# Patient Record
Sex: MALE | Race: BLACK OR AFRICAN AMERICAN | Employment: OTHER | URBAN - METROPOLITAN AREA
[De-identification: names, ages, dates, MRNs, and addresses within clinical notes are randomized per-mention and may not be internally consistent; named-entity substitution may affect disease eponyms.]

---

## 2019-08-29 ENCOUNTER — IP HISTORICAL/CONVERTED ENCOUNTER (OUTPATIENT)
Dept: OTHER | Age: 69
End: 2019-08-29

## 2019-09-19 ENCOUNTER — IP HISTORICAL/CONVERTED ENCOUNTER (OUTPATIENT)
Dept: OTHER | Age: 69
End: 2019-09-19

## 2020-01-22 ENCOUNTER — OP HISTORICAL/CONVERTED ENCOUNTER (OUTPATIENT)
Dept: OTHER | Age: 70
End: 2020-01-22

## 2020-01-28 ENCOUNTER — OP HISTORICAL/CONVERTED ENCOUNTER (OUTPATIENT)
Dept: OTHER | Age: 70
End: 2020-01-28

## 2020-08-20 ENCOUNTER — OP HISTORICAL/CONVERTED ENCOUNTER (OUTPATIENT)
Dept: OTHER | Age: 70
End: 2020-08-20

## 2020-09-29 ENCOUNTER — HOSPITAL ENCOUNTER (OUTPATIENT)
Age: 70
Setting detail: OUTPATIENT SURGERY
Discharge: HOME | End: 2020-09-29
Attending: INTERNAL MEDICINE | Admitting: INTERNAL MEDICINE
Payer: MEDICARE

## 2020-09-29 VITALS
OXYGEN SATURATION: 99 % | HEART RATE: 71 BPM | HEIGHT: 70 IN | DIASTOLIC BLOOD PRESSURE: 73 MMHG | TEMPERATURE: 97.6 F | BODY MASS INDEX: 32.93 KG/M2 | WEIGHT: 230 LBS | SYSTOLIC BLOOD PRESSURE: 150 MMHG | RESPIRATION RATE: 18 BRPM

## 2020-09-29 LAB
GLUCOSE BLD STRIP.AUTO-MCNC: 108 MG/DL (ref 65–100)
PERFORMED BY, TECHID: ABNORMAL

## 2020-09-29 PROCEDURE — P9045 ALBUMIN (HUMAN), 5%, 250 ML: HCPCS | Performed by: INTERNAL MEDICINE

## 2020-09-29 PROCEDURE — 77030018870 HC TY PARCNT BD -B: Performed by: INTERNAL MEDICINE

## 2020-09-29 PROCEDURE — 74011250636 HC RX REV CODE- 250/636: Performed by: INTERNAL MEDICINE

## 2020-09-29 PROCEDURE — 2709999900 HC NON-CHARGEABLE SUPPLY: Performed by: INTERNAL MEDICINE

## 2020-09-29 PROCEDURE — 76040000007: Performed by: INTERNAL MEDICINE

## 2020-09-29 PROCEDURE — 82962 GLUCOSE BLOOD TEST: CPT

## 2020-09-29 RX ORDER — SODIUM CHLORIDE 9 MG/ML
20 INJECTION, SOLUTION INTRAVENOUS CONTINUOUS
Status: DISCONTINUED | OUTPATIENT
Start: 2020-09-29 | End: 2020-09-29 | Stop reason: HOSPADM

## 2020-09-29 RX ORDER — ALBUMIN HUMAN 50 G/1000ML
37.5 SOLUTION INTRAVENOUS ONCE
Status: COMPLETED | OUTPATIENT
Start: 2020-09-29 | End: 2020-09-29

## 2020-09-29 RX ADMIN — ALBUMIN (HUMAN) 37.5 G: 12.5 INJECTION, SOLUTION INTRAVENOUS at 16:00

## 2020-09-29 NOTE — PROGRESS NOTES
Third bottle of albumin started per Dr. Leola Bose order to complete dose of 37.5grams dressing clean dry and intact to right abdomen

## 2020-09-29 NOTE — ROUTINE PROCESS
Second bottle of albumin started per Dr. Goldman Flow order. Dressing intact slight serous drainage around bandaid cleaned with alcohol.

## 2020-09-29 NOTE — PROGRESS NOTES
After waiting for Dr. Melvin Costa for 30 minutes I attempted to call him 4 times on his cell phone with no success, the office is now closed, and called 2 east and ICU to verify if he was on these units the patient's family member is waiting at the door for him and the patient is ready to be discharged; Patient's abdominal site is clean dry and intact with no more active drainage; Gave the patient Dr. Brii Villatoro office number to call and ask any questions that he may have tomorrow and educated him on signs and symptoms to look out for and when to call the DRUsha Or return to the ER the patient states understanding. Discharged via wheelchair no signs or symptoms of distress noted brother-in-law in attendance iv to right hand d/asiya tape and gauze applied.

## 2020-09-29 NOTE — PROGRESS NOTES
Notified Dr. Jessica Casillas patient has some drainage around bandaid dressing changed 2x2 and bandaids applied for pressure he states he will come talk to patient prior to d/c but to educate patient to lay on opposite side for about 2 hours once at home and if more comfortable while here at the hospital continuing to monitor.

## 2020-10-01 NOTE — PROCEDURES
[facility]  PROCEDURE NOTE    Name:  Rachel Plunkett  MR#:  [id number]  :  1950  ACCOUNT #:  [de-identified]  DATE OF SERVICE:  2020    PARACENTESIS REPORT    REQUESTING PHYSICIAN:  Madelin Rodriguez MD.    PREOPERATIVE DIAGNOSIS: Ascites. POST-PROCEDURE DIAGNOSIS:  Twelve liters of clear fluid was drained from abdomen and 750 mL of 5% albumin was infused. The patient tolerated the procedure very well and there were no immediate post-procedure complications. PROCEDURE PERFORMED:  Paracentesis. SURGEON:  Garcia Yee MD.    Freddy Dueñas    ANESTHESIA:  Topical anesthesia with lidocaine    ESTIMATED BLOOD LOSS: none. SPECIMENS REMOVED:  yes. COMPLICATIONS:  none. IMPLANTS:  none. INDICATIONS:  Grade IV ascites. PROCEDURE IN DETAIL:  The patient was placed in supine position. Ultrasound was used to susy the site for paracentesis. Area was prepped and draped and anesthetized with 1% lidocaine. A small stab incision was placed with a blade and trocar with a needle was introduced into the abdominal cavity. This was then hooked up to vacuum bottles and 12.2 liters of fluid was removed. The patient tolerated the procedure very well. His blood pressure and heart rate were monitored throughout the procedure. 750 ml of 5% Albumin infusion was given. RECOMMENDATIONS:  Low-salt diet, diuretic therapy, and followup as an outpatient in two weeks' time. Thank you for allowing me to participate in this patient's care.       Frederic Weiss MD      [id number]  D:  10/01/2020 9:25  T:  10/01/2020 11:53  JOB #:  [id number]  CC:  Madeiln Rodriguez MD

## 2020-11-29 ENCOUNTER — HOSPITAL ENCOUNTER (OUTPATIENT)
Dept: PREADMISSION TESTING | Age: 70
Discharge: HOME | End: 2020-11-29
Payer: MEDICARE

## 2020-11-29 LAB — SARS-COV-2, COV2: NORMAL

## 2020-11-29 PROCEDURE — 87635 SARS-COV-2 COVID-19 AMP PRB: CPT

## 2020-11-30 LAB — SARS-COV-2, COV2NT: NOT DETECTED

## 2020-12-03 ENCOUNTER — APPOINTMENT (OUTPATIENT)
Dept: ENDOSCOPY | Age: 70
End: 2020-12-03
Attending: INTERNAL MEDICINE
Payer: MEDICARE

## 2020-12-03 ENCOUNTER — ANESTHESIA (OUTPATIENT)
Dept: ENDOSCOPY | Age: 70
End: 2020-12-03
Payer: MEDICARE

## 2020-12-03 ENCOUNTER — HOSPITAL ENCOUNTER (OUTPATIENT)
Age: 70
Setting detail: OUTPATIENT SURGERY
Discharge: HOME | End: 2020-12-03
Attending: INTERNAL MEDICINE | Admitting: INTERNAL MEDICINE
Payer: MEDICARE

## 2020-12-03 ENCOUNTER — ANESTHESIA EVENT (OUTPATIENT)
Dept: ENDOSCOPY | Age: 70
End: 2020-12-03
Payer: MEDICARE

## 2020-12-03 VITALS
BODY MASS INDEX: 30.78 KG/M2 | WEIGHT: 215 LBS | SYSTOLIC BLOOD PRESSURE: 149 MMHG | RESPIRATION RATE: 16 BRPM | HEART RATE: 65 BPM | TEMPERATURE: 98 F | DIASTOLIC BLOOD PRESSURE: 86 MMHG | OXYGEN SATURATION: 95 % | HEIGHT: 70 IN

## 2020-12-03 LAB
GLUCOSE BLD STRIP.AUTO-MCNC: 102 MG/DL (ref 65–100)
PERFORMED BY, TECHID: ABNORMAL

## 2020-12-03 PROCEDURE — 74011250636 HC RX REV CODE- 250/636: Performed by: NURSE ANESTHETIST, CERTIFIED REGISTERED

## 2020-12-03 PROCEDURE — 74011000250 HC RX REV CODE- 250: Performed by: NURSE ANESTHETIST, CERTIFIED REGISTERED

## 2020-12-03 PROCEDURE — 2709999900 HC NON-CHARGEABLE SUPPLY: Performed by: INTERNAL MEDICINE

## 2020-12-03 PROCEDURE — 88305 TISSUE EXAM BY PATHOLOGIST: CPT

## 2020-12-03 PROCEDURE — 76040000007: Performed by: INTERNAL MEDICINE

## 2020-12-03 PROCEDURE — 76060000032 HC ANESTHESIA 0.5 TO 1 HR: Performed by: INTERNAL MEDICINE

## 2020-12-03 PROCEDURE — 77030021593 HC FCPS BIOP ENDOSC BSC -A: Performed by: INTERNAL MEDICINE

## 2020-12-03 PROCEDURE — 82962 GLUCOSE BLOOD TEST: CPT

## 2020-12-03 PROCEDURE — 77030019988 HC FCPS ENDOSC DISP BSC -B: Performed by: INTERNAL MEDICINE

## 2020-12-03 PROCEDURE — 74011250636 HC RX REV CODE- 250/636: Performed by: INTERNAL MEDICINE

## 2020-12-03 RX ORDER — FUROSEMIDE 80 MG/1
80 TABLET ORAL 2 TIMES DAILY
COMMUNITY
End: 2021-01-24

## 2020-12-03 RX ORDER — OMEPRAZOLE/SODIUM BICARBONATE 20MG-1.1G
1 CAPSULE ORAL 2 TIMES DAILY
COMMUNITY
End: 2021-01-24

## 2020-12-03 RX ORDER — SODIUM CHLORIDE 9 MG/ML
25 INJECTION, SOLUTION INTRAVENOUS CONTINUOUS
Status: DISCONTINUED | OUTPATIENT
Start: 2020-12-03 | End: 2020-12-04 | Stop reason: HOSPADM

## 2020-12-03 RX ORDER — METOPROLOL TARTRATE 5 MG/5ML
2.5 INJECTION INTRAVENOUS
Status: CANCELLED | OUTPATIENT
Start: 2020-12-03

## 2020-12-03 RX ORDER — LABETALOL HCL 20 MG/4 ML
5 SYRINGE (ML) INTRAVENOUS
Status: CANCELLED | OUTPATIENT
Start: 2020-12-03

## 2020-12-03 RX ORDER — SODIUM CHLORIDE 9 MG/ML
INJECTION, SOLUTION INTRAVENOUS
Status: DISCONTINUED | OUTPATIENT
Start: 2020-12-03 | End: 2020-12-03 | Stop reason: HOSPADM

## 2020-12-03 RX ORDER — HYDRALAZINE HYDROCHLORIDE 20 MG/ML
10 INJECTION INTRAMUSCULAR; INTRAVENOUS
Status: CANCELLED | OUTPATIENT
Start: 2020-12-03

## 2020-12-03 RX ORDER — LIDOCAINE HYDROCHLORIDE 20 MG/ML
INJECTION, SOLUTION EPIDURAL; INFILTRATION; INTRACAUDAL; PERINEURAL
Status: COMPLETED
Start: 2020-12-03 | End: 2020-12-03

## 2020-12-03 RX ORDER — PROPOFOL 10 MG/ML
INJECTION, EMULSION INTRAVENOUS
Status: COMPLETED
Start: 2020-12-03 | End: 2020-12-03

## 2020-12-03 RX ORDER — CALCITRIOL 0.25 UG/1
0.25 CAPSULE ORAL DAILY
COMMUNITY
End: 2021-12-08 | Stop reason: ALTCHOICE

## 2020-12-03 RX ORDER — METOPROLOL SUCCINATE 50 MG/1
50 TABLET, EXTENDED RELEASE ORAL 2 TIMES DAILY
COMMUNITY
End: 2021-09-16

## 2020-12-03 RX ORDER — PROPOFOL 10 MG/ML
INJECTION, EMULSION INTRAVENOUS AS NEEDED
Status: DISCONTINUED | OUTPATIENT
Start: 2020-12-03 | End: 2020-12-03 | Stop reason: HOSPADM

## 2020-12-03 RX ORDER — CLONIDINE HYDROCHLORIDE 0.1 MG/1
0.1 TABLET ORAL 3 TIMES DAILY
COMMUNITY
End: 2021-01-24

## 2020-12-03 RX ORDER — HYDRALAZINE HYDROCHLORIDE 100 MG/1
100 TABLET, FILM COATED ORAL 3 TIMES DAILY
Status: ON HOLD | COMMUNITY
End: 2021-01-24 | Stop reason: SDUPTHER

## 2020-12-03 RX ORDER — LIDOCAINE HYDROCHLORIDE 20 MG/ML
INJECTION, SOLUTION EPIDURAL; INFILTRATION; INTRACAUDAL; PERINEURAL AS NEEDED
Status: DISCONTINUED | OUTPATIENT
Start: 2020-12-03 | End: 2020-12-03 | Stop reason: HOSPADM

## 2020-12-03 RX ORDER — ATORVASTATIN CALCIUM 40 MG/1
40 TABLET, FILM COATED ORAL DAILY
COMMUNITY

## 2020-12-03 RX ADMIN — PROPOFOL 20 MG: 10 INJECTION, EMULSION INTRAVENOUS at 08:32

## 2020-12-03 RX ADMIN — SODIUM CHLORIDE 25 ML/HR: 9 INJECTION, SOLUTION INTRAVENOUS at 07:51

## 2020-12-03 RX ADMIN — LIDOCAINE HYDROCHLORIDE 100 MG: 20 INJECTION, SOLUTION EPIDURAL; INFILTRATION; INTRACAUDAL; PERINEURAL at 08:06

## 2020-12-03 RX ADMIN — PROPOFOL 20 MG: 10 INJECTION, EMULSION INTRAVENOUS at 08:20

## 2020-12-03 RX ADMIN — PROPOFOL 20 MG: 10 INJECTION, EMULSION INTRAVENOUS at 08:35

## 2020-12-03 RX ADMIN — PROPOFOL 20 MG: 10 INJECTION, EMULSION INTRAVENOUS at 08:29

## 2020-12-03 RX ADMIN — PROPOFOL 20 MG: 10 INJECTION, EMULSION INTRAVENOUS at 08:23

## 2020-12-03 RX ADMIN — PROPOFOL 50 MG: 10 INJECTION, EMULSION INTRAVENOUS at 08:08

## 2020-12-03 RX ADMIN — PROPOFOL 20 MG: 10 INJECTION, EMULSION INTRAVENOUS at 08:27

## 2020-12-03 RX ADMIN — SODIUM CHLORIDE: 9 INJECTION, SOLUTION INTRAVENOUS at 08:00

## 2020-12-03 RX ADMIN — PROPOFOL 100 MG: 10 INJECTION, EMULSION INTRAVENOUS at 08:06

## 2020-12-03 RX ADMIN — PROPOFOL 20 MG: 10 INJECTION, EMULSION INTRAVENOUS at 08:17

## 2020-12-03 RX ADMIN — PROPOFOL 50 MG: 10 INJECTION, EMULSION INTRAVENOUS at 08:12

## 2020-12-03 RX ADMIN — PROPOFOL 20 MG: 10 INJECTION, EMULSION INTRAVENOUS at 08:37

## 2020-12-03 RX ADMIN — PROPOFOL 20 MG: 10 INJECTION, EMULSION INTRAVENOUS at 08:25

## 2020-12-03 NOTE — ROUTINE PROCESS
Pt talked with MD prior to d/c, taking po's well, has followup appt for two weeks already scheduled. D/c'd home with family. 13.2

## 2020-12-03 NOTE — ANESTHESIA PREPROCEDURE EVALUATION
Relevant Problems   No relevant active problems       Anesthetic History   No history of anesthetic complications            Review of Systems / Medical History  Patient summary reviewed, nursing notes reviewed and pertinent labs reviewed    Pulmonary  Within defined limits                 Neuro/Psych   Within defined limits           Cardiovascular    Hypertension      CHF    Hyperlipidemia         GI/Hepatic/Renal         Renal disease: CRI  Liver disease     Endo/Other    Diabetes         Other Findings            Past Medical History:   Diagnosis Date    [facility]     Chronic kidney disease     CKD (chronic kidney disease)     4    Diabetes ([facility]     Hypertension        Past Surgical History:   Procedure Laterality Date    HX HEART CATHETERIZATION      HX HERNIA REPAIR         Current Outpatient Medications   Medication Instructions    atorvastatin (LIPITOR) 40 mg, Oral, DAILY    calcitRIOL (ROCALTROL) 0.25 mcg, Oral, DAILY    cloNIDine HCL (CATAPRES) 0.1 mg, Oral, 3 TIMES DAILY    furosemide (LASIX) 80 mg, Oral, 2 TIMES DAILY    hydrALAZINE (APRESOLINE) 100 mg, Oral, 3 TIMES DAILY    metoprolol succinate (TOPROL-XL) 50 mg, Oral, 2 TIMES DAILY    omeprazole-sodium bicarbonate (ZEGERID) 20-1.1 mg-gram capsule 1 Cap, Oral, 2 TIMES DAILY    OTHER,NON-FORMULARY, 5 mg, Oral, DAILY, Ongluza        Current Facility-Administered Medications   Medication Dose Route Frequency    0.9% sodium chloride infusion  25 mL/hr IntraVENous CONTINUOUS       Patient Vitals for the past 24 hrs:   Temp Pulse Resp BP SpO2   12/03/20 0739 36.7 °C (98 °F) 69 18 (!) 162/85 98 %       Lab Results   Component Value Date/Time    WBC 8.8 07/08/2010 04:10 AM    HGB 13.9 07/08/2010 04:10 AM    HCT 38.6 07/08/2010 04:10 AM    PLATELET 119 15/00/9243 04:10 AM    MCV 79.6 (L) 07/08/2010 04:10 AM     Lab Results   Component Value Date/Time    Sodium 129 (L) 07/08/2010 04:10 AM    Potassium 4.5 07/08/2010 04:10 AM    Chloride 93 (L) 07/08/2010 04:10 AM    CO2 21 07/08/2010 04:10 AM    Anion gap 15 07/08/2010 04:10 AM    Glucose 497 (H) 07/08/2010 04:10 AM    BUN 18 07/08/2010 04:10 AM    Creatinine 2.0 (H) 07/08/2010 04:10 AM    BUN/Creatinine ratio 9 (L) 07/08/2010 04:10 AM    GFR est AA 44 (L) 07/08/2010 04:10 AM    GFR est non-AA 37 (L) 07/08/2010 04:10 AM    Calcium 9.0 07/08/2010 04:10 AM     No results found for: APTT, PTP, INR, INREXT  Lab Results   Component Value Date/Time    Glucose 497 (H) 07/08/2010 04:10 AM    Glucose (POC) 102 (H) 12/03/2020 07:50 AM     Physical Exam    Airway  Mallampati: I  TM Distance: 4 - 6 cm  Neck ROM: normal range of motion   Mouth opening: Normal     Cardiovascular    Rhythm: regular  Rate: normal         Dental  No notable dental hx       Pulmonary  Breath sounds clear to auscultation               Abdominal  GI exam deferred       Other Findings            Anesthetic Plan    ASA: 3  Anesthesia type: total IV anesthesia and general          Induction: Intravenous  Anesthetic plan and risks discussed with: Patient and Family      General anesthesia was prescribed for this patient because by definition it is \"a drug-induced loss of consciousness during which patients are not arousable, even by painful stimulation. \" Sometimes, the ability to independently maintain ventilatory function is often impaired and patients often require assistance in maintaining a patent airway. Occasionally, positive pressure ventilation may be required because of depressed spontaneous ventilation or drug-induced depression of neuromuscular function. This depth of anesthesia is preferred for endoscopic procedures to facilitate the procedure and for patient safety/quality of care.

## 2021-01-16 ENCOUNTER — HOSPITAL ENCOUNTER (INPATIENT)
Age: 71
LOS: 8 days | Discharge: NURSING, REHAB OR LONG-TERM CARE | DRG: 177 | End: 2021-01-24
Attending: EMERGENCY MEDICINE | Admitting: HOSPITALIST
Payer: MEDICARE

## 2021-01-16 ENCOUNTER — APPOINTMENT (OUTPATIENT)
Dept: GENERAL RADIOLOGY | Age: 71
DRG: 177 | End: 2021-01-16
Attending: EMERGENCY MEDICINE
Payer: MEDICARE

## 2021-01-16 DIAGNOSIS — J96.01 ACUTE RESPIRATORY FAILURE WITH HYPOXIA (HCC): Primary | ICD-10-CM

## 2021-01-16 DIAGNOSIS — U07.1 COVID-19: ICD-10-CM

## 2021-01-16 DIAGNOSIS — N17.9 ACUTE RENAL FAILURE SUPERIMPOSED ON STAGE 5 CHRONIC KIDNEY DISEASE, NOT ON CHRONIC DIALYSIS, UNSPECIFIED ACUTE RENAL FAILURE TYPE (HCC): ICD-10-CM

## 2021-01-16 DIAGNOSIS — N18.5 ACUTE RENAL FAILURE SUPERIMPOSED ON STAGE 5 CHRONIC KIDNEY DISEASE, NOT ON CHRONIC DIALYSIS, UNSPECIFIED ACUTE RENAL FAILURE TYPE (HCC): ICD-10-CM

## 2021-01-16 DIAGNOSIS — I50.9 CONGESTIVE HEART FAILURE, UNSPECIFIED HF CHRONICITY, UNSPECIFIED HEART FAILURE TYPE (HCC): ICD-10-CM

## 2021-01-16 PROBLEM — N17.0 ATN (ACUTE TUBULAR NECROSIS) (HCC): Status: ACTIVE | Noted: 2021-01-16

## 2021-01-16 PROBLEM — J96.90 RESPIRATORY FAILURE (HCC): Status: ACTIVE | Noted: 2021-01-16

## 2021-01-16 LAB
ALBUMIN SERPL-MCNC: 2.9 G/DL (ref 3.5–5)
ALBUMIN/GLOB SERPL: 0.4 {RATIO} (ref 1.1–2.2)
ALP SERPL-CCNC: 121 U/L (ref 45–117)
ALT SERPL-CCNC: 31 U/L (ref 12–78)
ANION GAP SERPL CALC-SCNC: 17 MMOL/L (ref 5–15)
AST SERPL W P-5'-P-CCNC: 75 U/L (ref 15–37)
BASE DEFICIT BLDV-SCNC: 10.5 MMOL/L (ref 0–2)
BASOPHILS # BLD: 0 K/UL (ref 0–0.1)
BASOPHILS NFR BLD: 0 % (ref 0–1)
BDY SITE: ABNORMAL
BILIRUB SERPL-MCNC: 0.5 MG/DL (ref 0.2–1)
BNP SERPL-MCNC: ABNORMAL PG/ML
BUN SERPL-MCNC: 115 MG/DL (ref 6–20)
BUN/CREAT SERPL: 13 (ref 12–20)
CA-I BLD-MCNC: 8.9 MG/DL (ref 8.5–10.1)
CHLORIDE SERPL-SCNC: 105 MMOL/L (ref 97–108)
CO2 SERPL-SCNC: 12 MMOL/L (ref 21–32)
COVID-19 RAPID TEST, COVR: DETECTED
CREAT SERPL-MCNC: 9.17 MG/DL (ref 0.7–1.3)
DIFFERENTIAL METHOD BLD: ABNORMAL
EOSINOPHIL # BLD: 0 K/UL (ref 0–0.4)
EOSINOPHIL NFR BLD: 0 % (ref 0–7)
ERYTHROCYTE [DISTWIDTH] IN BLOOD BY AUTOMATED COUNT: 15 % (ref 11.5–14.5)
GAS FLOW.O2 O2 DELIVERY SYS: 4 L/MIN
GLOBULIN SER CALC-MCNC: 7.5 G/DL (ref 2–4)
GLUCOSE SERPL-MCNC: 172 MG/DL (ref 65–100)
HCO3 BLDV-SCNC: 16 MMOL/L (ref 22–26)
HCT VFR BLD AUTO: 39.2 % (ref 36.6–50.3)
HGB BLD-MCNC: 13.2 G/DL (ref 12.1–17)
IMM GRANULOCYTES # BLD AUTO: 0 K/UL
IMM GRANULOCYTES NFR BLD AUTO: 0 %
LYMPHOCYTES # BLD: 0.8 K/UL (ref 0.8–3.5)
LYMPHOCYTES NFR BLD: 6 % (ref 12–49)
MAGNESIUM SERPL-MCNC: 2.2 MG/DL (ref 1.6–2.4)
MCH RBC QN AUTO: 27.2 PG (ref 26–34)
MCHC RBC AUTO-ENTMCNC: 33.7 G/DL (ref 30–36.5)
MCV RBC AUTO: 80.7 FL (ref 80–99)
MONOCYTES # BLD: 1.1 K/UL (ref 0–1)
MONOCYTES NFR BLD: 8 % (ref 5–13)
NEUTS SEG # BLD: 11.5 K/UL (ref 1.8–8)
NEUTS SEG NFR BLD: 86 % (ref 32–75)
NRBC # BLD: 0 K/UL (ref 0–0.01)
NRBC BLD-RTO: 0 PER 100 WBC
PCO2 BLDV: 27.1 MMHG (ref 40–50)
PH BLDV: 7.33 [PH] (ref 7.31–7.41)
PLATELET # BLD AUTO: 296 K/UL (ref 150–400)
PMV BLD AUTO: 11.7 FL (ref 8.9–12.9)
PO2 BLDV: 166 MMHG (ref 36–42)
POTASSIUM SERPL-SCNC: 5.4 MMOL/L (ref 3.5–5.1)
PROT SERPL-MCNC: 10.4 G/DL (ref 6.4–8.2)
RBC # BLD AUTO: 4.86 M/UL (ref 4.1–5.7)
RBC MORPH BLD: ABNORMAL
RBC MORPH BLD: ABNORMAL
SAO2 % BLDV: 99 % (ref 60–80)
SAO2% DEVICE SAO2% SENSOR NAME: ABNORMAL
SARS-COV-2, COV2: NORMAL
SODIUM SERPL-SCNC: 134 MMOL/L (ref 136–145)
SPECIMEN SOURCE: ABNORMAL
TROPONIN I SERPL-MCNC: 0.76 NG/ML
WBC # BLD AUTO: 13.4 K/UL (ref 4.1–11.1)

## 2021-01-16 PROCEDURE — 83735 ASSAY OF MAGNESIUM: CPT

## 2021-01-16 PROCEDURE — 74011250637 HC RX REV CODE- 250/637: Performed by: HOSPITALIST

## 2021-01-16 PROCEDURE — 85025 COMPLETE CBC W/AUTO DIFF WBC: CPT

## 2021-01-16 PROCEDURE — 83880 ASSAY OF NATRIURETIC PEPTIDE: CPT

## 2021-01-16 PROCEDURE — 87635 SARS-COV-2 COVID-19 AMP PRB: CPT

## 2021-01-16 PROCEDURE — 74011000250 HC RX REV CODE- 250: Performed by: EMERGENCY MEDICINE

## 2021-01-16 PROCEDURE — 36415 COLL VENOUS BLD VENIPUNCTURE: CPT

## 2021-01-16 PROCEDURE — 82803 BLOOD GASES ANY COMBINATION: CPT

## 2021-01-16 PROCEDURE — 74011250636 HC RX REV CODE- 250/636: Performed by: HOSPITALIST

## 2021-01-16 PROCEDURE — 99285 EMERGENCY DEPT VISIT HI MDM: CPT

## 2021-01-16 PROCEDURE — 74011250637 HC RX REV CODE- 250/637: Performed by: EMERGENCY MEDICINE

## 2021-01-16 PROCEDURE — 71045 X-RAY EXAM CHEST 1 VIEW: CPT

## 2021-01-16 PROCEDURE — 65270000029 HC RM PRIVATE

## 2021-01-16 PROCEDURE — 93005 ELECTROCARDIOGRAM TRACING: CPT

## 2021-01-16 PROCEDURE — 74011250636 HC RX REV CODE- 250/636: Performed by: EMERGENCY MEDICINE

## 2021-01-16 PROCEDURE — 84484 ASSAY OF TROPONIN QUANT: CPT

## 2021-01-16 PROCEDURE — 80053 COMPREHEN METABOLIC PANEL: CPT

## 2021-01-16 RX ORDER — FUROSEMIDE 10 MG/ML
40 INJECTION INTRAMUSCULAR; INTRAVENOUS 2 TIMES DAILY
Status: DISCONTINUED | OUTPATIENT
Start: 2021-01-17 | End: 2021-01-17

## 2021-01-16 RX ORDER — DEXAMETHASONE SODIUM PHOSPHATE 10 MG/ML
10 INJECTION INTRAMUSCULAR; INTRAVENOUS ONCE
Status: DISCONTINUED | OUTPATIENT
Start: 2021-01-16 | End: 2021-01-16

## 2021-01-16 RX ORDER — FUROSEMIDE 10 MG/ML
80 INJECTION INTRAMUSCULAR; INTRAVENOUS
Status: COMPLETED | OUTPATIENT
Start: 2021-01-16 | End: 2021-01-16

## 2021-01-16 RX ORDER — HEPARIN SODIUM 5000 [USP'U]/ML
5000 INJECTION, SOLUTION INTRAVENOUS; SUBCUTANEOUS EVERY 8 HOURS
Status: DISCONTINUED | OUTPATIENT
Start: 2021-01-16 | End: 2021-01-24 | Stop reason: HOSPADM

## 2021-01-16 RX ORDER — DEXAMETHASONE SODIUM PHOSPHATE 4 MG/ML
6 INJECTION, SOLUTION INTRA-ARTICULAR; INTRALESIONAL; INTRAMUSCULAR; INTRAVENOUS; SOFT TISSUE EVERY 8 HOURS
Status: DISCONTINUED | OUTPATIENT
Start: 2021-01-16 | End: 2021-01-21

## 2021-01-16 RX ORDER — SODIUM BICARBONATE 1 MEQ/ML
50 SYRINGE (ML) INTRAVENOUS
Status: COMPLETED | OUTPATIENT
Start: 2021-01-16 | End: 2021-01-16

## 2021-01-16 RX ORDER — ATORVASTATIN CALCIUM 40 MG/1
40 TABLET, FILM COATED ORAL DAILY
Status: DISCONTINUED | OUTPATIENT
Start: 2021-01-17 | End: 2021-01-24 | Stop reason: HOSPADM

## 2021-01-16 RX ORDER — DEXAMETHASONE SODIUM PHOSPHATE 10 MG/ML
10 INJECTION INTRAMUSCULAR; INTRAVENOUS ONCE
Status: COMPLETED | OUTPATIENT
Start: 2021-01-16 | End: 2021-01-16

## 2021-01-16 RX ORDER — CALCITRIOL 0.25 UG/1
0.25 CAPSULE ORAL DAILY
Status: DISCONTINUED | OUTPATIENT
Start: 2021-01-17 | End: 2021-01-19

## 2021-01-16 RX ORDER — HYDRALAZINE HYDROCHLORIDE 50 MG/1
100 TABLET, FILM COATED ORAL 3 TIMES DAILY
Status: DISCONTINUED | OUTPATIENT
Start: 2021-01-16 | End: 2021-01-19

## 2021-01-16 RX ORDER — NITROGLYCERIN 0.4 MG/1
0.4 TABLET SUBLINGUAL
Status: COMPLETED | OUTPATIENT
Start: 2021-01-16 | End: 2021-01-16

## 2021-01-16 RX ORDER — METOPROLOL SUCCINATE 50 MG/1
50 TABLET, EXTENDED RELEASE ORAL 2 TIMES DAILY
Status: DISCONTINUED | OUTPATIENT
Start: 2021-01-16 | End: 2021-01-24 | Stop reason: HOSPADM

## 2021-01-16 RX ORDER — PANTOPRAZOLE SODIUM 40 MG/1
40 TABLET, DELAYED RELEASE ORAL DAILY
Status: DISCONTINUED | OUTPATIENT
Start: 2021-01-17 | End: 2021-01-24 | Stop reason: HOSPADM

## 2021-01-16 RX ORDER — CLONIDINE HYDROCHLORIDE 0.1 MG/1
0.1 TABLET ORAL 3 TIMES DAILY
Status: DISCONTINUED | OUTPATIENT
Start: 2021-01-16 | End: 2021-01-18

## 2021-01-16 RX ORDER — SODIUM BICARBONATE 650 MG/1
975 TABLET ORAL 2 TIMES DAILY
Status: DISCONTINUED | OUTPATIENT
Start: 2021-01-16 | End: 2021-01-17

## 2021-01-16 RX ADMIN — HYDRALAZINE HYDROCHLORIDE 100 MG: 50 TABLET, FILM COATED ORAL at 16:48

## 2021-01-16 RX ADMIN — SODIUM BICARBONATE 50 MEQ: 84 INJECTION INTRAVENOUS at 15:14

## 2021-01-16 RX ADMIN — METOPROLOL SUCCINATE 50 MG: 50 TABLET, EXTENDED RELEASE ORAL at 21:07

## 2021-01-16 RX ADMIN — AZITHROMYCIN DIHYDRATE 500 MG: 500 INJECTION, POWDER, LYOPHILIZED, FOR SOLUTION INTRAVENOUS at 16:48

## 2021-01-16 RX ADMIN — FUROSEMIDE 80 MG: 10 INJECTION, SOLUTION INTRAMUSCULAR; INTRAVENOUS at 15:09

## 2021-01-16 RX ADMIN — CLONIDINE HYDROCHLORIDE 0.1 MG: 0.1 TABLET ORAL at 16:48

## 2021-01-16 RX ADMIN — SODIUM BICARBONATE 50 MEQ: 84 INJECTION INTRAVENOUS at 15:10

## 2021-01-16 RX ADMIN — NITROGLYCERIN 1 INCH: 20 OINTMENT TOPICAL at 15:09

## 2021-01-16 RX ADMIN — DEXAMETHASONE SODIUM PHOSPHATE 10 MG: 10 INJECTION, SOLUTION INTRAMUSCULAR; INTRAVENOUS at 15:09

## 2021-01-16 RX ADMIN — HYDRALAZINE HYDROCHLORIDE 100 MG: 50 TABLET, FILM COATED ORAL at 21:07

## 2021-01-16 RX ADMIN — HEPARIN SODIUM 5000 UNITS: 5000 INJECTION INTRAVENOUS; SUBCUTANEOUS at 16:48

## 2021-01-16 RX ADMIN — NITROGLYCERIN 0.4 MG: 0.4 TABLET, ORALLY DISINTEGRATING SUBLINGUAL at 15:09

## 2021-01-16 RX ADMIN — CLONIDINE HYDROCHLORIDE 0.1 MG: 0.1 TABLET ORAL at 21:07

## 2021-01-16 NOTE — Clinical Note
Status[de-identified] INPATIENT [101]   Type of Bed: Intensive Care [6]   Inpatient Hospitalization Certified Necessary for the Following Reasons: 9.  Other (further clarification in H&P documentation)   Admitting Diagnosis: Respiratory failure ([facility] [[id number]]   Admitting Diagnosis: ATN (acute tubular necrosis) [facility]) [[id number]]   Admitting Physician: Conrad Guzman [[id number]]   Attending Physician: Conrad Guzman [[id number]]   Estimated Length of Stay: 3-4 Midnights   Discharge Plan[de-identified] Extended Care Facility (e.g. Adult Home, Nursing Home, etc.)

## 2021-01-16 NOTE — ED TRIAGE NOTES
Sob, hx of chf, pts family member covid positive. Pt states he is not any more sob than normal. pts family member at home relayed she is not able to care for pt anymore.  gcs 15

## 2021-01-16 NOTE — H&P
History and Physical    Subjective:   Chief Complaint : worsening shortness of breath since few days  Source of information : patient    History of present illness:     70M, H/o CHF unknown EF, CKD IV with worsening shortness of breath wince few days    He states, he was been on lasix 80 mg BID for almost a month and his lower extremity swelling has resolved, but lately he started to notice worsening shortness of breath , gradual, constant more on movement. People at home developed COVID and he was in contact with them. Chronically has h/o CHF unknown EF with CKDIV. ER: lasix 80 and 2 amps of bicarb. Past Medical History:   Diagnosis Date    [facility]     Chronic kidney disease     CKD (chronic kidney disease)     4    Diabetes ([facility]     Hypertension      Past Surgical History:   Procedure Laterality Date    COLONOSCOPY N/A 12/3/2020    COLONOSCOPY performed by Liz Ames MD at [facility] ENDOSCOPY    HX HEART CATHETERIZATION      HX HERNIA REPAIR       Family History   Problem Relation Age of Onset    Diabetes Mother     Heart Failure Father       Social History     Tobacco Use    Smoking status: Never Smoker    Smokeless tobacco: Never Used   Substance Use Topics    Alcohol use: Not Currently       Prior to Admission medications    Medication Sig Start Date End Date Taking? Authorizing Provider   hydrALAZINE (APRESOLINE) 100 mg tablet Take 100 mg by mouth three (3) times daily. Provider, Historical   metoprolol succinate (TOPROL-XL) 50 mg XL tablet Take 50 mg by mouth two (2) times a day. Provider, Historical   furosemide (Lasix) 80 mg tablet Take 80 mg by mouth two (2) times a day. Provider, Historical   calcitRIOL (ROCALTROL) 0.25 mcg capsule Take 0.25 mcg by mouth daily. Provider, Historical   omeprazole-sodium bicarbonate (ZEGERID) 20-1.1 mg-gram capsule Take 1 Cap by mouth two (2) times a day.     Provider, Historical OTHER,NON-FORMULARY, Take 5 mg by mouth daily. Ongluza    Provider, Historical   cloNIDine HCL (CATAPRES) 0.1 mg tablet Take 0.1 mg by mouth three (3) times daily. Provider, Historical   atorvastatin (LIPITOR) 40 mg tablet Take 40 mg by mouth daily. Provider, Historical     Allergies   Allergen Reactions    Lisinopril Angioedema    Pcn [Penicillins] Rash             Review of Systems:  Constitutional: Appetite is NOT good, denies weight loss, no fever, no chills, no night sweats  Eye: No recent visual disturbances, no discharge, no double vision  Ear/nose/mouth/throat : No hearing disturbance, no ear pain, no nasal congestion, no sore throat, no trouble swallowing. Respiratory : +++ trouble breathing, ++ cough, ++++ shortness of breath, no hemoptysis, no wheezing  Cardiovascular : No chest pain, no palpitation, no racing of heart, no orthopnea, no paroxysmal nocturnal dyspnea, no peripheral edema  Gastrointestinal : ++ nausea, no vomiting, no diarrhea, constipation, heartburn, abdominal pain  Genitourinary : No dysuria, no hematuria, no increased frequency, incontinence,  Lymphatics : No swollen glands -Neck, axillary, inguinal  Endocrine : No excessive thirst, no polyuria no cold intolerance, no heat intolerance.   Immunologic : No hives, urticaria, no seasonal allergies,   Musculoskeletal : No joint swelling, pain, effusion,  no back pain, no neck pain,   Integumentary : No rash, no pruritus, no ecchymosis  Hematology : No petechiae, No easy bruising,  No tendency to bleed easy  Neurology : Denies change in mental status, no abnormal balance, no headache, no confusion, numbness, tingling,  Psychiatric : No mood swings, no anxiety, depression    Vitals:     Visit Vitals  BP (!) 161/98   Pulse 82   Resp 24   Ht 5' 10\" (1.778 m)   Wt 95.3 kg (210 lb)   SpO2 95%   BMI 30.13 kg/m²       Physical Exam:   General : appears comfortable, on 2L NC, no distress  HEENT : PERRLA, normal oral mucosa, atraumatic normocephalic, Normal ear and nose. oropharynx  Neck : Supple, no JVD, no masses noted, no carotid bruit  Lungs : Breath sounds with good air entry bilaterally, no wheezes or rales, no accessory muscle use,  CVS : Rhythm rate regular, S1+, S2+, no murmur or gallop  Abdomen : Soft, nontender, no organomegaly, bowel sounds active  Extremities : 1 + edema  Musculoskeletal : Fair range of motion, no joint swelling or effusion, muscle tone and power appears normal,   Skin : Moist, warm, skin turgor, no pathological rash  Lymphatic : No cervical lymphadenopathy. Neurological : Awake, alert, oriented to time place person. Cranial nerves intact, deep tendon reflexes active, no sensory disturbance, no cerebellar deficits. Psychiatric : Mood and affect appears appropriate to the situation. Data Review:   Recent Results (from the past 24 hour(s))   CBC WITH AUTOMATED DIFF    Collection Time: 01/16/21 12:30 PM   Result Value Ref Range    WBC 13.4 (H) 4.1 - 11.1 K/uL    RBC 4.86 4.10 - 5.70 M/uL    HGB 13.2 12.1 - 17.0 g/dL    HCT 39.2 36.6 - 50.3 %    MCV 80.7 80.0 - 99.0 FL    MCH 27.2 26.0 - 34.0 PG    MCHC 33.7 30.0 - 36.5 g/dL    RDW 15.0 (H) 11.5 - 14.5 %    PLATELET 540 616 - 723 K/uL    MPV 11.7 8.9 - 12.9 FL    NRBC 0.0 0  WBC    ABSOLUTE NRBC 0.00 0.00 - 0.01 K/uL    NEUTROPHILS 86 (H) 32 - 75 %    LYMPHOCYTES 6 (L) 12 - 49 %    MONOCYTES 8 5 - 13 %    EOSINOPHILS 0 0 - 7 %    BASOPHILS 0 0 - 1 %    IMMATURE GRANULOCYTES 0 %    ABS. NEUTROPHILS 11.5 (H) 1.8 - 8.0 K/UL    ABS. LYMPHOCYTES 0.8 0.8 - 3.5 K/UL    ABS. MONOCYTES 1.1 (H) 0.0 - 1.0 K/UL    ABS. EOSINOPHILS 0.0 0.0 - 0.4 K/UL    ABS. BASOPHILS 0.0 0.0 - 0.1 K/UL    ABS. IMM.  GRANS. 0.0 K/UL    DF Manual      RBC COMMENTS Anisocytosis  1+        RBC COMMENTS Microcytosis  1+       METABOLIC PANEL, COMPREHENSIVE    Collection Time: 01/16/21 12:30 PM   Result Value Ref Range    Sodium 134 (L) 136 - 145 mmol/L    Potassium 5.4 (H) 3.5 - 5.1 mmol/L    Chloride 105 97 - 108 mmol/L    CO2 12 (LL) 21 - 32 mmol/L    Anion gap 17 (H) 5 - 15 mmol/L    Glucose 172 (H) 65 - 100 mg/dL     (H) 6 - 20 mg/dL    Creatinine 9.17 (H) 0.70 - 1.30 mg/dL    BUN/Creatinine ratio 13 12 - 20      GFR est AA 7 (L) >60 ml/min/1.73m2    GFR est non-AA 6 (L) >60 ml/min/1.73m2    Calcium 8.9 8.5 - 10.1 mg/dL    Bilirubin, total 0.5 0.2 - 1.0 mg/dL    AST (SGOT) 75 (H) 15 - 37 U/L    ALT (SGPT) 31 12 - 78 U/L    Alk. phosphatase 121 (H) 45 - 117 U/L    Protein, total 10.4 (H) 6.4 - 8.2 g/dL    Albumin 2.9 (L) 3.5 - 5.0 g/dL    Globulin 7.5 (H) 2.0 - 4.0 g/dL    A-G Ratio 0.4 (L) 1.1 - 2.2     TROPONIN I    Collection Time: 01/16/21 12:30 PM   Result Value Ref Range    Troponin-I, Qt. 0.76 (H) <0.05 ng/mL   BNP    Collection Time: 01/16/21 12:30 PM   Result Value Ref Range    NT pro-BNP >35,000 (H) <125 pg/mL   MAGNESIUM    Collection Time: 01/16/21 12:30 PM   Result Value Ref Range    Magnesium 2.2 1.6 - 2.4 mg/dL   SARS-COV-2    Collection Time: 01/16/21  1:30 PM   Result Value Ref Range    SARS-CoV-2 Nasopharyngeal     COVID-19 RAPID TEST    Collection Time: 01/16/21  1:30 PM   Result Value Ref Range    Specimen source Nasopharyngeal      COVID-19 rapid test DETECTED (A) Not Detected               Assessment and Plan :     (1) Acute hypoxic respiratory failure: s/t COVID and pulmonary edema. o2 to keep saturation    (2) COVID-19 pneumonia: azithromycin and decadron, consult pulmonology. (3) CHF: ECHO, lasix 40 BID. (4) CKD IV: possible CRS but also possible over diuresed with lasix 80 bid. If cr increasing tomorrow with stop lasix and see. Nephrology consult, he might end up needing HD. (5) HTN: resume clonidine and hydralazine    (6) GERD: protonix    DVT ppx:heparin SubQ    DISPOSITION: 4-5 days, pt to determine.      Signed By: Isaiah Garcia MD     January 16, 2021

## 2021-01-16 NOTE — ED NOTES
Labs and ekg completed, labs sent     Pt rounding complete, pt is awake alert and oriented x4, pt is resting on the stretcher, vitals are stable. Pt call bell within reach, and belongings are at bedside, pt updated on the plan of care at this time.

## 2021-01-16 NOTE — Clinical Note
#15 Schon inserted via access into right IJ. Velocity assessed both ports. Flushed with NS. Lidocaine 1% to access site of catheter. Catheter sutured in place.

## 2021-01-16 NOTE — ED PROVIDER NOTES
EMERGENCY DEPARTMENT HISTORY AND PHYSICAL EXAM      Date: 1/16/2021  Patient Name: Yordy Camargo    History of Presenting Illness     Chief Complaint   Patient presents with    Shortness of Breath       History Provided By: Patient    HPI: Roberth De Jesus, 72 y.o. male with PMHx as noted below presents the emergency department with complaints of shortness of breath. History is limited as patient is very poor historian. Patient notes progressively worsening dyspnea over the last several weeks but states acutely worsened yesterday. He reports symptoms are worse with exertion and laying flat. Otherwise no other relieving or exacerbating factors. Patient is denying any pain at this time.   Also denying any fevers, nausea, vomiting, chest pain      PCP: Annel Chopra MD    Current Facility-Administered Medications   Medication Dose Route Frequency Provider Last Rate Last Admin    calcium gluconate injection 1 g  1 g IntraVENous NOW Nirmal Kearns MD        albuterol (PROVENTIL HFA, VENTOLIN HFA, PROAIR HFA) inhaler 2 Puff  2 Puff Inhalation Q4H PRN Nirmal Kearns MD        sodium bicarbonate tablet 1,300 mg  1,300 mg Oral BID Dilan Gustafson MD   1,300 mg at 01/17/21 0924    furosemide (LASIX) injection 80 mg  80 mg IntraVENous BID Dilan Gustafson MD   80 mg at 01/17/21 0904    atorvastatin (LIPITOR) tablet 40 mg  40 mg Oral DAILY Elidia Good MD   40 mg at 01/17/21 2768    calcitRIOL (ROCALTROL) capsule 0.25 mcg  0.25 mcg Oral DAILY Elidia Good MD   0.25 mcg at 01/17/21 0922    metoprolol succinate (TOPROL-XL) XL tablet 50 mg  50 mg Oral BID Elidia Good MD   50 mg at 01/17/21 2154    hydrALAZINE (APRESOLINE) tablet 100 mg  100 mg Oral TID Elidia Good MD   100 mg at 01/17/21 0904    pantoprazole (PROTONIX) tablet 40 mg  40 mg Oral DAILY Elidia Good MD   40 mg at 01/17/21 8136    cloNIDine HCL (CATAPRES) tablet 0.1 mg  0.1 mg Oral TID Elidia Good MD   0.1 mg at 01/17/21 0904    azithromycin (ZITHROMAX) 500 mg in 0.9% sodium chloride 250 mL (VIAL-MATE)  500 mg IntraVENous Q24H Emery Madrid MD   500 mg at 01/16/21 1648    dexamethasone (DECADRON) 4 mg/mL injection 6 mg  6 mg IntraVENous Q8H Emery Madrid MD   6 mg at 01/17/21 0904    heparin (porcine) injection 5,000 Units  5,000 Units SubCUTAneous Q8H Emery Madrid MD   5,000 Units at 01/17/21 0904     Current Outpatient Medications   Medication Sig Dispense Refill    hydrALAZINE (APRESOLINE) 100 mg tablet Take 100 mg by mouth three (3) times daily.  metoprolol succinate (TOPROL-XL) 50 mg XL tablet Take 50 mg by mouth two (2) times a day.  furosemide (Lasix) 80 mg tablet Take 80 mg by mouth two (2) times a day.  calcitRIOL (ROCALTROL) 0.25 mcg capsule Take 0.25 mcg by mouth daily.  omeprazole-sodium bicarbonate (ZEGERID) 20-1.1 mg-gram capsule Take 1 Cap by mouth two (2) times a day.  OTHER,NON-FORMULARY, Take 5 mg by mouth daily. Ongluza      cloNIDine HCL (CATAPRES) 0.1 mg tablet Take 0.1 mg by mouth three (3) times daily.  atorvastatin (LIPITOR) 40 mg tablet Take 40 mg by mouth daily. Past History     Past Medical History:  Past Medical History:   Diagnosis Date    [facility] ([facility] [address]     Chronic kidney disease     CKD (chronic kidney disease)     4    Diabetes (HCC)     Hypertension        Past Surgical History:  Past Surgical History:   Procedure Laterality Date    COLONOSCOPY N/A 12/3/2020    COLONOSCOPY performed by Kojo Molina MD at [address] [place] [address] ENDOSCOPY    HX HEART CATHETERIZATION      HX HERNIA REPAIR         Family History:  Family History   Problem Relation Age of Onset    Diabetes Mother     Heart Failure Father        Social History:  Social History     Tobacco Use    Smoking status: Never Smoker    Smokeless tobacco: Never Used   Substance Use Topics    Alcohol use: Not Currently    Drug use: Never       Allergies:   Allergies Allergen Reactions    Lisinopril Angioedema    Pcn [Penicillins] Rash         Review of Systems   Review of Systems  Constitutional: Negative for fever, chills, and fatigue. HENT: Negative for congestion, sore throat, rhinorrhea, sneezing and neck stiffness   Eyes: Negative for discharge and redness. Respiratory: Positive for  shortness of breath. Negative wheezing   Cardiovascular: Negative for chest pain, palpitations   Gastrointestinal: Negative for nausea, vomiting, abdominal pain, constipation, diarrhea and blood in stool. Genitourinary: Negative for dysuria, urgency, frequency, hematuria, flank pain, decreased urine volume, discharge,   Musculoskeletal: Negative for myalgias or joint pain . Skin: Negative for rash or lesions . Neurological: Negative weakness, light-headedness, numbness and headaches. Physical Exam   Physical Exam    GENERAL: alert and oriented, no acute distress  EYES: PEERL, No injection, discharge or icterus. ENT: Mucous membranes pink and moist.  NECK: Supple  LUNGS: Airway patent. Labored respirations, crackles in the bases bilaterally  HEART: Regular rate and rhythm. No peripheral edema  ABDOMEN: Non-distended and non-tender, without guarding or rebound.   SKIN:  warm, dry  EXTREMITIES: Without swelling, tenderness or deformity, symmetric with normal ROM  NEUROLOGICAL: Alert, oriented      Diagnostic Study Results     Labs -     Recent Results (from the past 12 hour(s))   METABOLIC PANEL, BASIC    Collection Time: 01/17/21  3:19 AM   Result Value Ref Range    Sodium 136 136 - 145 mmol/L    Potassium 5.6 (H) 3.5 - 5.1 mmol/L    Chloride 110 (H) 97 - 108 mmol/L    CO2 13 (LL) 21 - 32 mmol/L    Anion gap 13 5 - 15 mmol/L    Glucose 210 (H) 65 - 100 mg/dL     (H) 6 - 20 mg/dL    Creatinine 8.96 (H) 0.70 - 1.30 mg/dL    BUN/Creatinine ratio 14 12 - 20      GFR est AA 7 (L) >60 ml/min/1.73m2    GFR est non-AA 6 (L) >60 ml/min/1.73m2    Calcium 7.6 (L) 8.5 - 10.1 mg/dL   PROCALCITONIN    Collection Time: 01/17/21  3:19 AM   Result Value Ref Range    Procalcitonin 7.59 (H) 0 ng/mL       Radiologic Studies -   XR CHEST SNGL V   Final Result   IMPRESSION: Findings consistent with developing congestive heart failure. CT Results  (Last 48 hours)    None        CXR Results  (Last 48 hours)               01/16/21 1336  XR CHEST SNGL V Final result    Impression:  IMPRESSION: Findings consistent with developing congestive heart failure. Narrative:  Frontal radiograph of the chest at 1:29 PM. No prior study. INDICATION: Shortness of breath. Heart size is enlarged. Pulmonary vasculature is prominent with increased   interstitial markings consistent with developing edema. No focal consolidation   or effusion. No pneumothorax. Bones appear intact. Medical Decision Making     ITita MD am the first provider for this patient and am the attending of record for this patient encounter. I reviewed the vital signs, available nursing notes, past medical history, past surgical history, family history and social history. Vital Signs-Reviewed the patient's vital signs. Records Reviewed: Nursing Notes and Old Medical Records    Provider Notes (Medical Decision Making):   71-year-old male presenting with labored respirations. Noted to be hypoxic on arrival.  X-ray showed findings concerning for volume overload although may be indicative of viral pneumonia as he is Covid positive as well. Labs notable for acute renal failure with a creatinine of 9 as well as a metabolic acidosis and hyperkalemia. At this time uncertain as to whether or not patient is volume overloaded for intravascularly volume depleted. In consultation with nephrology will trial Lasix and give patient 2 A of bicarb given his acidosis and hyperkalemia.   Will monitor urine output closely and recheck BMP with plan for dialysis in the morning if no significant improvement. ED Course:   Initial assessment performed. The patients presenting problems have been discussed, and they are in agreement with the care plan formulated and outlined with them. I have encouraged them to ask questions as they arise throughout their visit.         Medications   atorvastatin (LIPITOR) tablet 40 mg (40 mg Oral Given 1/17/21 0904)   calcitRIOL (ROCALTROL) capsule 0.25 mcg (0.25 mcg Oral Given 1/17/21 0923)   metoprolol succinate (TOPROL-XL) XL tablet 50 mg (50 mg Oral Given 1/17/21 0904)   hydrALAZINE (APRESOLINE) tablet 100 mg (100 mg Oral Given 1/17/21 0904)   pantoprazole (PROTONIX) tablet 40 mg (40 mg Oral Given 1/17/21 0904)   cloNIDine HCL (CATAPRES) tablet 0.1 mg (0.1 mg Oral Given 1/17/21 0904)   azithromycin (ZITHROMAX) 500 mg in 0.9% sodium chloride 250 mL (VIAL-MATE) (500 mg IntraVENous Given 1/16/21 1648)   dexamethasone (DECADRON) 4 mg/mL injection 6 mg (6 mg IntraVENous Given 1/17/21 0904)   heparin (porcine) injection 5,000 Units (5,000 Units SubCUTAneous Given 1/17/21 0904)   calcium gluconate injection 1 g (has no administration in time range)   albuterol (PROVENTIL HFA, VENTOLIN HFA, PROAIR HFA) inhaler 2 Puff (has no administration in time range)   sodium bicarbonate tablet 1,300 mg (1,300 mg Oral Given 1/17/21 0924)   furosemide (LASIX) injection 80 mg (80 mg IntraVENous Given 1/17/21 0904)   furosemide (LASIX) injection 80 mg (80 mg IntraVENous Given 1/16/21 1509)   sodium bicarbonate 8.4 % (1 mEq/mL) injection 50 mEq (50 mEq IntraVENous Given 1/16/21 1514)   sodium bicarbonate 8.4 % (1 mEq/mL) injection 50 mEq (50 mEq IntraVENous Given 1/16/21 1510)   nitroglycerin (NITROBID) 2 % ointment 1 Inch (1 Inch Topical Given 1/16/21 1509)   nitroglycerin (NITROSTAT) tablet 0.4 mg (0.4 mg SubLINGual Given 1/16/21 1509)   dexamethasone (PF) (DECADRON) 10 mg/mL injection 10 mg (10 mg IntraVENous Given 1/16/21 1509)   sodium polystyrene (KAYEXALATE) 15 gram/60 mL oral suspension 30 g (30 g Oral Given 1/17/21 0924)       PROGRESS:  The patient has been re-evaluated and sx have improved. Reviewed available results with patient and have counseled them on diagnosis and care plan. They have expressed understanding, and all their questions have been answered. They agree with plan for admission. CONSULT:  Lara Bellamy MD spoke with the hospitalist.  Discussed HPI and PE, available diagnostic tests and clinical findings. He is in agreement with care plans as outlined and will evaluate for admission     Admit Note  Patient is being admitted to the hospitalist.  The results of their tests and reasons for their admission have been discussed with them and/or available family. They convey agreement and understanding for the need to be admitted and for their admission diagnosis. Consultation has been made with the inpatient physician specialist for hospitalization. Critical Care Note     IMPENDING DETERIORATION -Respiratory, Cardiovascular and Renal  ASSOCIATED RISK FACTORS - Hypoxia  MANAGEMENT- Bedside Assessment and Supervision of Care  INTERPRETATION -  Xrays, ECG and Blood Pressure  INTERVENTIONS -supplemental oxygen titration, hyperkalemia treatment  CASE REVIEW - Hospitalist and Medical Sub-Specialist  TREATMENT RESPONSE -Improved  PERFORMED BY - Self    NOTES   :  I have provided a total of 47 minutes of critical time not including time spent on separately documented procedures. The reason for providing this level of medical care for this critically ill patient was due to a critical illness that impaired one or more vital organ systems such that there was a high probability of imminent or life threatening deterioration in the patients condition. This care involved high complexity decision making to assess, manipulate, and support vital system functions,  lab review, consultations with specialist, family decision- making, bedside attention and documentation.  During this entire length of time I was immediately available to the patient    Disposition:  admission    PLAN:  1. Admit     Diagnosis     Clinical Impression:   1. Acute respiratory failure with hypoxia ([facility] [address]    2. Acute renal failure superimposed on stage 5 chronic kidney disease, not on chronic dialysis, unspecified acute renal failure type ([facility] [address]    3. COVID-19    4. Congestive heart failure, unspecified HF chronicity, unspecified heart failure type [facility])        Please note that this dictation was completed with Dragon, computer voice recognition software. Quite often unanticipated grammatical, syntax, homophones, and other interpretive errors are inadvertently transcribed by the computer software. Please disregard these errors. Additionally, please excuse any errors that have escaped final proofreading.

## 2021-01-17 LAB
ANION GAP SERPL CALC-SCNC: 13 MMOL/L (ref 5–15)
BUN SERPL-MCNC: 123 MG/DL (ref 6–20)
BUN/CREAT SERPL: 14 (ref 12–20)
CA-I BLD-MCNC: 7.6 MG/DL (ref 8.5–10.1)
CHLORIDE SERPL-SCNC: 110 MMOL/L (ref 97–108)
CO2 SERPL-SCNC: 13 MMOL/L (ref 21–32)
CREAT SERPL-MCNC: 8.96 MG/DL (ref 0.7–1.3)
CRP SERPL HS-MCNC: >9.5 MG/L
GLUCOSE SERPL-MCNC: 210 MG/DL (ref 65–100)
POTASSIUM SERPL-SCNC: 5.6 MMOL/L (ref 3.5–5.1)
PROCALCITONIN SERPL-MCNC: 7.59 NG/ML
SODIUM SERPL-SCNC: 136 MMOL/L (ref 136–145)

## 2021-01-17 PROCEDURE — 36415 COLL VENOUS BLD VENIPUNCTURE: CPT

## 2021-01-17 PROCEDURE — 74011250636 HC RX REV CODE- 250/636: Performed by: INTERNAL MEDICINE

## 2021-01-17 PROCEDURE — 65270000029 HC RM PRIVATE

## 2021-01-17 PROCEDURE — 74011250637 HC RX REV CODE- 250/637: Performed by: HOSPITALIST

## 2021-01-17 PROCEDURE — 86141 C-REACTIVE PROTEIN HS: CPT

## 2021-01-17 PROCEDURE — 84145 PROCALCITONIN (PCT): CPT

## 2021-01-17 PROCEDURE — 74011250637 HC RX REV CODE- 250/637: Performed by: INTERNAL MEDICINE

## 2021-01-17 PROCEDURE — 80048 BASIC METABOLIC PNL TOTAL CA: CPT

## 2021-01-17 PROCEDURE — 74011250636 HC RX REV CODE- 250/636: Performed by: HOSPITALIST

## 2021-01-17 RX ORDER — CALCIUM GLUCONATE 94 MG/ML
1 INJECTION, SOLUTION INTRAVENOUS
Status: ACTIVE | OUTPATIENT
Start: 2021-01-17 | End: 2021-01-17

## 2021-01-17 RX ORDER — SODIUM BICARBONATE 650 MG/1
1300 TABLET ORAL 2 TIMES DAILY
Status: DISCONTINUED | OUTPATIENT
Start: 2021-01-17 | End: 2021-01-18

## 2021-01-17 RX ORDER — FUROSEMIDE 10 MG/ML
80 INJECTION INTRAMUSCULAR; INTRAVENOUS 2 TIMES DAILY
Status: DISCONTINUED | OUTPATIENT
Start: 2021-01-17 | End: 2021-01-19

## 2021-01-17 RX ORDER — SODIUM POLYSTYRENE SULFONATE 15 G/60ML
30 SUSPENSION ORAL; RECTAL
Status: COMPLETED | OUTPATIENT
Start: 2021-01-17 | End: 2021-01-17

## 2021-01-17 RX ORDER — ALBUTEROL SULFATE 90 UG/1
2 AEROSOL, METERED RESPIRATORY (INHALATION)
Status: DISCONTINUED | OUTPATIENT
Start: 2021-01-17 | End: 2021-01-24 | Stop reason: HOSPADM

## 2021-01-17 RX ADMIN — SODIUM BICARBONATE 650 MG TABLET 1300 MG: at 09:24

## 2021-01-17 RX ADMIN — CALCITRIOL CAPSULES 0.25 MCG 0.25 MCG: 0.25 CAPSULE ORAL at 09:23

## 2021-01-17 RX ADMIN — CLONIDINE HYDROCHLORIDE 0.1 MG: 0.1 TABLET ORAL at 09:04

## 2021-01-17 RX ADMIN — AZITHROMYCIN DIHYDRATE 500 MG: 500 INJECTION, POWDER, LYOPHILIZED, FOR SOLUTION INTRAVENOUS at 16:14

## 2021-01-17 RX ADMIN — SODIUM POLYSTYRENE SULFONATE 30 G: 15 SUSPENSION ORAL; RECTAL at 09:24

## 2021-01-17 RX ADMIN — HYDRALAZINE HYDROCHLORIDE 100 MG: 50 TABLET, FILM COATED ORAL at 16:13

## 2021-01-17 RX ADMIN — HEPARIN SODIUM 5000 UNITS: 5000 INJECTION INTRAVENOUS; SUBCUTANEOUS at 16:13

## 2021-01-17 RX ADMIN — ATORVASTATIN CALCIUM 40 MG: 40 TABLET, FILM COATED ORAL at 09:04

## 2021-01-17 RX ADMIN — FUROSEMIDE 80 MG: 10 INJECTION, SOLUTION INTRAMUSCULAR; INTRAVENOUS at 09:04

## 2021-01-17 RX ADMIN — DEXAMETHASONE SODIUM PHOSPHATE 6 MG: 4 INJECTION, SOLUTION INTRA-ARTICULAR; INTRALESIONAL; INTRAMUSCULAR; INTRAVENOUS; SOFT TISSUE at 09:04

## 2021-01-17 RX ADMIN — DEXAMETHASONE SODIUM PHOSPHATE 6 MG: 4 INJECTION, SOLUTION INTRA-ARTICULAR; INTRALESIONAL; INTRAMUSCULAR; INTRAVENOUS; SOFT TISSUE at 16:13

## 2021-01-17 RX ADMIN — CLONIDINE HYDROCHLORIDE 0.1 MG: 0.1 TABLET ORAL at 16:15

## 2021-01-17 RX ADMIN — METOPROLOL SUCCINATE 50 MG: 50 TABLET, EXTENDED RELEASE ORAL at 09:04

## 2021-01-17 RX ADMIN — SODIUM BICARBONATE 650 MG TABLET 975 MG: at 00:17

## 2021-01-17 RX ADMIN — HYDRALAZINE HYDROCHLORIDE 100 MG: 50 TABLET, FILM COATED ORAL at 09:04

## 2021-01-17 RX ADMIN — HEPARIN SODIUM 5000 UNITS: 5000 INJECTION INTRAVENOUS; SUBCUTANEOUS at 00:17

## 2021-01-17 RX ADMIN — DEXAMETHASONE SODIUM PHOSPHATE 6 MG: 4 INJECTION, SOLUTION INTRA-ARTICULAR; INTRALESIONAL; INTRAMUSCULAR; INTRAVENOUS; SOFT TISSUE at 00:17

## 2021-01-17 RX ADMIN — HEPARIN SODIUM 5000 UNITS: 5000 INJECTION INTRAVENOUS; SUBCUTANEOUS at 09:04

## 2021-01-17 RX ADMIN — PANTOPRAZOLE SODIUM 40 MG: 40 TABLET, DELAYED RELEASE ORAL at 09:04

## 2021-01-17 NOTE — PROGRESS NOTES
PROGRESS NOTE    Subjective:   Chief Complaint : worsening shortness of breath since few days  Source of information : patient    History of present illness:     70M, H/o CHF unknown EF, CKD IV with worsening shortness of breath wince few days s/t respiratory failure s.t COVID and fluid overload s.t ESRD. Plan for HD, while treating COVID    Past Medical History:   Diagnosis Date    [facility]est[facility] failure) ([facility] [address]     Chronic kidney disease     CKD (chronic kidney disease)     4    Diabetes ([facility] [address]     Hypertension      Past Surgical History:   Procedure Laterality Date    COLONOSCOPY N/A 12/3/2020    COLONOSCOPY performed by Urmila Dent MD at [address] [facility] ENDOSCOPY    HX HEART CATHETERIZATION      HX HERNIA REPAIR       Family History   Problem Relation Age of Onset    Diabetes Mother     Heart Failure Father       Social History     Tobacco Use    Smoking status: Never Smoker    Smokeless tobacco: Never Used   Substance Use Topics    Alcohol use: Not Currently       Prior to Admission medications    Medication Sig Start Date End Date Taking? Authorizing Provider   hydrALAZINE (APRESOLINE) 100 mg tablet Take 100 mg by mouth three (3) times daily. Provider, Historical   metoprolol succinate (TOPROL-XL) 50 mg XL tablet Take 50 mg by mouth two (2) times a day. Provider, Historical   furosemide (Lasix) 80 mg tablet Take 80 mg by mouth two (2) times a day. Provider, Historical   calcitRIOL (ROCALTROL) 0.25 mcg capsule Take 0.25 mcg by mouth daily. Provider, Historical   omeprazole-sodium bicarbonate (ZEGERID) 20-1.1 mg-gram capsule Take 1 Cap by mouth two (2) times a day. Provider, Historical   OTHER,NON-FORMULARY, Take 5 mg by mouth daily. Ongluza    Provider, Historical   cloNIDine HCL (CATAPRES) 0.1 mg tablet Take 0.1 mg by mouth three (3) times daily. Provider, Historical   atorvastatin (LIPITOR) 40 mg tablet Take 40 mg by mouth daily.     Provider, Historical     Allergies Allergen Reactions    Lisinopril Angioedema    Pcn [Penicillins] Rash             Review of Systems:  Constitutional: Appetite is NOT good, denies weight loss, no fever, no chills, no night sweats  Eye: No recent visual disturbances, no discharge, no double vision  Ear/nose/mouth/throat : No hearing disturbance, no ear pain, no nasal congestion, no sore throat, no trouble swallowing. Respiratory : +++ trouble breathing, ++ cough, ++++ shortness of breath, no hemoptysis, no wheezing  Cardiovascular : No chest pain, no palpitation, no racing of heart, no orthopnea, no paroxysmal nocturnal dyspnea, no peripheral edema  Gastrointestinal : ++ nausea, no vomiting, no diarrhea, constipation, heartburn, abdominal pain  Genitourinary : No dysuria, no hematuria, no increased frequency, incontinence,  Lymphatics : No swollen glands -Neck, axillary, inguinal  Endocrine : No excessive thirst, no polyuria no cold intolerance, no heat intolerance. Immunologic : No hives, urticaria, no seasonal allergies,   Musculoskeletal : No joint swelling, pain, effusion,  no back pain, no neck pain,   Integumentary : No rash, no pruritus, no ecchymosis  Hematology : No petechiae, No easy bruising,  No tendency to bleed easy  Neurology : Denies change in mental status, no abnormal balance, no headache, no confusion, numbness, tingling,  Psychiatric : No mood swings, no anxiety, depression    Vitals:     Visit Vitals  /80   Pulse 65   Temp 98.9 °F (37.2 °C)   Resp 20   Ht 5' 10\" (1.778 m)   Wt 95.3 kg (210 lb)   SpO2 95%   BMI 30.13 kg/m²       Physical Exam:   General : appears comfortable, on 2L NC, no distress  HEENT : PERRLA, normal oral mucosa, atraumatic normocephalic, Normal ear and nose.     oropharynx  Neck : Supple, no JVD, no masses noted, no carotid bruit  Lungs : Breath sounds with good air entry bilaterally, no wheezes or rales, no accessory muscle use,  CVS : Rhythm rate regular, S1+, S2+, no murmur or gallop  Abdomen : Soft, nontender, no organomegaly, bowel sounds active  Extremities : 1 + edema  Musculoskeletal : Fair range of motion, no joint swelling or effusion, muscle tone and power appears normal,   Skin : Moist, warm, skin turgor, no pathological rash  Lymphatic : No cervical lymphadenopathy.  Neurological : Awake, alert, oriented to time place person.  Cranial nerves intact, deep tendon reflexes active, no sensory disturbance, no cerebellar deficits.  Psychiatric : Mood and affect appears appropriate to the situation.         Data Review:   Recent Results (from the past 24 hour(s))   CBC WITH AUTOMATED DIFF    Collection Time: 01/16/21 12:30 PM   Result Value Ref Range    WBC 13.4 (H) 4.1 - 11.1 K/uL    RBC 4.86 4.10 - 5.70 M/uL    HGB 13.2 12.1 - 17.0 g/dL    HCT 39.2 36.6 - 50.3 %    MCV 80.7 80.0 - 99.0 FL    MCH 27.2 26.0 - 34.0 PG    MCHC 33.7 30.0 - 36.5 g/dL    RDW 15.0 (H) 11.5 - 14.5 %    PLATELET 296 150 - 400 K/uL    MPV 11.7 8.9 - 12.9 FL    NRBC 0.0 0  WBC    ABSOLUTE NRBC 0.00 0.00 - 0.01 K/uL    NEUTROPHILS 86 (H) 32 - 75 %    LYMPHOCYTES 6 (L) 12 - 49 %    MONOCYTES 8 5 - 13 %    EOSINOPHILS 0 0 - 7 %    BASOPHILS 0 0 - 1 %    IMMATURE GRANULOCYTES 0 %    ABS. NEUTROPHILS 11.5 (H) 1.8 - 8.0 K/UL    ABS. LYMPHOCYTES 0.8 0.8 - 3.5 K/UL    ABS. MONOCYTES 1.1 (H) 0.0 - 1.0 K/UL    ABS. EOSINOPHILS 0.0 0.0 - 0.4 K/UL    ABS. BASOPHILS 0.0 0.0 - 0.1 K/UL    ABS. IMM. GRANS. 0.0 K/UL    DF Manual      RBC COMMENTS Anisocytosis  1+        RBC COMMENTS Microcytosis  1+       METABOLIC PANEL, COMPREHENSIVE    Collection Time: 01/16/21 12:30 PM   Result Value Ref Range    Sodium 134 (L) 136 - 145 mmol/L    Potassium 5.4 (H) 3.5 - 5.1 mmol/L    Chloride 105 97 - 108 mmol/L    CO2 12 (LL) 21 - 32 mmol/L    Anion gap 17 (H) 5 - 15 mmol/L    Glucose 172 (H) 65 - 100 mg/dL     (H) 6 - 20 mg/dL    Creatinine 9.17 (H) 0.70 - 1.30 mg/dL    BUN/Creatinine ratio 13 12 - 20      GFR est AA 7 (L) >60 ml/min/1.73m2  GFR est non-AA 6 (L) >60 ml/min/1.73m2    Calcium 8.9 8.5 - 10.1 mg/dL    Bilirubin, total 0.5 0.2 - 1.0 mg/dL    AST (SGOT) 75 (H) 15 - 37 U/L    ALT (SGPT) 31 12 - 78 U/L    Alk.  phosphatase 121 (H) 45 - 117 U/L    Protein, total 10.4 (H) 6.4 - 8.2 g/dL    Albumin 2.9 (L) 3.5 - 5.0 g/dL    Globulin 7.5 (H) 2.0 - 4.0 g/dL    A-G Ratio 0.4 (L) 1.1 - 2.2     TROPONIN I    Collection Time: 01/16/21 12:30 PM   Result Value Ref Range    Troponin-I, Qt. 0.76 (H) <0.05 ng/mL   BNP    Collection Time: 01/16/21 12:30 PM   Result Value Ref Range    NT pro-BNP >35,000 (H) <125 pg/mL   MAGNESIUM    Collection Time: 01/16/21 12:30 PM   Result Value Ref Range    Magnesium 2.2 1.6 - 2.4 mg/dL   SARS-COV-2    Collection Time: 01/16/21  1:30 PM   Result Value Ref Range    SARS-CoV-2 Nasopharyngeal     COVID-19 RAPID TEST    Collection Time: 01/16/21  1:30 PM   Result Value Ref Range    Specimen source Nasopharyngeal      COVID-19 rapid test DETECTED (A) Not Detected     VENOUS BLOOD GAS    Collection Time: 01/16/21  3:30 PM   Result Value Ref Range    VENOUS PH 7.33 7.31 - 7.41      VENOUS PCO2 27.1 (L) 40 - 50 mmHg    VENOUS PO2 166 (H) 36 - 42 mmHg    VENOUS O2 SATURATION 99 (H) 60 - 80 %    VENOUS BICARBONATE 16 (L) 22 - 26 mmol/L    VENOUS BASE DEFICIT 10.5 (H) 0 - 2 mmol/L    O2 METHOD Nasal Cannula      O2 FLOW RATE 4 L/min    SITE Right Radial     METABOLIC PANEL, BASIC    Collection Time: 01/17/21  3:19 AM   Result Value Ref Range    Sodium 136 136 - 145 mmol/L    Potassium 5.6 (H) 3.5 - 5.1 mmol/L    Chloride 110 (H) 97 - 108 mmol/L    CO2 13 (LL) 21 - 32 mmol/L    Anion gap 13 5 - 15 mmol/L    Glucose 210 (H) 65 - 100 mg/dL     (H) 6 - 20 mg/dL    Creatinine 8.96 (H) 0.70 - 1.30 mg/dL    BUN/Creatinine ratio 14 12 - 20      GFR est AA 7 (L) >60 ml/min/1.73m2    GFR est non-AA 6 (L) >60 ml/min/1.73m2    Calcium 7.6 (L) 8.5 - 10.1 mg/dL   PROCALCITONIN    Collection Time: 01/17/21  3:19 AM   Result Value Ref Range Procalcitonin 7.59 (H) 0 ng/mL             Assessment and Plan :     (1) Acute hypoxic respiratory failure: s/t COVID and pulmonary edema. o2 to keep saturation    (2) COVID-19 pneumonia: azithromycin and decadron, consult pulmonology. (3) CHF: ECHO, lasix 40 BID. (4) CKD IV: possible CRS but also possible over diuresed with lasix 80 bid. If cr increasing tomorrow with stop lasix and see. Nephrology consult, he might end up needing HD. (5) HTN: resume clonidine and hydralazine    (6) GERD: protonix    DVT ppx:heparin SubQ    DISPOSITION: 4-5 days, may need permanent HD.      Signed By: Luann Moreira MD     January 17, 2021

## 2021-01-17 NOTE — CONSULTS
Renal Consultation Note    NAME:  Rosey Ames   :   1950   MRN:   [id number]     ATTENDING: Vilma Slater MD  PCP:  Shane Schulte MD    Date/Time:  2021 8:02 AM      Subjective:   REQUESTING PHYSICIAN:  REASON FOR CONSULT:      Patient is a 71-year-old gentleman with history of CKD 4/5 who follows with me outpatient but lost follow-up over the past year, hypertension, congestive heart failure with unknown EF who presented to the ER with shortness of breath which has been worsening over the past few days. He was on Lasix 80 mg twice daily for shortness of breath and leg swelling. According to the patient his leg swelling improved but shortness of breath did not so he came to the ER. He was found to be Covid positive in the ER. Patient has history of exposure to Covid positive patients at home (brother-in-law). On admission he was noted to have very abnormal labs including a creatinine of 9.1 with a GFR of only 7. CO2 was only 12 and potassium 5.4.  proBNP was elevated > 35,000 and chest x-ray suggestive of pulmonary edema. Exact baseline creatinine is unknown but creatinine was 2.0 in 2010. According to patient when he last saw me in the office about a year ago, I had told him his GFR is about 17-18 ml/min/1.73m2. However patient lost follow-up after that  Patient seen in the ER. Currently on Covid isolation. He is on room air but looks a little short of breath during conversation. His creatinine is unchanged at 8.9 today with a GFR of only 7. Potassium has increased to 5.6. He received Lasix 80 mg IV in the ER and 2 amps of bicarb. Pleitez catheter in place with about 200 mils of urine output so far  Patient has had issues with abdominal distention and saw GI about 2 years ago    I discussed with the patient in detail about possibly starting hemodialysis which she is agreeable for.     Past Medical History:   Diagnosis Date    CHF (congestive heart failure) (HCC)     Chronic kidney disease     CKD (chronic kidney disease)     4    Diabetes ([facility] [address]     Hypertension       Past Surgical History:   Procedure Laterality Date    COLONOSCOPY N/A 12/3/2020    COLONOSCOPY performed by Dianna Burks MD at [facility] HX HEART CATHETERIZATION      HX HERNIA REPAIR       Social History     Tobacco Use    Smoking status: Never Smoker    Smokeless tobacco: Never Used   Substance Use Topics    Alcohol use: Not Currently      Family History   Problem Relation Age of Onset    Diabetes Mother     Heart Failure Father        Allergies   Allergen Reactions    Lisinopril Angioedema    Pcn [Penicillins] Rash      Prior to Admission medications    Medication Sig Start Date End Date Taking? Authorizing Provider   hydrALAZINE (APRESOLINE) 100 mg tablet Take 100 mg by mouth three (3) times daily. Provider, Historical   metoprolol succinate (TOPROL-XL) 50 mg XL tablet Take 50 mg by mouth two (2) times a day. Provider, Historical   furosemide (Lasix) 80 mg tablet Take 80 mg by mouth two (2) times a day. Provider, Historical   calcitRIOL (ROCALTROL) 0.25 mcg capsule Take 0.25 mcg by mouth daily. Provider, Historical   omeprazole-sodium bicarbonate (ZEGERID) 20-1.1 mg-gram capsule Take 1 Cap by mouth two (2) times a day. Provider, Historical   OTHER,NON-FORMULARY, Take 5 mg by mouth daily. Ongluza    Provider, Historical   cloNIDine HCL (CATAPRES) 0.1 mg tablet Take 0.1 mg by mouth three (3) times daily. Provider, Historical   atorvastatin (LIPITOR) 40 mg tablet Take 40 mg by mouth daily. Provider, Historical       REVIEW OF SYSTEMS:       Constitutional: Negative for chills and fever. HENT: Negative. Eyes: Negative. Respiratory: Shortness of breath is present   Cardiovascular: Negative. Gastrointestinal: Negative for abdominal pain and nausea. Skin: Negative. Neurological: Negative.       Objective:   VITALS:    Visit Vitals  /69   Pulse 62   Temp 98.9 °F (37.2 °C)   Resp 21   Ht 5' 10\" (1.778 m)   Wt 95.3 kg (210 lb)   SpO2 98%   BMI 30.13 kg/m²     Temp (24hrs), Av.9 °F (37.2 °C), Min:98.9 °F (37.2 °C), Max:98.9 °F (37.2 °C)      PHYSICAL EXAM:   General:    Alert, cooperative, no distress, appears stated age. HEENT: Atraumatic, anicteric sclerae, pink conjunctivae     No oral ulcers, mucosa moist, throat clear  Neck:  JVD is not elevated  Lungs:   Crackles are heard both bases no rhonchi /wheezing  Chest wall:  No tenderness  No Accessory muscle use. Heart:   Regular  rhythm,  No  murmur   trace edema both ankles  Abdomen:   Soft, non-tender. Not distended. Bowel sounds normal  Extremities: No cyanosis. No clubbing  Genitourinary Pleitez catheter in place  Skin:     Eczema +  psych:  Good insight. Not depressed. Not anxious or agitated. Neurologic: Alert and oriented X 4. No asterixis    LAB DATA REVIEWED:    Recent Results (from the past 24 hour(s))   CBC WITH AUTOMATED DIFF    Collection Time: 21 12:30 PM   Result Value Ref Range    WBC 13.4 (H) 4.1 - 11.1 K/uL    RBC 4.86 4.10 - 5.70 M/uL    HGB 13.2 12.1 - 17.0 g/dL    HCT 39.2 36.6 - 50.3 %    MCV 80.7 80.0 - 99.0 FL    MCH 27.2 26.0 - 34.0 PG    MCHC 33.7 30.0 - 36.5 g/dL    RDW 15.0 (H) 11.5 - 14.5 %    PLATELET 129 434 - 534 K/uL    MPV 11.7 8.9 - 12.9 FL    NRBC 0.0 0  WBC    ABSOLUTE NRBC 0.00 0.00 - 0.01 K/uL    NEUTROPHILS 86 (H) 32 - 75 %    LYMPHOCYTES 6 (L) 12 - 49 %    MONOCYTES 8 5 - 13 %    EOSINOPHILS 0 0 - 7 %    BASOPHILS 0 0 - 1 %    IMMATURE GRANULOCYTES 0 %    ABS. NEUTROPHILS 11.5 (H) 1.8 - 8.0 K/UL    ABS. LYMPHOCYTES 0.8 0.8 - 3.5 K/UL    ABS. MONOCYTES 1.1 (H) 0.0 - 1.0 K/UL    ABS. EOSINOPHILS 0.0 0.0 - 0.4 K/UL    ABS. BASOPHILS 0.0 0.0 - 0.1 K/UL    ABS. IMM.  GRANS. 0.0 K/UL    DF Manual      RBC COMMENTS Anisocytosis  1+        RBC COMMENTS Microcytosis  1+       METABOLIC PANEL, COMPREHENSIVE    Collection Time: 21 12:30 PM   Result Value Ref Range Sodium 134 (L) 136 - 145 mmol/L    Potassium 5.4 (H) 3.5 - 5.1 mmol/L    Chloride 105 97 - 108 mmol/L    CO2 12 (LL) 21 - 32 mmol/L    Anion gap 17 (H) 5 - 15 mmol/L    Glucose 172 (H) 65 - 100 mg/dL     (H) 6 - 20 mg/dL    Creatinine 9.17 (H) 0.70 - 1.30 mg/dL    BUN/Creatinine ratio 13 12 - 20      GFR est AA 7 (L) >60 ml/min/1.73m2    GFR est non-AA 6 (L) >60 ml/min/1.73m2    Calcium 8.9 8.5 - 10.1 mg/dL    Bilirubin, total 0.5 0.2 - 1.0 mg/dL    AST (SGOT) 75 (H) 15 - 37 U/L    ALT (SGPT) 31 12 - 78 U/L    Alk.  phosphatase 121 (H) 45 - 117 U/L    Protein, total 10.4 (H) 6.4 - 8.2 g/dL    Albumin 2.9 (L) 3.5 - 5.0 g/dL    Globulin 7.5 (H) 2.0 - 4.0 g/dL    A-G Ratio 0.4 (L) 1.1 - 2.2     TROPONIN I    Collection Time: 01/16/21 12:30 PM   Result Value Ref Range    Troponin-I, Qt. 0.76 (H) <0.05 ng/mL   BNP    Collection Time: 01/16/21 12:30 PM   Result Value Ref Range    NT pro-BNP >35,000 (H) <125 pg/mL   MAGNESIUM    Collection Time: 01/16/21 12:30 PM   Result Value Ref Range    Magnesium 2.2 1.6 - 2.4 mg/dL   SARS-COV-2    Collection Time: 01/16/21  1:30 PM   Result Value Ref Range    SARS-CoV-2 Nasopharyngeal     COVID-19 RAPID TEST    Collection Time: 01/16/21  1:30 PM   Result Value Ref Range    Specimen source Nasopharyngeal      COVID-19 rapid test DETECTED (A) Not Detected     VENOUS BLOOD GAS    Collection Time: 01/16/21  3:30 PM   Result Value Ref Range    VENOUS PH 7.33 7.31 - 7.41      VENOUS PCO2 27.1 (L) 40 - 50 mmHg    VENOUS PO2 166 (H) 36 - 42 mmHg    VENOUS O2 SATURATION 99 (H) 60 - 80 %    VENOUS BICARBONATE 16 (L) 22 - 26 mmol/L    VENOUS BASE DEFICIT 10.5 (H) 0 - 2 mmol/L    O2 METHOD Nasal Cannula      O2 FLOW RATE 4 L/min    SITE Right Radial     METABOLIC PANEL, BASIC    Collection Time: 01/17/21  3:19 AM   Result Value Ref Range    Sodium 136 136 - 145 mmol/L    Potassium 5.6 (H) 3.5 - 5.1 mmol/L    Chloride 110 (H) 97 - 108 mmol/L    CO2 13 (LL) 21 - 32 mmol/L    Anion gap 13 5 - 15 mmol/L    Glucose 210 (H) 65 - 100 mg/dL     (H) 6 - 20 mg/dL    Creatinine 8.96 (H) 0.70 - 1.30 mg/dL    BUN/Creatinine ratio 14 12 - 20      GFR est AA 7 (L) >60 ml/min/1.73m2    GFR est non-AA 6 (L) >60 ml/min/1.73m2    Calcium 7.6 (L) 8.5 - 10.1 mg/dL   PROCALCITONIN    Collection Time: 01/17/21  3:19 AM   Result Value Ref Range    Procalcitonin 7.59 (H) 0 ng/mL       Recommendations/Plan:     1. ILIR on CKD 4/5  -presented with a creatinine of 9.1 . GFR only 7  -Creatinine has only slightly improved to 8.9 today. GFR about 7  -Exact baseline is unknown as office records are not available today but he has advanced CKD as baseline. Follows with me outpatient, though lost follow-up over last year  -Creatinine was 2.0 in 2010.   -ILIR could be prerenal secondary to diuretics versus progression of his renal disease   -presented with volume overload , chest x-ray showing pulmonary congestion  -Continue Lasix 80 iv TID  -Also has associated hyperkalemia and acidosis  -I have spoken to the patient in detail. He is agreeable to starting hemodialysis  -I will have dialysis catheter placed in a.m. Will have temporary catheter placed for now because of leukocytosis  -We will start with hemodialysis in a.m. with 2K bath. Goal to remove 1 to 2 L of fluid  -We will consult case management for placement outpatient in [facility]  -It is bland other than showing glucosuria and hematuria. Will check CT abdomen without contrast because of hematuria  -Quantify proteinuria  -We will check CPK. Not noted to be on any potential nephrotoxins other than diuretics    2. Renal osteodystrophy  -Calcium is okay. We will check phosphorus. Check PTH. Continue Calcitriol at the current dose for now  -Check vitamin D levels    3. Hyperkalemia  -Potassium is 5.6. Likely because of advanced renal disease and high potassium diet. Not on ACE inhibitor/ arbs  -We will treat with Kayexalate today.   Low potassium diet.  -Start hemodialysis in a.m. with 2K bath    4. Severe metabolic acidosis  -presented with bicarb of 12. Improved to 13 today. pH 7.2  -2/2 RTA IV 2/2 DN  -We will start oral bicarbonate 1300 twice daily for now. Will discontinue when patient is started on hemodialysis    5. Covid pneumonia  -Patient presented with shortness of breath. Found to be positive for Covid  -Has been started on Decadron and azithromycin  -Leukocytosis of 13.5. Afebrile for now  -Currently in no respiratory distress    6.   Hypertension  -Blood pressure is labile but mostly well controlled  -I will continue metoprolol and hydralazine at the current dose for now    Thank you for the consultation        ________________________________________________________________________  Signed: Gayatri Armendariz MD

## 2021-01-18 ENCOUNTER — APPOINTMENT (OUTPATIENT)
Dept: INTERVENTIONAL RADIOLOGY/VASCULAR | Age: 71
DRG: 177 | End: 2021-01-18
Attending: INTERNAL MEDICINE
Payer: MEDICARE

## 2021-01-18 LAB
25(OH)D3 SERPL-MCNC: 9.6 NG/ML (ref 30–100)
ALBUMIN SERPL-MCNC: 2.4 G/DL (ref 3.5–5)
ANION GAP SERPL CALC-SCNC: 15 MMOL/L (ref 5–15)
BASOPHILS # BLD: 0 K/UL (ref 0–0.1)
BASOPHILS NFR BLD: 0 % (ref 0–1)
BUN SERPL-MCNC: 131 MG/DL (ref 6–20)
BUN/CREAT SERPL: 13 (ref 12–20)
CA-I BLD-MCNC: 7.9 MG/DL (ref 8.5–10.1)
CA-I BLD-MCNC: 8 MG/DL (ref 8.5–10.1)
CHLORIDE SERPL-SCNC: 105 MMOL/L (ref 97–108)
CO2 SERPL-SCNC: 15 MMOL/L (ref 21–32)
CREAT SERPL-MCNC: 9.76 MG/DL (ref 0.7–1.3)
DIFFERENTIAL METHOD BLD: ABNORMAL
EOSINOPHIL # BLD: 0 K/UL (ref 0–0.4)
EOSINOPHIL NFR BLD: 0 % (ref 0–7)
ERYTHROCYTE [DISTWIDTH] IN BLOOD BY AUTOMATED COUNT: 15 % (ref 11.5–14.5)
GLUCOSE BLD STRIP.AUTO-MCNC: 301 MG/DL (ref 65–100)
GLUCOSE BLD STRIP.AUTO-MCNC: 389 MG/DL (ref 65–100)
GLUCOSE SERPL-MCNC: 320 MG/DL (ref 65–100)
HCT VFR BLD AUTO: 33.9 % (ref 36.6–50.3)
HGB BLD-MCNC: 11.3 G/DL (ref 12.1–17)
IMM GRANULOCYTES # BLD AUTO: 0 K/UL
IMM GRANULOCYTES NFR BLD AUTO: 0 % (ref 0–0.5)
LYMPHOCYTES # BLD: 0.9 K/UL (ref 0.8–3.5)
LYMPHOCYTES NFR BLD: 6 % (ref 12–49)
MCH RBC QN AUTO: 27.1 PG (ref 26–34)
MCHC RBC AUTO-ENTMCNC: 33.3 G/DL (ref 30–36.5)
MCV RBC AUTO: 81.3 FL (ref 80–99)
MONOCYTES # BLD: 0.3 K/UL (ref 0–1)
MONOCYTES NFR BLD: 2 % (ref 5–13)
NEUTS SEG # BLD: 14.2 K/UL (ref 1.8–8)
NEUTS SEG NFR BLD: 92 % (ref 32–75)
PERFORMED BY, TECHID: ABNORMAL
PERFORMED BY, TECHID: ABNORMAL
PHOSPHATE SERPL-MCNC: 6.6 MG/DL (ref 2.6–4.7)
PLATELET # BLD AUTO: 303 K/UL (ref 150–400)
PMV BLD AUTO: 11.5 FL (ref 8.9–12.9)
POTASSIUM SERPL-SCNC: 4.9 MMOL/L (ref 3.5–5.1)
PTH-INTACT SERPL-MCNC: 250.7 PG/ML (ref 18.4–88)
RBC # BLD AUTO: 4.17 M/UL (ref 4.1–5.7)
RBC MORPH BLD: ABNORMAL
SODIUM SERPL-SCNC: 135 MMOL/L (ref 136–145)
WBC # BLD AUTO: 15.4 K/UL (ref 4.1–11.1)

## 2021-01-18 PROCEDURE — 77001 FLUOROGUIDE FOR VEIN DEVICE: CPT

## 2021-01-18 PROCEDURE — 36415 COLL VENOUS BLD VENIPUNCTURE: CPT

## 2021-01-18 PROCEDURE — 74011250636 HC RX REV CODE- 250/636: Performed by: INTERNAL MEDICINE

## 2021-01-18 PROCEDURE — 74011250637 HC RX REV CODE- 250/637: Performed by: INTERNAL MEDICINE

## 2021-01-18 PROCEDURE — 5A1D70Z PERFORMANCE OF URINARY FILTRATION, INTERMITTENT, LESS THAN 6 HOURS PER DAY: ICD-10-PCS | Performed by: INTERNAL MEDICINE

## 2021-01-18 PROCEDURE — 65270000029 HC RM PRIVATE

## 2021-01-18 PROCEDURE — 80069 RENAL FUNCTION PANEL: CPT

## 2021-01-18 PROCEDURE — C1769 GUIDE WIRE: HCPCS

## 2021-01-18 PROCEDURE — 74011250636 HC RX REV CODE- 250/636: Performed by: RADIOLOGY

## 2021-01-18 PROCEDURE — 02HV33Z INSERTION OF INFUSION DEVICE INTO SUPERIOR VENA CAVA, PERCUTANEOUS APPROACH: ICD-10-PCS | Performed by: RADIOLOGY

## 2021-01-18 PROCEDURE — 94760 N-INVAS EAR/PLS OXIMETRY 1: CPT

## 2021-01-18 PROCEDURE — 77010033678 HC OXYGEN DAILY

## 2021-01-18 PROCEDURE — 86708 HEPATITIS A ANTIBODY: CPT

## 2021-01-18 PROCEDURE — 74011250636 HC RX REV CODE- 250/636: Performed by: HOSPITALIST

## 2021-01-18 PROCEDURE — 76937 US GUIDE VASCULAR ACCESS: CPT

## 2021-01-18 PROCEDURE — 74011250637 HC RX REV CODE- 250/637: Performed by: HOSPITALIST

## 2021-01-18 PROCEDURE — 82306 VITAMIN D 25 HYDROXY: CPT

## 2021-01-18 PROCEDURE — 0JH63XZ INSERTION OF TUNNELED VASCULAR ACCESS DEVICE INTO CHEST SUBCUTANEOUS TISSUE AND FASCIA, PERCUTANEOUS APPROACH: ICD-10-PCS | Performed by: RADIOLOGY

## 2021-01-18 PROCEDURE — 82962 GLUCOSE BLOOD TEST: CPT

## 2021-01-18 PROCEDURE — 83970 ASSAY OF PARATHORMONE: CPT

## 2021-01-18 PROCEDURE — 02PYX3Z REMOVAL OF INFUSION DEVICE FROM GREAT VESSEL, EXTERNAL APPROACH: ICD-10-PCS | Performed by: RADIOLOGY

## 2021-01-18 PROCEDURE — 85025 COMPLETE CBC W/AUTO DIFF WBC: CPT

## 2021-01-18 RX ORDER — HEPARIN SODIUM 5000 [USP'U]/ML
5000 INJECTION, SOLUTION INTRAVENOUS; SUBCUTANEOUS ONCE
Status: COMPLETED | OUTPATIENT
Start: 2021-01-18 | End: 2021-01-18

## 2021-01-18 RX ORDER — SODIUM BICARBONATE 650 MG/1
1300 TABLET ORAL 3 TIMES DAILY
Status: DISCONTINUED | OUTPATIENT
Start: 2021-01-18 | End: 2021-01-21

## 2021-01-18 RX ADMIN — HEPARIN SODIUM 5000 UNITS: 5000 INJECTION INTRAVENOUS; SUBCUTANEOUS at 17:10

## 2021-01-18 RX ADMIN — HEPARIN SODIUM 5000 UNITS: 5000 INJECTION INTRAVENOUS; SUBCUTANEOUS at 16:45

## 2021-01-18 RX ADMIN — SODIUM BICARBONATE 650 MG TABLET 1300 MG: at 08:55

## 2021-01-18 RX ADMIN — CLONIDINE HYDROCHLORIDE 0.1 MG: 0.1 TABLET ORAL at 08:55

## 2021-01-18 RX ADMIN — HEPARIN SODIUM 5000 UNITS: 5000 INJECTION INTRAVENOUS; SUBCUTANEOUS at 08:55

## 2021-01-18 RX ADMIN — CALCITRIOL CAPSULES 0.25 MCG 0.25 MCG: 0.25 CAPSULE ORAL at 09:00

## 2021-01-18 RX ADMIN — ATORVASTATIN CALCIUM 40 MG: 40 TABLET, FILM COATED ORAL at 08:55

## 2021-01-18 RX ADMIN — SODIUM BICARBONATE 650 MG TABLET 1300 MG: at 16:00

## 2021-01-18 RX ADMIN — METOPROLOL SUCCINATE 50 MG: 50 TABLET, EXTENDED RELEASE ORAL at 08:55

## 2021-01-18 RX ADMIN — HYDRALAZINE HYDROCHLORIDE 100 MG: 50 TABLET, FILM COATED ORAL at 08:55

## 2021-01-18 RX ADMIN — METOPROLOL SUCCINATE 50 MG: 50 TABLET, EXTENDED RELEASE ORAL at 21:43

## 2021-01-18 RX ADMIN — FUROSEMIDE 80 MG: 10 INJECTION, SOLUTION INTRAMUSCULAR; INTRAVENOUS at 08:54

## 2021-01-18 RX ADMIN — AZITHROMYCIN DIHYDRATE 500 MG: 500 INJECTION, POWDER, LYOPHILIZED, FOR SOLUTION INTRAVENOUS at 17:10

## 2021-01-18 RX ADMIN — SODIUM BICARBONATE 650 MG TABLET 1300 MG: at 21:42

## 2021-01-18 RX ADMIN — DEXAMETHASONE SODIUM PHOSPHATE 6 MG: 4 INJECTION, SOLUTION INTRA-ARTICULAR; INTRALESIONAL; INTRAMUSCULAR; INTRAVENOUS; SOFT TISSUE at 08:54

## 2021-01-18 RX ADMIN — HYDRALAZINE HYDROCHLORIDE 100 MG: 50 TABLET, FILM COATED ORAL at 17:10

## 2021-01-18 RX ADMIN — FUROSEMIDE 80 MG: 10 INJECTION, SOLUTION INTRAMUSCULAR; INTRAVENOUS at 21:43

## 2021-01-18 RX ADMIN — HYDRALAZINE HYDROCHLORIDE 100 MG: 50 TABLET, FILM COATED ORAL at 21:42

## 2021-01-18 RX ADMIN — DEXAMETHASONE SODIUM PHOSPHATE 6 MG: 4 INJECTION, SOLUTION INTRA-ARTICULAR; INTRALESIONAL; INTRAMUSCULAR; INTRAVENOUS; SOFT TISSUE at 17:10

## 2021-01-18 RX ADMIN — PANTOPRAZOLE SODIUM 40 MG: 40 TABLET, DELAYED RELEASE ORAL at 08:55

## 2021-01-18 NOTE — PROGRESS NOTES
Renal Consultation Note    NAME:  Luis Jaeger   :   1950   MRN:   [id number]     ATTENDING: Luann Moreira MD  PCP:  Isaiah Salas MD    Date/Time:  2021 8:02 AM      Subjective:   REQUESTING PHYSICIAN:  REASON FOR CONSULT:      Patient is seen in the room  He is awake and alert  No symptoms  No LE swelling     Past Medical History:   Diagnosis Date    [facility] [address]     Chronic kidney disease     CKD (chronic kidney disease)     4    Diabetes [facility] [address]     Hypertension       Past Surgical History:   Procedure Laterality Date    COLONOSCOPY N/A 12/3/2020    COLONOSCOPY performed by Cale Vincent MD at [address] [place] [address] HX HEART CATHETERIZATION      HX HERNIA REPAIR       Social History     Tobacco Use    Smoking status: Never Smoker    Smokeless tobacco: Never Used   Substance Use Topics    Alcohol use: Not Currently      Family History   Problem Relation Age of Onset    Diabetes Mother     Heart Failure Father        Allergies   Allergen Reactions    Lisinopril Angioedema    Pcn [Penicillins] Rash      Prior to Admission medications    Medication Sig Start Date End Date Taking? Authorizing Provider   hydrALAZINE (APRESOLINE) 100 mg tablet Take 100 mg by mouth three (3) times daily. Provider, Historical   metoprolol succinate (TOPROL-XL) 50 mg XL tablet Take 50 mg by mouth two (2) times a day. Provider, Historical   furosemide (Lasix) 80 mg tablet Take 80 mg by mouth two (2) times a day. Provider, Historical   calcitRIOL (ROCALTROL) 0.25 mcg capsule Take 0.25 mcg by mouth daily. Provider, Historical   omeprazole-sodium bicarbonate (ZEGERID) 20-1.1 mg-gram capsule Take 1 Cap by mouth two (2) times a day. Provider, Historical   OTHER,NON-FORMULARY, Take 5 mg by mouth daily. Ongluza    Provider, Historical   cloNIDine HCL (CATAPRES) 0.1 mg tablet Take 0.1 mg by mouth three (3) times daily.     Provider, Historical   atorvastatin (LIPITOR) 40 mg tablet Take 40 mg by mouth daily. Provider, Historical       REVIEW OF SYSTEMS:       Constitutional: Negative for chills and fever. HENT: Negative. Eyes: Negative. Respiratory: Shortness of breath is present   Cardiovascular: Negative. Gastrointestinal: Negative for abdominal pain and nausea. Skin: Negative. Neurological: Negative. Objective:   VITALS:    Visit Vitals  /77   Pulse 98   Temp (!) 95.9 °F (35.5 °C)   Resp 20   Ht 5' 10\" (1.778 m)   Wt 94.9 kg (209 lb 3.5 oz)   SpO2 98%   BMI 30.02 kg/m²     Temp (24hrs), Av °F (35.6 °C), Min:95.9 °F (35.5 °C), Max:96 °F (35.6 °C)      PHYSICAL EXAM:   General:    Alert, cooperative, no distress, appears stated age. HEENT: Atraumatic, anicteric sclerae, pink conjunctivae     No oral ulcers, mucosa moist, throat clear  Neck:  JVD is not elevated  Lungs:   Crackles are heard both bases no rhonchi /wheezing  Chest wall:  No tenderness  No Accessory muscle use. Heart:   Regular  rhythm,  No  murmur   trace edema both ankles  Abdomen:   Soft, non-tender. Not distended. Bowel sounds normal  Extremities: No cyanosis. No clubbing  Genitourinary Pleitez catheter in place  Skin:     Eczema +  psych:  Good insight. Not depressed. Not anxious or agitated. Neurologic: Alert and oriented X 4.   No asterixis    LAB DATA REVIEWED:    Recent Results (from the past 24 hour(s))   GLUCOSE, POC    Collection Time: 21  2:46 AM   Result Value Ref Range    Glucose (POC) 301 (H) 65 - 100 mg/dL    Performed by Liz SUTTON    PTH INTACT    Collection Time: 21  4:05 AM   Result Value Ref Range    Calcium 8.0 (L) 8.5 - 10.1 mg/dL    PTH, Intact 250.7 (H) 18.4 - 88.0 pg/mL   VITAMIN D, 25 HYDROXY    Collection Time: 21  4:05 AM   Result Value Ref Range    Vitamin D 25-Hydroxy 9.6 (L) 30 - 100 ng/mL   CBC WITH AUTOMATED DIFF    Collection Time: 21  4:05 AM   Result Value Ref Range    WBC 15.4 (H) 4.1 - 11.1 K/uL    RBC 4.17 4.10 - 5.70 M/uL HGB 11.3 (L) 12.1 - 17.0 g/dL    HCT 33.9 (L) 36.6 - 50.3 %    MCV 81.3 80.0 - 99.0 FL    MCH 27.1 26.0 - 34.0 PG    MCHC 33.3 30.0 - 36.5 g/dL    RDW 15.0 (H) 11.5 - 14.5 %    PLATELET 972 059 - 963 K/uL    MPV 11.5 8.9 - 12.9 FL    NEUTROPHILS 92 (H) 32 - 75 %    LYMPHOCYTES 6 (L) 12 - 49 %    MONOCYTES 2 (L) 5 - 13 %    EOSINOPHILS 0 0 - 7 %    BASOPHILS 0 0 - 1 %    IMMATURE GRANULOCYTES 0 0.0 - 0.5 %    ABS. NEUTROPHILS 14.2 (H) 1.8 - 8.0 K/UL    ABS. LYMPHOCYTES 0.9 0.8 - 3.5 K/UL    ABS. MONOCYTES 0.3 0.0 - 1.0 K/UL    ABS. EOSINOPHILS 0.0 0.0 - 0.4 K/UL    ABS. BASOPHILS 0.0 0.0 - 0.1 K/UL    ABS. IMM. GRANS. 0.0 K/UL    DF AUTOMATED      RBC COMMENTS Normocytic, Normochromic     RENAL FUNCTION PANEL    Collection Time: 01/18/21  4:05 AM   Result Value Ref Range    Sodium 135 (L) 136 - 145 mmol/L    Potassium 4.9 3.5 - 5.1 mmol/L    Chloride 105 97 - 108 mmol/L    CO2 15 (LL) 21 - 32 mmol/L    Anion gap 15 5 - 15 mmol/L    Glucose 320 (H) 65 - 100 mg/dL     (H) 6 - 20 mg/dL    Creatinine 9.76 (H) 0.70 - 1.30 mg/dL    BUN/Creatinine ratio 13 12 - 20      GFR est AA 6 (L) >60 ml/min/1.73m2    GFR est non-AA 5 (L) >60 ml/min/1.73m2    Calcium 7.9 (L) 8.5 - 10.1 mg/dL    Phosphorus 6.6 (H) 2.6 - 4.7 mg/dL    Albumin 2.4 (L) 3.5 - 5.0 g/dL       Recommendations/Plan:     1. 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 GFR only 7  -presented with volume overload, hyperkalemia and metabolic acidosis  -Continue Lasix 80 iv TI  -plan for perm cath placement and to start on dialysis  -consulted case management for placement outpatient in [facility]    2. Renal osteodystrophy:  -Calcium is okay.   -phosphorus 6.6. Will add phos binders if phos is still high despite on dialysis  -Check PTH. Continue Calcitriol at the current dose for now  -Check vitamin D levels    3. Hyperkalemia:  -Likely because of advanced renal disease and high potassium diet.   Not on ACE inhibitor/ arbs  -K 5.6, received kayexalate  -K is 4.9 today  -Low potassium diet.  -Start hemodialysis in a.m. with 2K bath    4. Severe metabolic acidosis  -presented with bicarb of 12. Improved to 15 today. pH 7.2  -2/2 RTA IV 2/2 DM  -will increase oral bicarbonate 1300 tid     5. Covid pneumonia  -Patient presented with shortness of breath.    -Found to be positive for Covid  -Has been started on Decadron and azithromycin  -Leukocytosis of 13.5. Afebrile for now  -Currently in no respiratory distress    6. Hypertension  -Blood pressure is labile but mostly well controlled  -I will continue metoprolol and hydralazine at the current dose for now    7.  Hydronephrosis, UL:  -had gross hematuria  -CT abd showed BL renal calculi  -R hydronephrosis from obstruction stone  -will consult Urology        ________________________________________________________________________  Signed: Vee Pineda MD

## 2021-01-18 NOTE — PROGRESS NOTES
PROGRESS NOTE    Subjective:   Chief Complaint : worsening shortness of breath since few days  Source of information : patient    History of present illness:     70M, H/o CHF unknown EF, CKD IV with worsening shortness of breath wince few days s/t respiratory failure s.t COVID and fluid overload s.t ESRD. Plan for HD today, will eventually need permanent HD. Treatment for Araceli continues    Past Medical History:   Diagnosis Date    CHF (congestive heart failure) (HCC)     Chronic kidney disease     CKD (chronic kidney disease)     4    Diabetes (HCC)     Hypertension      Past Surgical History:   Procedure Laterality Date    COLONOSCOPY N/A 12/3/2020    COL[facility] Liz [facility]es MD at [facility] ENDOSCOPY    HX HEART CATHETERIZATION      HX HERNIA REPAIR       Family History   Problem Relation Age of Onset    Diabetes Mother     Heart Failure Father       Social History     Tobacco Use    Smoking status: Never Smoker    Smokeless tobacco: Never Used   Substance Use Topics    Alcohol use: Not Currently       Prior to Admission medications    Medication Sig Start Date End Date Taking? Authorizing Provider   hydrALAZINE (APRESOLINE) 100 mg tablet Take 100 mg by mouth three (3) times daily. Provider, Historical   metoprolol succinate (TOPROL-XL) 50 mg XL tablet Take 50 mg by mouth two (2) times a day. Provider, Historical   furosemide (Lasix) 80 mg tablet Take 80 mg by mouth two (2) times a day. Provider, Historical   calcitRIOL (ROCALTROL) 0.25 mcg capsule Take 0.25 mcg by mouth daily. Provider, Historical   omeprazole-sodium bicarbonate (ZEGERID) 20-1.1 mg-gram capsule Take 1 Cap by mouth two (2) times a day. Provider, Historical   OTHER,NON-FORMULARY, Take 5 mg by mouth daily. Ongluza    Provider, Historical   cloNIDine HCL (CATAPRES) 0.1 mg tablet Take 0.1 mg by mouth three (3) times daily.     Provider, Historical   atorvastatin (LIPITOR) 40 mg tablet Take 40 mg by mouth daily. Provider, Historical     Allergies   Allergen Reactions    Lisinopril Angioedema    Pcn [Penicillins] Rash             Review of Systems:  Constitutional: Appetite is NOT good, denies weight loss, no fever, no chills, no night sweats  Eye: No recent visual disturbances, no discharge, no double vision  Ear/nose/mouth/throat : No hearing disturbance, no ear pain, no nasal congestion, no sore throat, no trouble swallowing. Respiratory : +++ trouble breathing, ++ cough, ++++ shortness of breath, no hemoptysis, no wheezing  Cardiovascular : No chest pain, no palpitation, no racing of heart, no orthopnea, no paroxysmal nocturnal dyspnea, no peripheral edema  Gastrointestinal : ++ nausea, no vomiting, no diarrhea, constipation, heartburn, abdominal pain  Genitourinary : No dysuria, no hematuria, no increased frequency, incontinence,  Lymphatics : No swollen glands -Neck, axillary, inguinal  Endocrine : No excessive thirst, no polyuria no cold intolerance, no heat intolerance. Immunologic : No hives, urticaria, no seasonal allergies,   Musculoskeletal : No joint swelling, pain, effusion,  no back pain, no neck pain,   Integumentary : No rash, no pruritus, no ecchymosis  Hematology : No petechiae, No easy bruising,  No tendency to bleed easy  Neurology : Denies change in mental status, no abnormal balance, no headache, no confusion, numbness, tingling,  Psychiatric : No mood swings, no anxiety, depression    Vitals:     Visit Vitals  /77   Pulse 98   Temp (!) 95.9 °F (35.5 °C)   Resp 20   Ht 5' 10\" (1.778 m)   Wt 94.9 kg (209 lb 3.5 oz)   SpO2 98%   BMI 30.02 kg/m²       Physical Exam:   General : appears comfortable, on 2L NC, no distress  HEENT : PERRLA, normal oral mucosa, atraumatic normocephalic, Normal ear and nose.     oropharynx  Neck : Supple, no JVD, no masses noted, no carotid bruit  Lungs : Breath sounds with good air entry bilaterally, no wheezes or rales, no accessory muscle use,  CVS : Rhythm rate regular, S1+, S2+, no murmur or gallop  Abdomen : Soft, nontender, no organomegaly, bowel sounds active  Extremities : 1 + edema  Musculoskeletal : Fair range of motion, no joint swelling or effusion, muscle tone and power appears normal,   Skin : Moist, warm, skin turgor, no pathological rash  Lymphatic : No cervical lymphadenopathy. Neurological : Awake, alert, oriented to time place person. Cranial nerves intact, deep tendon reflexes active, no sensory disturbance, no cerebellar deficits. Psychiatric : Mood and affect appears appropriate to the situation. Data Review:   Recent Results (from the past 24 hour(s))   GLUCOSE, POC    Collection Time: 01/18/21  2:46 AM   Result Value Ref Range    Glucose (POC) 301 (H) 65 - 100 mg/dL    Performed by SANDY SUTTON    CBC WITH AUTOMATED DIFF    Collection Time: 01/18/21  4:05 AM   Result Value Ref Range    WBC 15.4 (H) 4.1 - 11.1 K/uL    RBC 4.17 4.10 - 5.70 M/uL    HGB 11.3 (L) 12.1 - 17.0 g/dL    HCT 33.9 (L) 36.6 - 50.3 %    MCV 81.3 80.0 - 99.0 FL    MCH 27.1 26.0 - 34.0 PG    MCHC 33.3 30.0 - 36.5 g/dL    RDW 15.0 (H) 11.5 - 14.5 %    PLATELET 943 846 - 469 K/uL    MPV 11.5 8.9 - 12.9 FL    NEUTROPHILS 92 (H) 32 - 75 %    LYMPHOCYTES 6 (L) 12 - 49 %    MONOCYTES 2 (L) 5 - 13 %    EOSINOPHILS 0 0 - 7 %    BASOPHILS 0 0 - 1 %    IMMATURE GRANULOCYTES 0 0.0 - 0.5 %    ABS. NEUTROPHILS 14.2 (H) 1.8 - 8.0 K/UL    ABS. LYMPHOCYTES 0.9 0.8 - 3.5 K/UL    ABS. MONOCYTES 0.3 0.0 - 1.0 K/UL    ABS. EOSINOPHILS 0.0 0.0 - 0.4 K/UL    ABS. BASOPHILS 0.0 0.0 - 0.1 K/UL    ABS. IMM.  GRANS. 0.0 K/UL    DF AUTOMATED      RBC COMMENTS Normocytic, Normochromic     RENAL FUNCTION PANEL    Collection Time: 01/18/21  4:05 AM   Result Value Ref Range    Sodium 135 (L) 136 - 145 mmol/L    Potassium 4.9 3.5 - 5.1 mmol/L    Chloride 105 97 - 108 mmol/L    CO2 15 (LL) 21 - 32 mmol/L    Anion gap 15 5 - 15 mmol/L    Glucose 320 (H) 65 - 100 mg/dL     (H) 6 - 20 mg/dL    Creatinine 9.76 (H) 0.70 - 1.30 mg/dL    BUN/Creatinine ratio 13 12 - 20      GFR est AA 6 (L) >60 ml/min/1.73m2    GFR est non-AA 5 (L) >60 ml/min/1.73m2    Calcium 7.9 (L) 8.5 - 10.1 mg/dL    Phosphorus 6.6 (H) 2.6 - 4.7 mg/dL    Albumin 2.4 (L) 3.5 - 5.0 g/dL             Assessment and Plan :     (1) Acute hypoxic respiratory failure: s/t COVID and pulmonary edema. o2 to keep saturation    (2) COVID-19 pneumonia: azithromycin and decadron, consult pulmonology. (3) CHF: ECHO, lasix 40 BID. (4) CKD IV: possible CRS, but definitely need HD     (5) HTN: resume clonidine and hydralazine    (6) GERD: protonix    DVT ppx:heparin SubQ    DISPOSITION: 4-5 days, may need permanent HD.      Signed By: Sara Platt MD     January 18, 2021

## 2021-01-18 NOTE — PROCEDURES
Interventional Radiology Brief Procedure Note    Patient: Zhao Gregory MRN: [id number]  SSN: [id number]    YOB: 1950  Age: 72 y.o. Sex: male      Date of Procedure: 1/18/2021    Pre-Procedure Diagnosis: renal failure    Post-Procedure Diagnosis: SAME    Procedure(s): Temporary Hemodialysis Venous Catheter    Brief Description of Procedure: Temp dialysis cath placement, US and fluoro guided    Performed By: Laron Jon DO     Assistants: None    Anesthesia: Lidocaine    Estimated Blood Loss: Less than 10ml    Specimens: None    Implants: Temporary Hemodialysis Catheter    Findings: Widely patent RIJV, tip of catheter placed at cavoatrial junction    Complications: None    Recommendations: Catheter ready for use.

## 2021-01-19 LAB
ALBUMIN SERPL-MCNC: 2.5 G/DL (ref 3.5–5)
ANION GAP SERPL CALC-SCNC: 15 MMOL/L (ref 5–15)
ATRIAL RATE: 86 BPM
BUN SERPL-MCNC: 137 MG/DL (ref 6–20)
BUN/CREAT SERPL: 14 (ref 12–20)
CA-I BLD-MCNC: 7.3 MG/DL (ref 8.5–10.1)
CALCULATED P AXIS, ECG09: 33 DEGREES
CALCULATED R AXIS, ECG10: -39 DEGREES
CALCULATED T AXIS, ECG11: 94 DEGREES
CHLORIDE SERPL-SCNC: 100 MMOL/L (ref 97–108)
CO2 SERPL-SCNC: 17 MMOL/L (ref 21–32)
CREAT SERPL-MCNC: 9.81 MG/DL (ref 0.7–1.3)
DIAGNOSIS, 93000: NORMAL
GLUCOSE BLD STRIP.AUTO-MCNC: 426 MG/DL (ref 65–100)
GLUCOSE SERPL-MCNC: 425 MG/DL (ref 65–100)
P-R INTERVAL, ECG05: 150 MS
PERFORMED BY, TECHID: ABNORMAL
PHOSPHATE SERPL-MCNC: 6.9 MG/DL (ref 2.6–4.7)
POTASSIUM SERPL-SCNC: 4.6 MMOL/L (ref 3.5–5.1)
Q-T INTERVAL, ECG07: 390 MS
QRS DURATION, ECG06: 84 MS
QTC CALCULATION (BEZET), ECG08: 466 MS
SODIUM SERPL-SCNC: 132 MMOL/L (ref 136–145)
VENTRICULAR RATE, ECG03: 86 BPM

## 2021-01-19 PROCEDURE — 90935 HEMODIALYSIS ONE EVALUATION: CPT

## 2021-01-19 PROCEDURE — 74011250636 HC RX REV CODE- 250/636

## 2021-01-19 PROCEDURE — 77010033678 HC OXYGEN DAILY

## 2021-01-19 PROCEDURE — 74011250637 HC RX REV CODE- 250/637: Performed by: HOSPITALIST

## 2021-01-19 PROCEDURE — 94760 N-INVAS EAR/PLS OXIMETRY 1: CPT

## 2021-01-19 PROCEDURE — 65270000029 HC RM PRIVATE

## 2021-01-19 PROCEDURE — 74011250636 HC RX REV CODE- 250/636: Performed by: HOSPITALIST

## 2021-01-19 PROCEDURE — 80069 RENAL FUNCTION PANEL: CPT

## 2021-01-19 PROCEDURE — 74011250637 HC RX REV CODE- 250/637: Performed by: INTERNAL MEDICINE

## 2021-01-19 PROCEDURE — 82962 GLUCOSE BLOOD TEST: CPT

## 2021-01-19 PROCEDURE — 36415 COLL VENOUS BLD VENIPUNCTURE: CPT

## 2021-01-19 RX ORDER — HYDRALAZINE HYDROCHLORIDE 50 MG/1
50 TABLET, FILM COATED ORAL 3 TIMES DAILY
Status: DISCONTINUED | OUTPATIENT
Start: 2021-01-19 | End: 2021-01-23

## 2021-01-19 RX ORDER — HEPARIN SODIUM 1000 [USP'U]/ML
INJECTION, SOLUTION INTRAVENOUS; SUBCUTANEOUS
Status: COMPLETED
Start: 2021-01-19 | End: 2021-01-19

## 2021-01-19 RX ADMIN — HEPARIN SODIUM 2300 UNITS: 1000 INJECTION, SOLUTION INTRAVENOUS; SUBCUTANEOUS at 18:01

## 2021-01-19 RX ADMIN — SODIUM BICARBONATE 650 MG TABLET 1300 MG: at 09:36

## 2021-01-19 RX ADMIN — SODIUM BICARBONATE 650 MG TABLET 1300 MG: at 20:44

## 2021-01-19 RX ADMIN — DEXAMETHASONE SODIUM PHOSPHATE 6 MG: 4 INJECTION, SOLUTION INTRA-ARTICULAR; INTRALESIONAL; INTRAMUSCULAR; INTRAVENOUS; SOFT TISSUE at 06:29

## 2021-01-19 RX ADMIN — METOPROLOL SUCCINATE 50 MG: 50 TABLET, EXTENDED RELEASE ORAL at 09:36

## 2021-01-19 RX ADMIN — PANTOPRAZOLE SODIUM 40 MG: 40 TABLET, DELAYED RELEASE ORAL at 09:36

## 2021-01-19 RX ADMIN — DEXAMETHASONE SODIUM PHOSPHATE 6 MG: 4 INJECTION, SOLUTION INTRA-ARTICULAR; INTRALESIONAL; INTRAMUSCULAR; INTRAVENOUS; SOFT TISSUE at 00:16

## 2021-01-19 RX ADMIN — HEPARIN SODIUM 5000 UNITS: 5000 INJECTION INTRAVENOUS; SUBCUTANEOUS at 20:45

## 2021-01-19 RX ADMIN — HYDRALAZINE HYDROCHLORIDE 50 MG: 50 TABLET, FILM COATED ORAL at 09:00

## 2021-01-19 RX ADMIN — HYDRALAZINE HYDROCHLORIDE 50 MG: 50 TABLET, FILM COATED ORAL at 20:45

## 2021-01-19 RX ADMIN — HEPARIN SODIUM 5000 UNITS: 5000 INJECTION INTRAVENOUS; SUBCUTANEOUS at 09:37

## 2021-01-19 RX ADMIN — DEXAMETHASONE SODIUM PHOSPHATE 6 MG: 4 INJECTION, SOLUTION INTRA-ARTICULAR; INTRALESIONAL; INTRAMUSCULAR; INTRAVENOUS; SOFT TISSUE at 20:45

## 2021-01-19 RX ADMIN — METOPROLOL SUCCINATE 50 MG: 50 TABLET, EXTENDED RELEASE ORAL at 20:44

## 2021-01-19 RX ADMIN — HEPARIN SODIUM 5000 UNITS: 5000 INJECTION INTRAVENOUS; SUBCUTANEOUS at 00:17

## 2021-01-19 RX ADMIN — ATORVASTATIN CALCIUM 40 MG: 40 TABLET, FILM COATED ORAL at 09:36

## 2021-01-19 NOTE — PROGRESS NOTES
Renal Consultation Note    NAME:  Jodie Quiroz   :   1950   MRN:   [id number]     ATTENDING: Contreras Boateng MD  PCP:  Seferino Bhat MD    Date/Time:  2021 8:02 AM      Subjective:       Patient is seen in the room  He is awake and alert  No symptoms  No LE swelling     Past Medical History:   Diagnosis Date    [facility] [address]     Chronic kidney disease     CKD (chronic kidney disease)     4    Diabetes ([facility]     Hypertension       Past Surgical History:   Procedure Laterality Date    COLONOSCOPY N/A 12/3/2020    COLONOSCOPY performed by Tyler Porter MD at [address] [place] [address] HX HEART CATHETERIZATION      HX HERNIA REPAIR       Social History     Tobacco Use    Smoking status: Never Smoker    Smokeless tobacco: Never Used   Substance Use Topics    Alcohol use: Not Currently      Family History   Problem Relation Age of Onset    Diabetes Mother     Heart Failure Father        Allergies   Allergen Reactions    Lisinopril Angioedema    Pcn [Penicillins] Rash      Prior to Admission medications    Medication Sig Start Date End Date Taking? Authorizing Provider   hydrALAZINE (APRESOLINE) 100 mg tablet Take 100 mg by mouth three (3) times daily. Provider, Historical   metoprolol succinate (TOPROL-XL) 50 mg XL tablet Take 50 mg by mouth two (2) times a day. Provider, Historical   furosemide (Lasix) 80 mg tablet Take 80 mg by mouth two (2) times a day. Provider, Historical   calcitRIOL (ROCALTROL) 0.25 mcg capsule Take 0.25 mcg by mouth daily. Provider, Historical   omeprazole-sodium bicarbonate (ZEGERID) 20-1.1 mg-gram capsule Take 1 Cap by mouth two (2) times a day. Provider, Historical   OTHER,NON-FORMULARY, Take 5 mg by mouth daily. Ongluza    Provider, Historical   cloNIDine HCL (CATAPRES) 0.1 mg tablet Take 0.1 mg by mouth three (3) times daily. Provider, Historical   atorvastatin (LIPITOR) 40 mg tablet Take 40 mg by mouth daily.     Provider, Historical       REVIEW OF SYSTEMS:       Constitutional: Negative for chills and fever. HENT: Negative. Eyes: Negative. Respiratory: Shortness of breath is present   Cardiovascular: Negative. Gastrointestinal: Negative for abdominal pain and nausea. Skin: Negative. Neurological: Negative. Objective:   VITALS:    Visit Vitals  /75 (BP Patient Position: At rest)   Pulse 69   Temp 97.4 °F (36.3 °C)   Resp 20   Ht 5' 10\" (1.778 m)   Wt 94.9 kg (209 lb 3.5 oz)   SpO2 98%   BMI 30.02 kg/m²     Temp (24hrs), Av.9 °F (36.1 °C), Min:95.9 °F (35.5 °C), Max:97.5 °F (36.4 °C)      PHYSICAL EXAM:   General:    Alert, cooperative, no distress, appears stated age. HEENT: Atraumatic, anicteric sclerae, pink conjunctivae     No oral ulcers, mucosa moist, throat clear  Neck:  JVD is not elevated  Lungs:   Crackles are heard both bases no rhonchi /wheezing  Chest wall:  No tenderness  No Accessory muscle use. Heart:   Regular  rhythm,  No  murmur   trace edema both ankles  Abdomen:   Soft, non-tender. Not distended. Bowel sounds normal  Extremities: No cyanosis. No clubbing  Genitourinary Pleitez catheter in place  Skin:     Eczema +  psych:  Good insight. Not depressed. Not anxious or agitated. Neurologic: Alert and oriented X 4. No asterixis    LAB DATA REVIEWED:    Recent Results (from the past 24 hour(s))   GLUCOSE, POC    Collection Time: 21  9:29 PM   Result Value Ref Range    Glucose (POC) 389 (H) 65 - 100 mg/dL    Performed by Earlene Desir    GLUCOSE, POC    Collection Time: 21  7:46 AM   Result Value Ref Range    Glucose (POC) 426 (H) 65 - 100 mg/dL    Performed by Paulo Jerry        Recommendations/Plan:     1. 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   GFR only 7  -presented with volume overload, hyperkalemia and metabolic acidosis  -s/p perm cath placement on    -will start on dialysis  -consulted case management for placement outpatient in [facility]  -ok to dc once has chair setup     2. Renal osteodystrophy:  -Calcium is okay.   -phosphorus 6.6. Will add phos binders if phos is still high despite on dialysis  -Check PTH. Continue Calcitriol at the current dose for now  -Check vitamin D levels    3. Hyperkalemia:  -Likely because of advanced renal disease and high potassium diet. Not on ACE inhibitor/ arbs  -K 5.6, received kayexalate  -K is 4.9   -Low potassium diet.  -Start hemodialysis in a.m. with 2K bath    4. Severe metabolic acidosis  -presented with bicarb of 12. Improved to 15 today. pH 7.2  -2/2 RTA IV 2/2 DM  -will increase oral bicarbonate 1300 tid     5. Covid pneumonia  -Patient presented with shortness of breath.    -Found to be positive for Covid  -Has been started on Decadron and azithromycin  -Leukocytosis of 13.5. Afebrile for now  -Currently in no respiratory distress    6. Hypertension  -Blood pressure is labile but mostly well controlled  -I will continue metoprolol and hydralazine at the current dose for now    7.  Hydronephrosis, UL:  -had gross hematuria  -CT abd showed BL renal calculi  -R hydronephrosis from obstruction stone  -will consult Urology        ________________________________________________________________________  Signed: Deja Mario MD

## 2021-01-19 NOTE — PROGRESS NOTES
PROGRESS NOTE    Subjective:   Chief Complaint : worsening shortness of breath since few days  Source of information : patient    History of present illness:     70M, H/o CHF unknown EF, CKD IV with worsening shortness of breath wince few days s/t respiratory failure s.t COVID and fluid overload s.t ESRD. Temp hd cath placed 1/18 by IR  Plan for HD per nephrology, will eventually need permanent HD. Treatment for COVID continues    States that he is feeling some improvement in his breathing  Denies pain. O2 at 2 L saturating at 96%    Past Medical History:   Diagnosis Date    CHF (congestive heart failure) (HCC)     Chronic kidney disease     CKD (chronic kidney disease)     4    Diabetes ([address]     Hypertension      Past Surgical History:   Procedure Laterality Date    COLONOSCOPY N/A 12/3/2020    COL[facility] by [facility] Lan [facility] at [facility]COPY    HX HEART CATHETERIZATION      HX HERNIA REPAIR      IR INSERT NON TUNL CVC OVER 5 YRS  1/18/2021    IR PLACE CVAD FLUORO GUIDE  1/18/2021     Family History   Problem Relation Age of Onset    Diabetes Mother     Heart Failure Father       Social History     Tobacco Use    Smoking status: Never Smoker    Smokeless tobacco: Never Used   Substance Use Topics    Alcohol use: Not Currently       Prior to Admission medications    Medication Sig Start Date End Date Taking? Authorizing Provider   hydrALAZINE (APRESOLINE) 100 mg tablet Take 100 mg by mouth three (3) times daily. Provider, Historical   metoprolol succinate (TOPROL-XL) 50 mg XL tablet Take 50 mg by mouth two (2) times a day. Provider, Historical   furosemide (Lasix) 80 mg tablet Take 80 mg by mouth two (2) times a day. Provider, Historical   calcitRIOL (ROCALTROL) 0.25 mcg capsule Take 0.25 mcg by mouth daily. Provider, Historical   omeprazole-sodium bicarbonate (ZEGERID) 20-1.1 mg-gram capsule Take 1 Cap by mouth two (2) times a day.     Provider, Historical OTHER,NON-FORMULARY, Take 5 mg by mouth daily. Ongluza    Provider, Historical   cloNIDine HCL (CATAPRES) 0.1 mg tablet Take 0.1 mg by mouth three (3) times daily. Provider, Historical   atorvastatin (LIPITOR) 40 mg tablet Take 40 mg by mouth daily. Provider, Historical     Allergies   Allergen Reactions    Lisinopril Angioedema    Pcn [Penicillins] Rash             Review of Systems:  Review of Systems   Constitutional: Positive for malaise/fatigue. Negative for chills and fever. HENT: Negative. Eyes: Negative. Respiratory: Positive for cough and shortness of breath. Negative for sputum production. Cardiovascular: Negative. Gastrointestinal: Negative. Genitourinary: Negative. Musculoskeletal: Negative. Skin: Negative. Neurological: Positive for weakness. Endo/Heme/Allergies: Negative. Psychiatric/Behavioral: Negative. Vitals:     Visit Vitals  /75 (BP Patient Position: At rest)   Pulse 69   Temp 97.4 °F (36.3 °C)   Resp 20   Ht 5' 10\" (1.778 m)   Wt 94.9 kg (209 lb 3.5 oz)   SpO2 96%   BMI 30.02 kg/m²       Physical Exam:   General : appears comfortable, on 2L NC, no distress  HEENT : eomi, normal oral mucosa, atraumatic normocephalic, Normal ear and nose. oropharynx  Neck : Supple, no JVD, no masses noted, no carotid bruit  Lungs : Breath sounds with good air entry bilaterally, no wheezes or rales, no accessory muscle use,  CVS : Rhythm rate regular, S1+, S2+, no murmur or gallop  Abdomen : Soft, nontender, no organomegaly, bowel sounds active, neg cvat  Extremities : 1 + edema  Musculoskeletal : Fair range of motion, no joint swelling or effusion, muscle tone and power appears normal,   Skin : Moist, warm, skin turgor, no pathological rash  Lymphatic : No cervical lymphadenopathy. Neurological : Awake, alert, oriented to time place person. Cranial nerves intact, deep tendon reflexes active, no sensory disturbance, no cerebellar deficits.   Psychiatric : Mood and affect appears appropriate to the situation. Data Review:   Recent Results (from the past 24 hour(s))   GLUCOSE, POC    Collection Time: 01/18/21  9:29 PM   Result Value Ref Range    Glucose (POC) 389 (H) 65 - 100 mg/dL    Performed by Adama Mcknight    GLUCOSE, POC    Collection Time: 01/19/21  7:46 AM   Result Value Ref Range    Glucose (POC) 426 (H) 65 - 100 mg/dL    Performed by IZABELLA TADEO              Assessment and Plan :     --Acute hypoxic respiratory failure:  s/t COVID and pulmonary edema. o2 to keep saturation > 92%    --COVID-19 pneumonia:   azithromycin and decadron    --CHF:   ECHO, lasix 40 BID. --ILIR onCKD IV:   presented with a creatinine of 9.1 . GFR only 7  presented with volume overload, cxr showed pulmonary congestion. ILIR related to diuretics vs ckd progression. hyperkalemia and metabolic acidosis  Continue Lasix 80 iv TI  Temp hd cath placed 1/18  consulted case management for placement outpatient in [facility]  Met acidosis- cont bicarb  Nephrology following    --HTN:   Continue metoprolol and hydralazine    --GERD:  protonix    --nephroloithiasis/hydronephrosis; Had gross hematuria. Ct a/p showed Obstruction of the proximal right ureter x 6.6 mm calculus/rt hydronephrosis + bilateral renal calculi non obstructing. Consult Urology(no urology coverage today)  DVT ppx:heparin SubQ    DISPOSITION: 4-5 days, may need permanent HD.      Signed By: Darrell Mazariegos MD     January 19, 2021

## 2021-01-19 NOTE — PROGRESS NOTES
Problem: Falls - Risk of  Goal: *Absence of Falls  Description: Document Brooks Ponce Fall Risk and appropriate interventions in the flowsheet.   Outcome: Progressing Towards Goal  Note: Fall Risk Interventions:       Mentation Interventions: Bed/chair exit alarm         Elimination Interventions: Bed/chair exit alarm

## 2021-01-19 NOTE — PROGRESS NOTES
CM attempted to meet with the patient at the bedside to complete assessment. Patient is currently off the floor in dialysis. Per chart review there was a consult placed to  to arrange a hemodialysis chair at [place] [address] in [place]. CM has initiated the referral process, however, the patient will likely have to go to a [facility] dialysis center temporarily. He has had a Hep panel ordered and it is still pending at this time. Clinicals have been sent to [facility]. CM still will need to complete full assessment with the patient once he returns to the floor.

## 2021-01-20 ENCOUNTER — APPOINTMENT (OUTPATIENT)
Dept: NON INVASIVE DIAGNOSTICS | Age: 71
DRG: 177 | End: 2021-01-20
Attending: HOSPITALIST
Payer: MEDICARE

## 2021-01-20 LAB
HAV AB SER QL IA: NEGATIVE
HAV IGM SERPL QL IA: NEGATIVE
HBV CORE AB SERPL QL IA: NEGATIVE
HBV CORE IGM SERPL QL IA: NEGATIVE
HBV SURFACE AB SER QL: NON REACTIVE INDEX VALUE
HBV SURFACE AG SERPL QL IA: NEGATIVE
HCV AB S/CO SERPL IA: <0.1 S/CO RATIO
HCV AB SERPL QL IA: NORMAL

## 2021-01-20 PROCEDURE — 74011250636 HC RX REV CODE- 250/636: Performed by: HOSPITALIST

## 2021-01-20 PROCEDURE — 65270000029 HC RM PRIVATE

## 2021-01-20 PROCEDURE — 93306 TTE W/DOPPLER COMPLETE: CPT

## 2021-01-20 PROCEDURE — 74011250637 HC RX REV CODE- 250/637: Performed by: INTERNAL MEDICINE

## 2021-01-20 PROCEDURE — 94760 N-INVAS EAR/PLS OXIMETRY 1: CPT

## 2021-01-20 PROCEDURE — 74011250637 HC RX REV CODE- 250/637: Performed by: HOSPITALIST

## 2021-01-20 PROCEDURE — 77010033678 HC OXYGEN DAILY

## 2021-01-20 RX ORDER — HEPARIN SODIUM 1000 [USP'U]/ML
2200 INJECTION, SOLUTION INTRAVENOUS; SUBCUTANEOUS
Status: CANCELLED | OUTPATIENT
Start: 2021-01-20

## 2021-01-20 RX ADMIN — SODIUM BICARBONATE 650 MG TABLET 1300 MG: at 13:55

## 2021-01-20 RX ADMIN — METOPROLOL SUCCINATE 50 MG: 50 TABLET, EXTENDED RELEASE ORAL at 21:43

## 2021-01-20 RX ADMIN — HYDRALAZINE HYDROCHLORIDE 50 MG: 50 TABLET, FILM COATED ORAL at 21:43

## 2021-01-20 RX ADMIN — HEPARIN SODIUM 5000 UNITS: 5000 INJECTION INTRAVENOUS; SUBCUTANEOUS at 05:05

## 2021-01-20 RX ADMIN — PANTOPRAZOLE SODIUM 40 MG: 40 TABLET, DELAYED RELEASE ORAL at 13:55

## 2021-01-20 RX ADMIN — DEXAMETHASONE SODIUM PHOSPHATE 6 MG: 4 INJECTION, SOLUTION INTRA-ARTICULAR; INTRALESIONAL; INTRAMUSCULAR; INTRAVENOUS; SOFT TISSUE at 21:45

## 2021-01-20 RX ADMIN — DEXAMETHASONE SODIUM PHOSPHATE 6 MG: 4 INJECTION, SOLUTION INTRA-ARTICULAR; INTRALESIONAL; INTRAMUSCULAR; INTRAVENOUS; SOFT TISSUE at 16:17

## 2021-01-20 RX ADMIN — AZITHROMYCIN DIHYDRATE 500 MG: 500 INJECTION, POWDER, LYOPHILIZED, FOR SOLUTION INTRAVENOUS at 13:54

## 2021-01-20 RX ADMIN — SODIUM BICARBONATE 650 MG TABLET 1300 MG: at 21:42

## 2021-01-20 RX ADMIN — METOPROLOL SUCCINATE 50 MG: 50 TABLET, EXTENDED RELEASE ORAL at 13:55

## 2021-01-20 RX ADMIN — ATORVASTATIN CALCIUM 40 MG: 40 TABLET, FILM COATED ORAL at 13:55

## 2021-01-20 RX ADMIN — DEXAMETHASONE SODIUM PHOSPHATE 6 MG: 4 INJECTION, SOLUTION INTRA-ARTICULAR; INTRALESIONAL; INTRAMUSCULAR; INTRAVENOUS; SOFT TISSUE at 05:05

## 2021-01-20 RX ADMIN — HYDRALAZINE HYDROCHLORIDE 50 MG: 50 TABLET, FILM COATED ORAL at 16:17

## 2021-01-20 RX ADMIN — HEPARIN SODIUM 5000 UNITS: 5000 INJECTION INTRAVENOUS; SUBCUTANEOUS at 16:17

## 2021-01-20 RX ADMIN — HEPARIN SODIUM 5000 UNITS: 5000 INJECTION INTRAVENOUS; SUBCUTANEOUS at 22:00

## 2021-01-20 NOTE — PROGRESS NOTES
PROGRESS NOTE    Subjective:   Chief Complaint : worsening shortness of breath since few days  Source of information : patient    History of present illness:     70M, H/o CHF unknown EF, CKD IV with worsening shortness of breath wince few days s/t respiratory failure s.t COVID and fluid overload s.t ESRD. Temp hd cath placed 1/18 by IR  Hd 1/19 & 1/20nephrology following  Treatment for COVID continues    States that he is feeling some improvement in his breathing  Denies pain. O2 at 2 L saturating at 95%    Past Medical History:   Diagnosis Date    CHF (congestive heart failure) (HCC)     Chronic kidney disease     CKD (chronic kidney disease)     4    Diabetes ([address]     Hypertension      Past Surgical History:   Procedure Laterality Date    COLONOSCOPY N/A 12/3/2020    COLONOSCOPY performed by Tash Roque MD at [facility] ENDOSCOPY    HX HEART CATHETERIZATION      HX HERNIA REPAIR      IR INSERT NON TUNL CVC OVER 5 YRS  1/18/2021    IR PLACE CVAD FLUORO GUIDE  1/18/2021     Family History   Problem Relation Age of Onset    Diabetes Mother     Heart Failure Father       Social History     Tobacco Use    Smoking status: Never Smoker    Smokeless tobacco: Never Used   Substance Use Topics    Alcohol use: Not Currently       Prior to Admission medications    Medication Sig Start Date End Date Taking? Authorizing Provider   hydrALAZINE (APRESOLINE) 100 mg tablet Take 100 mg by mouth three (3) times daily. Provider, Historical   metoprolol succinate (TOPROL-XL) 50 mg XL tablet Take 50 mg by mouth two (2) times a day. Provider, Historical   furosemide (Lasix) 80 mg tablet Take 80 mg by mouth two (2) times a day. Provider, Historical   calcitRIOL (ROCALTROL) 0.25 mcg capsule Take 0.25 mcg by mouth daily. Provider, Historical   omeprazole-sodium bicarbonate (ZEGERID) 20-1.1 mg-gram capsule Take 1 Cap by mouth two (2) times a day.     Provider, Historical   OTHER,NON-FORMULARY, Take 5 mg by mouth daily. Ongluza    Provider, Historical   cloNIDine HCL (CATAPRES) 0.1 mg tablet Take 0.1 mg by mouth three (3) times daily. Provider, Historical   atorvastatin (LIPITOR) 40 mg tablet Take 40 mg by mouth daily. Provider, Historical     Allergies   Allergen Reactions    Lisinopril Angioedema    Pcn [Penicillins] Rash             Review of Systems:  Review of Systems   Constitutional: Positive for malaise/fatigue. Negative for chills and fever. HENT: Negative. Eyes: Negative. Respiratory: Positive for cough and shortness of breath. Negative for sputum production. Cardiovascular: Negative. Gastrointestinal: Negative. Genitourinary: Negative. Musculoskeletal: Negative. Skin: Negative. Neurological: Positive for weakness. Seizures: & 1/20. Endo/Heme/Allergies: Negative. Psychiatric/Behavioral: Negative. Vitals:     Visit Vitals  BP (!) 160/95 (BP 1 Location: Left arm, BP Patient Position: At rest)   Pulse 74   Temp 98.2 °F (36.8 °C)   Resp 16   Ht 5' 10\" (1.778 m)   Wt 94.9 kg (209 lb 3.5 oz)   SpO2 95%   BMI 30.02 kg/m²       Physical Exam:   General : appears comfortable, on 2L NC, no distress  HEENT : eomi, normal oral mucosa, atraumatic normocephalic, Normal ear and nose. oropharynx  Neck : Supple, no JVD, no masses noted, no carotid bruit  Lungs : Breath sounds with good air entry bilaterally, no wheezes or rales, no accessory muscle use,  CVS : Rhythm rate regular, S1+, S2+, no murmur or gallop  Abdomen : Soft, nontender, no organomegaly, bowel sounds active, neg cvat  Extremities : 1 + edema  Musculoskeletal : Fair range of motion, no joint swelling or effusion, muscle tone and power appears normal,   Skin : Moist, warm, skin turgor, no pathological rash  Lymphatic : No cervical lymphadenopathy. Neurological : Awake, alert, oriented to time place person.   Cranial nerves intact, deep tendon reflexes active, no sensory disturbance, no cerebellar deficits. Psychiatric : Mood and affect appears appropriate to the situation. Data Review:   Recent Results (from the past 24 hour(s))   ECHO ADULT COMPLETE    Collection Time: 01/20/21  3:31 PM   Result Value Ref Range    Pulmonic Regurgitant End Max Velocity 100.00 cm/s    AoV PG 4.00 mmHg    Aortic Valve Area by Continuity of Peak Velocity 2.28 cm2    IVSd 1.02 (A) 0.6 - 1.0 cm    LVIDd 6.86 (A) 4.2 - 5.9 cm    LVIDs 6.37 cm    LVOT d 1.90 cm    Pulmonic Regurgitant End Max Velocity 80.20 cm/s    LVOT Peak Gradient 3.00 mmHg    LVPWd 0.83 0.6 - 1.0 cm    LV E' Septal Velocity 3.61 cm/s    LV ED Vol A2C 323.00 cm3    BP EF 13.0 (A) 55 - 100 %    LV ES Vol A2C 258.00 cm3    E/E' septal 26.68     LV Ejection Fraction MOD 2C 13.0 %    Pulmonic Regurgitant End Max Velocity 459.00 cm/s    Mitral Valve Deceleration Towns 7,550.00 mm/s2    Mitral Valve Deceleration Towns 7,550.00 mm/s2    Mitral Valve E Wave Deceleration Time 178.00 ms    Mitral Valve Pressure Half-time 49.00 ms    MV A Jack 48.40 cm/s    MV E Jack 96.30 cm/s    MVA (PHT) 4.49 cm2    MV E/A 1.99     Pulmonic Regurgitant End Max Velocity 119.00 cm/s    Pulmonic Valve Systolic Peak Instantaneous Gradient 6.00 mmHg    Pulmonic Regurgitant End Max Velocity 67.90 cm/s    Pulmonic Valve Systolic Peak Instantaneous Gradient 2.00 mmHg    Est. RA Pressure 15.00 mmHg    RVIDd 5.34 cm    RVSP 89.00 mmHg    Tricuspid Valve Max Velocity 431.00 cm/s    Triscuspid Valve Regurgitation Peak Gradient 74.00 mmHg    Tapse 0.40 (A) 1.5 - 2.0 cm    LV Mass .8 88 - 224 g    LV Mass AL Index 132.4 49 - 115 g/m2    STACY/BSA Pk Jack 1.1 cm2/m2             Assessment and Plan :     --Acute hypoxic respiratory failure:  s/t COVID and pulmonary edema. o2 to keep saturation > 92%    --COVID-19 pneumonia:   azithromycin and decadron    --CHF:   ECHO, lasix 40 BID. --LIIR onCKD IV:   presented with a creatinine of 9.1 .   GFR only 7  presented with volume overload, cxr showed pulmonary congestion. ILIR related to diuretics vs ckd progression. hyperkalemia and metabolic acidosis  Continue Lasix 80 iv TI  Temp hd cath placed 1/18  Hd 1/19 + 1/20  Cm to arrange chair  consulted case management for placement outpatient in [facility]  Met acidosis- cont bicarb  Nephrology following    --HTN:   Continue metoprolol and hydralazine    --GERD:  protonix    --nephroloithiasis/hydronephrosis; Had gross hematuria. Ct a/p showed Obstruction of the proximal right ureter x 6.6 mm calculus/rt hydronephrosis + bilateral renal calculi non obstructing. Discussed with urology-chronic  DVT ppx:heparin SubQ    DISPOSITION: 3-4days, will need permanent HD cath.      Signed By: Leti Carrera MD     January 20, 2021

## 2021-01-20 NOTE — PROGRESS NOTES
14: 55PM MELISSA spoke w/Olvin (admissions coordinator/[facility]) @ [phone]. MELISSA informed of the following:    -just recv'd approval from insurance [employer] ([address] [place] [address] location)    -chair days Tue, Thurs, and Fri    -patient will need to be dialyzed on Friday prior to discharge. Can't admit a brand new patient over the weekend.      -patient to start new dialysis on Tuesday (1/26/2021)    -while the patient is COVID (+) patient dialysis is 15:00PM Tue, Thurs, and Sat    -patient can't come into the building while COVID (+) must call the staff to inform he's in the parking lot, and he'll be escorted in a different door.     -once patient is COVID(-) his chair time is going to be 11:15AM Tue, Thurs, and [address] [facility]

## 2021-01-20 NOTE — PROGRESS NOTES
Reason for Admission:   Respiratory Failure                   RUR Score:  18% Low                    Plan for utilizing home health:   Prior HH in the past.  Offered patient [facility] and patient declined at time of discharge. PCP: First and Last name:  Rand Casillas MD   Name of Practice:    Are you a current patient: Yes/No: Yes    Approximate date of last visit: 3-6 mos   Can you participate in a virtual visit with your PCP: Yes                    Current Advanced Directive/Advance Care Plan: FULL Code. Emergency contact listed is Katerine Mccarthy (brother-in-law) @ [phone]. Sister Saurabh Mobley verbalized she's his POA. Writer explained there's no POA documentation in his chart. \"It was brought years ago. \"  Writer explained hospital switched to a different computer system, and everything didn't transfer over. Requested she fax a copy of the POA paperwork to the hospital.                           Transition of Care Plan:                      Patient states he lives alone in a two story home w/stairs to enter the home. Last seen PCP approx 3-6 mos ago. Patient unable to recall which pharmacy he uses to p/u medications. Home O2; however unsure how many liters. DME includes a walker. Patient voiced he drives himself. Prior HH in the past.  No prior SNF or IRF in the past.  \"I need to go to rehab/[facility]. \"  Patient gave writer verbal consent for a referral to be sent to [facility]. Referral sent via [facility]. SW informed patient of dialysis information. Patient acknowledged understanding. Writer to contact the emergency contact (GERRY) to inform of dialysis information. Care Management Interventions  PCP Verified by CM: Yes(Approx 3-6 mos)  Mode of Transport at Discharge: Other (see comment)(Family or Transport)  Transition of Care Consult (CM Consult): Discharge Planning  Discharge Durable Medical Equipment: (Home O2.   DME (walker))  Current Support Network: Own Home(Lives alone in  two story home w/stairs to enter the front door. )  Confirm Follow Up Transport: Other (see comment)(Transport or Family)  Discharge Location  Discharge Placement: [address], [place]

## 2021-01-20 NOTE — PROGRESS NOTES
Renal Consultation Note    NAME:  Emmy Homans   :   1950   MRN:   [id number]     ATTENDING: Darling Rodas MD  PCP:  Tanja Ren MD    Date/Time:  2021 8:02 AM      Subjective:       Patient is seen on dialysis  He is awake and alert  No symptoms  No LE swelling     Past Medical History:   Diagnosis Date    [facility]congestive heart failure) ([facility] [address]     Chronic kidney disease     CKD (chronic kidney disease)     4    Diabetes ([facility] [address] [address]     Hypertension       Past Surgical History:   Procedure Laterality Date    COLONOSCOPY N/A 12/3/2020    COLONOSCOPY performed by Justyna Colunga MD at [facility] HERNIA REPAIR      IR INSERT NON TUNL CVC OVER 5 YRS  2021    IR PLACE CVAD FLUORO GUIDE  2021     Social History     Tobacco Use    Smoking status: Never Smoker    Smokeless tobacco: Never Used   Substance Use Topics    Alcohol use: Not Currently      Family History   Problem Relation Age of Onset    Diabetes Mother     Heart Failure Father        Allergies   Allergen Reactions    Lisinopril Angioedema    Pcn [Penicillins] Rash      Prior to Admission medications    Medication Sig Start Date End Date Taking? Authorizing Provider   hydrALAZINE (APRESOLINE) 100 mg tablet Take 100 mg by mouth three (3) times daily. Provider, Historical   metoprolol succinate (TOPROL-XL) 50 mg XL tablet Take 50 mg by mouth two (2) times a day. Provider, Historical   furosemide (Lasix) 80 mg tablet Take 80 mg by mouth two (2) times a day. Provider, Historical   calcitRIOL (ROCALTROL) 0.25 mcg capsule Take 0.25 mcg by mouth daily. Provider, Historical   omeprazole-sodium bicarbonate (ZEGERID) 20-1.1 mg-gram capsule Take 1 Cap by mouth two (2) times a day. Provider, Historical   OTHER,NON-FORMULARY, Take 5 mg by mouth daily. Ongluza    Provider, Historical   cloNIDine HCL (CATAPRES) 0.1 mg tablet Take 0.1 mg by mouth three (3) times daily.     Provider, Historical   atorvastatin (LIPITOR) 40 mg tablet Take 40 mg by mouth daily. Provider, Historical       REVIEW OF SYSTEMS:       Constitutional: Negative for chills and fever. HENT: Negative. Eyes: Negative. Respiratory: Shortness of breath is present   Cardiovascular: Negative. Gastrointestinal: Negative for abdominal pain and nausea. Skin: Negative. Neurological: Negative. Objective:   VITALS:    Visit Vitals  BP (!) 160/95 (BP 1 Location: Left arm, BP Patient Position: At rest)   Pulse 74   Temp 98.2 °F (36.8 °C)   Resp 16   Ht 5' 10\" (1.778 m)   Wt 94.9 kg (209 lb 3.5 oz)   SpO2 95%   BMI 30.02 kg/m²     Temp (24hrs), Av.8 °F (36.6 °C), Min:97.4 °F (36.3 °C), Max:98.2 °F (36.8 °C)      PHYSICAL EXAM:   General:    Alert, cooperative, no distress, appears stated age. HEENT: Atraumatic, anicteric sclerae, pink conjunctivae     No oral ulcers, mucosa moist, throat clear  Neck:  JVD is not elevated  Lungs:   Crackles are heard both bases no rhonchi /wheezing  Chest wall:  No tenderness  No Accessory muscle use. Heart:   Regular  rhythm,  No  murmur   trace edema both ankles  Abdomen:   Soft, non-tender. Not distended. Bowel sounds normal  Extremities: No cyanosis. No clubbing  Genitourinary Pleitez catheter in place  Skin:     Eczema +  psych:  Good insight. Not depressed. Not anxious or agitated. Neurologic: Alert and oriented X 4.   No asterixis    LAB DATA REVIEWED:    Recent Results (from the past 24 hour(s))   ECHO ADULT COMPLETE    Collection Time: 21  3:31 PM   Result Value Ref Range    Pulmonic Regurgitant End Max Velocity 100.00 cm/s    AoV PG 4.00 mmHg    Aortic Valve Area by Continuity of Peak Velocity 2.28 cm2    IVSd 1.02 (A) 0.6 - 1.0 cm    LVIDd 6.86 (A) 4.2 - 5.9 cm    LVIDs 6.37 cm    LVOT d 1.90 cm    Pulmonic Regurgitant End Max Velocity 80.20 cm/s    LVOT Peak Gradient 3.00 mmHg    LVPWd 0.83 0.6 - 1.0 cm    LV E' Septal Velocity 3.61 cm/s    LV ED Vol A2C 323.00 cm3    BP EF 13.0 (A) 55 - 100 %    LV ES Vol A2C 258.00 cm3    E/E' septal 26.68     LV Ejection Fraction MOD 2C 13.0 %    Pulmonic Regurgitant End Max Velocity 459.00 cm/s    Mitral Valve Deceleration [place] 7,550.00 mm/s2    Mitral Valve Deceleration [place] 7,550.00 mm/s2    Mitral Valve E Wave Deceleration Time 178.00 ms    Mitral Valve Pressure Half-time 49.00 ms    MV A Jack 48.40 cm/s    MV E Jack 96.30 cm/s    MVA (PHT) 4.49 cm2    MV E/A 1.99     Pulmonic Regurgitant End Max Velocity 119.00 cm/s    Pulmonic Valve Systolic Peak Instantaneous Gradient 6.00 mmHg    Pulmonic Regurgitant End Max Velocity 67.90 cm/s    Pulmonic Valve Systolic Peak Instantaneous Gradient 2.00 mmHg    Est. RA Pressure 15.00 mmHg    RVIDd 5.34 cm    RVSP 89.00 mmHg    Tricuspid Valve Max Velocity 431.00 cm/s    Triscuspid Valve Regurgitation Peak Gradient 74.00 mmHg    Tapse 0.40 (A) 1.5 - 2.0 cm    LV Mass .8 88 - 224 g    LV Mass AL Index 132.4 49 - 115 g/m2    STACY/BSA Pk Jack 1.1 cm2/m2       Recommendations/Plan:     1. 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 GFR only 7  -presented with volume overload, hyperkalemia and metabolic acidosis  -s/p perm cath placement on 1/18   -s/p dialyzed 1/19 and 1/20  -consulted case management for placement outpatient in [facility]  -ok to MI once has chair setup     2. Renal osteodystrophy:  -Calcium is okay.   -phosphorus 6.6. Will add phos binders if phos is still high despite on dialysis  -Check PTH. Continue Calcitriol at the current dose for now  -Check vitamin D levels    3. Hyperkalemia:  -Likely because of advanced renal disease and high potassium diet. Not on ACE inhibitor/ arbs  -K 5.6, received kayexalate  -K is 4.9   -Low potassium diet.  -Start hemodialysis in a.m. with 2K bath    4. Severe metabolic acidosis  -presented with bicarb of 12. Improved to 15 today. pH 7.2  -2/2 RTA IV 2/2 DM  -will increase oral bicarbonate 1300 tid     5. Covid pneumonia  -Patient presented with shortness of breath.    -Found to be positive for Covid  -Has been started on Decadron and azithromycin  -Leukocytosis of 13.5. Afebrile for now  -Currently in no respiratory distress    6. Hypertension  -Blood pressure is labile but mostly well controlled  -I will continue metoprolol and hydralazine at the current dose for now    7.  Hydronephrosis, UL:  -had gross hematuria  -CT abd showed BL renal calculi  -R hydronephrosis from obstruction stone  -will consult Urology        ________________________________________________________________________  Signed: Amrik Arenas MD

## 2021-01-21 ENCOUNTER — APPOINTMENT (OUTPATIENT)
Dept: GENERAL RADIOLOGY | Age: 71
DRG: 177 | End: 2021-01-21
Attending: INTERNAL MEDICINE
Payer: MEDICARE

## 2021-01-21 LAB
ALBUMIN SERPL-MCNC: 3.7 G/DL (ref 3.5–5)
AMMONIA PLAS-SCNC: 21 UMOL/L
ANION GAP SERPL CALC-SCNC: 13 MMOL/L (ref 5–15)
BNP SERPL-MCNC: ABNORMAL PG/ML
BUN SERPL-MCNC: 64 MG/DL (ref 6–20)
BUN/CREAT SERPL: 12 (ref 12–20)
CA-I BLD-MCNC: 7.8 MG/DL (ref 8.5–10.1)
CHLORIDE SERPL-SCNC: 98 MMOL/L (ref 97–108)
CO2 SERPL-SCNC: 23 MMOL/L (ref 21–32)
CREAT SERPL-MCNC: 5.54 MG/DL (ref 0.7–1.3)
CRP SERPL-MCNC: 1.73 MG/DL (ref 0–0.6)
ECHO AV AREA PEAK VELOCITY: 2.28 CM2
ECHO AV AREA/BSA PEAK VELOCITY: 1.1 CM2/M2
ECHO AV PEAK GRADIENT: 4 MMHG
ECHO EST RA PRESSURE: 15 MMHG
ECHO LV E' SEPTAL VELOCITY: 3.61 CM/S
ECHO LV EDV A2C: 323 CM3
ECHO LV EJECTION FRACTION A2C: 13 %
ECHO LV EJECTION FRACTION BIPLANE: 13 % (ref 55–100)
ECHO LV ESV A2C: 258 CM3
ECHO LV INTERNAL DIMENSION DIASTOLIC: 6.86 CM (ref 4.2–5.9)
ECHO LV INTERNAL DIMENSION SYSTOLIC: 6.37 CM
ECHO LV IVSD: 1.02 CM (ref 0.6–1)
ECHO LV MASS 2D: 281.8 G (ref 88–224)
ECHO LV MASS INDEX 2D: 132.4 G/M2 (ref 49–115)
ECHO LV POSTERIOR WALL DIASTOLIC: 0.83 CM (ref 0.6–1)
ECHO LVOT DIAM: 1.9 CM
ECHO LVOT PEAK GRADIENT: 3 MMHG
ECHO MV A VELOCITY: 48.4 CM/S
ECHO MV AREA PHT: 4.49 CM2
ECHO MV E DECELERATION TIME (DT): 178 MS
ECHO MV E VELOCITY: 96.3 CM/S
ECHO MV E/A RATIO: 1.99
ECHO MV E/E' SEPTAL: 26.68
ECHO MV PRESSURE HALF TIME (PHT): 49 MS
ECHO PV PEAK INSTANTANEOUS GRADIENT SYSTOLIC: 2 MMHG
ECHO PV PEAK INSTANTANEOUS GRADIENT SYSTOLIC: 6 MMHG
ECHO PV REGURGITANT MAX VELOCITY: 100 CM/S
ECHO PV REGURGITANT MAX VELOCITY: 119 CM/S
ECHO PV REGURGITANT MAX VELOCITY: 459 CM/S
ECHO PV REGURGITANT MAX VELOCITY: 67.9 CM/S
ECHO PV REGURGITANT MAX VELOCITY: 80.2 CM/S
ECHO RIGHT VENTRICULAR SYSTOLIC PRESSURE (RVSP): 89 MMHG
ECHO RV INTERNAL DIMENSION: 5.34 CM
ECHO RV TAPSE: 0.4 CM (ref 1.5–2)
ECHO TV MAX VELOCITY: 431 CM/S
ECHO TV REGURGITANT PEAK GRADIENT: 74 MMHG
GLUCOSE BLD STRIP.AUTO-MCNC: 189 MG/DL (ref 65–100)
GLUCOSE BLD STRIP.AUTO-MCNC: 351 MG/DL (ref 65–100)
GLUCOSE BLD STRIP.AUTO-MCNC: 386 MG/DL (ref 65–100)
GLUCOSE SERPL-MCNC: 360 MG/DL (ref 65–100)
MV DEC SLOPE: 7550 MM/S2
MV DEC SLOPE: 7550 MM/S2
PERFORMED BY, TECHID: ABNORMAL
PHOSPHATE SERPL-MCNC: 3.8 MG/DL (ref 2.6–4.7)
POTASSIUM SERPL-SCNC: 3.6 MMOL/L (ref 3.5–5.1)
PROCALCITONIN SERPL-MCNC: 2.85 NG/ML
SODIUM SERPL-SCNC: 134 MMOL/L (ref 136–145)
TROPONIN I SERPL-MCNC: 0.21 NG/ML

## 2021-01-21 PROCEDURE — 94760 N-INVAS EAR/PLS OXIMETRY 1: CPT

## 2021-01-21 PROCEDURE — 84145 PROCALCITONIN (PCT): CPT

## 2021-01-21 PROCEDURE — 77010033678 HC OXYGEN DAILY

## 2021-01-21 PROCEDURE — 74011250636 HC RX REV CODE- 250/636: Performed by: HOSPITALIST

## 2021-01-21 PROCEDURE — 84484 ASSAY OF TROPONIN QUANT: CPT

## 2021-01-21 PROCEDURE — 83036 HEMOGLOBIN GLYCOSYLATED A1C: CPT

## 2021-01-21 PROCEDURE — 86140 C-REACTIVE PROTEIN: CPT

## 2021-01-21 PROCEDURE — 65270000032 HC RM SEMIPRIVATE

## 2021-01-21 PROCEDURE — 74011636637 HC RX REV CODE- 636/637: Performed by: INTERNAL MEDICINE

## 2021-01-21 PROCEDURE — 80069 RENAL FUNCTION PANEL: CPT

## 2021-01-21 PROCEDURE — 82140 ASSAY OF AMMONIA: CPT

## 2021-01-21 PROCEDURE — 36415 COLL VENOUS BLD VENIPUNCTURE: CPT

## 2021-01-21 PROCEDURE — 74011250636 HC RX REV CODE- 250/636: Performed by: INTERNAL MEDICINE

## 2021-01-21 PROCEDURE — 83880 ASSAY OF NATRIURETIC PEPTIDE: CPT

## 2021-01-21 PROCEDURE — 82962 GLUCOSE BLOOD TEST: CPT

## 2021-01-21 PROCEDURE — 74011250637 HC RX REV CODE- 250/637: Performed by: HOSPITALIST

## 2021-01-21 PROCEDURE — 71045 X-RAY EXAM CHEST 1 VIEW: CPT

## 2021-01-21 PROCEDURE — 90935 HEMODIALYSIS ONE EVALUATION: CPT

## 2021-01-21 PROCEDURE — 74011250637 HC RX REV CODE- 250/637: Performed by: INTERNAL MEDICINE

## 2021-01-21 RX ORDER — INSULIN GLARGINE 100 [IU]/ML
10 INJECTION, SOLUTION SUBCUTANEOUS ONCE
Status: COMPLETED | OUTPATIENT
Start: 2021-01-21 | End: 2021-01-21

## 2021-01-21 RX ORDER — HEPARIN SODIUM 1000 [USP'U]/ML
INJECTION, SOLUTION INTRAVENOUS; SUBCUTANEOUS
Status: DISPENSED
Start: 2021-01-21 | End: 2021-01-22

## 2021-01-21 RX ORDER — MAGNESIUM SULFATE 100 %
4 CRYSTALS MISCELLANEOUS AS NEEDED
Status: DISCONTINUED | OUTPATIENT
Start: 2021-01-21 | End: 2021-01-24 | Stop reason: HOSPADM

## 2021-01-21 RX ORDER — DEXTROSE 50 % IN WATER (D50W) INTRAVENOUS SYRINGE
25-50 AS NEEDED
Status: DISCONTINUED | OUTPATIENT
Start: 2021-01-21 | End: 2021-01-24 | Stop reason: HOSPADM

## 2021-01-21 RX ORDER — INSULIN LISPRO 100 [IU]/ML
INJECTION, SOLUTION INTRAVENOUS; SUBCUTANEOUS
Status: DISCONTINUED | OUTPATIENT
Start: 2021-01-21 | End: 2021-01-24 | Stop reason: HOSPADM

## 2021-01-21 RX ORDER — DEXAMETHASONE 4 MG/1
4 TABLET ORAL EVERY 12 HOURS
Status: DISCONTINUED | OUTPATIENT
Start: 2021-01-21 | End: 2021-01-23

## 2021-01-21 RX ADMIN — DEXAMETHASONE 4 MG: 4 TABLET ORAL at 11:40

## 2021-01-21 RX ADMIN — INSULIN GLARGINE 10 UNITS: 100 INJECTION, SOLUTION SUBCUTANEOUS at 11:40

## 2021-01-21 RX ADMIN — METOPROLOL SUCCINATE 50 MG: 50 TABLET, EXTENDED RELEASE ORAL at 22:50

## 2021-01-21 RX ADMIN — DEXAMETHASONE SODIUM PHOSPHATE 6 MG: 4 INJECTION, SOLUTION INTRA-ARTICULAR; INTRALESIONAL; INTRAMUSCULAR; INTRAVENOUS; SOFT TISSUE at 05:35

## 2021-01-21 RX ADMIN — HYDRALAZINE HYDROCHLORIDE 50 MG: 50 TABLET, FILM COATED ORAL at 22:50

## 2021-01-21 RX ADMIN — SODIUM BICARBONATE 650 MG TABLET 1300 MG: at 08:08

## 2021-01-21 RX ADMIN — AZITHROMYCIN DIHYDRATE 500 MG: 500 INJECTION, POWDER, LYOPHILIZED, FOR SOLUTION INTRAVENOUS at 18:29

## 2021-01-21 RX ADMIN — INSULIN LISPRO 2 UNITS: 100 INJECTION, SOLUTION INTRAVENOUS; SUBCUTANEOUS at 22:50

## 2021-01-21 RX ADMIN — INSULIN LISPRO 10 UNITS: 100 INJECTION, SOLUTION INTRAVENOUS; SUBCUTANEOUS at 11:41

## 2021-01-21 RX ADMIN — METOPROLOL SUCCINATE 50 MG: 50 TABLET, EXTENDED RELEASE ORAL at 08:08

## 2021-01-21 RX ADMIN — HYDRALAZINE HYDROCHLORIDE 50 MG: 50 TABLET, FILM COATED ORAL at 08:08

## 2021-01-21 RX ADMIN — HEPARIN SODIUM 5000 UNITS: 5000 INJECTION INTRAVENOUS; SUBCUTANEOUS at 18:29

## 2021-01-21 RX ADMIN — HEPARIN SODIUM 5000 UNITS: 5000 INJECTION INTRAVENOUS; SUBCUTANEOUS at 22:50

## 2021-01-21 RX ADMIN — HEPARIN SODIUM 5000 UNITS: 5000 INJECTION INTRAVENOUS; SUBCUTANEOUS at 08:08

## 2021-01-21 RX ADMIN — ATORVASTATIN CALCIUM 40 MG: 40 TABLET, FILM COATED ORAL at 08:08

## 2021-01-21 RX ADMIN — DEXAMETHASONE 4 MG: 4 TABLET ORAL at 22:50

## 2021-01-21 RX ADMIN — PANTOPRAZOLE SODIUM 40 MG: 40 TABLET, DELAYED RELEASE ORAL at 08:08

## 2021-01-21 NOTE — WOUND CARE
IP WOUND CONSULT 
 
J Luis Camargo 
MEDICAL RECORD NUMBER:  [id number] 
AGE: 70 y.o.   GENDER: male  : 1950 
TODAY'S DATE:  2021 
 
GENERAL  
 
[] Follow-up  
[x] New Consult 
 
J Luis Camargo is a 70 y.o. male referred by:  
[x] Physician 
[x] Nursing 
[] Other:  
     
PAST MEDICAL HISTORY 
 
Past Medical History:  
Diagnosis Date  
• CHF (congestive heart failure) (HCC)   
• Chronic kidney disease   
• CKD (chronic kidney disease)   
 4  
• Diabetes (HCC)   
• Hypertension   
  
 
PAST SURGICAL HISTORY 
 
Past Surgical History:  
Procedure Laterality Date  
• COLONOSCOPY N/A 12/3/2020  
 COLONOSCOPY performed by Carly Kate MD at [facility] ENDOSCOPY  
• HX HEART CATHETERIZATION    
• HX HERNIA REPAIR    
• IR INSERT NON TUNL CVC OVER 5 YRS  2021  
• IR PLACE CVAD FLUORO GUIDE  2021  
 
 
FAMILY HISTORY 
 
Family History  
Problem Relation Age of Onset  
• Diabetes Mother   
• Heart Failure Father   
 
 
SOCIAL HISTORY 
 
Social History  
 
Tobacco Use  
• Smoking status: Never Smoker  
• Smokeless tobacco: Never Used  
Substance Use Topics  
• Alcohol use: Not Currently  
• Drug use: Never  
 
 
ALLERGIES 
 
Allergies  
Allergen Reactions  
• Lisinopril Angioedema  
• Pcn [Penicillins] Rash  
 
 
MEDICATIONS 
 
No current facility-administered medications on file prior to encounter.   
 
Current Outpatient Medications on File Prior to Encounter  
Medication Sig Dispense Refill  
• hydrALAZINE (APRESOLINE) 100 mg tablet Take 100 mg by mouth three (3) times daily.    
• metoprolol succinate (TOPROL-XL) 50 mg XL tablet Take 50 mg by mouth two (2) times a day.    
• furosemide (Lasix) 80 mg tablet Take 80 mg by mouth two (2) times a day.    
• calcitRIOL (ROCALTROL) 0.25 mcg capsule Take 0.25 mcg by mouth daily.    
• omeprazole-sodium bicarbonate (ZEGERID) 20-1.1 mg-gram capsule Take 1 Cap by mouth two (2) times a day.    
• OTHER,NON-FORMULARY, Take 5 mg by mouth daily. Ongluza    
  cloNIDine HCL (CATAPRES) 0.1 mg tablet Take 0.1 mg by mouth three (3) times daily.  atorvastatin (LIPITOR) 40 mg tablet Take 40 mg by mouth daily. Wt Readings from Last 3 Encounters:  
01/20/21 94.9 kg (209 lb 3.5 oz) 12/03/20 97.5 kg (215 lb)  
09/29/20 104.3 kg (230 lb) [unfilled] Visit Vitals BP (!) 170/101 (BP 1 Location: Right arm) Pulse 78 Temp 97.5 °F (36.4 °C) Resp 20 Ht 5' 10\" (1.778 m) Wt 94.9 kg (209 lb 3.5 oz) SpO2 96% BMI 30.02 kg/m² ASSESSMENT Patient awake and agreeable to care. Assisted nurse with turning. Pleitez to gravity drainage. Stat justen in place. Mepilex dressing with visible drainage red on the outside. Skin around wound and in center of wound macerated and moist.  Wound bed looks moist, though picture does not seem to  moisture. Vik Score: 18 
 
Current Preventative Measures: Mepilex border in place Activity Level/PT: 
 
Ulcer Type: Stage three sacral wound Contributing Factors: diabetes, decreased mobility, shear force, incontinence of urine, obesity and decreased tissue oxygenation Wound Buttocks (Active) Wound Image   01/21/21 1039 Wound Etiology Pressure Stage 3 01/21/21 1039 Dressing Status Breakthrough drainage noted 01/21/21 1039 Cleansed Cleansed with saline 01/21/21 1039 Dressing/Treatment Zinc paste 01/21/21 1039 Offloading for Diabetic Foot Ulcers Other (comment) 01/21/21 1039 Wound Length (cm) 7 cm 01/21/21 1039 Wound Width (cm) 7 cm 01/21/21 1039 Wound Surface Area (cm^2) 49 cm^2 01/21/21 1039 Wound Assessment Pink/red;Subcutaneous; Purple/maroon 01/21/21 1039 Drainage Amount Small 01/21/21 1039 Drainage Description Serosanguinous 01/21/21 1039 Wound Odor None 01/21/21 1039 Carole-Wound/Incision Assessment Excoriated;Fragile; Maceration 01/21/21 1039 Edges Attached edges 01/21/21 1039 Wound Thickness Description Full thickness 01/21/21 1039 Number of days: 1 Wound Heel Left;Posterior small, discolored, blanchable (Active) Wound Image   01/21/21 1045 Offloading for Diabetic Foot Ulcers Yes (type) 01/21/21 1045 Wound Assessment Other (Comment) 01/21/21 1045 Number of days: 0 Permission to photo obtained: yes Care/Treatment given:  Skin evaluation including heels. Sacral/coccyx wound and left heel photographed. Sacral wound rinsed with NS and Z paste applied for now. Will place order for Zpaste now. Anticipate loose center skin and surrounding will come off. Will place on list to reevaluate tomorrow to see if orders need to be changed. Patient tolerated care well. PLAN Skin Care & Pressure Relief Recommendations Speciality bed Will place on waffle bed for now. Anticipate discharge tomorrow. If he does not discharg, will reevaluate bed. Minimize layers of linen Turn/reposition approximately every 2 hours Pillow wedges Manage incontinence Promote continence; Skin Protective lotion/cream to buttocks and sacrum daily and as needed with incontinence care Offloading boots Nutrition Consult:  Consult placed Physician/Provider notified:  
 
 
Teaching completed with:  
[] Patient          
[] Family member      
[] Caregiver [x] Nursing 
[] Other Reviewed wound care, moisture under mepilex with Regi Suggs RN. Patient/Caregiver Teaching: 
Level of patient/caregiver understanding able to:  
[] Indicates understanding       [] Needs reinforcement 
[] Unsuccessful      [] Verbal Understanding 
[] Demonstrated understanding       [] No evidence of learning 
[] Refused teaching         [x] N/A Electronically signed by Evelene Schaumann, RN on 1/21/2021 at 11:01 AM

## 2021-01-21 NOTE — PROGRESS NOTES
Renal Consultation Note    NAME:  Ashton Callow   :   1950   MRN:   [id number]     ATTENDING: Norah Luque MD  PCP:  Tre Barajas MD    Date/Time:  2021 8:02 AM      Subjective:       Patient is seen in the room  He is awake and alert  No symptoms  No LE swelling   Was dialyzed     Past Medical History:   Diagnosis Date    [facility]     Chronic kidney disease     CKD (chronic kidney disease)     4    Diabetes ([facility]     Hypertension       Past Surgical History:   Procedure Laterality Date    COLONOSCOPY N/A 12/3/2020    [facility]ares [facility] at [facility]      HX HERNIA REPAIR      IR INSERT NON TUNL CVC OVER 5 YRS  2021    IR PLACE CVAD FLUORO GUIDE  2021     Social History     Tobacco Use    Smoking status: Never Smoker    Smokeless tobacco: Never Used   Substance Use Topics    Alcohol use: Not Currently      Family History   Problem Relation Age of Onset    Diabetes Mother     Heart Failure Father        Allergies   Allergen Reactions    Lisinopril Angioedema    Pcn [Penicillins] Rash      Prior to Admission medications    Medication Sig Start Date End Date Taking? Authorizing Provider   hydrALAZINE (APRESOLINE) 100 mg tablet Take 100 mg by mouth three (3) times daily. Provider, Historical   metoprolol succinate (TOPROL-XL) 50 mg XL tablet Take 50 mg by mouth two (2) times a day. Provider, Historical   furosemide (Lasix) 80 mg tablet Take 80 mg by mouth two (2) times a day. Provider, Historical   calcitRIOL (ROCALTROL) 0.25 mcg capsule Take 0.25 mcg by mouth daily. Provider, Historical   omeprazole-sodium bicarbonate (ZEGERID) 20-1.1 mg-gram capsule Take 1 Cap by mouth two (2) times a day. Provider, Historical   OTHER,NON-FORMULARY, Take 5 mg by mouth daily. Ongluza    Provider, Historical   cloNIDine HCL (CATAPRES) 0.1 mg tablet Take 0.1 mg by mouth three (3) times daily. Provider, Historical   atorvastatin (LIPITOR) 40 mg tablet Take 40 mg by mouth daily. Provider, Historical       REVIEW OF SYSTEMS:       Constitutional: Negative for chills and fever. HENT: Negative. Eyes: Negative. Respiratory: Shortness of breath is present   Cardiovascular: Negative. Gastrointestinal: Negative for abdominal pain and nausea. Skin: Negative. Neurological: Negative. Objective:   VITALS:    Visit Vitals  BP (!) 170/101 (BP 1 Location: Right arm)   Pulse 78   Temp 97.5 °F (36.4 °C)   Resp 20   Ht 5' 10\" (1.778 m)   Wt 94.9 kg (209 lb 3.5 oz)   SpO2 98%   BMI 30.02 kg/m²     Temp (24hrs), Av °F (36.7 °C), Min:97.5 °F (36.4 °C), Max:98.2 °F (36.8 °C)      PHYSICAL EXAM:   General:    Alert, cooperative, no distress, appears stated age. HEENT: Atraumatic, anicteric sclerae, pink conjunctivae     No oral ulcers, mucosa moist, throat clear  Neck:  JVD is not elevated  Lungs:   Crackles are heard both bases no rhonchi /wheezing  Chest wall:  No tenderness  No Accessory muscle use. Heart:   Regular  rhythm,  No  murmur   trace edema both ankles  Abdomen:   Soft, non-tender. Not distended. Bowel sounds normal  Extremities: No cyanosis. No clubbing  Genitourinary Pleitez catheter in place  Skin:     Eczema +  psych:  Good insight. Not depressed. Not anxious or agitated. Neurologic: Alert and oriented X 4.   No asterixis    LAB DATA REVIEWED:    Recent Results (from the past 24 hour(s))   ECHO ADULT COMPLETE    Collection Time: 21  3:31 PM   Result Value Ref Range    Pulmonic Regurgitant End Max Velocity 100.00 cm/s    AoV PG 4.00 mmHg    Aortic Valve Area by Continuity of Peak Velocity 2.28 cm2    IVSd 1.02 (A) 0.6 - 1.0 cm    LVIDd 6.86 (A) 4.2 - 5.9 cm    LVIDs 6.37 cm    LVOT d 1.90 cm    Pulmonic Regurgitant End Max Velocity 80.20 cm/s    LVOT Peak Gradient 3.00 mmHg    LVPWd 0.83 0.6 - 1.0 cm    LV E' Septal Velocity 3.61 cm/s    LV ED Vol A2C 323.00 cm3    BP EF 13.0 (A) 55 - 100 %    LV ES Vol A2C 258.00 cm3    E/E' septal 26.68     LV Ejection Fraction MOD 2C 13.0 %    Pulmonic Regurgitant End Max Velocity 459.00 cm/s    Mitral Valve Deceleration [place] 7,550.00 mm/s2    Mitral Valve Deceleration [place] 7,550.00 mm/s2    Mitral Valve E Wave Deceleration Time 178.00 ms    Mitral Valve Pressure Half-time 49.00 ms    MV A Jack 48.40 cm/s    MV E Jack 96.30 cm/s    MVA (PHT) 4.49 cm2    MV E/A 1.99     Pulmonic Regurgitant End Max Velocity 119.00 cm/s    Pulmonic Valve Systolic Peak Instantaneous Gradient 6.00 mmHg    Pulmonic Regurgitant End Max Velocity 67.90 cm/s    Pulmonic Valve Systolic Peak Instantaneous Gradient 2.00 mmHg    Est. RA Pressure 15.00 mmHg    RVIDd 5.34 cm    RVSP 89.00 mmHg    Tricuspid Valve Max Velocity 431.00 cm/s    Triscuspid Valve Regurgitation Peak Gradient 74.00 mmHg    Tapse 0.40 (A) 1.5 - 2.0 cm    LV Mass .8 88 - 224 g    LV Mass AL Index 132.4 49 - 115 g/m2    STACY/BSA Pk Jack 1.1 cm2/m2   GLUCOSE, POC    Collection Time: 01/21/21  7:41 AM   Result Value Ref Range    Glucose (POC) 351 (H) 65 - 100 mg/dL    Performed by Satnam Melchor        Recommendations/Plan:     1. 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 GFR only 7  -presented with volume overload, hyperkalemia and metabolic acidosis  -s/p perm cath placement on 1/18   -s/p dialyzed 1/19 and 1/20  -plan for HD AM   -pt has chair setup at [facility], [place]  -will start next week 1/26  -ok to dc tomorrow after the HD     2. Renal osteodystrophy:  -Calcium is okay.   -phosphorus 6.6. Will add phos binders if phos is still high despite on dialysis  -Check PTH. Continue Calcitriol at the current dose for now  -Check vitamin D levels    3. Hyperkalemia:  -Likely because of advanced renal disease and high potassium diet. Not on ACE inhibitor/ arbs  -K 5.6, received kayexalate  -K is 4.9   -Low potassium diet.  -Start hemodialysis in a.m. with 2K bath    4.   Severe metabolic acidosis  -presented with bicarb of 12. Improved to 15 today. pH 7.2  -2/2 RTA IV 2/2 DM  -will increase oral bicarbonate 1300 tid     5. Covid pneumonia  -Patient presented with shortness of breath.    -Found to be positive for Covid  -Has been started on Decadron and azithromycin  -Leukocytosis of 13.5. Afebrile for now  -Currently in no respiratory distress    6. Hypertension  -Blood pressure is labile but mostly well controlled  -I will continue metoprolol and hydralazine at the current dose for now    7.  Hydronephrosis, UL:  -had gross hematuria  -CT abd showed BL renal calculi  -R hydronephrosis from obstruction stone  -will consult Urology        ________________________________________________________________________  Signed: Trang Newman MD

## 2021-01-21 NOTE — PROGRESS NOTES
PROGRESS NOTE    Subjective:   Chief Complaint : worsening shortness of breath since few days  Source of information : patient    History of present illness:     70M, H/o CHF unknown EF, CKD IV with worsening shortness of breath wince few days s/t respiratory failure s.t COVID and fluid overload s.t ESRD. Temp hd cath placed 1/18 by IR  Hd 1/19 & 1/20nephrology following  Treatment for COVID continues  Blood sugars markedly elevated- hx dm  Echo 1/20 revealing lvef 15-20%, grade 33 diastolic dysfunction    States that he is feeling some improvement in his breathing  Denies pain. O2 at 2 L saturating at 94%    Past Medical History:   Diagnosis Date    CHF (congestive heart failure) (HCC)     Chronic kidney disease     CKD (chronic kidney disease)     4    Diabetes ([facility] [address]     Hypertension      Past Surgical History:   Procedure Laterality Date    COLONOSCOPY N/A 12/3/2020    [facility] Navya Craig [facility] ENDOSCOPY    HX HEART CATHETERIZATION      HX HERNIA REPAIR      IR INSERT NON TUNL CVC OVER 5 YRS  1/18/2021    IR PLACE CVAD FLUORO GUIDE  1/18/2021     Family History   Problem Relation Age of Onset    Diabetes Mother     Heart Failure Father       Social History     Tobacco Use    Smoking status: Never Smoker    Smokeless tobacco: Never Used   Substance Use Topics    Alcohol use: Not Currently       Prior to Admission medications    Medication Sig Start Date End Date Taking? Authorizing Provider   hydrALAZINE (APRESOLINE) 100 mg tablet Take 100 mg by mouth three (3) times daily. Provider, Historical   metoprolol succinate (TOPROL-XL) 50 mg XL tablet Take 50 mg by mouth two (2) times a day. Provider, Historical   furosemide (Lasix) 80 mg tablet Take 80 mg by mouth two (2) times a day. Provider, Historical   calcitRIOL (ROCALTROL) 0.25 mcg capsule Take 0.25 mcg by mouth daily.     Provider, Historical   omeprazole-sodium bicarbonate (ZEGERID) 20-1.1 mg-gram capsule Take 1 Cap by mouth two (2) times a day. Provider, Historical   OTHER,NON-FORMULARY, Take 5 mg by mouth daily. Ongluza    Provider, Historical   cloNIDine HCL (CATAPRES) 0.1 mg tablet Take 0.1 mg by mouth three (3) times daily. Provider, Historical   atorvastatin (LIPITOR) 40 mg tablet Take 40 mg by mouth daily. Provider, Historical     Allergies   Allergen Reactions    Lisinopril Angioedema    Pcn [Penicillins] Rash             Review of Systems:  Review of Systems   Constitutional: Positive for malaise/fatigue. Negative for chills and fever. HENT: Negative. Eyes: Negative. Respiratory: Positive for shortness of breath. Negative for sputum production. Cardiovascular: Negative. Gastrointestinal: Negative. Genitourinary: Negative. Musculoskeletal: Negative. Skin: Negative. Neurological: Positive for weakness. Seizures: & 1/20. Endo/Heme/Allergies: Negative. Psychiatric/Behavioral: Negative. Vitals:     Visit Vitals  BP (!) 160/93 (BP 1 Location: Left arm, BP Patient Position: At rest)   Pulse 73   Temp 98.1 °F (36.7 °C)   Resp 20   Ht 5' 10\" (1.778 m)   Wt 94.9 kg (209 lb 3.5 oz)   SpO2 97%   BMI 30.02 kg/m²       Physical Exam:   General : appears comfortable, on 2L NC, no distress  HEENT : eomi, normal oral mucosa, atraumatic normocephalic, Normal ear and nose. oropharynx  Neck : Supple, no JVD, no masses noted, no carotid bruit  Lungs : Breath sounds with good air entry bilaterally, no wheezes or rales, no accessory muscle use,  CVS : Rhythm rate regular, S1+, S2+, no murmur or gallop  Abdomen : Soft, nontender, no organomegaly, bowel sounds active, neg cvat. + obese/aascites/distended  Extremities : 1 + edema  Musculoskeletal : Fair range of motion, no joint swelling or effusion, muscle tone and power appears normal,   Skin : Moist, warm, skin turgor, no pathological rash  Lymphatic : No cervical lymphadenopathy.   Neurological : Awake, alert, oriented to time place person. Cranial nerves intact, nfd  Psychiatric : Mood and affect appears appropriate to the situation. Data Review:   Recent Results (from the past 24 hour(s))   ECHO ADULT COMPLETE    Collection Time: 01/20/21  3:31 PM   Result Value Ref Range    Pulmonic Regurgitant End Max Velocity 100.00 cm/s    AoV PG 4.00 mmHg    Aortic Valve Area by Continuity of Peak Velocity 2.28 cm2    IVSd 1.02 (A) 0.6 - 1.0 cm    LVIDd 6.86 (A) 4.2 - 5.9 cm    LVIDs 6.37 cm    LVOT d 1.90 cm    Pulmonic Regurgitant End Max Velocity 80.20 cm/s    LVOT Peak Gradient 3.00 mmHg    LVPWd 0.83 0.6 - 1.0 cm    LV E' Septal Velocity 3.61 cm/s    LV ED Vol A2C 323.00 cm3    BP EF 13.0 (A) 55 - 100 %    LV ES Vol A2C 258.00 cm3    E/E' septal 26.68     LV Ejection Fraction MOD 2C 13.0 %    Pulmonic Regurgitant End Max Velocity 459.00 cm/s    Mitral Valve Deceleration [place] 7,550.00 mm/s2    Mitral Valve Deceleration [place] 7,550.00 mm/s2    Mitral Valve E Wave Deceleration Time 178.00 ms    Mitral Valve Pressure Half-time 49.00 ms    MV A Jack 48.40 cm/s    MV E Jack 96.30 cm/s    MVA (PHT) 4.49 cm2    MV E/A 1.99     Pulmonic Regurgitant End Max Velocity 119.00 cm/s    Pulmonic Valve Systolic Peak Instantaneous Gradient 6.00 mmHg    Pulmonic Regurgitant End Max Velocity 67.90 cm/s    Pulmonic Valve Systolic Peak Instantaneous Gradient 2.00 mmHg    Est. RA Pressure 15.00 mmHg    RVIDd 5.34 cm    RVSP 89.00 mmHg    Tricuspid Valve Max Velocity 431.00 cm/s    Triscuspid Valve Regurgitation Peak Gradient 74.00 mmHg    Tapse 0.40 (A) 1.5 - 2.0 cm    LV Mass .8 88 - 224 g    LV Mass AL Index 132.4 49 - 115 g/m2    STACY/BSA Pk Jack 1.1 cm2/m2   GLUCOSE, POC    Collection Time: 01/21/21  7:41 AM   Result Value Ref Range    Glucose (POC) 351 (H) 65 - 100 mg/dL    Performed by Leonard Eldridge        2d echo:  · LV: Estimated LVEF is 15 - 20%. Normal wall thickness. Moderately dilated left ventricle.  Severely and globally reduced systolic function. Severe (grade 3) left ventricular diastolic dysfunction. · LA: Moderately dilated left atrium. · RV: Moderately dilated right ventricle. Moderately reduced systolic function. · RA: Severely dilated right atrium. · AV: Aortic valve leaflet calcification present. · MV: Mitral valve non-specific thickening. Mild mitral annular calcification. Moderate mitral valve regurgitation is present. · TV: Moderate to severe tricuspid valve regurgitation is present. · PV: Mild pulmonic valve regurgitation is present. · PA: Pulmonary arterial systolic pressure is 89 mmHg. · IVC: Severely elevated central venous pressure (15 mmHg); IVC diameter is larger than 21 mm and collapses less than 50% with respiration. · Pericardium: Trivial pericardial effusion. There is left pleural effusion. There is right pleural effusion. Assessment and Plan :     --Acute hypoxic respiratory failure:  s/t COVID and pulmonary edema. o2 to keep saturation > 92%  resp status better, now 2L @ 98%  Follow repeat cxr    --COVID-19 pneumonia:   azithromycin and decadron  Taper dexamethasone to oral, bg's out of contrrol. resp status better, now 2L @ 98%    --CHF: ac/chr combined  --Severe cardiomyopathy  ECHO, lasix 40 BID. Echo reports lvef 37-72%, grade 3 diastolic dysfunction  Patient denies cardio op w/u or known cardio  Asa  Trop elevated initially in setting of severe ilir  Cx cardio    --ILIR onCKD IV:   presented with a creatinine of 9.1 . GFR only 7  presented with volume overload, cxr showed pulmonary congestion. ILIR related to diuretics vs ckd progression.   hyperkalemia and metabolic acidosis  Continue Lasix 80 iv TI  Temp hd cath placed 1/18  Hd 1/19 + 1/20, HD per nephrology  Cm to arrange chair  consulted case management for placement outpatient in [facility]  Met acidosis- cont bicarb  Nephrology following    --HTN:   Continue metoprolol and hydralazine    --GERD:  protonix    --Hyperglycemia/ dm2 uncontrrolled/dexamethasone contributing:  Patient states that he is a diabetic, it appears that he takes Onglyza, not resumed on presentation. Steroids are contributing to markedly elevated blood sugars. Continue carb consistent/renal diet, SSI with hypoglycemia protocol and will give a dose of Lantus 10 units today and monitor to see how he does to see if need to continue it. A1c is pending. --Hx Cirrhosis  Follows with Dr Lilyl Shah  Follow ammonia    --nephroloithiasis/hydronephrosis; Had gross hematuria. Ct a/p showed Obstruction of the proximal right ureter x 6.6 mm calculus/rt hydronephrosis + bilateral renal calculi non obstructing. Discussed with urology-chronic  DVT ppx:heparin SubQ    DISPOSITION: 3-4days, will need permanent HD cath.      Signed By: Leti Carrera MD     January 21, 2021

## 2021-01-22 ENCOUNTER — APPOINTMENT (OUTPATIENT)
Dept: INTERVENTIONAL RADIOLOGY/VASCULAR | Age: 71
DRG: 177 | End: 2021-01-22
Attending: INTERNAL MEDICINE
Payer: MEDICARE

## 2021-01-22 LAB
ALBUMIN SERPL-MCNC: 2.4 G/DL (ref 3.5–5)
ANION GAP SERPL CALC-SCNC: 8 MMOL/L (ref 5–15)
BUN SERPL-MCNC: 42 MG/DL (ref 6–20)
BUN/CREAT SERPL: 10 (ref 12–20)
CA-I BLD-MCNC: 7.8 MG/DL (ref 8.5–10.1)
CHLORIDE SERPL-SCNC: 102 MMOL/L (ref 97–108)
CO2 SERPL-SCNC: 27 MMOL/L (ref 21–32)
CREAT SERPL-MCNC: 4.01 MG/DL (ref 0.7–1.3)
EST. AVERAGE GLUCOSE BLD GHB EST-MCNC: 171 MG/DL
GLUCOSE BLD STRIP.AUTO-MCNC: 133 MG/DL (ref 65–100)
GLUCOSE BLD STRIP.AUTO-MCNC: 177 MG/DL (ref 65–100)
GLUCOSE BLD STRIP.AUTO-MCNC: 85 MG/DL (ref 65–100)
GLUCOSE SERPL-MCNC: 71 MG/DL (ref 65–100)
HBA1C MFR BLD: 7.6 % (ref 4–5.6)
PERFORMED BY, TECHID: ABNORMAL
PERFORMED BY, TECHID: ABNORMAL
PERFORMED BY, TECHID: NORMAL
PHOSPHATE SERPL-MCNC: 2.6 MG/DL (ref 2.6–4.7)
POTASSIUM SERPL-SCNC: 3.1 MMOL/L (ref 3.5–5.1)
SODIUM SERPL-SCNC: 137 MMOL/L (ref 136–145)

## 2021-01-22 PROCEDURE — 80069 RENAL FUNCTION PANEL: CPT

## 2021-01-22 PROCEDURE — 74011250636 HC RX REV CODE- 250/636: Performed by: INTERNAL MEDICINE

## 2021-01-22 PROCEDURE — 97530 THERAPEUTIC ACTIVITIES: CPT

## 2021-01-22 PROCEDURE — 82962 GLUCOSE BLOOD TEST: CPT

## 2021-01-22 PROCEDURE — 74011636637 HC RX REV CODE- 636/637: Performed by: INTERNAL MEDICINE

## 2021-01-22 PROCEDURE — 97165 OT EVAL LOW COMPLEX 30 MIN: CPT

## 2021-01-22 PROCEDURE — 76937 US GUIDE VASCULAR ACCESS: CPT

## 2021-01-22 PROCEDURE — 74011250636 HC RX REV CODE- 250/636: Performed by: HOSPITALIST

## 2021-01-22 PROCEDURE — 36415 COLL VENOUS BLD VENIPUNCTURE: CPT

## 2021-01-22 PROCEDURE — 74011250637 HC RX REV CODE- 250/637: Performed by: HOSPITALIST

## 2021-01-22 PROCEDURE — 94760 N-INVAS EAR/PLS OXIMETRY 1: CPT

## 2021-01-22 PROCEDURE — 65270000032 HC RM SEMIPRIVATE

## 2021-01-22 PROCEDURE — 97161 PT EVAL LOW COMPLEX 20 MIN: CPT

## 2021-01-22 PROCEDURE — 74011250637 HC RX REV CODE- 250/637: Performed by: INTERNAL MEDICINE

## 2021-01-22 PROCEDURE — 74011250636 HC RX REV CODE- 250/636: Performed by: RADIOLOGY

## 2021-01-22 PROCEDURE — 77001 FLUOROGUIDE FOR VEIN DEVICE: CPT

## 2021-01-22 PROCEDURE — C1750 CATH, HEMODIALYSIS,LONG-TERM: HCPCS

## 2021-01-22 RX ORDER — POTASSIUM CHLORIDE 1.5 G/1.77G
40 POWDER, FOR SOLUTION ORAL
Status: COMPLETED | OUTPATIENT
Start: 2021-01-22 | End: 2021-01-22

## 2021-01-22 RX ORDER — HEPARIN SODIUM 5000 [USP'U]/ML
5000 INJECTION, SOLUTION INTRAVENOUS; SUBCUTANEOUS ONCE
Status: COMPLETED | OUTPATIENT
Start: 2021-01-22 | End: 2021-01-22

## 2021-01-22 RX ORDER — HEPARIN SODIUM 5000 [USP'U]/.5ML
5000 INJECTION, SOLUTION INTRAVENOUS; SUBCUTANEOUS ONCE
Status: DISCONTINUED | OUTPATIENT
Start: 2021-01-22 | End: 2021-01-22

## 2021-01-22 RX ADMIN — DEXAMETHASONE 4 MG: 4 TABLET ORAL at 08:34

## 2021-01-22 RX ADMIN — DEXAMETHASONE 4 MG: 4 TABLET ORAL at 21:04

## 2021-01-22 RX ADMIN — HEPARIN SODIUM 5000 UNITS: 5000 INJECTION INTRAVENOUS; SUBCUTANEOUS at 12:44

## 2021-01-22 RX ADMIN — METOPROLOL SUCCINATE 50 MG: 50 TABLET, EXTENDED RELEASE ORAL at 21:04

## 2021-01-22 RX ADMIN — HYDRALAZINE HYDROCHLORIDE 50 MG: 50 TABLET, FILM COATED ORAL at 21:05

## 2021-01-22 RX ADMIN — PANTOPRAZOLE SODIUM 40 MG: 40 TABLET, DELAYED RELEASE ORAL at 08:34

## 2021-01-22 RX ADMIN — METOPROLOL SUCCINATE 50 MG: 50 TABLET, EXTENDED RELEASE ORAL at 08:34

## 2021-01-22 RX ADMIN — HEPARIN SODIUM 5000 UNITS: 5000 INJECTION INTRAVENOUS; SUBCUTANEOUS at 08:34

## 2021-01-22 RX ADMIN — AZITHROMYCIN DIHYDRATE 500 MG: 500 INJECTION, POWDER, LYOPHILIZED, FOR SOLUTION INTRAVENOUS at 16:32

## 2021-01-22 RX ADMIN — INSULIN LISPRO 2 UNITS: 100 INJECTION, SOLUTION INTRAVENOUS; SUBCUTANEOUS at 22:00

## 2021-01-22 RX ADMIN — HEPARIN SODIUM 5000 UNITS: 5000 INJECTION, SOLUTION INTRAVENOUS; SUBCUTANEOUS at 11:31

## 2021-01-22 RX ADMIN — POTASSIUM CHLORIDE 40 MEQ: 1.5 POWDER, FOR SOLUTION ORAL at 12:43

## 2021-01-22 RX ADMIN — HEPARIN SODIUM 5000 UNITS: 5000 INJECTION INTRAVENOUS; SUBCUTANEOUS at 16:33

## 2021-01-22 RX ADMIN — HYDRALAZINE HYDROCHLORIDE 50 MG: 50 TABLET, FILM COATED ORAL at 08:34

## 2021-01-22 RX ADMIN — HEPARIN SODIUM 5000 UNITS: 5000 INJECTION INTRAVENOUS; SUBCUTANEOUS at 11:32

## 2021-01-22 RX ADMIN — ATORVASTATIN CALCIUM 40 MG: 40 TABLET, FILM COATED ORAL at 08:34

## 2021-01-22 RX ADMIN — HYDRALAZINE HYDROCHLORIDE 50 MG: 50 TABLET, FILM COATED ORAL at 16:32

## 2021-01-22 NOTE — PROGRESS NOTES
PROGRESS NOTE    Subjective:   Chief Complaint : worsening shortness of breath since few days  Source of information : patient    History of present illness:     70M, H/o CHF unknown EF, CKD IV with worsening shortness of breath wince few days s/t respiratory failure s.t COVID and fluid overload s.t ESRD. Temp hd cath placed 1/18 by IR  Hd 1/19 & 1/20nephrology following  Treatment for Araceli reyes        Past Medical History:   Diagnosis Date    CHF (congestive heart failure) (HCC)     Chronic kidney disease     CKD (chronic kidney disease)     4    Diabetes ([facility] [address]     Hypertension      Past Surgical History:   Procedure Laterality Date    COLONOSCOPY N/A 12/3/2020    COLONOSCOPY performed by Sara Caballero MD at [facility] HERNIA REPAIR      IR FLUORO GUIDE [facility] CVAD  1/22/2021    IR INSERT NON TUNL CVC OVER 5 YRS  1/18/2021    IR INSERT TUNL CVC W/O PORT OVER 5 YR  1/22/2021    IR PLACE CVAD FLUORO GUIDE  1/18/2021     Family History   Problem Relation Age of Onset    Diabetes Mother     Heart Failure Father       Social History     Tobacco Use    Smoking status: Never Smoker    Smokeless tobacco: Never Used   Substance Use Topics    Alcohol use: Not Currently       Prior to Admission medications    Medication Sig Start Date End Date Taking? Authorizing Provider   hydrALAZINE (APRESOLINE) 100 mg tablet Take 100 mg by mouth three (3) times daily. Provider, Historical   metoprolol succinate (TOPROL-XL) 50 mg XL tablet Take 50 mg by mouth two (2) times a day. Provider, Historical   furosemide (Lasix) 80 mg tablet Take 80 mg by mouth two (2) times a day. Provider, Historical   calcitRIOL (ROCALTROL) 0.25 mcg capsule Take 0.25 mcg by mouth daily. Provider, Historical   omeprazole-sodium bicarbonate (ZEGERID) 20-1.1 mg-gram capsule Take 1 Cap by mouth two (2) times a day.     Provider, Historical   OTHER,NON-FORMULARY, Take 5 mg by mouth daily. Ongluza    Provider, Historical   cloNIDine HCL (CATAPRES) 0.1 mg tablet Take 0.1 mg by mouth three (3) times daily. Provider, Historical   atorvastatin (LIPITOR) 40 mg tablet Take 40 mg by mouth daily. Provider, Historical     Allergies   Allergen Reactions    Lisinopril Angioedema    Pcn [Penicillins] Rash             Review of Systems:  Review of Systems   Constitutional: Positive for malaise/fatigue. Negative for chills and fever. HENT: Negative. Eyes: Negative. Respiratory: Positive for cough and shortness of breath. Negative for sputum production. Cardiovascular: Negative. Gastrointestinal: Negative. Genitourinary: Negative. Musculoskeletal: Negative. Skin: Negative. Neurological: Positive for weakness. Seizures: & 1/20. Endo/Heme/Allergies: Negative. Psychiatric/Behavioral: Negative. Vitals:     Visit Vitals  BP (!) 152/97 (BP 1 Location: Right arm, BP Patient Position: At rest)   Pulse 68   Temp 98 °F (36.7 °C)   Resp 22   Ht 5' 10\" (1.778 m)   Wt 96.2 kg (212 lb)   SpO2 97%   BMI 30.42 kg/m²       Physical Exam:   General : appears comfortable, on 2L NC, no distress  HEENT : eomi, normal oral mucosa, atraumatic normocephalic, Normal ear and nose. oropharynx  Neck : Supple, no JVD, no masses noted, no carotid bruit  Lungs : Breath sounds with good air entry bilaterally, no wheezes or rales, no accessory muscle use,  CVS : Rhythm rate regular, S1+, S2+, no murmur or gallop  Abdomen : Soft, nontender, no organomegaly, bowel sounds active, neg cvat  Extremities : 1 + edema  Musculoskeletal : Fair range of motion, no joint swelling or effusion, muscle tone and power appears normal,   Skin : Moist, warm, skin turgor, no pathological rash  Lymphatic : No cervical lymphadenopathy. Neurological : Awake, alert, oriented to time place person. Cranial nerves intact, deep tendon reflexes active, no sensory disturbance, no cerebellar deficits.   Psychiatric : Mood and affect appears appropriate to the situation. Data Review:   Recent Results (from the past 24 hour(s))   GLUCOSE, POC    Collection Time: 01/21/21 10:42 PM   Result Value Ref Range    Glucose (POC) 189 (H) 65 - 100 mg/dL    Performed by Kalyani Epperson    GLUCOSE, POC    Collection Time: 01/22/21  7:50 AM   Result Value Ref Range    Glucose (POC) 85 65 - 100 mg/dL    Performed by Avelina Cerda    RENAL FUNCTION PANEL    Collection Time: 01/22/21  8:13 AM   Result Value Ref Range    Sodium 137 136 - 145 mmol/L    Potassium 3.1 (L) 3.5 - 5.1 mmol/L    Chloride 102 97 - 108 mmol/L    CO2 27 21 - 32 mmol/L    Anion gap 8 5 - 15 mmol/L    Glucose 71 65 - 100 mg/dL    BUN 42 (H) 6 - 20 mg/dL    Creatinine 4.01 (H) 0.70 - 1.30 mg/dL    BUN/Creatinine ratio 10 (L) 12 - 20      GFR est AA 18 (L) >60 ml/min/1.73m2    GFR est non-AA 15 (L) >60 ml/min/1.73m2    Calcium 7.8 (L) 8.5 - 10.1 mg/dL    Phosphorus 2.6 2.6 - 4.7 mg/dL    Albumin 2.4 (L) 3.5 - 5.0 g/dL   GLUCOSE, POC    Collection Time: 01/22/21 12:57 PM   Result Value Ref Range    Glucose (POC) 133 (H) 65 - 100 mg/dL    Performed by Cristiane Baker    GLUCOSE, POC    Collection Time: 01/22/21  4:12 PM   Result Value Ref Range    Glucose (POC) 177 (H) 65 - 100 mg/dL    Performed by Radha Macias and Plan :     --Acute hypoxic respiratory failure:  s/t COVID and pulmonary edema. o2 to keep saturation > 92%    --COVID-19 pneumonia:   azithromycin and decadron    --CHF:   ECHO, lasix 40 BID. --ILIR onCKD IV:   presented with a creatinine of 9.1 . GFR only 7  presented with volume overload, cxr showed pulmonary congestion. ILIR related to diuretics vs ckd progression.   hyperkalemia and metabolic acidosis  Continue Lasix 80 iv TI  Temp hd cath placed 1/18  Hd 1/19 + 1/20  Cm to arrange chair  consulted case management for [facility] outpatient in [facility]  Met acidosis- cont bicarb  Nephrology following    --HTN:   Continue metoprolol and hydralazine    --GERD:  protonix    --nephroloithiasis/hydronephrosis; Had gross hematuria. Ct a/p showed Obstruction of the proximal right ureter x 6.6 mm calculus/rt hydronephrosis + bilateral renal calculi non obstructing. Discussed with urology-chronic  DVT ppx:heparin SubQ    DISPOSITION: 3-4days, will need permanent HD cath.      Signed By: Hi Barrera MD     January 22, 2021

## 2021-01-22 NOTE — PROGRESS NOTES
Comprehensive Nutrition Assessment    Type and Reason for Visit: Wound, Consult, Initial    Nutrition Recommendations/Plan:   Continue current Renal Consistent Carbohydrate (Diabetic)diet   Adding Beneprotein with applesauce TID (255kcals, 18g pro)  Allow snacks as desired  Food preferences added    Adjust insulin to promote euglycemia  Please document % of all meal/snack consumed    Nutrition Assessment:  Admitted for sob, +COVID-19. No appetite and intakes of 75% documented in EMR. RD interviewed pt over phone, endorsed appetite has decreased since admit, stated he consumes ~50% of trays. Per RN however, pt eating well. Pt agreeable to beneprotiein with applesauce to help enhance wound healing, RD to order. Will f/u for tolerance and need for adjustments to nutrition interventions. Labs: K+ 3.1, BUN42, Cr 4.01, BG , Corrected Ca 9.08. Meds: Statin, azithromycin, dexamethasone, heparin, insulin, PPI. Malnutrition Assessment:  Malnutrition Status:  Mild malnutrition    Context:  Chronic illness     Findings of the 6 clinical characteristics of malnutrition:   Energy Intake:  No significant decrease in energy intake  Weight Loss:  1.0 - 7.5% over 3 months(Wt loss of 8.1kg x4 months (8.4%, moderate), ? If fluid related with hx ESRD on HD and CHF.)     Body Fat Loss:  Unable to assess,     Muscle Mass Loss:  Unable to assess,    Fluid Accumulation:  No significant fluid accumulation,      Nutrition Related Findings:  Unable to complete NFPE d/t COVID-19 precautions. +1 BLLE edema. Per pt +diarrhea for >1 week, says this prevents intakes, however he did not yet tell RN about this issues, per EMR last BM 1/19. Denies N/V and issues with c/s.       Wounds:    Stage III, Deep tissue injury(st 3-sacrum; DTI-L heel)       Current Nutrition Therapies:  DIET RENAL Regular; Consistent Carb 1800kcal    Anthropometric Measures:  · Height:  5' 10\" (177.8 cm)  · Current Body Wt:  96.2 kg (212 lb 1.3 oz)(1/22)   · Usual Body Wt:  97.5 kg (215 lb)(per pt)     · Ideal Body Wt:  166 lbs:  127.8 %   · BMI Category:  Obese class 1 (BMI 30.0-34. 9)     Wt loss of 8.1kg x4 months (8.4%, moderate), ? If fluid related with hx ESRD on HD and CHF. Wt Readings from Last 5 Encounters:   01/22/21 96.2 kg (212 lb)   12/03/20 97.5 kg (215 lb)   09/29/20 104.3 kg (230 lb)       Nutrition Diagnosis:   · Increased nutrient needs related to catabolic illness, increased demand for energy/nutrients(ESRD on HD, large wounds) as evidenced by wounds, intake 51-75%      Nutrition Interventions:   Food and/or Nutrient Delivery: Continue current diet, Start oral nutrition supplement(Beneprotein)  Nutrition Education and Counseling: No recommendations at this time  Coordination of Nutrition Care: Continue to monitor while inpatient    Goals:  Intakes >/=75% of EENs in >5 days  Wt maintenance within +/-0.5kg in >5 days       Nutrition Monitoring and Evaluation:   Behavioral-Environmental Outcomes: None identified  Food/Nutrient Intake Outcomes: Food and nutrient intake, Supplement intake  Physical Signs/Symptoms Outcomes: Weight, Skin, Fluid status or edema    Discharge Planning:     Too soon to determine     Electronically signed by Ragini Delgado RD on 1/22/2021 at 4:43 PM    Contact: Ext [phone]

## 2021-01-22 NOTE — PROGRESS NOTES
PHYSICAL THERAPY EVALUATION  Patient: Zhao Gregory (18 y.o. male)  Date: 1/22/2021  Primary Diagnosis: Respiratory failure ([facility] [J96.90]  ATN (acute tubular necrosis) ([facility] [N17.0]        Precautions: droplet plus (COVID +), skin integrity, falls       ASSESSMENT  Based on the objective data described below, the patient presents with new supplemental O2 use, decreased standing balance using RW for ambulation, decreased activity tolerance, generalized deconditioning, AMS, increased need for A with amb and functional mobility/transfers. Patient seated on commode with OT, upon PT arrival and agreeable evaluation. Patient A&O to person and place, disoriented to time stating \"February 2001\" and disoriented to situation and per pt report, pt lives with sister in a two story house with basement and primary bed/bath in basement with 10 CHANTALE and 5 steps down to basement, unclear if handrails. Patient reports ambulating with no AD, DME includes: RW, shower chair, BSC, grab bars. Patient reports being independent for all ADLs/IADLs PTA including driving. Patient does not wear oxygen at home and currently wearing 3L via nasal cannula. Oxygen 99% at rest, decreased to 96% with bed to chair transfer and 99% following ambulation to and from bathroom. Observed SOB with activity but oxygen saturation remaining above 96% throughout. Patient required min A sit <> stand, min A bed to chair transfer. Pt amb 15 feet from commode to bedside chair with gt belt, RW, and CGA, demonstrating slow, shuffling, step to gt pattern; no LOB or knee buckling noted. Patient would benefit from continued skilled PT services to address above deficits and improve safety and independence with amb and functional mobility/transfers. Recommend discharge to IRF for pulmonary vs HHPT with 24/7 supervision when medically appropriate.  Patient independent PTA and driving, currently experiencing new AMS and supplemental O2 use and would benefit from pulmonary rehab.     Current Level of Function Impacting Discharge: new use of AD, requiring CGA to min A for safety      Other factors to consider for discharge: time since onset, new O2 use, family support, AMS          PLAN :  Recommendations and Planned Interventions: bed mobility training, transfer training, gait training, therapeutic exercises, patient and family training/education and therapeutic activities      Frequency/Duration: Patient will be followed by physical therapy:  5 times a week to address goals. Recommendation for discharge: (in order for the patient to meet his/her long term goals)  IRF vs HHPT    This discharge recommendation:  Has been made in collaboration with the attending provider and/or case management    IF patient discharges home will need the following DME: to be determined (TBD)         SUBJECTIVE:   Patient stated I'm doing ok.     OBJECTIVE DATA SUMMARY:   HISTORY:    Past Medical History:   Diagnosis Date    [facility]congest[facility] failure) ([facility]     Chronic kidney disease     CKD (chronic kidney disease)     4    Diabetes ([facility]     Hypertension      Past Surgical History:   Procedure Laterality Date    COLONOSCOPY N/A 12/3/2020    COLONOSCOPY performed by Funmi Dennis MD at [facility] HX HEART CATHETERIZATION      HX HERNIA REPAIR      IR FLUORO GUIDE PLC CVAD  1/22/2021    IR INSERT NON TUNL CVC OVER 5 YRS  1/18/2021    IR INSERT TUNL CVC W/O PORT OVER 5 YR  1/22/2021    IR PLACE CVAD FLUORO GUIDE  1/18/2021         Home Situation  Home Environment: Private residence  # Steps to Enter: 10  One/Two Story Residence: Other (Comment)(two story with basement, pt lives in basement)  # of Interior Steps: 5  Living Alone: No  Support Systems: Family member(s)(lives with sister)  Patient Expects to be Discharged to[de-identified] Private residence  Current DME Used/Available at Home: Walker, rolling, Shower chair, Grab bars, Commode, bedside    EXAMINATION/PRESENTATION/DECISION MAKING: Critical Behavior:  Neurologic State: Alert  Orientation Level: Oriented to person, Oriented to place, Disoriented to time, Disoriented to situation  Cognition: Decreased attention/concentration, Decreased command following(requires cueing to follow commands)  Safety/Judgement: Decreased awareness of need for assistance, Decreased insight into deficits  Hearing: Auditory  Auditory Impairment: None  Skin:  Large wound noted on sacrum   Edema: none noted  Range Of Motion:  AROM: Generally decreased, functional           PROM: Within functional limits           Strength:    Strength: Generally decreased, functional                    Tone & Sensation:   Tone: Normal                              Coordination:     Vision:      Functional Mobility:  Bed Mobility:  Rolling: Stand-by assistance; Additional time  Supine to Sit: Contact guard assistance; Additional time     Scooting: Stand-by assistance; Additional time  Transfers:  Sit to Stand: Minimum assistance  Stand to Sit: Minimum assistance        Bed to Chair: Minimum assistance              Balance:   Sitting: Intact; With support  Standing: Impaired; With support  Standing - Static: Fair;Occasional  Standing - Dynamic : Poor;Constant support  Ambulation/Gait Training:  Distance (ft): 15 Feet (ft)  Assistive Device: Gait belt;Walker, rolling  Ambulation - Level of Assistance: Contact guard assistance     Gait Description (WDL): Exceptions to [facility]           Base of Support: Narrowed     Speed/Roseanne: Shuffled; Slow     Therapeutic Exercises:   Not completed this session    Functional Measure:  [address] [facility] [address] [address] AM-PAC 6 Clicks         Basic Mobility Inpatient Short Form  How much difficulty does the patient currently have. .. Unable A Lot A Little None   1. Turning over in bed (including adjusting bedclothes, sheets and blankets)? [] 1   [] 2   [x] 3   [] 4   2.   Sitting down on and standing up from a chair with arms ( e.g., wheelchair, bedside commode, etc.)   [] 1   [] 2   [x] 3   [] 4   3. Moving from lying on back to sitting on the side of the bed? [] 1   [] 2   [x] 3   [] 4          How much help from another person does the patient currently need. .. Total A Lot A Little None   4. Moving to and from a bed to a chair (including a wheelchair)? [] 1   [x] 2   [] 3   [] 4   5. Need to walk in hospital room? [] 1   [x] 2   [] 3   [] 4   6. Climbing 3-5 steps with a railing? [] 1   [x] 2   [] 3   [] 4   © , Trustees of [facility] [employer], under license to [employer]. All rights reserved     Score:  Initial: 15/24 Most Recent: X (Date: 2021 )   Interpretation of Tool:  Represents activities that are increasingly more difficult (i.e. Bed mobility, Transfers, Gait). Score 24 23 22-20 19-15 14-10 9-7 6   Modifier CH CI CJ CK CL CM CN         Physical Therapy Evaluation Charge Determination   History Examination Presentation Decision-Making   HIGH Complexity :3+ comorbidities / personal factors will impact the outcome/ POC  MEDIUM Complexity : 3 Standardized tests and measures addressing body structure, function, activity limitation and / or participation in recreation  LOW Complexity : Stable, uncomplicated  Other outcome measures ampac 6  mod      Based on the above components, the patient evaluation is determined to be of the following complexity level: LOW     Pain Ratin/10    Activity Tolerance:   Fair, desaturates with exertion and requires oxygen, requires rest breaks and observed SOB with activity    After treatment patient left in no apparent distress:   Sitting in chair, Call bell within reach and Bed / chair alarm activated and nsg updated. GOALS:    Problem: Mobility Impaired (Adult and Pediatric)  Goal: *Acute Goals and Plan of Care (Insert Text)  Description: Pt will be I with LE HEP in 7 days. Pt will perform bed mobility with mod I in 7 days. Pt will perform transfers with mod I in 7 days.    Pt will amb  feet with LRAD safely with mod I in 7 days. Outcome: Not Met       COMMUNICATION/EDUCATION:   The patients plan of care was discussed with: Occupational therapist and Registered nurse. Fall prevention education was provided and the patient/caregiver indicated understanding., Patient/family have participated as able in goal setting and plan of care. , and Patient/family agree to work toward stated goals and plan of care. PT/OT sessions occurred partially together for increased safety of pt and clinician.        Thank you for this referral.  Jason Gill, PT, DPT   Time Calculation: 23 mins

## 2021-01-22 NOTE — PROGRESS NOTES
Renal Consultation Note    NAME:  Álvaro Rahman   :   1950   MRN:   [id number]     ATTENDING: Patrick Xiong MD  PCP:  Luna Ayon MD    Date/Time:  2021 8:02 AM      Subjective:       Patient is seen in the room  He is awake and alert  No symptoms  No LE swelling   Was dialyzed     Past Medical History:   Diagnosis Date    [facility]     Chronic kidney disease     CKD (chronic kidney disease)     4    Diabetes ([facility]     Hypertension       Past Surgical History:   Procedure Laterality Date    COLONOSCOPY N/A 12/3/2020    COLON[facility] Conchita Gustafson MD at [facility] HERNIA REPAIR      IR INSERT NON TUNL CVC OVER 5 YRS  2021    IR PLACE CVAD FLUORO GUIDE  2021     Social History     Tobacco Use    Smoking status: Never Smoker    Smokeless tobacco: Never Used   Substance Use Topics    Alcohol use: Not Currently      Family History   Problem Relation Age of Onset    Diabetes Mother     Heart Failure Father        Allergies   Allergen Reactions    Lisinopril Angioedema    Pcn [Penicillins] Rash      Prior to Admission medications    Medication Sig Start Date End Date Taking? Authorizing Provider   hydrALAZINE (APRESOLINE) 100 mg tablet Take 100 mg by mouth three (3) times daily. Provider, Historical   metoprolol succinate (TOPROL-XL) 50 mg XL tablet Take 50 mg by mouth two (2) times a day. Provider, Historical   furosemide (Lasix) 80 mg tablet Take 80 mg by mouth two (2) times a day. Provider, Historical   calcitRIOL (ROCALTROL) 0.25 mcg capsule Take 0.25 mcg by mouth daily. Provider, Historical   omeprazole-sodium bicarbonate (ZEGERID) 20-1.1 mg-gram capsule Take 1 Cap by mouth two (2) times a day. Provider, Historical   OTHER,NON-FORMULARY, Take 5 mg by mouth daily. Ongluza    Provider, Historical   cloNIDine HCL (CATAPRES) 0.1 mg tablet Take 0.1 mg by mouth three (3) times daily. Provider, Historical   atorvastatin (LIPITOR) 40 mg tablet Take 40 mg by mouth daily. Provider, Historical       REVIEW OF SYSTEMS:       Constitutional: Negative for chills and fever. HENT: Negative. Eyes: Negative. Respiratory: Shortness of breath is present   Cardiovascular: Negative. Gastrointestinal: Negative for abdominal pain and nausea. Skin: Negative. Neurological: Negative. Objective:   VITALS:    Visit Vitals  BP (!) 155/100   Pulse 68   Temp 98.2 °F (36.8 °C)   Resp 22   Ht 5' 10\" (1.778 m)   Wt 94.9 kg (209 lb 3.5 oz)   SpO2 99%   BMI 30.02 kg/m²     Temp (24hrs), Av.3 °F (36.8 °C), Min:97.9 °F (36.6 °C), Max:98.7 °F (37.1 °C)      PHYSICAL EXAM:   General:    Alert, cooperative, no distress, appears stated age. HEENT: Atraumatic, anicteric sclerae, pink conjunctivae     No oral ulcers, mucosa moist, throat clear  Neck:  JVD is not elevated  Lungs:   Crackles are heard both bases no rhonchi /wheezing  Chest wall:  No tenderness  No Accessory muscle use. Heart:   Regular  rhythm,  No  murmur   trace edema both ankles  Abdomen:   Soft, non-tender. Not distended. Bowel sounds normal  Extremities: No cyanosis. No clubbing  Genitourinary Pleitez catheter in place  Skin:     Eczema +  psych:  Good insight. Not depressed. Not anxious or agitated. Neurologic: Alert and oriented X 4.   No asterixis    LAB DATA REVIEWED:    Recent Results (from the past 24 hour(s))   RENAL FUNCTION PANEL    Collection Time: 21 12:57 PM   Result Value Ref Range    Sodium 134 (L) 136 - 145 mmol/L    Potassium 3.6 3.5 - 5.1 mmol/L    Chloride 98 97 - 108 mmol/L    CO2 23 21 - 32 mmol/L    Anion gap 13 5 - 15 mmol/L    Glucose 360 (H) 65 - 100 mg/dL    BUN 64 (H) 6 - 20 mg/dL    Creatinine 5.54 (H) 0.70 - 1.30 mg/dL    BUN/Creatinine ratio 12 12 - 20      GFR est AA 12 (L) >60 ml/min/1.73m2    GFR est non-AA 10 (L) >60 ml/min/1.73m2    Calcium 7.8 (L) 8.5 - 10.1 mg/dL    Phosphorus 3.8 2.6 - 4.7 mg/dL    Albumin 3.7 3.5 - 5.0 g/dL   PROCALCITONIN    Collection Time: 01/21/21 12:59 PM   Result Value Ref Range    Procalcitonin 2.85 (H) 0 ng/mL   C REACTIVE PROTEIN, QT    Collection Time: 01/21/21 12:59 PM   Result Value Ref Range    C-Reactive protein 1.73 (H) 0.00 - 0.60 mg/dL   AMMONIA    Collection Time: 01/21/21 12:59 PM   Result Value Ref Range    Ammonia 21 <32 umol/L   TROPONIN I    Collection Time: 01/21/21 12:59 PM   Result Value Ref Range    Troponin-I, Qt. 0.21 (H) <0.05 ng/mL   BNP    Collection Time: 01/21/21 12:59 PM   Result Value Ref Range    NT pro-BNP >35,000 (H) <125 pg/mL   GLUCOSE, POC    Collection Time: 01/21/21 10:42 PM   Result Value Ref Range    Glucose (POC) 189 (H) 65 - 100 mg/dL    Performed by Simeon Pugh    GLUCOSE, POC    Collection Time: 01/22/21  7:50 AM   Result Value Ref Range    Glucose (POC) 85 65 - 100 mg/dL    Performed by Gokul Christensen    RENAL FUNCTION PANEL    Collection Time: 01/22/21  8:13 AM   Result Value Ref Range    Sodium 137 136 - 145 mmol/L    Potassium 3.1 (L) 3.5 - 5.1 mmol/L    Chloride 102 97 - 108 mmol/L    CO2 27 21 - 32 mmol/L    Anion gap 8 5 - 15 mmol/L    Glucose 71 65 - 100 mg/dL    BUN 42 (H) 6 - 20 mg/dL    Creatinine 4.01 (H) 0.70 - 1.30 mg/dL    BUN/Creatinine ratio 10 (L) 12 - 20      GFR est AA 18 (L) >60 ml/min/1.73m2    GFR est non-AA 15 (L) >60 ml/min/1.73m2    Calcium 7.8 (L) 8.5 - 10.1 mg/dL    Phosphorus 2.6 2.6 - 4.7 mg/dL    Albumin 2.4 (L) 3.5 - 5.0 g/dL       Recommendations/Plan:     1. 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 GFR only 7  -presented with volume overload, hyperkalemia and metabolic acidosis  -s/p perm cath placement on 1/18   -s/p dialyzed 1/19 and 1/20  -plan for HD AM   -pt has chair setup at [facility], [place]  -will start next week 1/26  -ok to dc tomorrow after the HD     2. Renal osteodystrophy:  -Calcium is okay.   -phosphorus 6.6.   Will add phos binders if phos is still high despite on dialysis  -Check PTH. Continue Calcitriol at the current dose for now  -Check vitamin D levels    3. Hypokalemia:  -K 3.1  -po KCL 40 meq  -will use 3K bath      4. Severe metabolic acidosis  -presented with bicarb of 12. Improved to 15 today. pH 7.2  -2/2 RTA IV 2/2 DM  -will increase oral bicarbonate 1300 tid     5. Covid pneumonia  -Patient presented with shortness of breath.    -Found to be positive for Covid  -Has been started on Decadron and azithromycin  -Leukocytosis of 13.5. Afebrile for now  -Currently in no respiratory distress    6. Hypertension  -Blood pressure is labile but mostly well controlled  -I will continue metoprolol and hydralazine at the current dose for now    7.  Hydronephrosis, UL:  -had gross hematuria  -CT abd showed BL renal calculi  -R hydronephrosis from obstruction stone  -will consult Urology        ________________________________________________________________________  Signed: Almas Pelaez MD

## 2021-01-22 NOTE — PROGRESS NOTES
Spoke with pt's x wife who will be involved in his care. His Daughter Ernestina Ren will be point of contact for pt's family.  Ernestina Ren- [phone] Betsy Boateng [phone]

## 2021-01-22 NOTE — BRIEF OP NOTE
Brief Procedure Note    Patient: Luis Jaeger  YOB: 1950  MRN: [id number]    Date of Procedure: 1/22/2021     Pre-Op Diagnosis: ILIR on CKD    Post-Op Diagnosis: Same    Procedure:  Removal of non tunneled dialysis catheter  US and fluoroscopic guided placement of a tunneled dialysis catheter    :  Meghan Gao MD    Assistant:  IR staff    Findings:  Removal of non tunneled dialysis catheter  Successful US and fluoroscopic guided placement of a tunneled dialysis catheter via the right IJ vein. Catheter appropriate for immediate use.     Estimated blood loss: < 5 mL    Complication: none    Specimen: none

## 2021-01-22 NOTE — PROGRESS NOTES
OCCUPATIONAL THERAPY EVALUATION  Patient: Tripp Bryan (22 y.o. male)  Date: 1/22/2021  Primary Diagnosis: Respiratory failure ([facility] [J96.90]  ATN (acute tubular necrosis) ([facility] [N17.0]        Precautions: COVID +, falls    ASSESSMENT  Based on the objective data described below, the patient presents with new supplemental O2 use, decreased standing balance using RW for ambulation, decreased activity tolerance, generalized deconditioning, AMS, increased need for A with self care and functional mobility/transfers. Patient semi supine in bed upon OT arrival and agreeable to working with therapy. Patient A&O to person and place, disoriented to time stating \"February 2001\" and disoriented to situation and per pt report, pt lives with sister in a two story house with basement and primary bed/bath in basement with 10 CHANTALE and 5 steps down to basement, unclear if handrails. Patient reports ambulating with no AD, DME includes: RW, shower chair, BSC, grab bars. Patient reports being independent for all ADLs/IADLs PTA including driving. Patient does not wear oxygen at home and currently wearing 3L via nasal cannula. Oxygen 99% at rest, decreased to 96% with bed to chair transfer and 99% following ambulation to and from bathroom. Observed SOB with activity but oxygen saturation remaining above 96% throughout. Patient SBA with increased time bed mobility, CGA sup -> sit, SBA scooting EOB, min A sit <> stand, min A bed to chair transfer. Once sitting in chair patient requesting to use the restroom, min A toilet transfer, CGA bathroom mobility. Patient setup A grooming to wash hands sitting on toilet, washing face and brushing teeth sitting in chair. Patient max A LE dressing to don socks seated EOB, min A toileting with A for cloth mgmt. Patient would benefit from continued skilled OT services to address above deficits and improve safety and independence with self care and functional mobility/transfers.  Recommend discharge to IRF for pulmonary vs HHOT with 24/7 supervision when medically appropriate. Patient independent PTA and driving, currently experiencing new AMS and supplemental O2 use and would benefit from pulmonary rehab. Current Level of Function Impacting Discharge (ADLs/self-care): setup A grooming sitting in chair, min A toileting, max A LE dressing. Other factors to consider for discharge: time since onset, new O2 use, family support, AMS        PLAN :  Recommendations and Planned Interventions: self care training, functional mobility training, therapeutic exercise, balance training, therapeutic activities, endurance activities, patient education, home safety training and family training/education    Frequency/Duration: Patient will be followed by occupational therapy 5 times a week to address goals. Recommendation for discharge: (in order for the patient to meet his/her long term goals)  IRF vs HHOT and 24/7 supervision    This discharge recommendation:  Has been made in collaboration with the attending provider and/or case management    IF patient discharges home will need the following DME: patient owns DME required for discharge       SUBJECTIVE:   Patient stated My sister is home with me most of the time.     OBJECTIVE DATA SUMMARY:   HISTORY:   Past Medical History:   Diagnosis Date    [facility]congestive [facility] failure) [facility]     Chronic kidney disease     CKD (chronic kidney disease)     4    Diabetes ([facility]     Hypertension      Past Surgical History:   Procedure Laterality Date    COLONOSCOPY N/A 12/3/2020    COLONOSCOPY performed by Meeta Sharma MD at [address] HX HEART CATHETERIZATION      HX HERNIA REPAIR      IR INSERT NON TUNL CVC OVER 5 YRS  1/18/2021    IR PLACE CVAD FLUORO GUIDE  1/18/2021       Expanded or extensive additional review of patient history:     Home Situation  Home Environment: Private residence  # Steps to Enter: 10  One/Two Story Residence: Other (Comment)(two story with basement, pt lives in basement)  # of Interior Steps: 5  Living Alone: No  Support Systems: Family member(s)(lives with sister)  Patient Expects to be Discharged to[de-identified] Private residence  Current DME Used/Available at Home: Walker, rolling, Shower chair, Grab bars, Commode, bedside    PLOF: Pt I for ADLS/IADLS, I with mobility prior to admission. Hand dominance: Right    EXAMINATION OF PERFORMANCE DEFICITS:  Cognitive/Behavioral Status:  Neurologic State: Alert  Orientation Level: Oriented to person;Oriented to place; Disoriented to time;Disoriented to situation  Cognition: Decreased attention/concentration;Decreased command following(requires cueing to follow commands)  Safety/Judgement: Decreased awareness of need for assistance;Decreased insight into deficits    Skin: patient with stagable sore on buttock, z paste applied following toileting, see nursing notes for details    Edema: none noted    Hearing: Auditory  Auditory Impairment: None    Vision/Perceptual:    No deficits noted at this time    Range of Motion:  AROM: Generally decreased, functional  PROM: Within functional limits    Strength:  Strength: Generally decreased, functional     RUE Strength  Observation: grossly observed to be 3+/5     LUE Strength  Observation: grossly observed to be 3+/5    Coordination:  Generally intact    Tone & Sensation:  Tone: Normal    Balance:  Sitting: Intact; With support  Standing: Impaired; With support  Standing - Static: Fair;Occasional  Standing - Dynamic : Poor;Constant support    Functional Mobility and Transfers for ADLs:  Bed Mobility:  Rolling: Stand-by assistance; Additional time  Supine to Sit: Contact guard assistance; Additional time  Scooting: Stand-by assistance; Additional time    Transfers:  Sit to Stand: Minimum assistance  Stand to Sit: Minimum assistance  Bed to Chair: Minimum assistance  Bathroom Mobility: Contact guard assistance  Toilet Transfer : Minimum assistance  Assistive Device : Gait Belt;Walker, rolling    ADL Assessment:    Oral Facial Hygiene/Grooming: Setup    Lower Body Dressing: Maximum assistance    Toileting: Minimum assistance    ADL Intervention and task modifications:    Grooming  Grooming Assistance: Set-up  Position Performed: Seated in chair  Washing Hands: Set-up  Brushing Teeth: Set-up    Lower Body Dressing Assistance  Dressing Assistance: Maximum assistance  Socks: Maximum assistance  Leg Crossed Method Used: No  Position Performed: Seated edge of bed    Toileting  Toileting Assistance: Minimum assistance  Bowel Hygiene: Stand-by assistance  Clothing Management: Minimum assistance    Cognitive Retraining  Safety/Judgement: Decreased awareness of need for assistance;Decreased insight into deficits    Therapeutic Exercise:  Patient may benefit from UE HEP to be initiated at next session as able     Functional Measure:    [facility] AM-PACTM \"6 Clicks\"                                                       Daily Activity Inpatient Short Form  How much help from another person does the patient currently need. .. Total; A Lot A Little None   1. Putting on and taking off regular lower body clothing? []  1 [x]  2 []  3 []  4   2. Bathing (including washing, rinsing, drying)? []  1 []  2 [x]  3 []  4   3. Toileting, which includes using toilet, bedpan or urinal? [] 1 []  2 [x]  3 []  4   4. Putting on and taking off regular upper body clothing? []  1 []  2 [x]  3 []  4   5. Taking care of personal grooming such as brushing teeth? []  1 []  2 [x]  3 []  4   6. Eating meals? []  1 []  2 [x]  3 []  4   © 2007, Trustees of [facility], under license to [employer]. All rights reserved     Score: 17/24     Interpretation of Tool:  Represents clinically-significant functional categories (i.e. Activities of daily living).   Percentage of Impairment CH    0%   CI    1-19% CJ    20-39% CK    40-59% CL    60-79% CM    80-99% CN     100%   AMPA  Score 6-24 24 23 20-22 15-19 10-14 7-9 6        Occupational Therapy Evaluation Charge Determination   History Examination Decision-Making   LOW Complexity : Brief history review  LOW Complexity : 1-3 performance deficits relating to physical, cognitive , or psychosocial skils that result in activity limitations and / or participation restrictions  LOW Complexity : No comorbidities that affect functional and no verbal or physical assistance needed to complete eval tasks       Based on the above components, the patient evaluation is determined to be of the following complexity level: LOW     Pain Ratin/10    Activity Tolerance:   Fair, desaturates with exertion and requires oxygen, requires rest breaks and observed SOB with activity    After treatment patient left in no apparent distress:    ambulating in room with PT upon OT exit    COMMUNICATION/EDUCATION:   The patients plan of care was discussed with: Physical therapist and Registered nurse. Home safety education was provided and the patient/caregiver indicated understanding., Patient/family have participated as able in goal setting and plan of care. and Patient/family agree to work toward stated goals and plan of care. This patients plan of care is appropriate for delegation to ALEX. Partial overlap with PT during patient hand off of care.     Problem: Self Care Deficits Care Plan (Adult)  Goal: *Acute Goals and Plan of Care (Insert Text)  Description: Pt will be mod I sup <> sit in prep for EOB ADLs  Pt will be mod I grooming standing at sink LRAD  Pt will be mod I LE dressing sitting EOB/long sit  Pt will be mod I sit <>  prep for toileting LRAD  Pt will be mod I toileting/toilet transfer/cloth mgmt LRAD  Pt will be I following UE HEP in prep for self care tasks     Outcome: Not Met     Thank you for this referral.  Vic Kessler, OTR/L  Time Calculation: 48 mins

## 2021-01-22 NOTE — PROGRESS NOTES
SW spoke w/Olvin/admissions coordinator ([address] [facility] [facility]) @ [phone]. Admissions coordinator verbalized the following:    -patient is approved for dialysis, start date is Jan 26th, 2021.    -chair days is [facility], and Sat @ 15:00PM.    -patient must be dialyzed before he discharges, d/t start date of Jan 26th, 2021.    -patient can't enter the building upon arrival, must call the staff to inform he's there and will be taken into a different entrance. -once he's COVID (-) his chair time will change to 11:15AM Tue, Thurs, and Sat. Dialysis staff will inform him of new chair time.         Shivam Esparza, ARNIE

## 2021-01-22 NOTE — WOUND CARE
Spoke with [address] [place] [address] regarding patient discharge plan. Planning IRF but could be a wait for bed and no definitive  Discharge plans. Will order air gel bed with pump to floor. Bed delivered with pump connected to matress and plug. Kavita Chan RN notified.

## 2021-01-23 LAB
ANION GAP SERPL CALC-SCNC: 9 MMOL/L (ref 5–15)
BASOPHILS # BLD: 0 K/UL (ref 0–0.1)
BASOPHILS NFR BLD: 0 % (ref 0–1)
BUN SERPL-MCNC: 54 MG/DL (ref 6–20)
BUN/CREAT SERPL: 12 (ref 12–20)
CA-I BLD-MCNC: 7.7 MG/DL (ref 8.5–10.1)
CHLORIDE SERPL-SCNC: 100 MMOL/L (ref 97–108)
CO2 SERPL-SCNC: 26 MMOL/L (ref 21–32)
CREAT SERPL-MCNC: 4.46 MG/DL (ref 0.7–1.3)
DIFFERENTIAL METHOD BLD: ABNORMAL
EOSINOPHIL # BLD: 0 K/UL (ref 0–0.4)
EOSINOPHIL NFR BLD: 0 % (ref 0–7)
ERYTHROCYTE [DISTWIDTH] IN BLOOD BY AUTOMATED COUNT: 15.5 % (ref 11.5–14.5)
GLUCOSE BLD STRIP.AUTO-MCNC: 188 MG/DL (ref 65–100)
GLUCOSE BLD STRIP.AUTO-MCNC: 191 MG/DL (ref 65–100)
GLUCOSE BLD STRIP.AUTO-MCNC: 286 MG/DL (ref 65–100)
GLUCOSE BLD STRIP.AUTO-MCNC: 334 MG/DL (ref 65–100)
GLUCOSE SERPL-MCNC: 183 MG/DL (ref 65–100)
HCT VFR BLD AUTO: 30.9 % (ref 36.6–50.3)
HGB BLD-MCNC: 10.2 G/DL (ref 12.1–17)
IMM GRANULOCYTES # BLD AUTO: 0.1 K/UL (ref 0–0.04)
IMM GRANULOCYTES NFR BLD AUTO: 0 % (ref 0–0.5)
LYMPHOCYTES # BLD: 1.4 K/UL (ref 0.8–3.5)
LYMPHOCYTES NFR BLD: 9 % (ref 12–49)
MCH RBC QN AUTO: 27.6 PG (ref 26–34)
MCHC RBC AUTO-ENTMCNC: 33 G/DL (ref 30–36.5)
MCV RBC AUTO: 83.5 FL (ref 80–99)
MONOCYTES # BLD: 0.3 K/UL (ref 0–1)
MONOCYTES NFR BLD: 2 % (ref 5–13)
NEUTS SEG # BLD: 14.1 K/UL (ref 1.8–8)
NEUTS SEG NFR BLD: 89 % (ref 32–75)
PERFORMED BY, TECHID: ABNORMAL
PLATELET # BLD AUTO: 226 K/UL (ref 150–400)
PMV BLD AUTO: 11.5 FL (ref 8.9–12.9)
POTASSIUM SERPL-SCNC: 3.5 MMOL/L (ref 3.5–5.1)
RBC # BLD AUTO: 3.7 M/UL (ref 4.1–5.7)
SODIUM SERPL-SCNC: 135 MMOL/L (ref 136–145)
WBC # BLD AUTO: 15.8 K/UL (ref 4.1–11.1)

## 2021-01-23 PROCEDURE — 80048 BASIC METABOLIC PNL TOTAL CA: CPT

## 2021-01-23 PROCEDURE — 65270000032 HC RM SEMIPRIVATE

## 2021-01-23 PROCEDURE — 74011250637 HC RX REV CODE- 250/637: Performed by: INTERNAL MEDICINE

## 2021-01-23 PROCEDURE — 82962 GLUCOSE BLOOD TEST: CPT

## 2021-01-23 PROCEDURE — 90935 HEMODIALYSIS ONE EVALUATION: CPT

## 2021-01-23 PROCEDURE — 36415 COLL VENOUS BLD VENIPUNCTURE: CPT

## 2021-01-23 PROCEDURE — 74011250637 HC RX REV CODE- 250/637: Performed by: HOSPITALIST

## 2021-01-23 PROCEDURE — 74011636637 HC RX REV CODE- 636/637: Performed by: INTERNAL MEDICINE

## 2021-01-23 PROCEDURE — 74011250636 HC RX REV CODE- 250/636: Performed by: INTERNAL MEDICINE

## 2021-01-23 PROCEDURE — 74011250636 HC RX REV CODE- 250/636: Performed by: HOSPITALIST

## 2021-01-23 PROCEDURE — 85025 COMPLETE CBC W/AUTO DIFF WBC: CPT

## 2021-01-23 RX ORDER — HEPARIN SODIUM 1000 [USP'U]/ML
INJECTION, SOLUTION INTRAVENOUS; SUBCUTANEOUS
Status: DISPENSED
Start: 2021-01-23 | End: 2021-01-24

## 2021-01-23 RX ORDER — HYDRALAZINE HYDROCHLORIDE 50 MG/1
100 TABLET, FILM COATED ORAL 3 TIMES DAILY
Status: DISCONTINUED | OUTPATIENT
Start: 2021-01-23 | End: 2021-01-24 | Stop reason: HOSPADM

## 2021-01-23 RX ORDER — DEXAMETHASONE 4 MG/1
4 TABLET ORAL DAILY
Status: DISCONTINUED | OUTPATIENT
Start: 2021-01-24 | End: 2021-01-24 | Stop reason: HOSPADM

## 2021-01-23 RX ADMIN — AZITHROMYCIN DIHYDRATE 500 MG: 500 INJECTION, POWDER, LYOPHILIZED, FOR SOLUTION INTRAVENOUS at 18:29

## 2021-01-23 RX ADMIN — INSULIN LISPRO 6 UNITS: 100 INJECTION, SOLUTION INTRAVENOUS; SUBCUTANEOUS at 22:30

## 2021-01-23 RX ADMIN — ATORVASTATIN CALCIUM 40 MG: 40 TABLET, FILM COATED ORAL at 09:52

## 2021-01-23 RX ADMIN — HEPARIN SODIUM 5000 UNITS: 5000 INJECTION INTRAVENOUS; SUBCUTANEOUS at 09:53

## 2021-01-23 RX ADMIN — METOPROLOL SUCCINATE 50 MG: 50 TABLET, EXTENDED RELEASE ORAL at 22:30

## 2021-01-23 RX ADMIN — HEPARIN SODIUM 5000 UNITS: 5000 INJECTION INTRAVENOUS; SUBCUTANEOUS at 00:30

## 2021-01-23 RX ADMIN — HYDRALAZINE HYDROCHLORIDE 100 MG: 50 TABLET, FILM COATED ORAL at 22:29

## 2021-01-23 RX ADMIN — METOPROLOL SUCCINATE 50 MG: 50 TABLET, EXTENDED RELEASE ORAL at 09:52

## 2021-01-23 RX ADMIN — HYDRALAZINE HYDROCHLORIDE 100 MG: 50 TABLET, FILM COATED ORAL at 18:28

## 2021-01-23 RX ADMIN — DEXAMETHASONE 4 MG: 4 TABLET ORAL at 09:52

## 2021-01-23 RX ADMIN — INSULIN LISPRO 2 UNITS: 100 INJECTION, SOLUTION INTRAVENOUS; SUBCUTANEOUS at 09:53

## 2021-01-23 RX ADMIN — HEPARIN SODIUM 5000 UNITS: 5000 INJECTION INTRAVENOUS; SUBCUTANEOUS at 18:28

## 2021-01-23 RX ADMIN — HYDRALAZINE HYDROCHLORIDE 50 MG: 50 TABLET, FILM COATED ORAL at 09:52

## 2021-01-23 RX ADMIN — PANTOPRAZOLE SODIUM 40 MG: 40 TABLET, DELAYED RELEASE ORAL at 09:52

## 2021-01-23 NOTE — PROGRESS NOTES
Visit attempted for patient in  East unit during rounds. Per current COVID-19 isolation protocol in effect, I provided silent support and prayer outside patient room. There were no family members present. Chaplains will follow up if further referrals are requested. Chaplain Tristin Torres M.Div.    can be reached by calling the  at [facility]  [phone]

## 2021-01-23 NOTE — PROGRESS NOTES
Renal Consultation Note    NAME:  Isis Sun   :   1950   MRN:   [id number]     ATTENDING: Sylvia Garrett MD  PCP:  Barbara Lyman MD    Date/Time:  2021 8:02 AM      Subjective:       Patient is seen in the room  He is awake and alert  No symptoms  No LE swelling   Was dialyzed     Past Medical History:   Diagnosis Date    [facility]     Chronic kidney disease     CKD (chronic kidney disease)     4    Diabetes [facility]     Hypertension       Past Surgical History:   Procedure Laterality Date    COLONOSCOPY N/A 12/3/2020    COLONOSCOPY performed by Naomi Lan MD at [facility] HERNIA REPAIR      IR FLUORO GUIDE [facility] CVAD  2021    IR INSERT NON TUNL CVC OVER 5 YRS  2021    IR INSERT TUNL CVC W/O PORT OVER 5 YR  2021    IR PLACE CVAD FLUORO GUIDE  2021     Social History     Tobacco Use    Smoking status: Never Smoker    Smokeless tobacco: Never Used   Substance Use Topics    Alcohol use: Not Currently      Family History   Problem Relation Age of Onset    Diabetes Mother     Heart Failure Father        Allergies   Allergen Reactions    Lisinopril Angioedema    Pcn [Penicillins] Rash      Prior to Admission medications    Medication Sig Start Date End Date Taking? Authorizing Provider   hydrALAZINE (APRESOLINE) 100 mg tablet Take 100 mg by mouth three (3) times daily. Provider, Historical   metoprolol succinate (TOPROL-XL) 50 mg XL tablet Take 50 mg by mouth two (2) times a day. Provider, Historical   furosemide (Lasix) 80 mg tablet Take 80 mg by mouth two (2) times a day. Provider, Historical   calcitRIOL (ROCALTROL) 0.25 mcg capsule Take 0.25 mcg by mouth daily. Provider, Historical   omeprazole-sodium bicarbonate (ZEGERID) 20-1.1 mg-gram capsule Take 1 Cap by mouth two (2) times a day. Provider, Historical   OTHER,NON-FORMULARY, Take 5 mg by mouth daily.  Ongluza    Provider, Historical   cloNIDine HCL (CATAPRES) 0.1 mg tablet Take 0.1 mg by mouth three (3) times daily. Provider, Historical   atorvastatin (LIPITOR) 40 mg tablet Take 40 mg by mouth daily. Provider, Historical       REVIEW OF SYSTEMS:       Constitutional: Negative for chills and fever. HENT: Negative. Eyes: Negative. Respiratory: Shortness of breath is present   Cardiovascular: Negative. Gastrointestinal: Negative for abdominal pain and nausea. Skin: Negative. Neurological: Negative. Objective:   VITALS:    Visit Vitals  BP (!) 168/99 (BP 1 Location: Right arm)   Pulse 84   Temp 97.6 °F (36.4 °C)   Resp 20   Ht 5' 10\" (1.778 m)   Wt 96.2 kg (212 lb)   SpO2 97%   BMI 30.42 kg/m²     Temp (24hrs), Av °F (36.7 °C), Min:97.6 °F (36.4 °C), Max:98.1 °F (36.7 °C)      PHYSICAL EXAM:   General:    Alert, cooperative, no distress, appears stated age. HEENT: Atraumatic, anicteric sclerae, pink conjunctivae     No oral ulcers, mucosa moist, throat clear  Neck:  JVD is not elevated  Lungs:   Crackles are heard both bases no rhonchi /wheezing  Chest wall:  No tenderness  No Accessory muscle use. Heart:   Regular  rhythm,  No  murmur   trace edema both ankles  Abdomen:   Soft, non-tender. Not distended. Bowel sounds normal  Extremities: No cyanosis. No clubbing  Genitourinary Pleitez catheter in place  Skin:     Eczema +  psych:  Good insight. Not depressed. Not anxious or agitated. Neurologic: Alert and oriented X 4.   No asterixis    LAB DATA REVIEWED:    Recent Results (from the past 24 hour(s))   GLUCOSE, POC    Collection Time: 21  4:12 PM   Result Value Ref Range    Glucose (POC) 177 (H) 65 - 100 mg/dL    Performed by [place] Officer    GLUCOSE, POC    Collection Time: 21 12:23 AM   Result Value Ref Range    Glucose (POC) 191 (H) 65 - 100 mg/dL    Performed by Lisa Chavarria[address], BASIC    Collection Time: 21  5:55 AM   Result Value Ref Range    Sodium 135 (L) 136 - 145 mmol/L    Potassium 3.5 3.5 - 5.1 mmol/L    Chloride 100 97 - 108 mmol/L    CO2 26 21 - 32 mmol/L    Anion gap 9 5 - 15 mmol/L    Glucose 183 (H) 65 - 100 mg/dL    BUN 54 (H) 6 - 20 mg/dL    Creatinine 4.46 (H) 0.70 - 1.30 mg/dL    BUN/Creatinine ratio 12 12 - 20      GFR est AA 16 (L) >60 ml/min/1.73m2    GFR est non-AA 13 (L) >60 ml/min/1.73m2    Calcium 7.7 (L) 8.5 - 10.1 mg/dL   CBC WITH AUTOMATED DIFF    Collection Time: 01/23/21  5:55 AM   Result Value Ref Range    WBC 15.8 (H) 4.1 - 11.1 K/uL    RBC 3.70 (L) 4.10 - 5.70 M/uL    HGB 10.2 (L) 12.1 - 17.0 g/dL    HCT 30.9 (L) 36.6 - 50.3 %    MCV 83.5 80.0 - 99.0 FL    MCH 27.6 26.0 - 34.0 PG    MCHC 33.0 30.0 - 36.5 g/dL    RDW 15.5 (H) 11.5 - 14.5 %    PLATELET 687 578 - 576 K/uL    MPV 11.5 8.9 - 12.9 FL    NEUTROPHILS 89 (H) 32 - 75 %    LYMPHOCYTES 9 (L) 12 - 49 %    MONOCYTES 2 (L) 5 - 13 %    EOSINOPHILS 0 0 - 7 %    BASOPHILS 0 0 - 1 %    IMMATURE GRANULOCYTES 0 0.0 - 0.5 %    ABS. NEUTROPHILS 14.1 (H) 1.8 - 8.0 K/UL    ABS. LYMPHOCYTES 1.4 0.8 - 3.5 K/UL    ABS. MONOCYTES 0.3 0.0 - 1.0 K/UL    ABS. EOSINOPHILS 0.0 0.0 - 0.4 K/UL    ABS. BASOPHILS 0.0 0.0 - 0.1 K/UL    ABS. IMM. GRANS. 0.1 (H) 0.00 - 0.04 K/UL    DF AUTOMATED     GLUCOSE, POC    Collection Time: 01/23/21  7:52 AM   Result Value Ref Range    Glucose (POC) 188 (H) 65 - 100 mg/dL    Performed by [address] [place] [address], POC    Collection Time: 01/23/21  1:10 PM   Result Value Ref Range    Glucose (POC) 334 (H) 65 - 100 mg/dL    Performed by Rosalino Da Silva        Recommendations/Plan:     1. 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 GFR only 7  -presented with volume overload, hyperkalemia and metabolic acidosis  -s/p perm cath placement on 1/18   -s/p dialyzed 1/19 and 1/20  -plan for HD AM   -pt has chair setup at [facility], [place]  -will start next week 1/26  -ok to dc     2. Renal osteodystrophy:  -Calcium is okay.   -phosphorus 6.6.   Will add phos binders if phos is still high despite on dialysis  -Check PTH. Continue Calcitriol at the current dose for now  -Check vitamin D levels    3. Hypokalemia:  -K 3.1-->3.5  -po KCL 40 meq  Received   -will use 3K bath      4. Severe metabolic acidosis  -presented with bicarb of 12. Improved to 15 today. pH 7.2  -2/2 RTA IV 2/2 DM  -will increase oral bicarbonate 1300 tid     5. Covid pneumonia  -Patient presented with shortness of breath.    -Found to be positive for Covid  -Has been started on Decadron and azithromycin  -Leukocytosis of 13.5. Afebrile for now  -Currently in no respiratory distress    6. Hypertension  -Blood pressure is labile but mostly well controlled  -I will continue metoprolol and hydralazine at the current dose for now    7.  Hydronephrosis, UL:  -had gross hematuria  -CT abd showed BL renal calculi  -R hydronephrosis from obstruction stone  -will consult Urology        ________________________________________________________________________  Signed: Angelina Bernabe MD

## 2021-01-23 NOTE — PROGRESS NOTES
Problem: Falls - Risk of  Goal: *Absence of Falls  Description: Document Sarah Sims Fall Risk and appropriate interventions in the flowsheet.   Outcome: Progressing Towards Goal  Note: Fall Risk Interventions:  Mobility Interventions: Bed/chair exit alarm    Mentation Interventions: Bed/chair exit alarm    Medication Interventions: Bed/chair exit alarm    Elimination Interventions: Bed/chair exit alarm    History of Falls Interventions: Bed/chair exit alarm

## 2021-01-23 NOTE — PROGRESS NOTES
0630- Blood found on patients sheets and gown from site of perm-a-cath placed yesterday at dialysis. Dr Suraj Zurita notified of bleeding and gave orders to place pressure at the site in order to stop the bleeding. Pressure held on the site long enough for it to finally stop. Perm-a-cath redressed and bleeding seemed to stop slowly. Call placed to the nephro physician on call, no return call at this time.

## 2021-01-23 NOTE — PROGRESS NOTES
PROGRESS NOTE    Subjective:   Chief Complaint : worsening shortness of breath since few days  Source of information : patient    History of present illness:     70M, H/o CHF unknown EF, CKD IV with worsening shortness of breath wince few days s/t respiratory failure s.t COVID and fluid overload s.t ESRD. Tunneled cath 1/22  Feels improved  Weaned him down from 3 l/m to ra  HD this afternoon, hoping to dc but needs pt for ambulatory check. States he feels better. States doesn't need hhc pt/ot  Respiratory tried walking for ambulatory O2 check but concerned 2/2 weakness and rec PT to walk. Past Medical History:   Diagnosis Date    CHF (congestive heart failure) (HCC)     Chronic kidney disease     CKD (chronic kidney disease)     4    Diabetes ([facility] [address]     Hypertension      Past Surgical History:   Procedure Laterality Date    COLONOSCOPY N/A 12/3/2020    COLONOSCOPY performed by Ed Prasad MD at [facility]COPY    HX HEART CATHETERIZATION      HX HERNIA REPAIR      IR FLUORO GUIDE [facility] CVAD  1/22/2021    IR INSERT NON TUNL CVC OVER 5 YRS  1/18/2021    IR INSERT TUNL CVC W/O PORT OVER 5 YR  1/22/2021    IR PLACE CVAD FLUORO GUIDE  1/18/2021     Family History   Problem Relation Age of Onset    Diabetes Mother     Heart Failure Father       Social History     Tobacco Use    Smoking status: Never Smoker    Smokeless tobacco: Never Used   Substance Use Topics    Alcohol use: Not Currently       Prior to Admission medications    Medication Sig Start Date End Date Taking? Authorizing Provider   hydrALAZINE (APRESOLINE) 100 mg tablet Take 100 mg by mouth three (3) times daily. Provider, Historical   metoprolol succinate (TOPROL-XL) 50 mg XL tablet Take 50 mg by mouth two (2) times a day. Provider, Historical   furosemide (Lasix) 80 mg tablet Take 80 mg by mouth two (2) times a day. Provider, Historical   calcitRIOL (ROCALTROL) 0.25 mcg capsule Take 0.25 mcg by mouth daily. Provider, Historical   omeprazole-sodium bicarbonate (ZEGERID) 20-1.1 mg-gram capsule Take 1 Cap by mouth two (2) times a day. Provider, Historical   OTHER,NON-FORMULARY, Take 5 mg by mouth daily. Ongluza    Provider, Historical   cloNIDine HCL (CATAPRES) 0.1 mg tablet Take 0.1 mg by mouth three (3) times daily. Provider, Historical   atorvastatin (LIPITOR) 40 mg tablet Take 40 mg by mouth daily. Provider, Historical     Allergies   Allergen Reactions    Lisinopril Angioedema    Pcn [Penicillins] Rash             Review of Systems:  Review of Systems   Constitutional: Positive for malaise/fatigue. Negative for chills and fever. HENT: Negative. Eyes: Negative. Respiratory: Negative for cough, sputum production and shortness of breath. Cardiovascular: Negative. Gastrointestinal: Negative. Genitourinary: Negative. Musculoskeletal: Negative. Skin: Negative. Neurological: Positive for weakness. Seizures: & 1/20. Endo/Heme/Allergies: Negative. Psychiatric/Behavioral: Negative. Vitals:     Visit Vitals  BP (!) 168/99 (BP 1 Location: Right arm)   Pulse 84   Temp 97.6 °F (36.4 °C)   Resp 20   Ht 5' 10\" (1.778 m)   Wt 96.2 kg (212 lb)   SpO2 97%   BMI 30.42 kg/m²       Physical Exam:   General : appears comfortable, on ra @ 98%, no distress  HEENT : eomi, normal oral mucosa, atraumatic normocephalic, Normal ear and nose. oropharynx  Neck : Supple, no JVD, no masses noted, no carotid bruit  Lungs : Breath sounds with good air entry bilaterally, no wheezes or rales, no accessory muscle use,  CVS : Rhythm rate regular, S1+, S2+, no murmur or gallop  Abdomen : Soft, nontender, no organomegaly, bowel sounds active, neg cvat  Extremities : no edema  Musculoskeletal : Fair range of motion, no joint swelling or effusion, muscle tone and power appears normal,   Skin : Moist, warm, skin turgor, no pathological rash  Lymphatic : No cervical lymphadenopathy.   Neurological : Awake, alert, oriented to time place person. Cranial nerves intact, deep tendon reflexes active, no sensory disturbance, no cerebellar deficits. Psychiatric : Mood and affect appears appropriate to the situation. Data Review:   Recent Results (from the past 24 hour(s))   GLUCOSE, POC    Collection Time: 01/22/21 12:57 PM   Result Value Ref Range    Glucose (POC) 133 (H) 65 - 100 mg/dL    Performed by [employer]    GLUCOSE, POC    Collection Time: 01/22/21  4:12 PM   Result Value Ref Range    Glucose (POC) 177 (H) 65 - 100 mg/dL    Performed by [employer]    GLUCOSE, POC    Collection Time: 01/23/21 12:23 AM   Result Value Ref Range    Glucose (POC) 191 (H) 65 - 100 mg/dL    Performed by MAGGY MOSS    METABOLIC PANEL, BASIC    Collection Time: 01/23/21  5:55 AM   Result Value Ref Range    Sodium 135 (L) 136 - 145 mmol/L    Potassium 3.5 3.5 - 5.1 mmol/L    Chloride 100 97 - 108 mmol/L    CO2 26 21 - 32 mmol/L    Anion gap 9 5 - 15 mmol/L    Glucose 183 (H) 65 - 100 mg/dL    BUN 54 (H) 6 - 20 mg/dL    Creatinine 4.46 (H) 0.70 - 1.30 mg/dL    BUN/Creatinine ratio 12 12 - 20      GFR est AA 16 (L) >60 ml/min/1.73m2    GFR est non-AA 13 (L) >60 ml/min/1.73m2    Calcium 7.7 (L) 8.5 - 10.1 mg/dL   CBC WITH AUTOMATED DIFF    Collection Time: 01/23/21  5:55 AM   Result Value Ref Range    WBC 15.8 (H) 4.1 - 11.1 K/uL    RBC 3.70 (L) 4.10 - 5.70 M/uL    HGB 10.2 (L) 12.1 - 17.0 g/dL    HCT 30.9 (L) 36.6 - 50.3 %    MCV 83.5 80.0 - 99.0 FL    MCH 27.6 26.0 - 34.0 PG    MCHC 33.0 30.0 - 36.5 g/dL    RDW 15.5 (H) 11.5 - 14.5 %    PLATELET 205 531 - 315 K/uL    MPV 11.5 8.9 - 12.9 FL    NEUTROPHILS 89 (H) 32 - 75 %    LYMPHOCYTES 9 (L) 12 - 49 %    MONOCYTES 2 (L) 5 - 13 %    EOSINOPHILS 0 0 - 7 %    BASOPHILS 0 0 - 1 %    IMMATURE GRANULOCYTES 0 0.0 - 0.5 %    ABS. NEUTROPHILS 14.1 (H) 1.8 - 8.0 K/UL    ABS. LYMPHOCYTES 1.4 0.8 - 3.5 K/UL    ABS. MONOCYTES 0.3 0.0 - 1.0 K/UL    ABS.  EOSINOPHILS 0.0 0.0 - 0.4 K/UL ABS. BASOPHILS 0.0 0.0 - 0.1 K/UL    ABS. IMM. GRANS. 0.1 (H) 0.00 - 0.04 K/UL    DF AUTOMATED     GLUCOSE, POC    Collection Time: 01/23/21  7:52 AM   Result Value Ref Range    Glucose (POC) 188 (H) 65 - 100 mg/dL    Performed by Sherin Tanner      2d echo:  · LV: Estimated LVEF is 15 - 20%. Normal wall thickness. Moderately dilated left ventricle. Severely and globally reduced systolic function. Severe (grade 3) left ventricular diastolic dysfunction. · LA: Moderately dilated left atrium. · RV: Moderately dilated right ventricle. Moderately reduced systolic function. · RA: Severely dilated right atrium. · AV: Aortic valve leaflet calcification present. · MV: Mitral valve non-specific thickening. Mild mitral annular calcification. Moderate mitral valve regurgitation is present. · TV: Moderate to severe tricuspid valve regurgitation is present. · PV: Mild pulmonic valve regurgitation is present. · PA: Pulmonary arterial systolic pressure is 89 mmHg. · IVC: Severely elevated central venous pressure (15 mmHg); IVC diameter is larger than 21 mm and collapses less than 50% with respiration. · Pericardium: Trivial pericardial effusion. There is left pleural effusion. There is right pleural effusion. LV: Estimated LVEF is 15 - 20%. Normal wall thickness. Moderately dilated left ventricle. Severely and globally reduced systolic function. Severe (grade 3) left ventricular diastolic dysfunction. · LA: Moderately dilated left atrium. · RV: Moderately dilated right ventricle. Moderately reduced systolic function. · RA: Severely dilated right atrium. · AV: Aortic valve leaflet calcification present. · MV: Mitral valve non-specific thickening. Mild mitral annular calcification. Moderate mitral valve regurgitation is present. · TV: Moderate to severe tricuspid valve regurgitation is present. · PV: Mild pulmonic valve regurgitation is present. · PA: Pulmonary arterial systolic pressure is 89 mmHg.   · IVC: Severely elevated central venous pressure (15 mmHg); IVC diameter is larger than 21 mm and collapses less than 50% with respiration. · Pericardium: Trivial pericardial effusion. There is left pleural effusion. There is right pleural effusion. Assessment and Plan :     --Acute hypoxic respiratory failure:  s/t COVID and pulmonary edema. waened off o2  Ambulatory O2 check- home O2? Needs pt to ambulate per resp therapy    --COVID-19 pneumonia:   azithromycin and decadron-taper- dc on discharge  --CHF: Cardiomyopathy, ef 15-20%, pulm hypertension  Cont metoprolol. Not on ace/arb- defer to     --ILIR onCKD IV:   presented with a creatinine of 9.1 . GFR only 7  presented with volume overload, cxr showed pulmonary congestion. ILIR related to diuretics vs ckd progression. hyperkalemia and metabolic acidosis resolved  Tunneled hd cath placed 1/22  Hd 1/19 + 1/20 + 1/23  Cm arranged chair [id number] [place] [address], [address] and will start next week. Nephrology following    --HTN:   Continue metoprolol and hydralazine    --GERD:  protonix    --nephroloithiasis/hydronephrosis; Had gross hematuria. Ct a/p showed Obstruction of the proximal right ureter x 6.6 mm calculus/rt hydronephrosis + bilateral renal calculi non obstructing.   Discussed with urology-chronic  DVT ppx:heparin SubQ    DISPOSITION: Anticipate home Am     Signed By: Ann Marie Duncan MD     January 23, 2021

## 2021-01-24 VITALS
TEMPERATURE: 98.3 F | BODY MASS INDEX: 29.86 KG/M2 | OXYGEN SATURATION: 95 % | WEIGHT: 208.56 LBS | SYSTOLIC BLOOD PRESSURE: 151 MMHG | DIASTOLIC BLOOD PRESSURE: 91 MMHG | RESPIRATION RATE: 18 BRPM | HEART RATE: 74 BPM | HEIGHT: 70 IN

## 2021-01-24 PROBLEM — J12.82 PNEUMONIA DUE TO COVID-19 VIRUS: Status: ACTIVE | Noted: 2021-01-24

## 2021-01-24 PROBLEM — I50.23 SYSTOLIC CHF, ACUTE ON CHRONIC (HCC): Status: ACTIVE | Noted: 2021-01-24

## 2021-01-24 PROBLEM — E87.70 FLUID OVERLOAD: Status: ACTIVE | Noted: 2021-01-24

## 2021-01-24 PROBLEM — J96.01 ACUTE HYPOXEMIC RESPIRATORY FAILURE (HCC): Status: RESOLVED | Noted: 2021-01-24 | Resolved: 2021-01-24

## 2021-01-24 PROBLEM — I10 ESSENTIAL HYPERTENSION: Status: ACTIVE | Noted: 2021-01-24

## 2021-01-24 PROBLEM — I42.9 CARDIOMYOPATHY (HCC): Chronic | Status: ACTIVE | Noted: 2021-01-24

## 2021-01-24 PROBLEM — K21.9 GASTROESOPHAGEAL REFLUX DISEASE: Status: ACTIVE | Noted: 2021-01-24

## 2021-01-24 PROBLEM — Z99.2 END STAGE RENAL DISEASE ON DIALYSIS (HCC): Status: ACTIVE | Noted: 2021-01-24

## 2021-01-24 PROBLEM — J96.01 ACUTE HYPOXEMIC RESPIRATORY FAILURE (HCC): Status: ACTIVE | Noted: 2021-01-24

## 2021-01-24 PROBLEM — R53.1 ASTHENIA: Status: ACTIVE | Noted: 2021-01-24

## 2021-01-24 PROBLEM — U07.1 PNEUMONIA DUE TO COVID-19 VIRUS: Status: ACTIVE | Noted: 2021-01-24

## 2021-01-24 PROBLEM — N18.6 END STAGE RENAL DISEASE ON DIALYSIS (HCC): Status: ACTIVE | Noted: 2021-01-24

## 2021-01-24 LAB
GLUCOSE BLD STRIP.AUTO-MCNC: 191 MG/DL (ref 65–100)
GLUCOSE BLD STRIP.AUTO-MCNC: 232 MG/DL (ref 65–100)
PERFORMED BY, TECHID: ABNORMAL
PERFORMED BY, TECHID: ABNORMAL

## 2021-01-24 PROCEDURE — 74011250637 HC RX REV CODE- 250/637: Performed by: HOSPITALIST

## 2021-01-24 PROCEDURE — 74011636637 HC RX REV CODE- 636/637: Performed by: INTERNAL MEDICINE

## 2021-01-24 PROCEDURE — 74011250637 HC RX REV CODE- 250/637: Performed by: INTERNAL MEDICINE

## 2021-01-24 PROCEDURE — 74011250636 HC RX REV CODE- 250/636: Performed by: HOSPITALIST

## 2021-01-24 PROCEDURE — 94762 N-INVAS EAR/PLS OXIMTRY CONT: CPT

## 2021-01-24 PROCEDURE — 82962 GLUCOSE BLOOD TEST: CPT

## 2021-01-24 PROCEDURE — 74011250636 HC RX REV CODE- 250/636: Performed by: INTERNAL MEDICINE

## 2021-01-24 RX ORDER — OMEPRAZOLE 20 MG/1
20 CAPSULE, DELAYED RELEASE ORAL DAILY
Qty: 30 CAP | Refills: 0 | Status: SHIPPED
Start: 2021-01-24 | End: 2021-02-23

## 2021-01-24 RX ORDER — INSULIN LISPRO 100 [IU]/ML
INJECTION, SOLUTION INTRAVENOUS; SUBCUTANEOUS
Qty: 1 VIAL | Refills: 0 | Status: SHIPPED
Start: 2021-01-24 | End: 2021-07-07

## 2021-01-24 RX ORDER — HYDRALAZINE HYDROCHLORIDE 100 MG/1
50 TABLET, FILM COATED ORAL 3 TIMES DAILY
Qty: 90 TAB | Refills: 0 | Status: SHIPPED
Start: 2021-01-24 | End: 2021-10-21

## 2021-01-24 RX ORDER — ALBUTEROL SULFATE 90 UG/1
2 AEROSOL, METERED RESPIRATORY (INHALATION)
Qty: 1 INHALER | Refills: 0 | Status: SHIPPED
Start: 2021-01-24

## 2021-01-24 RX ADMIN — METOPROLOL SUCCINATE 50 MG: 50 TABLET, EXTENDED RELEASE ORAL at 09:30

## 2021-01-24 RX ADMIN — HEPARIN SODIUM 5000 UNITS: 5000 INJECTION INTRAVENOUS; SUBCUTANEOUS at 01:31

## 2021-01-24 RX ADMIN — ATORVASTATIN CALCIUM 40 MG: 40 TABLET, FILM COATED ORAL at 09:30

## 2021-01-24 RX ADMIN — PANTOPRAZOLE SODIUM 40 MG: 40 TABLET, DELAYED RELEASE ORAL at 09:30

## 2021-01-24 RX ADMIN — INSULIN LISPRO 6 UNITS: 100 INJECTION, SOLUTION INTRAVENOUS; SUBCUTANEOUS at 13:15

## 2021-01-24 RX ADMIN — INSULIN LISPRO 2 UNITS: 100 INJECTION, SOLUTION INTRAVENOUS; SUBCUTANEOUS at 09:36

## 2021-01-24 RX ADMIN — DEXAMETHASONE 4 MG: 4 TABLET ORAL at 09:30

## 2021-01-24 RX ADMIN — HYDRALAZINE HYDROCHLORIDE 100 MG: 50 TABLET, FILM COATED ORAL at 09:30

## 2021-01-24 NOTE — PROGRESS NOTES
Report called to [facility]. Transport through [facility]. Pt has spoke to family and they are aware of his plan for [facility].

## 2021-01-24 NOTE — PROGRESS NOTES
PT treatment was iniitated and patient was agreeable to therapy but was haulted when nurse stated his cathether is currently bleeding and would need to be addressed ASAP ; this writer will re attempt visit as  Time schedules permitted this PM

## 2021-01-24 NOTE — PROGRESS NOTES
Final approval received from [facility] for pt admit this date. Call report to [phone] after transport is set up and arrival time is known. Please call Admit coordinator FIRST as she will arrange bed for him  #[phone]  CM to fax DC order and DC summary as soon as they are available.     1600: faxed DC order and summary to [email or web address]

## 2021-01-24 NOTE — ROUTINE PROCESS
Bedside shift report given to Maria E Maldonado RN  (oncoming nurse) by Ramon Hunter RN (offgoing nurse). Report to include SBAR, plan of care, discharge planning, and patient's questions and concerns.

## 2021-01-24 NOTE — PROGRESS NOTES
PROGRESS NOTE    Subjective:   Chief Complaint : worsening shortness of breath since few days  Source of information : patient    History of present illness:     70M, H/o CHF unknown EF, CKD IV with worsening shortness of breath wince few days s/t respiratory failure s.t COVID and fluid overload s.t ESRD. Tunneled cath 1/22  Feels improved  Weaned him down from to room air yesterday and is maintaining sats at rest > 94%. Overnight oximetry on room air had him at <88% 33.6% of the time, (time 2:31:26)  Will need at least nocturnal O2. PT will check if can get ambulatory sao22. Bleeding from tunneled cath site this morning as well from site with removed temp cath site. Pt/ot recs noted. Pt declines placement or hhc at this point. I was able to d/w sister/careegiver, she is adamant about placement, wheelchair, walker . .. D/c cm and [facility] is arranged for today. I asked sister to talk with him to encourage IRF short term. Will d/c to [facility] later today if he agrees and no further bleed from cath sitee.        Past Medical History:   Diagnosis Date    [facility]est[facility]) ([facility])     Chronic kidney disease     CKD (chronic kidney disease)     4    Diabetes ([facility] [address]     Hypertension      Past Surgical History:   Procedure Laterality Date    COLONOSCOPY N/A 12/3/2020    COLONOSCOPY performed by Fuentes Catalan MD at [address] [facility] HX HEART CATHETERIZATION      HX HERNIA REPAIR      IR FLUORO GUIDE [facility] CVAD  1/22/2021    IR INSERT NON TUNL CVC OVER 5 YRS  1/18/2021    IR INSERT TUNL CVC W/O PORT OVER 5 YR  1/22/2021    IR PLACE CVAD FLUORO GUIDE  1/18/2021     Family History   Problem Relation Age of Onset    Diabetes Mother     Heart Failure Father       Social History     Tobacco Use    Smoking status: Never Smoker    Smokeless tobacco: Never Used   Substance Use Topics    Alcohol use: Not Currently       Prior to Admission medications    Medication Sig Start Date End Date Taking? Authorizing Provider   hydrALAZINE (APRESOLINE) 100 mg tablet Take 100 mg by mouth three (3) times daily. Provider, Historical   metoprolol succinate (TOPROL-XL) 50 mg XL tablet Take 50 mg by mouth two (2) times a day. Provider, Historical   furosemide (Lasix) 80 mg tablet Take 80 mg by mouth two (2) times a day. Provider, Historical   calcitRIOL (ROCALTROL) 0.25 mcg capsule Take 0.25 mcg by mouth daily. Provider, Historical   omeprazole-sodium bicarbonate (ZEGERID) 20-1.1 mg-gram capsule Take 1 Cap by mouth two (2) times a day. Provider, Historical   OTHER,NON-FORMULARY, Take 5 mg by mouth daily. Ongluza    Provider, Historical   cloNIDine HCL (CATAPRES) 0.1 mg tablet Take 0.1 mg by mouth three (3) times daily. Provider, Historical   atorvastatin (LIPITOR) 40 mg tablet Take 40 mg by mouth daily. Provider, Historical     Allergies   Allergen Reactions    Lisinopril Angioedema    Pcn [Penicillins] Rash             Review of Systems:  Review of Systems   Constitutional: Positive for malaise/fatigue. Negative for chills and fever. HENT: Negative. Eyes: Negative. Respiratory: Negative for cough, sputum production and shortness of breath. Cardiovascular: Negative. Gastrointestinal: Negative. Genitourinary: Negative. Musculoskeletal: Negative. Skin: Negative. Neurological: Positive for weakness. Seizures: & 1/20. Endo/Heme/Allergies: Negative. Psychiatric/Behavioral: Negative. Vitals:     Visit Vitals  BP (!) 151/91   Pulse 74   Temp 98.3 °F (36.8 °C)   Resp 18   Ht 5' 10\" (1.778 m)   Wt 94.6 kg (208 lb 8.9 oz)   SpO2 95%   BMI 29.92 kg/m²       Physical Exam:   General : appears comfortable, on ra @ 98%, no distress  HEENT : eomi, normal oral mucosa, atraumatic normocephalic, Normal ear and nose.     oropharynx  Neck : Supple, no JVD, no masses noted, no carotid bruit  Lungs : Breath sounds with good air entry bilaterally, no wheezes or rales, no accessory muscle use,  CVS : Rhythm rate regular, S1+, S2+, no murmur or gallop  Abdomen : Soft, nontender, no organomegaly, bowel sounds active, neg cvat  Extremities : no edema  Musculoskeletal : Fair range of motion, no joint swelling or effusion, muscle tone and power appears normal,   Skin : Moist, warm, skin turgor, no pathological rash  Lymphatic : No cervical lymphadenopathy. Neurological : Awake, alert, oriented to time place person. Cranial nerves intact, deep tendon reflexes active, no sensory disturbance, no cerebellar deficits. Psychiatric : Mood and affect appears appropriate to the situation. Data Review:   Recent Results (from the past 24 hour(s))   GLUCOSE, POC    Collection Time: 01/23/21  1:10 PM   Result Value Ref Range    Glucose (POC) 334 (H) 65 - 100 mg/dL    Performed by [address] [place] [address], POC    Collection Time: 01/23/21  7:57 PM   Result Value Ref Range    Glucose (POC) 286 (H) 65 - 100 mg/dL    Performed by Darnell Merritt 10, POC    Collection Time: 01/24/21  7:43 AM   Result Value Ref Range    Glucose (POC) 191 (H) 65 - 100 mg/dL    Performed by Renny Cavanaugh      2d echo:  · LV: Estimated LVEF is 15 - 20%. Normal wall thickness. Moderately dilated left ventricle. Severely and globally reduced systolic function. Severe (grade 3) left ventricular diastolic dysfunction. · LA: Moderately dilated left atrium. · RV: Moderately dilated right ventricle. Moderately reduced systolic function. · RA: Severely dilated right atrium. · AV: Aortic valve leaflet calcification present. · MV: Mitral valve non-specific thickening. Mild mitral annular calcification. Moderate mitral valve regurgitation is present. · TV: Moderate to severe tricuspid valve regurgitation is present. · PV: Mild pulmonic valve regurgitation is present. · PA: Pulmonary arterial systolic pressure is 89 mmHg.   · IVC: Severely elevated central venous pressure (15 mmHg); IVC diameter is larger than 21 mm and collapses less than 50% with respiration. · Pericardium: Trivial pericardial effusion. There is left pleural effusion. There is right pleural effusion. LV: Estimated LVEF is 15 - 20%. Normal wall thickness. Moderately dilated left ventricle. Severely and globally reduced systolic function. Severe (grade 3) left ventricular diastolic dysfunction. · LA: Moderately dilated left atrium. · RV: Moderately dilated right ventricle. Moderately reduced systolic function. · RA: Severely dilated right atrium. · AV: Aortic valve leaflet calcification present. · MV: Mitral valve non-specific thickening. Mild mitral annular calcification. Moderate mitral valve regurgitation is present. · TV: Moderate to severe tricuspid valve regurgitation is present. · PV: Mild pulmonic valve regurgitation is present. · PA: Pulmonary arterial systolic pressure is 89 mmHg. · IVC: Severely elevated central venous pressure (15 mmHg); IVC diameter is larger than 21 mm and collapses less than 50% with respiration. · Pericardium: Trivial pericardial effusion. There is left pleural effusion. There is right pleural effusion. Assessment and Plan :     --Acute hypoxic respiratory failure:  s/t COVID and pulmonary edema. waened off o2 yesterday and sats >94 % RA. Overniight oximetry had him <88% for 2:31;26/333% of the timee. Rec nocturnal O2 22 l/m.    --COVID-19 pneumonia:   azithromycin and decadron-taper- dc on discharge    --CHF: Cardiomyopathy, ef 15-20%, pulm hypertension  Cont metoprolol. Not on ace/arb-     --ILIR onCKD IV:   presented with a creatinine of 9.1 . GFR only 7  presented with volume overload, cxr showed pulmonary congestion. ILIR related to diuretics vs ckd progression. hyperkalemia and metabolic acidosis resolved  Tunneled hd cath placed 1/22  Hd 1/19 + 1/20 + 1/23  Cm arranged chair [id number] [place] [address] [place], [address] and will start next week.   Nephrology following    --HTN:   Continue metoprolol and hydralazine    --GERD:  protonix    -Asthenia: pt/ot rec IRF vs [facility] pt/ot and 24 hr supervision. Sister with whom she live states he needs to go to rehab and she is discussing this with him now.      --nephroloithiasis/hydronephrosis; Had gross hematuria. Ct a/p showed Obstruction of the proximal right ureter x 6.6 mm calculus/rt hydronephrosis + bilateral renal calculi non obstructing    Discussed with urology-chronic  DVT ppx:heparin SubQ    Dispo: [facility] has a bed for him today. If he agrees will dc today to IRF.         Signed By: Mikki Mccrary MD     January 24, 2021

## 2021-01-24 NOTE — PROGRESS NOTES
Updated documentation faxed to  as provided by [email or web address]. Will fax DC order and summary as available.

## 2021-01-24 NOTE — PROGRESS NOTES
Renal Consultation Note    NAME:  Zhao Gregory   :   1950   MRN:   [id number]     ATTENDING: Akira Hernandez MD  PCP:  Marco Gusman MD    Date/Time:  2021 8:02 AM      Subjective:       Patient is seen in the room  He is awake and alert  No symptoms  No LE swelling   Was dialyzed     Past Medical History:   Diagnosis Date    CHF (congestive heart failure) ([facility]     Chronic kidney disease     CKD (chronic kidney disease)     4    Diabetes ([facility]     Hypertension       Past Surgical History:   Procedure Laterality Date    COLONOSCOPY N/A 12/3/2020    COLONOSCOPY performed by Kennedy Arndt MD at [facility] HERNIA REPAIR      IR FLUORO GUIDE [facility] CVAD  2021    IR INSERT NON TUNL CVC OVER 5 YRS  2021    IR INSERT TUNL CVC W/O PORT OVER 5 YR  2021    IR PLACE CVAD FLUORO GUIDE  2021     Social History     Tobacco Use    Smoking status: Never Smoker    Smokeless tobacco: Never Used   Substance Use Topics    Alcohol use: Not Currently      Family History   Problem Relation Age of Onset    Diabetes Mother     Heart Failure Father        Allergies   Allergen Reactions    Lisinopril Angioedema    Pcn [Penicillins] Rash      Prior to Admission medications    Medication Sig Start Date End Date Taking? Authorizing Provider   hydrALAZINE (APRESOLINE) 100 mg tablet Take 100 mg by mouth three (3) times daily. Provider, Historical   metoprolol succinate (TOPROL-XL) 50 mg XL tablet Take 50 mg by mouth two (2) times a day. Provider, Historical   furosemide (Lasix) 80 mg tablet Take 80 mg by mouth two (2) times a day. Provider, Historical   calcitRIOL (ROCALTROL) 0.25 mcg capsule Take 0.25 mcg by mouth daily. Provider, Historical   omeprazole-sodium bicarbonate (ZEGERID) 20-1.1 mg-gram capsule Take 1 Cap by mouth two (2) times a day. Provider, Historical   OTHER,NON-FORMULARY, Take 5 mg by mouth daily.  Ongluza    Provider, Historical   cloNIDine HCL (CATAPRES) 0.1 mg tablet Take 0.1 mg by mouth three (3) times daily.    Provider, Historical   atorvastatin (LIPITOR) 40 mg tablet Take 40 mg by mouth daily.    Provider, Historical       REVIEW OF SYSTEMS:       Constitutional: Negative for chills and fever.   HENT: Negative.    Eyes: Negative.    Respiratory: Shortness of breath is present   Cardiovascular: Negative.    Gastrointestinal: Negative for abdominal pain and nausea.   Skin: Negative.    Neurological: Negative.      Objective:   VITALS:    Visit Vitals  BP (!) 151/91   Pulse 74   Temp 98.3 °F (36.8 °C)   Resp 18   Ht 5' 10\" (1.778 m)   Wt 94.6 kg (208 lb 8.9 oz)   SpO2 95%   BMI 29.92 kg/m²     Temp (24hrs), Av.7 °F (37.1 °C), Min:97.8 °F (36.6 °C), Max:100.1 °F (37.8 °C)      PHYSICAL EXAM:   General:    Alert, cooperative, no distress, appears stated age.     HEENT: Atraumatic, anicteric sclerae, pink conjunctivae     No oral ulcers, mucosa moist, throat clear  Neck:  JVD is not elevated  Lungs:   Crackles are heard both bases no rhonchi /wheezing  Chest wall:  No tenderness  No Accessory muscle use.  Heart:   Regular  rhythm,  No  murmur   trace edema both ankles  Abdomen:   Soft, non-tender. Not distended.  Bowel sounds normal  Extremities: No cyanosis.  No clubbing  Genitourinary Pleitez catheter in place  Skin:     Eczema +  psych:  Good insight.  Not depressed.  Not anxious or agitated.  Neurologic: Alert and oriented X 4.  No asterixis    LAB DATA REVIEWED:    Recent Results (from the past 24 hour(s))   GLUCOSE, POC    Collection Time: 21  1:10 PM   Result Value Ref Range    Glucose (POC) 334 (H) 65 - 100 mg/dL    Performed by VERNA SIMMONS    GLUCOSE, POC    Collection Time: 21  7:57 PM   Result Value Ref Range    Glucose (POC) 286 (H) 65 - 100 mg/dL    Performed by ANT MEDLEY    GLUCOSE, POC    Collection Time: 21  7:43 AM   Result Value Ref Range    Glucose (POC) 191 (H) 65 - 100  mg/dL    Performed by Aidee Rae        Recommendations/Plan:     1. 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 GFR only 7  -presented with volume overload, hyperkalemia and metabolic acidosis  -s/p perm cath placement on 1/18   -last dialyzed 1/23  -pt has chair setup at [facility], [place]  -will start next week 1/26  -ok to dc     2. Renal osteodystrophy:  -Calcium is okay.   -phosphorus 6.6. Will add phos binders if phos is still high despite on dialysis  -Check PTH. Continue Calcitriol at the current dose for now  -Check vitamin D levels    3. Hypokalemia:  -K 3.1-->3.5  -po KCL 40 meq  Received   -will use 3K bath      4. Covid pneumonia  -Patient presented with shortness of breath.    -Found to be positive for Covid  -Has been started on Decadron and azithromycin  -Leukocytosis of 13.5. Afebrile for now  -Currently in no respiratory distress    5. Hypertension  -Blood pressure is labile but mostly well controlled  -I will continue metoprolol and hydralazine at the current dose for now    6.  Hydronephrosis, UL:  -had gross hematuria  -CT abd showed BL renal calculi  -R hydronephrosis from obstruction stone  - Urology on board        ________________________________________________________________________  Signed: Suresh Brooks MD

## 2021-01-24 NOTE — PROGRESS NOTES
Chart reviewed for DC readiness. Dialysis chair arranged at [facility] in [place]. See previous CM note. . Nephrology documented OK to DC on 01/23/2021   Pt bed is available at [facility] for admission today. Attending paged for status update. Spoke with Attending regarding DC. Spoke with pts floor RN regarding DC  CM will follow for DC destination and possible need for O2 in the home.

## 2021-01-24 NOTE — PROGRESS NOTES
Discharge Pt to [facility] pt family notified. [facility] transport with [facility]. Report given to [facility].

## 2021-01-24 NOTE — DISCHARGE SUMMARY
Physician Discharge Summary     Patient ID:    Pearl Kaufman  [id number]  72 y.o.  1950    Admit date: 1/16/2021    Discharge date : 1/24/2021    Chronic Diagnoses:    Problem List as of 1/24/2021 Never Reviewed          Codes Class Noted - Resolved    Respiratory failure ([facility] ICD-10-CM: J96.90  ICD-9-CM: 518.81  1/16/2021 - Present        ATN (acute tubular necrosis) ([facility]) ICD-10-CM: N17.0  ICD-9-CM: 584.5  1/16/2021 - Present          22    Final Diagnoses:   Respiratory failure ([facility] [J96.90]  ATN (acute tubular necrosis) ([facility] [N17.0]    Reason for Hospitalization:  Acute respiratory failure with hypoxia secondary to Covid and pulmonary edema/fluid overload. COVID-19 pneumonia. Hospital Course:   70M, H/o CHF unknown EF, CKD IV with worsening shortness of breath for few days s/t respiratory failure s.t COVID and fluid overload s.t ESRD. He had a known history of CKD 4/5 following with Dr. Duarte Cooper in the outpatient setting but lost to follow-up over the past year with a history of hypertension, CHF with unknown EF presenting to the ER with shortness of breath which had been worsening over the past few days. Patient had been on Lasix 80 mg twice a day for shortness of breath and leg swelling. The leg swelling had improved but shortness of breath did not and he presented to the ER for evaluation. He was found to be Covid positive in the ER. Patient has a history of exposure to Covid positive family members. On presentation and his creatinine was 9.1 with a GFR of only 7. CO2 was only 12 and his potassium was 5.4. His proBNP was elevated to greater than 35,000 and his chest x-ray was suggestive of pulmonary edema. Surmised ILIR could be secondary to diuretics versus progression of his renal disease. Presenting with volume overload and his chest x-ray showing pulmonary congestion. He is continue on Lasix 80 mg IV 3 times daily.   Nephrology followed and recommending started hemodialysis which patient agreed. Temporary hemodialysis catheter was placed and hemodialysis was initiated. PermCath was placed on 1/18. He was dialyzed on 119, 1/20 and finally 1/23. Case management arranged for a chair at [facility] and he will start dialysis next week 1/26.  hyperkalemia resolved after hemodialysis as did metabolic acidosis. Regards to acute hypoxic respiratory failure, to supplement mentation ultimately weaned to room air and he maintain adequate saturations at rest on room air, on discharge date greater than 94%. He received tapering Decadron and azithromycin. As mentioned his respiratory status improved. We did do an overnight oximetry as patient was too weak to ambulate, reports of O2 saturations below 88% 33% of the time or 2 hours and 31 minutes. He does qualify for nocturnal oxygen and will continue this at 2 L per nasal cannula. He will need to be reevaluated for this or set up on discharge from IRF. In regards to CHF, echocardiogram did show a severe cardiomyopathy with an EF of 15 to 20% with pulmonary hypertension. Metoprolol was continued. ACE/ARB deferred to nephrology. Euvolemic on discharge. Patient also was found to have nephrolithiasis/hydronephrosis. He had an episode of gross hematuria and a CAT scan of the abdomen and pelvis showed obstruction of the proximal right ureter with a 6 mm calculus/right hydronephrosis and bilateral renal calculi which were nonobstructing. I did consult urology, we discussed it and he said this was chronic and there is nothing to do currently. We will need to monitor this in the future. Urology as needed. Patient was quite weak, followed by physical and Occupational Therapy recommending IRF versus home health care with PT OT and 24-hour supervision.   Discussed with sister who cares for him at home stating that he cannot return home as she just recently had surgery as did her  with a stroke and they cannot care for him. Patient initially was resistant to placement but he is now agreeable for short term rehab at [facility], bed available and will be transferring him over today. --Acute hypoxic respiratory failure:  s/t COVID and pulmonary edema. waened off o2 yesterday and sats >94 % RA. Overniight oximetry had him <88% for 2:31;26/ 33% of the timee. Rec nocturnal O2 22 l/m.     --COVID-19 pneumonia:   azithromycin and decadron-taper- dc on discharge     --CHF: Cardiomyopathy, ef 15-20%, pulm hypertension  Cont metoprolol. Not on ace with history of angioedema to lisinopril.     --ILIR onCKD IV:   presented with a creatinine of 9.1 .  GFR only 7  presented with volume overload, cxr showed pulmonary congestion. ILIR related to diuretics vs ckd progression. hyperkalemia and metabolic acidosis resolved  Tunneled hd cath placed 1/22  Hd 1/19 + 1/20 + 1/23  Cm arranged chair [id number] [place] [address] and will start next week 1/26  Nephrology following     --HTN:   Continue metoprolol and hydralazine     --GERD:  protonix     -Asthenia: pt/ot rec IRF vs [facility] pt/ot and 24 hr supervision. Kezia Arriola stated she could not take care of him. Patient is finally agreeable for placement in inpatient rehab facility and accepted at [facility] will be going today.      --nephroloithiasis/hydronephrosis; Had gross hematuria  Ct a/p showed Obstruction   Consulted urology, no current intervention, outpatient follow-up. DC recommendations:   -Discharged to [facility] rehab  -Medications per med rec  -Recommend nocturnal O2 at 2 L per nasal cannula. Reevaluate for need during the daytime.  -Azithromycin and dexamethasone discontinued on discharge  -Hemodialysis at [facility], next session 1/26/2021  -Monitor tunneled dialysis catheter for bleeding. Patient had some bleeding to his cath site within the last 24 hours but this ceased after heparin subcutaneously for DVT prophylaxis was stopped.   -Recommend repeating CBC and BMP in 3 days. Discharge Medications:   Current Discharge Medication List      START taking these medications    Details   albuterol (PROVENTIL HFA, VENTOLIN HFA, PROAIR HFA) 90 mcg/actuation inhaler Take 2 Puffs by inhalation every four (4) hours as needed for Wheezing or Shortness of Breath. Qty: 1 Inhaler, Refills: 0      insulin lispro (HUMALOG) 100 unit/mL injection Sliding scale insulin coverage as per scale above, before meals and at bedtime. Follow to titrate as dexamethasone has been tapered off. Qty: 1 Vial, Refills: 0      omeprazole (PRILOSEC) 20 mg capsule Take 1 Cap by mouth daily for 30 days. Qty: 30 Cap, Refills: 0         CONTINUE these medications which have CHANGED    Details   hydrALAZINE (APRESOLINE) 100 mg tablet Take 0.5 Tabs by mouth three (3) times daily. For systolic under 808  Qty: 90 Tab, Refills: 0         CONTINUE these medications which have NOT CHANGED    Details   metoprolol succinate (TOPROL-XL) 50 mg XL tablet Take 50 mg by mouth two (2) times a day. calcitRIOL (ROCALTROL) 0.25 mcg capsule Take 0.25 mcg by mouth daily. atorvastatin (LIPITOR) 40 mg tablet Take 40 mg by mouth daily. STOP taking these medications       furosemide (Lasix) 80 mg tablet Comments:   Reason for Stopping:         omeprazole-sodium bicarbonate (ZEGERID) 20-1.1 mg-gram capsule Comments:   Reason for Stopping:         OTHER,NON-FORMULARY, Comments:   Reason for Stopping:         cloNIDine HCL (CATAPRES) 0.1 mg tablet Comments:   Reason for Stopping: Follow up Care:    1. Shane Schulte MD in 1-2 weeks. Please call to set up an appointment shortly after discharge. 2-Dr. Edgar Sandoval 2 weeks nephrology  3-Dr. Zoya Arrington 2 to 4 weeks urology    Diet:  Diabetic Diet and Renal Diet    Disposition:  [facility] rehab/IRF. Advanced Directive:   FULL x   DNR      Discharge Exam:  See today's note.     CONSULTATIONS: Nephrology, Urology and Hospitalist    Significant Diagnostic Studies:   1/16/2021:  mg/dL* (Ref range: 6 - 20 mg/dL); Calcium 8.9 mg/dL (Ref range: 8.5 - 10.1 mg/dL); CO2 12 mmol/L* (Ref range: 21 - 32 mmol/L); Creatinine 9.17 mg/dL* (Ref range: 0.70 - 1.30 mg/dL); Glucose 172 mg/dL* (Ref range: 65 - 100 mg/dL); HCT 39.2 % (Ref range: 36.6 - 50.3 %); HGB 13.2 g/dL (Ref range: 12.1 - 17.0 g/dL); Potassium 5.4 mmol/L* (Ref range: 3.5 - 5.1 mmol/L); Sodium 134 mmol/L* (Ref range: 136 - 145 mmol/L)  1/17/2021:  mg/dL* (Ref range: 6 - 20 mg/dL); Calcium 7.6 mg/dL* (Ref range: 8.5 - 10.1 mg/dL); CO2 13 mmol/L* (Ref range: 21 - 32 mmol/L); Creatinine 8.96 mg/dL* (Ref range: 0.70 - 1.30 mg/dL); Glucose 210 mg/dL* (Ref range: 65 - 100 mg/dL); Potassium 5.6 mmol/L* (Ref range: 3.5 - 5.1 mmol/L); Sodium 136 mmol/L (Ref range: 136 - 145 mmol/L)  Recent Labs     01/23/21  0555   WBC 15.8*   HGB 10.2*   HCT 30.9*        Recent Labs     01/23/21  0555 01/22/21  0813   * 137   K 3.5 3.1*    102   CO2 26 27   BUN 54* 42*   CREA 4.46* 4.01*   * 71   CA 7.7* 7.8*   PHOS  --  2.6     Recent Labs     01/22/21  0813   ALB 2.4*     No results for input(s): INR, PTP, APTT, INREXT in the last 72 hours. No results for input(s): FE, TIBC, PSAT, FERR in the last 72 hours. No results for input(s): PH, PCO2, PO2 in the last 72 hours. No results for input(s): CPK, CKMB in the last 72 hours.     No lab exists for component: TROPONINI  Lab Results   Component Value Date/Time    Glucose (POC) 232 (H) 01/24/2021 12:15 PM    Glucose (POC) 191 (H) 01/24/2021 07:43 AM    Glucose (POC) 286 (H) 01/23/2021 07:57 PM    Glucose (POC) 334 (H) 01/23/2021 01:10 PM    Glucose (POC) 188 (H) 01/23/2021 07:52 AM           Signed:  Sakshi Styles MD  1/24/2021  2:24 PM    Time 45 minutes

## 2021-01-25 ENCOUNTER — APPOINTMENT (OUTPATIENT)
Dept: GENERAL RADIOLOGY | Age: 71
End: 2021-01-25
Attending: EMERGENCY MEDICINE
Payer: COMMERCIAL

## 2021-01-25 ENCOUNTER — HOSPITAL ENCOUNTER (EMERGENCY)
Age: 71
Discharge: HOME | End: 2021-01-25
Attending: EMERGENCY MEDICINE
Payer: COMMERCIAL

## 2021-01-25 ENCOUNTER — HOSPITAL ENCOUNTER (OUTPATIENT)
Dept: LAB | Age: 71
Discharge: HOME | End: 2021-01-25

## 2021-01-25 VITALS
TEMPERATURE: 98.1 F | SYSTOLIC BLOOD PRESSURE: 146 MMHG | HEART RATE: 80 BPM | WEIGHT: 215 LBS | HEIGHT: 70 IN | RESPIRATION RATE: 18 BRPM | OXYGEN SATURATION: 97 % | DIASTOLIC BLOOD PRESSURE: 95 MMHG | BODY MASS INDEX: 30.78 KG/M2

## 2021-01-25 DIAGNOSIS — T82.838A BLEEDING DUE TO DIALYSIS CATHETER PLACEMENT, INITIAL ENCOUNTER (HCC): Primary | ICD-10-CM

## 2021-01-25 LAB
ALBUMIN SERPL-MCNC: 2.3 G/DL (ref 3.5–5)
ALBUMIN/GLOB SERPL: 0.4 {RATIO} (ref 1.1–2.2)
ALP SERPL-CCNC: 445 U/L (ref 45–117)
ALT SERPL-CCNC: 70 U/L (ref 12–78)
ANION GAP SERPL CALC-SCNC: 9 MMOL/L (ref 5–15)
AST SERPL W P-5'-P-CCNC: 102 U/L (ref 15–37)
BASOPHILS # BLD: 0 K/UL (ref 0–0.1)
BASOPHILS NFR BLD: 0 % (ref 0–1)
BILIRUB SERPL-MCNC: 0.6 MG/DL (ref 0.2–1)
BUN SERPL-MCNC: 53 MG/DL (ref 6–20)
BUN/CREAT SERPL: 12 (ref 12–20)
CA-I BLD-MCNC: 7.9 MG/DL (ref 8.5–10.1)
CHLORIDE SERPL-SCNC: 101 MMOL/L (ref 97–108)
CO2 SERPL-SCNC: 27 MMOL/L (ref 21–32)
CREAT SERPL-MCNC: 4.25 MG/DL (ref 0.7–1.3)
DIFFERENTIAL METHOD BLD: ABNORMAL
EOSINOPHIL # BLD: 0 K/UL (ref 0–0.4)
EOSINOPHIL NFR BLD: 0 % (ref 0–7)
ERYTHROCYTE [DISTWIDTH] IN BLOOD BY AUTOMATED COUNT: 15.7 % (ref 11.5–14.5)
GLOBULIN SER CALC-MCNC: 5.4 G/DL (ref 2–4)
GLUCOSE SERPL-MCNC: 184 MG/DL (ref 65–100)
HCT VFR BLD AUTO: 29.4 % (ref 36.6–50.3)
HGB BLD-MCNC: 9.5 G/DL (ref 12.1–17)
IMM GRANULOCYTES # BLD AUTO: 0.1 K/UL (ref 0–0.04)
IMM GRANULOCYTES NFR BLD AUTO: 0 % (ref 0–0.5)
LYMPHOCYTES # BLD: 1.7 K/UL (ref 0.8–3.5)
LYMPHOCYTES NFR BLD: 11 % (ref 12–49)
MCH RBC QN AUTO: 27.1 PG (ref 26–34)
MCHC RBC AUTO-ENTMCNC: 32.3 G/DL (ref 30–36.5)
MCV RBC AUTO: 83.8 FL (ref 80–99)
MONOCYTES # BLD: 0.6 K/UL (ref 0–1)
MONOCYTES NFR BLD: 4 % (ref 5–13)
NEUTS SEG # BLD: 13.5 K/UL (ref 1.8–8)
NEUTS SEG NFR BLD: 85 % (ref 32–75)
PLATELET # BLD AUTO: 202 K/UL (ref 150–400)
PMV BLD AUTO: 12.1 FL (ref 8.9–12.9)
POTASSIUM SERPL-SCNC: 3.3 MMOL/L (ref 3.5–5.1)
PREALB SERPL-MCNC: 10.9 MG/DL (ref 20–40)
PROT SERPL-MCNC: 7.7 G/DL (ref 6.4–8.2)
RBC # BLD AUTO: 3.51 M/UL (ref 4.1–5.7)
SODIUM SERPL-SCNC: 137 MMOL/L (ref 136–145)
WBC # BLD AUTO: 15.8 K/UL (ref 4.1–11.1)

## 2021-01-25 PROCEDURE — 85025 COMPLETE CBC W/AUTO DIFF WBC: CPT

## 2021-01-25 PROCEDURE — 80053 COMPREHEN METABOLIC PANEL: CPT

## 2021-01-25 PROCEDURE — 71045 X-RAY EXAM CHEST 1 VIEW: CPT

## 2021-01-25 PROCEDURE — 99283 EMERGENCY DEPT VISIT LOW MDM: CPT

## 2021-01-25 PROCEDURE — 84134 ASSAY OF PREALBUMIN: CPT

## 2021-01-25 NOTE — PROGRESS NOTES
08: 59AM 1/25/2021 Outbound call to Barbi/bubba ([facility]) @ [phone]. SW informed Barbi, of the contact number for [facility], and to notify [facility] prior to patient discharging re: chair days/times. Barbi acknowledged understanding, and agreed to do so. ARNIE Slaughter            08:52AM 1/25/2021 Outbound call to [address] [facility] @ [phone]. Identified self, role, and nature of the call. Spoke to [place]. MELISSA informed patient had been accepted at [facility] for inpatient acute rehab. Nazia requested that [facility] contact them before patient discharges to discuss chair days/times. Message to be relayed to [place] (admissions coordinator). Writer acknowledged understanding.           ARNIE Slaughter

## 2021-01-25 NOTE — ED PROVIDER NOTES
EMERGENCY DEPARTMENT HISTORY AND PHYSICAL EXAM      Date: 1/25/2021  Patient Name: Jason Camargo    History of Presenting Illness     Chief Complaint   Patient presents with    Physical       History Provided By: Patient    HPI: Son Bowen, 72 y.o. male with a past medical history significant CHF presents to the ED with chief complaint of Physical  .   73-year-old male recent Covid infection is on dialysis. Recently had a permacatheter placed to the anterior chest for dialysis. Is post to have dialysis today. They were concerned that it had become dislodged versus just bleeding. Oozing through the bandage site. Patient has [place] no complaints including dizziness. No nausea vomiting diarrhea. No worsening cough congestion or shortness of breath. There are no other complaints, changes, or physical findings at this time. PCP: Maximiliano Schwarz MD    Current Outpatient Medications   Medication Sig Dispense Refill    hydrALAZINE (APRESOLINE) 100 mg tablet Take 0.5 Tabs by mouth three (3) times daily. For systolic under 964 90 Tab 0    albuterol (PROVENTIL HFA, VENTOLIN HFA, PROAIR HFA) 90 mcg/actuation inhaler Take 2 Puffs by inhalation every four (4) hours as needed for Wheezing or Shortness of Breath. 1 Inhaler 0    insulin lispro (HUMALOG) 100 unit/mL injection Sliding scale insulin coverage as per scale above, before meals and at bedtime. Follow to titrate as dexamethasone has been tapered off. 1 Vial 0    omeprazole (PRILOSEC) 20 mg capsule Take 1 Cap by mouth daily for 30 days. 30 Cap 0    metoprolol succinate (TOPROL-XL) 50 mg XL tablet Take 50 mg by mouth two (2) times a day.  calcitRIOL (ROCALTROL) 0.25 mcg capsule Take 0.25 mcg by mouth daily.  atorvastatin (LIPITOR) 40 mg tablet Take 40 mg by mouth daily.          Past History     Past Medical History:  Past Medical History:   Diagnosis Date    Asthenia 1/24/2021    Cardiomyopathy [facility]) 1/24/2021    [facility]est[facility] [address]     Chronic kidney disease     CKD (chronic kidney disease)     4    Diabetes ([facility] [address]     End stage renal disease on dialysis ([facility] [address] 1/24/2021    Essential hypertension 1/24/2021    Gastroesophageal reflux disease 1/24/2021    Gastroesophageal reflux disease 1/24/2021    Hypertension     Pneumonia due to COVID-19 virus 0/37/3491    Systolic CHF, acute on chronic ([facility] [address] 1/24/2021       Past Surgical History:  Past Surgical History:   Procedure Laterality Date    COLONOSCOPY N/A 12/3/2020    COLONOSCOPY performed by Zhanna Renteria MD at [facility] HX HEART CATHETERIZATION      HX HERNIA REPAIR      IR FLUORO GUIDE [facility] CV[facility]  1/22/2021    IR INSERT NON TUNL CVC OVER 5 YRS  1/18/2021    IR INSERT TUNL CVC W/O PORT OVER 5 YR  1/22/2021    IR PLACE CVAD FLUORO GUIDE  1/18/2021       Family History:  Family History   Problem Relation Age of Onset    Diabetes Mother     Heart Failure Father        Social History:  Social History     Tobacco Use    Smoking status: Never Smoker    Smokeless tobacco: Never Used   Substance Use Topics    Alcohol use: Not Currently    Drug use: Never       Allergies: Allergies   Allergen Reactions    Lisinopril Angioedema    Pcn [Penicillins] Rash         Review of Systems   Review of Systems   Constitutional: Negative. Negative for chills, fatigue and fever. HENT: Negative. Negative for congestion, ear pain, nosebleeds and sore throat. Eyes: Negative. Negative for pain, discharge and visual disturbance. Respiratory: Negative. Negative for cough, chest tightness and shortness of breath. Cardiovascular: Negative. Negative for chest pain and leg swelling. Gastrointestinal: Negative. Negative for abdominal pain, blood in stool, constipation, diarrhea, nausea and vomiting. Endocrine: Negative. Genitourinary: Negative. Negative for difficulty urinating, dysuria and flank pain. Musculoskeletal: Negative. Negative for back pain and myalgias. Skin: Positive for wound. Negative for rash. Allergic/Immunologic: Negative. Neurological: Negative. Negative for dizziness, syncope, weakness, numbness and headaches. Hematological: Negative. Does not bruise/bleed easily. Psychiatric/Behavioral: Negative. Negative for agitation, confusion, hallucinations and suicidal ideas. All other systems reviewed and are negative. Physical Exam   Physical Exam  Vitals signs and nursing note reviewed. Constitutional:       General: He is not in acute distress. Appearance: He is normal weight. He is not ill-appearing. HENT:      Head: Normocephalic and atraumatic. Right Ear: External ear normal.      Left Ear: External ear normal.      Nose: Nose normal. No rhinorrhea. Mouth/Throat:      Mouth: Mucous membranes are moist.      Pharynx: Oropharynx is clear. Eyes:      Extraocular Movements: Extraocular movements intact. Conjunctiva/sclera: Conjunctivae normal.      Pupils: Pupils are equal, round, and reactive to light. Neck:      Musculoskeletal: Normal range of motion and neck supple. Cardiovascular:      Rate and Rhythm: Normal rate and regular rhythm. Pulses: Normal pulses. Heart sounds: Normal heart sounds. Comments: Chest wall port right anterior chest with blood on the bandage no active bleeding now. Pulmonary:      Effort: Pulmonary effort is normal. No respiratory distress. Breath sounds: Normal breath sounds. Abdominal:      General: Abdomen is flat. Bowel sounds are normal.      Palpations: Abdomen is soft. Musculoskeletal: Normal range of motion. General: No tenderness or deformity. Skin:     General: Skin is warm and dry. Capillary Refill: Capillary refill takes less than 2 seconds. Findings: No bruising, lesion or rash. Neurological:      General: No focal deficit present. Mental Status: He is alert and oriented to person, place, and time.  Mental status is at baseline. Psychiatric:         Mood and Affect: Mood normal.         Behavior: Behavior normal.         Thought Content: Thought content normal.         Judgment: Judgment normal.         Diagnostic Study Results     Labs -     Recent Results (from the past 12 hour(s))   CBC WITH AUTOMATED DIFF    Collection Time: 01/25/21  6:14 AM   Result Value Ref Range    WBC 15.8 (H) 4.1 - 11.1 K/uL    RBC 3.51 (L) 4.10 - 5.70 M/uL    HGB 9.5 (L) 12.1 - 17.0 g/dL    HCT 29.4 (L) 36.6 - 50.3 %    MCV 83.8 80.0 - 99.0 FL    MCH 27.1 26.0 - 34.0 PG    MCHC 32.3 30.0 - 36.5 g/dL    RDW 15.7 (H) 11.5 - 14.5 %    PLATELET 666 922 - 770 K/uL    MPV 12.1 8.9 - 12.9 FL    NEUTROPHILS 85 (H) 32 - 75 %    LYMPHOCYTES 11 (L) 12 - 49 %    MONOCYTES 4 (L) 5 - 13 %    EOSINOPHILS 0 0 - 7 %    BASOPHILS 0 0 - 1 %    IMMATURE GRANULOCYTES 0 0.0 - 0.5 %    ABS. NEUTROPHILS 13.5 (H) 1.8 - 8.0 K/UL    ABS. LYMPHOCYTES 1.7 0.8 - 3.5 K/UL    ABS. MONOCYTES 0.6 0.0 - 1.0 K/UL    ABS. EOSINOPHILS 0.0 0.0 - 0.4 K/UL    ABS. BASOPHILS 0.0 0.0 - 0.1 K/UL    ABS. IMM. GRANS. 0.1 (H) 0.00 - 0.04 K/UL    DF AUTOMATED     METABOLIC PANEL, COMPREHENSIVE    Collection Time: 01/25/21  6:14 AM   Result Value Ref Range    Sodium 137 136 - 145 mmol/L    Potassium 3.3 (L) 3.5 - 5.1 mmol/L    Chloride 101 97 - 108 mmol/L    CO2 27 21 - 32 mmol/L    Anion gap 9 5 - 15 mmol/L    Glucose 184 (H) 65 - 100 mg/dL    BUN 53 (H) 6 - 20 mg/dL    Creatinine 4.25 (H) 0.70 - 1.30 mg/dL    BUN/Creatinine ratio 12 12 - 20      GFR est AA 17 (L) >60 ml/min/1.73m2    GFR est non-AA 14 (L) >60 ml/min/1.73m2    Calcium 7.9 (L) 8.5 - 10.1 mg/dL    Bilirubin, total 0.6 0.2 - 1.0 mg/dL    AST (SGOT) 102 (H) 15 - 37 U/L    ALT (SGPT) 70 12 - 78 U/L    Alk.  phosphatase 445 (H) 45 - 117 U/L    Protein, total 7.7 6.4 - 8.2 g/dL    Albumin 2.3 (L) 3.5 - 5.0 g/dL    Globulin 5.4 (H) 2.0 - 4.0 g/dL    A-G Ratio 0.4 (L) 1.1 - 2.2         Radiologic Studies -   XR CHEST SNGL V   Final Result Impression: Underexpanded lungs with subsegmental atelectasis. Hydrostatic   edema. CT Results  (Last 48 hours)    None        CXR Results  (Last 48 hours)               01/25/21 1024  XR CHEST SNGL V Final result    Impression:  Impression: Underexpanded lungs with subsegmental atelectasis. Hydrostatic   edema. Narrative:  Chest, frontal view, 1/25/2021       History: Port bleeding. Comparison: Including chest 1/21/2021. Findings: Right internal jugular catheter tip is near the SVC/right atrial   junction. The cardiac silhouette remains enlarged. The lungs are   underexpanded. Linear opacities in both lungs are most likely subsegmental   atelectasis. Pulmonary vascular congestion and hydrostatic edema are not   significantly changed. No pleural effusion or pneumothorax is identified. Degenerative changes are present in the thoracic spine. Medical Decision Making and ED Course   I am the first provider for this patient. I reviewed the vital signs, available nursing notes, past medical history, past surgical history, family history and social history. Vital Signs-Reviewed the patient's vital signs. Patient Vitals for the past 12 hrs:   Temp Pulse Resp BP SpO2   01/25/21 1012 97.5 °F (36.4 °C) 80 18 (!) 144/98 96 %       EKG interpretation:         Records Reviewed: Previous Hospital chart. EMS run report      ED Course:   Initial assessment performed. The patients presenting problems have been discussed, and they are in agreement with the care plan formulated and outlined with them. I have encouraged them to ask questions as they arise throughout their visit.     Orders Placed This Encounter    XR CHEST SNGL V     Standing Status:   Standing     Number of Occurrences:   1     Order Specific Question:   Transport     Answer:   Ambulatory [1]     Order Specific Question:   Reason for Exam     Answer:   port bleeding    Droplet Plus Isolation     Standing Status:   Standing     Number of Occurrences:   1              CONSULTANTS:  Consults  teresa campbell in ED    Provider Notes (Medical Decision Making):   50-year-old male with recent cath placement of a permanent catheter site for dialysis treatment. Scheduled to get dialysis today. Patient is in no respiratory distress. X-ray performed to confirm placement to make sure it has not become dislodged. Quick clot applied to the site which is controlled the bleeding. Patient is stable for discharge back for continuation of dialysis today. Patient was evaluated by nephrology in the ER and he agrees. Procedures                       Disposition       Emergency Department Disposition:  Discharged      DISCHARGE PLAN:    Patient is discharged home. Discharge instructions provided. Patient is stable and improved at time of disposition. Vitals are stable. 1.   Current Discharge Medication List      CONTINUE these medications which have NOT CHANGED    Details   hydrALAZINE (APRESOLINE) 100 mg tablet Take 0.5 Tabs by mouth three (3) times daily. For systolic under 534  Qty: 90 Tab, Refills: 0      albuterol (PROVENTIL HFA, VENTOLIN HFA, PROAIR HFA) 90 mcg/actuation inhaler Take 2 Puffs by inhalation every four (4) hours as needed for Wheezing or Shortness of Breath. Qty: 1 Inhaler, Refills: 0      insulin lispro (HUMALOG) 100 unit/mL injection Sliding scale insulin coverage as per scale above, before meals and at bedtime. Follow to titrate as dexamethasone has been tapered off. Qty: 1 Vial, Refills: 0      omeprazole (PRILOSEC) 20 mg capsule Take 1 Cap by mouth daily for 30 days. Qty: 30 Cap, Refills: 0      metoprolol succinate (TOPROL-XL) 50 mg XL tablet Take 50 mg by mouth two (2) times a day. calcitRIOL (ROCALTROL) 0.25 mcg capsule Take 0.25 mcg by mouth daily. atorvastatin (LIPITOR) 40 mg tablet Take 40 mg by mouth daily.            2.   Follow-up Information     Follow up With Specialties Details Why Contact Info    Luis Flores MD Internal Medicine   Annette Mcmahon 1997 [id number] [phone]          3. Return to ED if worse     Pt voiced they understand they plan and do not have questions at this time      Diagnosis     Clinical Impression:   1. Bleeding due to dialysis catheter placement, initial encounter [facility])        Attestations:    Annette Simons MD    Please note that this dictation was completed with Videoflot, the computer voice recognition software. Quite often unanticipated grammatical, syntax, homophones, and other interpretive errors are inadvertently transcribed by the computer software. Please disregard these errors. Please excuse any errors that have escaped final proofreading. Thank you.

## 2021-01-25 NOTE — ED TRIAGE NOTES
Discharged from [facility] yesterday, went to [facility] for rehab, pt has new permacath in chest wall which is oozing blood. Sent for bleeding permacath. Pt reports permacath is new. Labs entered per dictation on 8/23/2018

## 2021-01-25 NOTE — DISCHARGE INSTRUCTIONS
Thank you!  Thank you for allowing me to care for you in the emergency department.  I sincerely hope that you are satisfied with your visit today.  It is my goal to provide you with excellent care.    Below you will find a list of your labs and imaging from your visit today.  Should you have any questions regarding these results please do not hesitate to call the emergency department.    Labs -     Recent Results (from the past 12 hour(s))   CBC WITH AUTOMATED DIFF    Collection Time: 01/25/21  6:14 AM   Result Value Ref Range    WBC 15.8 (H) 4.1 - 11.1 K/uL    RBC 3.51 (L) 4.10 - 5.70 M/uL    HGB 9.5 (L) 12.1 - 17.0 g/dL    HCT 29.4 (L) 36.6 - 50.3 %    MCV 83.8 80.0 - 99.0 FL    MCH 27.1 26.0 - 34.0 PG    MCHC 32.3 30.0 - 36.5 g/dL    RDW 15.7 (H) 11.5 - 14.5 %    PLATELET 202 150 - 400 K/uL    MPV 12.1 8.9 - 12.9 FL    NEUTROPHILS 85 (H) 32 - 75 %    LYMPHOCYTES 11 (L) 12 - 49 %    MONOCYTES 4 (L) 5 - 13 %    EOSINOPHILS 0 0 - 7 %    BASOPHILS 0 0 - 1 %    IMMATURE GRANULOCYTES 0 0.0 - 0.5 %    ABS. NEUTROPHILS 13.5 (H) 1.8 - 8.0 K/UL    ABS. LYMPHOCYTES 1.7 0.8 - 3.5 K/UL    ABS. MONOCYTES 0.6 0.0 - 1.0 K/UL    ABS. EOSINOPHILS 0.0 0.0 - 0.4 K/UL    ABS. BASOPHILS 0.0 0.0 - 0.1 K/UL    ABS. IMM. GRANS. 0.1 (H) 0.00 - 0.04 K/UL    DF AUTOMATED     METABOLIC PANEL, COMPREHENSIVE    Collection Time: 01/25/21  6:14 AM   Result Value Ref Range    Sodium 137 136 - 145 mmol/L    Potassium 3.3 (L) 3.5 - 5.1 mmol/L    Chloride 101 97 - 108 mmol/L    CO2 27 21 - 32 mmol/L    Anion gap 9 5 - 15 mmol/L    Glucose 184 (H) 65 - 100 mg/dL    BUN 53 (H) 6 - 20 mg/dL    Creatinine 4.25 (H) 0.70 - 1.30 mg/dL    BUN/Creatinine ratio 12 12 - 20      GFR est AA 17 (L) >60 ml/min/1.73m2    GFR est non-AA 14 (L) >60 ml/min/1.73m2    Calcium 7.9 (L) 8.5 - 10.1 mg/dL    Bilirubin, total 0.6 0.2 - 1.0 mg/dL    AST (SGOT) 102 (H) 15 - 37 U/L    ALT (SGPT) 70 12 - 78 U/L    Alk. phosphatase 445 (H) 45 - 117 U/L    Protein, total 7.7 6.4 -  8.2 g/dL    Albumin 2.3 (L) 3.5 - 5.0 g/dL    Globulin 5.4 (H) 2.0 - 4.0 g/dL    A-G Ratio 0.4 (L) 1.1 - 2.2         Radiologic Studies -   XR CHEST SNGL V   Final Result   Impression: Underexpanded lungs with subsegmental atelectasis. Hydrostatic   edema. CT Results  (Last 48 hours)      None          CXR Results  (Last 48 hours)                 01/25/21 1024  XR CHEST SNGL V Final result    Impression:  Impression: Underexpanded lungs with subsegmental atelectasis. Hydrostatic   edema. Narrative:  Chest, frontal view, 1/25/2021       History: Port bleeding. Comparison: Including chest 1/21/2021. Findings: Right internal jugular catheter tip is near the SVC/right atrial   junction. The cardiac silhouette remains enlarged. The lungs are   underexpanded. Linear opacities in both lungs are most likely subsegmental   atelectasis. Pulmonary vascular congestion and hydrostatic edema are not   significantly changed. No pleural effusion or pneumothorax is identified. Degenerative changes are present in the thoracic spine. If you feel that you have not received excellent quality care or timely care, please ask to speak to the nurse manager. Please choose us in the future for your continued health care needs. ------------------------------------------------------------------------------------------------------------  The exam and treatment you received in the Emergency Department were for an urgent problem and are not intended as complete care. It is important that you follow-up with a doctor, nurse practitioner, or physician assistant to:  (1) confirm your diagnosis,  (2) re-evaluation of changes in your illness and treatment, and  (3) for ongoing care. If your symptoms become worse or you do not improve as expected and you are unable to reach your usual health care provider, you should return to the Emergency Department. We are available 24 hours a day.      Please take your discharge instructions with you when you go to your follow-up appointment. If you have any problem arranging a follow-up appointment, contact the Emergency Department immediately. If a prescription has been provided, please have it filled as soon as possible to prevent a delay in treatment. Read the entire medication instruction sheet provided to you by the pharmacy. If you have any questions or reservations about taking the medication due to side effects or interactions with other medications, please call your primary care physician or contact the ER to speak with the charge nurse. Make an appointment with your family doctor or the physician you were referred to for follow-up of this visit as instructed on your discharge paperwork, as this is a mandatory follow-up. Return to the ER if you are unable to be seen or if you are unable to be seen in a timely manner. If you have any problem arranging the follow-up visit, contact the Emergency Department immediately.

## 2021-01-26 ENCOUNTER — HOSPITAL ENCOUNTER (OUTPATIENT)
Dept: LAB | Age: 71
Discharge: HOME | End: 2021-01-26

## 2021-01-26 LAB
ALBUMIN SERPL-MCNC: 2.4 G/DL (ref 3.5–5)
ANION GAP SERPL CALC-SCNC: 9 MMOL/L (ref 5–15)
BUN SERPL-MCNC: 43 MG/DL (ref 6–20)
BUN/CREAT SERPL: 11 (ref 12–20)
CA-I BLD-MCNC: 7.9 MG/DL (ref 8.5–10.1)
CHLORIDE SERPL-SCNC: 103 MMOL/L (ref 97–108)
CO2 SERPL-SCNC: 27 MMOL/L (ref 21–32)
CREAT SERPL-MCNC: 3.91 MG/DL (ref 0.7–1.3)
GLUCOSE SERPL-MCNC: 158 MG/DL (ref 65–100)
PHOSPHATE SERPL-MCNC: 1.4 MG/DL (ref 2.6–4.7)
POTASSIUM SERPL-SCNC: 3.3 MMOL/L (ref 3.5–5.1)
SODIUM SERPL-SCNC: 139 MMOL/L (ref 136–145)

## 2021-01-26 PROCEDURE — 80069 RENAL FUNCTION PANEL: CPT

## 2021-01-29 ENCOUNTER — HOSPITAL ENCOUNTER (OUTPATIENT)
Dept: LAB | Age: 71
Discharge: HOME | End: 2021-01-29

## 2021-01-29 LAB
ALBUMIN SERPL-MCNC: 2.3 G/DL (ref 3.5–5)
ANION GAP SERPL CALC-SCNC: 8 MMOL/L (ref 5–15)
BUN SERPL-MCNC: 47 MG/DL (ref 6–20)
BUN/CREAT SERPL: 11 (ref 12–20)
CA-I BLD-MCNC: 7.5 MG/DL (ref 8.5–10.1)
CHLORIDE SERPL-SCNC: 104 MMOL/L (ref 97–108)
CO2 SERPL-SCNC: 26 MMOL/L (ref 21–32)
CREAT SERPL-MCNC: 4.15 MG/DL (ref 0.7–1.3)
ERYTHROCYTE [DISTWIDTH] IN BLOOD BY AUTOMATED COUNT: 16.8 % (ref 11.5–14.5)
GLUCOSE SERPL-MCNC: 139 MG/DL (ref 65–100)
HCT VFR BLD AUTO: 26.3 % (ref 36.6–50.3)
HGB BLD-MCNC: 8.3 G/DL (ref 12.1–17)
MCH RBC QN AUTO: 27.5 PG (ref 26–34)
MCHC RBC AUTO-ENTMCNC: 31.6 G/DL (ref 30–36.5)
MCV RBC AUTO: 87.1 FL (ref 80–99)
PHOSPHATE SERPL-MCNC: 1.3 MG/DL (ref 2.6–4.7)
PLATELET # BLD AUTO: 153 K/UL (ref 150–400)
POTASSIUM SERPL-SCNC: 3.6 MMOL/L (ref 3.5–5.1)
RBC # BLD AUTO: 3.02 M/UL (ref 4.1–5.7)
SODIUM SERPL-SCNC: 138 MMOL/L (ref 136–145)
WBC # BLD AUTO: 13.8 K/UL (ref 4.1–11.1)

## 2021-01-29 PROCEDURE — 80069 RENAL FUNCTION PANEL: CPT

## 2021-01-29 PROCEDURE — 36415 COLL VENOUS BLD VENIPUNCTURE: CPT

## 2021-01-29 PROCEDURE — 85027 COMPLETE CBC AUTOMATED: CPT

## 2021-02-01 ENCOUNTER — HOSPITAL ENCOUNTER (OUTPATIENT)
Dept: LAB | Age: 71
Discharge: HOME | End: 2021-02-01

## 2021-02-01 LAB
ALBUMIN SERPL-MCNC: 2.2 G/DL (ref 3.5–5)
ANION GAP SERPL CALC-SCNC: 7 MMOL/L (ref 5–15)
BASOPHILS # BLD: 0 K/UL (ref 0–0.1)
BASOPHILS NFR BLD: 0 % (ref 0–1)
BUN SERPL-MCNC: 52 MG/DL (ref 6–20)
BUN/CREAT SERPL: 11 (ref 12–20)
CA-I BLD-MCNC: 7.9 MG/DL (ref 8.5–10.1)
CHLORIDE SERPL-SCNC: 102 MMOL/L (ref 97–108)
CO2 SERPL-SCNC: 26 MMOL/L (ref 21–32)
CREAT SERPL-MCNC: 4.69 MG/DL (ref 0.7–1.3)
DIFFERENTIAL METHOD BLD: ABNORMAL
EOSINOPHIL # BLD: 0.1 K/UL (ref 0–0.4)
EOSINOPHIL NFR BLD: 1 % (ref 0–7)
ERYTHROCYTE [DISTWIDTH] IN BLOOD BY AUTOMATED COUNT: 17 % (ref 11.5–14.5)
GLUCOSE SERPL-MCNC: 91 MG/DL (ref 65–100)
HCT VFR BLD AUTO: 22.6 % (ref 36.6–50.3)
HGB BLD-MCNC: 6.9 G/DL (ref 12.1–17)
IMM GRANULOCYTES # BLD AUTO: 0 K/UL (ref 0–0.04)
IMM GRANULOCYTES NFR BLD AUTO: 0 % (ref 0–0.5)
LYMPHOCYTES # BLD: 0.9 K/UL (ref 0.8–3.5)
LYMPHOCYTES NFR BLD: 8 % (ref 12–49)
MCH RBC QN AUTO: 26.1 PG (ref 26–34)
MCHC RBC AUTO-ENTMCNC: 30.5 G/DL (ref 30–36.5)
MCV RBC AUTO: 85.6 FL (ref 80–99)
MONOCYTES # BLD: 1.7 K/UL (ref 0–1)
MONOCYTES NFR BLD: 15 % (ref 5–13)
NEUTS SEG # BLD: 8.4 K/UL (ref 1.8–8)
NEUTS SEG NFR BLD: 76 % (ref 32–75)
PHOSPHATE SERPL-MCNC: 2 MG/DL (ref 2.6–4.7)
PLATELET # BLD AUTO: 144 K/UL (ref 150–400)
PMV BLD AUTO: 12.7 FL (ref 8.9–12.9)
POTASSIUM SERPL-SCNC: 4.4 MMOL/L (ref 3.5–5.1)
RBC # BLD AUTO: 2.64 M/UL (ref 4.1–5.7)
SODIUM SERPL-SCNC: 135 MMOL/L (ref 136–145)
WBC # BLD AUTO: 11 K/UL (ref 4.1–11.1)

## 2021-02-01 PROCEDURE — 85025 COMPLETE CBC W/AUTO DIFF WBC: CPT

## 2021-02-01 PROCEDURE — 87070 CULTURE OTHR SPECIMN AEROBIC: CPT

## 2021-02-01 PROCEDURE — 87186 SC STD MICRODIL/AGAR DIL: CPT

## 2021-02-01 PROCEDURE — 80069 RENAL FUNCTION PANEL: CPT

## 2021-02-01 PROCEDURE — 87077 CULTURE AEROBIC IDENTIFY: CPT

## 2021-02-02 ENCOUNTER — HOSPITAL ENCOUNTER (OUTPATIENT)
Dept: LAB | Age: 71
Discharge: HOME | End: 2021-02-02

## 2021-02-02 PROCEDURE — 87493 C DIFF AMPLIFIED PROBE: CPT

## 2021-02-03 LAB — C DIFF TOX GENS STL QL NAA+PROBE: NEGATIVE

## 2021-02-04 NOTE — PROGRESS NOTES
Physician Progress Note      Arian Kirkpatrick  CSN #:                  [id number]  :                       1950  ADMIT DATE:       2021 12:19 PM  [place] DATE:        2021 6:00 PM  RESPONDING  PROVIDER #:        Tere Penny MD          QUERY TEXT:    Pt admitted with shortness of breath and has CHF documented. If possible, please document in progress notes and discharge summary further specificity regarding the type and acuity of CHF:        The medical record reflects the following:  Risk Factors: 72year old male, history of CHF  Clinical Indicators:  Attending PN - CHF: ac/chr combined. BNP >35,000   ECHO - EF 15-20%.  CXR - Findings consistent with developing congestive heart failure. Treatment: IV Lasix 40mg BID      Please call [phone] with any questions  Options provided:  -- Acute on Chronic Systolic CHF/HFrEF  -- Acute on Chronic Diastolic CHF/HFpEF  -- Acute on Chronic Systolic and Diastolic CHF  -- Other - I will add my own diagnosis  -- Disagree - Not applicable / Not valid  -- Disagree - Clinically unable to determine / Unknown  -- Refer to Clinical Documentation Reviewer    PROVIDER RESPONSE TEXT:    This patient is in acute on chronic systolic CHF/HFrEF. Query created by:  Aurelia Cristina on 2021 8:29 AM      Electronically signed by:  Tere Penny MD 2021 4:25 PM

## 2021-02-06 LAB
ALBUMIN SERPL-MCNC: 2.5 G/DL (ref 3.5–5)
ANION GAP SERPL CALC-SCNC: 10 MMOL/L (ref 5–15)
BACTERIA SPEC CULT: NORMAL
BASOPHILS # BLD: 0 K/UL (ref 0–0.1)
BASOPHILS NFR BLD: 0 % (ref 0–1)
BUN SERPL-MCNC: 54 MG/DL (ref 6–20)
BUN/CREAT SERPL: 11 (ref 12–20)
CA-I BLD-MCNC: 7.6 MG/DL (ref 8.5–10.1)
CHLORIDE SERPL-SCNC: 99 MMOL/L (ref 97–108)
CO2 SERPL-SCNC: 24 MMOL/L (ref 21–32)
CREAT SERPL-MCNC: 5.02 MG/DL (ref 0.7–1.3)
DIFFERENTIAL METHOD BLD: ABNORMAL
EOSINOPHIL # BLD: 0.1 K/UL (ref 0–0.4)
EOSINOPHIL NFR BLD: 1 % (ref 0–7)
ERYTHROCYTE [DISTWIDTH] IN BLOOD BY AUTOMATED COUNT: 17.2 % (ref 11.5–14.5)
GLUCOSE SERPL-MCNC: 140 MG/DL (ref 65–100)
GRAM STN SPEC: NORMAL
HCT VFR BLD AUTO: 26.3 % (ref 36.6–50.3)
HGB BLD-MCNC: 8.2 G/DL (ref 12.1–17)
IMM GRANULOCYTES # BLD AUTO: 0 K/UL (ref 0–0.04)
IMM GRANULOCYTES NFR BLD AUTO: 0 % (ref 0–0.5)
LYMPHOCYTES # BLD: 1.8 K/UL (ref 0.8–3.5)
LYMPHOCYTES NFR BLD: 14 % (ref 12–49)
MCH RBC QN AUTO: 27.1 PG (ref 26–34)
MCHC RBC AUTO-ENTMCNC: 31.2 G/DL (ref 30–36.5)
MCV RBC AUTO: 86.8 FL (ref 80–99)
MONOCYTES # BLD: 0.6 K/UL (ref 0–1)
MONOCYTES NFR BLD: 5 % (ref 5–13)
NEUTS SEG # BLD: 10.3 K/UL (ref 1.8–8)
NEUTS SEG NFR BLD: 80 % (ref 32–75)
NRBC # BLD: 0 K/UL (ref 0–0.01)
NRBC BLD-RTO: 0 PER 100 WBC
PHOSPHATE SERPL-MCNC: 2.1 MG/DL (ref 2.6–4.7)
PLATELET # BLD AUTO: 157 K/UL (ref 150–400)
PMV BLD AUTO: 13.2 FL (ref 8.9–12.9)
POTASSIUM SERPL-SCNC: 4.8 MMOL/L (ref 3.5–5.1)
RBC # BLD AUTO: 3.03 M/UL (ref 4.1–5.7)
SODIUM SERPL-SCNC: 133 MMOL/L (ref 136–145)
SPECIAL REQUESTS,SREQ: NORMAL
WBC # BLD AUTO: 12.8 K/UL (ref 4.1–11.1)

## 2021-07-06 ENCOUNTER — APPOINTMENT (OUTPATIENT)
Dept: CT IMAGING | Age: 71
DRG: 314 | End: 2021-07-06
Attending: EMERGENCY MEDICINE
Payer: MEDICARE

## 2021-07-06 ENCOUNTER — HOSPITAL ENCOUNTER (EMERGENCY)
Age: 71
Discharge: HOME | DRG: 314 | End: 2021-07-07
Attending: EMERGENCY MEDICINE
Payer: MEDICARE

## 2021-07-06 VITALS
RESPIRATION RATE: 16 BRPM | OXYGEN SATURATION: 98 % | SYSTOLIC BLOOD PRESSURE: 114 MMHG | HEART RATE: 97 BPM | DIASTOLIC BLOOD PRESSURE: 61 MMHG | HEIGHT: 70 IN | TEMPERATURE: 99.6 F | WEIGHT: 170 LBS | BODY MASS INDEX: 24.34 KG/M2

## 2021-07-06 DIAGNOSIS — N18.6 ESRD (END STAGE RENAL DISEASE) (HCC): ICD-10-CM
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
## 2021-07-06 LAB
ALBUMIN SERPL-MCNC: 2.6 G/DL (ref 3.5–5)
ALBUMIN/GLOB SERPL: 0.4 {RATIO} (ref 1.1–2.2)
ALP SERPL-CCNC: 79 U/L (ref 45–117)
ALT SERPL-CCNC: 12 U/L (ref 12–78)
ANION GAP SERPL CALC-SCNC: 9 MMOL/L (ref 5–15)
AST SERPL W P-5'-P-CCNC: 20 U/L (ref 15–37)
BASOPHILS # BLD: 0 K/UL (ref 0–0.1)
BASOPHILS NFR BLD: 0 % (ref 0–1)
BILIRUB SERPL-MCNC: 0.7 MG/DL (ref 0.2–1)
BUN SERPL-MCNC: 39 MG/DL (ref 6–20)
BUN/CREAT SERPL: 5 (ref 12–20)
CA-I BLD-MCNC: 8.2 MG/DL (ref 8.5–10.1)
CHLORIDE SERPL-SCNC: 103 MMOL/L (ref 97–108)
CO2 SERPL-SCNC: 22 MMOL/L (ref 21–32)
CREAT SERPL-MCNC: 7.34 MG/DL (ref 0.7–1.3)
DIFFERENTIAL METHOD BLD: ABNORMAL
EOSINOPHIL # BLD: 0.1 K/UL (ref 0–0.4)
EOSINOPHIL NFR BLD: 0 % (ref 0–7)
ERYTHROCYTE [DISTWIDTH] IN BLOOD BY AUTOMATED COUNT: 20.2 % (ref 11.5–14.5)
GLOBULIN SER CALC-MCNC: 6.8 G/DL (ref 2–4)
GLUCOSE SERPL-MCNC: 81 MG/DL (ref 65–100)
HCT VFR BLD AUTO: 38 % (ref 36.6–50.3)
HGB BLD-MCNC: 11.7 G/DL (ref 12.1–17)
IMM GRANULOCYTES # BLD AUTO: 0.1 K/UL (ref 0–0.04)
IMM GRANULOCYTES NFR BLD AUTO: 1 % (ref 0–0.5)
LYMPHOCYTES # BLD: 1.4 K/UL (ref 0.8–3.5)
LYMPHOCYTES NFR BLD: 10 % (ref 12–49)
MAGNESIUM SERPL-MCNC: 2 MG/DL (ref 1.6–2.4)
MCH RBC QN AUTO: 25.7 PG (ref 26–34)
MCHC RBC AUTO-ENTMCNC: 30.8 G/DL (ref 30–36.5)
MCV RBC AUTO: 83.3 FL (ref 80–99)
MONOCYTES # BLD: 1.8 K/UL (ref 0–1)
MONOCYTES NFR BLD: 12 % (ref 5–13)
NEUTS SEG # BLD: 11.4 K/UL (ref 1.8–8)
NEUTS SEG NFR BLD: 77 % (ref 32–75)
NRBC # BLD: 0 K/UL (ref 0–0.01)
NRBC BLD-RTO: 0 PER 100 WBC
PLATELET # BLD AUTO: 242 K/UL (ref 150–400)
PMV BLD AUTO: 10.7 FL (ref 8.9–12.9)
POTASSIUM SERPL-SCNC: 4.3 MMOL/L (ref 3.5–5.1)
PROT SERPL-MCNC: 9.4 G/DL (ref 6.4–8.2)
RBC # BLD AUTO: 4.56 M/UL (ref 4.1–5.7)
SODIUM SERPL-SCNC: 134 MMOL/L (ref 136–145)
TROPONIN I SERPL-MCNC: 0.15 NG/ML
WBC # BLD AUTO: 14.8 K/UL (ref 4.1–11.1)

## 2021-07-06 PROCEDURE — 70450 CT HEAD/BRAIN W/O DYE: CPT

## 2021-07-06 PROCEDURE — 83735 ASSAY OF MAGNESIUM: CPT

## 2021-07-06 PROCEDURE — 84484 ASSAY OF TROPONIN QUANT: CPT

## 2021-07-06 PROCEDURE — 93005 ELECTROCARDIOGRAM TRACING: CPT

## 2021-07-06 PROCEDURE — 80053 COMPREHEN METABOLIC PANEL: CPT

## 2021-07-06 PROCEDURE — 85025 COMPLETE CBC W/AUTO DIFF WBC: CPT

## 2021-07-06 PROCEDURE — 99285 EMERGENCY DEPT VISIT HI MDM: CPT

## 2021-07-06 PROCEDURE — 36415 COLL VENOUS BLD VENIPUNCTURE: CPT

## 2021-07-06 NOTE — ED PROVIDER NOTES
EMERGENCY DEPARTMENT HISTORY AND PHYSICAL EXAM      Date: 7/6/2021  Patient Name: Sandee Camargo    History of Presenting Illness     Chief Complaint   Patient presents with    Fatigue       History Provided By: Patient    HPI: Solo Pablo, 72 y.o. male with PMHx as noted below presents the emergency department for evaluation after a fall. Patient states that earlier today he tripped falling 2 times. Patient denies any injury and states that he is feeling at his baseline but states that his daughter wanted him to come to be evaluated since he did fall twice. He is denying any specific headache, syncope, fatigue, unilateral weakness, numbness, speech difficulty, disequilibrium or imbalance. Patient is currently being treated for shingles. He is also being followed by ophthalmology for ocular involvement. PCP: Mark Bliss MD    Current Outpatient Medications   Medication Sig Dispense Refill    hydrALAZINE (APRESOLINE) 100 mg tablet Take 0.5 Tabs by mouth three (3) times daily. For systolic under 254 90 Tab 0    albuterol (PROVENTIL HFA, VENTOLIN HFA, PROAIR HFA) 90 mcg/actuation inhaler Take 2 Puffs by inhalation every four (4) hours as needed for Wheezing or Shortness of Breath. 1 Inhaler 0    insulin lispro (HUMALOG) 100 unit/mL injection Sliding scale insulin coverage as per scale above, before meals and at bedtime. Follow to titrate as dexamethasone has been tapered off. 1 Vial 0    metoprolol succinate (TOPROL-XL) 50 mg XL tablet Take 50 mg by mouth two (2) times a day.  calcitRIOL (ROCALTROL) 0.25 mcg capsule Take 0.25 mcg by mouth daily.  atorvastatin (LIPITOR) 40 mg tablet Take 40 mg by mouth daily.          Past History     Past Medical History:  Past Medical History:   Diagnosis Date    Asthenia 1/24/2021    Cardiomyopathy [facility]) 1/24/2021    CHF (congestive heart failure) (HCC)     Chronic kidney disease     CKD (chronic kidney disease)     4    Diabetes (HCC)     End stage renal disease on dialysis [facility]) 1/24/2021    Essential hypertension 1/24/2021    Gastroesophageal reflux disease 1/24/2021    Gastroesophageal reflux disease 1/24/2021    Hypertension     Pneumonia due to COVID-19 virus 7/23/1209    Systolic CHF, acute on chronic ([facility] [address] 1/24/2021       Past Surgical History:  Past Surgical History:   Procedure Laterality Date    COLONOSCOPY N/A 12/3/2020    COLONOSCOPY performed by Filemon Hodge MD at [address] HX HERNIA REPAIR      IR FLUORO GUIDE PLC CVAD  1/22/2021    IR INSERT NON TUNL CVC OVER 5 YRS  1/18/2021    IR INSERT TUNL CVC W/O PORT OVER 5 YR  1/22/2021    IR PLACE CVAD FLUORO GUIDE  1/18/2021       Family History:  Family History   Problem Relation Age of Onset    Diabetes Mother     Heart Failure Father        Social History:  Social History     Tobacco Use    Smoking status: Never Smoker    Smokeless tobacco: Never Used   Substance Use Topics    Alcohol use: Not Currently    Drug use: Never       Allergies: Allergies   Allergen Reactions    Lisinopril Angioedema    Pcn [Penicillins] Rash         Review of Systems   Review of Systems  Constitutional: Negative for fever, chills, and fatigue. HENT: Negative for congestion, sore throat, rhinorrhea, sneezing and neck stiffness   Eyes: Negative for discharge and redness. Respiratory: Negative for  shortness of breath, wheezing   Cardiovascular: Negative for chest pain, palpitations   Gastrointestinal: Negative for nausea, vomiting, abdominal pain, constipation, diarrhea and blood in stool. Genitourinary: Negative for dysuria, hematuria, flank pain, decreased urine volume, discharge,   Musculoskeletal: Negative for myalgias or joint pain . Skin: Negative for rash or lesions . Neurological: Negative weakness, light-headedness, numbness and headaches.        Physical Exam   Physical Exam    GENERAL: alert and oriented, no acute distress  EYES: PEERL, No injection, discharge or icterus. ENT: Mucous membranes pink and moist.  NECK: Supple  LUNGS: Airway patent. Non-labored respirations. Breath sounds clear with good air entry bilaterally. HEART: Regular rate and rhythm. No peripheral edema  ABDOMEN: Non-distended and non-tender, without guarding or rebound. SKIN:  warm, dry  MSK/EXTREMITIES: Without swelling, tenderness or deformity, symmetric with normal ROM  NEUROLOGICAL: alert and oriented x 3, CN II-XII grossly intact although difficult to assess secondary to left-sided facial swelling from the zoster, strength 5/5 bilateral upper and lower extremities, sensation intact throughout to light touch, no dysmetria or ataxia noted      1a. Level of Consciousness: 0  0 = Alert; keenly responsive. 1 = Not alert; but arousable by minor stimulation to obey, answer, or respond. 2 = Not alert; requires repeated stimulation to attend, or is obtunded and requires strong or painful stimulation to make movements (not stereotyped.)  3 = Responds only with reflex motor or autonomic effects or totally unresponsive, flaccid and areflexic. 1b. LOC Questions: 0   0 = Answers both questions correctly. 1 = Answers one question correctly. 2 = Answers neither question correctly. 1c. LOC Commands: 0       0 = Performs both tasks correctly. 1 = Performs one task correctly. 2 = Performs neither task correctly. 2. Best Gaze: 0  0 = Normal. Able to move both eyes left to right across midline. 1 = Partial gaze palsy. Gaze is abnormal in one or both eyes, but forced deviation, or total gaze paresis is not present. Able to move one or both eyes, but may not be able to cross midline. 2 = Forced deviation. Total gaze paresis is not overcome by the oculocephalic maneuver. 3.  Best Visual. : 0   0 = No visual loss. 1 = Partial hemianopia. Includes loss in only one quadrant. 2 = Complete hemianopia.  Loss of vision in both top and bottom quadrants on the right or left side of a patients visual field. 3 = Bilateral hemianopia. Blindness of any cause, including cortical blindness, or if visual loss is noted on both right and left sides of the visual fields. The next several items of the NIHSS are intended to assess voluntary movement of the stroke patient. 4.  Facial Palsy.: 0  0 = Normal. Symmetrical movements. 1 = Minor paralysis. Flattened nasolabial fold, asymmetry on smiling. 2 = Partial paralysis. Total or near-total paralysis of lower face. 3 = Complete paralysis of one or both sides of face. Absence of movement in the upper and lower face. 5.  Motor Arm. (a. Left Arm, b. Right Arm): 0  0 = No drift. Limb holds 90 (or 45) degrees for a full 10 seconds. 1 = Drift. Limb holds 90 (or 45) degrees, but then drifts down before full 10 seconds; does not hit bed or other support. 2 = Some effort against gravity. Limb cannot get to or maintain 90 (or 45) degrees, drifts down to bed, but has some effort against gravity. 3 = No effort against gravity. Limb falls. 4 = No movements. Flaccid extremities with no effort noted. 6. Motor Leg. ( a. Left Leg, b. Right Leg) : 0  0 = No drift. Leg holds 30 degree position for full five seconds. 1 = Drift. Leg falls by the end of the five second period, but does not hit bed. 2 = Some effort against gravity. Leg falls to bed by five seconds, but has some effort against gravity. 3 = No effort against gravity. Leg falls to bed immediately. 4 = No movement. Flaccid extremities with no effort noted. 7. Limb Ataxia. : 0  Scale Definition  0 = Absent. 1 = Present in one limb. 2 = Present in two limbs. 8. Sensory: 0  0 = Normal. No sensory loss. 1 = Mild to moderate sensory loss. Patient feels pin prick is less sharp or is dull on the affected side or there is a loss of superficial pain with pin prick, but patient is aware of being touched. 2 = Severe to total sensory loss.  Patient is not aware of being touched on the face, arm and leg. 9. Best Language: 0   0 = No aphasia. Normal fluent speech. 1 = Mild to moderate aphasia. Some obvious loss of fluency or facility of comprehension without significant limitation on ideas expressed or form of expression. Reduction of speech and/or comprehension however, makes conversation about provided materials difficult or impossible. For example, in conversation about provided materials, examiner can identify picture or naming card content from patients response. 2 = Severe aphasia. All communication is through fragmentary expression; great need for inference, questioning and guessing by the listener. Often limited to one-word answers. Range of information that can be exchanged is limited; listener carries burden of communication. Examiner cannot identify materials provided from patient response. 3 = Mute. Global aphasia. No usable speech or auditory comprehension. 10. Dysarthria: 0  0 = Normal  1= Mild to moderate dysarthria. Patient slurs at least some words and at worst, can be understood with some difficulty. 2 = Severe dysarthria. Patients speech is so slurred as to be unintelligible in the absence of or out of proportion to any dysphasia or is mute. 11. Extinction and Inattention (formerly neglect): 0  0 = Normal  1 = Visual, tactile, auditory, special or personal inattention or extinction to bilateral simultaneous stimulation in one of the sensory modalities. 2 = Profound pallavi-inattention or extinction to more than one modality. Patient does not recognize own hand or orients to only one side of space.      Total Score: 0             Diagnostic Study Results     Labs -     Recent Results (from the past 12 hour(s))   CBC WITH AUTOMATED DIFF    Collection Time: 07/06/21  3:05 PM   Result Value Ref Range    WBC 14.8 (H) 4.1 - 11.1 K/uL    RBC 4.56 4.10 - 5.70 M/uL    HGB 11.7 (L) 12.1 - 17.0 g/dL    HCT 38.0 36.6 - 50.3 %    MCV 83.3 80.0 - 99.0 FL MCH 25.7 (L) 26.0 - 34.0 PG    MCHC 30.8 30.0 - 36.5 g/dL    RDW 20.2 (H) 11.5 - 14.5 %    PLATELET 415 069 - 303 K/uL    MPV 10.7 8.9 - 12.9 FL    NRBC 0.0 0.0  WBC    ABSOLUTE NRBC 0.00 0.00 - 0.01 K/uL    NEUTROPHILS 77 (H) 32 - 75 %    LYMPHOCYTES 10 (L) 12 - 49 %    MONOCYTES 12 5 - 13 %    EOSINOPHILS 0 0 - 7 %    BASOPHILS 0 0 - 1 %    IMMATURE GRANULOCYTES 1 (H) 0 - 0.5 %    ABS. NEUTROPHILS 11.4 (H) 1.8 - 8.0 K/UL    ABS. LYMPHOCYTES 1.4 0.8 - 3.5 K/UL    ABS. MONOCYTES 1.8 (H) 0.0 - 1.0 K/UL    ABS. EOSINOPHILS 0.1 0.0 - 0.4 K/UL    ABS. BASOPHILS 0.0 0.0 - 0.1 K/UL    ABS. IMM. GRANS. 0.1 (H) 0.00 - 0.04 K/UL    DF AUTOMATED     METABOLIC PANEL, COMPREHENSIVE    Collection Time: 07/06/21  3:05 PM   Result Value Ref Range    Sodium 134 (L) 136 - 145 mmol/L    Potassium 4.3 3.5 - 5.1 mmol/L    Chloride 103 97 - 108 mmol/L    CO2 22 21 - 32 mmol/L    Anion gap 9 5 - 15 mmol/L    Glucose 81 65 - 100 mg/dL    BUN 39 (H) 6 - 20 mg/dL    Creatinine 7.34 (H) 0.70 - 1.30 mg/dL    BUN/Creatinine ratio 5 (L) 12 - 20      GFR est AA 9 (L) >60 ml/min/1.73m2    GFR est non-AA 7 (L) >60 ml/min/1.73m2    Calcium 8.2 (L) 8.5 - 10.1 mg/dL    Bilirubin, total 0.7 0.2 - 1.0 mg/dL    AST (SGOT) 20 15 - 37 U/L    ALT (SGPT) 12 12 - 78 U/L    Alk.  phosphatase 79 45 - 117 U/L    Protein, total 9.4 (H) 6.4 - 8.2 g/dL    Albumin 2.6 (L) 3.5 - 5.0 g/dL    Globulin 6.8 (H) 2.0 - 4.0 g/dL    A-G Ratio 0.4 (L) 1.1 - 2.2     TROPONIN I    Collection Time: 07/06/21  3:05 PM   Result Value Ref Range    Troponin-I, Qt. 0.15 (H) <0.05 ng/mL   MAGNESIUM    Collection Time: 07/06/21  3:05 PM   Result Value Ref Range    Magnesium 2.0 1.6 - 2.4 mg/dL       Radiologic Studies -   CT HEAD WO CONT   Final Result   Negative exam.               Dose reduction: All CT scans at this facility are performed using radiation dose   reduction optimization techniques as appropriate to a performed exam, including   the following: Automated exposure control (8 cc), adjustment of the MAA and/or   KUB according to the patient's size, or the patient's use of iterative   reconstruction techniques (ASIR). CT Results  (Last 48 hours)               07/06/21 1832  CT HEAD WO CONT Final result    Impression:  Negative exam.                   Dose reduction: All CT scans at this facility are performed using radiation dose   reduction optimization techniques as appropriate to a performed exam, including   the following: Automated exposure control (8 cc), adjustment of the MAA and/or   KUB according to the patient's size, or the patient's use of iterative   reconstruction techniques (ASIR). Narrative:  CT SCAN OF THE HEAD WITHOUT IV CONTRAST:       INDICATION: Fall       COMPARISON:None       The exam is negative for hemorrhage, infarct, mass lesion, midline shift or   abnormal extra-axial fluid collection. The ventricles and cisterns are normal.    The orbits, paranasal sinuses and temporal bones are normal. No fractures are   identified. CXR Results  (Last 48 hours)    None            Medical Decision Making     IShmuel MD am the first provider for this patient and am the attending of record for this patient encounter. I reviewed the vital signs, available nursing notes, past medical history, past surgical history, family history and social history. Vital Signs-Reviewed the patient's vital signs. Patient Vitals for the past 12 hrs:   Temp Pulse Resp BP SpO2   07/06/21 1445 99.6 °F (37.6 °C) 97 16 114/61 98 %         Records Reviewed: Nursing Notes and Old Medical Records    Provider Notes (Medical Decision Making): On presentation, the patient is well appearing, in no acute distress with normal vital signs. Patient assures that these were mechanical falls and he tripped.   Based on my history and exam the differential diagnosis for this patient includes generalized weakness, sepsis, metabolic abnormality, dehydration, electrolyte abnormality, stroke, head injury. Labs reviewed, at patient's baseline. Patient is entirely asymptomatic at this time and states he only came because his daughter wanted to be checked out after the falls. He again denies any injuries or acute complaints at this time and states he feels well and at his baseline do not feel that any further work-up would be indicated at this time. He had no focal findings on neurologic assessment. Patient is already being treated for his zoster and is being followed by ophthalmology will discharge per his preference. ED Course:   Initial assessment performed. The patients presenting problems have been discussed, and they are in agreement with the care plan formulated and outlined with them. I have encouraged them to ask questions as they arise throughout their visit. PROGRESS  Alesha Camargo's  results have been reviewed with him. He has been counseled regarding his diagnosis. He verbally conveys understanding and agreement of the signs, symptoms, diagnosis, treatment and prognosis and additionally agrees to follow up as recommended with Dr. Mine Lerner MD in 24 - 48 hours. He also agrees with the care-plan and conveys that all of his questions have been answered. I have also put together some discharge instructions for him that include: 1) educational information regarding their diagnosis, 2) how to care for their diagnosis at home, as well a 3) list of reasons why they would want to return to the ED prior to their follow-up appointment, should their condition change. Disposition:  home    PLAN:  1. Current Discharge Medication List        2. Follow-up Information    None       Return to ED if worse     Diagnosis     Clinical Impression:   1. Fall, initial encounter    2. ESRD (end stage renal disease) ([facility] [address]        Please note that this dictation was completed with Dragon, Adan voice recognition software.   Quite often unanticipated grammatical, syntax, homophones, and other interpretive errors are inadvertently transcribed by the computer software. Please disregard these errors. Additionally, please excuse any errors that have escaped final proofreading.

## 2021-07-06 NOTE — ED TRIAGE NOTES
Pt arrives by EMS from home. Pt has shingles and wife reported Pt has generalized weakness when taking his \"shingles medication. \" Pt decreased walking. Pt A&Ox4. Pt his a HD patient.

## 2021-07-07 ENCOUNTER — APPOINTMENT (OUTPATIENT)
Dept: GENERAL RADIOLOGY | Age: 71
DRG: 314 | End: 2021-07-07
Attending: EMERGENCY MEDICINE
Payer: MEDICARE

## 2021-07-07 ENCOUNTER — APPOINTMENT (OUTPATIENT)
Dept: GENERAL RADIOLOGY | Age: 71
DRG: 314 | End: 2021-07-07
Attending: HOSPITALIST
Payer: MEDICARE

## 2021-07-07 ENCOUNTER — HOSPITAL ENCOUNTER (INPATIENT)
Age: 71
LOS: 7 days | Discharge: NURSING, REHAB OR LONG-TERM CARE | DRG: 314 | End: 2021-07-14
Attending: EMERGENCY MEDICINE | Admitting: INTERNAL MEDICINE
Payer: MEDICARE

## 2021-07-07 DIAGNOSIS — A41.9 SEPSIS WITHOUT ACUTE ORGAN DYSFUNCTION, DUE TO UNSPECIFIED ORGANISM (HCC): ICD-10-CM

## 2021-07-07 DIAGNOSIS — B95.62 MRSA BACTEREMIA: Primary | ICD-10-CM

## 2021-07-07 DIAGNOSIS — R78.81 MRSA BACTEREMIA: Primary | ICD-10-CM

## 2021-07-07 PROBLEM — Z99.2 ESRD (END STAGE RENAL DISEASE) ON DIALYSIS (HCC): Status: ACTIVE | Noted: 2021-07-07

## 2021-07-07 PROBLEM — N18.6 ESRD (END STAGE RENAL DISEASE) ON DIALYSIS (HCC): Status: ACTIVE | Noted: 2021-07-07

## 2021-07-07 PROBLEM — T85.79XA LINE SEPSIS (HCC): Status: ACTIVE | Noted: 2021-07-07

## 2021-07-07 LAB
ALBUMIN SERPL-MCNC: 2.8 G/DL (ref 3.5–5)
ALBUMIN/GLOB SERPL: 0.4 {RATIO} (ref 1.1–2.2)
ALP SERPL-CCNC: 112 U/L (ref 45–117)
ALT SERPL-CCNC: 12 U/L (ref 12–78)
ANION GAP SERPL CALC-SCNC: 10 MMOL/L (ref 5–15)
AST SERPL W P-5'-P-CCNC: 20 U/L (ref 15–37)
ATRIAL RATE: 95 BPM
BASOPHILS # BLD: 0.1 K/UL (ref 0–0.1)
BASOPHILS NFR BLD: 1 % (ref 0–1)
BILIRUB SERPL-MCNC: 0.7 MG/DL (ref 0.2–1)
BUN SERPL-MCNC: 50 MG/DL (ref 6–20)
BUN/CREAT SERPL: 6 (ref 12–20)
CA-I BLD-MCNC: 8.6 MG/DL (ref 8.5–10.1)
CALCULATED P AXIS, ECG09: 92 DEGREES
CALCULATED R AXIS, ECG10: -143 DEGREES
CALCULATED T AXIS, ECG11: 84 DEGREES
CHLORIDE SERPL-SCNC: 105 MMOL/L (ref 97–108)
CO2 SERPL-SCNC: 20 MMOL/L (ref 21–32)
COVID-19 RAPID TEST, COVR: NOT DETECTED
CREAT SERPL-MCNC: 8.89 MG/DL (ref 0.7–1.3)
DIAGNOSIS, 93000: NORMAL
DIFFERENTIAL METHOD BLD: ABNORMAL
EOSINOPHIL # BLD: 0.1 K/UL (ref 0–0.4)
EOSINOPHIL NFR BLD: 1 % (ref 0–7)
ERYTHROCYTE [DISTWIDTH] IN BLOOD BY AUTOMATED COUNT: 19.9 % (ref 11.5–14.5)
GLOBULIN SER CALC-MCNC: 7.6 G/DL (ref 2–4)
GLUCOSE SERPL-MCNC: 78 MG/DL (ref 65–100)
HCT VFR BLD AUTO: 40.4 % (ref 36.6–50.3)
HGB BLD-MCNC: 12.3 G/DL (ref 12.1–17)
IMM GRANULOCYTES # BLD AUTO: 0.1 K/UL (ref 0–0.04)
IMM GRANULOCYTES NFR BLD AUTO: 1 % (ref 0–0.5)
LACTATE SERPL-SCNC: 1.4 MMOL/L (ref 0.4–2)
LYMPHOCYTES # BLD: 1.3 K/UL (ref 0.8–3.5)
LYMPHOCYTES NFR BLD: 12 % (ref 12–49)
MCH RBC QN AUTO: 25.5 PG (ref 26–34)
MCHC RBC AUTO-ENTMCNC: 30.4 G/DL (ref 30–36.5)
MCV RBC AUTO: 83.6 FL (ref 80–99)
MONOCYTES # BLD: 0.9 K/UL (ref 0–1)
MONOCYTES NFR BLD: 9 % (ref 5–13)
NEUTS SEG # BLD: 7.9 K/UL (ref 1.8–8)
NEUTS SEG NFR BLD: 76 % (ref 32–75)
NRBC # BLD: 0 K/UL (ref 0–0.01)
NRBC BLD-RTO: 0 PER 100 WBC
P-R INTERVAL, ECG05: 194 MS
PLATELET # BLD AUTO: 248 K/UL (ref 150–400)
PMV BLD AUTO: 10.2 FL (ref 8.9–12.9)
POTASSIUM SERPL-SCNC: 4.7 MMOL/L (ref 3.5–5.1)
PROT SERPL-MCNC: 10.4 G/DL (ref 6.4–8.2)
Q-T INTERVAL, ECG07: 358 MS
QRS DURATION, ECG06: 88 MS
QTC CALCULATION (BEZET), ECG08: 449 MS
RBC # BLD AUTO: 4.83 M/UL (ref 4.1–5.7)
SARS-COV-2, COV2: NORMAL
SODIUM SERPL-SCNC: 135 MMOL/L (ref 136–145)
SPECIMEN SOURCE: NORMAL
VENTRICULAR RATE, ECG03: 95 BPM
WBC # BLD AUTO: 10.2 K/UL (ref 4.1–11.1)

## 2021-07-07 PROCEDURE — 74011250636 HC RX REV CODE- 250/636: Performed by: INTERNAL MEDICINE

## 2021-07-07 PROCEDURE — 81001 URINALYSIS AUTO W/SCOPE: CPT

## 2021-07-07 PROCEDURE — 87086 URINE CULTURE/COLONY COUNT: CPT

## 2021-07-07 PROCEDURE — 85025 COMPLETE CBC W/AUTO DIFF WBC: CPT

## 2021-07-07 PROCEDURE — 74011250636 HC RX REV CODE- 250/636: Performed by: EMERGENCY MEDICINE

## 2021-07-07 PROCEDURE — 74011250637 HC RX REV CODE- 250/637: Performed by: EMERGENCY MEDICINE

## 2021-07-07 PROCEDURE — 87186 SC STD MICRODIL/AGAR DIL: CPT

## 2021-07-07 PROCEDURE — 71046 X-RAY EXAM CHEST 2 VIEWS: CPT

## 2021-07-07 PROCEDURE — 87040 BLOOD CULTURE FOR BACTERIA: CPT

## 2021-07-07 PROCEDURE — 65270000029 HC RM PRIVATE

## 2021-07-07 PROCEDURE — 99284 EMERGENCY DEPT VISIT MOD MDM: CPT

## 2021-07-07 PROCEDURE — 87077 CULTURE AEROBIC IDENTIFY: CPT

## 2021-07-07 PROCEDURE — 36415 COLL VENOUS BLD VENIPUNCTURE: CPT

## 2021-07-07 PROCEDURE — 93005 ELECTROCARDIOGRAM TRACING: CPT

## 2021-07-07 PROCEDURE — 99223 1ST HOSP IP/OBS HIGH 75: CPT | Performed by: INTERNAL MEDICINE

## 2021-07-07 PROCEDURE — 74011000250 HC RX REV CODE- 250: Performed by: INTERNAL MEDICINE

## 2021-07-07 PROCEDURE — 71045 X-RAY EXAM CHEST 1 VIEW: CPT

## 2021-07-07 PROCEDURE — 80053 COMPREHEN METABOLIC PANEL: CPT

## 2021-07-07 PROCEDURE — 83605 ASSAY OF LACTIC ACID: CPT

## 2021-07-07 PROCEDURE — 74011250637 HC RX REV CODE- 250/637: Performed by: HOSPITALIST

## 2021-07-07 PROCEDURE — 87635 SARS-COV-2 COVID-19 AMP PRB: CPT

## 2021-07-07 RX ORDER — ACYCLOVIR 200 MG/1
200 CAPSULE ORAL 2 TIMES DAILY
Status: COMPLETED | OUTPATIENT
Start: 2021-07-07 | End: 2021-07-11

## 2021-07-07 RX ORDER — SODIUM CHLORIDE 0.9 % (FLUSH) 0.9 %
5-40 SYRINGE (ML) INJECTION EVERY 8 HOURS
Status: DISCONTINUED | OUTPATIENT
Start: 2021-07-07 | End: 2021-07-14 | Stop reason: HOSPADM

## 2021-07-07 RX ORDER — ACETAMINOPHEN 325 MG/1
650 TABLET ORAL
Status: DISCONTINUED | OUTPATIENT
Start: 2021-07-07 | End: 2021-07-14 | Stop reason: HOSPADM

## 2021-07-07 RX ORDER — ACETAMINOPHEN 325 MG/1
650 TABLET ORAL
Status: COMPLETED | OUTPATIENT
Start: 2021-07-07 | End: 2021-07-07

## 2021-07-07 RX ORDER — ACETAMINOPHEN 650 MG/1
650 SUPPOSITORY RECTAL
Status: DISCONTINUED | OUTPATIENT
Start: 2021-07-07 | End: 2021-07-14 | Stop reason: HOSPADM

## 2021-07-07 RX ORDER — FAMOTIDINE 20 MG/1
20 TABLET, FILM COATED ORAL 2 TIMES DAILY
Status: DISCONTINUED | OUTPATIENT
Start: 2021-07-07 | End: 2021-07-14 | Stop reason: HOSPADM

## 2021-07-07 RX ORDER — ONDANSETRON 2 MG/ML
4 INJECTION INTRAMUSCULAR; INTRAVENOUS
Status: DISCONTINUED | OUTPATIENT
Start: 2021-07-07 | End: 2021-07-14 | Stop reason: HOSPADM

## 2021-07-07 RX ORDER — SODIUM CHLORIDE 0.9 % (FLUSH) 0.9 %
5-10 SYRINGE (ML) INJECTION AS NEEDED
Status: DISCONTINUED | OUTPATIENT
Start: 2021-07-07 | End: 2021-07-14 | Stop reason: HOSPADM

## 2021-07-07 RX ORDER — POLYETHYLENE GLYCOL 3350 17 G/17G
17 POWDER, FOR SOLUTION ORAL DAILY PRN
Status: DISCONTINUED | OUTPATIENT
Start: 2021-07-07 | End: 2021-07-14 | Stop reason: HOSPADM

## 2021-07-07 RX ORDER — SODIUM CHLORIDE 0.9 % (FLUSH) 0.9 %
5-40 SYRINGE (ML) INJECTION AS NEEDED
Status: DISCONTINUED | OUTPATIENT
Start: 2021-07-07 | End: 2021-07-14 | Stop reason: HOSPADM

## 2021-07-07 RX ORDER — ACYCLOVIR 800 MG/1
800 TABLET ORAL 2 TIMES DAILY
COMMUNITY

## 2021-07-07 RX ORDER — PROMETHAZINE HYDROCHLORIDE 25 MG/1
12.5 TABLET ORAL
Status: DISCONTINUED | OUTPATIENT
Start: 2021-07-07 | End: 2021-07-14 | Stop reason: HOSPADM

## 2021-07-07 RX ADMIN — SODIUM CHLORIDE 500 ML: 9 INJECTION, SOLUTION INTRAVENOUS at 17:04

## 2021-07-07 RX ADMIN — CEFEPIME HYDROCHLORIDE 1 G: 1 INJECTION, POWDER, FOR SOLUTION INTRAMUSCULAR; INTRAVENOUS at 21:16

## 2021-07-07 RX ADMIN — Medication 10 ML: at 21:17

## 2021-07-07 RX ADMIN — ACETAMINOPHEN 650 MG: 325 TABLET ORAL at 17:02

## 2021-07-07 RX ADMIN — Medication 10 ML: at 22:45

## 2021-07-07 RX ADMIN — VANCOMYCIN HYDROCHLORIDE 1000 MG: 1 INJECTION, POWDER, LYOPHILIZED, FOR SOLUTION INTRAVENOUS at 17:33

## 2021-07-07 RX ADMIN — FAMOTIDINE 20 MG: 20 TABLET ORAL at 21:17

## 2021-07-07 NOTE — ED PROVIDER NOTES
HPI   Chief Complaint   Patient presents with    Fever     70yoM hx CHF, DM, ESRD on HD presents with fever. Pt reports 2-3 days of severe constant diffuse weakness, causing him to fall twice yesterday, was worked up in ED and then discharged. Today having fever and persistent 10/10 diffuse weakness. Pt reports several weeks of left facial shingles, no visual deficit or left eye pain, he says he is on antiviral by his PCP. He reports chronic watery diarrhea but no abdominal pain. Poor appetite. Yesterday felt so bad he did not get dialysis like he was supposed to.      Past Medical History:   Diagnosis Date    Asthenia 1/24/2021    Cardiomyopathy ([address] 1/24/2021    CHF (congestive heart failure) (HCC)     Chronic kidney disease     CKD (chronic kidney disease)     4    Diabetes ([address]     End stage renal disease on dialysis ([facility] [address] 1/24/2021    Essential hypertension 1/24/2021    Gastroesophageal reflux disease 1/24/2021    Gastroesophageal reflux disease 1/24/2021    Hypertension     Pneumonia due to COVID-19 virus 2/77/5655    Systolic CHF, acute on chronic ([address] 1/24/2021       Past Surgical History:   Procedure Laterality Date    COLONOSCOPY N/A 12/3/2020    COLONOSCOPY performed by Kylie Hennessy MD at [address] HX HEART CATHETERIZATION      HX HERNIA REPAIR      IR FLUORO GUIDE PLC CVAD  1/22/2021    IR INSERT NON TUNL CVC OVER 5 YRS  1/18/2021    IR INSERT TUNL CVC W/O PORT OVER 5 YR  1/22/2021    IR PLACE CVAD FLUORO GUIDE  1/18/2021         Family History:   Problem Relation Age of Onset    Diabetes Mother     Heart Failure Father        Social History     Socioeconomic History    Marital status:      Spouse name: Not on file    Number of children: Not on file    Years of education: Not on file    Highest education level: Not on file   Occupational History    Not on file   Tobacco Use    Smoking status: Never Smoker    Smokeless tobacco: Never Used   Substance and Sexual Activity    Alcohol use: Not Currently    Drug use: Never    Sexual activity: Not on file   Other Topics Concern    Not on file   Social History Narrative    Not on file     Social Determinants of Health     Financial Resource Strain:     Difficulty of Paying Living Expenses:    Food Insecurity:     Worried About Running Out of Food in the Last Year:     [address] in the Last Year:    Transportation Needs:     Lack of Transportation (Medical):  Lack of Transportation (Non-Medical):    Physical Activity:     Days of Exercise per Week:     Minutes of Exercise per Session:    Stress:     Feeling of Stress :    Social Connections:     Frequency of Communication with Friends and Family:     Frequency of Social Gatherings with Friends and Family:     Attends Judaism Services:     Active Member of Clubs or Organizations:     Attends Club or Organization Meetings:     Marital Status:    Intimate Partner Violence:     Fear of Current or Ex-Partner:     Emotionally Abused:     Physically Abused:     Sexually Abused: ALLERGIES: Lisinopril and Pcn [penicillins]    Review of Systems   Constitutional: Positive for appetite change, fatigue and fever. Gastrointestinal: Positive for diarrhea. Skin: Positive for rash. Neurological: Positive for weakness. All other systems reviewed and are negative. Vitals:    07/07/21 1510 07/07/21 1652   BP: 120/80 134/81   Pulse: (!) 101 97   Resp: 17 16   Temp: (!) 103 °F (39.4 °C) (!) 102.4 °F (39.1 °C)   SpO2: 99% 98%   Height: 5' 10\" (1.778 m)             Physical Exam   Patient Vitals for the past 8 hrs:   Temp Pulse Resp BP SpO2   07/07/21 1652 (!) 102.4 °F (39.1 °C) 97 16 134/81 98 %   07/07/21 1510 (!) 103 °F (39.4 °C) (!) 101 17 120/80 99 %        Nursing note and vitals reviewed. Constitutional: Ill appearing. Head: Normocephalic and atraumatic. Mouth/Throat: Airway patent. Dry mucous membranes. Eyes: EOMI.  Left conjunctival injection, yellow crusting and discharge. Visual acuity grossly intact b/l. No scleral icterus. Neck: Neck supple. Cardiovascular: Tachy rate, regular rhythm. Normal heart sounds. No murmur, rub, or gallop. Good pulses throughout. Pulmonary/Chest: No respiratory distress. Clear to auscultation bilaterally. No wheezes, rales, or rhonchi. Right chest wall dialysis line has very dirty dressing, foul smelling. Abdominal/GI: BS normal, Soft, non-tender, non-distended. No rebound or guarding. Musculoskeletal: No gross injuries or deformities. No leg swelling. Neurological: Alert and oriented to person, place, and time. Cranial Nerves 2-12 intact. Moving all extremities. No gross deficits. Psych: Pleasant, cooperative. Skin: Skin is warm and dry. Left upper face and scalp shingles rash, all lesions are crusted over, no erythema/drainage/induration. MDM   Ddx = Olmstead's line infection, bacteremia, pneumonia, COVID-19, lower suspicion for C. difficile as patient has no abdominal tenderness. I reviewed labs from yesterday, he had leukocytosis yesterday. Today's does not have leukocytosis, does have metabolic acidosis with bicarb of 20. Empirically I gave the patient vancomycin and cefepime for sepsis. I only gave him 0.5 L of IV fluid bolus due to his ESRD status. On reassessment patient is not tachycardic or hypotensive. His lactate is normal.  I consulted Dr. Deniz Alexandra hospitalist who is admitting the patient for sepsis work-up. Labs Reviewed   CBC WITH AUTOMATED DIFF - Abnormal; Notable for the following components:       Result Value    MCH 25.5 (*)     RDW 19.9 (*)     NEUTROPHILS 76 (*)     IMMATURE GRANULOCYTES 1 (*)     ABS. IMM.  GRANS. 0.1 (*)     All other components within normal limits   METABOLIC PANEL, COMPREHENSIVE - Abnormal; Notable for the following components:    Sodium 135 (*)     CO2 20 (*)     BUN 50 (*)     Creatinine 8.89 (*)     BUN/Creatinine ratio 6 (*)     GFR est AA 7 (*)     GFR est non-AA 6 (*)     Protein, total 10.4 (*)     Albumin 2.8 (*)     Globulin 7.6 (*)     A-G Ratio 0.4 (*)     All other components within normal limits   CULTURE, BLOOD, PAIRED   COVID-19 RAPID TEST   LACTIC ACID   URINALYSIS W/ REFLEX CULTURE     XR CHEST PA LAT   Final Result   Lung base opacity likely atelectasis. No focal airspace   consolidation. Evidence of pulmonary vascular congestion without overt edema. Medications   sodium chloride 0.9 % bolus infusion 500 mL (500 mL IntraVENous New Bag 7/7/21 1704)   vancomycin (VANCOCIN) 1,000 mg in 0.9% sodium chloride 250 mL (VIAL-MATE) (has no administration in time range)   cefepime (MAXIPIME) 1 g in sterile water (preservative free) 10 mL IV syringe (has no administration in time range)   acetaminophen (TYLENOL) tablet 650 mg (650 mg Oral Given 7/7/21 1702)     INirali MD, am  the first and primary ED provider for this patient.           Procedures

## 2021-07-07 NOTE — ED TRIAGE NOTES
EMS reports summoned for weakness and fever today. Seen here yesterday for Shingles in face which he had been already treated for.  Pt has multiple scabbed areas to face no drainage

## 2021-07-07 NOTE — H&P
History and Physical    Patient: Fidel Stewart MRN: [id number]  SSN: [id number]    YOB: 1950  Age: 72 y.o. Sex: male      Subjective:      Chief Complaint: generalized  weakness    HPI: Fidel Stewart is a 72 y.o. male who has a history of end-stage renal disease on hemodialysis, congestive heart failure, cardiomyopathy, diabetes came to ER with a complaint of generalized weakness and recurrent falls at home. Patient states that he has been very weak he came to ER yesterday also with a similar kind of complaints. Today on evaluation in ER patient was found to have fever of 103 °F.  Patient white cell count was elevated yesterday to 14 however today they are 10. Patient is noticed to have poor care of his dialysis catheter. Hospitalist service has been requested to admit the patient for further management.     Past Medical History:   Diagnosis Date    Asthenia 1/24/2021    Cardiomyopathy ([address] 1/24/2021    CHF (congestive heart failure) (HCC)     Chronic kidney disease     CKD (chronic kidney disease)     4    Diabetes ([address]     End stage renal disease on dialysis ([address] 1/24/2021    Essential hypertension 1/24/2021    Gastroesophageal reflux disease 1/24/2021    Gastroesophageal reflux disease 1/24/2021    Hypertension     Pneumonia due to COVID-19 virus 3/57/5755    Systolic CHF, acute on chronic ([address] 1/24/2021     Past Surgical History:   Procedure Laterality Date    COLONOSCOPY N/A 12/3/2020    COLONOSCOPY performed by Héctor Casanova MD at [address] [place] [address] HX HEART CATHETERIZATION      HX HERNIA REPAIR      IR FLUORO GUIDE PLC CVAD  1/22/2021    IR INSERT NON TUNL CVC OVER 5 YRS  1/18/2021    IR INSERT TUNL CVC W/O PORT OVER 5 YR  1/22/2021    IR PLACE CVAD FLUORO GUIDE  1/18/2021      Family History   Problem Relation Age of Onset    Diabetes Mother     Heart Failure Father      Social History     Tobacco Use    Smoking status: Never Smoker    Smokeless tobacco: Never Used   Substance Use Topics    Alcohol use: Not Currently      Prior to Admission medications    Medication Sig Start Date End Date Taking? Authorizing Provider   acyclovir (ZOVIRAX) 800 mg tablet Take 800 mg by mouth two (2) times a day. Yes Other, MD Farhan   hydrALAZINE (APRESOLINE) 100 mg tablet Take 0.5 Tabs by mouth three (3) times daily. For systolic under 282 4/99/83  Yes Zay Hui MD   albuterol (PROVENTIL HFA, VENTOLIN HFA, PROAIR HFA) 90 mcg/actuation inhaler Take 2 Puffs by inhalation every four (4) hours as needed for Wheezing or Shortness of Breath. 1/24/21  Yes Zay Hui MD   metoprolol succinate (TOPROL-XL) 50 mg XL tablet Take 50 mg by mouth two (2) times a day. Yes Provider, Historical   calcitRIOL (ROCALTROL) 0.25 mcg capsule Take 0.25 mcg by mouth daily. Yes Provider, Historical   atorvastatin (LIPITOR) 40 mg tablet Take 40 mg by mouth daily.    Yes Provider, Historical        Allergies   Allergen Reactions    Lisinopril Angioedema    Pcn [Penicillins] Rash       Review of Systems:  Constitutional: +++fevers, No chills, ++ fatigue, ++ weakness  Eyes: No visual disturbance  Ears, Nose, Mouth, Throat, and Face: No nasal congestion, No sore throat  Respiratory: No cough, No sputum, No wheezing, No SOB  Cardiovascular: No chest pain, No lower extremity edema, No Palpitations   Gastrointestinal: No nausea, No vomiting, No diarrhea, No constipation, No abdominal pain  Genitourinary: No frequency, No dysuria, No hematuria  Integument/Breast: No rash, No skin lesion(s), No dryness  Musculoskeletal: No arthralgias, No neck pain, No back pain  Neurological: No headaches, No dizziness, No confusion,  No seizures  Behavioral/Psychiatric: No anxiety, No depression      Objective:     Vitals:    07/07/21 1510 07/07/21 1652 07/07/21 1747   BP: 120/80 134/81    Pulse: (!) 101 97    Resp: 17 16    Temp: (!) 103 °F (39.4 °C) (!) 102.4 °F (39.1 °C)    SpO2: 99% 98% 98%   Height: 5' 10\" (1.778 m)          Physical Exam:  General: Alert and Oriented x 3. Cooperative and friendly. No acute distress. HEAD: Normocephalic, atraumatic. Eye: PERRLA, EOMI. Throat and Neck: normal and no erythema or exudates noted. No mass   Lung: Clear to auscultation bilaterally without wheezes, rhonchi. Good air movement bilaterally. Heart: Regular rate and rhythm. Normal S1/S2. NO appreciated murmurs, rubs or gallops. Abdomen: soft, non-tender. Bowel sounds present in all four quadrants. No masses appreciated. Extremities:  able to move all extremities normal, atraumatic  Skin: Clean, dry and intact without appreciated lesions. Neurologic: AOx3. Cranial nerves 2-12 and sensation grossly intact. Psychiatric: non focal, normal affect, normal thought process    Recent Labs     07/07/21  1635 07/06/21  1505   WBC 10.2 14.8*   HGB 12.3 11.7*   HCT 40.4 38.0    242     Recent Labs     07/07/21  1635 07/06/21  1505   * 134*   K 4.7 4.3    103   CO2 20* 22   GLU 78 81   BUN 50* 39*   CREA 8.89* 7.34*   CA 8.6 8.2*   MG  --  2.0   ALB 2.8* 2.6*   TBILI 0.7 0.7   ALT 12 12     No results for input(s): PH, PCO2, PO2, HCO3, FIO2 in the last 72 hours. XR CHEST PA LAT   Final Result   Lung base opacity likely atelectasis. No focal airspace   consolidation. Evidence of pulmonary vascular congestion without overt edema.           XR CHEST PORT    (Results Pending)         Assessment:     Active Problems:    Essential hypertension (1/24/2021)      Sepsis ([address] (7/7/2021)      ESRD (end stage renal disease) on dialysis [facility]) (7/7/2021)      Line sepsis [facility]) (7/7/2021)         Plan:     60-year-old male came to the hospital with a complaint of generalized weakness and is found to have fever  Start the patient on cefepime and vancomycin  Follow-up on blood cultures  Admit the patient to remote telemetry  Infectious disease and nephrology evaluation of the patient will be requested  Continue patient home antihypertensive medications  Further plan of management will depend upon patient's clinical course in the hospital  Home medication reviewed and reconciled  Patient's sister is her surrogate decision-maker  He is a full code    Signed By: Mita Ricci MD     July 7, 2021

## 2021-07-07 NOTE — ED NOTES
Called lab to confirm receipt of specimens. Lab confirmed receipt of all blood specimens except for 1 set of ordered blood cultures.

## 2021-07-07 NOTE — ED NOTES
Called Sister Nina Ramos [phone] left a message to report [phone] [facility] arrived @[phone] for transportation to home.

## 2021-07-07 NOTE — ED NOTES
[facility] here for transport , the issue with pt being transported via stretcher versus wheelchair is the steps going into and from the house

## 2021-07-07 NOTE — ED NOTES
Pt ambulated with a walker, steady gait with assistive device, no c/o dizziness, family member - sister whom cares for pt asked for assistance with problem solving with transportaion from home to appointments, amber pt has diff with ambulation  with stairs, pt has to go up and down steps to get out and into his home

## 2021-07-08 ENCOUNTER — APPOINTMENT (OUTPATIENT)
Dept: CT IMAGING | Age: 71
DRG: 314 | End: 2021-07-08
Attending: INTERNAL MEDICINE
Payer: MEDICARE

## 2021-07-08 PROBLEM — B02.29 HERPES ZOSTER WITH NERVOUS SYSTEM COMPLICATION: Status: ACTIVE | Noted: 2021-07-08

## 2021-07-08 LAB
ANION GAP SERPL CALC-SCNC: 16 MMOL/L (ref 5–15)
APPEARANCE UR: CLEAR
ATRIAL RATE: 84 BPM
BACTERIA URNS QL MICRO: NEGATIVE /HPF
BASOPHILS # BLD: 0.1 K/UL (ref 0–0.1)
BASOPHILS NFR BLD: 1 % (ref 0–1)
BILIRUB UR QL: NEGATIVE
BUN SERPL-MCNC: 52 MG/DL (ref 6–20)
BUN/CREAT SERPL: 6 (ref 12–20)
CA-I BLD-MCNC: 8.5 MG/DL (ref 8.5–10.1)
CALCULATED P AXIS, ECG09: 36 DEGREES
CALCULATED R AXIS, ECG10: -39 DEGREES
CALCULATED T AXIS, ECG11: 133 DEGREES
CHLORIDE SERPL-SCNC: 103 MMOL/L (ref 97–108)
CO2 SERPL-SCNC: 13 MMOL/L (ref 21–32)
COLOR UR: ABNORMAL
CREAT SERPL-MCNC: 9.04 MG/DL (ref 0.7–1.3)
CRP SERPL-MCNC: 15.2 MG/DL (ref 0–0.6)
DATE LAST DOSE: NORMAL
DIAGNOSIS, 93000: NORMAL
DIFFERENTIAL METHOD BLD: ABNORMAL
EOSINOPHIL # BLD: 0 K/UL (ref 0–0.4)
EOSINOPHIL NFR BLD: 0 % (ref 0–7)
ERYTHROCYTE [DISTWIDTH] IN BLOOD BY AUTOMATED COUNT: 19.5 % (ref 11.5–14.5)
GLUCOSE SERPL-MCNC: 84 MG/DL (ref 65–100)
GLUCOSE UR STRIP.AUTO-MCNC: NEGATIVE MG/DL
HCT VFR BLD AUTO: 38.5 % (ref 36.6–50.3)
HGB BLD-MCNC: 12 G/DL (ref 12.1–17)
HGB UR QL STRIP: NEGATIVE
IMM GRANULOCYTES # BLD AUTO: 0 K/UL (ref 0–0.04)
IMM GRANULOCYTES NFR BLD AUTO: 0 % (ref 0–0.5)
KETONES UR QL STRIP.AUTO: NEGATIVE MG/DL
LACTATE SERPL-SCNC: 2.5 MMOL/L (ref 0.4–2)
LEUKOCYTE ESTERASE UR QL STRIP.AUTO: ABNORMAL
LYMPHOCYTES # BLD: 1.6 K/UL (ref 0.8–3.5)
LYMPHOCYTES NFR BLD: 16 % (ref 12–49)
MCH RBC QN AUTO: 25.8 PG (ref 26–34)
MCHC RBC AUTO-ENTMCNC: 31.2 G/DL (ref 30–36.5)
MCV RBC AUTO: 82.6 FL (ref 80–99)
MONOCYTES # BLD: 1.3 K/UL (ref 0–1)
MONOCYTES NFR BLD: 12 % (ref 5–13)
MUCOUS THREADS URNS QL MICRO: ABNORMAL /LPF
NEUTS SEG # BLD: 7.1 K/UL (ref 1.8–8)
NEUTS SEG NFR BLD: 71 % (ref 32–75)
NITRITE UR QL STRIP.AUTO: NEGATIVE
NRBC # BLD: 0 K/UL (ref 0–0.01)
NRBC BLD-RTO: 0 PER 100 WBC
P-R INTERVAL, ECG05: 160 MS
PH UR STRIP: 7 [PH] (ref 5–8)
PLATELET # BLD AUTO: 240 K/UL (ref 150–400)
PMV BLD AUTO: 10.5 FL (ref 8.9–12.9)
POTASSIUM SERPL-SCNC: 4.8 MMOL/L (ref 3.5–5.1)
PROCALCITONIN SERPL-MCNC: 23.29 NG/ML
PROT UR STRIP-MCNC: >300 MG/DL
Q-T INTERVAL, ECG07: 410 MS
QRS DURATION, ECG06: 102 MS
QTC CALCULATION (BEZET), ECG08: 484 MS
RBC # BLD AUTO: 4.66 M/UL (ref 4.1–5.7)
RBC #/AREA URNS HPF: ABNORMAL /HPF (ref 0–5)
REPORTED DOSE,DOSE: NORMAL UNITS
SODIUM SERPL-SCNC: 132 MMOL/L (ref 136–145)
SP GR UR REFRACTOMETRY: 1.02 (ref 1–1.03)
UA: UC IF INDICATED,UAUC: ABNORMAL
UROBILINOGEN UR QL STRIP.AUTO: 0.1 EU/DL (ref 0.1–1)
VANCOMYCIN SERPL-MCNC: 8.2 UG/ML
VENTRICULAR RATE, ECG03: 84 BPM
WBC # BLD AUTO: 10.1 K/UL (ref 4.1–11.1)
WBC URNS QL MICRO: ABNORMAL /HPF (ref 0–4)

## 2021-07-08 PROCEDURE — 36415 COLL VENOUS BLD VENIPUNCTURE: CPT

## 2021-07-08 PROCEDURE — 74011250636 HC RX REV CODE- 250/636: Performed by: INTERNAL MEDICINE

## 2021-07-08 PROCEDURE — 74176 CT ABD & PELVIS W/O CONTRAST: CPT

## 2021-07-08 PROCEDURE — 87147 CULTURE TYPE IMMUNOLOGIC: CPT

## 2021-07-08 PROCEDURE — 86140 C-REACTIVE PROTEIN: CPT

## 2021-07-08 PROCEDURE — 87040 BLOOD CULTURE FOR BACTERIA: CPT

## 2021-07-08 PROCEDURE — 74011000250 HC RX REV CODE- 250: Performed by: HOSPITALIST

## 2021-07-08 PROCEDURE — 65270000029 HC RM PRIVATE

## 2021-07-08 PROCEDURE — 74011250636 HC RX REV CODE- 250/636: Performed by: HOSPITALIST

## 2021-07-08 PROCEDURE — 74011250637 HC RX REV CODE- 250/637: Performed by: HOSPITALIST

## 2021-07-08 PROCEDURE — 85025 COMPLETE CBC W/AUTO DIFF WBC: CPT

## 2021-07-08 PROCEDURE — 90935 HEMODIALYSIS ONE EVALUATION: CPT

## 2021-07-08 PROCEDURE — 74011250637 HC RX REV CODE- 250/637: Performed by: INTERNAL MEDICINE

## 2021-07-08 PROCEDURE — 84145 PROCALCITONIN (PCT): CPT

## 2021-07-08 PROCEDURE — 5A1D70Z PERFORMANCE OF URINARY FILTRATION, INTERMITTENT, LESS THAN 6 HOURS PER DAY: ICD-10-PCS | Performed by: INTERNAL MEDICINE

## 2021-07-08 PROCEDURE — 74011250637 HC RX REV CODE- 250/637

## 2021-07-08 PROCEDURE — 80202 ASSAY OF VANCOMYCIN: CPT

## 2021-07-08 PROCEDURE — 99232 SBSQ HOSP IP/OBS MODERATE 35: CPT | Performed by: INTERNAL MEDICINE

## 2021-07-08 PROCEDURE — 83605 ASSAY OF LACTIC ACID: CPT

## 2021-07-08 PROCEDURE — 80048 BASIC METABOLIC PNL TOTAL CA: CPT

## 2021-07-08 RX ORDER — TRAMADOL HYDROCHLORIDE 50 MG/1
50 TABLET ORAL
Status: DISCONTINUED | OUTPATIENT
Start: 2021-07-08 | End: 2021-07-14 | Stop reason: HOSPADM

## 2021-07-08 RX ORDER — DIPHENHYDRAMINE HCL 25 MG
1 CAPSULE ORAL
COMMUNITY

## 2021-07-08 RX ORDER — HYDRALAZINE HYDROCHLORIDE 50 MG/1
50 TABLET, FILM COATED ORAL 3 TIMES DAILY
Status: DISCONTINUED | OUTPATIENT
Start: 2021-07-08 | End: 2021-07-09

## 2021-07-08 RX ORDER — TRAMADOL HYDROCHLORIDE 50 MG/1
TABLET ORAL
Status: COMPLETED
Start: 2021-07-08 | End: 2021-07-08

## 2021-07-08 RX ORDER — HEPARIN SODIUM 1000 [USP'U]/ML
3600 INJECTION, SOLUTION INTRAVENOUS; SUBCUTANEOUS
Status: DISCONTINUED | OUTPATIENT
Start: 2021-07-08 | End: 2021-07-14 | Stop reason: HOSPADM

## 2021-07-08 RX ORDER — METOPROLOL SUCCINATE 50 MG/1
50 TABLET, EXTENDED RELEASE ORAL 2 TIMES DAILY
Status: DISCONTINUED | OUTPATIENT
Start: 2021-07-08 | End: 2021-07-14 | Stop reason: HOSPADM

## 2021-07-08 RX ORDER — ATORVASTATIN CALCIUM 40 MG/1
40 TABLET, FILM COATED ORAL DAILY
Status: DISCONTINUED | OUTPATIENT
Start: 2021-07-09 | End: 2021-07-14 | Stop reason: HOSPADM

## 2021-07-08 RX ORDER — CALCITRIOL 0.25 UG/1
0.25 CAPSULE ORAL DAILY
Status: DISCONTINUED | OUTPATIENT
Start: 2021-07-08 | End: 2021-07-09

## 2021-07-08 RX ORDER — HEPARIN SODIUM 1000 [USP'U]/ML
INJECTION, SOLUTION INTRAVENOUS; SUBCUTANEOUS
Status: DISPENSED
Start: 2021-07-08 | End: 2021-07-09

## 2021-07-08 RX ORDER — ACYCLOVIR 800 MG/1
800 TABLET ORAL 2 TIMES DAILY
Status: DISCONTINUED | OUTPATIENT
Start: 2021-07-08 | End: 2021-07-08 | Stop reason: SDUPTHER

## 2021-07-08 RX ADMIN — VANCOMYCIN HYDROCHLORIDE 1250 MG: 1.25 INJECTION, POWDER, LYOPHILIZED, FOR SOLUTION INTRAVENOUS at 18:01

## 2021-07-08 RX ADMIN — ACYCLOVIR 200 MG: 200 CAPSULE ORAL at 20:47

## 2021-07-08 RX ADMIN — ACETAMINOPHEN 650 MG: 325 TABLET ORAL at 00:33

## 2021-07-08 RX ADMIN — Medication 10 ML: at 14:34

## 2021-07-08 RX ADMIN — ACETAMINOPHEN 650 MG: 325 TABLET ORAL at 23:45

## 2021-07-08 RX ADMIN — CEFEPIME HYDROCHLORIDE 1 G: 1 INJECTION, POWDER, FOR SOLUTION INTRAMUSCULAR; INTRAVENOUS at 00:47

## 2021-07-08 RX ADMIN — CEFEPIME HYDROCHLORIDE 1 G: 1 INJECTION, POWDER, FOR SOLUTION INTRAMUSCULAR; INTRAVENOUS at 10:27

## 2021-07-08 RX ADMIN — ACYCLOVIR 200 MG: 200 CAPSULE ORAL at 00:33

## 2021-07-08 RX ADMIN — FAMOTIDINE 20 MG: 20 TABLET ORAL at 20:47

## 2021-07-08 RX ADMIN — FAMOTIDINE 20 MG: 20 TABLET ORAL at 08:53

## 2021-07-08 RX ADMIN — ACETAMINOPHEN 650 MG: 325 TABLET ORAL at 08:58

## 2021-07-08 RX ADMIN — TRAMADOL HYDROCHLORIDE 50 MG: 50 TABLET, FILM COATED ORAL at 17:15

## 2021-07-08 RX ADMIN — METOPROLOL SUCCINATE 50 MG: 50 TABLET, EXTENDED RELEASE ORAL at 20:47

## 2021-07-08 RX ADMIN — ACYCLOVIR 200 MG: 200 CAPSULE ORAL at 10:27

## 2021-07-08 RX ADMIN — HYDRALAZINE HYDROCHLORIDE 50 MG: 50 TABLET, FILM COATED ORAL at 18:01

## 2021-07-08 RX ADMIN — Medication 10 ML: at 05:44

## 2021-07-08 RX ADMIN — Medication 10 ML: at 20:48

## 2021-07-08 RX ADMIN — HEPARIN SODIUM 3600 UNITS: 1000 INJECTION, SOLUTION INTRAVENOUS; SUBCUTANEOUS at 15:43

## 2021-07-08 RX ADMIN — CEFEPIME HYDROCHLORIDE 1 G: 1 INJECTION, POWDER, FOR SOLUTION INTRAMUSCULAR; INTRAVENOUS at 18:01

## 2021-07-08 NOTE — DIALYSIS
gcs 15 pt  tolerated 3.5h of dialysis with 2 L fluid removal without problem. Pt returns to room with transport. Received heparin during dialysis  to prevent dialysis circuit clotting.

## 2021-07-08 NOTE — CONSULTS
Nephrology Consult    Patient: Violet Millan MRN: [id number]  SSN: [id number]    YOB: 1950  Age: 72 y.o. Sex: male      Subjective:   Reason for the consultation. Medical management for end-stage renal disease  History of present illness. The patient is 36-year-old man with underlying history of end-stage renal disease, on hemodialysis, right IJ permacath as dialysis access, maturing left wrist AV fistula ,history of kidney stones and hydronephrosis in the past, cardiomyopathy with low LVEF 15 to 20% was admitted with temp of 103 recurrent falls, chest x-ray no infiltrates, UA 20-50 WBCs and trace LE, put on IV antibiotics vancomycin and cefepime, seen by infectious disease specialist.  Patient is also complaining of loose stools. Urine culture is pending. Hemodynamically stable. He is afebrile this morning.     Past Medical History:   Diagnosis Date    Asthenia 1/24/2021    Cardiomyopathy ([address] 1/24/2021    CHF (congestive heart failure) (HCC)     Chronic kidney disease     CKD (chronic kidney disease)     4    Diabetes ([address]     End stage renal disease on dialysis ([address] 1/24/2021    Essential hypertension 1/24/2021    Gastroesophageal reflux disease 1/24/2021    Gastroesophageal reflux disease 1/24/2021    Hypertension     Pneumonia due to COVID-19 virus 0/94/8012    Systolic CHF, acute on chronic ([address] 1/24/2021     Past Surgical History:   Procedure Laterality Date    COLONOSCOPY N/A 12/3/2020    COLONOSCOPY performed by Jennifer Nicole MD at [facility] HX HEART CATHETERIZATION      HX HERNIA REPAIR      IR FLUORO GUIDE PLC CVAD  1/22/2021    IR INSERT NON TUNL CVC OVER 5 YRS  1/18/2021    IR INSERT TUNL CVC W/O PORT OVER 5 YR  1/22/2021    IR PLACE CVAD FLUORO GUIDE  1/18/2021      Family History   Problem Relation Age of Onset    Diabetes Mother     Heart Failure Father      Social History     Tobacco Use    Smoking status: Never Smoker    Smokeless tobacco: Never Used   Substance Use Topics    Alcohol use: Not Currently      Current Facility-Administered Medications   Medication Dose Route Frequency Provider Last Rate Last Admin    vancomycin (VANCOCIN) 1,250 mg in 0.9% sodium chloride 250 mL (VIAL-MATE)  1,250 mg IntraVENous Delia Rodriguez MD        [START ON 7/10/2021] Vancomycin random level to be drawn on 7/10/21 at 0600 pre HD. Other Vaishali Bean MD        sodium chloride (NS) flush 5-10 mL  5-10 mL IntraVENous PRN Chin Kimbrough MD        cefepime (MAXIPIME) 1 g in sterile water (preservative free) 10 mL IV syringe  1 g IntraVENous Q8H Chin Kimbrough MD   1 g at 07/08/21 1027    sodium chloride (NS) flush 5-40 mL  5-40 mL IntraVENous Q8H Chin Kimbrough MD   10 mL at 07/08/21 0544    sodium chloride (NS) flush 5-40 mL  5-40 mL IntraVENous PRN Chin Kimbrough MD        acetaminophen (TYLENOL) tablet 650 mg  650 mg Oral Q6H PRN Chin Kimbrough MD   650 mg at 07/08/21 6551    Or    acetaminophen (TYLENOL) suppository 650 mg  650 mg Rectal Q6H PRN Chin Kimbrough MD        polyethylene glycol (MIRALAX) packet 17 g  17 g Oral DAILY PRN Chin Kimbrough MD        promethazine (PHENERGAN) tablet 12.5 mg  12.5 mg Oral Q6H PRN Chin Kimbrough MD        Or    ondansetron [facility]) injection 4 mg  4 mg IntraVENous Q6H PRN Chin Kimbrough MD        famotidine (PEPCID) tablet 20 mg  20 mg Oral BID Chin Kimbrough MD   20 mg at 07/08/21 5164    Vancomycin dose per pharmacy protocol   Other Rx Dosing/Monitoring Gabe Ortega MD        acyclovir (ZOVIRAX) capsule 200 mg  200 mg Oral BID Gabe Ortega MD   200 mg at 07/08/21 1027        Allergies   Allergen Reactions    Lisinopril Angioedema    Pcn [Penicillins] Rash       Review of Systems:  A comprehensive review of systems was negative except for that written in the History of Present Illness.     Objective:     Vitals:    07/08/21 0055 07/08/21 0336 07/08/21 0400 07/08/21 7572 BP:  113/64  (!) 148/86   Pulse: 100 85 89 95   Resp:  18  18   Temp:  99 °F (37.2 °C)  98.5 °F (36.9 °C)   SpO2:  99%  99%   Weight:       Height:            Physical Exam:  General: NAD  Eyes: sclera anicteric  Oral Cavity: No thrush or ulcers  Neck: no JVD  Chest: Fair bilateral air entry  Heart: normal sounds  Abdomen: soft and non tender   : no simms  Lower Extremities: no edema  Skin: no rash  Neuro: intact  Psychiatric: non-depressed    RI Perm cath, clean exit site and no drainage  L wrist AVF with good thrill          Assessment:     Hospital Problems  Date Reviewed: 1/24/2021        Codes Class Noted POA    Sepsis ([facility] ICD-10-CM: A41.9  ICD-9-CM: 038.9, 995.91  7/7/2021 Unknown        ESRD (end stage renal disease) on dialysis [facility]) ICD-10-CM: N18.6, Z99.2  ICD-9-CM: 585.6, V45.11  7/7/2021 Unknown        Line sepsis ([facility] ICD-10-CM: T85.79XA, A41.9  ICD-9-CM: 996.62, 038.9, 995.91  7/7/2021 Unknown        Essential hypertension ICD-10-CM: I10  ICD-9-CM: 401.9  1/24/2021 Yes              Plan:     1 ESRD. -On hemodialysis Tuesday Thursday Saturday.    -He was last dialyzed on 7/6 as an outpatient.    -No volume overload.    -He is not looking uremic.    -Plan to dialyze him today as per his schedule.    -He has permacath is a dialysis access.    -He has a maturing left wrist AV fistula. 2.  Fever.    -Etiology not clear so far. -Came with temp 103, WBC 14.8.    -Chest x-ray no infiltrates. -Urine 20-30 WBCs and trace LE. -Urine culture is pending.    -His catheter site looks clean with no drainage.    -He was put on vancomycin and cefepime.    -He is afebrile and leukocytosis has been improved. -Hemodynamically stable. -I will do blood cultures from the permacath.    -Will order CT of the abdomen as patient had history of renal calculi and hydronephrosis in the past.   -ID specialist on board. 3.  Metabolic acidosis, high anion gap.     -CO2 is 13.   - It was 20 yesterday.    -I will check lactic acid.    -Lactic acid was 1.4 on 7/7.    -He also complaining of loose stools. - I will dialyze her with high bicarb bath. 4.  Anemia.    -Hemoglobin at goal.  No indication for erythropoietin. 5.  Renal osteodystrophy.    -His calcium is okay. -I will check phosphorus and intact PTH level. 6 ophthalmic herpes zoster.    -Left ophthalmic artery zoster.    -Seen by ophthalmology as an outpatient.    -On acyclovir 800 mg twice a day. Thank you for the consultation.     Signed By: All Mena MD     July 8, 2021

## 2021-07-08 NOTE — PROGRESS NOTES
This nurse completed admission double skin assessment with Laila Del Valle RN. Patient presents with healing incision with stitches for new fistula in left wrist; crusted over scabs from shingles on forehead; slightly tunneling pressure ulcer on coccyx/sacrum with surrounding tissue pink/red; healing scabs bilateral shins; blanchable pink bilateral heels; blanchable pink skin to 4th and 5th digit on right foot; permacath right chest; and yellow/green drainage from left eye.

## 2021-07-08 NOTE — PROGRESS NOTES
Problem: Falls - Risk of  Goal: *Absence of Falls  Description: Document Haritha Danieletarik Fall Risk and appropriate interventions in the flowsheet. Outcome: Progressing Towards Goal  Note: Fall Risk Interventions:  Mobility Interventions: Bed/chair exit alarm, Patient to call before getting OOB, PT Consult for mobility concerns, PT Consult for assist device competence, Strengthening exercises (ROM-active/passive), Utilize walker, cane, or other assistive device         Medication Interventions: Bed/chair exit alarm, Patient to call before getting OOB    Elimination Interventions: Bed/chair exit alarm, Call light in reach, Toileting schedule/hourly rounds    History of Falls Interventions: Bed/chair exit alarm, Door open when patient unattended         Problem: Patient Education: Go to Patient Education Activity  Goal: Patient/Family Education  Outcome: Progressing Towards Goal     Problem: Risk for Spread of Infection  Goal: Prevent transmission of infectious organism to others  Description: Prevent the transmission of infectious organisms to other patients, staff members, and visitors. Outcome: Progressing Towards Goal     Problem: Patient Education:  Go to Education Activity  Goal: Patient/Family Education  Outcome: Progressing Towards Goal     Problem: Pressure Injury - Risk of  Goal: *Prevention of pressure injury  Description: Document Vik Scale and appropriate interventions in the flowsheet.   Outcome: Progressing Towards Goal  Note: Pressure Injury Interventions:  Sensory Interventions: Keep linens dry and wrinkle-free, Maintain/enhance activity level, Minimize linen layers    Moisture Interventions: Absorbent underpads, Apply protective barrier, creams and emollients, Minimize layers    Activity Interventions: Increase time out of bed, PT/OT evaluation    Mobility Interventions: PT/OT evaluation, HOB 30 degrees or less    Nutrition Interventions: Document food/fluid/supplement intake    Friction and Shear Interventions: Apply protective barrier, creams and emollients, Foam dressings/transparent film/skin sealants, HOB 30 degrees or less, Minimize layers                Problem: Patient Education: Go to Patient Education Activity  Goal: Patient/Family Education  Outcome: Progressing Towards Goal

## 2021-07-08 NOTE — PROGRESS NOTES
Patient arrived to unit via cart and transferred with two to bed. Patient oriented to room and call light.

## 2021-07-08 NOTE — PROGRESS NOTES
Hospitalist Progress Note    Subjective:   Daily Progress Note: 7/8/2021 12:42 PM    Hospital Course:     Ayana Delgado is a 72 y.o. male who has a history of end-stage renal disease on hemodialysis, congestive heart failure, cardiomyopathy, diabetes came to ER with a complaint of generalized weakness and recurrent falls at home. Patient states that he has been very weak he came to ER yesterday also with a similar kind of complaints. Today on evaluation in ER patient was found to have fever of 103 °F.    If he cannot elevated WBC, procalcitonin and CRP on admission. Also noted to have facial shingle crustations around left forehead with associated neuropathy pain. Patient is noticed to have poor care of his dialysis catheter. Hospitalist service has been requested to admit the patient for further management. Started on IV vancomycin and cefepime. Consults placed with nephrology and infectious disease. Blood cultures returned positive for gram-positive cocci in clusters. Subjective:  Examined patient at the bedside. He is complaining of significant pain from shingles. Also complaining of significant weakness. Current Facility-Administered Medications   Medication Dose Route Frequency    vancomycin (VANCOCIN) 1,250 mg in 0.9% sodium chloride 250 mL (VIAL-MATE)  1,250 mg IntraVENous ONCE    [START ON 7/10/2021] Vancomycin random level to be drawn on 7/10/21 at 0600 pre HD.    Other ONCE    traMADoL (ULTRAM) tablet 50 mg  50 mg Oral Q6H PRN    polyvinyl alcohol-povidon(PF) (REFRESH CLASSIC) 1.4-0.6 % ophthalmic solution 1 Drop  1 Drop Both Eyes PRN    sodium chloride (NS) flush 5-10 mL  5-10 mL IntraVENous PRN    cefepime (MAXIPIME) 1 g in sterile water (preservative free) 10 mL IV syringe  1 g IntraVENous Q8H    sodium chloride (NS) flush 5-40 mL  5-40 mL IntraVENous Q8H    sodium chloride (NS) flush 5-40 mL  5-40 mL IntraVENous PRN    acetaminophen (TYLENOL) tablet 650 mg  650 mg Oral Q6H PRN Or    acetaminophen (TYLENOL) suppository 650 mg  650 mg Rectal Q6H PRN    polyethylene glycol (MIRALAX) packet 17 g  17 g Oral DAILY PRN    promethazine (PHENERGAN) tablet 12.5 mg  12.5 mg Oral Q6H PRN    Or    ondansetron (ZOFRAN) injection 4 mg  4 mg IntraVENous Q6H PRN    famotidine (PEPCID) tablet 20 mg  20 mg Oral BID    Vancomycin dose per pharmacy protocol   Other Rx Dosing/Monitoring    acyclovir (ZOVIRAX) capsule 200 mg  200 mg Oral BID        REVIEW OF SYSTEMS    Review of Systems   Constitutional: Positive for malaise/fatigue. HENT: Negative. Respiratory: Negative. Cardiovascular: Negative. Gastrointestinal: Negative. Musculoskeletal: Positive for myalgias. Neurological: Positive for weakness. Nerve pain from facial shingles        Objective:     Visit Vitals  /72 (BP 1 Location: Left upper arm, BP Patient Position: At rest)   Pulse 83   Temp 99.4 °F (37.4 °C)   Resp 18   Ht 5' 10\" (1.778 m)   Wt 81.6 kg (179 lb 14.3 oz)   SpO2 96%   BMI 25.81 kg/m²      O2 Device: None (Room air)    Temp (24hrs), Av.9 °F (37.7 °C), Min:98.3 °F (36.8 °C), Max:103 °F (39.4 °C)      No intake/output data recorded.  1901 -  0700  In: 9018 [P.O.:300; I.V.:790]  Out: 150 [Urine:150]    PHYSICAL EXAM:    Physical Exam  Constitutional:       Appearance: He is ill-appearing. Cardiovascular:      Rate and Rhythm: Normal rate. Pulmonary:      Effort: No respiratory distress. Neurological:      Motor: Weakness present.       Comments: Nerve pain from facial shingles           Data Review    Recent Results (from the past 24 hour(s))   LACTIC ACID    Collection Time: 21  4:15 PM   Result Value Ref Range    Lactic acid 1.4 0.4 - 2.0 mmol/L   CBC WITH AUTOMATED DIFF    Collection Time: 21  4:35 PM   Result Value Ref Range    WBC 10.2 4.1 - 11.1 K/uL    RBC 4.83 4.10 - 5.70 M/uL    HGB 12.3 12.1 - 17.0 g/dL    HCT 40.4 36.6 - 50.3 %    MCV 83.6 80.0 - 99.0 FL    MCH 25.5 (L) 26.0 - 34.0 PG    MCHC 30.4 30.0 - 36.5 g/dL    RDW 19.9 (H) 11.5 - 14.5 %    PLATELET 655 300 - 388 K/uL    MPV 10.2 8.9 - 12.9 FL    NRBC 0.0 0.0  WBC    ABSOLUTE NRBC 0.00 0.00 - 0.01 K/uL    NEUTROPHILS 76 (H) 32 - 75 %    LYMPHOCYTES 12 12 - 49 %    MONOCYTES 9 5 - 13 %    EOSINOPHILS 1 0 - 7 %    BASOPHILS 1 0 - 1 %    IMMATURE GRANULOCYTES 1 (H) 0 - 0.5 %    ABS. NEUTROPHILS 7.9 1.8 - 8.0 K/UL    ABS. LYMPHOCYTES 1.3 0.8 - 3.5 K/UL    ABS. MONOCYTES 0.9 0.0 - 1.0 K/UL    ABS. EOSINOPHILS 0.1 0.0 - 0.4 K/UL    ABS. BASOPHILS 0.1 0.0 - 0.1 K/UL    ABS. IMM. GRANS. 0.1 (H) 0.00 - 0.04 K/UL    DF AUTOMATED     METABOLIC PANEL, COMPREHENSIVE    Collection Time: 07/07/21  4:35 PM   Result Value Ref Range    Sodium 135 (L) 136 - 145 mmol/L    Potassium 4.7 3.5 - 5.1 mmol/L    Chloride 105 97 - 108 mmol/L    CO2 20 (L) 21 - 32 mmol/L    Anion gap 10 5 - 15 mmol/L    Glucose 78 65 - 100 mg/dL    BUN 50 (H) 6 - 20 mg/dL    Creatinine 8.89 (H) 0.70 - 1.30 mg/dL    BUN/Creatinine ratio 6 (L) 12 - 20      GFR est AA 7 (L) >60 ml/min/1.73m2    GFR est non-AA 6 (L) >60 ml/min/1.73m2    Calcium 8.6 8.5 - 10.1 mg/dL    Bilirubin, total 0.7 0.2 - 1.0 mg/dL    AST (SGOT) 20 15 - 37 U/L    ALT (SGPT) 12 12 - 78 U/L    Alk. phosphatase 112 45 - 117 U/L    Protein, total 10.4 (H) 6.4 - 8.2 g/dL    Albumin 2.8 (L) 3.5 - 5.0 g/dL    Globulin 7.6 (H) 2.0 - 4.0 g/dL    A-G Ratio 0.4 (L) 1.1 - 2.2     CULTURE, BLOOD, PAIRED    Collection Time: 07/07/21  5:25 PM    Specimen: Blood   Result Value Ref Range    Special Requests: No Special Requests      Culture result:        Two of four bottles have been flagged positive by instrument. Bottles have been sent to [facility] laboratory to assess for possible growth.  Gram Positive Cocci in clusters CALLED TO AND READ BACK BY DAIJA ANDREWS R.N. [id number] 07/08/2021 BY JAVED   COVID-19 RAPID TEST    Collection Time: 07/07/21  5:45 PM   Result Value Ref Range    Specimen source Nasopharyngeal COVID-19 rapid test Not Detected Not Detected     SARS-COV-2    Collection Time: 07/07/21  5:45 PM   Result Value Ref Range    SARS-CoV-2 Please find results under separate order     EKG, 12 LEAD, INITIAL    Collection Time: 07/07/21  6:08 PM   Result Value Ref Range    Ventricular Rate 84 BPM    Atrial Rate 84 BPM    P-R Interval 160 ms    QRS Duration 102 ms    Q-T Interval 410 ms    QTC Calculation (Bezet) 484 ms    Calculated P Axis 36 degrees    Calculated R Axis -39 degrees    Calculated T Axis 133 degrees    Diagnosis       Normal sinus rhythm  Left axis deviation  Anteroseptal infarct (cited on or before 16-JAN-2021)  T wave abnormality, consider lateral ischemia  Abnormal ECG  When compared with ECG of 06-JUL-2021 15:00,  Questionable change in initial forces of Septal leads  Questionable change in initial forces of Lateral leads  T wave inversion now evident in Lateral leads  Confirmed by [facility] RODDYSilverdaleStephanie ([id number]) on 7/8/2021 8:42:05 AM     METABOLIC PANEL, BASIC    Collection Time: 07/08/21  4:43 AM   Result Value Ref Range    Sodium 132 (L) 136 - 145 mmol/L    Potassium 4.8 3.5 - 5.1 mmol/L    Chloride 103 97 - 108 mmol/L    CO2 13 (LL) 21 - 32 mmol/L    Anion gap 16 (H) 5 - 15 mmol/L    Glucose 84 65 - 100 mg/dL    BUN 52 (H) 6 - 20 mg/dL    Creatinine 9.04 (H) 0.70 - 1.30 mg/dL    BUN/Creatinine ratio 6 (L) 12 - 20      GFR est AA 7 (L) >60 ml/min/1.73m2    GFR est non-AA 6 (L) >60 ml/min/1.73m2    Calcium 8.5 8.5 - 10.1 mg/dL   CBC WITH AUTOMATED DIFF    Collection Time: 07/08/21  4:43 AM   Result Value Ref Range    WBC 10.1 4.1 - 11.1 K/uL    RBC 4.66 4.10 - 5.70 M/uL    HGB 12.0 (L) 12.1 - 17.0 g/dL    HCT 38.5 36.6 - 50.3 %    MCV 82.6 80.0 - 99.0 FL    MCH 25.8 (L) 26.0 - 34.0 PG    MCHC 31.2 30.0 - 36.5 g/dL    RDW 19.5 (H) 11.5 - 14.5 %    PLATELET 304 115 - 445 K/uL    MPV 10.5 8.9 - 12.9 FL    NRBC 0.0 0.0  WBC    ABSOLUTE NRBC 0.00 0.00 - 0.01 K/uL    NEUTROPHILS 71 32 - 75 % LYMPHOCYTES 16 12 - 49 %    MONOCYTES 12 5 - 13 %    EOSINOPHILS 0 0 - 7 %    BASOPHILS 1 0 - 1 %    IMMATURE GRANULOCYTES 0 0 - 0.5 %    ABS. NEUTROPHILS 7.1 1.8 - 8.0 K/UL    ABS. LYMPHOCYTES 1.6 0.8 - 3.5 K/UL    ABS. MONOCYTES 1.3 (H) 0.0 - 1.0 K/UL    ABS. EOSINOPHILS 0.0 0.0 - 0.4 K/UL    ABS. BASOPHILS 0.1 0.0 - 0.1 K/UL    ABS. IMM. GRANS. 0.0 0.00 - 0.04 K/UL    DF AUTOMATED     VANCOMYCIN, RANDOM    Collection Time: 07/08/21  4:43 AM   Result Value Ref Range    Vancomycin, random 8.2 ug/mL    Reported dose date Not provided      Reported dose: Not provided Units   C REACTIVE PROTEIN, QT    Collection Time: 07/08/21  4:43 AM   Result Value Ref Range    C-Reactive protein 15.20 (H) 0.00 - 0.60 mg/dL   PROCALCITONIN    Collection Time: 07/08/21  4:43 AM   Result Value Ref Range    Procalcitonin 23.29 (H) 0 ng/mL       CT ABD PELV WO CONT   Final Result   Pronounced ascites. No evidence of a fever source. No evidence of   hydronephrosis      XR CHEST PORT   Final Result   Mild central pulmonary vascular congestion. XR CHEST PA LAT   Final Result   Lung base opacity likely atelectasis. No focal airspace   consolidation. Evidence of pulmonary vascular congestion without overt edema. Active Problems:    Essential hypertension (1/24/2021)      Cardiomyopathy ([facility] [address] (1/24/2021)      Sepsis ([facility] [address] (7/7/2021)      ESRD (end stage renal disease) on dialysis ([facility] [address] (7/7/2021)      Line sepsis ([facility] [address] (7/7/2021)      Herpes zoster with nervous system complication (4/8/8413)        Assessment/Plan:     1. Sepsis:  Noted poor HD line hygiene  Elevated WBC, procalcitonin and CRP  Cultures positive for gram-positive cocci in clusters  ID consulted  On IV vancomycin    2. ESRD on HD:  Nephrology consulted  Currently has permacath in place    3.   Cardiomyopathy:  Systolic and diastolic stage III heart failure with severe TR  Hydralazine 100 mg 3 times daily, hold for SBP less than 110, metoprolol 50 mg twice daily    4. Shingles with neuropathy symptoms:  Continue acyclovir    DVT Prophylaxis:  Code Status: Full Code  POA/NOK:  Disposition/barriers: Response to treatment  Care Plan discussed with: Patient, RN, IDR team  _____________________________________________________________________________  Time spent in direct care including coordination of service, review of data and examination: > 35 minutes    ______________________________________________________________________________    Maximo Mendoza NP    This is dictation was done by dragon, computer voice recognition software. Quite often unanticipated grammatical, syntax, homophones and other interpretive errors or inadvertently transcribed by the computer software. Please excuse errors that have escaped final proofreading. Thank you.

## 2021-07-08 NOTE — PROGRESS NOTES
Reason for Admission: Sepsis, ESRD                      RUR Score:   19%               PCP: First and Last name:   Brenda Quezada MD     Name of Practice:    Are you a current patient: Yes/No: yes   Approximate date of last visit: 7/1/21   Can you participate in a virtual visit if needed: no    Do you (patient/family) have any concerns for transition/discharge? Concerns about not having a ramp to the house or grab bars in the bathroom                 Plan for utilizing home health: requesting IRF/SNF      Current Advanced Directive/Advance Care Plan:  Full Code      Healthcare Decision Maker:   Mary Manzo (sister) [phone]  Click here to complete [address] including selection of the Healthcare Decision Maker Relationship (ie \"Primary\")              Transition of Care Plan: CM met with the patient and his sister, Xander Louis, at the bedside to complete assessment. Patient reported he lives in a house with his sister. He reported increased weakness the past week with falls the past two days. He has a walker, wheelchair and hospital bed, however his sister reports she has no ramp and due to increased weakness she is having difficulty getting him in and out the house. Patient and sister reported they feel he needs rehab of some kind and he has been to both [facility] and [facility] for SNF in the past. They are agreeable to either facility depending upon therapy recs. Choice obtained and referrals will be sent. DC plan: SNF vs. IRF pending recs.

## 2021-07-08 NOTE — PROGRESS NOTES
07/07/2021 - Consult for Vancomycin Dosing by Pharmacy by Dr. Lucius Dowd provided for this 72y.o. year old male , for indication of sepsis. Day of Therapy: 2  Goal of Level(s): 15-20mcg/dL        Serum Creatinine Creatinine   Date Value Ref Range Status   07/08/2021 9.04 (H) 0.70 - 1.30 mg/dL Final   07/07/2021 8.89 (H) 0.70 - 1.30 mg/dL Final   07/06/2021 7.34 (H) 0.70 - 1.30 mg/dL Final      Creatinine Clearance Estimated Creatinine Clearance: 8 mL/min (A) (based on SCr of 9.04 mg/dL (H)). BUN Lab Results   Component Value Date/Time    BUN 52 (H) 07/08/2021 04:43 AM      WBC Lab Results   Component Value Date/Time    WBC 10.1 07/08/2021 04:43 AM      Temp Temp Readings from Last 1 Encounters:   07/08/21 98.5 °F (36.9 °C)          Vancomycin, random   Date/Time Value Ref Range Status   07/08/2021 04:43 AM 8.2 ug/mL Final     Comment:     No reference range has been established. Give Vancomycin 1250 mg  today post HD. A random vancomycin level has been scheduled for 7/10/21 at 0600 am pre HD. Random vancomycin level ordered for AM 07/08    Pharmacy to follow daily and will make changes to dose and/or frequency based on clinical status.   _________________________________     Pharmacist Rene Steven, WILLISD

## 2021-07-08 NOTE — PROGRESS NOTES
Progress Note    Patient: Eduardo Goldstein MRN: [id number]  SSN: [id number]    YOB: 1950  Age: 72 y.o. Sex: male      Admit Date: 7/7/2021    LOS: 1 day     Subjective:   Patient followed for sepsis of unclear etiology but with concern for dialysis catheter related bacteremia. Blood cultures now growing GPC in clusters and urine culture pending. He has low grade temperatures with normal WBC but elevated procal and CRP. He is currently on Vancomycin and Cefepime. Patient is off the floor at this time, perhaps for hemodialysis. Objective:     Vitals:    07/08/21 0336 07/08/21 0400 07/08/21 0736 07/08/21 1125   BP: 113/64  (!) 148/86 115/72   Pulse: 85 89 95 83   Resp: 18  18 18   Temp: 99 °F (37.2 °C)  98.5 °F (36.9 °C) 99.4 °F (37.4 °C)   SpO2: 99%  99% 96%   Weight:       Height:            Intake and Output:  Current Shift: No intake/output data recorded. Last three shifts: 07/06 1901 - 07/08 0700  In: 1090 [P.O.:300; I.V.:790]  Out: 150 [Urine:150]    Physical Exam:    Vitals and nursing note reviewed. Constitutional:       Appearance: He is obese. He is ill-appearing. HENT:      Head: Normocephalic and atraumatic. Right Ear: External ear normal.      Left Ear: External ear normal.      Nose: Nose normal.      Mouth/Throat:      Pharynx: Oropharynx is clear. Eyes:      General:         Left eye: Discharge present. Pupils: Pupils are equal, round, and reactive to light. Cardiovascular:      Rate and Rhythm: Normal rate and regular rhythm. Comments: Right sided Permacath with site hyperpigmentation but no purulent drainage, erythema or tenderness  Musculoskeletal:      Cervical back: Neck supple. Right lower leg: No edema. Left lower leg: No edema. Skin:     Findings: Rash present. Comments: Dry crusted lesions left forehead   Neurological:      General: No focal deficit present. Mental Status: He is alert and oriented to person, place, and time. Psychiatric:         Mood and Affect: Mood normal.         Behavior: Behavior normal.         Thought Content: Thought content normal.         Judgment: Judgment normal.     Lab/Data Review:     WBC 10,100    Procal 23.29  CRP 15.20    Blood cultures (7/7) Pending  Urine culture (7/7) Pending  Assessment:     Active Problems:    Essential hypertension (1/24/2021)      Sepsis ([facility] [address] (7/7/2021)      ESRD (end stage renal disease) on dialysis ([facility] [address] (7/7/2021)      Line sepsis ([facility] [address] (7/7/2021)    1. Sepsis with fever, leukocytosis, elevated procal and CRP, etiology unclear  2. Possible Permacath related bacteremia, blood cultures growing GPC in clusters  3. Dermatomal zoster, CN V1, left side, resolving, Day #11/14 Acyclovir  4. CKD Stage 4 on dialysis  5. Nonobstructing renal calculi  6. Generalized weakness    Plan:   1. Continue IV Vancomcyin  2. Continue Acyclovir for possible eye involvement  3. Discontinue Cefepime for now  4. Follow-up blood cultures and urine culture  5. In am, repeat procal and CRP  6.  Contact Dr. Oscar Adams ([phone]), his Ophthalmologist regarding his diagnosis; unable to reach today       Signed By: Kaitlin Cordova MD     July 8, 2021

## 2021-07-08 NOTE — PROGRESS NOTES
07/07/2021 - Consult for Vancomycin Dosing by Pharmacy by Dr. Robin Hebert provided for this 72y.o. year old male , for indication of sepsis. Day of Therapy: 1  Goal of Level(s): 15-20mcg/dL      All Micro Results       Procedure Component Value Units Date/Time    COVID-19 RAPID TEST [[id number]] Collected: 07/07/21 1745    Order Status: Completed Specimen: Nasopharyngeal Updated: 07/07/21 1810     Specimen source Nasopharyngeal        COVID-19 rapid test Not Detected        Comment: Rapid Abbott ID Now   Rapid NAAT:  The specimen is NEGATIVE for SARS-CoV-2, the novel coronavirus associated with COVID-19. Negative results should be treated as presumptive and, if inconsistent with clinical signs and symptoms or necessary for patient management, should be tested with an alternative molecular assay. Negative results do not preclude SARS-CoV-2 infection and should not be used as the sole basis for patient management decisions. This test has been authorized by the FDA under   an Emergency Use Authorization (EUA) for use by authorized laboratories. Fact sheet for Healthcare Providers: ConventionUpdate.co.nz Fact sheet for Patients: ConventionUpdate.co.nz   Methodology: Isothermal Nucleic Acid Amplification         CULTURE, BLOOD [[id number]]     Order Status: Sent Specimen: Blood     CULTURE, BLOOD [[id number]]     Order Status: Sent Specimen: Blood     CULTURE, BLOOD, PAIRED [[id number]] Collected: 07/07/21 1725    Order Status: Completed Specimen: Blood Updated: 07/07/21 1748              Serum Creatinine Creatinine   Date Value Ref Range Status   07/07/2021 8.89 (H) 0.70 - 1.30 mg/dL Final   07/06/2021 7.34 (H) 0.70 - 1.30 mg/dL Final   02/01/2021 5.02 (H) 0.70 - 1.30 mg/dL Final      Creatinine Clearance CrCl cannot be calculated (Unknown ideal weight.).    BUN Lab Results   Component Value Date/Time    BUN 50 (H) 07/07/2021 04:35 PM      WBC Lab Results   Component Value Date/Time    WBC 10.2 07/07/2021 04:35 PM      Temp Temp Readings from Last 1 Encounters:   07/07/21 98.3 °F (36.8 °C)        Last Level: No results found for: VANCT No results found for: VANCR    Ht Readings from Last 1 Encounters:   07/07/21 177.8 cm (70\")        Wt Readings from Last 1 Encounters:   07/06/21 77.1 kg (170 lb)     Patient weight not recorded     Patient received 1000mg in ER. No further dose needed at this time. Scr 8.89mg/dL. Random vancomycin level ordered for AM 07/08    Pharmacy to follow daily and will make changes to dose and/or frequency based on clinical status.   _________________________________     Pharmacist Cely Nguyen, WILLISD

## 2021-07-08 NOTE — ED NOTES
TRANSFER - OUT REPORT:    Verbal report given to accepting admit nurse, Tal Jauregui (name) on Solo Pablo  being transferred to [facility] (unit) for routine progression of care       Report consisted of patients Situation, Background, Assessment and   Recommendations(SBAR). Information from the following report(s) SBAR was reviewed with the receiving nurse & care transferred. Lines:   Venous Access Device Dialysis port 07/07/21 Upper chest (subclavicular area, right (Active)       Peripheral IV 07/07/21 Right Antecubital (Active)   Site Assessment Clean, dry, & intact 07/07/21 1655   Phlebitis Assessment 0 07/07/21 1655   Infiltration Assessment 0 07/07/21 1655   Dressing Status Clean, dry, & intact 07/07/21 1655   Dressing Type Tape;Transparent 07/07/21 1655   Hub Color/Line Status Pink 07/07/21 1655        Opportunity for questions and clarification was provided. Patient transported to floor on stretcher by ED Tech with:   Monitor    All belongings accompanied patient to floor in labeled belongings bag.

## 2021-07-08 NOTE — CONSULTS
Consult Date: 7/7/2021    Consults  Sepsis, ?catheter associated    Subjective   This is a 72year old male with CKD on hemodialysis who presented because of weakness and frequent falls. He missed dialysis yesterday. He was febrile to 103 with WBC 14,800. Urinalysis negative for WBC and bacteria. CXR showed lung base opacity likely atelectasis with no focal airspace consolidation and evidence of pulmonary vascular congestion without overt edema. CT Abdomen showed obstruction of the proximal right ureter x 6.6 mm calculus and nonobstructing bilateral renal calculi. CT Head was negative. Images reviewed by me. Blood culture was sent and patient was started on Cefepime and Vancomycin. Patient has right sided dialysis catheter. ID has been consulted for possible related infection. Patient seen in the ED. He affirms history above. Also being treated for dermatomal zoster involving left forehead and ?uveitis. He was seen by Ophthalmology and started on Acyclovir 800 mg BID on 6/28/21.        Past Medical History:   Diagnosis Date    Asthenia 1/24/2021    Cardiomyopathy ([address] 1/24/2021    CHF (congestive heart failure) (HCC)     Chronic kidney disease     CKD (chronic kidney disease)     4    Diabetes ([address]     End stage renal disease on dialysis ([address] 1/24/2021    Essential hypertension 1/24/2021    Gastroesophageal reflux disease 1/24/2021    Gastroesophageal reflux disease 1/24/2021    Hypertension     Pneumonia due to COVID-19 virus 6/97/2368    Systolic CHF, acute on chronic ([address] 1/24/2021      Past Surgical History:   Procedure Laterality Date    COLONOSCOPY N/A 12/3/2020    COLONOSCOPY performed by Chas Krueger MD at [address] [place] [address] HX HEART CATHETERIZATION      HX HERNIA REPAIR      IR FLUORO GUIDE PLC CVAD  1/22/2021    IR INSERT NON TUNL CVC OVER 5 YRS  1/18/2021    IR INSERT TUNL CVC W/O PORT OVER 5 YR  1/22/2021    IR PLACE CVAD FLUORO GUIDE  1/18/2021     Family History   Problem Relation Age of Onset    Diabetes Mother     Heart Failure Father       Social History     Tobacco Use    Smoking status: Never Smoker    Smokeless tobacco: Never Used   Substance Use Topics    Alcohol use: Not Currently       Current Facility-Administered Medications   Medication Dose Route Frequency Provider Last Rate Last Admin    cefepime (MAXIPIME) 1 g in sterile water (preservative free) 10 mL IV syringe  1 g IntraVENous Neptali Rousseau MD        sodium chloride (NS) flush 5-10 mL  5-10 mL IntraVENous PRN Pamela Last MD        cefepime (MAXIPIME) 1 g in sterile water (preservative free) 10 mL IV syringe  1 g IntraVENous Q8H Pamela Last MD        sodium chloride (NS) flush 5-40 mL  5-40 mL IntraVENous Q8H Pamela Last MD        sodium chloride (NS) flush 5-40 mL  5-40 mL IntraVENous PRN Pamela Last MD        acetaminophen (TYLENOL) tablet 650 mg  650 mg Oral Q6H PRN Pamela Last MD        Or    acetaminophen (TYLENOL) suppository 650 mg  650 mg Rectal Q6H PRN Pamela Last MD        polyethylene glycol (MIRALAX) packet 17 g  17 g Oral DAILY PRN Pamela Last MD        promethazine (PHENERGAN) tablet 12.5 mg  12.5 mg Oral Q6H PRN Pamela Last MD        Or    ondansetron [facility]) injection 4 mg  4 mg IntraVENous Q6H PRN Pamela Last MD        famotidine (PEPCID) tablet 20 mg  20 mg Oral BID Pamela Last MD        Vancomycin dose per pharmacy protocol   Other Rx Dosing/Monitoring Rosibel Bauman MD         Current Outpatient Medications   Medication Sig Dispense Refill    acyclovir (ZOVIRAX) 800 mg tablet Take 800 mg by mouth two (2) times a day.  hydrALAZINE (APRESOLINE) 100 mg tablet Take 0.5 Tabs by mouth three (3) times daily. For systolic under 860 90 Tab 0    albuterol (PROVENTIL HFA, VENTOLIN HFA, PROAIR HFA) 90 mcg/actuation inhaler Take 2 Puffs by inhalation every four (4) hours as needed for Wheezing or Shortness of Breath.  1 Inhaler 0    metoprolol succinate (TOPROL-XL) 50 mg XL tablet Take 50 mg by mouth two (2) times a day.  calcitRIOL (ROCALTROL) 0.25 mcg capsule Take 0.25 mcg by mouth daily.  atorvastatin (LIPITOR) 40 mg tablet Take 40 mg by mouth daily. Review of Systems   Constitutional: Positive for fatigue and fever. Negative for appetite change and chills. HENT: Negative. Eyes: Positive for redness. Respiratory: Negative. Cardiovascular: Negative. Gastrointestinal: Negative. Endocrine: Negative. Genitourinary: Negative. Musculoskeletal: Positive for arthralgias (knees and ankles). Skin: Positive for rash (forehead). Allergic/Immunologic: Negative. Neurological: Positive for weakness. Negative for headaches. Hematological: Negative. Psychiatric/Behavioral: Negative. Objective     Vital signs for last 24 hours:  Visit Vitals  /71   Pulse 84   Temp 98.3 °F (36.8 °C)   Resp 18   Ht 5' 10\" (1.778 m)   SpO2 99%   BMI 24.39 kg/m²       Intake/Output this shift:  Current Shift: 07/07 1901 - 07/08 0700  In: 750 [I.V.:750]  Out: -   Last 3 Shifts: No intake/output data recorded. Data Review:   Recent Results (from the past 24 hour(s))   LACTIC ACID    Collection Time: 07/07/21  4:15 PM   Result Value Ref Range    Lactic acid 1.4 0.4 - 2.0 mmol/L   CBC WITH AUTOMATED DIFF    Collection Time: 07/07/21  4:35 PM   Result Value Ref Range    WBC 10.2 4.1 - 11.1 K/uL    RBC 4.83 4.10 - 5.70 M/uL    HGB 12.3 12.1 - 17.0 g/dL    HCT 40.4 36.6 - 50.3 %    MCV 83.6 80.0 - 99.0 FL    MCH 25.5 (L) 26.0 - 34.0 PG    MCHC 30.4 30.0 - 36.5 g/dL    RDW 19.9 (H) 11.5 - 14.5 %    PLATELET 816 324 - 326 K/uL    MPV 10.2 8.9 - 12.9 FL    NRBC 0.0 0.0  WBC    ABSOLUTE NRBC 0.00 0.00 - 0.01 K/uL    NEUTROPHILS 76 (H) 32 - 75 %    LYMPHOCYTES 12 12 - 49 %    MONOCYTES 9 5 - 13 %    EOSINOPHILS 1 0 - 7 %    BASOPHILS 1 0 - 1 %    IMMATURE GRANULOCYTES 1 (H) 0 - 0.5 %    ABS.  NEUTROPHILS 7.9 1.8 - 8.0 K/UL    ABS. LYMPHOCYTES 1.3 0.8 - 3.5 K/UL    ABS. MONOCYTES 0.9 0.0 - 1.0 K/UL    ABS. EOSINOPHILS 0.1 0.0 - 0.4 K/UL    ABS. BASOPHILS 0.1 0.0 - 0.1 K/UL    ABS. IMM. GRANS. 0.1 (H) 0.00 - 0.04 K/UL    DF AUTOMATED     METABOLIC PANEL, COMPREHENSIVE    Collection Time: 07/07/21  4:35 PM   Result Value Ref Range    Sodium 135 (L) 136 - 145 mmol/L    Potassium 4.7 3.5 - 5.1 mmol/L    Chloride 105 97 - 108 mmol/L    CO2 20 (L) 21 - 32 mmol/L    Anion gap 10 5 - 15 mmol/L    Glucose 78 65 - 100 mg/dL    BUN 50 (H) 6 - 20 mg/dL    Creatinine 8.89 (H) 0.70 - 1.30 mg/dL    BUN/Creatinine ratio 6 (L) 12 - 20      GFR est AA 7 (L) >60 ml/min/1.73m2    GFR est non-AA 6 (L) >60 ml/min/1.73m2    Calcium 8.6 8.5 - 10.1 mg/dL    Bilirubin, total 0.7 0.2 - 1.0 mg/dL    AST (SGOT) 20 15 - 37 U/L    ALT (SGPT) 12 12 - 78 U/L    Alk. phosphatase 112 45 - 117 U/L    Protein, total 10.4 (H) 6.4 - 8.2 g/dL    Albumin 2.8 (L) 3.5 - 5.0 g/dL    Globulin 7.6 (H) 2.0 - 4.0 g/dL    A-G Ratio 0.4 (L) 1.1 - 2.2     COVID-19 RAPID TEST    Collection Time: 07/07/21  5:45 PM   Result Value Ref Range    Specimen source Nasopharyngeal      COVID-19 rapid test Not Detected Not Detected     SARS-COV-2    Collection Time: 07/07/21  5:45 PM   Result Value Ref Range    SARS-CoV-2 Please find results under separate order         Physical Exam  Vitals and nursing note reviewed. Constitutional:       Appearance: He is obese. He is ill-appearing. HENT:      Head: Normocephalic and atraumatic. Right Ear: External ear normal.      Left Ear: External ear normal.      Nose: Nose normal.      Mouth/Throat:      Pharynx: Oropharynx is clear. Eyes:      General:         Left eye: Discharge present. Pupils: Pupils are equal, round, and reactive to light. Cardiovascular:      Rate and Rhythm: Normal rate and regular rhythm.       Comments: Right sided Permacath with site hyperpigmentation but no purulent drainage, erythema or tenderness  Musculoskeletal:      Cervical back: Neck supple. Right lower leg: No edema. Left lower leg: No edema. Skin:     Findings: Rash present. Comments: Dry crusted lesions left forehead   Neurological:      General: No focal deficit present. Mental Status: He is alert and oriented to person, place, and time. Psychiatric:         Mood and Affect: Mood normal.         Behavior: Behavior normal.         Thought Content: Thought content normal.         Judgment: Judgment normal.       ASSESSMENT/PLAN    1. Sepsis with fever and leukocytosis, etiology unclear  2. Permacath in situ, rule out catheter associated bacteremia  3. Dermatomal zoster, CN V1, left side, resolving, Day #10/14 Acyclovir  4. CKD Stage 4 on dialysis  5. Nonobstructing renal calculi  6. Generalized weakness    1. Continue IV Cefepime and Vancomcyin  2. Continue Acyclovir for possible eye involvement  3. Follow-up blood cultures   4. Send urine culture  5. In am, repeat CBC, check procal and CRP  6. Contact Dr. Percy Rosales, his Ophthalmologist regarding his diagnosis    Keon Rodríguez MD

## 2021-07-09 LAB
ALBUMIN SERPL-MCNC: 2.4 G/DL (ref 3.5–5)
ANION GAP SERPL CALC-SCNC: 10 MMOL/L (ref 5–15)
ANION GAP SERPL CALC-SCNC: 9 MMOL/L (ref 5–15)
BACTERIA SPEC CULT: NORMAL
BACTERIA SPEC CULT: NORMAL
BASOPHILS # BLD: 0 K/UL (ref 0–0.1)
BASOPHILS NFR BLD: 1 % (ref 0–1)
BUN SERPL-MCNC: 39 MG/DL (ref 6–20)
BUN SERPL-MCNC: 40 MG/DL (ref 6–20)
BUN/CREAT SERPL: 6 (ref 12–20)
BUN/CREAT SERPL: 6 (ref 12–20)
CA-I BLD-MCNC: 8.2 MG/DL (ref 8.5–10.1)
CA-I BLD-MCNC: 8.2 MG/DL (ref 8.5–10.1)
CHLORIDE SERPL-SCNC: 101 MMOL/L (ref 97–108)
CHLORIDE SERPL-SCNC: 102 MMOL/L (ref 97–108)
CO2 SERPL-SCNC: 22 MMOL/L (ref 21–32)
CO2 SERPL-SCNC: 22 MMOL/L (ref 21–32)
CREAT SERPL-MCNC: 6.91 MG/DL (ref 0.7–1.3)
CREAT SERPL-MCNC: 7.04 MG/DL (ref 0.7–1.3)
CRP SERPL-MCNC: 11.2 MG/DL (ref 0–0.6)
DIFFERENTIAL METHOD BLD: ABNORMAL
EOSINOPHIL # BLD: 0.1 K/UL (ref 0–0.4)
EOSINOPHIL NFR BLD: 2 % (ref 0–7)
ERYTHROCYTE [DISTWIDTH] IN BLOOD BY AUTOMATED COUNT: 19.7 % (ref 11.5–14.5)
GLUCOSE SERPL-MCNC: 109 MG/DL (ref 65–100)
GLUCOSE SERPL-MCNC: 111 MG/DL (ref 65–100)
HCT VFR BLD AUTO: 39.3 % (ref 36.6–50.3)
HGB BLD-MCNC: 12.1 G/DL (ref 12.1–17)
IMM GRANULOCYTES # BLD AUTO: 0 K/UL (ref 0–0.04)
IMM GRANULOCYTES NFR BLD AUTO: 1 % (ref 0–0.5)
LYMPHOCYTES # BLD: 1.5 K/UL (ref 0.8–3.5)
LYMPHOCYTES NFR BLD: 20 % (ref 12–49)
MCH RBC QN AUTO: 25.2 PG (ref 26–34)
MCHC RBC AUTO-ENTMCNC: 30.8 G/DL (ref 30–36.5)
MCV RBC AUTO: 81.7 FL (ref 80–99)
MONOCYTES # BLD: 1.6 K/UL (ref 0–1)
MONOCYTES NFR BLD: 22 % (ref 5–13)
NEUTS SEG # BLD: 4.1 K/UL (ref 1.8–8)
NEUTS SEG NFR BLD: 54 % (ref 32–75)
NRBC # BLD: 0 K/UL (ref 0–0.01)
NRBC BLD-RTO: 0 PER 100 WBC
PHOSPHATE SERPL-MCNC: 3.8 MG/DL (ref 2.6–4.7)
PLATELET # BLD AUTO: 250 K/UL (ref 150–400)
PMV BLD AUTO: 10.8 FL (ref 8.9–12.9)
POTASSIUM SERPL-SCNC: 3.8 MMOL/L (ref 3.5–5.1)
POTASSIUM SERPL-SCNC: 3.9 MMOL/L (ref 3.5–5.1)
PROCALCITONIN SERPL-MCNC: 21.98 NG/ML
RBC # BLD AUTO: 4.81 M/UL (ref 4.1–5.7)
SODIUM SERPL-SCNC: 133 MMOL/L (ref 136–145)
SODIUM SERPL-SCNC: 133 MMOL/L (ref 136–145)
SPECIAL REQUESTS,SREQ: NORMAL
SPECIAL REQUESTS,SREQ: NORMAL
WBC # BLD AUTO: 7.4 K/UL (ref 4.1–11.1)

## 2021-07-09 PROCEDURE — 74011250637 HC RX REV CODE- 250/637: Performed by: NURSE PRACTITIONER

## 2021-07-09 PROCEDURE — 80048 BASIC METABOLIC PNL TOTAL CA: CPT

## 2021-07-09 PROCEDURE — 65270000029 HC RM PRIVATE

## 2021-07-09 PROCEDURE — 97165 OT EVAL LOW COMPLEX 30 MIN: CPT

## 2021-07-09 PROCEDURE — 97530 THERAPEUTIC ACTIVITIES: CPT

## 2021-07-09 PROCEDURE — 80069 RENAL FUNCTION PANEL: CPT

## 2021-07-09 PROCEDURE — 86140 C-REACTIVE PROTEIN: CPT

## 2021-07-09 PROCEDURE — 84145 PROCALCITONIN (PCT): CPT

## 2021-07-09 PROCEDURE — 85025 COMPLETE CBC W/AUTO DIFF WBC: CPT

## 2021-07-09 PROCEDURE — 74011250637 HC RX REV CODE- 250/637: Performed by: INTERNAL MEDICINE

## 2021-07-09 PROCEDURE — 36415 COLL VENOUS BLD VENIPUNCTURE: CPT

## 2021-07-09 PROCEDURE — 99232 SBSQ HOSP IP/OBS MODERATE 35: CPT | Performed by: INTERNAL MEDICINE

## 2021-07-09 PROCEDURE — 97161 PT EVAL LOW COMPLEX 20 MIN: CPT

## 2021-07-09 PROCEDURE — 74011250637 HC RX REV CODE- 250/637: Performed by: HOSPITALIST

## 2021-07-09 RX ADMIN — ACYCLOVIR 200 MG: 200 CAPSULE ORAL at 10:20

## 2021-07-09 RX ADMIN — Medication 10 ML: at 16:07

## 2021-07-09 RX ADMIN — ATORVASTATIN CALCIUM 40 MG: 40 TABLET, FILM COATED ORAL at 10:20

## 2021-07-09 RX ADMIN — TRAMADOL HYDROCHLORIDE 50 MG: 50 TABLET, FILM COATED ORAL at 23:13

## 2021-07-09 RX ADMIN — Medication 10 ML: at 23:15

## 2021-07-09 RX ADMIN — FAMOTIDINE 20 MG: 20 TABLET ORAL at 10:20

## 2021-07-09 RX ADMIN — CALCITRIOL CAPSULES 0.25 MCG 0.25 MCG: 0.25 CAPSULE ORAL at 10:20

## 2021-07-09 RX ADMIN — ACETAMINOPHEN 650 MG: 325 TABLET ORAL at 16:07

## 2021-07-09 RX ADMIN — METOPROLOL SUCCINATE 50 MG: 50 TABLET, EXTENDED RELEASE ORAL at 10:20

## 2021-07-09 RX ADMIN — Medication 10 ML: at 05:21

## 2021-07-09 RX ADMIN — METOPROLOL SUCCINATE 50 MG: 50 TABLET, EXTENDED RELEASE ORAL at 20:21

## 2021-07-09 RX ADMIN — ACYCLOVIR 200 MG: 200 CAPSULE ORAL at 20:21

## 2021-07-09 RX ADMIN — FAMOTIDINE 20 MG: 20 TABLET ORAL at 20:20

## 2021-07-09 RX ADMIN — HYDRALAZINE HYDROCHLORIDE 50 MG: 50 TABLET, FILM COATED ORAL at 10:20

## 2021-07-09 RX ADMIN — MINERAL OIL, PETROLATUM: 425; 568 OINTMENT OPHTHALMIC at 21:00

## 2021-07-09 NOTE — PROGRESS NOTES
PHYSICAL THERAPY EVALUATION  Patient: Egbert Landau (76 y.o. male)  Date: 7/9/2021  Primary Diagnosis: Sepsis ([facility] [A41.9]  ESRD (end stage renal disease) on dialysis ([facility] [N18.6, Z99.2]  Line sepsis ([facility] Doretha Daiglecal, A41.9]        Precautions: falls, contact      ASSESSMENT  Pt is a 71 yo male admitted on 7/7/2021 for reports of generalized weakness and fever; pt currently being treated for sepsis with unknown origin. Medical chart also reports shingles on left forehead and scalp extending down near left eye. PMH: asthenia, cardiomyopathy, CHF, CKD on HD, DM, ESRD, HTN, GERD, COVID19 virus. Pt A&O x 4. Per pt report pt resides with sister in a 1 story home with 3 CHANTALE, bilateral handrails, pt reports being I with ADLS/IADLS, RW AD with mobility prior to admission. DME: WC, RW, and hospital bed. Pt reports several rehabilitation admissions this year due to falls (reports 2 in 3 months) as well as following COVID 19. Based on the objective data described below, the patient presents with generalized weakness, impaired functional mobility, impaired amb, impaired balance, and decreased activity tolerance. Pt semi-supine in bed upon PT/OT arrival, agreeable to evaluation. Pt required SBA with additional time for bed mobility and  supine <> sit, CGA to min A with additional time sit <> stand transfers. Pt amb 30 feet (bed > commode> bedside recliner) with gt belt, RW, and CGA; demonstrating slow, steady, step through gt pattern with no LOB or knee buckling noted. Pt did fair with session today with reports of overall weakness and fatigue following amb this sesssion. Pt will benefit from continued skilled PT to address above deficits and return to PLOF. Current PT DC recommendation SNF.      Current Level of Function Impacting Discharge (mobility/balance): SBA to min A with additional time    Other factors to consider for discharge: severity of deficit acute medical state      PLAN :  Recommendations and Planned Interventions: bed mobility training, transfer training, gait training, therapeutic exercises, neuromuscular re-education, patient and family training/education and therapeutic activities      Recommend with staff: amb with RW and gt belt    Frequency/Duration: Patient will be followed by physical therapy:  5 times a week to address goals. Recommendation for discharge: (in order for the patient to meet his/her long term goals)  SNF    This discharge recommendation:  Has been made in collaboration with the attending provider and/or case management    IF patient discharges home will need the following DME: pt owns DME needed for DC home         SUBJECTIVE:   Patient stated Anselmo steele help me get to the bathroom?     OBJECTIVE DATA SUMMARY:   HISTORY:    Past Medical History:   Diagnosis Date    Asthenia 1/24/2021    Cardiomyopathy ([facility] 1/24/2021    CHF (congestive heart failure) (HCC)     Chronic kidney disease     CKD (chronic kidney disease)     4    Diabetes ([facility]     End stage renal disease on dialysis ([facility] 1/24/2021    Essential hypertension 1/24/2021    Gastroesophageal reflux disease 1/24/2021    Gastroesophageal reflux disease 1/24/2021    Hypertension     Pneumonia due to COVID-19 virus 1950    Systolic CHF, acute on chronic ([facility] 1/24/2021     Past Surgical History:   Procedure Laterality Date    COLONOSCOPY N/A 12/3/2020    COLONOSCOPY performed by Melody Boone MD at [address] [place] [address] HX HEART CATHETERIZATION      HX HERNIA REPAIR      IR FLUORO GUIDE PLC CVAD  1/22/2021    IR INSERT NON TUNL CVC OVER 5 YRS  1/18/2021    IR INSERT TUNL CVC W/O PORT OVER 5 YR  1/22/2021    IR PLACE CVAD FLUORO GUIDE  1/18/2021       Home Situation  Home Environment: Private residence  [place] to Enter: Yes  Hand Rails : Bilateral  One/Two Story Residence: One story  Living Alone: Yes  Support Systems: Family member(s) (Sister)  Patient Expects to be Discharged to[de-identified] Skilled nursing facility  Current DME Used/Available at Home: Delsie Frock, rolling, Wheelchair, Hospital bed  Tub or Shower Type: Tub/Shower combination    EXAMINATION/PRESENTATION/DECISION MAKING:   Critical Behavior:  Neurologic State: Alert  Orientation Level: Oriented X4  Cognition: Follows commands     Hearing: Auditory  Auditory Impairment: None  Skin:  Bandage noted on buttock; stage 2 sacral wound listed in chart not visualized by PT  Edema: none noted   Range Of Motion:  AROM: Generally decreased, functional                       Strength:    Strength: Generally decreased, functional                    Tone & Sensation:   Tone: Normal              Functional Mobility:  Bed Mobility:  Rolling: Stand-by assistance  Supine to Sit: Stand-by assistance     Scooting: Stand-by assistance  Transfers:  Sit to Stand: Contact guard assistance;Minimum assistance  Stand to Sit: Contact guard assistance        Bed to Chair: Contact guard assistance              Balance:   Sitting: Intact  Standing: Impaired; Without support  Standing - Static: Fair;Occasional  Standing - Dynamic : Fair;Constant support  Ambulation/Gait Training:  Distance (ft): 30 Feet (ft)  Assistive Device: Gait belt;Walker, rolling  Ambulation - Level of Assistance: Contact guard assistance     Gait Description (WDL): Exceptions to WDL           Base of Support: Widened     Speed/Roseanne: Slow           Therapeutic Exercises:   Not completed this session    Functional Measure:  [employer] AM-PAC 6 Clicks         Basic Mobility Inpatient Short Form  How much difficulty does the patient currently have. .. Unable A Lot A Little None   1. Turning over in bed (including adjusting bedclothes, sheets and blankets)? [] 1   [] 2   [] 3   [x] 4   2. Sitting down on and standing up from a chair with arms ( e.g., wheelchair, bedside commode, etc.)   [] 1   [] 2   [x] 3   [] 4   3. Moving from lying on back to sitting on the side of the bed?    [] 1   [] 2   [x] 3   [] 4 How much help from another person does the patient currently need. .. Total A Lot A Little None   4. Moving to and from a bed to a chair (including a wheelchair)? [] 1   [] 2   [x] 3   [] 4   5. Need to walk in hospital room? [] 1   [] 2   [x] 3   [] 4   6. Climbing 3-5 steps with a railing? [] 1   [x] 2   [] 3   [] 4   © , Trustees of [facility] [employer], under license to [employer]. All rights reserved     Score:  Initial:  Most Recent: X (Date: 21 )   Interpretation of Tool:  Represents activities that are increasingly more difficult (i.e. Bed mobility, Transfers, Gait). Score 24 23 22-20 19-15 14-10 9-7 6   Modifier CH CI CJ CK CL CM CN         Physical Therapy Evaluation Charge Determination   History Examination Presentation Decision-Making   HIGH Complexity :3+ comorbidities / personal factors will impact the outcome/ POC  HIGH Complexity : 4+ Standardized tests and measures addressing body structure, function, activity limitation and / or participation in recreation  LOW Complexity : Stable, uncomplicated  Other outcome measures ampac 6  mod      Based on the above components, the patient evaluation is determined to be of the following complexity level: LOW     Pain Ratin-6/10 forehead, face and head    Activity Tolerance:   Good    After treatment patient left in no apparent distress:   Sitting in chair and Call bell within reach and nsg in room and updated. GOALS:    Problem: Mobility Impaired (Adult and Pediatric)  Goal: *Acute Goals and Plan of Care (Insert Text)  Description: Pt will be I with LE HEP in 7 days. Pt will perform bed mobility with mod I in 7 days. Pt will perform transfers with mod I in 7 days. Pt will amb  feet with LRAD safely with mod I in 7 days. Outcome: Not Met       COMMUNICATION/EDUCATION:   The patients plan of care was discussed with: Occupational therapist and Registered nurse.      Fall prevention education was provided and the patient/caregiver indicated understanding., Patient/family have participated as able in goal setting and plan of care. , and Patient/family agree to work toward stated goals and plan of care. PT/OT sessions occurred together for increased safety of pt and clinician.        Thank you for this referral.  Chava Britton, PT, DPT   Time Calculation: 23 mins

## 2021-07-09 NOTE — PROGRESS NOTES
Patient voiced desires to go to [facility]; and has declined SNF option at time of discharge. Message sent to SNF informing of patient's change of plan re: discharge. Currently open /[facility] agency. Referral to [facility] sent and is being reviewed.          ARNIE Hernández

## 2021-07-09 NOTE — PROGRESS NOTES
OCCUPATIONAL THERAPY EVALUATION  Patient: Ryley Polo (45 y.o. male)  Date: 7/9/2021  Primary Diagnosis: Sepsis ([facility] [A41.9]  ESRD (end stage renal disease) on dialysis ([facility] [N18.6, Z99.2]  Line sepsis ([facility] Alisa Almeida, A41.9]        Precautions: Fall    ASSESSMENT  Patient is a 72year old male, who came to the ED for evaluation after a fall and admitted for sepsis, ESRD, line sepsis on 7/7/2021. Patient currently being treated for shingles. PMH includes: asthenia, CHF, CKD, DM, ESRD, essential HTN. Based on the objective data described below, the patient presents with impaired balance, generalized weakness, decreased activity tolerance and increased need for A with self care and functional mobility/transfers. Patient semi-supine upon OT/OTS/PT arrival and pleasantly agreeable to working with therapy. Patient A&O x4 and per pt report, pt lives with his sister in a 1  with 3 CHANTALE and bilateral handrails. Patient reports being IND for ADLs/IADLs and used a RW for mobility at home PTA and reports owning a w/c and hospital bed in addition to the RW. Patient states he's had two recent falls, but doesn't remember how they happened. Patient states he was at [facility] until May 2021 after his sister requested he go there 'to get stronger' because she couldn't pick him up if he fell. Patient SBA rolling, supine > sit and scooting for bed mobility. Patient CGA/Min A sit > stand and CGA ambulating to bathroom with gait belt and RW. Patient CGA toilet transfer and mod A clothing management for brief, CGA bowel hygiene, CGA grooming (washing hands) standing at sink. Patient CGA ambulating back to chair and CGA stand > sit. Patient would benefit from continued skilled OT services to address above deficits and improve safety and independence with self care and functional mobility/transfers. Recommend discharge to SNF when medically appropriate.     Current Level of Function Impacting Discharge (ADLs/self-care): CGA toilet transfer/toileting/bowel hygiene, Mod A clothing management (brief) prior to / after toileting, CGA grooming (washing hands) standing at sink with RW. Other factors to consider for discharge: family support, DME, hx of falls, severity of deficits        PLAN :  Recommendations and Planned Interventions: self care training, functional mobility training, therapeutic exercise, balance training, therapeutic activities, endurance activities, patient education and home safety training    Frequency/Duration: Patient will be followed by occupational therapy 5 times a week to address goals. Recommendation for discharge: (in order for the patient to meet his/her long term goals)  SNF    This discharge recommendation:  Has been made in collaboration with the attending provider and/or case management    IF patient discharges home will need the following DME: Patient may benefit from shower chair once d/c home. SUBJECTIVE:   Patient stated My head and face are sore.     OBJECTIVE DATA SUMMARY:   HISTORY:   Past Medical History:   Diagnosis Date    Asthenia 1/24/2021    [facility] 1/24/2021    [facility]est[facility]     Chronic kidney disease     CKD (chronic kidney disease)     4    Diabetes (HCC)     End stage renal disease on dialysis ([facility] 1/24/2021    Essential hypertension 1/24/2021    Gastroesophageal reflux disease 1/24/2021    Gastroesophageal reflux disease 1/24/2021    Hypertension     Pneumonia due to COVID-19 virus 2/41/5022    Systolic CHF, acute on chronic ([facility] 1/24/2021     Past Surgical History:   Procedure Laterality Date    COLONOSCOPY N/A 12/3/2020    COLONOSCOPY performed by João Mays MD at [facility] HX HEART CATHETERIZATION      HX HERNIA REPAIR      IR FLUORO GUIDE PLC CVAD  1/22/2021    IR INSERT NON TUNL CVC OVER 5 YRS  1/18/2021    IR INSERT TUNL CVC W/O PORT OVER 5 YR  1/22/2021    IR PLACE CVAD FLUORO GUIDE  1/18/2021 Expanded or extensive additional review of patient history:     Home Situation  Home Environment: Private residence  [place] to Enter: Yes  Hand Rails : Bilateral  One/Two Story Residence: One story  Living Alone: Yes  Support Systems: Family member(s) (Sister)  Patient Expects to be Discharged to[de-identified] Skilled nursing facility  Current DME Used/Available at Home: Markus [facility], [facility], [address] bed  Tub or Shower Type: Tub/Shower combination    PLOF: Pt IND for ADLS/IADLS, mod I with mobility prior to admission. Hand dominance: Right    EXAMINATION OF PERFORMANCE DEFICITS:  Cognitive/Behavioral Status:  Neurologic State: Alert  Orientation Level: Oriented X4  Cognition: Follows commands    Skin: shingles noted on forehead and scalp, bandage over sacral wound, otherwise intact where visible    Hearing: Auditory  Auditory Impairment: None    Vision/Perceptual:    Not formally assessed, however patient stated he has not had any changes in vision since admission. Per medical chart, pt is being followed by opthalmology due to shingles.     Range of Motion:  AROM: Generally decreased, functional    Strength:  Strength: Generally decreased, functional     RUE Strength  Observation: grossly observed 4-/5     LUE Strength  Observation: grossly observed 4-/5    Tone & Sensation:  Tone: Normal    Balance:  Sitting: Intact  Standing: Impaired  Standing - Static: Fair  Standing - Dynamic : Fair    Functional Mobility and Transfers for ADLs:  Bed Mobility:  Rolling: Stand-by assistance  Supine to Sit: Stand-by assistance  Scooting: Stand-by assistance    Transfers:  Sit to Stand: Contact guard assistance;Minimum assistance  Stand to Sit: Contact guard assistance  Bed to Chair: Contact guard assistance  Bathroom Mobility: Contact guard assistance  Toilet Transfer : Contact guard assistance  Assistive Device : Gait Belt;Walker, rolling    ADL Intervention and task modifications:  Grooming  Position Performed: Standing  Washing Hands: Contact guard assistance    Lower Body Dressing Assistance  Protective Undergarmet: Moderate assistance    Toileting  Toileting Assistance: Contact guard assistance  Bowel Hygiene: Contact guard assistance  Clothing Management: Moderate assistance (Brief)    Therapeutic Exercise:  Patient may benefit from UE HEP, initiate at next session as able. Functional Measure:    [address] [place] [address] AM-PACTM \"6 Clicks\"                                                       Daily Activity Inpatient Short Form  How much help from another person does the patient currently need. .. Total; A Lot A Little None   1. Putting on and taking off regular lower body clothing? []  1 [x]  2 []  3 []  4   2. Bathing (including washing, rinsing, drying)? []  1 [x]  2 []  3 []  4   3. Toileting, which includes using toilet, bedpan or urinal? [] 1 []  2 [x]  3 []  4   4. Putting on and taking off regular upper body clothing? []  1 []  2 [x]  3 []  4   5. Taking care of personal grooming such as brushing teeth? []  1 []  2 [x]  3 []  4   6. Eating meals? []  1 []  2 [x]  3 []  4   © 2007, Trustees of [address] [place] [address], under license to [employer]. All rights reserved     Score: 16/24     Interpretation of Tool:  Represents clinically-significant functional categories (i.e. Activities of daily living). Percentage of Impairment CH    0%   CI    1-19% CJ    20-39% CK    40-59% CL    60-79% CM    80-99% CN     100%   [facility]  Score 6-24 24 23 20-22 15-19 10-14 7-9 6        Occupational Therapy Evaluation Charge Determination   History Examination Decision-Making   LOW Complexity : Brief history review  LOW Complexity : 1-3 performance deficits relating to physical, cognitive , or psychosocial skils that result in activity limitations and / or participation restrictions  MEDIUM Complexity : Patient may present with comorbidities that affect occupational performnce.  Miniml to moderate modification of tasks or assistance (eg, physical or verbal ) with assesment(s) is necessary to enable patient to complete evaluation       Based on the above components, the patient evaluation is determined to be of the following complexity level: LOW     Pain Ratin/10 head soreness from shingles    Activity Tolerance:   Fair    After treatment patient left in no apparent distress:    Sitting in chair and Call bell within reach    COMMUNICATION/EDUCATION:   The patients plan of care was discussed with: Physical therapist and Registered nurse. Patient/family have participated as able in goal setting and plan of care. This patients plan of care is appropriate for delegation to [facility]. Problem: Self Care Deficits Care Plan (Adult)  Goal: *Acute Goals and Plan of Care (Insert Text)  Description: Pt will be mod I sup <> sit in prep for EOB ADLs  Pt will be mod I grooming standing at sink  Pt will be min A LE dressing sitting EOB/long sit  Pt will be mod I sit <>  prep for toileting  Pt will be mod I toileting/toilet transfer/cloth mgmt  Pt will be IND following UE HEP in prep for self care tasks   Outcome: Not Met    OT/PT sessions occurred together for increased patient and clinician safety. Patient was seen by OT student, Debra Bernabe, under direct supervision of supervising OT, Jacob Gallegos. Supervising OT has reviewed documentation for accuracy and co-signed report.     Thank you for this referral.  JS Wadsworth  Time Calculation: 26 mins

## 2021-07-09 NOTE — PROGRESS NOTES
Hospitalist Progress Note    Subjective:   Daily Progress Note: 7/9/2021 12:42 PM    Hospital Course:     Eduardo Goldstein is a 72 y.o. male who has a history of end-stage renal disease on hemodialysis, congestive heart failure, cardiomyopathy, diabetes came to ER with a complaint of generalized weakness and recurrent falls at home. Patient states that he has been very weak he came to ER yesterday also with a similar kind of complaints. Today on evaluation in ER patient was found to have fever of 103 °F.    If he cannot elevated WBC, procalcitonin and CRP on admission. Also noted to have facial shingle crustations around left forehead with associated neuropathy pain. Patient is noticed to have poor care of his dialysis catheter. Hospitalist service has been requested to admit the patient for further management. Started on IV vancomycin and cefepime. Consults placed with nephrology and infectious disease. Blood cultures returned positive for gram-positive cocci in clusters. Subjective: Follow-up examination of patient at the bedside. He looks much better today. He states he feels better, no particular complaints. Confirms generalized weakness. Current Facility-Administered Medications   Medication Dose Route Frequency    atorvastatin (LIPITOR) tablet 40 mg  40 mg Oral DAILY    metoprolol succinate (TOPROL-XL) XL tablet 50 mg  50 mg Oral BID    [START ON 7/10/2021] Vancomycin random level to be drawn on 7/10/21 at 0600 pre HD.    Other ONCE    traMADoL (ULTRAM) tablet 50 mg  50 mg Oral Q6H PRN    polyvinyl alcohol-povidon(PF) (REFRESH CLASSIC) 1.4-0.6 % ophthalmic solution 1 Drop  1 Drop Both Eyes PRN    heparin (porcine) 1,000 unit/mL injection 3,600 Units  3,600 Units Hemodialysis DIALYSIS PRN    sodium chloride (NS) flush 5-10 mL  5-10 mL IntraVENous PRN    sodium chloride (NS) flush 5-40 mL  5-40 mL IntraVENous Q8H    sodium chloride (NS) flush 5-40 mL  5-40 mL IntraVENous PRN    acetaminophen (TYLENOL) tablet 650 mg  650 mg Oral Q6H PRN    Or    acetaminophen (TYLENOL) suppository 650 mg  650 mg Rectal Q6H PRN    polyethylene glycol (MIRALAX) packet 17 g  17 g Oral DAILY PRN    promethazine (PHENERGAN) tablet 12.5 mg  12.5 mg Oral Q6H PRN    Or    ondansetron (ZOFRAN) injection 4 mg  4 mg IntraVENous Q6H PRN    famotidine (PEPCID) tablet 20 mg  20 mg Oral BID    Vancomycin dose per pharmacy protocol   Other Rx Dosing/Monitoring    acyclovir (ZOVIRAX) capsule 200 mg  200 mg Oral BID        REVIEW OF SYSTEMS    Review of Systems   Constitutional: Positive for malaise/fatigue. HENT: Negative. Respiratory: Negative. Cardiovascular: Negative. Gastrointestinal: Negative. Musculoskeletal: Positive for myalgias. Neurological: Positive for weakness. Nerve pain from facial shingles        Objective:     Visit Vitals  /69 (BP 1 Location: Right upper arm, BP Patient Position: At rest;Supine)   Pulse 78   Temp 98.4 °F (36.9 °C)   Resp 16   Ht 5' 10\" (1.778 m)   Wt 79.1 kg (174 lb 6.1 oz)   SpO2 95%   BMI 25.02 kg/m²      O2 Device: None (Room air)    Temp (24hrs), Av.7 °F (37.6 °C), Min:98.2 °F (36.8 °C), Max:101.7 °F (38.7 °C)      701 - 1900  In: 350 [P.O.:350]  Out: -   1901 -  0700  In: 7869 [P.O.:300; I.V.:790]  Out: 2150 [Urine:150]    PHYSICAL EXAM:    Physical Exam  Constitutional:       Appearance: He is ill-appearing. Cardiovascular:      Rate and Rhythm: Normal rate. Pulmonary:      Effort: No respiratory distress. Neurological:      Motor: Weakness present.       Comments: Nerve pain from facial shingles           Data Review    Recent Results (from the past 24 hour(s))   CULTURE, BLOOD, PAIRED    Collection Time: 21  1:35 PM    Specimen: Blood   Result Value Ref Range    Special Requests: No Special Requests      Culture result:        Gram pos cocci in clusters Two of four bottles have been flagged positive by instrument. Bottles have been sent to [facility] laboratory to assess for possible growth. PREVIOUS POSITIVE. LACTIC ACID    Collection Time: 07/08/21  1:52 PM   Result Value Ref Range    Lactic acid 2.5 (HH) 0.4 - 2.0 mmol/L   CBC WITH AUTOMATED DIFF    Collection Time: 07/09/21  7:22 AM   Result Value Ref Range    WBC 7.4 4.1 - 11.1 K/uL    RBC 4.81 4.10 - 5.70 M/uL    HGB 12.1 12.1 - 17.0 g/dL    HCT 39.3 36.6 - 50.3 %    MCV 81.7 80.0 - 99.0 FL    MCH 25.2 (L) 26.0 - 34.0 PG    MCHC 30.8 30.0 - 36.5 g/dL    RDW 19.7 (H) 11.5 - 14.5 %    PLATELET 066 860 - 546 K/uL    MPV 10.8 8.9 - 12.9 FL    NRBC 0.0 0.0  WBC    ABSOLUTE NRBC 0.00 0.00 - 0.01 K/uL    NEUTROPHILS 54 32 - 75 %    LYMPHOCYTES 20 12 - 49 %    MONOCYTES 22 (H) 5 - 13 %    EOSINOPHILS 2 0 - 7 %    BASOPHILS 1 0 - 1 %    IMMATURE GRANULOCYTES 1 (H) 0 - 0.5 %    ABS. NEUTROPHILS 4.1 1.8 - 8.0 K/UL    ABS. LYMPHOCYTES 1.5 0.8 - 3.5 K/UL    ABS. MONOCYTES 1.6 (H) 0.0 - 1.0 K/UL    ABS. EOSINOPHILS 0.1 0.0 - 0.4 K/UL    ABS. BASOPHILS 0.0 0.0 - 0.1 K/UL    ABS. IMM.  GRANS. 0.0 0.00 - 0.04 K/UL    DF AUTOMATED     RENAL FUNCTION PANEL    Collection Time: 07/09/21  7:22 AM   Result Value Ref Range    Sodium 133 (L) 136 - 145 mmol/L    Potassium 3.8 3.5 - 5.1 mmol/L    Chloride 101 97 - 108 mmol/L    CO2 22 21 - 32 mmol/L    Anion gap 10 5 - 15 mmol/L    Glucose 111 (H) 65 - 100 mg/dL    BUN 40 (H) 6 - 20 mg/dL    Creatinine 7.04 (H) 0.70 - 1.30 mg/dL    BUN/Creatinine ratio 6 (L) 12 - 20      GFR est AA 9 (L) >60 ml/min/1.73m2    GFR est non-AA 8 (L) >60 ml/min/1.73m2    Calcium 8.2 (L) 8.5 - 10.1 mg/dL    Phosphorus 3.8 2.6 - 4.7 mg/dL    Albumin 2.4 (L) 3.5 - 5.0 g/dL   C REACTIVE PROTEIN, QT    Collection Time: 07/09/21  7:22 AM   Result Value Ref Range    C-Reactive protein 11.20 (H) 0.00 - 0.60 mg/dL   PROCALCITONIN    Collection Time: 07/09/21  7:22 AM   Result Value Ref Range    Procalcitonin 21.98 (H) 0 ng/mL   METABOLIC PANEL, BASIC Collection Time: 07/09/21  7:22 AM   Result Value Ref Range    Sodium 133 (L) 136 - 145 mmol/L    Potassium 3.9 3.5 - 5.1 mmol/L    Chloride 102 97 - 108 mmol/L    CO2 22 21 - 32 mmol/L    Anion gap 9 5 - 15 mmol/L    Glucose 109 (H) 65 - 100 mg/dL    BUN 39 (H) 6 - 20 mg/dL    Creatinine 6.91 (H) 0.70 - 1.30 mg/dL    BUN/Creatinine ratio 6 (L) 12 - 20      GFR est AA 10 (L) >60 ml/min/1.73m2    GFR est non-AA 8 (L) >60 ml/min/1.73m2    Calcium 8.2 (L) 8.5 - 10.1 mg/dL       CT ABD PELV WO CONT   Final Result   Pronounced ascites. No evidence of a fever source. No evidence of   hydronephrosis      XR CHEST PORT   Final Result   Mild central pulmonary vascular congestion. XR CHEST PA LAT   Final Result   Lung base opacity likely atelectasis. No focal airspace   consolidation. Evidence of pulmonary vascular congestion without overt edema. Active Problems:    Essential hypertension (1/24/2021)      Cardiomyopathy ([facility] (1/24/2021)      Sepsis ([facility] (7/7/2021)      ESRD (end stage renal disease) on dialysis ([facility] (7/7/2021)      Line sepsis ([facility] (7/7/2021)      Herpes zoster with nervous system complication (0/8/1662)        Assessment/Plan:     1. Sepsis:  Noted poor HD line hygiene  Elevated WBC, procalcitonin and CRP  Cultures positive for gram-positive cocci in clusters  ID consulted   On IV vancomycin    2. ESRD on HD:  Nephrology consulted  Currently has permacath in place    3. Cardiomyopathy:  Systolic and diastolic stage III heart failure with severe TR  Hydralazine 100 mg 3 times daily, hold for SBP less than 110, metoprolol 50 mg twice daily    4.   Shingles with neuropathy symptoms:  Continue acyclovir day #12/14    DVT Prophylaxis:  Code Status: Full Code  POA/NOK:  Disposition/barriers: Response to treatment  Care Plan discussed with: Patient, RN, IDR team  _____________________________________________________________________________  Time spent in direct care including coordination of service, review of data and examination: > 35 minutes    ______________________________________________________________________________    Mary Fox NP    This is dictation was done by [employer] computer voice recognition software. Quite often unanticipated grammatical, syntax, homophones and other interpretive errors or inadvertently transcribed by the computer software. Please excuse errors that have escaped final proofreading. Thank you.

## 2021-07-09 NOTE — ROUTINE PROCESS
[facility] shift change report given to Ok Leigh, RN, RN (oncoming nurse) by PALMER Hein RN (offgoing nurse).  Report included the following information from the SB[facility]

## 2021-07-09 NOTE — PROGRESS NOTES
Nephrology Consult    Patient: Rachel Livingston MRN: [id number]  SSN: [id number]    YOB: 1950  Age: 72 y.o.   Sex: male      Subjective:   Pt is seen in the room  He is looking more alert  No new complaints  Afebrile, normal wbc, BP ok  BC GPC in clusters  CT abdomen no hydronephrosis but ascites       Past Medical History:   Diagnosis Date    Asthenia 1/24/2021    Cardiomyopathy ([address] 1/24/2021    CHF (congestive heart failure) ([address]     Chronic kidney disease     CKD (chronic kidney disease)     4    Diabetes ([address]     End stage renal disease on dialysis ([facility] 1/24/2021    Essential hypertension 1/24/2021    Gastroesophageal reflux disease 1/24/2021    Gastroesophageal reflux disease 1/24/2021    Hypertension     Pneumonia due to COVID-19 virus 0/92/4209    Systolic CHF, acute on chronic ([facility] 1/24/2021     Past Surgical History:   Procedure Laterality Date    COLONOSCOPY N/A 12/3/2020    COLONOSCOPY performed by Leisa Montana MD at [address] HX HERNIA REPAIR       [address] CVAD  1/22/2021    IR INSERT NON TUNL CVC OVER 5 YRS  1/18/2021    IR INSERT TUNL CVC W/O PORT OVER 5 YR  1/22/2021    IR PLACE CVAD FLUORO GUIDE  1/18/2021      Family History   Problem Relation Age of Onset    Diabetes Mother     Heart Failure Father      Social History     Tobacco Use    Smoking status: Never Smoker    Smokeless tobacco: Never Used   Substance Use Topics    Alcohol use: Not Currently      Current Facility-Administered Medications   Medication Dose Route Frequency Provider Last Rate Last Admin    atorvastatin (LIPITOR) tablet 40 mg  40 mg Oral DAILY Obdulia Cabrales MD   40 mg at 07/09/21 1020    calcitRIOL (ROCALTROL) capsule 0.25 mcg  0.25 mcg Oral DAILY Obdulia Cabrales MD   0.25 mcg at 07/09/21 1020    hydrALAZINE (APRESOLINE) tablet 50 mg  50 mg Oral TID Obdulia Cabrales MD   50 mg at 07/09/21 1020    metoprolol succinate (TOPROL-XL) XL tablet 50 mg  50 mg Oral BID Norma Bajwa MD   50 mg at 07/09/21 1020    [START ON 7/10/2021] Vancomycin random level to be drawn on 7/10/21 at 0600 pre HD. Other Maria Teresa Cantu MD        traMADoL Rollyariane Caceresi) tablet 50 mg  50 mg Oral Q6H PRN Sarahi Zee NP   50 mg at 07/08/21 1715    polyvinyl alcohol-povidon(PF) (REFRESH CLASSIC) 1.4-0.6 % ophthalmic solution 1 Drop  1 Drop Both Eyes PRN Sarahi Zee NP        heparin (porcine) 1,000 unit/mL injection 3,600 Units  3,600 Units Hemodialysis DIALYSIS PRN Tamara Villalobos MD   3,600 Units at 07/08/21 1543    sodium chloride (NS) flush 5-10 mL  5-10 mL IntraVENous PRN Norma Bajwa MD        sodium chloride (NS) flush 5-40 mL  5-40 mL IntraVENous Q8H Norma Bajwa MD   10 mL at 07/09/21 0521    sodium chloride (NS) flush 5-40 mL  5-40 mL IntraVENous PRN Norma Bajwa MD        acetaminophen (TYLENOL) tablet 650 mg  650 mg Oral Q6H PRN Norma Bajwa MD   650 mg at 07/08/21 2345    Or    acetaminophen (TYLENOL) suppository 650 mg  650 mg Rectal Q6H PRN Norma Bajwa MD        polyethylene glycol (MIRALAX) packet 17 g  17 g Oral DAILY PRN Norma Bajwa MD        promethazine (PHENERGAN) tablet 12.5 mg  12.5 mg Oral Q6H PRN Norma Bajwa MD        Or    [facility]ron [facility]) injection 4 mg  4 mg IntraVENous Q6H PRN Norma Bajwa MD        famotidine (PEPCID) tablet 20 mg  20 mg Oral BID Norma Bajwa MD   20 mg at 07/09/21 1020    Vancomycin dose per pharmacy protocol   Other Rx Dosing/Monitoring Ez Gates MD        acyclovir (ZOVIRAX) capsule 200 mg  200 mg Oral BID Ez Gates MD   200 mg at 07/09/21 1020        Allergies   Allergen Reactions    Lisinopril Angioedema    Pcn [Penicillins] Rash       Review of Systems:  A comprehensive review of systems was negative except for that written in the History of Present Illness.     Objective:     Vitals:    07/09/21 0129 07/09/21 4935 07/09/21 6436 07/09/21 0749   BP:    115/69   Pulse:  69  75   Resp:    16   Temp: 100.2 °F (37.9 °C)   98.4 °F (36.9 °C)   TempSrc:       SpO2:    95%   Weight:   79.1 kg (174 lb 6.1 oz)    Height:            Physical Exam:  General: NAD  Eyes: sclera anicteric  Oral Cavity: No thrush or ulcers  Neck: no JVD  Chest: Fair bilateral air entry  Heart: normal sounds  Abdomen: soft and non tender   : no simms  Lower Extremities: no edema  Skin: no rash  Neuro: intact  Psychiatric: non-depressed    RI Perm cath, clean exit site and no drainage  L wrist AVF with good thrill          Assessment:     Hospital Problems  Date Reviewed: 1/24/2021        Codes Class Noted POA    Herpes zoster with nervous system complication CNR-40-VR: F85.84  ICD-9-CM: 053.10  7/8/2021 Unknown        Sepsis ([facility] ICD-10-CM: A41.9  ICD-9-CM: 038.9, 995.91  7/7/2021 Unknown        ESRD (end stage renal disease) on dialysis [facility]) ICD-10-CM: N18.6, Z99.2  ICD-9-CM: 585.6, V45.11  7/7/2021 Unknown        Line sepsis ([facility] ICD-10-CM: T85.79XA, A41.9  ICD-9-CM: 996.62, 038.9, 995.91  7/7/2021 Unknown        Essential hypertension ICD-10-CM: I10  ICD-9-CM: 401.9  1/24/2021 Yes        Cardiomyopathy ([facility] (Chronic) ICD-10-CM: I42.9  ICD-9-CM: 425.4  1/24/2021 Yes              Plan:     1 ESRD. -On hemodialysis Tuesday Thursday Saturday.    -He was last dialyzed on 7/6 as an outpatient.    -No volume overload,  not looking uremic.    -s/p dialyzed on 7/08 and removed 2L  -He has permacath is a dialysis access.    -He has a maturing left wrist AV fistula. 2.  Sepsis:  -2/2 bacteremia  -BC grew GPC in clusters   -Came with temp 103, WBC 14.8.    -Chest x-ray no infiltrates. -Urine 20-30 WBCs and trace LE. -Urine culture is pending.    -His catheter site looks clean with no drainage.    -plan to keep on iv vancomycin and cefepime (dced). -He is afebrile and leukocytosis has been improved.     -Hemodynamically stable.    -No indication to remove the perm cath if ok with ID   - CT of the abdomen no hydronephrosis. -ID specialist on board. 3.  Metabolic acidosis, high anion gap. -CO2 up from 13-->22. -LA 2.5   -Lactic acid was 1.4 on 7/7.    -He also complaining of loose stools. -dialyze him with high bicarb bath. 4.  Anemia.    -Hemoglobin at goal.    -No indication for erythropoietin. 5.  Renal osteodystrophy.    -His calcium is okay. -I will check phosphorus and intact PTH level. 6 ophthalmic herpes zoster.    -Left ophthalmic artery zoster.    -Seen by ophthalmology as an outpatient.    -On acyclovir 800 mg twice a day.       Signed By: Krystal Hernandes MD     July 9, 2021

## 2021-07-09 NOTE — ROUTINE PROCESS
[facility] BEDSIDE_VERBAL shift change report given to Marielle Rodriguez, RN, RN (oncoming nurse) by PALMER Mendez RN (offgoing nurse).  Report included the following information from the SBAR

## 2021-07-09 NOTE — PROGRESS NOTES
Bedside shift change report given to Pao Gomez (oncoming nurse) by Robinson Fink (offgoing nurse). Report included the following information SBAR.

## 2021-07-09 NOTE — PROGRESS NOTES
Progress Note    Patient: Rosanne Hankins MRN: [id number]  SSN: [id number]    YOB: 1950  Age: 72 y.o. Sex: male      Admit Date: 7/7/2021    LOS: 2 days     Subjective:   Patient followed for sepsis of unclear etiology but with concern for dialysis catheter related bacteremia. Blood cultures now growing GPC in clusters and urine culture negative. He has low grade temperatures with normal WBC but elevated procal and CRP. He is currently on Vancomycin alone. Patient resting comfortably with no complaints except that he is not getting his eye lubricant. No visual disturbance. Objective:     Vitals:    07/09/21 0129 07/09/21 0346 07/09/21 0520 07/09/21 0749   BP:    115/69   Pulse:  69  75   Resp:    16   Temp: 100.2 °F (37.9 °C)   98.4 °F (36.9 °C)   TempSrc:       SpO2:    95%   Weight:   174 lb 6.1 oz (79.1 kg)    Height:            Intake and Output:  Current Shift: No intake/output data recorded. Last three shifts: 07/07 1901 - 07/09 0700  In: 1090 [P.O.:300; I.V.:790]  Out: 2150 [Urine:150]    Physical Exam:    Vitals and nursing note reviewed. Constitutional:       Appearance: He is obese. He is ill-appearing. HENT:      Head: Normocephalic and atraumatic. Right Ear: External ear normal.      Left Ear: External ear normal.      Nose: Nose normal.      Mouth/Throat:      Pharynx: Oropharynx is clear. Eyes:      General:         Left eye: redness resolving     Pupils: Pupils are equal, round, and reactive to light. Cardiovascular:      Rate and Rhythm: Normal rate and regular rhythm. Comments: Right sided Permacath with site hyperpigmentation but no purulent drainage, erythema or tenderness  Musculoskeletal:      Cervical back: Neck supple. Right lower leg: No edema. Left lower leg: No edema. Skin:     Findings: Rash present. Comments: Dry crusted lesions left forehead   Neurological:      General: No focal deficit present.       Mental Status: He is alert and oriented to person, place, and time. Psychiatric:         Mood and Affect: Mood normal.         Behavior: Behavior normal.         Thought Content: Thought content normal.         Judgment: Judgment normal.     Lab/Data Review:     WBC 7,400  Lactic acid 2.5    Procal 21.98 <23.29  CRP 11.20 <15.20    Blood cultures (7/7) Staphylococcus aureus  Blood cultures (7/8) No growth so far  Urine culture (7/7) No growth FINAL  Assessment:     Active Problems:    Essential hypertension (1/24/2021)      Cardiomyopathy ([address] (1/24/2021)      Sepsis ([address] (7/7/2021)      ESRD (end stage renal disease) on dialysis ([address] (7/7/2021)      Line sepsis ([address] (7/7/2021)      Herpes zoster with nervous system complication (4/9/3468)    1. Sepsis with fever, leukocytosis, elevated procal and CRP, etiology unclear  2. Permacath related bacteremia, secondary to Staphylococcus aureus, on IV Vancomycin  3. Dermatomal zoster, CN V1, left side, resolving, Day #12/14 Acyclovir  4. CKD Stage 4 on dialysis  5. Nonobstructing renal calculi  6. Generalized weakness    Comment:  If repeat blood cultures are negative, I think that we can treat without removing Permacath, but defer Nephrology. Plan:   1. Continue IV Vancomcyin  2. Continue Acyclovir for possible eye involvement; order artificial tears  3. Follow-up blood cultures  4. In am, repeat procal and CRP  5.  Contact Dr. David Claire ([phone]), his Ophthalmologist regarding his diagnosis; unable to reach today       Signed By: Daniel Rao MD     July 9, 2021

## 2021-07-10 LAB
ALBUMIN SERPL-MCNC: 2.3 G/DL (ref 3.5–5)
ANION GAP SERPL CALC-SCNC: 11 MMOL/L (ref 5–15)
BACTERIA SPEC CULT: NORMAL
BACTERIA SPEC CULT: NORMAL
BASOPHILS # BLD: 0.1 K/UL (ref 0–0.1)
BASOPHILS NFR BLD: 1 % (ref 0–1)
BUN SERPL-MCNC: 49 MG/DL (ref 6–20)
BUN/CREAT SERPL: 6 (ref 12–20)
CA-I BLD-MCNC: 8 MG/DL (ref 8.5–10.1)
CHLORIDE SERPL-SCNC: 100 MMOL/L (ref 97–108)
CO2 SERPL-SCNC: 21 MMOL/L (ref 21–32)
CREAT SERPL-MCNC: 7.91 MG/DL (ref 0.7–1.3)
CRP SERPL-MCNC: 11.8 MG/DL (ref 0–0.6)
DATE LAST DOSE: NORMAL
DIFFERENTIAL METHOD BLD: ABNORMAL
EOSINOPHIL # BLD: 0.1 K/UL (ref 0–0.4)
EOSINOPHIL NFR BLD: 1 % (ref 0–7)
ERYTHROCYTE [DISTWIDTH] IN BLOOD BY AUTOMATED COUNT: 19 % (ref 11.5–14.5)
GLUCOSE BLD STRIP.AUTO-MCNC: 166 MG/DL (ref 65–117)
GLUCOSE BLD STRIP.AUTO-MCNC: 89 MG/DL (ref 65–117)
GLUCOSE SERPL-MCNC: 112 MG/DL (ref 65–100)
HCT VFR BLD AUTO: 37.9 % (ref 36.6–50.3)
HGB BLD-MCNC: 11.8 G/DL (ref 12.1–17)
IMM GRANULOCYTES # BLD AUTO: 0 K/UL (ref 0–0.04)
IMM GRANULOCYTES NFR BLD AUTO: 0 % (ref 0–0.5)
LYMPHOCYTES # BLD: 1.8 K/UL (ref 0.8–3.5)
LYMPHOCYTES NFR BLD: 20 % (ref 12–49)
MCH RBC QN AUTO: 25.4 PG (ref 26–34)
MCHC RBC AUTO-ENTMCNC: 31.1 G/DL (ref 30–36.5)
MCV RBC AUTO: 81.7 FL (ref 80–99)
MONOCYTES # BLD: 1.7 K/UL (ref 0–1)
MONOCYTES NFR BLD: 19 % (ref 5–13)
NEUTS SEG # BLD: 5.4 K/UL (ref 1.8–8)
NEUTS SEG NFR BLD: 59 % (ref 32–75)
NRBC # BLD: 0 K/UL (ref 0–0.01)
NRBC BLD-RTO: 0 PER 100 WBC
PERFORMED BY, TECHID: ABNORMAL
PERFORMED BY, TECHID: NORMAL
PHOSPHATE SERPL-MCNC: 4 MG/DL (ref 2.6–4.7)
PLATELET # BLD AUTO: 234 K/UL (ref 150–400)
PMV BLD AUTO: 10.7 FL (ref 8.9–12.9)
POTASSIUM SERPL-SCNC: 4 MMOL/L (ref 3.5–5.1)
PROCALCITONIN SERPL-MCNC: 19.35 NG/ML
RBC # BLD AUTO: 4.64 M/UL (ref 4.1–5.7)
REPORTED DOSE,DOSE: NORMAL UNITS
SODIUM SERPL-SCNC: 132 MMOL/L (ref 136–145)
SPECIAL REQUESTS,SREQ: NORMAL
VANCOMYCIN SERPL-MCNC: 15.2 UG/ML
WBC # BLD AUTO: 9.2 K/UL (ref 4.1–11.1)

## 2021-07-10 PROCEDURE — 99232 SBSQ HOSP IP/OBS MODERATE 35: CPT | Performed by: INTERNAL MEDICINE

## 2021-07-10 PROCEDURE — 86140 C-REACTIVE PROTEIN: CPT

## 2021-07-10 PROCEDURE — 74011250637 HC RX REV CODE- 250/637: Performed by: HOSPITALIST

## 2021-07-10 PROCEDURE — 65270000029 HC RM PRIVATE

## 2021-07-10 PROCEDURE — 82962 GLUCOSE BLOOD TEST: CPT

## 2021-07-10 PROCEDURE — 74011250636 HC RX REV CODE- 250/636

## 2021-07-10 PROCEDURE — 74011250637 HC RX REV CODE- 250/637: Performed by: INTERNAL MEDICINE

## 2021-07-10 PROCEDURE — 90935 HEMODIALYSIS ONE EVALUATION: CPT

## 2021-07-10 PROCEDURE — 85025 COMPLETE CBC W/AUTO DIFF WBC: CPT

## 2021-07-10 PROCEDURE — 80069 RENAL FUNCTION PANEL: CPT

## 2021-07-10 PROCEDURE — 74011250636 HC RX REV CODE- 250/636: Performed by: INTERNAL MEDICINE

## 2021-07-10 PROCEDURE — 80202 ASSAY OF VANCOMYCIN: CPT

## 2021-07-10 PROCEDURE — 74011000258 HC RX REV CODE- 258: Performed by: INTERNAL MEDICINE

## 2021-07-10 PROCEDURE — 36415 COLL VENOUS BLD VENIPUNCTURE: CPT

## 2021-07-10 PROCEDURE — 84145 PROCALCITONIN (PCT): CPT

## 2021-07-10 RX ORDER — HEPARIN SODIUM 1000 [USP'U]/ML
INJECTION, SOLUTION INTRAVENOUS; SUBCUTANEOUS
Status: COMPLETED
Start: 2021-07-10 | End: 2021-07-10

## 2021-07-10 RX ORDER — TRAMADOL HYDROCHLORIDE 50 MG/1
TABLET ORAL
Status: DISPENSED
Start: 2021-07-10 | End: 2021-07-10

## 2021-07-10 RX ADMIN — METOPROLOL SUCCINATE 50 MG: 50 TABLET, EXTENDED RELEASE ORAL at 21:03

## 2021-07-10 RX ADMIN — Medication 10 ML: at 05:51

## 2021-07-10 RX ADMIN — VANCOMYCIN HYDROCHLORIDE 1250 MG: 1.25 INJECTION, POWDER, LYOPHILIZED, FOR SOLUTION INTRAVENOUS at 16:15

## 2021-07-10 RX ADMIN — HEPARIN SODIUM 3600 UNITS: 1000 INJECTION, SOLUTION INTRAVENOUS; SUBCUTANEOUS at 11:01

## 2021-07-10 RX ADMIN — MINERAL OIL, PETROLATUM: 425; 568 OINTMENT OPHTHALMIC at 12:07

## 2021-07-10 RX ADMIN — ACYCLOVIR 200 MG: 200 CAPSULE ORAL at 21:03

## 2021-07-10 RX ADMIN — FAMOTIDINE 20 MG: 20 TABLET ORAL at 11:56

## 2021-07-10 RX ADMIN — MINERAL OIL, PETROLATUM: 425; 568 OINTMENT OPHTHALMIC at 21:04

## 2021-07-10 RX ADMIN — DAPTOMYCIN 500 MG: 500 INJECTION, POWDER, LYOPHILIZED, FOR SOLUTION INTRAVENOUS at 13:00

## 2021-07-10 RX ADMIN — Medication 10 ML: at 16:17

## 2021-07-10 RX ADMIN — ACYCLOVIR 200 MG: 200 CAPSULE ORAL at 09:00

## 2021-07-10 RX ADMIN — FAMOTIDINE 20 MG: 20 TABLET ORAL at 21:03

## 2021-07-10 RX ADMIN — ATORVASTATIN CALCIUM 40 MG: 40 TABLET, FILM COATED ORAL at 11:56

## 2021-07-10 RX ADMIN — METOPROLOL SUCCINATE 50 MG: 50 TABLET, EXTENDED RELEASE ORAL at 12:00

## 2021-07-10 RX ADMIN — Medication 10 ML: at 21:09

## 2021-07-10 NOTE — WOUND CARE
IP WOUND CONSULT    Cristopher Gandhi  MEDICAL RECORD NUMBER:  [id number]  AGE: 72 y.o. GENDER: male  : 1950  TODAY'S DATE:  7/10/2021    GENERAL     [] Follow-up   [x] New Consult    Tila Camargo is a 72 y.o. male referred by:   [x] Physician  [] Nursing  [] Other:         PAST MEDICAL HISTORY    Past Medical History:   Diagnosis Date    Asthenia 2021    Cardiomyopathy ([facility] 2021    CHF (congestive heart failure) (HCC)     Chronic kidney disease     CKD (chronic kidney disease)     4    Diabetes ([facility]     End stage renal disease on dialysis ([facility] 2021    Essential hypertension 2021    Gastroesophageal reflux disease 2021    Gastroesophageal reflux disease 2021    Hypertension     Pneumonia due to COVID-19 virus 2338    Systolic CHF, acute on chronic ([facility] 2021        PAST SURGICAL HISTORY    Past Surgical History:   Procedure Laterality Date    COLONOSCOPY N/A 12/3/2020    COLONOSCOPY performed by Alexa Serrato MD [facility] HX HEART CATHETERIZATION      HX HERNIA REPAIR      IR FLUORO GUIDE [facility] CVAD  2021    IR INSERT NON TUNL CVC OVER 5 YRS  2021    IR INSERT TUNL CVC W/O PORT OVER 5 YR  2021    IR PLACE CVAD FLUORO GUIDE  2021       FAMILY HISTORY    Family History   Problem Relation Age of Onset    Diabetes Mother     Heart Failure Father          ALLERGIES    Allergies   Allergen Reactions    Lisinopril Angioedema    Pcn [Penicillins] Rash       MEDICATIONS    No current facility-administered medications on file prior to encounter. Current Outpatient Medications on File Prior to Encounter   Medication Sig Dispense Refill    dextran 70-hypromellose (Artificial Tears,[id number],) ophthalmic solution Administer 1 Drop to both eyes three (3) times daily as needed.  acyclovir (ZOVIRAX) 800 mg tablet Take 800 mg by mouth two (2) times a day.       hydrALAZINE (APRESOLINE) 100 mg tablet Take 0.5 Tabs by mouth three (3) times daily. For systolic under 209 90 Tab 0    albuterol (PROVENTIL HFA, VENTOLIN HFA, PROAIR HFA) 90 mcg/actuation inhaler Take 2 Puffs by inhalation every four (4) hours as needed for Wheezing or Shortness of Breath. 1 Inhaler 0    metoprolol succinate (TOPROL-XL) 50 mg XL tablet Take 50 mg by mouth two (2) times a day.  calcitRIOL (ROCALTROL) 0.25 mcg capsule Take 0.25 mcg by mouth daily.  atorvastatin (LIPITOR) 40 mg tablet Take 40 mg by mouth daily. [unfilled]  Visit Vitals  /70   Pulse 100   Temp 98.4 °F (36.9 °C) (Oral)   Resp 18   Ht 5' 10\" (1.778 m)   Wt 79.1 kg (174 lb 6.1 oz)   SpO2 98%   BMI 25.02 kg/m²       ASSESSMENT     Wound Identification & Type: Stage 4 PI to coccyx (wound to inner buttocks is well-healed compared to previous admission in January 2021, open area is now at coccyx). Dressing change: Yes, packed with Wanderu Ag rope and covered with Optifoam Border Sacrum  Verbal consent for picture: Yes    Contributing Factors: anticoagulation therapy, diabetes, poor glucose control, decreased mobility, shear force and incontinence of urine    Wound Buttocks (Active)   Wound Image   07/10/21 1318   Wound Etiology Pressure Stage 4 07/10/21 1318   Dressing Status New dressing applied 07/10/21 1318   Cleansed Cleansed with saline 07/10/21 1318   Dressing/Treatment Alginate with Ag; Foam 07/10/21 1318   Dressing Change Due 07/11/21 07/10/21 1318   Wound Length (cm) 2 cm 07/10/21 1318   Wound Width (cm) 1.5 cm 07/10/21 1318   Wound Depth (cm) 1.8 cm 07/10/21 1318   Wound Surface Area (cm^2) 3 cm^2 07/10/21 1318   Change in Wound Size % 93.88 07/10/21 1318   Wound Volume (cm^3) 5.4 cm^3 07/10/21 1318   Wound Assessment Pink/red 07/10/21 1318   Drainage Amount Scant 07/10/21 1318   Drainage Description Serous 07/10/21 1318   Wound Odor None 07/10/21 1318   Carole-Wound/Incision Assessment Maceration 07/10/21 1318   Edges Defined edges 07/10/21 1318 Wound Thickness Description Full thickness 07/10/21 1318   Number of days: 171       Wound Heel Left;Posterior small, discolored, blanchable (Active)   Number of days: 170       Wound Wrist Left Healing surgical site. No redness, swelling, or drainage. Present upon arrival to ED (Active)   Number of days: 3       Incision 01/23/21 Cervical anterior Right (Active)   Number of days: 168          PLAN     Skin Care & Pressure Relief Recommendations  Minimize layers of linen  Turn/reposition approximately every 2 hours  Pillow wedges  Manage incontinence   Promote continence; Skin Protective lotion/cream to buttocks and sacrum daily and as needed with incontinence care  Offload heels pillows    Vik Cornejo  Blood Glucose: 112 on 7/10/21                              Albumin: 2.3 on 7/10/21  WBCs: 9.2 on 7/10/21    Support Surface: Gel mattress    Physician/Provider notified:   Recommendations: Wound is much improved compared to previous admission in January 2021. Pack with Genterpret rope and cover with Thinkature Sacrum daily and prn for soiling. Patient laying fully on right side. Per patient, he rarely lays flat on back. Turns self in bed independently as witnessed. Per patient, he makes urine sometimes. Currently wearing brief from home but agreeable to removing and using  incontinence system for men. Apply PrimoFit Male  incontinence system or Quick Change wrap to manage  incontinence. Discussed plan with Cassy Victoria RN. Ensure heels are floated with pillows and offload when in bed. Ensure turning q2h at 30 degree angle. Encourage OOB to chair 3 x daily if approved by PT. Will continue to follow.         Teaching completed with:   [] Patient           [] Family member       [] Caregiver          [] Nursing  [] Other    Patient/Caregiver Teaching:  Level of patient/caregiver understanding able to:   [] Indicates understanding       [] Needs reinforcement  [] Unsuccessful      [] Verbal Understanding  [] Demonstrated understanding       [] No evidence of learning  [] Refused teaching         [] N/A       Electronically signed by Milton Carcamo RN on 7/10/2021 at 1:23 PM

## 2021-07-10 NOTE — PROGRESS NOTES
Hospitalist Progress Note    Subjective:   Daily Progress Note: 7/10/2021 9:47 AM    Hospital Course:  Rayshawn Murphy a 72 y. o. male who has a history of end-stage renal disease on hemodialysis, congestive heart failure, cardiomyopathy, and diabetes who came to ER with a complaint of generalized weakness and recurrent falls at home.  Patient states that he has been very weak recently. In the ER, patient was found to have fever of 103 °F. Elevated WBC, procalcitonin, and CRP on admission.    Also noted to have facial shingle crustations around left forehead with associated neuropathy pain. Patient is noticed to have poor care of his dialysis catheter.  Hospitalist service has been requested to admit the patient for further management. Started on IV vancomycin and cefepime. Consults placed with nephrology and infectious disease. Blood cultures returned positive for gram-positive cocci in clusters. Subjective:    Patient seen in dialysis. WBC within normal range, afebrile. No overnight events noted. Current Facility-Administered Medications   Medication Dose Route Frequency    traMADoL (ULTRAM) 50 mg tablet        heparin (porcine) 1,000 unit/mL injection        white petrolatum-mineral oiL (LACRILUBE S.O.P.) ointment   Both Eyes Q12H    atorvastatin (LIPITOR) tablet 40 mg  40 mg Oral DAILY    metoprolol succinate (TOPROL-XL) XL tablet 50 mg  50 mg Oral BID    Vancomycin random level to be drawn on 7/10/21 at 0600 pre HD.    Other ONCE    traMADoL (ULTRAM) tablet 50 mg  50 mg Oral Q6H PRN    polyvinyl alcohol-povidon(PF) (REFRESH CLASSIC) 1.4-0.6 % ophthalmic solution 1 Drop  1 Drop Both Eyes PRN    heparin (porcine) 1,000 unit/mL injection 3,600 Units  3,600 Units Hemodialysis DIALYSIS PRN    sodium chloride (NS) flush 5-10 mL  5-10 mL IntraVENous PRN    sodium chloride (NS) flush 5-40 mL  5-40 mL IntraVENous Q8H    sodium chloride (NS) flush 5-40 mL  5-40 mL IntraVENous PRN    acetaminophen (TYLENOL) tablet 650 mg  650 mg Oral Q6H PRN    Or    acetaminophen (TYLENOL) suppository 650 mg  650 mg Rectal Q6H PRN    polyethylene glycol (MIRALAX) packet 17 g  17 g Oral DAILY PRN    promethazine (PHENERGAN) tablet 12.5 mg  12.5 mg Oral Q6H PRN    Or    ondansetron (ZOFRAN) injection 4 mg  4 mg IntraVENous Q6H PRN    famotidine (PEPCID) tablet 20 mg  20 mg Oral BID    Vancomycin dose per pharmacy protocol   Other Rx Dosing/Monitoring    acyclovir (ZOVIRAX) capsule 200 mg  200 mg Oral BID        Review of Systems  Constitutional: Positive for malaise/fatigue. HENT: Negative. Respiratory: Negative. Cardiovascular: Negative. Gastrointestinal: Negative. Musculoskeletal: Positive for myalgias. Neurological: Positive for weakness. Nerve pain from facial shingles. Objective:     Visit Vitals  /65   Pulse 68   Temp 98.1 °F (36.7 °C) (Oral)   Resp 18   Ht 5' 10\" (1.778 m)   Wt 79.1 kg (174 lb 6.1 oz)   SpO2 98%   BMI 25.02 kg/m²      O2 Device: None (Room air)    Temp (24hrs), Av.7 °F (37.1 °C), Min:98.1 °F (36.7 °C), Max:100.1 °F (37.8 °C)      No intake/output data recorded.  1901 - 07/10 0700  In: 0 [P.O.:1050]  Out: -     PHYSICAL EXAM:  Constitutional: Ill-appearing, obese male. No acute distress  Skin: Right-sided permacath. Extremities and face reveal no rashes. HEENT: Mild left eye redness. Crusted lesions on forehead. PERRL. No oral ulcers. The neck is supple and no masses. Cardiovascular: Regular rate and rhythm. Normal S1/S2. No murmur appreciated. Respiratory:  Clear breath sounds bilaterally with no wheezes, rales, or rhonchi. GI: Abdomen nondistended, soft, and nontender. Normal active bowel sounds. Rectal: Deferred   Musculoskeletal: No pitting edema of the lower legs. Able to move all ext  Neurological:  Patient is alert and oriented.  Cranial nerves II-XII grossly intact  Psychiatric: Mood appears appropriate       Data Review    Recent Results (from the past 24 hour(s))   VANCOMYCIN, RANDOM    Collection Time: 07/10/21  6:10 AM   Result Value Ref Range    Vancomycin, random 15.2 ug/mL    Reported dose date Not provided      Reported dose: Not provided Units   RENAL FUNCTION PANEL    Collection Time: 07/10/21  6:10 AM   Result Value Ref Range    Sodium 132 (L) 136 - 145 mmol/L    Potassium 4.0 3.5 - 5.1 mmol/L    Chloride 100 97 - 108 mmol/L    CO2 21 21 - 32 mmol/L    Anion gap 11 5 - 15 mmol/L    Glucose 112 (H) 65 - 100 mg/dL    BUN 49 (H) 6 - 20 mg/dL    Creatinine 7.91 (H) 0.70 - 1.30 mg/dL    BUN/Creatinine ratio 6 (L) 12 - 20      GFR est AA 8 (L) >60 ml/min/1.73m2    GFR est non-AA 7 (L) >60 ml/min/1.73m2    Calcium 8.0 (L) 8.5 - 10.1 mg/dL    Phosphorus 4.0 2.6 - 4.7 mg/dL    Albumin 2.3 (L) 3.5 - 5.0 g/dL   CBC WITH AUTOMATED DIFF    Collection Time: 07/10/21  6:10 AM   Result Value Ref Range    WBC 9.2 4.1 - 11.1 K/uL    RBC 4.64 4.10 - 5.70 M/uL    HGB 11.8 (L) 12.1 - 17.0 g/dL    HCT 37.9 36.6 - 50.3 %    MCV 81.7 80.0 - 99.0 FL    MCH 25.4 (L) 26.0 - 34.0 PG    MCHC 31.1 30.0 - 36.5 g/dL    RDW 19.0 (H) 11.5 - 14.5 %    PLATELET 739 613 - 631 K/uL    MPV 10.7 8.9 - 12.9 FL    NRBC 0.0 0.0  WBC    ABSOLUTE NRBC 0.00 0.00 - 0.01 K/uL    NEUTROPHILS 59 32 - 75 %    LYMPHOCYTES 20 12 - 49 %    MONOCYTES 19 (H) 5 - 13 %    EOSINOPHILS 1 0 - 7 %    BASOPHILS 1 0 - 1 %    IMMATURE GRANULOCYTES 0 0 - 0.5 %    ABS. NEUTROPHILS 5.4 1.8 - 8.0 K/UL    ABS. LYMPHOCYTES 1.8 0.8 - 3.5 K/UL    ABS. MONOCYTES 1.7 (H) 0.0 - 1.0 K/UL    ABS. EOSINOPHILS 0.1 0.0 - 0.4 K/UL    ABS. BASOPHILS 0.1 0.0 - 0.1 K/UL    ABS. IMM.  GRANS. 0.0 0.00 - 0.04 K/UL    DF AUTOMATED     C REACTIVE PROTEIN, QT    Collection Time: 07/10/21  6:10 AM   Result Value Ref Range    C-Reactive protein 11.80 (H) 0.00 - 0.60 mg/dL   PROCALCITONIN    Collection Time: 07/10/21  6:10 AM   Result Value Ref Range    Procalcitonin 19.35 (H) 0 ng/mL       CT ABD PELV WO CONT   Final Result   Pronounced ascites. No evidence of a fever source. No evidence of   hydronephrosis      XR CHEST PORT   Final Result   Mild central pulmonary vascular congestion. XR CHEST PA LAT   Final Result   Lung base opacity likely atelectasis. No focal airspace   consolidation. Evidence of pulmonary vascular congestion without overt edema. Active Problems:    Essential hypertension (1/24/2021)      Cardiomyopathy ([address] (1/24/2021)      Sepsis ([address] (7/7/2021)      ESRD (end stage renal disease) on dialysis ([address] (7/7/2021)      Line sepsis ([address] (7/7/2021)      Herpes zoster with nervous system complication (3/4/9325)        Assessment/Plan:     1. Sepsis:  Secondary to bacteremia  Noted poor HD line hygiene  Elevated WBC, procalcitonin and CRP. WBC now normal, procal and CRP trending down. Cultures positive for gram-positive cocci in clusters  ID consulted   On IV vancomycin     2. ESRD on HD:  Nephrology consulted  Currently has permacath in place     3.  Cardiomyopathy:  Systolic and diastolic stage III heart failure with severe TR  Hydralazine 100 mg 3 times daily, hold for SBP less than 110, metoprolol 50 mg twice daily     4. Shingles with neuropathy symptoms  5. Ophthalmic herpes zoster  Continue acyclovir day #13/14  F/u with Dr. Quoc Goetz ([phone]), his Ophthalmologist, in outpatient setting      DVT Prophylaxis:  Code Status: Full Code  POA/NOK:    Disposition/barriers: Response to treatment  Care Plan discussed with: Patient, RN    Total time spent with patient: >35 minutes.

## 2021-07-10 NOTE — PROGRESS NOTES
Progress Note    Patient: Catherine Warner MRN: [id number]  SSN: [id number]    YOB: 1950  Age: 72 y.o. Sex: male      Admit Date: 7/7/2021    LOS: 3 days     Subjective:   Patient followed for sepsis of unclear etiology but with concern for dialysis catheter related bacteremia. Blood cultures now growing GPC in clusters and urine culture negative. He has low grade temperatures with normal WBC but elevated procal and CRP. He is currently on Vancomycin alone. Patient resting comfortably with no complaints. Objective:     Vitals:    07/10/21 0730 07/10/21 0800 07/10/21 0830 07/10/21 0900   BP: 117/71 118/76 128/60 131/65   Pulse: 77 70 72 68   Resp: 18 18 18 18   Temp: 98.1 °F (36.7 °C)      TempSrc: Oral      SpO2:       Weight:       Height:            Intake and Output:  Current Shift: No intake/output data recorded. Last three shifts: 07/08 1901 - 07/10 0700  In: 1050 [P.O.:1050]  Out: -     Physical Exam:    Vitals and nursing note reviewed. Constitutional:       Appearance: He is obese. He is ill-appearing. HENT:      Head: Normocephalic and atraumatic. Right Ear: External ear normal.      Left Ear: External ear normal.      Nose: Nose normal.      Mouth/Throat:      Pharynx: Oropharynx is clear. Eyes:      General:         Left eye: redness resolving     Pupils: Pupils are equal, round, and reactive to light. Cardiovascular:      Rate and Rhythm: Normal rate and regular rhythm. Comments: Right sided Permacath with site hyperpigmentation but no purulent drainage, erythema or tenderness  Musculoskeletal:      Cervical back: Neck supple. Right lower leg: No edema. Left lower leg: No edema. Skin:     Findings: Rash present. Comments: Dry crusted lesions left forehead   Neurological:      General: No focal deficit present. Mental Status: He is alert and oriented to person, place, and time.    Psychiatric:         Mood and Affect: Mood normal. Behavior: Behavior normal.         Thought Content: Thought content normal.         Judgment: Judgment normal.     Lab/Data Review:     WBC 9,200  Lactic acid 2.5    Procal 19.35 <21.98 <23.29  CRP 11.80 <11.20 <15.20    Blood cultures (7/7) Staphylococcus aureus (MRSA)  Blood cultures (7/8) GPC in clusters  Urine culture (7/7) No growth FINAL  Assessment:     Active Problems:    Essential hypertension (1/24/2021)      Cardiomyopathy ([address] (1/24/2021)      Sepsis ([address] (7/7/2021)      ESRD (end stage renal disease) on dialysis ([facility] (7/7/2021)      Line sepsis ([facility] [address] (7/7/2021)      Herpes zoster with nervous system complication (0/5/8491)    1. Sepsis with fever, leukocytosis, elevated procal and CRP  2. Permacath related bacteremia, secondary to Staphylococcus aureus (MRSA), on IV Vancomycin  3. Dermatomal zoster, CN V1, left side, resolving, Day #13/14 Acyclovir  4. CKD Stage 4 on dialysis  5. Nonobstructing renal calculi  6. Generalized weakness  7. Penicillin allergy    Comment:  It appears that repeat blood cultures are positive. We can still try to treat through without removing Permacath. Will add Daptomycin for additive coverage since Ceftaroline might cause hypersensitivity reaction because of Penicillin allergy. Plan:   1. Continue IV Vancomcyin  2. Start Daptomycin 6 mg/kg IV q48 hours for additive therapy  3. Continue Acyclovir for presumptive eye involvement; artificial tears  4. Follow-up blood cultures  5. In am, repeat procal and CRP, repeat blood cultures, check CK  6.  Contact Dr. Judd Lee ([phone]), his Ophthalmologist regarding his diagnosis; unable to reach today       Signed By: Mary Dalton MD     July 10, 2021

## 2021-07-10 NOTE — PROGRESS NOTES
Nephrology Consult    Patient: Ran Jameson MRN: [id number]  SSN: [id number]    YOB: 1950  Age: 72 y.o.   Sex: male      Subjective:   Pt is seen on dialysis  He is looking more alert  No new complaints  Afebrile, normal wbc, BP ok  BC grew MRSA  CT abdomen no hydronephrosis but ascites       Past Medical History:   Diagnosis Date    Asthenia 1/24/2021    Cardiomyopathy ([facility] 1/24/2021    CHF (congestive heart failure) (HCC)     Chronic kidney disease     CKD (chronic kidney disease)     4    Diabetes ([address]     End stage renal disease on dialysis ([facility] [address] 1/24/2021    Essential hypertension 1/24/2021    Gastroesophageal reflux disease 1/24/2021    Gastroesophageal reflux disease 1/24/2021    Hypertension     Pneumonia due to COVID-19 virus 7/23/3473    Systolic CHF, acute on chronic ([facility] [address] 1/24/2021     Past Surgical History:   Procedure Laterality Date    COLONOSCOPY N/A 12/3/2020    COLONOSCOPY performed by Juan Manuel Pablo MD at [facility] HX HEART CATHETERIZATION      HX HERNIA REPAIR       [address] CVAD  1/22/2021    IR INSERT NON TUNL CVC OVER 5 YRS  1/18/2021    IR INSERT TUNL CVC W/O PORT OVER 5 YR  1/22/2021    IR PLACE CVAD FLUORO GUIDE  1/18/2021      Family History   Problem Relation Age of Onset    Diabetes Mother     Heart Failure Father      Social History     Tobacco Use    Smoking status: Never Smoker    Smokeless tobacco: Never Used   Substance Use Topics    Alcohol use: Not Currently      Current Facility-Administered Medications   Medication Dose Route Frequency Provider Last Rate Last Admin    traMADoL (ULTRAM) 50 mg tablet             white petrolatum-mineral oiL (LACRILUBE S.O.P.) ointment   Both Eyes Q12H Charis Choudhury MD   Given at 07/09/21 2100    atorvastatin (LIPITOR) tablet 40 mg  40 mg Oral DAILY Trinidad Gonzalez MD   40 mg at 07/09/21 1020    metoprolol succinate (TOPROL-XL) XL tablet 50 mg  50 mg Oral BID Trinidad Gonzalez MD   50 mg at 07/09/21 2021    Vancomycin random level to be drawn on 7/10/21 at 0600 pre HD. Other Shirley Veras MD        traMADoL [employer] Bracket) tablet 50 mg  50 mg Oral Q6H PRN Debbie Kingsley, NP   50 mg at 07/09/21 2313    polyvinyl alcohol-povidon(PF) (REFRESH CLASSIC) 1.4-0.6 % ophthalmic solution 1 Drop  1 Drop Both Eyes PRN Debbie Kingsley, NP        heparin (porcine) 1,000 unit/mL injection 3,600 Units  3,600 Units Hemodialysis DIALYSIS PRN Uriel Worley MD   3,600 Units at 07/08/21 1543    sodium chloride (NS) flush 5-10 mL  5-10 mL IntraVENous PRN Dash Nobles MD        sodium chloride (NS) flush 5-40 mL  5-40 mL IntraVENous Q8H Dash Nobles MD   10 mL at 07/10/21 0551    sodium chloride (NS) flush 5-40 mL  5-40 mL IntraVENous PRN Dash Nobles MD        acetaminophen (TYLENOL) tablet 650 mg  650 mg Oral Q6H PRN Dash Nobles MD   650 mg at 07/09/21 1607    Or    acetaminophen (TYLENOL) suppository 650 mg  650 mg Rectal Q6H PRN Dash Nobles MD        polyethylene glycol (MIRALAX) packet 17 g  17 g Oral DAILY PRN Dash Nobles MD        promethazine (PHENERGAN) tablet 12.5 mg  12.5 mg Oral Q6H PRN Dash Nobles MD        Or    ondansetron [facility]) injection 4 mg  4 mg IntraVENous Q6H PRN Dash Nobles MD        famotidine (PEPCID) tablet 20 mg  20 mg Oral BID Dash Nobles MD   20 mg at 07/09/21 2020    Vancomycin dose per pharmacy protocol   Other Rx Dosing/Monitoring Lemont Ormond, MD        acyclovir (ZOVIRAX) capsule 200 mg  200 mg Oral BID Lemont Ormond, MD   200 mg at 07/09/21 2021        Allergies   Allergen Reactions    Lisinopril Angioedema    Pcn [Penicillins] Rash       Review of Systems:  A comprehensive review of systems was negative except for that written in the History of Present Illness.     Objective:     Vitals:    07/09/21 2000 07/09/21 2016 07/09/21 2316 07/10/21 0730   BP:  99/60 110/68 117/71   Pulse: 80 70 80 77   Resp:  16 16 18 Temp:  98.8 °F (37.1 °C) 98.2 °F (36.8 °C) 98.1 °F (36.7 °C)   TempSrc:    Oral   SpO2:  95% 98%    Weight:       Height:            Physical Exam:  General: NAD  Eyes: sclera anicteric  Oral Cavity: No thrush or ulcers  Neck: no JVD  Chest: Fair bilateral air entry  Heart: normal sounds  Abdomen: soft and non tender   : no simms  Lower Extremities: no edema  Skin: no rash  Neuro: intact  Psychiatric: non-depressed    RI Perm cath, clean exit site and no drainage  L wrist AVF with good thrill          Assessment:     Hospital Problems  Date Reviewed: 1/24/2021        Codes Class Noted POA    Herpes zoster with nervous system complication WOM-08-DM: X36.99  ICD-9-CM: 053.10  7/8/2021 Unknown        Sepsis ([facility] ICD-10-CM: A41.9  ICD-9-CM: 038.9, 995.91  7/7/2021 Unknown        ESRD (end stage renal disease) on dialysis [facility]) ICD-10-CM: N18.6, Z99.2  ICD-9-CM: 585.6, V45.11  7/7/2021 Unknown        Line sepsis ([facility] ICD-10-CM: T85.79XA, A41.9  ICD-9-CM: 996.62, 038.9, 995.91  7/7/2021 Unknown        Essential hypertension ICD-10-CM: I10  ICD-9-CM: 401.9  1/24/2021 Yes        Cardiomyopathy ([facility] (Chronic) ICD-10-CM: I42.9  ICD-9-CM: 425.4  1/24/2021 Yes              Plan:     1 ESRD. -On hemodialysis Tuesday Thursday Saturday.    -He was last dialyzed on 7/6 as an outpatient.    -No volume overload,  not looking uremic.    -s/p dialyzed on 7/08 and removed 2L  -He has permacath is a dialysis access.    -He has a maturing left wrist AV fistula. 2.  Sepsis:  -2/2 bacteremia  -BC grew GPC in clusters   -Came with temp 103, WBC 14.8.    -Chest x-ray no infiltrates. -Urine 20-30 WBCs and trace LE. -Urine culture is neg.    -His catheter site looks clean with no drainage.    -plan to keep on iv vancomycin and cefepime (dced). -He is afebrile and leukocytosis has been improved. -Hemodynamically stable.    -No indication to remove the perm cath if ok with ID   - CT of the abdomen no hydronephrosis.    -ID specialist on board. 3.  Metabolic acidosis, high anion gap. -CO2 up from 13-->22. -LA 2.5   -Lactic acid was 1.4 on 7/7.    -He also complaining of loose stools. -dialyze him with high bicarb bath. 4.  Anemia.    -Hemoglobin at goal.    -No indication for erythropoietin. 5.  Renal osteodystrophy.    -His calcium and phos are ok.    -pending intact PTH level. 6 ophthalmic herpes zoster.    -Left ophthalmic artery zoster.    -Seen by ophthalmology as an outpatient.    -On acyclovir 800 mg twice a day.       Signed By: Reba Holstein, MD     July 10, 2021

## 2021-07-10 NOTE — PROGRESS NOTES
Consult for Vancomycin Dosing by Pharmacy by Dr. Amilcar Chavez provided for this 72y.o. year old male on HD (TTS), for indication of sepsis. Day of Therapy: 4  Goal of Level(s): 15-20mcg/dL    Significant Cultures:       Date                             Culture                                       Organism      7/7                                Blood x4                                     MRSA    Serum Creatinine Creatinine   Date Value Ref Range Status   07/10/2021 7.91 (H) 0.70 - 1.30 mg/dL Final   07/09/2021 7.04 (H) 0.70 - 1.30 mg/dL Final   07/09/2021 6.91 (H) 0.70 - 1.30 mg/dL Final     Comment:     Investigated per delta check protocol      Creatinine Clearance Estimated Creatinine Clearance: 9.1 mL/min (A) (based on SCr of 7.91 mg/dL (H)). BUN Lab Results   Component Value Date/Time    BUN 49 (H) 07/10/2021 06:10 AM      WBC Lab Results   Component Value Date/Time    WBC 9.2 07/10/2021 06:10 AM      Temp Temp Readings from Last 1 Encounters:   07/10/21 98.1 °F (36.7 °C) (Oral)        Last Level:    Vancomycin, random   Date/Time Value Ref Range Status   07/10/2021 06:10 AM 15.2 ug/mL Final     Comment:     No reference range has been established. Ht Readings from Last 1 Encounters:   07/07/21 177.8 cm (70\")        Wt Readings from Last 1 Encounters:   07/09/21 79.1 kg (174 lb 6.1 oz)     Ideal body weight: 73 kg (160 lb 15 oz)  Adjusted ideal body weight: 75.4 kg (166 lb 5 oz)     Previous Regimen: 1250mg IV x1 (7/8)    New Regimen:   Start Vancomycin 1250mg IV x1 post HD. Pharmacy to follow daily and will make changes to dose and/or frequency based on clinical status.   _________________________________     Pharmacist Estephania Garibay

## 2021-07-11 LAB
ANION GAP SERPL CALC-SCNC: 11 MMOL/L (ref 5–15)
BACTERIA SPEC CULT: ABNORMAL
BACTERIA SPEC CULT: ABNORMAL
BASOPHILS # BLD: 0.1 K/UL (ref 0–0.1)
BASOPHILS NFR BLD: 1 % (ref 0–1)
BUN SERPL-MCNC: 33 MG/DL (ref 6–20)
BUN/CREAT SERPL: 5 (ref 12–20)
CA-I BLD-MCNC: 8.1 MG/DL (ref 8.5–10.1)
CHLORIDE SERPL-SCNC: 100 MMOL/L (ref 97–108)
CK SERPL-CCNC: 80 U/L (ref 39–308)
CO2 SERPL-SCNC: 22 MMOL/L (ref 21–32)
CREAT SERPL-MCNC: 6.04 MG/DL (ref 0.7–1.3)
CRP SERPL-MCNC: 15.1 MG/DL (ref 0–0.6)
DIFFERENTIAL METHOD BLD: ABNORMAL
EOSINOPHIL # BLD: 0.1 K/UL (ref 0–0.4)
EOSINOPHIL NFR BLD: 1 % (ref 0–7)
ERYTHROCYTE [DISTWIDTH] IN BLOOD BY AUTOMATED COUNT: 18.6 % (ref 11.5–14.5)
GLUCOSE SERPL-MCNC: 89 MG/DL (ref 65–100)
HCT VFR BLD AUTO: 38.9 % (ref 36.6–50.3)
HGB BLD-MCNC: 12 G/DL (ref 12.1–17)
IMM GRANULOCYTES # BLD AUTO: 0 K/UL (ref 0–0.04)
IMM GRANULOCYTES NFR BLD AUTO: 0 % (ref 0–0.5)
LYMPHOCYTES # BLD: 1.8 K/UL (ref 0.8–3.5)
LYMPHOCYTES NFR BLD: 19 % (ref 12–49)
MCH RBC QN AUTO: 25.1 PG (ref 26–34)
MCHC RBC AUTO-ENTMCNC: 30.8 G/DL (ref 30–36.5)
MCV RBC AUTO: 81.4 FL (ref 80–99)
MONOCYTES # BLD: 1.8 K/UL (ref 0–1)
MONOCYTES NFR BLD: 19 % (ref 5–13)
NEUTS SEG # BLD: 5.6 K/UL (ref 1.8–8)
NEUTS SEG NFR BLD: 60 % (ref 32–75)
NRBC # BLD: 0 K/UL (ref 0–0.01)
NRBC BLD-RTO: 0 PER 100 WBC
PLATELET # BLD AUTO: 260 K/UL (ref 150–400)
PMV BLD AUTO: 11.2 FL (ref 8.9–12.9)
POTASSIUM SERPL-SCNC: 3.6 MMOL/L (ref 3.5–5.1)
PROCALCITONIN SERPL-MCNC: 13.79 NG/ML
RBC # BLD AUTO: 4.78 M/UL (ref 4.1–5.7)
SODIUM SERPL-SCNC: 133 MMOL/L (ref 136–145)
SPECIAL REQUESTS,SREQ: ABNORMAL
SPECIAL REQUESTS,SREQ: ABNORMAL
WBC # BLD AUTO: 9.4 K/UL (ref 4.1–11.1)

## 2021-07-11 PROCEDURE — 65270000029 HC RM PRIVATE

## 2021-07-11 PROCEDURE — 87040 BLOOD CULTURE FOR BACTERIA: CPT

## 2021-07-11 PROCEDURE — 74011250637 HC RX REV CODE- 250/637: Performed by: INTERNAL MEDICINE

## 2021-07-11 PROCEDURE — 99232 SBSQ HOSP IP/OBS MODERATE 35: CPT | Performed by: INTERNAL MEDICINE

## 2021-07-11 PROCEDURE — 82550 ASSAY OF CK (CPK): CPT

## 2021-07-11 PROCEDURE — 74011250637 HC RX REV CODE- 250/637: Performed by: HOSPITALIST

## 2021-07-11 PROCEDURE — 84145 PROCALCITONIN (PCT): CPT

## 2021-07-11 PROCEDURE — 86140 C-REACTIVE PROTEIN: CPT

## 2021-07-11 PROCEDURE — 74011250637 HC RX REV CODE- 250/637: Performed by: NURSE PRACTITIONER

## 2021-07-11 PROCEDURE — 85025 COMPLETE CBC W/AUTO DIFF WBC: CPT

## 2021-07-11 PROCEDURE — 80048 BASIC METABOLIC PNL TOTAL CA: CPT

## 2021-07-11 PROCEDURE — 36415 COLL VENOUS BLD VENIPUNCTURE: CPT

## 2021-07-11 RX ADMIN — Medication 10 ML: at 05:51

## 2021-07-11 RX ADMIN — METOPROLOL SUCCINATE 50 MG: 50 TABLET, EXTENDED RELEASE ORAL at 22:01

## 2021-07-11 RX ADMIN — FAMOTIDINE 20 MG: 20 TABLET ORAL at 22:01

## 2021-07-11 RX ADMIN — Medication 10 ML: at 13:57

## 2021-07-11 RX ADMIN — METOPROLOL SUCCINATE 50 MG: 50 TABLET, EXTENDED RELEASE ORAL at 08:33

## 2021-07-11 RX ADMIN — ACYCLOVIR 200 MG: 200 CAPSULE ORAL at 08:33

## 2021-07-11 RX ADMIN — TRAMADOL HYDROCHLORIDE 50 MG: 50 TABLET, FILM COATED ORAL at 08:39

## 2021-07-11 RX ADMIN — Medication 10 ML: at 22:00

## 2021-07-11 RX ADMIN — ATORVASTATIN CALCIUM 40 MG: 40 TABLET, FILM COATED ORAL at 08:33

## 2021-07-11 RX ADMIN — MINERAL OIL, PETROLATUM: 425; 568 OINTMENT OPHTHALMIC at 08:34

## 2021-07-11 RX ADMIN — MINERAL OIL, PETROLATUM: 425; 568 OINTMENT OPHTHALMIC at 22:02

## 2021-07-11 RX ADMIN — FAMOTIDINE 20 MG: 20 TABLET ORAL at 08:33

## 2021-07-11 NOTE — PROGRESS NOTES
Problem: Falls - Risk of  Goal: *Absence of Falls  Description: Document Patrick Gomez Fall Risk and appropriate interventions in the flowsheet.   Outcome: Progressing Towards Goal  Note: Fall Risk Interventions:  Mobility Interventions: Patient to call before getting OOB    Mentation Interventions: Adequate sleep, hydration, pain control    Medication Interventions: Patient to call before getting OOB    Elimination Interventions: Call light in reach    History of Falls Interventions: Bed/chair exit alarm

## 2021-07-11 NOTE — PROGRESS NOTES
Nephrology Consult    Patient: Renee Treviño MRN: [id number]  SSN: [id number]    YOB: 1950  Age: 72 y.o.   Sex: male      Subjective:   Pt is seen in the room  He is looking more alert  No new complaints  Afebrile, normal wbc, BP ok  BC grew MRSA  CT abdomen no hydronephrosis but ascites       Past Medical History:   Diagnosis Date    Asthenia 1/24/2021    Cardiomyopathy ([address] 1/24/2021    CHF (congestive heart failure) ([address]     Chronic kidney disease     CKD (chronic kidney disease)     4    Diabetes ([address]     End stage renal disease on dialysis ([facility] 1/24/2021    Essential hypertension 1/24/2021    Gastroesophageal reflux disease 1/24/2021    Gastroesophageal reflux disease 1/24/2021    Hypertension     Pneumonia due to COVID-19 virus 0/62/0955    Systolic CHF, acute on chronic ([facility] 1/24/2021     Past Surgical History:   Procedure Laterality Date    COLONOSCOPY N/A 12/3/2020    COLONOSCOPY performed by Azam Holden MD at [facility] HX HEART CATHETERIZATION      HX HERNIA REPAIR       [address] CVAD  1/22/2021    IR INSERT NON TUNL CVC OVER 5 YRS  1/18/2021    IR INSERT TUNL CVC W/O PORT OVER 5 YR  1/22/2021    IR PLACE CVAD FLUORO GUIDE  1/18/2021      Family History   Problem Relation Age of Onset    Diabetes Mother     Heart Failure Father      Social History     Tobacco Use    Smoking status: Never Smoker    Smokeless tobacco: Never Used   Substance Use Topics    Alcohol use: Not Currently      Current Facility-Administered Medications   Medication Dose Route Frequency Provider Last Rate Last Admin    DAPTOmycin (CUBICIN) 500 mg in 0.9% sodium chloride 50 mL IVPB RF formulation  500 mg IntraVENous Q48H Shazia Duran  mL/hr at 07/10/21 1300 500 mg at 07/10/21 1300    white petrolatum-mineral oiL (LACRILUBE S.O.P.) ointment   Both Eyes Q12H Shazia Duran MD   Given at 07/11/21 0834    atorvastatin (LIPITOR) tablet 40 mg  40 mg Oral DAILY Sage Cosby MD   40 mg at 07/11/21 1903    metoprolol succinate (TOPROL-XL) XL tablet 50 mg  50 mg Oral BID Sage Cosby MD   50 mg at 07/11/21 3421    traMADoL (ULTRAM) tablet 50 mg  50 mg Oral Q6H PRN Caity Fitzgerald NP   50 mg at 07/11/21 0839    polyvinyl alcohol-povidon(PF) (REFRESH CLASSIC) 1.4-0.6 % ophthalmic solution 1 Drop  1 Drop Both Eyes PRN Caity Fitzgerald NP        heparin (porcine) 1,000 unit/mL injection 3,600 Units  3,600 Units Hemodialysis DIALYSIS PRN Fatmata Jenkins MD   3,600 Units at 07/10/21 1101    sodium chloride (NS) flush 5-10 mL  5-10 mL IntraVENous PRN Sage Cosby MD        sodium chloride (NS) flush 5-40 mL  5-40 mL IntraVENous Q8H Sage Cosby MD   10 mL at 07/11/21 0551    sodium chloride (NS) flush 5-40 mL  5-40 mL IntraVENous PRN Sage Cosby MD        acetaminophen (TYLENOL) tablet 650 mg  650 mg Oral Q6H PRN Sage Cosby MD   650 mg at 07/09/21 1607    Or    acetaminophen (TYLENOL) suppository 650 mg  650 mg Rectal Q6H PRN Sage Cosby MD        polyethylene glycol (MIRALAX) packet 17 g  17 g Oral DAILY PRN Sage Cosby MD        promethazine (PHENERGAN) tablet 12.5 mg  12.5 mg Oral Q6H PRN Sage Cosby MD        Or    [facility] 4 mg  4 mg IntraVENous Q6H PRN Sage Cosby MD        famotidine (PEPCID) tablet 20 mg  20 mg Oral BID Sage Cosby MD   20 mg at 07/11/21 4127    Vancomycin dose per pharmacy protocol   Other Rx Dosing/Monitoring Cornel Salgado MD            Allergies   Allergen Reactions    Lisinopril Angioedema    Pcn [Penicillins] Rash       Review of Systems:  A comprehensive review of systems was negative except for that written in the History of Present Illness.     Objective:     Vitals:    07/10/21 2104 07/11/21 0010 07/11/21 0831 07/11/21 1004   BP: 120/64 113/75 133/72    Pulse: 81 78 81 79   Resp: 16 16 18    Temp: 99.4 °F (37.4 °C) 98.7 °F (37.1 °C) 98.1 °F (36.7 °C)    TempSrc: SpO2: 100% 97% 94%    Weight:       Height:            Physical Exam:  General: NAD  Eyes: sclera anicteric  Oral Cavity: No thrush or ulcers  Neck: no JVD  Chest: Fair bilateral air entry  Heart: normal sounds  Abdomen: soft and non tender   : no simms  Lower Extremities: no edema  Skin: no rash  Neuro: intact  Psychiatric: non-depressed    RI Perm cath, clean exit site and no drainage  L wrist AVF with good thrill          Assessment:     Hospital Problems  Date Reviewed: 1/24/2021        Codes Class Noted POA    Herpes zoster with nervous system complication [id number]-DK: M51.47  ICD-9-CM: 053.10  7/8/2021 Unknown        Sepsis ([facility] ICD-10-CM: A41.9  ICD-9-CM: 038.9, 995.91  7/7/2021 Unknown        ESRD (end stage renal disease) on dialysis [facility]) ICD-10-CM: N18.6, Z99.2  ICD-9-CM: 585.6, V45.11  7/7/2021 Unknown        Line sepsis ([facility] ICD-10-CM: T85.79XA, A41.9  ICD-9-CM: 996.62, 038.9, 995.91  7/7/2021 Unknown        Essential hypertension ICD-10-CM: I10  ICD-9-CM: 401.9  1/24/2021 Yes        Cardiomyopathy ([facility] (Chronic) ICD-10-CM: I42.9  ICD-9-CM: 425.4  1/24/2021 Yes              Plan:     1 ESRD. -On hemodialysis Tuesday Thursday Saturday.    -He was last dialyzed on 7/6 as an outpatient.    -No volume overload,  not looking uremic.    -s/p dialyzed on 7/08 and removed 2L  -He has permacath is a dialysis access.    -He has a maturing left wrist AV fistula. 2.  Sepsis:  -2/2 bacteremia  -BC grew GPC in clusters   -Came with temp 103, WBC 14.8.    -Chest x-ray no infiltrates. -Urine 20-30 WBCs and trace LE. -Urine culture is neg.    -His catheter site looks clean with no drainage.    -plan to keep on iv vancomycin and cefepime (dced). -He is afebrile and leukocytosis has been improved. -Hemodynamically stable.    -No indication to remove the perm cath if ok with ID   - CT of the abdomen no hydronephrosis. -ID specialist on board. 3.  Metabolic acidosis, high anion gap.     -CO2 up from 13-->22. -LA 2.5   -Lactic acid was 1.4 on 7/7.    -He also complaining of loose stools. -dialyze him with high bicarb bath. 4.  Anemia.    -Hemoglobin at goal.    -No indication for erythropoietin. 5.  Renal osteodystrophy.    -His calcium and phos are ok.    -pending intact PTH level. 6 ophthalmic herpes zoster.    -Left ophthalmic artery zoster.    -Seen by ophthalmology as an outpatient.    -On acyclovir 800 mg twice a day.       Signed By: Bill Finch MD     July 11, 2021

## 2021-07-11 NOTE — PROGRESS NOTES
Problem: Falls - Risk of  Goal: *Absence of Falls  Description: Document Gm Krause Fall Risk and appropriate interventions in the flowsheet.   Outcome: Progressing Towards Goal  Note: Fall Risk Interventions:  Mobility Interventions: Bed/chair exit alarm, Patient to call before getting OOB, Utilize walker, cane, or other assistive device    Mentation Interventions: Bed/chair exit alarm, Increase mobility, More frequent rounding    Medication Interventions: Bed/chair exit alarm, Patient to call before getting OOB, Teach patient to arise slowly    Elimination Interventions: Bed/chair exit alarm, Call light in reach, Patient to call for help with toileting needs    History of Falls Interventions: Bed/chair exit alarm

## 2021-07-11 NOTE — PROGRESS NOTES
Hospitalist Progress Note    Subjective:   Daily Progress Note: 7/11/2021 10:41 AM    Hospital Course:  Jack Busby a 72 y. o. male who has a history of end-stage renal disease on hemodialysis, congestive heart failure, cardiomyopathy, and diabetes who came to ER with a complaint of generalized weakness and recurrent falls at home.  Patient states that he has been very weak recently. In the ER, patient was found to have fever of 103 °F. Elevated WBC, procalcitonin, and CRP on admission.    Also noted to have facial shingle crustations around left forehead with associated neuropathy pain.  Patient is noticed to have poor care of his dialysis catheter.  Hospitalist service has been requested to admit the patient for further management.  Started on IV vancomycin and cefepime. Consults placed with nephrology and infectious disease.  Started on IV Acyclovir for ophthalmic zoster. Blood cultures returned positive for gram-positive cocci in clusters. Repeat blood cultures also positive. IV Daptomycin added for additional coverage. Subjective:    Patient seen and examined at bedside. He reports some dull pain around dermatomal zoster breakout. WBC count within normal range. Afebrile.     Current Facility-Administered Medications   Medication Dose Route Frequency    DAPTOmycin (CUBICIN) 500 mg in 0.9% sodium chloride 50 mL IVPB RF formulation  500 mg IntraVENous Q48H    white petrolatum-mineral oiL (LACRILUBE S.O.P.) ointment   Both Eyes Q12H    atorvastatin (LIPITOR) tablet 40 mg  40 mg Oral DAILY    metoprolol succinate (TOPROL-XL) XL tablet 50 mg  50 mg Oral BID    traMADoL (ULTRAM) tablet 50 mg  50 mg Oral Q6H PRN    polyvinyl alcohol-povidon(PF) (REFRESH CLASSIC) 1.4-0.6 % ophthalmic solution 1 Drop  1 Drop Both Eyes PRN    heparin (porcine) 1,000 unit/mL injection 3,600 Units  3,600 Units Hemodialysis DIALYSIS PRN    sodium chloride (NS) flush 5-10 mL  5-10 mL IntraVENous PRN    sodium chloride (NS) flush 5-40 mL  5-40 mL IntraVENous Q8H    sodium chloride (NS) flush 5-40 mL  5-40 mL IntraVENous PRN    acetaminophen (TYLENOL) tablet 650 mg  650 mg Oral Q6H PRN    Or    acetaminophen (TYLENOL) suppository 650 mg  650 mg Rectal Q6H PRN    polyethylene glycol (MIRALAX) packet 17 g  17 g Oral DAILY PRN    promethazine (PHENERGAN) tablet 12.5 mg  12.5 mg Oral Q6H PRN    Or    ondansetron (ZOFRAN) injection 4 mg  4 mg IntraVENous Q6H PRN    famotidine (PEPCID) tablet 20 mg  20 mg Oral BID    Vancomycin dose per pharmacy protocol   Other Rx Dosing/Monitoring        Review of Systems  Constitutional: Positive for malaise/fatigue. HENT: Negative.    Respiratory: Negative.    Cardiovascular: Negative.    Gastrointestinal: Negative.    Musculoskeletal: Positive for myalgias. Neurological: Positive for weakness. Nerve pain from facial shingles.       Objective:     Visit Vitals  /72 (BP 1 Location: Right lower arm, BP Patient Position: At rest)   Pulse 79   Temp 98.1 °F (36.7 °C)   Resp 18   Ht 5' 10\" (1.778 m)   Wt 79.1 kg (174 lb 6.1 oz)   SpO2 94%   BMI 25.02 kg/m²      O2 Device: None (Room air)    Temp (24hrs), Av.8 °F (37.1 °C), Min:98.1 °F (36.7 °C), Max:99.9 °F (37.7 °C)      701 - 1900  In: 240 [P.O.:240]  Out: -   1901 - 700  In: 680 [P.O.:680]  Out:      PHYSICAL EXAM:  Constitutional: Ill-appearing, obese male. No acute distress  Skin: Right-sided permacath. Extremities and face reveal no rashes. HEENT: Mild left eye redness. Crusted lesions on left side of forehead. PERRL. No oral ulcers. The neck is supple and no masses. Cardiovascular: Regular rate and rhythm. Normal S1/S2. No murmur appreciated. Respiratory:  Clear breath sounds bilaterally with no wheezes, rales, or rhonchi. GI: Abdomen nondistended, soft, and nontender. Normal active bowel sounds. Rectal: Deferred   Musculoskeletal: No pitting edema of the lower legs.  Able to move all ext  Neurological:  Patient is alert and oriented. Cranial nerves II-XII grossly intact  Psychiatric: Mood appears appropriate        Data Review    Recent Results (from the past 24 hour(s))   GLUCOSE, POC    Collection Time: 07/10/21 12:07 PM   Result Value Ref Range    Glucose (POC) 166 (H) 65 - 117 mg/dL    Performed by Radha Du, POC    Collection Time: 07/10/21  4:29 PM   Result Value Ref Range    Glucose (POC) 89 65 - 117 mg/dL    Performed by Lisa Diaz    C REACTIVE PROTEIN, QT    Collection Time: 07/11/21  6:36 AM   Result Value Ref Range    C-Reactive protein 15.10 (H) 0.00 - 0.60 mg/dL   PROCALCITONIN    Collection Time: 07/11/21  6:36 AM   Result Value Ref Range    Procalcitonin 13.79 (H) 0 ng/mL   CK    Collection Time: 07/11/21  6:36 AM   Result Value Ref Range    CK 80 39 - 308 U/L   CBC WITH AUTOMATED DIFF    Collection Time: 07/11/21  6:36 AM   Result Value Ref Range    WBC 9.4 4.1 - 11.1 K/uL    RBC 4.78 4.10 - 5.70 M/uL    HGB 12.0 (L) 12.1 - 17.0 g/dL    HCT 38.9 36.6 - 50.3 %    MCV 81.4 80.0 - 99.0 FL    MCH 25.1 (L) 26.0 - 34.0 PG    MCHC 30.8 30.0 - 36.5 g/dL    RDW 18.6 (H) 11.5 - 14.5 %    PLATELET 358 008 - 428 K/uL    MPV 11.2 8.9 - 12.9 FL    NRBC 0.0 0.0  WBC    ABSOLUTE NRBC 0.00 0.00 - 0.01 K/uL    NEUTROPHILS 60 32 - 75 %    LYMPHOCYTES 19 12 - 49 %    MONOCYTES 19 (H) 5 - 13 %    EOSINOPHILS 1 0 - 7 %    BASOPHILS 1 0 - 1 %    IMMATURE GRANULOCYTES 0 0 - 0.5 %    ABS. NEUTROPHILS 5.6 1.8 - 8.0 K/UL    ABS. LYMPHOCYTES 1.8 0.8 - 3.5 K/UL    ABS. MONOCYTES 1.8 (H) 0.0 - 1.0 K/UL    ABS. EOSINOPHILS 0.1 0.0 - 0.4 K/UL    ABS. BASOPHILS 0.1 0.0 - 0.1 K/UL    ABS. IMM.  GRANS. 0.0 0.00 - 0.04 K/UL    DF AUTOMATED     METABOLIC PANEL, BASIC    Collection Time: 07/11/21  6:36 AM   Result Value Ref Range    Sodium 133 (L) 136 - 145 mmol/L    Potassium 3.6 3.5 - 5.1 mmol/L    Chloride 100 97 - 108 mmol/L    CO2 22 21 - 32 mmol/L    Anion gap 11 5 - 15 mmol/L    Glucose 89 65 - 100 mg/dL    BUN 33 (H) 6 - 20 mg/dL    Creatinine 6.04 (H) 0.70 - 1.30 mg/dL    BUN/Creatinine ratio 5 (L) 12 - 20      GFR est AA 11 (L) >60 ml/min/1.73m2    GFR est non-AA 9 (L) >60 ml/min/1.73m2    Calcium 8.1 (L) 8.5 - 10.1 mg/dL       CT ABD PELV WO CONT   Final Result   Pronounced ascites. No evidence of a fever source. No evidence of   hydronephrosis      XR CHEST PORT   Final Result   Mild central pulmonary vascular congestion. XR CHEST PA LAT   Final Result   Lung base opacity likely atelectasis. No focal airspace   consolidation. Evidence of pulmonary vascular congestion without overt edema. Active Problems:    Essential hypertension (1/24/2021)      Cardiomyopathy ([facility] [address] (1/24/2021)      Sepsis ([facility] [address] (7/7/2021)      ESRD (end stage renal disease) on dialysis ([facility] [address] (7/7/2021)      Line sepsis ([facility] [address] (7/7/2021)      Herpes zoster with nervous system complication (4/1/1439)        Assessment/Plan:     1.  Sepsis:  Secondary to bacteremia  Noted poor HD line hygiene  Elevated WBC, procalcitonin and CRP. WBC now normal, procal and CRP trending down. Cultures positive for gram-positive cocci in clusters. Repeat cultures positive. ID consulted   On IV vancomycin and IV daptomycin     2.  ESRD on HD:  Nephrology consulted  Currently has permacath in place     3.  Cardiomyopathy:  Systolic and diastolic stage III heart failure with severe TR  Hydralazine 100 mg 3 times daily, hold for SBP less than 110, metoprolol 50 mg twice daily     4.  Shingles with neuropathy symptoms  5. Ophthalmic herpes zoster  Continue acyclovir day #14/14  F/u with Dr. Krunal Egan ([phone]), his Ophthalmologist, in outpatient setting      DVT Prophylaxis:  Code Status: Full Code  POA/NOK:     Disposition/barriers: Response to treatment  Care Plan discussed with: Patient, RN     Total time spent with patient: >35 minutes.

## 2021-07-11 NOTE — PROGRESS NOTES
Progress Note    Patient: Mukund Saucedo MRN: [id number]  SSN: [id number]    YOB: 1950  Age: 72 y.o. Sex: male      Admit Date: 7/7/2021    LOS: 4 days     Subjective:   Patient followed for sepsis of unclear etiology but with concern for dialysis catheter related bacteremia with MRSA and repeat blood cultures also positive. He has low grade temperatures with normal WBC but elevated procal and CRP. He is currently on Vancomycin and Daptomycin. Patient resting comfortably with no complaints. Objective:     Vitals:    07/10/21 1537 07/10/21 2104 07/11/21 0010 07/11/21 0831   BP: 116/71 120/64 113/75 133/72   Pulse: 78 81 78 81   Resp: 16 16 16 18   Temp: 99.9 °F (37.7 °C) 99.4 °F (37.4 °C) 98.7 °F (37.1 °C) 98.1 °F (36.7 °C)   TempSrc:       SpO2: 96% 100% 97% 94%   Weight:       Height:            Intake and Output:  Current Shift: No intake/output data recorded. Last three shifts: 07/09 1901 - 07/11 0700  In: 680 [P.O.:680]  Out: 2000     Physical Exam:    Vitals and nursing note reviewed. Constitutional:       Appearance: He is obese. He is ill-appearing. HENT:      Head: Normocephalic and atraumatic. Right Ear: External ear normal.      Left Ear: External ear normal.      Nose: Nose normal.      Mouth/Throat:      Pharynx: Oropharynx is clear. Eyes:      General:         Left eye: redness resolved     Pupils: Pupils are equal, round, and reactive to light. Cardiovascular:      Rate and Rhythm: Normal rate and regular rhythm. Comments: Right sided Permacath with site hyperpigmentation but no purulent drainage, erythema or tenderness  Musculoskeletal:      Cervical back: Neck supple. Right lower leg: No edema. Left lower leg: No edema. Skin:     Findings: Rash present. Comments: Dry crusted lesions left forehead   Neurological:      General: No focal deficit present. Mental Status: He is alert and oriented to person, place, and time. Psychiatric:         Mood and Affect: Mood normal.         Behavior: Behavior normal.         Thought Content: Thought content normal.         Judgment: Judgment normal.     Lab/Data Review:     WBC 9,400  Lactic acid 2.5  CK 80    Procal 19.35 <21.98 <23.29  CRP 15.10 <11.80 <11.20 <15.20    Blood cultures (7/7) Staphylococcus aureus (MRSA)  Blood cultures (7/8) Staphylococcus aureus  Blood cultures (7/11) Pending  Urine culture (7/7) No growth FINAL  Assessment:     Active Problems:    Essential hypertension (1/24/2021)      Cardiomyopathy ([address] (1/24/2021)      Sepsis ([facility] [address] (7/7/2021)      ESRD (end stage renal disease) on dialysis ([facility] [address] (7/7/2021)      Line sepsis ([facility] [address] (7/7/2021)      Herpes zoster with nervous system complication (7/9/9084)    1. Sepsis with fever, leukocytosis, elevated procal and CRP  2. Permacath related bacteremia, secondary to Staphylococcus aureus (MRSA), on Day #5 IV Vancomycin, Day #2 IV Daptomycin  3. Dermatomal zoster, CN V1, left side, resolving, Day #14/14 Acyclovir  4. CKD Stage 4 on dialysis  5. Nonobstructing renal calculi  6. Generalized weakness  7. Penicillin allergy    Comment:  If blood cultures remain positive on combination therapy, may require removal of Permacath. Plan:   1. Continue IV Vancomcyin and Daptomycin for additive therapy  3. Discontinue Acyclovir after today  4. Follow-up repeat blood cultures  5. In am, repeat procal and CRP, blood cultures  6.  Contact Dr. David Claire ([phone]), his Ophthalmologist regarding his diagnosis; unable to reach today       Signed By: Daniel Rao MD     July 11, 2021

## 2021-07-12 LAB
ANION GAP SERPL CALC-SCNC: 9 MMOL/L (ref 5–15)
BASOPHILS # BLD: 0.1 K/UL (ref 0–0.1)
BASOPHILS NFR BLD: 1 % (ref 0–1)
BUN SERPL-MCNC: 41 MG/DL (ref 6–20)
BUN/CREAT SERPL: 6 (ref 12–20)
CA-I BLD-MCNC: 8.2 MG/DL (ref 8.5–10.1)
CHLORIDE SERPL-SCNC: 101 MMOL/L (ref 97–108)
CO2 SERPL-SCNC: 23 MMOL/L (ref 21–32)
CREAT SERPL-MCNC: 7.04 MG/DL (ref 0.7–1.3)
CRP SERPL-MCNC: 16.6 MG/DL (ref 0–0.6)
DIFFERENTIAL METHOD BLD: ABNORMAL
EOSINOPHIL # BLD: 0.1 K/UL (ref 0–0.4)
EOSINOPHIL NFR BLD: 1 % (ref 0–7)
ERYTHROCYTE [DISTWIDTH] IN BLOOD BY AUTOMATED COUNT: 18.8 % (ref 11.5–14.5)
GLUCOSE BLD STRIP.AUTO-MCNC: 101 MG/DL (ref 65–117)
GLUCOSE BLD STRIP.AUTO-MCNC: 121 MG/DL (ref 65–117)
GLUCOSE BLD STRIP.AUTO-MCNC: 136 MG/DL (ref 65–117)
GLUCOSE BLD STRIP.AUTO-MCNC: 79 MG/DL (ref 65–117)
GLUCOSE SERPL-MCNC: 84 MG/DL (ref 65–100)
HCT VFR BLD AUTO: 39.5 % (ref 36.6–50.3)
HGB BLD-MCNC: 12.3 G/DL (ref 12.1–17)
IMM GRANULOCYTES # BLD AUTO: 0.1 K/UL (ref 0–0.04)
IMM GRANULOCYTES NFR BLD AUTO: 1 % (ref 0–0.5)
LYMPHOCYTES # BLD: 2 K/UL (ref 0.8–3.5)
LYMPHOCYTES NFR BLD: 21 % (ref 12–49)
MCH RBC QN AUTO: 25.2 PG (ref 26–34)
MCHC RBC AUTO-ENTMCNC: 31.1 G/DL (ref 30–36.5)
MCV RBC AUTO: 80.9 FL (ref 80–99)
MONOCYTES # BLD: 1.6 K/UL (ref 0–1)
MONOCYTES NFR BLD: 17 % (ref 5–13)
NEUTS SEG # BLD: 5.4 K/UL (ref 1.8–8)
NEUTS SEG NFR BLD: 59 % (ref 32–75)
NRBC # BLD: 0 K/UL (ref 0–0.01)
NRBC BLD-RTO: 0 PER 100 WBC
PERFORMED BY, TECHID: ABNORMAL
PERFORMED BY, TECHID: ABNORMAL
PERFORMED BY, TECHID: NORMAL
PERFORMED BY, TECHID: NORMAL
PLATELET # BLD AUTO: 274 K/UL (ref 150–400)
PMV BLD AUTO: 10.9 FL (ref 8.9–12.9)
POTASSIUM SERPL-SCNC: 3.9 MMOL/L (ref 3.5–5.1)
PROCALCITONIN SERPL-MCNC: NORMAL NG/ML
RBC # BLD AUTO: 4.88 M/UL (ref 4.1–5.7)
SODIUM SERPL-SCNC: 133 MMOL/L (ref 136–145)
WBC # BLD AUTO: 9.1 K/UL (ref 4.1–11.1)

## 2021-07-12 PROCEDURE — 87040 BLOOD CULTURE FOR BACTERIA: CPT

## 2021-07-12 PROCEDURE — 82962 GLUCOSE BLOOD TEST: CPT

## 2021-07-12 PROCEDURE — 65270000029 HC RM PRIVATE

## 2021-07-12 PROCEDURE — 74011250637 HC RX REV CODE- 250/637: Performed by: NURSE PRACTITIONER

## 2021-07-12 PROCEDURE — 86140 C-REACTIVE PROTEIN: CPT

## 2021-07-12 PROCEDURE — 80048 BASIC METABOLIC PNL TOTAL CA: CPT

## 2021-07-12 PROCEDURE — 84145 PROCALCITONIN (PCT): CPT

## 2021-07-12 PROCEDURE — 74011000258 HC RX REV CODE- 258: Performed by: INTERNAL MEDICINE

## 2021-07-12 PROCEDURE — 74011250637 HC RX REV CODE- 250/637: Performed by: HOSPITALIST

## 2021-07-12 PROCEDURE — 74011250636 HC RX REV CODE- 250/636: Performed by: INTERNAL MEDICINE

## 2021-07-12 PROCEDURE — 36415 COLL VENOUS BLD VENIPUNCTURE: CPT

## 2021-07-12 PROCEDURE — 99232 SBSQ HOSP IP/OBS MODERATE 35: CPT | Performed by: INTERNAL MEDICINE

## 2021-07-12 PROCEDURE — 85025 COMPLETE CBC W/AUTO DIFF WBC: CPT

## 2021-07-12 RX ADMIN — METOPROLOL SUCCINATE 50 MG: 50 TABLET, EXTENDED RELEASE ORAL at 20:31

## 2021-07-12 RX ADMIN — ATORVASTATIN CALCIUM 40 MG: 40 TABLET, FILM COATED ORAL at 08:52

## 2021-07-12 RX ADMIN — Medication 10 ML: at 06:49

## 2021-07-12 RX ADMIN — MINERAL OIL, PETROLATUM: 425; 568 OINTMENT OPHTHALMIC at 08:53

## 2021-07-12 RX ADMIN — METOPROLOL SUCCINATE 50 MG: 50 TABLET, EXTENDED RELEASE ORAL at 08:52

## 2021-07-12 RX ADMIN — MINERAL OIL, PETROLATUM: 425; 568 OINTMENT OPHTHALMIC at 20:32

## 2021-07-12 RX ADMIN — DAPTOMYCIN 500 MG: 500 INJECTION, POWDER, LYOPHILIZED, FOR SOLUTION INTRAVENOUS at 13:35

## 2021-07-12 RX ADMIN — Medication 10 ML: at 22:00

## 2021-07-12 RX ADMIN — FAMOTIDINE 20 MG: 20 TABLET ORAL at 20:31

## 2021-07-12 RX ADMIN — TRAMADOL HYDROCHLORIDE 50 MG: 50 TABLET, FILM COATED ORAL at 08:52

## 2021-07-12 RX ADMIN — FAMOTIDINE 20 MG: 20 TABLET ORAL at 08:52

## 2021-07-12 RX ADMIN — Medication 10 ML: at 13:38

## 2021-07-12 NOTE — PROGRESS NOTES
Hospitalist Progress Note    Subjective:   Daily Progress Note: 7/12/2021 10:26 AM    Hospital Course:  Mohsen Madrigal a 72 y. o. male who has a history of end-stage renal disease on hemodialysis, congestive heart failure, cardiomyopathy, and diabetes who came to ER with a complaint of generalized weakness and recurrent falls at home.  Patient states that he has been very weak recently. In the ER, patient was found to have fever of 103 °F. Elevated WBC, procalcitonin, and CRP on admission.    Also noted to have facial shingle crustations around left forehead with associated neuropathy pain.  Patient is noticed to have poor care of his dialysis catheter.  Hospitalist service has been requested to admit the patient for further management.  Started on IV vancomycin and cefepime. Consults placed with nephrology and infectious disease.  Started on IV Acyclovir for ophthalmic zoster. Blood cultures returned positive for gram-positive cocci in clusters. Repeat blood cultures also positive. IV Daptomycin added for additional coverage. Subjective:    Patient seen and examined at bedside. He is asking about discharge. Afebrile, WBC count within normal range.      Current Facility-Administered Medications   Medication Dose Route Frequency    DAPTOmycin (CUBICIN) 500 mg in 0.9% sodium chloride 50 mL IVPB RF formulation  500 mg IntraVENous Q48H    white petrolatum-mineral oiL (LACRILUBE S.O.P.) ointment   Both Eyes Q12H    atorvastatin (LIPITOR) tablet 40 mg  40 mg Oral DAILY    metoprolol succinate (TOPROL-XL) XL tablet 50 mg  50 mg Oral BID    traMADoL (ULTRAM) tablet 50 mg  50 mg Oral Q6H PRN    polyvinyl alcohol-povidon(PF) (REFRESH CLASSIC) 1.4-0.6 % ophthalmic solution 1 Drop  1 Drop Both Eyes PRN    heparin (porcine) 1,000 unit/mL injection 3,600 Units  3,600 Units Hemodialysis DIALYSIS PRN    sodium chloride (NS) flush 5-10 mL  5-10 mL IntraVENous PRN    sodium chloride (NS) flush 5-40 mL  5-40 mL IntraVENous Q8H    sodium chloride (NS) flush 5-40 mL  5-40 mL IntraVENous PRN    acetaminophen (TYLENOL) tablet 650 mg  650 mg Oral Q6H PRN    Or    acetaminophen (TYLENOL) suppository 650 mg  650 mg Rectal Q6H PRN    polyethylene glycol (MIRALAX) packet 17 g  17 g Oral DAILY PRN    promethazine (PHENERGAN) tablet 12.5 mg  12.5 mg Oral Q6H PRN    Or    ondansetron (ZOFRAN) injection 4 mg  4 mg IntraVENous Q6H PRN    famotidine (PEPCID) tablet 20 mg  20 mg Oral BID    Vancomycin dose per pharmacy protocol   Other Rx Dosing/Monitoring        Review of Systems  Constitutional: Positive for malaise/fatigue. HENT: Negative.    Respiratory: Negative.    Cardiovascular: Negative.    Gastrointestinal: Negative.    Musculoskeletal: Positive for myalgias. Neurological: Positive for weakness. Nerve pain from facial shingles.       Objective:     Visit Vitals  /67 (BP 1 Location: Right lower arm, BP Patient Position: At rest)   Pulse 72   Temp 98.3 °F (36.8 °C)   Resp 18   Ht 5' 10\" (1.778 m)   Wt 79.1 kg (174 lb 6.1 oz)   SpO2 99%   BMI 25.02 kg/m²      O2 Device: None (Room air)    Temp (24hrs), Av.2 °F (37.3 °C), Min:98.3 °F (36.8 °C), Max:99.6 °F (37.6 °C)      No intake/output data recorded. 07/10 1901 -  0700  In: 200 [P.O.:980]  Out: -     PHYSICAL EXAM:  Constitutional: Ill-appearing, obese male. No acute distress  Skin: Right-sided permacath. Extremities and face reveal no rashes. HEENT: Mild left eye redness. Crusted lesions on left side of forehead.  PERRL. No oral ulcers. The neck is supple and no masses. Cardiovascular: Regular rate and rhythm. Normal S1/S2. No murmur appreciated. Respiratory:  Clear breath sounds bilaterally with no wheezes, rales, or rhonchi. GI: Abdomen nondistended, soft, and nontender. Normal active bowel sounds. Rectal: Deferred   Musculoskeletal: No pitting edema of the lower legs. Able to move all ext  Neurological:  Patient is alert and oriented.  Cranial nerves II-XII grossly intact  Psychiatric: Mood appears appropriate      Data Review    Recent Results (from the past 24 hour(s))   CBC WITH AUTOMATED DIFF    Collection Time: 07/12/21  7:08 AM   Result Value Ref Range    WBC 9.1 4.1 - 11.1 K/uL    RBC 4.88 4.10 - 5.70 M/uL    HGB 12.3 12.1 - 17.0 g/dL    HCT 39.5 36.6 - 50.3 %    MCV 80.9 80.0 - 99.0 FL    MCH 25.2 (L) 26.0 - 34.0 PG    MCHC 31.1 30.0 - 36.5 g/dL    RDW 18.8 (H) 11.5 - 14.5 %    PLATELET 514 623 - 437 K/uL    MPV 10.9 8.9 - 12.9 FL    NRBC 0.0 0.0  WBC    ABSOLUTE NRBC 0.00 0.00 - 0.01 K/uL    NEUTROPHILS 59 32 - 75 %    LYMPHOCYTES 21 12 - 49 %    MONOCYTES 17 (H) 5 - 13 %    EOSINOPHILS 1 0 - 7 %    BASOPHILS 1 0 - 1 %    IMMATURE GRANULOCYTES 1 (H) 0 - 0.5 %    ABS. NEUTROPHILS 5.4 1.8 - 8.0 K/UL    ABS. LYMPHOCYTES 2.0 0.8 - 3.5 K/UL    ABS. MONOCYTES 1.6 (H) 0.0 - 1.0 K/UL    ABS. EOSINOPHILS 0.1 0.0 - 0.4 K/UL    ABS. BASOPHILS 0.1 0.0 - 0.1 K/UL    ABS. IMM.  GRANS. 0.1 (H) 0.00 - 0.04 K/UL    DF AUTOMATED     METABOLIC PANEL, BASIC    Collection Time: 07/12/21  7:08 AM   Result Value Ref Range    Sodium 133 (L) 136 - 145 mmol/L    Potassium 3.9 3.5 - 5.1 mmol/L    Chloride 101 97 - 108 mmol/L    CO2 23 21 - 32 mmol/L    Anion gap 9 5 - 15 mmol/L    Glucose 84 65 - 100 mg/dL    BUN 41 (H) 6 - 20 mg/dL    Creatinine 7.04 (H) 0.70 - 1.30 mg/dL    BUN/Creatinine ratio 6 (L) 12 - 20      GFR est AA 9 (L) >60 ml/min/1.73m2    GFR est non-AA 8 (L) >60 ml/min/1.73m2    Calcium 8.2 (L) 8.5 - 10.1 mg/dL   C REACTIVE PROTEIN, QT    Collection Time: 07/12/21  7:08 AM   Result Value Ref Range    C-Reactive protein 16.60 (H) 0.00 - 0.60 mg/dL   PROCALCITONIN    Collection Time: 07/12/21  7:08 AM   Result Value Ref Range    Procalcitonin Results verified, phoned to and read back by 0 ng/mL   GLUCOSE, POC    Collection Time: 07/12/21  8:35 AM   Result Value Ref Range    Glucose (POC) 79 65 - 117 mg/dL    Performed by Raven CLIFTON ABD PELV WO CONT   Final Result   Pronounced ascites. No evidence of a fever source. No evidence of   hydronephrosis      XR CHEST PORT   Final Result   Mild central pulmonary vascular congestion. XR CHEST PA LAT   Final Result   Lung base opacity likely atelectasis. No focal airspace   consolidation. Evidence of pulmonary vascular congestion without overt edema. Active Problems:    Essential hypertension (1/24/2021)      Cardiomyopathy ([address] (1/24/2021)      Sepsis ([address] (7/7/2021)      ESRD (end stage renal disease) on dialysis ([address] (7/7/2021)      Line sepsis ([address] (7/7/2021)      Herpes zoster with nervous system complication (0/6/9813)        Assessment/Plan:     1.  Sepsis:  Secondary to bacteremia  Noted poor HD line hygiene  Elevated WBC, procalcitonin and CRP. WBC now normal, procal and CRP trending down. Cultures positive for gram-positive cocci in clusters. Repeat cultures positive. On IV vancomycin and IV daptomycin  ID consulted     2.  ESRD on HD:  Nephrology consulted  Currently has permacath in place, no indication for removal  Urine culture negative  CT abd showed no hydronephrosis     3.  Cardiomyopathy:  Systolic and diastolic stage III heart failure with severe TR  Hydralazine 100 mg 3 times daily, hold for SBP less than 110, metoprolol 50 mg twice daily     4.  Shingles with neuropathy symptoms  5. Ophthalmic herpes zoster  Finished 14 days of IV acyclovir  F/u with Dr. Magali Fish ([phone]), his Ophthalmologist, in outpatient setting      DVT Prophylaxis:  Code Status: Full Code  POA/NOK:     Disposition/barriers: Response to treatment, awaiting culture sensitivities and further antibiotic management per ID  Care Plan discussed with: Patient, RN    Total time spent with patient: >35 minutes.

## 2021-07-12 NOTE — PROGRESS NOTES
Nephrology Consult    Patient: Renee Treviño MRN: [id number]  SSN: [id number]    YOB: 1950  Age: 72 y.o.   Sex: male      Subjective:   Pt is seen in the room  He is looking more alert  No new complaints  Afebrile, normal wbc, BP ok  BC grew MRSA  CT abdomen no hydronephrosis but ascites       Past Medical History:   Diagnosis Date    Asthenia 1/24/2021    Cardiomyopathy ([address] 1/24/2021    CHF (congestive heart failure) ([address]     Chronic kidney disease     CKD (chronic kidney disease)     4    Diabetes ([address]     End stage renal disease on dialysis ([facility] 1/24/2021    Essential hypertension 1/24/2021    Gastroesophageal reflux disease 1/24/2021    Gastroesophageal reflux disease 1/24/2021    Hypertension     Pneumonia due to COVID-19 virus 0/02/3016    Systolic CHF, acute on chronic ([facility] 1/24/2021     Past Surgical History:   Procedure Laterality Date    COLONOSCOPY N/A 12/3/2020    COLONOSCOPY performed by Azam Holden MD at [facility] HX HEART CATHETERIZATION      HX HERNIA REPAIR       [address] CVAD  1/22/2021    IR INSERT NON TUNL CVC OVER 5 YRS  1/18/2021    IR INSERT TUNL CVC W/O PORT OVER 5 YR  1/22/2021    IR PLACE CVAD FLUORO GUIDE  1/18/2021      Family History   Problem Relation Age of Onset    Diabetes Mother     Heart Failure Father      Social History     Tobacco Use    Smoking status: Never Smoker    Smokeless tobacco: Never Used   Substance Use Topics    Alcohol use: Not Currently      Current Facility-Administered Medications   Medication Dose Route Frequency Provider Last Rate Last Admin    DAPTOmycin (CUBICIN) 500 mg in 0.9% sodium chloride 50 mL IVPB RF formulation  500 mg IntraVENous Q48H Shazia Duran  mL/hr at 07/10/21 1300 500 mg at 07/10/21 1300    white petrolatum-mineral oiL (LACRILUBE S.O.P.) ointment   Both Eyes Q12H Shazia Duarn MD   Given at 07/12/21 0853    atorvastatin (LIPITOR) tablet 40 mg  40 mg Oral DAILY Mary Wallis MD   40 mg at 07/12/21 5872    metoprolol succinate (TOPROL-XL) XL tablet 50 mg  50 mg Oral BID Mary Wallis MD   50 mg at 07/12/21 0852    traMADoL (ULTRAM) tablet 50 mg  50 mg Oral Q6H PRN Tianna Cough, NP   50 mg at 07/12/21 0214    polyvinyl alcohol-povidon(PF) (REFRESH CLASSIC) 1.4-0.6 % ophthalmic solution 1 Drop  1 Drop Both Eyes PRN [place] Cough, NP        heparin (porcine) 1,000 unit/mL injection 3,600 Units  3,600 Units Hemodialysis DIALYSIS PRN Loulou Atkins MD   3,600 Units at 07/10/21 1101    sodium chloride (NS) flush 5-10 mL  5-10 mL IntraVENous PRN Mary Wallis MD        sodium chloride (NS) flush 5-40 mL  5-40 mL IntraVENous Q8H Mary Wallis MD   10 mL at 07/12/21 0649    sodium chloride (NS) flush 5-40 mL  5-40 mL IntraVENous PRN Mary Wallis MD        acetaminophen (TYLENOL) tablet 650 mg  650 mg Oral Q6H PRN Mary Wallis MD   650 mg at 07/09/21 1607    Or    acetaminophen (TYLENOL) suppository 650 mg  650 mg Rectal Q6H PRN Mary Wallis MD        polyethylene glycol (MIRALAX) packet 17 g  17 g Oral DAILY PRN Mary Wallis MD        promethazine (PHENERGAN) tablet 12.5 mg  12.5 mg Oral Q6H PRN Mary Wallis MD        Or    [facility]) [facility] 4 mg  4 mg IntraVENous Q6H PRN Mary Wallis MD        famotidine (PEPCID) tablet 20 mg  20 mg Oral BID Mary Wallis MD   20 mg at 07/12/21 5830    Vancomycin dose per pharmacy protocol   Other Rx Dosing/Monitoring Lazara Smith MD            Allergies   Allergen Reactions    Lisinopril Angioedema    Pcn [Penicillins] Rash       Review of Systems:  A comprehensive review of systems was negative except for that written in the History of Present Illness.     Objective:     Vitals:    07/11/21 2209 07/12/21 0703 07/12/21 0728 07/12/21 1208   BP: 121/75 124/71 121/67 135/81   Pulse: 78 72 72 80   Resp: 18 18 18 18   Temp: 99.2 °F (37.3 °C) 99.4 °F (37.4 °C) 98.3 °F (36.8 °C) 100.2 °F (37.9 °C)   TempSrc:       SpO2: 96% 96% 99% 95%   Weight:       Height:            Physical Exam:  General: NAD  Eyes: sclera anicteric  Oral Cavity: No thrush or ulcers  Neck: no JVD  Chest: Fair bilateral air entry  Heart: normal sounds  Abdomen: soft and non tender   : no simms  Lower Extremities: no edema  Skin: no rash  Neuro: intact  Psychiatric: non-depressed    RI Perm cath, clean exit site and no drainage  L wrist AVF with good thrill          Assessment:     Hospital Problems  Date Reviewed: 1/24/2021        Codes Class Noted POA    Herpes zoster with nervous system complication [id number]: X62.24  ICD-9-CM: 053.10  7/8/2021 Unknown        Sepsis ([facility] ICD-10-CM: A41.9  ICD-9-CM: 038.9, 995.91  7/7/2021 Unknown        ESRD (end stage renal disease) on dialysis [facility]) ICD-10-CM: N18.6, Z99.2  ICD-9-CM: 585.6, V45.11  7/7/2021 Unknown        Line sepsis ([facility] ICD-10-CM: T85.79XA, A41.9  ICD-9-CM: 996.62, 038.9, 995.91  7/7/2021 Unknown        Essential hypertension ICD-10-CM: I10  ICD-9-CM: 401.9  1/24/2021 Yes        Cardiomyopathy ([facility] (Chronic) ICD-10-CM: I42.9  ICD-9-CM: 425.4  1/24/2021 Yes              Plan:     1 ESRD. -On hemodialysis Tuesday Thursday Saturday.    -He was last dialyzed on 7/6 as an outpatient.    -No volume overload,  not looking uremic.    -s/p dialyzed on 7/10 and removed 2L  -He has permacath is a dialysis access.    -He has a maturing left wrist AV fistula. 2.  Sepsis:  -2/2 bacteremia  -BC grew GPC in clusters   -Came with temp 103, WBC 14.8.    -Chest x-ray no infiltrates. -Urine 20-30 WBCs and trace LE. -Urine culture is neg.    -His catheter site looks clean with no drainage.    -plan to keep on iv vancomycin and cefepime (dced). -He is afebrile and leukocytosis has been improved. -Hemodynamically stable.    -No indication to remove the perm cath if ok with ID   - CT of the abdomen no hydronephrosis. -ID specialist on board.     3.  Metabolic acidosis, high anion gap. -CO2 up from 13-->22. -LA 2.5   -Lactic acid was 1.4 on 7/7.    -He also complaining of loose stools. -dialyze him with high bicarb bath. 4.  Anemia.    -Hemoglobin at goal.    -No indication for erythropoietin. 5.  Renal osteodystrophy.    -His calcium and phos are ok.    -pending intact PTH level. 6 ophthalmic herpes zoster.    -Left ophthalmic artery zoster.    -Seen by ophthalmology as an outpatient.    -On acyclovir 800 mg twice a day.       Signed By: Clabe Leyden, MD     July 12, 2021

## 2021-07-12 NOTE — PROGRESS NOTES
MARY Plan:    -d/c to [facility] when medically stable. Patient has been approved for [facility], pending bed availability. Patient's insurance doesn't require an [facility]. [facility] will continue to follow. Medicare 2nd IM letter prior to discharge.          Yordan Segura, MSW

## 2021-07-12 NOTE — PROGRESS NOTES
Progress Note    Patient: Blanca Farrell MRN: [id number]  SSN: [id number]    YOB: 1950  Age: 72 y.o. Sex: male      Admit Date: 7/7/2021    LOS: 5 days     Subjective:   Patient followed for sepsis of unclear etiology but with concern for dialysis catheter related bacteremia with MRSA and repeat blood cultures also positive. Most recent blood cultures negative at 1 day. They were repeated again today. Still having low grade temperatures with normal WBC and decreasing procal.  He is currently on Vancomycin and Daptomycin. Patient resting comfortably with no complaints. Objective:     Vitals:    07/11/21 2209 07/12/21 0703 07/12/21 0728 07/12/21 1208   BP: 121/75 124/71 121/67 135/81   Pulse: 78 72 72 80   Resp: 18 18 18 18   Temp: 99.2 °F (37.3 °C) 99.4 °F (37.4 °C) 98.3 °F (36.8 °C) 100.2 °F (37.9 °C)   TempSrc:       SpO2: 96% 96% 99% 95%   Weight:       Height:            Intake and Output:  Current Shift: 07/12 0701 - 07/12 1900  In: 100 [P.O.:100]  Out: -   Last three shifts: 07/10 1901 - 07/12 0700  In: 980 [P.O.:980]  Out: -     Physical Exam:    Vitals and nursing note reviewed. Constitutional:       Appearance: He is obese. He is ill-appearing. HENT:      Head: Normocephalic and atraumatic. Right Ear: External ear normal.      Left Ear: External ear normal.      Nose: Nose normal.      Mouth/Throat:      Pharynx: Oropharynx is clear. Eyes:      General:         Left eye: redness resolved     Pupils: Pupils are equal, round, and reactive to light. Cardiovascular:      Rate and Rhythm: Normal rate and regular rhythm. Comments: Right sided Permacath with site hyperpigmentation but no purulent drainage, erythema or tenderness  Musculoskeletal:      Cervical back: Neck supple. Right lower leg: No edema. Left lower leg: No edema. Skin:     Findings: Rash present.       Comments: Dry crusted lesions left forehead   Neurological:      General: No focal deficit present. Mental Status: He is alert and oriented to person, place, and time. Psychiatric:         Mood and Affect: Mood normal.         Behavior: Behavior normal.         Thought Content: Thought content normal.         Judgment: Judgment normal.     Lab/Data Review:     WBC 9,100  Lactic acid 2.5  CK 80    Procal <19.35 <21.98 <23.29  CRP 16.60 <15.10 <11.80 <11.20 <15.20    Blood cultures (7/7) Staphylococcus aureus (MRSA)  Blood cultures (7/8) Staphylococcus aureus (MRSA)  Blood cultures (7/11) No growth 1 day  Blood cultures (7/12) Pending  Urine culture (7/7) No growth FINAL  Assessment:     Active Problems:    Essential hypertension (1/24/2021)      Cardiomyopathy ([facility] (1/24/2021)      Sepsis ([address] (7/7/2021)      ESRD (end stage renal disease) on dialysis ([facility] (7/7/2021)      Line sepsis ([facility] (7/7/2021)      Herpes zoster with nervous system complication (1/7/4329)    1. Sepsis with fever, leukocytosis, elevated procal and CRP  2. Permacath related bacteremia, secondary to Staphylococcus aureus (MRSA), on Day #5 IV Vancomycin, Day #2 IV Daptomycin  3. Dermatomal zoster, CN V1, left side, resolving, Day #14/14 Acyclovir  4. CKD Stage 4 on dialysis  5. Nonobstructing renal calculi  6. Generalized weakness  7. Penicillin allergy    Comment:  Afebrile with normal WBC and decreasing procal.  If blood cultures remain positive on combination therapy, may require removal of Permacath. Plan:   1. Continue IV Vancomcyin and Daptomycin for additive therapy  2. Discontinue Acyclovir   3. Follow-up repeat blood cultures  4.  In am, repeat procal and CRP        Signed By: Pilo Baez MD     July 12, 2021

## 2021-07-13 LAB
ANION GAP SERPL CALC-SCNC: 10 MMOL/L (ref 5–15)
BASOPHILS # BLD: 0.1 K/UL (ref 0–0.1)
BASOPHILS NFR BLD: 1 % (ref 0–1)
BUN SERPL-MCNC: 45 MG/DL (ref 6–20)
BUN/CREAT SERPL: 6 (ref 12–20)
CA-I BLD-MCNC: 8.2 MG/DL (ref 8.5–10.1)
CHLORIDE SERPL-SCNC: 102 MMOL/L (ref 97–108)
CO2 SERPL-SCNC: 22 MMOL/L (ref 21–32)
CREAT SERPL-MCNC: 7.91 MG/DL (ref 0.7–1.3)
CREAT SERPL-MCNC: 7.91 MG/DL (ref 0.7–1.3)
CRP SERPL-MCNC: 16 MG/DL (ref 0–0.6)
DATE LAST DOSE: NORMAL
DIFFERENTIAL METHOD BLD: ABNORMAL
EOSINOPHIL # BLD: 0.1 K/UL (ref 0–0.4)
EOSINOPHIL NFR BLD: 2 % (ref 0–7)
ERYTHROCYTE [DISTWIDTH] IN BLOOD BY AUTOMATED COUNT: 18.7 % (ref 11.5–14.5)
GLUCOSE SERPL-MCNC: 97 MG/DL (ref 65–100)
HCT VFR BLD AUTO: 39.2 % (ref 36.6–50.3)
HGB BLD-MCNC: 12.3 G/DL (ref 12.1–17)
IMM GRANULOCYTES # BLD AUTO: 0 K/UL (ref 0–0.04)
IMM GRANULOCYTES NFR BLD AUTO: 1 % (ref 0–0.5)
LYMPHOCYTES # BLD: 1.8 K/UL (ref 0.8–3.5)
LYMPHOCYTES NFR BLD: 22 % (ref 12–49)
MCH RBC QN AUTO: 25.4 PG (ref 26–34)
MCHC RBC AUTO-ENTMCNC: 31.4 G/DL (ref 30–36.5)
MCV RBC AUTO: 80.8 FL (ref 80–99)
MONOCYTES # BLD: 1.3 K/UL (ref 0–1)
MONOCYTES NFR BLD: 16 % (ref 5–13)
NEUTS SEG # BLD: 4.8 K/UL (ref 1.8–8)
NEUTS SEG NFR BLD: 58 % (ref 32–75)
NRBC # BLD: 0 K/UL (ref 0–0.01)
NRBC BLD-RTO: 0 PER 100 WBC
PLATELET # BLD AUTO: 321 K/UL (ref 150–400)
PMV BLD AUTO: 10.3 FL (ref 8.9–12.9)
POTASSIUM SERPL-SCNC: 4 MMOL/L (ref 3.5–5.1)
PROCALCITONIN SERPL-MCNC: 6.79 NG/ML
RBC # BLD AUTO: 4.85 M/UL (ref 4.1–5.7)
REPORTED DOSE,DOSE: NORMAL UNITS
SODIUM SERPL-SCNC: 134 MMOL/L (ref 136–145)
VANCOMYCIN SERPL-MCNC: 19.3 UG/ML
WBC # BLD AUTO: 8.1 K/UL (ref 4.1–11.1)

## 2021-07-13 PROCEDURE — 86140 C-REACTIVE PROTEIN: CPT

## 2021-07-13 PROCEDURE — 80202 ASSAY OF VANCOMYCIN: CPT

## 2021-07-13 PROCEDURE — 74011250637 HC RX REV CODE- 250/637: Performed by: HOSPITALIST

## 2021-07-13 PROCEDURE — 85025 COMPLETE CBC W/AUTO DIFF WBC: CPT

## 2021-07-13 PROCEDURE — 74011250636 HC RX REV CODE- 250/636: Performed by: INTERNAL MEDICINE

## 2021-07-13 PROCEDURE — 84145 PROCALCITONIN (PCT): CPT

## 2021-07-13 PROCEDURE — 74011250637 HC RX REV CODE- 250/637: Performed by: NURSE PRACTITIONER

## 2021-07-13 PROCEDURE — 80048 BASIC METABOLIC PNL TOTAL CA: CPT

## 2021-07-13 PROCEDURE — 65270000029 HC RM PRIVATE

## 2021-07-13 PROCEDURE — 99232 SBSQ HOSP IP/OBS MODERATE 35: CPT | Performed by: INTERNAL MEDICINE

## 2021-07-13 PROCEDURE — 90935 HEMODIALYSIS ONE EVALUATION: CPT

## 2021-07-13 PROCEDURE — 36415 COLL VENOUS BLD VENIPUNCTURE: CPT

## 2021-07-13 RX ORDER — HEPARIN SODIUM 1000 [USP'U]/ML
INJECTION, SOLUTION INTRAVENOUS; SUBCUTANEOUS
Status: DISPENSED
Start: 2021-07-13 | End: 2021-07-13

## 2021-07-13 RX ORDER — VANCOMYCIN HYDROCHLORIDE 10 G/100ML
1000 INJECTION, POWDER, LYOPHILIZED, FOR SOLUTION INTRAVENOUS
Qty: 6000 MG | Refills: 0 | Status: SHIPPED | OUTPATIENT
Start: 2021-07-13 | End: 2021-07-27

## 2021-07-13 RX ADMIN — MINERAL OIL, PETROLATUM: 425; 568 OINTMENT OPHTHALMIC at 20:34

## 2021-07-13 RX ADMIN — Medication 10 ML: at 06:30

## 2021-07-13 RX ADMIN — MINERAL OIL, PETROLATUM: 425; 568 OINTMENT OPHTHALMIC at 09:00

## 2021-07-13 RX ADMIN — VANCOMYCIN HYDROCHLORIDE 1000 MG: 1 INJECTION, POWDER, FOR SOLUTION INTRAVENOUS at 15:47

## 2021-07-13 RX ADMIN — ATORVASTATIN CALCIUM 40 MG: 40 TABLET, FILM COATED ORAL at 12:05

## 2021-07-13 RX ADMIN — HEPARIN SODIUM 3600 UNITS: 1000 INJECTION, SOLUTION INTRAVENOUS; SUBCUTANEOUS at 07:43

## 2021-07-13 RX ADMIN — Medication 10 ML: at 14:00

## 2021-07-13 RX ADMIN — TRAMADOL HYDROCHLORIDE 50 MG: 50 TABLET, FILM COATED ORAL at 03:59

## 2021-07-13 RX ADMIN — FAMOTIDINE 20 MG: 20 TABLET ORAL at 20:31

## 2021-07-13 RX ADMIN — FAMOTIDINE 20 MG: 20 TABLET ORAL at 12:05

## 2021-07-13 RX ADMIN — METOPROLOL SUCCINATE 50 MG: 50 TABLET, EXTENDED RELEASE ORAL at 20:31

## 2021-07-13 RX ADMIN — TRAMADOL HYDROCHLORIDE 50 MG: 50 TABLET, FILM COATED ORAL at 20:32

## 2021-07-13 RX ADMIN — METOPROLOL SUCCINATE 50 MG: 50 TABLET, EXTENDED RELEASE ORAL at 12:05

## 2021-07-13 NOTE — PROGRESS NOTES
Picc insertion ordered contacted MANASA Tidwell related to patient being a dialysis patient and a power-line would be necessary.

## 2021-07-13 NOTE — PROGRESS NOTES
Bedside shift change report given to Tori leger (oncoming nurse) by Andrew Gleason (offgoing nurse). Report included the following information SBAR, Procedure Summary, Intake/Output, MAR and Recent Results.

## 2021-07-13 NOTE — PROGRESS NOTES
PT treatment attempted at 1304 however, per nursing patient at dialysis at this time. Will continue to follow pt and will attempt treatment at a later time.  Thank you

## 2021-07-13 NOTE — PROGRESS NOTES
MARY Plan:    -d/c to [facility]         Scheduled to discharge to [facility] today. Room will be ready @19:00PM.  Please schedule transport at 19:00PM.  Patient to go to Room 208. Nurse to call report to [phone]. Medicare pt has received, reviewed, and signed 2nd IM letter informing them of their right to appeal the discharge. Signed copy has been placed on pt bedside chart.     ARNIE Adam Cha

## 2021-07-13 NOTE — PROGRESS NOTES
Progress Note    Patient: Maria E Wood MRN: [id number]  SSN: [id number]    YOB: 1950  Age: 72 y.o. Sex: male      Admit Date: 7/7/2021    LOS: 6 days     Subjective:   Patient followed for sepsis of unclear etiology but with concern for dialysis catheter related bacteremia with MRSA and repeat blood cultures also positive. Most recent blood cultures, however, remain negative at 2 days on Vancomycin and Daptomycin. He is afebrile but still having low grade temperatures with normal WBC and decreasing procal.  He is currently on Vancomycin and Daptomycin. Patient resting comfortably with no complaints. Objective:     Vitals:    07/13/21 1000 07/13/21 1030 07/13/21 1100 07/13/21 1104   BP: 131/87 127/84 125/70 133/83   Pulse: 68 71 69 69   Resp:       Temp:  98.3 °F (36.8 °C)     TempSrc:  Oral     SpO2:       Weight:       Height:            Intake and Output:  Current Shift: 07/13 0701 - 07/13 1900  In: -   Out: 2000   Last three shifts: 07/11 1901 - 07/13 0700  In: 430 [P.O.:430]  Out: -     Physical Exam:    Vitals and nursing note reviewed. Constitutional:       Appearance: He is obese. He is ill-appearing. HENT:      Head: Normocephalic and atraumatic. Right Ear: External ear normal.      Left Ear: External ear normal.      Nose: Nose normal.      Mouth/Throat:      Pharynx: Oropharynx is clear. Eyes:      General:         Left eye: redness resolved     Pupils: Pupils are equal, round, and reactive to light. Cardiovascular:      Rate and Rhythm: Normal rate and regular rhythm. Comments: Right sided Permacath with site hyperpigmentation but no purulent drainage, erythema or tenderness  Musculoskeletal:      Cervical back: Neck supple. Right lower leg: No edema. Left lower leg: No edema. Skin:     Findings: Rash present. Comments: Dry crusted lesions left forehead   Neurological:      General: No focal deficit present.       Mental Status: He is alert and oriented to person, place, and time. Psychiatric:         Mood and Affect: Mood normal.         Behavior: Behavior normal.         Thought Content: Thought content normal.         Judgment: Judgment normal.     Lab/Data Review:     WBC 8,100  Lactic acid 2.5  CK 80    Procal 6.79 <13.79 <19.35 <21.98 <23.29  CRP 16.00 <16.60 <15.10 <11.80 <11.20 <15.20    Blood cultures (7/7) Staphylococcus aureus (MRSA)  Blood cultures (7/8) Staphylococcus aureus (MRSA)  Blood cultures (7/11) No growth 2 days  Blood cultures (7/12) No growth 1 day  Urine culture (7/7) No growth FINAL  Assessment:     Active Problems:    Essential hypertension (1/24/2021)      Cardiomyopathy ([facility] (1/24/2021)      Sepsis ([facility] (7/7/2021)      ESRD (end stage renal disease) on dialysis ([facility] (7/7/2021)      Line sepsis ([facility] (7/7/2021)      Herpes zoster with nervous system complication (8/1/8060)    1. Sepsis with fever, leukocytosis, elevated procal and CRP  2. Permacath related bacteremia, secondary to Staphylococcus aureus (MRSA), on Day #6 IV Vancomycin, Day #3 IV Daptomycin  3. Dermatomal zoster, CN V1, left side, resolving, status post Acyclovir, resolved  4. CKD Stage 4 on dialysis  5. Nonobstructing renal calculi  6. Generalized weakness  7. Penicillin allergy    Comment:  Afebrile with normal WBC and decreasing procal and CRP. Plan:   1. Reasonable to discharge on IV Vancomycin at hemodialysis for another 2 weeks (Rx for Vancomycin IV after dialysis TThSat on chart, needs to be faxed to [address] [facility] on [place] [address])  2. Will follow-up repeat blood cultures  3.  Cleared for discharge from ID standpoint        Signed By: Sourav De La Paz MD     July 13, 2021

## 2021-07-13 NOTE — PROGRESS NOTES
Hospitalist Progress Note    Subjective:   Daily Progress Note: 7/13/2021 10:48 AM    Hospital Course:  Maddie Brought a 72 y. o. male who has a history of end-stage renal disease on hemodialysis, congestive heart failure, cardiomyopathy, and diabetes who came to ER with a complaint of generalized weakness and recurrent falls at home.  Patient states that he has been very weak recently. In the ER, patient was found to have fever of 103 °F. Elevated WBC, procalcitonin, and CRP on admission.    Also noted to have facial shingle crustations around left forehead with associated neuropathy pain.  Patient is noticed to have poor care of his dialysis catheter.  Hospitalist service has been requested to admit the patient for further management.  Started on IV vancomycin and cefepime. Consults placed with nephrology and infectious disease.  Started on IV Acyclovir for ophthalmic zoster. Blood cultures returned positive for gram-positive cocci in clusters. Repeat blood cultures also positive. IV Daptomycin added for additional coverage. Subjective:    Patient seen and examined at bedside. He has no new complaints this morning. Repeat blood cultures showing no growth so far, WBC normal, CRP and procal trending down. Current Facility-Administered Medications   Medication Dose Route Frequency    heparin (porcine) 1,000 unit/mL injection        [START ON 7/15/2021] Random Vancomycin  Dose to be drawn on 7/15/21 at 0600 pre HD.    Other ONCE    vancomycin (VANCOCIN) 1,000 mg in 0.9% sodium chloride 250 mL (VIAL-MATE)  1,000 mg IntraVENous ONCE    DAPTOmycin (CUBICIN) 500 mg in 0.9% sodium chloride 50 mL IVPB RF formulation  500 mg IntraVENous Q48H    white petrolatum-mineral oiL (LACRILUBE S.O.P.) ointment   Both Eyes Q12H    atorvastatin (LIPITOR) tablet 40 mg  40 mg Oral DAILY    metoprolol succinate (TOPROL-XL) XL tablet 50 mg  50 mg Oral BID    traMADoL (ULTRAM) tablet 50 mg  50 mg Oral Q6H PRN    polyvinyl alcohol-povidon(PF) (REFRESH CLASSIC) 1.4-0.6 % ophthalmic solution 1 Drop  1 Drop Both Eyes PRN    heparin (porcine) 1,000 unit/mL injection 3,600 Units  3,600 Units Hemodialysis DIALYSIS PRN    sodium chloride (NS) flush 5-10 mL  5-10 mL IntraVENous PRN    sodium chloride (NS) flush 5-40 mL  5-40 mL IntraVENous Q8H    sodium chloride (NS) flush 5-40 mL  5-40 mL IntraVENous PRN    acetaminophen (TYLENOL) tablet 650 mg  650 mg Oral Q6H PRN    Or    acetaminophen (TYLENOL) suppository 650 mg  650 mg Rectal Q6H PRN    polyethylene glycol (MIRALAX) packet 17 g  17 g Oral DAILY PRN    promethazine (PHENERGAN) tablet 12.5 mg  12.5 mg Oral Q6H PRN    Or    ondansetron (ZOFRAN) injection 4 mg  4 mg IntraVENous Q6H PRN    famotidine (PEPCID) tablet 20 mg  20 mg Oral BID    Vancomycin dose per pharmacy protocol   Other Rx Dosing/Monitoring        Review of Systems  Constitutional: Positive for malaise/fatigue. HENT: Negative.    Respiratory: Negative.    Cardiovascular: Negative.    Gastrointestinal: Negative.    Musculoskeletal: Positive for myalgias. Neurological: Positive for weakness. Nerve pain from facial shingles.       Objective:     Visit Vitals  /87   Pulse 68   Temp 98.5 °F (36.9 °C) (Oral)   Resp 18   Ht 5' 10\" (1.778 m)   Wt 79.1 kg (174 lb 6.1 oz)   SpO2 98%   BMI 25.02 kg/m²      O2 Device: None (Room air)    Temp (24hrs), Av.2 °F (37.3 °C), Min:98.3 °F (36.8 °C), Max:100.2 °F (37.9 °C)      No intake/output data recorded.  1901 -  0700  In: 200 [P.O.:430]  Out: -     PHYSICAL EXAM:  Constitutional: Ill-appearing, obese male. No acute distress  Skin: Right-sided permacath. Extremities and face reveal no rashes. HEENT: Mild left eye redness. Crusted lesions on left side of forehead.  PERRL. No oral ulcers. The neck is supple and no masses. Cardiovascular: Regular rate and rhythm. Normal S1/S2. No murmur appreciated.   Respiratory:  Clear breath sounds bilaterally with no wheezes, rales, or rhonchi. GI: Abdomen nondistended, soft, and nontender. Normal active bowel sounds. Rectal: Deferred   Musculoskeletal: No pitting edema of the lower legs. Able to move all ext  Neurological:  Patient is alert and oriented. Cranial nerves II-XII grossly intact  Psychiatric: Mood appears appropriate       Data Review    Recent Results (from the past 24 hour(s))   GLUCOSE, POC    Collection Time: 07/12/21 12:14 PM   Result Value Ref Range    Glucose (POC) 101 65 - 117 mg/dL    Performed by Rupert Nixon, POC    Collection Time: 07/12/21  4:41 PM   Result Value Ref Range    Glucose (POC) 136 (H) 65 - 117 mg/dL    Performed by Rupert Nixon, POC    Collection Time: 07/12/21  7:40 PM   Result Value Ref Range    Glucose (POC) 121 (H) 65 - 117 mg/dL    Performed by Isis Priest    CREATININE    Collection Time: 07/13/21  6:19 AM   Result Value Ref Range    Creatinine 7.91 (H) 0.70 - 1.30 mg/dL   VANCOMYCIN, RANDOM    Collection Time: 07/13/21  6:19 AM   Result Value Ref Range    Vancomycin, random 19.3 ug/mL    Reported dose date Not provided      Reported dose: Not provided Units   CBC WITH AUTOMATED DIFF    Collection Time: 07/13/21  6:19 AM   Result Value Ref Range    WBC 8.1 4.1 - 11.1 K/uL    RBC 4.85 4.10 - 5.70 M/uL    HGB 12.3 12.1 - 17.0 g/dL    HCT 39.2 36.6 - 50.3 %    MCV 80.8 80.0 - 99.0 FL    MCH 25.4 (L) 26.0 - 34.0 PG    MCHC 31.4 30.0 - 36.5 g/dL    RDW 18.7 (H) 11.5 - 14.5 %    PLATELET 896 725 - 958 K/uL    MPV 10.3 8.9 - 12.9 FL    NRBC 0.0 0.0  WBC    ABSOLUTE NRBC 0.00 0.00 - 0.01 K/uL    NEUTROPHILS 58 32 - 75 %    LYMPHOCYTES 22 12 - 49 %    MONOCYTES 16 (H) 5 - 13 %    EOSINOPHILS 2 0 - 7 %    BASOPHILS 1 0 - 1 %    IMMATURE GRANULOCYTES 1 (H) 0 - 0.5 %    ABS. NEUTROPHILS 4.8 1.8 - 8.0 K/UL    ABS. LYMPHOCYTES 1.8 0.8 - 3.5 K/UL    ABS. MONOCYTES 1.3 (H) 0.0 - 1.0 K/UL    ABS. EOSINOPHILS 0.1 0.0 - 0.4 K/UL    ABS.  BASOPHILS 0.1 0.0 - 0.1 K/UL    ABS. IMM. GRANS. 0.0 0.00 - 0.04 K/UL    DF AUTOMATED     METABOLIC PANEL, BASIC    Collection Time: 07/13/21  6:19 AM   Result Value Ref Range    Sodium 134 (L) 136 - 145 mmol/L    Potassium 4.0 3.5 - 5.1 mmol/L    Chloride 102 97 - 108 mmol/L    CO2 22 21 - 32 mmol/L    Anion gap 10 5 - 15 mmol/L    Glucose 97 65 - 100 mg/dL    BUN 45 (H) 6 - 20 mg/dL    Creatinine 7.91 (H) 0.70 - 1.30 mg/dL    BUN/Creatinine ratio 6 (L) 12 - 20      GFR est AA 8 (L) >60 ml/min/1.73m2    GFR est non-AA 7 (L) >60 ml/min/1.73m2    Calcium 8.2 (L) 8.5 - 10.1 mg/dL   PROCALCITONIN    Collection Time: 07/13/21  6:19 AM   Result Value Ref Range    Procalcitonin 6.79 (H) 0 ng/mL   C REACTIVE PROTEIN, QT    Collection Time: 07/13/21  6:19 AM   Result Value Ref Range    C-Reactive protein 16.00 (H) 0.00 - 0.60 mg/dL       CT ABD PELV WO CONT   Final Result   Pronounced ascites. No evidence of a fever source. No evidence of   hydronephrosis      XR CHEST PORT   Final Result   Mild central pulmonary vascular congestion. XR CHEST PA LAT   Final Result   Lung base opacity likely atelectasis. No focal airspace   consolidation. Evidence of pulmonary vascular congestion without overt edema. Active Problems:    Essential hypertension (1/24/2021)      Cardiomyopathy ([address] (1/24/2021)      Sepsis ([address] (7/7/2021)      ESRD (end stage renal disease) on dialysis ([address] (7/7/2021)      Line sepsis ([address] (7/7/2021)      Herpes zoster with nervous system complication (3/7/3740)        Assessment/Plan:        1.  Sepsis:  Secondary to bacteremia  Noted poor HD line hygiene  Elevated WBC, procalcitonin and CRP. WBC now normal, procal and CRP trending down. Cultures positive for Staphylococcus aureus (MRSA).    On IV vancomycin and IV daptomycin  Repeat cultures negative so far  ID consulted     2.  ESRD on HD:  Nephrology consulted  Currently has permacath in place, no indication for removal  Urine culture negative  CT abd showed no hydronephrosis     3.  Cardiomyopathy:  Systolic and diastolic stage III heart failure with severe TR  Hydralazine 100 mg 3 times daily, hold for SBP less than 110, metoprolol 50 mg twice daily     4.  Shingles with neuropathy symptoms  5. Ophthalmic herpes zoster  Finished 14 days of IV acyclovir  F/u with Dr. Hui Moreno [phone]), his Ophthalmologist, in outpatient setting      DVT Prophylaxis:  Code Status: Full Code  POA/NOK:     Disposition/barriers: Response to treatment, awaiting culture sensitivities and further antibiotic management per ID  Care Plan discussed with: Patient, RN    Total time spent with patient: >35 minutes.

## 2021-07-13 NOTE — PROGRESS NOTES
Nephrology Consult    Patient: Ran Jameson MRN: [id number]  SSN: [id number]    YOB: 1950  Age: 72 y.o. Sex: male      Subjective:   Pt is seen in the room  He is looking more alert  No new complaints  Afebrile, normal wbc, BP ok  BC grew MRSA  CT abdomen no hydronephrosis but ascites       Past Medical History:   Diagnosis Date    Asthenia 1/24/2021    Cardiomyopathy ([address] 1/24/2021    CHF (congestive heart failure) ([address]     Chronic kidney disease     CKD (chronic kidney disease)     4    Diabetes ([address]     End stage renal disease on dialysis ([address] 1/24/2021    Essential hypertension 1/24/2021    Gastroesophageal reflux disease 1/24/2021    Gastroesophageal reflux disease 1/24/2021    Hypertension     Pneumonia due to COVID-19 virus 5/53/6392    Systolic CHF, acute on chronic ([facility] 1/24/2021     Past Surgical History:   Procedure Laterality Date    COLONOSCOPY N/A 12/3/2020    COLONOSCOPY performed by Juan Manuel Pablo MD at [facility] HX HEART CATHETERIZATION      HX HERNIA REPAIR       [address] CVAD  1/22/2021    IR INSERT NON TUNL CVC OVER 5 YRS  1/18/2021    IR INSERT TUNL CVC W/O PORT OVER 5 YR  1/22/2021    IR PLACE CVAD FLUORO GUIDE  1/18/2021      Family History   Problem Relation Age of Onset    Diabetes Mother     Heart Failure Father      Social History     Tobacco Use    Smoking status: Never Smoker    Smokeless tobacco: Never Used   Substance Use Topics    Alcohol use: Not Currently      Current Facility-Administered Medications   Medication Dose Route Frequency Provider Last Rate Last Admin    heparin (porcine) 1,000 unit/mL injection             [START ON 7/15/2021] Random Vancomycin  Dose to be drawn on 7/15/21 at 0600 pre HD.    Other Israel Youngblood MD        vancomycin [facility]) 1,000 mg in 0.9% sodium chloride 250 mL (VIAL-MATE)  1,000 mg IntraVENous Israel Youngblood MD        [START ON 7/14/2021] vancomycin (VANCOCIN) 1,000 mg in 0.9% sodium chloride 250 mL (VIAL-MATE)  1,000 mg IntraVENous Q MON, WED & Ramin Webber PA-C        DAPTOmycin (CUBICIN) 500 mg in 0.9% sodium chloride 50 mL IVPB RF formulation  500 mg IntraVENous Q48H Kei Rain  mL/hr at 07/12/21 1335 500 mg at 07/12/21 1335    white petrolatum-mineral oiL (LACRILUBE S.O.P.) ointment   Both Eyes Q12H Kei Rain MD   Given at 07/13/21 0900    atorvastatin (LIPITOR) tablet 40 mg  40 mg Oral DAILY Nav Mcfadden MD   40 mg at 07/13/21 1205    metoprolol succinate (TOPROL-XL) XL tablet 50 mg  50 mg Oral BID Nav Mcfadden MD   50 mg at 07/13/21 1205    traMADoL (ULTRAM) tablet 50 mg  50 mg Oral Q6H PRN Radha Burris NP   50 mg at 07/13/21 0359    polyvinyl alcohol-povidon(PF) (REFRESH CLASSIC) 1.4-0.6 % ophthalmic solution 1 Drop  1 Drop Both Eyes PRN Radha Fine, NP        heparin (porcine) 1,000 unit/mL injection 3,600 Units  3,600 Units Hemodialysis DIALYSIS PRN Elvin Greer MD   3,600 Units at 07/13/21 0743    sodium chloride (NS) flush 5-10 mL  5-10 mL IntraVENous PRN Nav Mcfadden MD        sodium chloride (NS) flush 5-40 mL  5-40 mL IntraVENous Q8H Nav Mcfadden MD   10 mL at 07/13/21 0630    sodium chloride (NS) flush 5-40 mL  5-40 mL IntraVENous PRN Nav Mcfadden MD        acetaminophen (TYLENOL) tablet 650 mg  650 mg Oral Q6H PRN Nav Mcfadden MD   650 mg at 07/09/21 1607    Or    acetaminophen (TYLENOL) suppository 650 mg  650 mg Rectal Q6H PRN Nav Mcfadden MD        polyethylene glycol (MIRALAX) packet 17 g  17 g Oral DAILY PRN Nav Mcfadden MD        promethazine (PHENERGAN) tablet 12.5 mg  12.5 mg Oral Q6H PRN Nav Mcfadden MD        Or    ondansetron [facility]) injection 4 mg  4 mg IntraVENous Q6H PRN Nav Mcfadden MD        famotidine (PEPCID) tablet 20 mg  20 mg Oral BID Nav Mcfadden MD   20 mg at 07/13/21 1205    Vancomycin dose per pharmacy protocol   Other Rx Dosing/Monitoring Janell Peralta MD            Allergies   Allergen Reactions    Lisinopril Angioedema    Pcn [Penicillins] Rash       Review of Systems:  A comprehensive review of systems was negative except for that written in the History of Present Illness. Objective:     Vitals:    07/13/21 1100 07/13/21 1104 07/13/21 1142 07/13/21 1452   BP: 125/70 133/83 134/79 127/78   Pulse: 69 69 74 76   Resp:   17 17   Temp:   98.8 °F (37.1 °C) 99.4 °F (37.4 °C)   TempSrc:       SpO2:   98% 92%   Weight:       Height:            Physical Exam:  General: NAD  Eyes: sclera anicteric  Oral Cavity: No thrush or ulcers  Neck: no JVD  Chest: Fair bilateral air entry  Heart: normal sounds  Abdomen: soft and non tender   : no simms  Lower Extremities: no edema  Skin: no rash  Neuro: intact  Psychiatric: non-depressed    RI Perm cath, clean exit site and no drainage  L wrist AVF with good thrill          Assessment:     Hospital Problems  Date Reviewed: 1/24/2021        Codes Class Noted POA    Herpes zoster with nervous system complication PDB-14-SE: L30.72  ICD-9-CM: 053.10  7/8/2021 Unknown        * (Principal) Sepsis ([facility] ICD-10-CM: A41.9  ICD-9-CM: 038.9, 995.91  7/7/2021 Unknown        ESRD (end stage renal disease) on dialysis [facility]) ICD-10-CM: N18.6, Z99.2  ICD-9-CM: 585.6, V45.11  7/7/2021 Unknown        Line sepsis ([facility] ICD-10-CM: T85.79XA, A41.9  ICD-9-CM: 996.62, 038.9, 995.91  7/7/2021 Unknown        Essential hypertension ICD-10-CM: I10  ICD-9-CM: 401.9  1/24/2021 Yes        Cardiomyopathy ([facility] (Chronic) ICD-10-CM: I42.9  ICD-9-CM: 425.4  1/24/2021 Yes              Plan:     1 ESRD. -On hemodialysis Tuesday Thursday Saturday.    -He was last dialyzed on 7/6 as an outpatient.    -No volume overload,  not looking uremic.    -s/p dialyzed on 7/13 and removed 2L  -He has permacath is a dialysis access.    -He has a maturing left wrist AV fistula.     2.  Sepsis:  -2/2 bacteremia  -BC grew GPC in clusters -Came with temp 103, WBC 14.8.    -Chest x-ray no infiltrates. -Urine 20-30 WBCs and trace LE. -Urine culture is neg.    -His catheter site looks clean with no drainage.    -plan to keep on iv vancomycin and cefepime (dced). -He is afebrile and leukocytosis has been improved. -Hemodynamically stable.    -No indication to remove the perm cath if ok with ID   - CT of the abdomen no hydronephrosis. -ID specialist on board. 3.  Metabolic acidosis, high anion gap. -CO2 up from 13-->22. -LA 2.5   -Lactic acid was 1.4 on 7/7.    -He also complaining of loose stools. -dialyze him with high bicarb bath. 4.  Anemia.    -Hemoglobin at goal.    -No indication for erythropoietin. 5.  Renal osteodystrophy.    -His calcium and phos are ok.    -pending intact PTH level. 6 ophthalmic herpes zoster.    -Left ophthalmic artery zoster.    -Seen by ophthalmology as an outpatient.    -On acyclovir 800 mg twice a day.       Signed By: Maddie Villegas MD     July 13, 2021

## 2021-07-14 ENCOUNTER — APPOINTMENT (OUTPATIENT)
Dept: INTERVENTIONAL RADIOLOGY/VASCULAR | Age: 71
DRG: 314 | End: 2021-07-14
Attending: STUDENT IN AN ORGANIZED HEALTH CARE EDUCATION/TRAINING PROGRAM
Payer: MEDICARE

## 2021-07-14 VITALS
WEIGHT: 174.38 LBS | TEMPERATURE: 98.1 F | OXYGEN SATURATION: 96 % | HEIGHT: 70 IN | DIASTOLIC BLOOD PRESSURE: 73 MMHG | HEART RATE: 72 BPM | BODY MASS INDEX: 24.97 KG/M2 | SYSTOLIC BLOOD PRESSURE: 132 MMHG | RESPIRATION RATE: 16 BRPM

## 2021-07-14 PROCEDURE — 74011250637 HC RX REV CODE- 250/637: Performed by: HOSPITALIST

## 2021-07-14 PROCEDURE — 74011250637 HC RX REV CODE- 250/637: Performed by: NURSE PRACTITIONER

## 2021-07-14 PROCEDURE — 99232 SBSQ HOSP IP/OBS MODERATE 35: CPT | Performed by: INTERNAL MEDICINE

## 2021-07-14 RX ADMIN — METOPROLOL SUCCINATE 50 MG: 50 TABLET, EXTENDED RELEASE ORAL at 08:53

## 2021-07-14 RX ADMIN — TRAMADOL HYDROCHLORIDE 50 MG: 50 TABLET, FILM COATED ORAL at 06:17

## 2021-07-14 RX ADMIN — MINERAL OIL, PETROLATUM: 425; 568 OINTMENT OPHTHALMIC at 08:53

## 2021-07-14 RX ADMIN — Medication 10 ML: at 06:17

## 2021-07-14 RX ADMIN — Medication 10 ML: at 13:21

## 2021-07-14 RX ADMIN — ATORVASTATIN CALCIUM 40 MG: 40 TABLET, FILM COATED ORAL at 08:53

## 2021-07-14 RX ADMIN — FAMOTIDINE 20 MG: 20 TABLET ORAL at 08:53

## 2021-07-14 NOTE — PROGRESS NOTES
Progress Note    Patient: Carlyle Asher MRN: [id number]  SSN: [id number]    YOB: 1950  Age: 72 y.o. Sex: male      Admit Date: 7/7/2021    LOS: 7 days     Subjective:   Patient followed for sepsis of unclear etiology but with concern for dialysis catheter related bacteremia with MRSA and repeat blood cultures also positive. Most recent blood cultures, however, remain negative at 2 days on Vancomycin and Daptomycin. He is afebrile but still having low grade temperatures with normal WBC and decreasing procal.  He is currently on Vancomycin and Daptomycin. Patient resting comfortably with no complaints. It appears that his discharge was delayed because of order for Powerline. Objective:     Vitals:    07/13/21 2003 07/14/21 0256 07/14/21 0738 07/14/21 0912   BP: 130/81 139/86 139/85    Pulse: 74 81 68    Resp: 18 20 18    Temp: 99.5 °F (37.5 °C) 98.1 °F (36.7 °C) 97.7 °F (36.5 °C)    TempSrc:       SpO2: 97% 95% 98%    Weight:       Height:    5' 10\" (1.778 m)        Intake and Output:  Current Shift: No intake/output data recorded. Last three shifts: 07/12 1901 - 07/14 0700  In: 320 [P.O.:320]  Out: 2000     Physical Exam:    Vitals and nursing note reviewed. Constitutional:       Appearance: He is obese. He is ill-appearing. HENT:      Head: Normocephalic and atraumatic. Right Ear: External ear normal.      Left Ear: External ear normal.      Nose: Nose normal.      Mouth/Throat:      Pharynx: Oropharynx is clear. Eyes:      General:         Left eye: redness resolved     Pupils: Pupils are equal, round, and reactive to light. Cardiovascular:      Rate and Rhythm: Normal rate and regular rhythm. Comments: Right sided Permacath with site hyperpigmentation but no purulent drainage, erythema or tenderness  Musculoskeletal:      Cervical back: Neck supple. Right lower leg: No edema. Left lower leg: No edema. Skin:     Findings: Rash present.       Comments: Dry crusted lesions left forehead   Neurological:      General: No focal deficit present. Mental Status: He is alert and oriented to person, place, and time. Psychiatric:         Mood and Affect: Mood normal.         Behavior: Behavior normal.         Thought Content: Thought content normal.         Judgment: Judgment normal.     Lab/Data Review:     WBC 8,100  Lactic acid 2.5  CK 80    Procal 6.79 <13.79 <19.35 <21.98 <23.29  CRP 16.00 <16.60 <15.10 <11.80 <11.20 <15.20    Blood cultures (7/7) Staphylococcus aureus (MRSA)  Blood cultures (7/8) Staphylococcus aureus (MRSA)  Blood cultures (7/11) No growth 3 days  Blood cultures (7/12) No growth 2 days  Urine culture (7/7) No growth FINAL  Assessment:     Principal Problem:    Sepsis ([address] (7/7/2021)    Active Problems:    Essential hypertension (1/24/2021)      Cardiomyopathy ([address] (1/24/2021)      ESRD (end stage renal disease) on dialysis ([facility] (7/7/2021)      Line sepsis ([address] (7/7/2021)      Herpes zoster with nervous system complication (7/0/7092)    1. Sepsis with fever, leukocytosis, elevated procal and CRP  2. Permacath related bacteremia, secondary to Staphylococcus aureus (MRSA), on Day #7 IV Vancomycin  3. Dermatomal zoster, CN V1, left side, resolving, status post Acyclovir, resolved  4. CKD Stage 4 on dialysis  5. Nonobstructing renal calculi  6. Generalized weakness  7. Penicillin allergy    Comment:  Afebrile with normal WBC and decreasing procal and CRP. Plan:   1. Reasonable to discharge on IV Vancomycin at hemodialysis for another 2 weeks (Rx for Vancomycin IV after dialysis TThSat on chart, needs to be faxed to [address] [facility] on [place] [address])  2. Will follow-up repeat blood cultures until finalized  3. Powerline not needed since Vancomycin to be given at dialysis  4.  Cleared for discharge from ID standpoint        Signed By: Nora Hung MD     July 14, 2021

## 2021-07-14 NOTE — PROGRESS NOTES
Per primary nurse Tori report to IR that consult for powerline is no longer needed, that patient is to receive antibiotics in dialysis.

## 2021-07-14 NOTE — DISCHARGE INSTRUCTIONS
Patient Education        Hemodialysis Vascular Access: Care Instructions  Overview  Hemodialysis, or dialysis, is the use of a machine to remove wastes from your blood. You need it if your kidneys are not able to remove wastes on their own. A dialysis access is the place in your arm, or sometimes in your leg, where a doctor creates a blood vessel that carries a large flow of blood. Your doctor creates an access during a minor surgery. You need to take care of your access to keep it working and to prevent infection. When you have dialysis, two needles are placed in this blood vessel and are connected to the dialysis machine. Your blood flows out of one needle and into the machine to be cleaned. Then your cleaned blood flows back into your body through the other needle. Sometimes a doctor makes a short-term access through a tube, called a catheter, placed in your neck, upper chest, or groin. Follow-up care is a key part of your treatment and safety. Be sure to make and go to all appointments, and call your doctor if you are having problems. It's also a good idea to know your test results and keep a list of the medicines you take. How can you care for yourself at home? · After your doctor creates an access, keep it dry for at least 2 days. · Squeeze a soft ball or other object as instructed after the access is placed. This will help blood flow through the access and help prevent blood clots. · After you have dialysis, check to see if the access bleeds or swells. Let your doctor know if your arm bleeds or swells. · Do not lift anything heavy with the arm that has the access. · Do not bump your arm. · Don't wear tight clothing or jewelry over the access. · Don't sleep with your access arm under your body. · Have blood drawn or blood pressure taken from your other arm. · Keep the access clean and dry. · Don't put cream or lotion on or near the access. When should you call for help?    Call your doctor now or seek immediate medical care if:    · You have signs of infection, such as:  ? Increased pain, swelling, warmth, or redness around the access. ? Red streaks leading from the access. ? Pus draining from the access. ? A fever.     · You do not feel a pulse in your access. Watch closely for changes in your health, and be sure to contact your doctor if:    · You do not get better as expected. Where can you learn more? Go to http://www.gray.com/  Enter L169 in the search box to learn more about \"Hemodialysis Vascular Access: Care Instructions. \"  Current as of: December 17, 2020               Content Version: 12.8  © 2006-2021 [employer]. Care instructions adapted under license by [employer] (which disclaims liability or warranty for this information). If you have questions about a medical condition or this instruction, always ask your healthcare professional. [place] [address] any warranty or liability for your use of this information. Patient Education        MRSA: Care Instructions  Your Care Instructions     MRSA stands for methicillin-resistant Staphylococcus aureus. It is a type of bacteria that can cause a staph infection. But it cannot be killed by the antibiotic methicillin and some other antibiotics. This sometimes makes it harder to treat. The bacteria are widespread on skin and in the nose. MRSA can cause infections of the skin, heart, blood, and bones. The bacteria can spread quickly in the body and cause serious problems. MRSA can also be spread from person to person. Depending on how serious your infection is, the doctor may drain your wound and you may get antibiotics through a small tube placed in a vein (IV). Your doctor may also give you an antibiotic ointment to use on sores or in your nose. Follow-up care is a key part of your treatment and safety.  Be sure to make and go to all appointments, and call your doctor if you are having problems. It's also a good idea to know your test results and keep a list of the medicines you take. How can you care for yourself at home? · Take your antibiotics as directed. Do not stop taking them just because you feel better. You need to take the full course of antibiotics. · Keep any cuts or other wounds covered while they heal.  · Wash your hands often, especially after you touch elastic bandages or other dressings over a wound. This can keep the bacteria from spreading. Wrap bandages in a plastic bag before you throw them away. · Do not share towels, washcloths, razors, clothing, or other items that touched your wound or bandage. Wash your sheets, towels, and clothes with warm water and detergent. Dry them in a hot dryer, if possible. · Keep shared areas clean by wiping down surfaces (such as countertops, doorknobs, and light switches) with a disinfectant. When should you call for help? Call your doctor now or seek immediate medical care if:    · You have worse symptoms of infection, such as:  ? Increased pain, swelling, warmth, or redness. ? Red streaks leading from the area. ? Pus draining from the area. ? A fever. Watch closely for changes in your health, and be sure to contact your doctor if:    · You do not get better as expected. Where can you learn more? Go to http://www.gray.com/  Enter A043 in the search box to learn more about \"MRSA: Care Instructions. \"  Current as of: September 23, 2020               Content Version: 12.8  © 2006-2021 [employer]. Care instructions adapted under license by [employer] (which disclaims liability or warranty for this information). If you have questions about a medical condition or this instruction, always ask your healthcare professional. [place] [address] any warranty or liability for your use of this information.          Patient Education        Learning About Preventing MRSA Infection  What is a MRSA infection? MRSA stands for methicillin-resistant Staphylococcus aureus. It is a type of bacteria that can cause a staph infection. Staph bacteria normally live on your skin and in your nose, usually without causing problems. Sometimes the bacteria cause infection. Usually you can treat this infection with antibiotics. But MRSA infections are harder to treat than other staph infections. This is because antibiotics may not be able to kill MRSA. For some people, especially those who are weak or ill, MRSA infections can become serious. MRSA can spread from person to person. It is commonly spread from the hands of someone who has MRSA. This could be anyone in a health care setting or in the community. MRSA is more likely to develop when antibiotics are used too often or aren't used the right way. Over time, bacteria can change so that these antibiotics no longer work well. How can you prevent MRSA infection? Practice good hygiene  · Wash your hands often and thoroughly with soap and clean, running water. You can also use an alcohol-based hand . Hand-washing is the best way to avoid spreading germs. · Keep cuts and scrapes clean and covered with a bandage. Avoid contact with other people's wounds or bandages. · Don't share personal items such as towels or razors. · If you are in the hospital, remind doctors and nurses to wash their hands before they touch you. Use antibiotics wisely  · Always ask your doctor if antibiotics are the best treatment. They can help treat bacterial infections, but they can't cure viral infections. Don't pressure your doctor to prescribe antibiotics when they won't help you get better. · If your doctor prescribed antibiotics, take them as directed. Do not stop taking them just because you feel better. You need to take the full course of antibiotics.  Using only part of the medicine may cause antibiotic-resistant bacteria to develop. · Do not save any antibiotics. Don't use antibiotics that were prescribed for someone else. What are the symptoms? Symptoms of a MRSA infection depend on where the infection is:  · If the infection is in a wound, that area of your skin may be red or tender. · If the infection is in the skin, you may have boils or abscesses. It may look like you have been bitten by a spider or insect. How is it diagnosed? The doctor will take a sample of your infected wound or take a blood or urine sample. The sample is tested to see if antibiotics can kill the bacteria. This test may take several days. You may also be tested to see if you are a MRSA carrier. A carrier is a person who has the bacteria on his or her skin but who isn't sick. The doctor will take a swab from the inside of your nose for this test.  How is MRSA treated? Your doctor may:  · Drain your wound. · Give you antibiotics as pills or through a needle put in your vein (IV). · Give you an ointment to put on your skin or inside your nose. · Have you wash your skin daily with an antiseptic soap. You may have to stay in the hospital for treatment. In the hospital, you may be kept apart from others to reduce the chances of spreading the bacteria. Follow-up care is a key part of your treatment and safety. Be sure to make and go to all appointments, and call your doctor if you are having problems. It's also a good idea to know your test results and keep a list of the medicines you take. Where can you learn more? Go to http://www.gray.com/  Enter D218 in the search box to learn more about \"Learning About Preventing MRSA Infection. \"  Current as of: September 23, 2020               Content Version: 12.8  © 7764-6025 [employer]. Care instructions adapted under license by [employer] (which disclaims liability or warranty for this information).  If you have questions about a medical condition or this instruction, always ask your healthcare professional. [place] [address] any warranty or liability for your use of this information. Patient Education        Shingles: Care Instructions  Your Care Instructions     Shingles (herpes zoster) causes pain and a blistered rash. The rash can appear anywhere on the body but will be on only one side of the body, the left or right. It will be in a band, a strip, or a small area. The pain can be very severe. Shingles can also cause tingling or itching in the area of the rash. The blisters scab over after a few days and heal in 2 to 4 weeks. Medicines can help you feel better and may help prevent more serious problems caused by shingles. Shingles is caused by the same virus that causes chickenpox. When you have chickenpox, the virus gets into your nerve roots and stays there (becomes dormant) long after you get over the chickenpox. If the virus becomes active again, it can cause shingles. Follow-up care is a key part of your treatment and safety. Be sure to make and go to all appointments, and call your doctor if you are having problems. It's also a good idea to know your test results and keep a list of the medicines you take. How can you care for yourself at home? · Be safe with medicines. Take your medicines exactly as prescribed. Call your doctor if you think you are having a problem with your medicine. Antiviral medicine helps you get better faster. · Try not to scratch or pick at the blisters. They will crust over and fall off on their own if you leave them alone. · Put cool, wet cloths on the area to relieve pain and itching. You can also use calamine lotion. Try not to use so much lotion that it cakes and is hard to get off. · Put cornstarch or baking soda on the sores to help dry them out so they heal faster. · Do not use thick ointment, such as petroleum jelly, on the sores. This will keep them from drying and healing.   · To help remove loose crusts, soak them in tap water. This can help decrease oozing, and dry and soothe the skin. · Take an over-the-counter pain medicine, such as acetaminophen (Tylenol), ibuprofen (Advil, Motrin), or naproxen (Aleve). Read and follow all instructions on the label. · Avoid close contact with people until the blisters have healed. It is very important for you to avoid contact with anyone who has never had chickenpox or the chickenpox vaccine. Pregnant women, young babies, and anyone else who has a hard time fighting infection (such as someone with HIV, diabetes, or cancer) is especially at risk. When should you call for help? Call your doctor now or seek immediate medical care if:    · You have a new or higher fever.     · You have a severe headache and a stiff neck.     · You lose the ability to think clearly.     · The rash spreads to your forehead, nose, eyes, or eyelids.     · You have eye pain, or your vision gets worse.     · You have new pain in your face, or you cannot move the muscles in your face.     · Blisters spread to new parts of your body. Watch closely for changes in your health, and be sure to contact your doctor if:    · The rash has not healed after 2 to 4 weeks.     · You still have pain after the rash has healed. Where can you learn more? Go to http://www.gray.com/  Enter X176 in the search box to learn more about \"Shingles: Care Instructions. \"  Current as of: September 23, 2020               Content Version: 12.8  © 5982-0555 [employer]. Care instructions adapted under license by [employer] (which disclaims liability or warranty for this information). If you have questions about a medical condition or this instruction, always ask your healthcare professional. [address] any warranty or liability for your use of this information.

## 2021-07-14 NOTE — DISCHARGE SUMMARY
Hospitalist Discharge Summary     Patient ID:    Mariusz Carrion  [id number]  72 y.o.  1950    Admit date: 7/7/2021    Discharge date : 7/14/2021    Chronic Diagnoses:    Problem List as of 7/14/2021 Date Reviewed: 1/24/2021        Codes Class Noted - Resolved    Herpes zoster with nervous system complication [id number]: G32.60  ICD-9-CM: 053.10  7/8/2021 - Present        * (Principal) Sepsis ([facility] ICD-10-CM: A41.9  ICD-9-CM: 038.9, 995.91  7/7/2021 - Present        ESRD (end stage renal disease) on dialysis [facility]) ICD-10-CM: N18.6, Z99.2  ICD-9-CM: 585.6, V45.11  7/7/2021 - Present        Line sepsis [facility]) ICD-10-CM: T85.79XA, A41.9  ICD-9-CM: 996.62, 038.9, 995.91  7/7/2021 - Present        End stage renal disease on dialysis [facility]) ICD-10-CM: N18.6, Z99.2  ICD-9-CM: 585.6, V45.11  1/24/2021 - Present        Pneumonia due to COVID-19 virus ICD-10-CM: U07.1, J12.82  ICD-9-CM: 480.8, 079.89  1/24/2021 - Present        Essential hypertension ICD-10-CM: I10  ICD-9-CM: 401.9  1/24/2021 - Present        Cardiomyopathy ([facility] (Chronic) ICD-10-CM: I42.9  ICD-9-CM: 425.4  1/24/2021 - Present        Systolic CHF, acute on chronic (HCC) ICD-10-CM: I50.23  ICD-9-CM: 428.23, 428.0  1/24/2021 - Present        Fluid overload ICD-10-CM: E87.70  ICD-9-CM: 276.69  1/24/2021 - Present        Gastroesophageal reflux disease ICD-10-CM: K21.9  ICD-9-CM: 530.81  1/24/2021 - Present        Asthenia ICD-10-CM: R53.1  ICD-9-CM: 780.79  1/24/2021 - Present        ATN (acute tubular necrosis) ([facility] ICD-10-CM: N17.0  ICD-9-CM: 584.5  1/16/2021 - Present        RESOLVED: Acute hypoxemic respiratory failure ([facility] ICD-10-CM: J96.01  ICD-9-CM: 518.81  1/24/2021 - 1/24/2021          22    Final Diagnoses:   Principal Problem:    Sepsis ([facility] (7/7/2021)    Active Problems:    Essential hypertension (1/24/2021)      Cardiomyopathy ([facility] (1/24/2021)      ESRD (end stage renal disease) on dialysis ([facility] (7/7/2021)      Line sepsis ([facility] (7/7/2021)      Herpes zoster with nervous system complication (5/2/8857)        Hospital Course:   Shan Cleveland a 72 y. o. male who has a history of end-stage renal disease on hemodialysis, congestive heart failure, cardiomyopathy, and diabetes who came to ER with a complaint of generalized weakness and recurrent falls at home.  Patient states that he has been very weak recently. In the ER, patient was found to have fever of 103 °F. Elevated WBC, procalcitonin, and CRP on admission.    Also noted to have facial shingle crustations around left forehead with associated neuropathy pain.  Patient is noticed to have poor care of his dialysis catheter.  Hospitalist service has been requested to admit the patient for further management.  Started on IV vancomycin and cefepime. Consults placed with nephrology and infectious disease.  Started on IV Acyclovir for ophthalmic zoster. Blood cultures returned positive for gram-positive cocci in clusters. Repeat blood cultures also positive. IV Daptomycin added for additional coverage. Repeat blood cultures showing no growth. Patient cleared by ID to continue vancomycin x2 weeks. Powerline will be placed by IR for continued IV antibiotics. Pharmacy to pulse dose vancomycin at dialysis. Vitals and labs stable. Discharge Medications:   Current Discharge Medication List      START taking these medications    Details   vancomycin (VANCOCIN) 10 gram solr 1,000 mg by IntraVENous route Every Tuesday, Thursday and Saturday for 14 days. Indications: MRSA bacteremia  Qty: 6000 mg, Refills: 0  Start date: 7/13/2021, End date: 7/27/2021         CONTINUE these medications which have NOT CHANGED    Details   dextran 70-hypromellose (Artificial Tears,[id number],) ophthalmic solution Administer 1 Drop to both eyes three (3) times daily as needed. acyclovir (ZOVIRAX) 800 mg tablet Take 800 mg by mouth two (2) times a day.       hydrALAZINE (APRESOLINE) 100 mg tablet Take 0.5 Tabs by mouth three (3) times daily. For systolic under 527  Qty: 90 Tab, Refills: 0      albuterol (PROVENTIL HFA, VENTOLIN HFA, PROAIR HFA) 90 mcg/actuation inhaler Take 2 Puffs by inhalation every four (4) hours as needed for Wheezing or Shortness of Breath. Qty: 1 Inhaler, Refills: 0      metoprolol succinate (TOPROL-XL) 50 mg XL tablet Take 50 mg by mouth two (2) times a day. calcitRIOL (ROCALTROL) 0.25 mcg capsule Take 0.25 mcg by mouth daily. atorvastatin (LIPITOR) 40 mg tablet Take 40 mg by mouth daily. Follow up Care:    1. Ramón Clifton MD in 1-2 weeks. Follow-up Information     Follow up With Specialties Details Why Contact Info    Ramón Clifton MD Internal Medicine   [facility]  [phone]      Camryn Pepper MD Infectious Disease Schedule an appointment as soon as possible for a visit  [address] [place] [address]  [phone]              Patient Follow Up Instructions: Activity: Activity as tolerated per IRF  Diet:  Renal Diet     Condition at Discharge:  Stable  __________________________________________________________________    Disposition  Rehab Facility  ____________________________________________________________________    Code Status:  Full Code  ___________________________________________________________________    Discharge Exam:  Patient seen and examined by me on discharge day. Pertinent Findings:    Gen:    Not in distress  Skin:    Crusted lesions on left side of forehead related to dermatomal zoster infection. Chest:  Clear lungs. On room air. CVS:    Regular rate and rhythm. Normal S1/S2. No edema  Abd:     Soft, not distended, not tender  Neuro:  Alert and oriented.     CONSULTATIONS: ID and Nephrology    Significant Diagnostic Studies:   No results found for this or any previous visit (from the past 24 hour(s)). CT ABD PELV WO CONT   Final Result   Pronounced ascites.  No evidence of a fever source. No evidence of   hydronephrosis      XR CHEST PORT   Final Result   Mild central pulmonary vascular congestion. XR CHEST PA LAT   Final Result   Lung base opacity likely atelectasis. No focal airspace   consolidation. Evidence of pulmonary vascular congestion without overt edema.                   Signed:  Catie Grullon PA-C  7/14/2021  7:55 AM

## 2021-07-14 NOTE — ROUTINE PROCESS
Patient discharged to IRF per HCP orders. Discharged education provided to patient. Understanding verbalized. Called and spoke to sister Gilberto Fields to notify of patient's discharge and departure from hospital.    Discharge plan of care/case management plan validated with provider discharge order.

## 2021-07-14 NOTE — PROGRESS NOTES
Renal Progress Note    Patient: Tab Joaquin MRN: [id number]  SSN: [id number]    YOB: 1950  Age: 72 y.o. Sex: male      Admit Date: 7/7/2021    LOS: 7 days     Subjective:   Patient seen at bedside. Alert and awake, no acute distress. Patient is  not giving any new complaints today. No complaints of any swelling in lower extremities. No complaints of shortness of breath. Current Facility-Administered Medications   Medication Dose Route Frequency    [START ON 7/15/2021] Random Vancomycin  Dose to be drawn on 7/15/21 at 0600 pre HD.    Other ONCE    white petrolatum-mineral oiL (LACRILUBE S.O.P.) ointment   Both Eyes Q12H    atorvastatin (LIPITOR) tablet 40 mg  40 mg Oral DAILY    metoprolol succinate (TOPROL-XL) XL tablet 50 mg  50 mg Oral BID    traMADoL (ULTRAM) tablet 50 mg  50 mg Oral Q6H PRN    polyvinyl alcohol-povidon(PF) (REFRESH CLASSIC) 1.4-0.6 % ophthalmic solution 1 Drop  1 Drop Both Eyes PRN    heparin (porcine) 1,000 unit/mL injection 3,600 Units  3,600 Units Hemodialysis DIALYSIS PRN    sodium chloride (NS) flush 5-10 mL  5-10 mL IntraVENous PRN    sodium chloride (NS) flush 5-40 mL  5-40 mL IntraVENous Q8H    sodium chloride (NS) flush 5-40 mL  5-40 mL IntraVENous PRN    acetaminophen (TYLENOL) tablet 650 mg  650 mg Oral Q6H PRN    Or    acetaminophen (TYLENOL) suppository 650 mg  650 mg Rectal Q6H PRN    polyethylene glycol (MIRALAX) packet 17 g  17 g Oral DAILY PRN    promethazine (PHENERGAN) tablet 12.5 mg  12.5 mg Oral Q6H PRN    Or    ondansetron (ZOFRAN) injection 4 mg  4 mg IntraVENous Q6H PRN    famotidine (PEPCID) tablet 20 mg  20 mg Oral BID    Vancomycin dose per pharmacy protocol   Other Rx Dosing/Monitoring        Vitals:    07/14/21 0738 07/14/21 0912 07/14/21 1130 07/14/21 1536   BP: 139/85  134/82 132/73   Pulse: 68  71 72   Resp: 18  18 16   Temp: 97.7 °F (36.5 °C)  98 °F (36.7 °C) 98.1 °F (36.7 °C)   TempSrc:       SpO2: 98%  97% 96% Weight:       Height:  5' 10\" (1.778 m)       Objective:   General: alert awake well-oriented, no acute distress. HEENT: EOMI, no Icterus, no Pallor,  mucosa moist.  Neck: Neck is supple, No JVD  Lungs: breathsounds normal,  no rhonchi, no rales,   CVS: heart sounds normal,  no murmurs, no rubs. GI: soft, nontender, normal BS. Extremeties: no cyanosis, no edema,   Neuro: Alert, awake, oriented x3, speech is normal  Skin: normal skin turgor, no skin rashes. Intake and Output:  Current Shift: No intake/output data recorded. Last three shifts: 07/12 1901 - 07/14 0700  In: 320 [P.O.:320]  Out: 2000       Lab/Data Review:  Recent Labs     07/13/21 0619 07/12/21  0708   WBC 8.1 9.1   HGB 12.3 12.3   HCT 39.2 39.5    274     Recent Labs     07/13/21 0619 07/12/21  0708   * 133*   K 4.0 3.9    101   CO2 22 23   GLU 97 84   BUN 45* 41*   CREA 7.91*  7.91* 7.04*   CA 8.2* 8.2*         Assessment and Plan:     1 ESRD. -On hemodialysis Tuesday Thursday Saturday. --No volume overload,  no uremia.    -He has permacath is a dialysis access.    -He has a maturing left wrist AV fistula. Will continue HD on TTS     2.  Sepsis:-2/2 bacteremia, ?line related sepsis  -MRSA  Improved clinically  Plan is to continue vancomycin for 2 more weeks and keep the permacath for now  Will monitor as outpatient  -   3. Metabolic acidosis, high anion gap.    -resolved with HD     4. Anemia. stable HB     5.  Renal osteodystrophy.    -His calcium and phos are ok.    -pending intact PTH level.     6 ophthalmic herpes zoster.    -Left ophthalmic artery zoster.    -Seen by ophthalmology as an outpatient.    -On acyclovir 800 mg twice a day.     Ok to [employer] today      Signed By: Claire Franco MD     July 14, 2021

## 2021-07-14 NOTE — PROGRESS NOTES
Comprehensive Nutrition Assessment    Type and Reason for Visit: RD nutrition re-screen/LOS    Nutrition Recommendations/Plan:   Continue current diet  Nursing to monitor BM, I/Os, %PO     Nutrition Assessment:  Admitted 2/2 Sepsis, generalized weakness. Current appetite reported as poor. Per EMR % PO Cardiac Restriction: Low Fat/Low Chol/High Fiber/KHRIS. Nutrition intervention: RD added renal/DM diet. To add additional snacks Denies ONS. Labs: creat 7.91. Gluc poc 121. Na 134. Alb 2.3. BUN 46. Ca 8.2. BG . Meds reviewed. Malnutrition Assessment:  Malnutrition Status: At risk for malnutrition (specify) (Poor appetite)      Nutrition Related Findings:  No NFPE performed, appears nourished. Denies N/V/C. BM 7/14 and loose. Reports meds give him diarrhea. Denies C/S issues. No edema doc. Wounds:    Surgical incision (Left wrist healing incision)       Current Nutrition Therapies:  ADULT DIET Regular; 4 carb choices (60 gm/meal); Low Fat/Low Chol/High Fiber/KHRIS; Low Potassium (Less than 3000 mg/day); Low Phosphorus (Less than 1000 mg)  ADULT ORAL NUTRITION SUPPLEMENT AM Snack, PM Snack; Snack; Apple and crackers    Anthropometric Measures:  · Height:  5' 10\" (177.8 cm)  · Current Body Wt:  79.1 kg (174 lb 6.1 oz)   · Admission Body Wt:  179 lb 14.3 oz    · Usual Body Wt:  77.1 kg (170 lb)     · Ideal Body Wt:  166 lbs:  105.1 %   · BMI Category: Overweight (BMI 25.0-29. 9)     Wt Readings from Last 3 Encounters:   07/09/21 79.1 kg (174 lb 6.1 oz)   07/06/21 77.1 kg (170 lb)   01/25/21 97.5 kg (215 lb)   ·     Nutrition Diagnosis:   · Inadequate oral intake related to inadequate protein-energy intake as evidenced by intake 26-50%      Nutrition Interventions:   Food and/or Nutrient Delivery: Continue current diet  Nutrition Education and Counseling: No recommendations at this time  Coordination of Nutrition Care: No recommendation at this time    Nutrition Monitoring and Evaluation: Behavioral-Environmental Outcomes: Beliefs and attitudes  Food/Nutrient Intake Outcomes: Diet advancement/tolerance  Physical Signs/Symptoms Outcomes: Biochemical data, Weight, Diarrhea    Discharge Planning:    Continue current diet     Electronically signed by Jennifer Henson RD on 7/14/2021 at 2:12 PM    Contact: Ext [phone]

## 2021-07-14 NOTE — PROGRESS NOTES
MARY Plan:    -d/c to [facility]          Scheduled to d/c to [facility] today. Patient to go to Room 208. RN to call report to [phone]. Requesting transport @ 18:30PM.      Medicare 2nd IM letter issued on 7/13/21.         Eh Louie, ARNIE   9

## 2021-07-14 NOTE — ROUTINE PROCESS
Report called to receiving facility. This nurse spoke to [place], Nursing Supervisor. Transport is arranged for 1830 today.

## 2021-07-15 ENCOUNTER — HOSPITAL ENCOUNTER (OUTPATIENT)
Dept: LAB | Age: 71
Discharge: HOME | End: 2021-07-15

## 2021-07-15 LAB
ALBUMIN SERPL-MCNC: 2.4 G/DL (ref 3.5–5)
ALBUMIN/GLOB SERPL: 0.4 {RATIO} (ref 1.1–2.2)
ALP SERPL-CCNC: 78 U/L (ref 45–117)
ALT SERPL-CCNC: 17 U/L (ref 12–78)
ANION GAP SERPL CALC-SCNC: 10 MMOL/L (ref 5–15)
AST SERPL W P-5'-P-CCNC: 32 U/L (ref 15–37)
BASOPHILS # BLD: 0.1 K/UL (ref 0–0.1)
BASOPHILS NFR BLD: 1 % (ref 0–1)
BILIRUB SERPL-MCNC: 0.5 MG/DL (ref 0.2–1)
BUN SERPL-MCNC: 38 MG/DL (ref 6–20)
BUN/CREAT SERPL: 5 (ref 12–20)
CA-I BLD-MCNC: 8.5 MG/DL (ref 8.5–10.1)
CHLORIDE SERPL-SCNC: 101 MMOL/L (ref 97–108)
CO2 SERPL-SCNC: 21 MMOL/L (ref 21–32)
CREAT SERPL-MCNC: 7.36 MG/DL (ref 0.7–1.3)
DIFFERENTIAL METHOD BLD: ABNORMAL
EOSINOPHIL # BLD: 0.2 K/UL (ref 0–0.4)
EOSINOPHIL NFR BLD: 3 % (ref 0–7)
ERYTHROCYTE [DISTWIDTH] IN BLOOD BY AUTOMATED COUNT: 18.8 % (ref 11.5–14.5)
GLOBULIN SER CALC-MCNC: 6.8 G/DL (ref 2–4)
GLUCOSE SERPL-MCNC: 102 MG/DL (ref 65–100)
HCT VFR BLD AUTO: 42.9 % (ref 36.6–50.3)
HGB BLD-MCNC: 13.3 G/DL (ref 12.1–17)
IMM GRANULOCYTES # BLD AUTO: 0.1 K/UL (ref 0–0.04)
IMM GRANULOCYTES NFR BLD AUTO: 1 % (ref 0–0.5)
LYMPHOCYTES # BLD: 1.3 K/UL (ref 0.8–3.5)
LYMPHOCYTES NFR BLD: 22 % (ref 12–49)
MCH RBC QN AUTO: 25.4 PG (ref 26–34)
MCHC RBC AUTO-ENTMCNC: 31 G/DL (ref 30–36.5)
MCV RBC AUTO: 81.9 FL (ref 80–99)
MONOCYTES # BLD: 0.9 K/UL (ref 0–1)
MONOCYTES NFR BLD: 15 % (ref 5–13)
NEUTS SEG # BLD: 3.5 K/UL (ref 1.8–8)
NEUTS SEG NFR BLD: 58 % (ref 32–75)
NRBC # BLD: 0 K/UL (ref 0–0.01)
NRBC BLD-RTO: 0 PER 100 WBC
PLATELET # BLD AUTO: 303 K/UL (ref 150–400)
PMV BLD AUTO: 10.9 FL (ref 8.9–12.9)
POTASSIUM SERPL-SCNC: 4.1 MMOL/L (ref 3.5–5.1)
PROT SERPL-MCNC: 9.2 G/DL (ref 6.4–8.2)
RBC # BLD AUTO: 5.24 M/UL (ref 4.1–5.7)
SODIUM SERPL-SCNC: 132 MMOL/L (ref 136–145)
WBC # BLD AUTO: 6.1 K/UL (ref 4.1–11.1)

## 2021-07-15 PROCEDURE — 80053 COMPREHEN METABOLIC PANEL: CPT

## 2021-07-15 PROCEDURE — 85025 COMPLETE CBC W/AUTO DIFF WBC: CPT

## 2021-07-16 ENCOUNTER — HOSPITAL ENCOUNTER (OUTPATIENT)
Dept: LAB | Age: 71
Discharge: HOME | End: 2021-07-16

## 2021-07-16 LAB
DATE LAST DOSE: NORMAL
HBV SURFACE AG SER QL: <0.1 INDEX
HBV SURFACE AG SER QL: NEGATIVE
REPORTED DOSE,DOSE: NORMAL UNITS
VANCOMYCIN SERPL-MCNC: 14.1 UG/ML

## 2021-07-16 PROCEDURE — 36415 COLL VENOUS BLD VENIPUNCTURE: CPT

## 2021-07-16 PROCEDURE — 87340 HEPATITIS B SURFACE AG IA: CPT

## 2021-07-16 PROCEDURE — 80202 ASSAY OF VANCOMYCIN: CPT

## 2021-07-17 LAB
BACTERIA SPEC CULT: NORMAL
SPECIAL REQUESTS,SREQ: NORMAL

## 2021-07-18 LAB
BACTERIA SPEC CULT: NORMAL
SPECIAL REQUESTS,SREQ: NORMAL

## 2021-07-19 ENCOUNTER — HOSPITAL ENCOUNTER (OUTPATIENT)
Dept: LAB | Age: 71
Discharge: HOME | End: 2021-07-19

## 2021-07-19 LAB
ALBUMIN SERPL-MCNC: 2.3 G/DL (ref 3.5–5)
ANION GAP SERPL CALC-SCNC: 9 MMOL/L (ref 5–15)
BASOPHILS # BLD: 0.1 K/UL (ref 0–0.1)
BASOPHILS NFR BLD: 1 % (ref 0–1)
BUN SERPL-MCNC: 46 MG/DL (ref 6–20)
BUN/CREAT SERPL: 5 (ref 12–20)
CA-I BLD-MCNC: 8.5 MG/DL (ref 8.5–10.1)
CHLORIDE SERPL-SCNC: 98 MMOL/L (ref 97–108)
CO2 SERPL-SCNC: 23 MMOL/L (ref 21–32)
CREAT SERPL-MCNC: 8.52 MG/DL (ref 0.7–1.3)
DATE LAST DOSE: NORMAL
DIFFERENTIAL METHOD BLD: ABNORMAL
EOSINOPHIL # BLD: 0.2 K/UL (ref 0–0.4)
EOSINOPHIL NFR BLD: 3 % (ref 0–7)
ERYTHROCYTE [DISTWIDTH] IN BLOOD BY AUTOMATED COUNT: 18 % (ref 11.5–14.5)
GLUCOSE SERPL-MCNC: 138 MG/DL (ref 65–100)
HCT VFR BLD AUTO: 39.2 % (ref 36.6–50.3)
HGB BLD-MCNC: 11.7 G/DL (ref 12.1–17)
IMM GRANULOCYTES # BLD AUTO: 0 K/UL (ref 0–0.04)
IMM GRANULOCYTES NFR BLD AUTO: 0 % (ref 0–0.5)
LYMPHOCYTES # BLD: 1.7 K/UL (ref 0.8–3.5)
LYMPHOCYTES NFR BLD: 24 % (ref 12–49)
MCH RBC QN AUTO: 24.8 PG (ref 26–34)
MCHC RBC AUTO-ENTMCNC: 29.8 G/DL (ref 30–36.5)
MCV RBC AUTO: 83.2 FL (ref 80–99)
MONOCYTES # BLD: 1.1 K/UL (ref 0–1)
MONOCYTES NFR BLD: 16 % (ref 5–13)
NEUTS SEG # BLD: 4.1 K/UL (ref 1.8–8)
NEUTS SEG NFR BLD: 56 % (ref 32–75)
NRBC # BLD: 0 K/UL (ref 0–0.01)
NRBC BLD-RTO: 0 PER 100 WBC
PHOSPHATE SERPL-MCNC: 5.4 MG/DL (ref 2.6–4.7)
PLATELET # BLD AUTO: 297 K/UL (ref 150–400)
PMV BLD AUTO: 10.5 FL (ref 8.9–12.9)
POTASSIUM SERPL-SCNC: 4.7 MMOL/L (ref 3.5–5.1)
RBC # BLD AUTO: 4.71 M/UL (ref 4.1–5.7)
REPORTED DOSE,DOSE: NORMAL UNITS
SODIUM SERPL-SCNC: 130 MMOL/L (ref 136–145)
VANCOMYCIN SERPL-MCNC: 15.2 UG/ML
WBC # BLD AUTO: 7.2 K/UL (ref 4.1–11.1)

## 2021-07-19 PROCEDURE — 80069 RENAL FUNCTION PANEL: CPT

## 2021-07-19 PROCEDURE — 80202 ASSAY OF VANCOMYCIN: CPT

## 2021-07-19 PROCEDURE — 85025 COMPLETE CBC W/AUTO DIFF WBC: CPT

## 2021-07-21 ENCOUNTER — HOSPITAL ENCOUNTER (OUTPATIENT)
Dept: LAB | Age: 71
Discharge: HOME | End: 2021-07-21

## 2021-07-21 LAB
DATE LAST DOSE: NORMAL
REPORTED DOSE,DOSE: NORMAL UNITS
VANCOMYCIN SERPL-MCNC: 12.5 UG/ML

## 2021-07-21 PROCEDURE — 80202 ASSAY OF VANCOMYCIN: CPT

## 2021-09-08 ENCOUNTER — APPOINTMENT (OUTPATIENT)
Dept: CT IMAGING | Age: 71
DRG: 871 | End: 2021-09-08
Attending: HOSPITALIST
Payer: MEDICARE

## 2021-09-08 ENCOUNTER — HOSPITAL ENCOUNTER (INPATIENT)
Age: 71
LOS: 8 days | Discharge: HOME HEALTH | DRG: 871 | End: 2021-09-16
Attending: STUDENT IN AN ORGANIZED HEALTH CARE EDUCATION/TRAINING PROGRAM | Admitting: INTERNAL MEDICINE
Payer: MEDICARE

## 2021-09-08 ENCOUNTER — APPOINTMENT (OUTPATIENT)
Dept: GENERAL RADIOLOGY | Age: 71
DRG: 871 | End: 2021-09-08
Attending: STUDENT IN AN ORGANIZED HEALTH CARE EDUCATION/TRAINING PROGRAM
Payer: MEDICARE

## 2021-09-08 DIAGNOSIS — R78.81 MRSA BACTEREMIA: ICD-10-CM

## 2021-09-08 DIAGNOSIS — R94.31 ST SEGMENT DEPRESSION: ICD-10-CM

## 2021-09-08 DIAGNOSIS — I47.1 SVT (SUPRAVENTRICULAR TACHYCARDIA) (HCC): Primary | ICD-10-CM

## 2021-09-08 DIAGNOSIS — I21.4 NSTEMI (NON-ST ELEVATED MYOCARDIAL INFARCTION) (HCC): ICD-10-CM

## 2021-09-08 DIAGNOSIS — A41.9 SEPSIS, DUE TO UNSPECIFIED ORGANISM, UNSPECIFIED WHETHER ACUTE ORGAN DYSFUNCTION PRESENT (HCC): ICD-10-CM

## 2021-09-08 DIAGNOSIS — B95.62 MRSA BACTEREMIA: ICD-10-CM

## 2021-09-08 LAB
ALBUMIN SERPL-MCNC: 2.6 G/DL (ref 3.5–5)
ALBUMIN/GLOB SERPL: 0.4 {RATIO} (ref 1.1–2.2)
ALP SERPL-CCNC: 78 U/L (ref 45–117)
ALT SERPL-CCNC: 9 U/L (ref 12–78)
ANION GAP SERPL CALC-SCNC: 12 MMOL/L (ref 5–15)
AST SERPL W P-5'-P-CCNC: 15 U/L (ref 15–37)
ATRIAL RATE: 159 BPM
ATRIAL RATE: 202 BPM
BASOPHILS # BLD: 0 K/UL (ref 0–0.1)
BASOPHILS NFR BLD: 0 % (ref 0–1)
BILIRUB SERPL-MCNC: 0.6 MG/DL (ref 0.2–1)
BUN SERPL-MCNC: 37 MG/DL (ref 6–20)
BUN/CREAT SERPL: 6 (ref 12–20)
CA-I BLD-MCNC: 8.3 MG/DL (ref 8.5–10.1)
CALCULATED R AXIS, ECG10: -54 DEGREES
CALCULATED R AXIS, ECG10: -62 DEGREES
CALCULATED T AXIS, ECG11: 108 DEGREES
CALCULATED T AXIS, ECG11: 98 DEGREES
CHLORIDE SERPL-SCNC: 101 MMOL/L (ref 97–108)
CO2 SERPL-SCNC: 22 MMOL/L (ref 21–32)
CREAT SERPL-MCNC: 6.62 MG/DL (ref 0.7–1.3)
DIAGNOSIS, 93000: NORMAL
DIAGNOSIS, 93000: NORMAL
DIFFERENTIAL METHOD BLD: ABNORMAL
EOSINOPHIL # BLD: 0 K/UL (ref 0–0.4)
EOSINOPHIL NFR BLD: 0 % (ref 0–7)
ERYTHROCYTE [DISTWIDTH] IN BLOOD BY AUTOMATED COUNT: 19.6 % (ref 11.5–14.5)
GLOBULIN SER CALC-MCNC: 6.7 G/DL (ref 2–4)
GLUCOSE SERPL-MCNC: 108 MG/DL (ref 65–100)
HCT VFR BLD AUTO: 36.2 % (ref 36.6–50.3)
HGB BLD-MCNC: 11.4 G/DL (ref 12.1–17)
IMM GRANULOCYTES # BLD AUTO: 0 K/UL (ref 0–0.04)
IMM GRANULOCYTES NFR BLD AUTO: 0 % (ref 0–0.5)
LACTATE SERPL-SCNC: 2.4 MMOL/L (ref 0.4–2)
LYMPHOCYTES # BLD: 0.7 K/UL (ref 0.8–3.5)
LYMPHOCYTES NFR BLD: 6 % (ref 12–49)
MAGNESIUM SERPL-MCNC: 1.9 MG/DL (ref 1.6–2.4)
MCH RBC QN AUTO: 27 PG (ref 26–34)
MCHC RBC AUTO-ENTMCNC: 31.5 G/DL (ref 30–36.5)
MCV RBC AUTO: 85.8 FL (ref 80–99)
MONOCYTES # BLD: 0.3 K/UL (ref 0–1)
MONOCYTES NFR BLD: 3 % (ref 5–13)
NEUTS SEG # BLD: 9.8 K/UL (ref 1.8–8)
NEUTS SEG NFR BLD: 91 % (ref 32–75)
PHOSPHATE SERPL-MCNC: 3.7 MG/DL (ref 2.6–4.7)
PLATELET # BLD AUTO: 322 K/UL (ref 150–400)
PMV BLD AUTO: 9.6 FL (ref 8.9–12.9)
POTASSIUM SERPL-SCNC: 4.2 MMOL/L (ref 3.5–5.1)
PROT SERPL-MCNC: 9.3 G/DL (ref 6.4–8.2)
Q-T INTERVAL, ECG07: 292 MS
Q-T INTERVAL, ECG07: 294 MS
QRS DURATION, ECG06: 92 MS
QRS DURATION, ECG06: 96 MS
QTC CALCULATION (BEZET), ECG08: 482 MS
QTC CALCULATION (BEZET), ECG08: 485 MS
RBC # BLD AUTO: 4.22 M/UL (ref 4.1–5.7)
SARS-COV-2, COV2: NOT DETECTED
SODIUM SERPL-SCNC: 135 MMOL/L (ref 136–145)
SPECIMEN SOURCE: NORMAL
TROPONIN I SERPL-MCNC: 0.09 NG/ML
TROPONIN I SERPL-MCNC: <0.05 NG/ML
VENTRICULAR RATE, ECG03: 164 BPM
VENTRICULAR RATE, ECG03: 164 BPM
WBC # BLD AUTO: 10.8 K/UL (ref 4.1–11.1)

## 2021-09-08 PROCEDURE — 84100 ASSAY OF PHOSPHORUS: CPT

## 2021-09-08 PROCEDURE — 85025 COMPLETE CBC W/AUTO DIFF WBC: CPT

## 2021-09-08 PROCEDURE — 71250 CT THORAX DX C-: CPT

## 2021-09-08 PROCEDURE — 93005 ELECTROCARDIOGRAM TRACING: CPT

## 2021-09-08 PROCEDURE — 96375 TX/PRO/DX INJ NEW DRUG ADDON: CPT

## 2021-09-08 PROCEDURE — 74011250637 HC RX REV CODE- 250/637: Performed by: STUDENT IN AN ORGANIZED HEALTH CARE EDUCATION/TRAINING PROGRAM

## 2021-09-08 PROCEDURE — 36415 COLL VENOUS BLD VENIPUNCTURE: CPT

## 2021-09-08 PROCEDURE — 65270000029 HC RM PRIVATE

## 2021-09-08 PROCEDURE — 96368 THER/DIAG CONCURRENT INF: CPT

## 2021-09-08 PROCEDURE — 74011250636 HC RX REV CODE- 250/636: Performed by: HOSPITALIST

## 2021-09-08 PROCEDURE — 87186 SC STD MICRODIL/AGAR DIL: CPT

## 2021-09-08 PROCEDURE — 74011250636 HC RX REV CODE- 250/636

## 2021-09-08 PROCEDURE — 74011000250 HC RX REV CODE- 250: Performed by: STUDENT IN AN ORGANIZED HEALTH CARE EDUCATION/TRAINING PROGRAM

## 2021-09-08 PROCEDURE — 80053 COMPREHEN METABOLIC PANEL: CPT

## 2021-09-08 PROCEDURE — 71045 X-RAY EXAM CHEST 1 VIEW: CPT

## 2021-09-08 PROCEDURE — 74011250636 HC RX REV CODE- 250/636: Performed by: STUDENT IN AN ORGANIZED HEALTH CARE EDUCATION/TRAINING PROGRAM

## 2021-09-08 PROCEDURE — 74011000258 HC RX REV CODE- 258: Performed by: STUDENT IN AN ORGANIZED HEALTH CARE EDUCATION/TRAINING PROGRAM

## 2021-09-08 PROCEDURE — 87635 SARS-COV-2 COVID-19 AMP PRB: CPT

## 2021-09-08 PROCEDURE — 99285 EMERGENCY DEPT VISIT HI MDM: CPT

## 2021-09-08 PROCEDURE — 83735 ASSAY OF MAGNESIUM: CPT

## 2021-09-08 PROCEDURE — 87040 BLOOD CULTURE FOR BACTERIA: CPT

## 2021-09-08 PROCEDURE — 74011000250 HC RX REV CODE- 250: Performed by: HOSPITALIST

## 2021-09-08 PROCEDURE — 96365 THER/PROPH/DIAG IV INF INIT: CPT

## 2021-09-08 PROCEDURE — 83605 ASSAY OF LACTIC ACID: CPT

## 2021-09-08 PROCEDURE — 84484 ASSAY OF TROPONIN QUANT: CPT

## 2021-09-08 PROCEDURE — 87077 CULTURE AEROBIC IDENTIFY: CPT

## 2021-09-08 RX ORDER — ADENOSINE 3 MG/ML
6 INJECTION, SOLUTION INTRAVENOUS ONCE
Status: COMPLETED | OUTPATIENT
Start: 2021-09-08 | End: 2021-09-08

## 2021-09-08 RX ORDER — ESMOLOL HYDROCHLORIDE 10 MG/ML
0-200 INJECTION INTRAVENOUS
Status: DISCONTINUED | OUTPATIENT
Start: 2021-09-08 | End: 2021-09-09

## 2021-09-08 RX ORDER — METOPROLOL TARTRATE 5 MG/5ML
5 INJECTION INTRAVENOUS ONCE
Status: COMPLETED | OUTPATIENT
Start: 2021-09-08 | End: 2021-09-08

## 2021-09-08 RX ORDER — METOPROLOL TARTRATE 5 MG/5ML
5 INJECTION INTRAVENOUS
Status: COMPLETED | OUTPATIENT
Start: 2021-09-08 | End: 2021-09-08

## 2021-09-08 RX ORDER — HEPARIN SODIUM 5000 [USP'U]/ML
5000 INJECTION, SOLUTION INTRAVENOUS; SUBCUTANEOUS EVERY 12 HOURS
Status: DISCONTINUED | OUTPATIENT
Start: 2021-09-08 | End: 2021-09-16 | Stop reason: HOSPADM

## 2021-09-08 RX ORDER — SODIUM CHLORIDE 0.9 % (FLUSH) 0.9 %
5-40 SYRINGE (ML) INJECTION EVERY 8 HOURS
Status: DISCONTINUED | OUTPATIENT
Start: 2021-09-08 | End: 2021-09-12

## 2021-09-08 RX ORDER — ACETAMINOPHEN 650 MG/1
650 SUPPOSITORY RECTAL
Status: DISCONTINUED | OUTPATIENT
Start: 2021-09-08 | End: 2021-09-16 | Stop reason: HOSPADM

## 2021-09-08 RX ORDER — SODIUM CHLORIDE 9 MG/ML
100 INJECTION, SOLUTION INTRAVENOUS ONCE
Status: COMPLETED | OUTPATIENT
Start: 2021-09-08 | End: 2021-09-08

## 2021-09-08 RX ORDER — ASPIRIN 325 MG
325 TABLET ORAL ONCE
Status: COMPLETED | OUTPATIENT
Start: 2021-09-08 | End: 2021-09-08

## 2021-09-08 RX ORDER — LEVOFLOXACIN 5 MG/ML
750 INJECTION, SOLUTION INTRAVENOUS
Status: DISCONTINUED | OUTPATIENT
Start: 2021-09-08 | End: 2021-09-09

## 2021-09-08 RX ORDER — ONDANSETRON 2 MG/ML
4 INJECTION INTRAMUSCULAR; INTRAVENOUS
Status: DISCONTINUED | OUTPATIENT
Start: 2021-09-08 | End: 2021-09-16 | Stop reason: HOSPADM

## 2021-09-08 RX ORDER — ADENOSINE 3 MG/ML
INJECTION, SOLUTION INTRAVENOUS
Status: COMPLETED
Start: 2021-09-08 | End: 2021-09-08

## 2021-09-08 RX ORDER — ADENOSINE 3 MG/ML
12 INJECTION, SOLUTION INTRAVENOUS ONCE
Status: COMPLETED | OUTPATIENT
Start: 2021-09-08 | End: 2021-09-08

## 2021-09-08 RX ORDER — METOPROLOL TARTRATE 25 MG/1
50 TABLET, FILM COATED ORAL
Status: COMPLETED | OUTPATIENT
Start: 2021-09-08 | End: 2021-09-08

## 2021-09-08 RX ORDER — ACETAMINOPHEN 325 MG/1
650 TABLET ORAL
Status: DISCONTINUED | OUTPATIENT
Start: 2021-09-08 | End: 2021-09-16 | Stop reason: HOSPADM

## 2021-09-08 RX ORDER — SODIUM CHLORIDE 9 MG/ML
250 INJECTION, SOLUTION INTRAVENOUS CONTINUOUS
Status: DISCONTINUED | OUTPATIENT
Start: 2021-09-08 | End: 2021-09-09

## 2021-09-08 RX ORDER — SODIUM CHLORIDE 0.9 % (FLUSH) 0.9 %
5-40 SYRINGE (ML) INJECTION AS NEEDED
Status: DISCONTINUED | OUTPATIENT
Start: 2021-09-08 | End: 2021-09-16 | Stop reason: HOSPADM

## 2021-09-08 RX ORDER — ONDANSETRON 4 MG/1
4 TABLET, ORALLY DISINTEGRATING ORAL
Status: DISCONTINUED | OUTPATIENT
Start: 2021-09-08 | End: 2021-09-16 | Stop reason: HOSPADM

## 2021-09-08 RX ADMIN — HEPARIN SODIUM 5000 UNITS: 5000 INJECTION INTRAVENOUS; SUBCUTANEOUS at 23:12

## 2021-09-08 RX ADMIN — Medication 10 ML: at 23:18

## 2021-09-08 RX ADMIN — VANCOMYCIN HYDROCHLORIDE 1000 MG: 1 INJECTION, POWDER, LYOPHILIZED, FOR SOLUTION INTRAVENOUS at 18:33

## 2021-09-08 RX ADMIN — ASPIRIN 325 MG ORAL TABLET 325 MG: 325 PILL ORAL at 18:41

## 2021-09-08 RX ADMIN — LEVOFLOXACIN 750 MG: 5 INJECTION, SOLUTION INTRAVENOUS at 23:12

## 2021-09-08 RX ADMIN — METOPROLOL TARTRATE 5 MG: 5 INJECTION INTRAVENOUS at 17:47

## 2021-09-08 RX ADMIN — METOPROLOL TARTRATE 50 MG: 25 TABLET, FILM COATED ORAL at 18:41

## 2021-09-08 RX ADMIN — ADENOSINE 6 MG: 3 INJECTION, SOLUTION INTRAVENOUS at 17:31

## 2021-09-08 RX ADMIN — SODIUM CHLORIDE 100 ML/HR: 9 INJECTION, SOLUTION INTRAVENOUS at 22:51

## 2021-09-08 RX ADMIN — METOPROLOL TARTRATE 5 MG: 5 INJECTION INTRAVENOUS at 17:42

## 2021-09-08 RX ADMIN — SODIUM CHLORIDE 250 ML: 9 INJECTION, SOLUTION INTRAVENOUS at 16:35

## 2021-09-08 RX ADMIN — ESMOLOL HYDROCHLORIDE 50 MCG/KG/MIN: 10 INJECTION INTRAVENOUS at 19:05

## 2021-09-08 RX ADMIN — METOPROLOL TARTRATE 5 MG: 5 INJECTION INTRAVENOUS at 17:59

## 2021-09-08 RX ADMIN — SODIUM CHLORIDE 250 ML: 9 INJECTION, SOLUTION INTRAVENOUS at 21:56

## 2021-09-08 RX ADMIN — PIPERACILLIN AND TAZOBACTAM 2.25 G: 2; .25 INJECTION, POWDER, LYOPHILIZED, FOR SOLUTION INTRAVENOUS at 18:25

## 2021-09-08 RX ADMIN — ADENOSINE 12 MG: 3 INJECTION INTRAVENOUS at 17:34

## 2021-09-08 RX ADMIN — CEFEPIME HYDROCHLORIDE 1 G: 1 INJECTION, POWDER, FOR SOLUTION INTRAMUSCULAR; INTRAVENOUS at 23:12

## 2021-09-08 NOTE — Clinical Note
Status[de-identified] INPATIENT [101]   Type of Bed: Intensive Care [6]   Inpatient Hospitalization Certified Necessary for the Following Reasons: 4.  Patient requires ICU level of care interventions (further clarification in H&P documentation)   Admitting Diagnosis: Sepsis [facility]) [[id number]]   Admitting Physician: Shavon Garcia [[id number]]   Attending Physician: Shavon Garcia [[id number]]   Estimated Length of Stay: 2 Midnights   Discharge Plan[de-identified] Extended Care Facility (e.g. Adult Home, Nursing Home, etc.)

## 2021-09-08 NOTE — ED PROVIDER NOTES
EMERGENCY DEPARTMENT HISTORY AND PHYSICAL EXAM      Date: 9/8/2021  Patient Name: Tila Camargo    History of Presenting Illness     Chief Complaint   Patient presents with    Fever       HPI: Cristopher Gandhi, 72 y.o. male with history of ESRD,  line infection, diabetes, hypertension, pneumonia, and CHF presenting for fever from dialysis. He is reporting fatigue but no localizing symptoms are present. He denies any shortness of breath, chest pain, cough, runny nose, sore throat, abdominal pain, nausea, or vomiting. does not make urine. No back pain. No loss of taste or smell. To note, he was admitted and discharged 1 month ago for a  line infection. PCP: Tatiana Sykes MD    Current Facility-Administered Medications   Medication Dose Route Frequency Provider Last Rate Last Admin    vancomycin (VANCOCIN) 1,000 mg in 0.9% sodium chloride 250 mL (VIAL-MATE)  1,000 mg IntraVENous Raffaele Styles  mL/hr at 09/08/21 1833 1,000 mg at 09/08/21 1833    piperacillin-tazobactam (ZOSYN) 2.25 g in 0.9% sodium chloride (MBP/ADV) 50 mL MBP  2.25 g IntraVENous Raffaele Styles MD 12.5 mL/hr at 09/08/21 1825 2.25 g at 09/08/21 1825    esmolol (BREVIBLOC) 2,500 mg/250 mL (10 mg/mL) infusion  0-200 mcg/kg/min IntraVENous TITRATE Luis Cruz MD         Current Outpatient Medications   Medication Sig Dispense Refill    dextran 70-hypromellose (Artificial Tears,[id number],) ophthalmic solution Administer 1 Drop to both eyes three (3) times daily as needed.  acyclovir (ZOVIRAX) 800 mg tablet Take 800 mg by mouth two (2) times a day.  hydrALAZINE (APRESOLINE) 100 mg tablet Take 0.5 Tabs by mouth three (3) times daily. For systolic under 085 90 Tab 0    albuterol (PROVENTIL HFA, VENTOLIN HFA, PROAIR HFA) 90 mcg/actuation inhaler Take 2 Puffs by inhalation every four (4) hours as needed for Wheezing or Shortness of Breath.  1 Inhaler 0    metoprolol succinate (TOPROL-XL) 50 mg XL tablet Take 50 mg by mouth two (2) times a day.  calcitRIOL (ROCALTROL) 0.25 mcg capsule Take 0.25 mcg by mouth daily.  atorvastatin (LIPITOR) 40 mg tablet Take 40 mg by mouth daily. Medical History   I reviewed the medical, surgical, family, and social history, as well as allergies:    Past Medical History:  Past Medical History:   Diagnosis Date    Asthenia 1/24/2021    Cardiomyopathy ([facility] [address] 1/24/2021    CHF (congestive heart failure) ([facility] [facility]     Chronic kidney disease     CKD (chronic kidney disease)     4    Diabetes ([facility] [address] [facility]     End stage renal disease on dialysis ([facility] [address] 1/24/2021    Essential hypertension 1/24/2021    Gastroesophageal reflux disease 1/24/2021    Gastroesophageal reflux disease 1/24/2021    Hypertension     Pneumonia due to COVID-19 virus 7/91/9960    Systolic CHF, acute on chronic ([facility] [address] 1/24/2021       Past Surgical History:  Past Surgical History:   Procedure Laterality Date    COLONOSCOPY N/A 12/3/2020    COLONOSCOPY performed by Nayeli Ventura MD at [facility]      HX HERNIA REPAIR      IR FLUORO GUIDE [facility] CVAD  1/22/2021    IR INSERT NON TUNL CVC OVER 5 YRS  1/18/2021    IR INSERT TUNL CVC W/O PORT OVER 5 YR  1/22/2021    IR PLACE CVAD FLUORO GUIDE  1/18/2021       Family History:  Family History   Problem Relation Age of Onset    Diabetes Mother     Heart Failure Father        Social History:  Social History     Tobacco Use    Smoking status: Never Smoker    Smokeless tobacco: Never Used   Vaping Use    Vaping Use: Never used   Substance Use Topics    Alcohol use: Not Currently    Drug use: Never       Allergies: Allergies   Allergen Reactions    Lisinopril Angioedema    Pcn [Penicillins] Rash       Review of Systems     Review of Systems   Constitutional: Positive for fatigue. Negative for chills and fever. HENT: Negative for congestion, rhinorrhea and sore throat. Eyes: Negative.     Respiratory: Negative for cough and shortness of breath. Cardiovascular: Negative for chest pain and leg swelling. Gastrointestinal: Negative for abdominal pain and vomiting. Endocrine: Negative. Genitourinary: Negative for dysuria and hematuria. Musculoskeletal: Negative for back pain and myalgias. Skin: Negative for rash and wound. Allergic/Immunologic: Negative. Neurological: Negative for light-headedness and headaches. Hematological: Negative. Psychiatric/Behavioral: Negative for agitation and confusion. Physical Exam and Vital Signs   Vital Signs - Reviewed the patient's vital signs. Patient Vitals for the past 12 hrs:   Temp Pulse Resp BP SpO2   09/08/21 1857  (!) 151  111/75 96 %   09/08/21 1841  99  (!) 106/58    09/08/21 1834 (!) 100.6 °F (38.1 °C) 100  107/68 98 %   09/08/21 1818  96  113/75 96 %   09/08/21 1806 (!) 101.1 °F (38.4 °C) (!) 147  120/77 98 %   09/08/21 1803  (!) 148      09/08/21 1759  (!) 149  119/82    09/08/21 1747  (!) 153  130/81    09/08/21 1742  (!) 155  129/85    09/08/21 1734 (!) 102.3 °F (39.1 °C) (!) 163  128/80    09/08/21 1731  (!) 164  113/76    09/08/21 1641  (!) 160 16  98 %   09/08/21 1621 (!) 103 °F (39.4 °C) (!) 120 18 128/82 98 %       Physical Exam:    GENERAL: awake, alert, cooperative, not in distress  HEENT:  * Pupils equal, EOMI  * Head atraumatic  CV:  * regular rhythm, tachycardic  * warm and perfused extremities bilaterally  PULMONARY: Good air movement, no wheezes or crackles  ABDOMEN: soft, not distended, no guarding, not tenderness to palpation  : No suprapubic tenderness  EXTREMITIES/BACK: warm and perfused, no tenderness, no edema  SKIN: no rashes or signs of trauma  NEURO:  * Speech clear  * Moves U&LE to command      Medical Decision Making and ED Course   - I am the first and primary provider for this patient and am the primary provider of record.   - I reviewed the vital signs, available nursing notes, past medical history, past surgical history, family history and social history. - Initial assessment performed. The patients presenting problems have been discussed, and the staff are in agreement with the care plan formulated and outlined with them. I have encouraged them to ask questions as they arise throughout their visit. - Available medical records, nursing notes, old EKGs, and EMS run sheets (if patient was EMS transported) were reviewed    MDM:   Patient is a 72 y.o. male presenting for fever. Vitals reveal tachycardia of 160 with an EKG showing SVT and physical exam reveals normalities. Based on the history, physical exam, risk factors, and vitals signs, differential includes: Sepsis, ACS, CHF, pulmonary, pleural effusion, direct imbalances, ILIR, SVT, drug infection, pneumonia, anemia. Results     Labs:     Recent Results (from the past 12 hour(s))   CBC WITH AUTOMATED DIFF    Collection Time: 09/08/21  4:30 PM   Result Value Ref Range    WBC 10.8 4.1 - 11.1 K/uL    RBC 4.22 4.10 - 5.70 M/uL    HGB 11.4 (L) 12.1 - 17.0 g/dL    HCT 36.2 (L) 36.6 - 50.3 %    MCV 85.8 80.0 - 99.0 FL    MCH 27.0 26.0 - 34.0 PG    MCHC 31.5 30.0 - 36.5 g/dL    RDW 19.6 (H) 11.5 - 14.5 %    PLATELET 404 705 - 184 K/uL    MPV 9.6 8.9 - 12.9 FL    NEUTROPHILS 91 (H) 32 - 75 %    LYMPHOCYTES 6 (L) 12 - 49 %    MONOCYTES 3 (L) 5 - 13 %    EOSINOPHILS 0 0 - 7 %    BASOPHILS 0 0 - 1 %    IMMATURE GRANULOCYTES 0 0.0 - 0.5 %    ABS. NEUTROPHILS 9.8 (H) 1.8 - 8.0 K/UL    ABS. LYMPHOCYTES 0.7 (L) 0.8 - 3.5 K/UL    ABS. MONOCYTES 0.3 0.0 - 1.0 K/UL    ABS. EOSINOPHILS 0.0 0.0 - 0.4 K/UL    ABS. BASOPHILS 0.0 0.0 - 0.1 K/UL    ABS. IMM.  GRANS. 0.0 0.00 - 0.04 K/UL    DF AUTOMATED     METABOLIC PANEL, COMPREHENSIVE    Collection Time: 09/08/21  4:30 PM   Result Value Ref Range    Sodium 135 (L) 136 - 145 mmol/L    Potassium 4.2 3.5 - 5.1 mmol/L    Chloride 101 97 - 108 mmol/L    CO2 22 21 - 32 mmol/L    Anion gap 12 5 - 15 mmol/L    Glucose 108 (H) 65 - 100 mg/dL    BUN 37 (H) 6 - 20 mg/dL    Creatinine 6.62 (H) 0.70 - 1.30 mg/dL    BUN/Creatinine ratio 6 (L) 12 - 20      GFR est AA 10 (L) >60 ml/min/1.73m2    GFR est non-AA 8 (L) >60 ml/min/1.73m2    Calcium 8.3 (L) 8.5 - 10.1 mg/dL    Bilirubin, total 0.6 0.2 - 1.0 mg/dL    AST (SGOT) 15 15 - 37 U/L    ALT (SGPT) 9 (L) 12 - 78 U/L    Alk. phosphatase 78 45 - 117 U/L    Protein, total 9.3 (H) 6.4 - 8.2 g/dL    Albumin 2.6 (L) 3.5 - 5.0 g/dL    Globulin 6.7 (H) 2.0 - 4.0 g/dL    A-G Ratio 0.4 (L) 1.1 - 2.2     TROPONIN I    Collection Time: 09/08/21  4:30 PM   Result Value Ref Range    Troponin-I, Qt. <0.05 <0.05 ng/mL   MAGNESIUM    Collection Time: 09/08/21  4:30 PM   Result Value Ref Range    Magnesium 1.9 1.6 - 2.4 mg/dL   PHOSPHORUS    Collection Time: 09/08/21  4:30 PM   Result Value Ref Range    Phosphorus 3.7 2.6 - 4.7 mg/dL   LACTIC ACID    Collection Time: 09/08/21  4:30 PM   Result Value Ref Range    Lactic acid 2.4 (HH) 0.4 - 2.0 mmol/L   EKG, 12 LEAD, INITIAL    Collection Time: 09/08/21  4:34 PM   Result Value Ref Range    Ventricular Rate 164 BPM    Atrial Rate 202 BPM    QRS Duration 92 ms    Q-T Interval 294 ms    QTC Calculation (Bezet) 485 ms    Calculated R Axis -54 degrees    Calculated T Axis 108 degrees    Diagnosis       Supraventricular tachycardia  Left axis deviation  Incomplete right bundle branch block  Inferior infarct , age undetermined  Anterior infarct (cited on or before 16-JAN-2021)  ST & T wave abnormality, consider lateral ischemia  Abnormal ECG  When compared with ECG of 07-JUL-2021 18:08,  Vent.  rate has increased BY  80 BPM  Incomplete right bundle branch block is now Present  Inferior infarct is now Present  Questionable change in initial forces of Septal leads  Confirmed by Darshana Cid ([id number]) on 9/8/2021 4:48:50 PM     TROPONIN I    Collection Time: 09/08/21  6:15 PM   Result Value Ref Range    Troponin-I, Qt. 0.09 (H) <0.05 ng/mL       Radiologic Studies:  CT Results  (Last 48 hours) None        CXR Results  (Last 48 hours)               09/08/21 1645  XR CHEST PORT Final result    Narrative:  Chest single view. Comparison single view chest July 7, 2021. Right-sided permacath. Similar appearance for the lungs;   no gross interstitial or alveolar pulmonary edema. Cardiac and mediastinal   structures magnified on this AP view. No pneumothorax or sizable pleural   effusion. Medications ordered:  Medications   vancomycin (VANCOCIN) 1,000 mg in 0.9% sodium chloride 250 mL (VIAL-MATE) (1,000 mg IntraVENous New Bag 9/8/21 1833)   piperacillin-tazobactam (ZOSYN) 2.25 g in 0.9% sodium chloride (MBP/ADV) 50 mL MBP (2.25 g IntraVENous New Bag 9/8/21 1825)   esmolol (BREVIBLOC) 2,500 mg/250 mL (10 mg/mL) infusion (has no administration in time range)   sodium chloride 0.9 % bolus infusion 250 mL (0 mL IntraVENous IV Completed 9/8/21 1657)   adenosine (ADENOCARD) injection 6 mg (6 mg IntraVENous Given 9/8/21 1731)   adenosine (ADENOCARD) injection 12 mg (12 mg IntraVENous Given 9/8/21 1734)   metoprolol (LOPRESSOR) injection 5 mg (5 mg IntraVENous Given 9/8/21 1742)   metoprolol (LOPRESSOR) injection 5 mg (5 mg IntraVENous Given 9/8/21 1747)   metoprolol (LOPRESSOR) injection 5 mg (5 mg IntraVENous Given 9/8/21 1759)   metoprolol tartrate (LOPRESSOR) tablet 50 mg (50 mg Oral Given 9/8/21 1841)   aspirin tablet 325 mg (325 mg Oral Given 9/8/21 1841)        ED Course     ED Course:          Reassessment / Disposition / Discussion:    Patient had SVT that was unresponsive to a 250 cc bolus of fluids on presentation that was given due to concern for fever. He was given adenosine as a neck step as he had started complaining of some chest pain and started having ST depressions on the EKG. 6 and 12 of adenosine did not improve his symptoms. He was then given 3 doses of metoprolol with improvement of his tachycardia from 1 60-1 40.   Due to the ongoing ST depressions and mild chest pain, will start an esmolol drip. The patient was covered with vancomycin and Zosyn for broad-spectrum coverage for sepsis of unknown location. The patient will need to be admitted to the ICU.    6:21 PM  After the third dose of Lopressor, the patient had improvement of his heart rate down to 96. Repeat EKG and troponin ordered. We will give him 50 of metoprolol p.o.    6:39 PM  Repeat troponin is 0.07, elevated due to thickened and ischemia. No ST elevations on EKG and ST depressions have resolved. Will give aspirin. Will downgrade to telemetry. 7:02 PM  patient has SVT at 150 despite loading with metoprolol p.o. Will start esmolol drip. Final Disposition     Disposition: Condition stable  Admitted to ICU Medical ICU the case was discussed with the admitting physician Dr. Jefferson Luther. ADMISSION: After completion of ED workup and discussion of results and diagnoses with the patient, patient was admitted to the hospital. All the patient's questions were answered. Case was discussed with the receiving team.    ED Procedures & Consultations   Performed by: Sienna Fernandez MD  Procedures     EKG interpretation (Preliminary):  EKG Rhythm: Supraventricular tachycardia; and regular . Rate (approx.): 160. Axis: normal;  AL interval: NA;  QRS interval: normal ;  ST/T wave: normal;  Other findings: abnormal ekg. EKG interpretation (Preliminary):  EKG Rhythm: Supraventricular tachycardia; and regular . Rate (approx.): 160. Axis: normal;  AL interval: NA;  QRS interval: normal ;  ST/T wave: ST depressions in lateral leads  Other findings: abnormal ekg. ED Critical Care   and Critical Care  CRITICAL CARE NOTE :  5:56 PM    Critical care time is being documented due to the fulfillment of at least one of the following:    - Critical conditions: condition that acutely impairs one or more vital organ systems such that there is a high probability of imminent or life-threatening deterioration in condition.  Examples are diagnoses including but not limited to Afib RVR, DKA, PE, Etc. .    - Critical interventions: an action whose failure to initiate would likely allow a sudden, clinically significant decline in the patient's condition. These include   Requirement of transfer or ICU admission   Contemplation or provision of tPA   Drip initiation (pressors, antiarrhythmics, heparin, etc.)   Antidotes given (narcan, charcoal, epi for anaphylaxis, etc..)   >=2L fluid bolus   >=3 Duonebs   >1 IV/IM doses of sedatives, antiepileptics, BP meds, rate control meds, adenosine.  Procedures that are suggestive of critical care: chest tubes, cardioversion, BiPAP, IO, etc..    Critical care time is documented based on continuous or non-continuous provision of care that includes face-to-face time, placing orders, chart review, documentation, discussion with consultants, discussion with family. This time calculation is a best approximation and does not include time spent on CPR, EKG interpretation, central line placement, intubation, laceration repairs, and other separately billed procedures. Amount of Critical Care Time: 50minutes    Details of critical care provision is documented above. A general summary is listed below:    IMPENDING DETERIORATION -Cardiovascular and Metabolic  ASSOCIATED RISK FACTORS - Dysrhythmia  MANAGEMENT- Bedside Assessment  INTERPRETATION -  Xrays, ECG, Blood Pressure and Cardiac Output Measures   INTERVENTIONS - hemodynamic mngmt  CASE REVIEW - Hospitalist/Intensivist  TREATMENT RESPONSE -Stable  PERFORMED BY - Self    NOTES   :  During this entire length of time I was immediately available to the patient . Andrew Freire MD    Diagnosis     Clinical Impression:   1. SVT (supraventricular tachycardia) ([facility] [address]    2. Sepsis, due to unspecified organism, unspecified whether acute organ dysfunction present ([facility] [address]    3. ST segment depression    4.  NSTEMI (non-ST elevated myocardial infarction) ([address] Attestations:    Kevin Arias MD    Please note that this dictation was completed with [employer], the computer voice recognition software. Quite often unanticipated grammatical, syntax, homophones, and other interpretive errors are inadvertently transcribed by the computer software. Please disregard these errors. Please excuse any errors that have escaped final proofreading. Thank you.

## 2021-09-08 NOTE — ED TRIAGE NOTES
EMS called to dialysis center for this patient regarding fever since yesterday, tylenol given at dialysis center.

## 2021-09-08 NOTE — ED NOTES
Patient placed on lifepak at bedside, continuous EKG monitoring, cardiac monitor  HR, preparing for adenosine administration.

## 2021-09-08 NOTE — ED NOTES
Ice packs place under bilateral axilla to assist with temp regulation, patient received tylenol 650mg at dialysis.

## 2021-09-09 LAB
ALBUMIN SERPL-MCNC: 2.4 G/DL (ref 3.5–5)
ALBUMIN/GLOB SERPL: 0.4 {RATIO} (ref 1.1–2.2)
ALP SERPL-CCNC: 105 U/L (ref 45–117)
ALT SERPL-CCNC: 19 U/L (ref 12–78)
ANION GAP SERPL CALC-SCNC: 9 MMOL/L (ref 5–15)
ARTERIAL PATENCY WRIST A: POSITIVE
AST SERPL W P-5'-P-CCNC: 49 U/L (ref 15–37)
ATRIAL RATE: 178 BPM
ATRIAL RATE: 97 BPM
BASE DEFICIT BLDA-SCNC: 0.9 MMOL/L (ref 0–2)
BASOPHILS # BLD: 0 K/UL (ref 0–0.1)
BASOPHILS NFR BLD: 0 % (ref 0–1)
BDY SITE: ABNORMAL
BILIRUB SERPL-MCNC: 0.6 MG/DL (ref 0.2–1)
BUN SERPL-MCNC: 39 MG/DL (ref 6–20)
BUN/CREAT SERPL: 6 (ref 12–20)
CA-I BLD-MCNC: 8.4 MG/DL (ref 8.5–10.1)
CALCULATED P AXIS, ECG09: 65 DEGREES
CALCULATED R AXIS, ECG10: -38 DEGREES
CALCULATED R AXIS, ECG10: -58 DEGREES
CALCULATED T AXIS, ECG11: 72 DEGREES
CALCULATED T AXIS, ECG11: 96 DEGREES
CHLORIDE SERPL-SCNC: 104 MMOL/L (ref 97–108)
CO2 SERPL-SCNC: 22 MMOL/L (ref 21–32)
CREAT SERPL-MCNC: 6.92 MG/DL (ref 0.7–1.3)
DIAGNOSIS, 93000: NORMAL
DIAGNOSIS, 93000: NORMAL
DIFFERENTIAL METHOD BLD: ABNORMAL
EOSINOPHIL # BLD: 0 K/UL (ref 0–0.4)
EOSINOPHIL NFR BLD: 0 % (ref 0–7)
EPAP/CPAP/PEEP, PAPEEP: 11
ERYTHROCYTE [DISTWIDTH] IN BLOOD BY AUTOMATED COUNT: 19.8 % (ref 11.5–14.5)
FIO2 ON VENT: 25 %
GLOBULIN SER CALC-MCNC: 6.1 G/DL (ref 2–4)
GLUCOSE BLD STRIP.AUTO-MCNC: 142 MG/DL (ref 65–117)
GLUCOSE SERPL-MCNC: 122 MG/DL (ref 65–100)
HCO3 BLDA-SCNC: 24 MMOL/L (ref 22–26)
HCT VFR BLD AUTO: 31.1 % (ref 36.6–50.3)
HGB BLD-MCNC: 9.7 G/DL (ref 12.1–17)
IMM GRANULOCYTES # BLD AUTO: 0.2 K/UL (ref 0–0.04)
IMM GRANULOCYTES NFR BLD AUTO: 1 % (ref 0–0.5)
LACTATE SERPL-SCNC: 1.3 MMOL/L (ref 0.4–2)
LYMPHOCYTES # BLD: 1.4 K/UL (ref 0.8–3.5)
LYMPHOCYTES NFR BLD: 7 % (ref 12–49)
MAGNESIUM SERPL-MCNC: 2 MG/DL (ref 1.6–2.4)
MCH RBC QN AUTO: 27.1 PG (ref 26–34)
MCHC RBC AUTO-ENTMCNC: 31.2 G/DL (ref 30–36.5)
MCV RBC AUTO: 86.9 FL (ref 80–99)
MONOCYTES # BLD: 1.7 K/UL (ref 0–1)
MONOCYTES NFR BLD: 9 % (ref 5–13)
NEUTS SEG # BLD: 16.3 K/UL (ref 1.8–8)
NEUTS SEG NFR BLD: 83 % (ref 32–75)
NRBC # BLD: 0.03 K/UL (ref 0–0.01)
NRBC BLD-RTO: 0.2 PER 100 WBC
P-R INTERVAL, ECG05: 156 MS
PCO2 BLDA: 33 MMHG (ref 35–45)
PERFORMED BY, TECHID: ABNORMAL
PH BLDA: 7.44 [PH] (ref 7.35–7.45)
PHOSPHATE SERPL-MCNC: 3.8 MG/DL (ref 2.6–4.7)
PLATELET # BLD AUTO: 278 K/UL (ref 150–400)
PMV BLD AUTO: 10.6 FL (ref 8.9–12.9)
PO2 BLDA: 113 MMHG (ref 75–100)
POTASSIUM SERPL-SCNC: 4.8 MMOL/L (ref 3.5–5.1)
PROT SERPL-MCNC: 8.5 G/DL (ref 6.4–8.2)
Q-T INTERVAL, ECG07: 298 MS
Q-T INTERVAL, ECG07: 364 MS
QRS DURATION, ECG06: 84 MS
QRS DURATION, ECG06: 88 MS
QTC CALCULATION (BEZET), ECG08: 462 MS
QTC CALCULATION (BEZET), ECG08: 490 MS
RBC # BLD AUTO: 3.58 M/UL (ref 4.1–5.7)
SAO2 % BLD: 99 %
SAO2% DEVICE SAO2% SENSOR NAME: ABNORMAL
SODIUM SERPL-SCNC: 135 MMOL/L (ref 136–145)
SPECIMEN SITE: ABNORMAL
VENTRICULAR RATE, ECG03: 163 BPM
VENTRICULAR RATE, ECG03: 97 BPM
WBC # BLD AUTO: 19.6 K/UL (ref 4.1–11.1)

## 2021-09-09 PROCEDURE — 65610000006 HC RM INTENSIVE CARE

## 2021-09-09 PROCEDURE — 99223 1ST HOSP IP/OBS HIGH 75: CPT | Performed by: INTERNAL MEDICINE

## 2021-09-09 PROCEDURE — 36600 WITHDRAWAL OF ARTERIAL BLOOD: CPT

## 2021-09-09 PROCEDURE — 85025 COMPLETE CBC W/AUTO DIFF WBC: CPT

## 2021-09-09 PROCEDURE — 87205 SMEAR GRAM STAIN: CPT

## 2021-09-09 PROCEDURE — 87186 SC STD MICRODIL/AGAR DIL: CPT

## 2021-09-09 PROCEDURE — 87077 CULTURE AEROBIC IDENTIFY: CPT

## 2021-09-09 PROCEDURE — 82962 GLUCOSE BLOOD TEST: CPT

## 2021-09-09 PROCEDURE — 74011250636 HC RX REV CODE- 250/636: Performed by: HOSPITALIST

## 2021-09-09 PROCEDURE — 74011250637 HC RX REV CODE- 250/637: Performed by: HOSPITALIST

## 2021-09-09 PROCEDURE — 83605 ASSAY OF LACTIC ACID: CPT

## 2021-09-09 PROCEDURE — 80053 COMPREHEN METABOLIC PANEL: CPT

## 2021-09-09 PROCEDURE — 84100 ASSAY OF PHOSPHORUS: CPT

## 2021-09-09 PROCEDURE — 74011000250 HC RX REV CODE- 250: Performed by: HOSPITALIST

## 2021-09-09 PROCEDURE — 82803 BLOOD GASES ANY COMBINATION: CPT

## 2021-09-09 PROCEDURE — 83735 ASSAY OF MAGNESIUM: CPT

## 2021-09-09 RX ORDER — LOSARTAN POTASSIUM 25 MG/1
25 TABLET ORAL DAILY
Status: DISCONTINUED | OUTPATIENT
Start: 2021-09-10 | End: 2021-09-16 | Stop reason: HOSPADM

## 2021-09-09 RX ORDER — PREDNISOLONE ACETATE 10 MG/ML
1 SUSPENSION/ DROPS OPHTHALMIC 2 TIMES DAILY
COMMUNITY

## 2021-09-09 RX ORDER — CARVEDILOL 3.12 MG/1
3.12 TABLET ORAL 2 TIMES DAILY WITH MEALS
Status: DISCONTINUED | OUTPATIENT
Start: 2021-09-09 | End: 2021-09-16 | Stop reason: HOSPADM

## 2021-09-09 RX ADMIN — Medication 10 ML: at 22:30

## 2021-09-09 RX ADMIN — HEPARIN SODIUM 5000 UNITS: 5000 INJECTION INTRAVENOUS; SUBCUTANEOUS at 15:39

## 2021-09-09 RX ADMIN — Medication 10 ML: at 15:39

## 2021-09-09 RX ADMIN — ACETAMINOPHEN 650 MG: 325 TABLET ORAL at 15:39

## 2021-09-09 RX ADMIN — HEPARIN SODIUM 5000 UNITS: 5000 INJECTION INTRAVENOUS; SUBCUTANEOUS at 22:31

## 2021-09-09 RX ADMIN — CEFEPIME HYDROCHLORIDE 1 G: 1 INJECTION, POWDER, FOR SOLUTION INTRAMUSCULAR; INTRAVENOUS at 22:30

## 2021-09-09 NOTE — CONSULTS
Consult    NAME: Magy Aguilar   :  1950   MRN:  [id number]     Date/Time:  2021 2:44 PM    Patient PCP: Naida Lester MD  ________________________________________________________________________    This is an inpatient cardiology consultation requested by Dr. Adeline Sharma for tachycardia. Assessment:     1. Paroxysmal SVT in the patient with history of known dilated cardiomyopathy with severely reduced LV systolic function. 2. Sepsis syndrome  3. Borderline troponin which is type II non-STEMI related to problem #1 and 2.  4. End-stage renal disease with hemodialysis dependency        Plan:     1. Low-dose beta-blocker namely Coreg for patient SVT and congestive cardiomyopathy. 2. ARBS as tolerated by blood pressure for afterload reduction  keeping patient euvolemic by hemodialysis. 3. Sepsis management as per medical team.  4. We will follow patient with you. []        High complexity decision making was performed        Subjective:   CHIEF COMPLAINT:     HISTORY OF PRESENT ILLNESS:     This is a 71-year-old -American man with a history of paroxysmal SVT, congestive cardiomyopathy with baseline LV ejection fraction of 15-20% as per echocardiogram of , end-stage renal disease with hemodialysis dependency, hypertension, GERD, diabetes mellitus,Covid PNA and GERD who was brought from the dialysis unit with profound tiredness and lethargy. Patient in the dialysis unit was noted to be febrile with T-max of 103F. His initial assessment freestanding ER was suggestive of sepsis syndrome where he was was also found to be in SVT. Patient for SVT was tried on adenosine without success and finally on Esmolol SVT changed  to sinus tachycardia and was taken off from Esmolol.   Patient at the time of my evaluation was maintaining sinus rhythm and denied any chest pain, dyspnea or palpitations, chest fluttering or dizziness    Past Medical History:   Diagnosis Date    Asthenia 1/24/2021    Cardiomyopathy ([facility] 1/24/2021    CHF (congestive heart failure) (formerly [facility])     Chronic kidney disease     CKD (chronic kidney disease)     4    Diabetes (HCC)     End stage renal disease on dialysis ([facility] 1/24/2021    Essential hypertension 1/24/2021    Gastroesophageal reflux disease 1/24/2021    Gastroesophageal reflux disease 1/24/2021    Hypertension     Pneumonia due to COVID-19 virus 2/62/4189    Systolic CHF, acute on chronic ([facility] 1/24/2021      Past Surgical History:   Procedure Laterality Date    COLONOSCOPY N/A 12/3/2020    COLONOSCOPY performed by Katja Youngblood MD at [facility] HX HEART CATHETERIZATION      HX HERNIA REPAIR      IR FLUORO GUIDE PLC CVAD  1/22/2021    IR INSERT NON TUNL CVC OVER 5 YRS  1/18/2021    IR INSERT TUNL CVC W/O PORT OVER 5 YR  1/22/2021    IR PLACE CVAD FLUORO GUIDE  1/18/2021     Allergies   Allergen Reactions    Lisinopril Angioedema    Pcn [Penicillins] Rash      Meds:  See below  Social History     Tobacco Use    Smoking status: Never Smoker    Smokeless tobacco: Never Used   Substance Use Topics    Alcohol use: Not Currently      Family History   Problem Relation Age of Onset    Diabetes Mother     Heart Failure Father        REVIEW OF SYSTEMS:     []         Unable to obtain  ROS due to ---   [x]         Total of 12 systems reviewed as follows:    Constitutional: negative fever, negative chills, negative weight loss  Eyes:   negative visual changes  ENT:   negative sore throat, tongue or lip swelling  Respiratory:  negative cough, negative dyspnea  Cards:   As per history of present illness  GI:   negative for nausea, vomiting, diarrhea, and abdominal pain  Genitourinary: negative for frequency, dysuria  Integument:  negative for rash   Hematologic:  negative for easy bruising and gum/nose bleeding  Musculoskel: negative for myalgias,  back pain  Neurological:  negative for headaches, dizziness, vertigo, weakness  Behavl/Psych: negative for feelings of anxiety, depression     Pertinent Positives include :    Objective:      Physical Exam:    Last 24hrs VS reviewed since prior progress note. Most recent are:    Visit Vitals  /69 (BP 1 Location: Left upper arm, BP Patient Position: At rest;Supine)   Pulse 80   Temp 98 °F (36.7 °C)   Resp 22   Ht 5' 10\" (1.778 m)   Wt 78.9 kg (174 lb)   SpO2 100%   BMI 24.97 kg/m²       Intake/Output Summary (Last 24 hours) at 9/9/2021 1444  Last data filed at 9/8/2021 2119  Gross per 24 hour   Intake 602.86 ml   Output    Net 602.86 ml        General Appearance: Well developed, well nourished, alert & oriented x 3,    no acute distress. Ears/Nose/Mouth/Throat: Pupils equal and round, Hearing grossly normal.  Neck: Supple. JVP within normal limits. Carotids good upstrokes, with no bruit. Chest: Lungs clear to auscultation bilaterally. Cardiovascular: Regular rate and rhythm, S1S2 normal, no murmur, rubs, gallops. Abdomen: Soft, non-tender, bowel sounds are active. No organomegaly. Extremities: No edema bilaterally. Femoral pulses +2, Distal Pulses +1. Skin: Warm and dry. Neuro: CN II-XII grossly intact, Strength and sensation grossly intact. []         Post-cath site without hematoma, bruit, tenderness, or thrill. Distal pulses intact. Data:      Telemetry: Sinus rhythm    EKG:  SVT with nonspecific ST-T wave changes    CT CHEST WO CONT   Final Result   1. There are findings suspect for fluid overload with increased edema within   the body wall fat as well as a mild degree of lung base groundglass parenchymal   density suspect for mild hydrostatic edema. 2.  In addition, there is discoid atelectasis as might be associated with mucous   plugging or bronchospasm. 3.  This examination is negative for dense airspace consolidation. Please see   the above text for further details.             XR CHEST PORT   Final Result             Prior to Admission medications    Medication Sig Start Date End Date Taking? Authorizing Provider   dextran 70-hypromellose (Artificial Tears,[id number],) ophthalmic solution Administer 1 Drop to both eyes three (3) times daily as needed. Provider, Historical   acyclovir (ZOVIRAX) 800 mg tablet Take 800 mg by mouth two (2) times a day. Other, MD Farhan   hydrALAZINE (APRESOLINE) 100 mg tablet Take 0.5 Tabs by mouth three (3) times daily. For systolic under 202 6/96/06   Alexei Cummings MD   albuterol (PROVENTIL HFA, VENTOLIN HFA, PROAIR HFA) 90 mcg/actuation inhaler Take 2 Puffs by inhalation every four (4) hours as needed for Wheezing or Shortness of Breath. 1/24/21   Willi BAEZA MD   metoprolol succinate (TOPROL-XL) 50 mg XL tablet Take 50 mg by mouth two (2) times a day. Provider, Historical   calcitRIOL (ROCALTROL) 0.25 mcg capsule Take 0.25 mcg by mouth daily. Provider, Historical   atorvastatin (LIPITOR) 40 mg tablet Take 40 mg by mouth daily. Provider, Historical       Recent Results (from the past 24 hour(s))   CBC WITH AUTOMATED DIFF    Collection Time: 09/08/21  4:30 PM   Result Value Ref Range    WBC 10.8 4.1 - 11.1 K/uL    RBC 4.22 4.10 - 5.70 M/uL    HGB 11.4 (L) 12.1 - 17.0 g/dL    HCT 36.2 (L) 36.6 - 50.3 %    MCV 85.8 80.0 - 99.0 FL    MCH 27.0 26.0 - 34.0 PG    MCHC 31.5 30.0 - 36.5 g/dL    RDW 19.6 (H) 11.5 - 14.5 %    PLATELET 006 106 - 825 K/uL    MPV 9.6 8.9 - 12.9 FL    NEUTROPHILS 91 (H) 32 - 75 %    LYMPHOCYTES 6 (L) 12 - 49 %    MONOCYTES 3 (L) 5 - 13 %    EOSINOPHILS 0 0 - 7 %    BASOPHILS 0 0 - 1 %    IMMATURE GRANULOCYTES 0 0.0 - 0.5 %    ABS. NEUTROPHILS 9.8 (H) 1.8 - 8.0 K/UL    ABS. LYMPHOCYTES 0.7 (L) 0.8 - 3.5 K/UL    ABS. MONOCYTES 0.3 0.0 - 1.0 K/UL    ABS. EOSINOPHILS 0.0 0.0 - 0.4 K/UL    ABS. BASOPHILS 0.0 0.0 - 0.1 K/UL    ABS. IMM.  GRANS. 0.0 0.00 - 0.04 K/UL    DF AUTOMATED     METABOLIC PANEL, COMPREHENSIVE    Collection Time: 09/08/21  4:30 PM   Result Value Ref Range    Sodium 135 (L) 136 - 145 mmol/L Potassium 4.2 3.5 - 5.1 mmol/L    Chloride 101 97 - 108 mmol/L    CO2 22 21 - 32 mmol/L    Anion gap 12 5 - 15 mmol/L    Glucose 108 (H) 65 - 100 mg/dL    BUN 37 (H) 6 - 20 mg/dL    Creatinine 6.62 (H) 0.70 - 1.30 mg/dL    BUN/Creatinine ratio 6 (L) 12 - 20      GFR est AA 10 (L) >60 ml/min/1.73m2    GFR est non-AA 8 (L) >60 ml/min/1.73m2    Calcium 8.3 (L) 8.5 - 10.1 mg/dL    Bilirubin, total 0.6 0.2 - 1.0 mg/dL    AST (SGOT) 15 15 - 37 U/L    ALT (SGPT) 9 (L) 12 - 78 U/L    Alk. phosphatase 78 45 - 117 U/L    Protein, total 9.3 (H) 6.4 - 8.2 g/dL    Albumin 2.6 (L) 3.5 - 5.0 g/dL    Globulin 6.7 (H) 2.0 - 4.0 g/dL    A-G Ratio 0.4 (L) 1.1 - 2.2     TROPONIN I    Collection Time: 09/08/21  4:30 PM   Result Value Ref Range    Troponin-I, Qt. <0.05 <0.05 ng/mL   COVID-19 RAPID TEST    Collection Time: 09/08/21  4:30 PM   Result Value Ref Range    Specimen source Please find results under separate order     MAGNESIUM    Collection Time: 09/08/21  4:30 PM   Result Value Ref Range    Magnesium 1.9 1.6 - 2.4 mg/dL   PHOSPHORUS    Collection Time: 09/08/21  4:30 PM   Result Value Ref Range    Phosphorus 3.7 2.6 - 4.7 mg/dL   CULTURE, BLOOD, PAIRED    Collection Time: 09/08/21  4:30 PM    Specimen: Blood   Result Value Ref Range    Special Requests: No Special Requests      Culture result:        One of four bottles has been flagged positive by instrument. Bottle has been sent to [facility] laboratory to assess for possible growth.  Gram Negative Rods CALLED TO AND READ BACK BY TIFFANIE FUNES R.N. [id number] 09/09/2021 BY JAVED   LACTIC ACID    Collection Time: 09/08/21  4:30 PM   Result Value Ref Range    Lactic acid 2.4 (HH) 0.4 - 2.0 mmol/L   SARS-COV-2    Collection Time: 09/08/21  4:30 PM   Result Value Ref Range    SARS-CoV-2 Not Detected Not Detected     EKG, 12 LEAD, INITIAL    Collection Time: 09/08/21  4:34 PM   Result Value Ref Range    Ventricular Rate 164 BPM    Atrial Rate 202 BPM    QRS Duration 92 ms    Q-T Interval 294 ms    QTC Calculation (Bezet) 485 ms    Calculated R Axis -54 degrees    Calculated T Axis 108 degrees    Diagnosis       Supraventricular tachycardia  Left axis deviation  Incomplete right bundle branch block  Inferior infarct , age undetermined  Anterior infarct (cited on or before 16-JAN-2021)  ST & T wave abnormality, consider lateral ischemia  Abnormal ECG  When compared with ECG of 07-JUL-2021 18:08,  Vent.  rate has increased BY  80 BPM  Incomplete right bundle branch block is now Present  Inferior infarct is now Present  Questionable change in initial forces of Septal leads  Confirmed by Eduin Sears ([id number]) on 9/8/2021 4:48:50 PM     EKG, 12 LEAD, INITIAL    Collection Time: 09/08/21  5:20 PM   Result Value Ref Range    Ventricular Rate 164 BPM    Atrial Rate 159 BPM    QRS Duration 96 ms    Q-T Interval 292 ms    QTC Calculation (Bezet) 482 ms    Calculated R Axis -62 degrees    Calculated T Axis 98 degrees    Diagnosis       Supraventricular tachycardia  Left axis deviation  Inferior infarct (cited on or before 16-JAN-2021)  Anteroseptal infarct (cited on or before 16-JAN-2021)  ST & T wave abnormality, consider lateral ischemia  Abnormal ECG  When compared with ECG of 08-SEP-2021 16:34,  No significant change was found    Confirmed by Mitchell Mallory ([id number]) on 9/8/2021 9:35:08 PM     EKG, 12 LEAD, INITIAL    Collection Time: 09/08/21  5:31 PM   Result Value Ref Range    Ventricular Rate 163 BPM    Atrial Rate 178 BPM    QRS Duration 88 ms    Q-T Interval 298 ms    QTC Calculation (Bezet) 490 ms    Calculated R Axis -58 degrees    Calculated T Axis 96 degrees    Diagnosis       Supraventricular tachycardia  Left axis deviation  Inferior infarct (cited on or before 16-JAN-2021)  Anteroseptal infarct (cited on or before 16-JAN-2021)  Marked ST abnormality, possible lateral subendocardial injury  Abnormal ECG  When compared with ECG of 08-SEP-2021 17:20,  No significant change was found  Confirmed by Mathew Gastelum [phone]) on 9/9/2021 10:38:51 AM     TROPONIN I    Collection Time: 09/08/21  6:15 PM   Result Value Ref Range    Troponin-I, Qt. 0.09 (H) <0.05 ng/mL   EKG, 12 LEAD, SUBSEQUENT    Collection Time: 09/08/21  6:22 PM   Result Value Ref Range    Ventricular Rate 97 BPM    Atrial Rate 97 BPM    P-R Interval 156 ms    QRS Duration 84 ms    Q-T Interval 364 ms    QTC Calculation (Bezet) 462 ms    Calculated P Axis 65 degrees    Calculated R Axis -38 degrees    Calculated T Axis 72 degrees    Diagnosis       Normal sinus rhythm  Left axis deviation  Anteroseptal infarct (cited on or before 16-JAN-2021)  Abnormal ECG  When compared with ECG of 08-SEP-2021 17:36,  Vent. rate has decreased BY  68 BPM  ST no longer depressed in Anterior leads  T wave inversion no longer evident in Lateral leads  Confirmed by Mathew Gastelum ([id number]) on 9/9/2021 97:82:26 AM     METABOLIC PANEL, COMPREHENSIVE    Collection Time: 09/09/21  3:00 AM   Result Value Ref Range    Sodium 135 (L) 136 - 145 mmol/L    Potassium 4.8 3.5 - 5.1 mmol/L    Chloride 104 97 - 108 mmol/L    CO2 22 21 - 32 mmol/L    Anion gap 9 5 - 15 mmol/L    Glucose 122 (H) 65 - 100 mg/dL    BUN 39 (H) 6 - 20 mg/dL    Creatinine 6.92 (H) 0.70 - 1.30 mg/dL    BUN/Creatinine ratio 6 (L) 12 - 20      GFR est AA 10 (L) >60 ml/min/1.73m2    GFR est non-AA 8 (L) >60 ml/min/1.73m2    Calcium 8.4 (L) 8.5 - 10.1 mg/dL    Bilirubin, total 0.6 0.2 - 1.0 mg/dL    AST (SGOT) 49 (H) 15 - 37 U/L    ALT (SGPT) 19 12 - 78 U/L    Alk.  phosphatase 105 45 - 117 U/L    Protein, total 8.5 (H) 6.4 - 8.2 g/dL    Albumin 2.4 (L) 3.5 - 5.0 g/dL    Globulin 6.1 (H) 2.0 - 4.0 g/dL    A-G Ratio 0.4 (L) 1.1 - 2.2     MAGNESIUM    Collection Time: 09/09/21  3:00 AM   Result Value Ref Range    Magnesium 2.0 1.6 - 2.4 mg/dL   PHOSPHORUS    Collection Time: 09/09/21  3:00 AM   Result Value Ref Range    Phosphorus 3.8 2.6 - 4.7 mg/dL   CBC WITH AUTOMATED DIFF    Collection Time: 09/09/21  3:00 AM Result Value Ref Range    WBC 19.6 (H) 4.1 - 11.1 K/uL    RBC 3.58 (L) 4.10 - 5.70 M/uL    HGB 9.7 (L) 12.1 - 17.0 g/dL    HCT 31.1 (L) 36.6 - 50.3 %    MCV 86.9 80.0 - 99.0 FL    MCH 27.1 26.0 - 34.0 PG    MCHC 31.2 30.0 - 36.5 g/dL    RDW 19.8 (H) 11.5 - 14.5 %    PLATELET 398 795 - 616 K/uL    MPV 10.6 8.9 - 12.9 FL    NRBC 0.2 (H) 0.0  WBC    ABSOLUTE NRBC 0.03 (H) 0.00 - 0.01 K/uL    NEUTROPHILS 83 (H) 32 - 75 %    LYMPHOCYTES 7 (L) 12 - 49 %    MONOCYTES 9 5 - 13 %    EOSINOPHILS 0 0 - 7 %    BASOPHILS 0 0 - 1 %    IMMATURE GRANULOCYTES 1 (H) 0 - 0.5 %    ABS. NEUTROPHILS 16.3 (H) 1.8 - 8.0 K/UL    ABS. LYMPHOCYTES 1.4 0.8 - 3.5 K/UL    ABS. MONOCYTES 1.7 (H) 0.0 - 1.0 K/UL    ABS. EOSINOPHILS 0.0 0.0 - 0.4 K/UL    ABS. BASOPHILS 0.0 0.0 - 0.1 K/UL    ABS. IMM. GRANS. 0.2 (H) 0.00 - 0.04 K/UL    DF AUTOMATED     LACTIC ACID    Collection Time: 09/09/21  3:00 AM   Result Value Ref Range    Lactic acid 1.3 0.4 - 2.0 mmol/L   BLOOD GAS, ARTERIAL    Collection Time: 09/09/21  6:22 AM   Result Value Ref Range    pH 7.44 7.35 - 7.45      PCO2 33 (L) 35 - 45 mmHg    PO2 113 (H) 75 - 100 mmHg    O2 SAT 99 >95 %    BICARBONATE 24 22 - 26 mmol/L    BASE DEFICIT 0.9 0 - 2 mmol/L    O2 METHOD CPAP      FIO2 25 %    EPAP/CPAP/PEEP 11      Sample source Arterial      SITE Left Radial      BALTA'S TEST Positive          Echo:   01/16/21    ECHO ADULT COMPLETE 01/21/2021 1/21/2021    Interpretation Summary  · LV: Estimated LVEF is 15 - 20%. Normal wall thickness. Moderately dilated left ventricle. Severely and globally reduced systolic function. Severe (grade 3) left ventricular diastolic dysfunction. · LA: Moderately dilated left atrium. · RV: Moderately dilated right ventricle. Moderately reduced systolic function. · RA: Severely dilated right atrium. · AV: Aortic valve leaflet calcification present. · MV: Mitral valve non-specific thickening. Mild mitral annular calcification.  Moderate mitral valve regurgitation is present. · TV: Moderate to severe tricuspid valve regurgitation is present. · PV: Mild pulmonic valve regurgitation is present. · PA: Pulmonary arterial systolic pressure is 89 mmHg. · IVC: Severely elevated central venous pressure (15 mmHg); IVC diameter is larger than 21 mm and collapses less than 50% with respiration. · Pericardium: Trivial pericardial effusion. There is left pleural effusion. There is right pleural effusion. Signed by: Michaela Cunningham MD on 1/21/2021  7:34 AM      Patient's  EKG and laboratory data were individually reviewed by me. Please send a copy of this dictation to above referring physician. Brittany De La O MD    This dictation was done by Dragon computer voice recognition software. Occasionally grammatical, syntax and other interpretive errors escape final proof reading. Please feel free to call me for any clarification.

## 2021-09-09 NOTE — CONSULTS
Nephrology Consult    Patient: Zeenat Levy MRN: [id number]  SSN: [id number]    YOB: 1950  Age: 72 y.o. Sex: male      Subjective:   Reason for the consultation end-stage renal disease  History of present illness. The patient is 71-year-old man with underlying history of ESRD, cardiomyopathy with low LVEF 15 to 20%, diabetes mellitus type 2, hypertension was admitted from the dialysis center due to high fever, hypotensive on arrival, was in SVT status post IV adenosine and put on esmolol drip transiently, diagnosed with severe sepsis, CT chest bilateral pulmonary edema and put on IV antibiotics.      Past Medical History:   Diagnosis Date    Asthenia 1/24/2021    Cardiomyopathy ([address] [facility] 1/24/2021    CHF (congestive heart failure) (HCC)     Chronic kidney disease     CKD (chronic kidney disease)     4    Diabetes ([address]     End stage renal disease on dialysis ([address] [facility] 1/24/2021    Essential hypertension 1/24/2021    Gastroesophageal reflux disease 1/24/2021    Gastroesophageal reflux disease 1/24/2021    Hypertension     Pneumonia due to COVID-19 virus 4/40/9994    Systolic CHF, acute on chronic ([address] 1/24/2021     Past Surgical History:   Procedure Laterality Date    COLONOSCOPY N/A 12/3/2020    COLONOSCOPY performed by Kevin Torres MD at [place] [address] HX HEART CATHETERIZATION      HX HERNIA REPAIR      IR FLUORO GUIDE [facility] CVAD  1/22/2021    IR INSERT NON TUNL CVC OVER 5 YRS  1/18/2021    IR INSERT TUNL CVC W/O PORT OVER 5 YR  1/22/2021    IR PLACE CVAD FLUORO GUIDE  1/18/2021      Family History   Problem Relation Age of Onset    Diabetes Mother     Heart Failure Father      Social History     Tobacco Use    Smoking status: Never Smoker    Smokeless tobacco: Never Used   Substance Use Topics    Alcohol use: Not Currently      Current Facility-Administered Medications   Medication Dose Route Frequency Provider Last Rate Last Admin    esmolol (BREVIBLOC) 2,500 mg/250 mL (10 mg/mL) infusion  0-200 mcg/kg/min IntraVENous TITRATE Riley Escalante MD   Stopped at 09/08/21 2119    sodium chloride (NS) flush 5-40 mL  5-40 mL IntraVENous Q8H Patrica Llamas MD   10 mL at 09/08/21 2318    sodium chloride (NS) flush 5-40 mL  5-40 mL IntraVENous PRN Patrica Llamas MD        acetaminophen (TYLENOL) tablet 650 mg  650 mg Oral Q6H PRN Patrica Llamas MD        Or   [facility] acetaminophen (TYLENOL) suppository 650 mg  650 mg Rectal Q6H PRN Patrica Llamas MD        ondansetron (ZOFRAN ODT) tablet 4 mg  4 mg Oral Q8H PRN Patrica Llamas MD        Or    ondansetron (ZOFRAN) injection 4 mg  4 mg IntraVENous Q6H PRN Patrica Llamas MD        levoFLOXacin (LEVAQUIN) 750 mg in D5W IVPB  750 mg IntraVENous Q48H Patrica Llamas  mL/hr at 09/08/21 2312 750 mg at 09/08/21 2312    heparin (porcine) injection 5,000 Units  5,000 Units SubCUTAneous Q12H Patrica Llamas MD   5,000 Units at 09/08/21 2312    cefepime (MAXIPIME) 1 g in sterile water (preservative free) 10 mL IV syringe  1 g IntraVENous Q24H Patrica Llamas MD   1 g at 09/08/21 2312     Current Outpatient Medications   Medication Sig Dispense Refill    dextran 70-hypromellose (Artificial Tears,[id number],) ophthalmic solution Administer 1 Drop to both eyes three (3) times daily as needed.  acyclovir (ZOVIRAX) 800 mg tablet Take 800 mg by mouth two (2) times a day.  hydrALAZINE (APRESOLINE) 100 mg tablet Take 0.5 Tabs by mouth three (3) times daily. For systolic under 777 90 Tab 0    albuterol (PROVENTIL HFA, VENTOLIN HFA, PROAIR HFA) 90 mcg/actuation inhaler Take 2 Puffs by inhalation every four (4) hours as needed for Wheezing or Shortness of Breath. 1 Inhaler 0    metoprolol succinate (TOPROL-XL) 50 mg XL tablet Take 50 mg by mouth two (2) times a day.  calcitRIOL (ROCALTROL) 0.25 mcg capsule Take 0.25 mcg by mouth daily.       atorvastatin (LIPITOR) 40 mg tablet Take 40 mg by mouth daily. Allergies   Allergen Reactions    Lisinopril Angioedema    Pcn [Penicillins] Rash       Review of Systems:  A comprehensive review of systems was negative except for that written in the History of Present Illness. Objective:     Vitals:    09/09/21 0625 09/09/21 0635 09/09/21 0658 09/09/21 0928   BP:  (!) 94/33 (!) 97/52 (!) 100/54   Pulse:  67 68 70   Resp:  18 16 18   Temp:   97.6 °F (36.4 °C)    SpO2: 99% 98% 98% 96%   Weight:       Height:            Physical Exam:  General: NAD  Eyes: sclera anicteric  Oral Cavity: No thrush or ulcers  Neck: no JVD  Chest: Fair bilateral air entry  Heart: normal sounds  Abdomen: soft and non tender   : no simms  Lower Extremities: no edema  Skin: no rash  Neuro: intact  Psychiatric: non-depressed            Assessment:     Hospital Problems  Date Reviewed: 1/24/2021        Codes Class Noted POA    Sepsis ([facility] ICD-10-CM: A41.9  ICD-9-CM: 038.9, 995.91  7/7/2021 Unknown              Plan:   1 ESRD. -On hemodialysis Monday Wednesday Friday.    -He was last dialyzed on 9/08.    -No significant volume overload or uremia.    -No indication for dialysis today.    -will plan for dialysis tomorrow.    -He has right IJ permacath is a dialysis access.    -He has left wrist AV fistula which is still maturing. 2.  Sepsis.    -He came with high temp and hypotensive with leukocytosis. -Blood cultures are pending. -CT chest shows bilateral edema.    -He is put on IV antibiotics. -No indication for IV fluids as patient is end-stage renal disease and his EF is low. 2.  Anemia.    -Hemoglobin 9.7. We will continue on IV erythropoietin    4. Renal osteodystrophy.   - His calcium is okay. His phosphorus is normal.    5.  SVT. On arrival his heart rate was 117 status post IV adenosine. He was put on esmolol transiently which has been discontinued. Thank you for the consultation.     Signed By: Yunier Bell MD September 9, 2021

## 2021-09-09 NOTE — CONSULTS
Consult Date: 9/9/2021    Consults  Sepsis    Subjective   This is a 72year old male with ESRD on HD, known to me from July 2021 when followed for MRSA bacteremia discharged on IV Vancomycin. Patient presented to ED with 2 day history of fever. He was febrile to 103 and tachycardic. WBC today is 19,600. CXR unremarkable for pneumonia with right-sided permacath. CT Chest consistent with fluid overload but no dense airpspace consolidation. Images reviewed by me. Blood cultures sent and today growing Gram negative rods. Patient currently on IV Cefepime and Levafloxacin. ID has been consulted for this reason. Patient seen in the ED where he is awake and responsive. He states that he was experiencing \"shivering\" during dialysis. No other symptoms except for soreness in his left shoulder from peeling pears. Makes very little urine. No respiratory symptoms and no wounds.       Past Medical History:   Diagnosis Date    Asthenia 1/24/2021    Cardiomyopathy ([address] 1/24/2021    CHF (congestive heart failure) (HCC)     Chronic kidney disease     CKD (chronic kidney disease)     4    Diabetes ([address]     End stage renal disease on dialysis ([address] 1/24/2021    Essential hypertension 1/24/2021    Gastroesophageal reflux disease 1/24/2021    Gastroesophageal reflux disease 1/24/2021    Hypertension     Pneumonia due to COVID-19 virus 1/57/0843    Systolic CHF, acute on chronic ([address] 1/24/2021      Past Surgical History:   Procedure Laterality Date    COLONOSCOPY N/A 12/3/2020    COLONOSCOPY performed by Jacky Jaramillo MD at [address] HX HEART CATHETERIZATION      HX HERNIA REPAIR      IR FLUORO GUIDE PLC CVAD  1/22/2021    IR INSERT NON TUNL CVC OVER 5 YRS  1/18/2021    IR INSERT TUNL CVC W/O PORT OVER 5 YR  1/22/2021    IR PLACE CVAD FLUORO GUIDE  1/18/2021     Family History   Problem Relation Age of Onset    Diabetes Mother     Heart Failure Father       Social History     Tobacco Use  Smoking status: Never Smoker    Smokeless tobacco: Never Used   Substance Use Topics    Alcohol use: Not Currently       Current Facility-Administered Medications   Medication Dose Route Frequency Provider Last Rate Last Admin    [START ON 9/10/2021] epoetin sotero-epbx (RETACRIT) injection 10,000 Units  10,000 Units IntraVENous Q MON, WED & Kayla Mann MD        esmolol (BREVIBLOC) 2,500 mg/250 mL (10 mg/mL) infusion  0-200 mcg/kg/min IntraVENous TITRATE Velia Primrose, Sami, MD   Stopped at 09/08/21 2119    sodium chloride (NS) flush 5-40 mL  5-40 mL IntraVENous Q8H Martha Santiago MD   10 mL at 09/08/21 2318    sodium chloride (NS) flush 5-40 mL  5-40 mL IntraVENous PRN Martha Santiago MD        acetaminophen (TYLENOL) tablet 650 mg  650 mg Oral Q6H PRN Martha Santiago MD        Or   Genevia Nares acetaminophen (TYLENOL) suppository 650 mg  650 mg Rectal Q6H PRN Martha Santiago MD        ondansetron (ZOFRAN ODT) tablet 4 mg  4 mg Oral Q8H PRN Martha Santiago MD        Or    ondansetron (ZOFRAN) injection 4 mg  4 mg IntraVENous Q6H PRN Martha Santiago MD        levoFLOXacin (LEVAQUIN) 750 mg in D5W IVPB  750 mg IntraVENous Q48H Martha Santiago  mL/hr at 09/08/21 2312 750 mg at 09/08/21 2312    heparin (porcine) injection 5,000 Units  5,000 Units SubCUTAneous Q12H Martha Santiago MD   5,000 Units at 09/08/21 2312    cefepime (MAXIPIME) 1 g in sterile water (preservative free) 10 mL IV syringe  1 g IntraVENous Q24H Martha Santiago MD   1 g at 09/08/21 2312     Current Outpatient Medications   Medication Sig Dispense Refill    dextran 70-hypromellose (Artificial Tears,[id number],) ophthalmic solution Administer 1 Drop to both eyes three (3) times daily as needed.  acyclovir (ZOVIRAX) 800 mg tablet Take 800 mg by mouth two (2) times a day.  hydrALAZINE (APRESOLINE) 100 mg tablet Take 0.5 Tabs by mouth three (3) times daily.  For systolic under 110 90 Tab 0    albuterol (PROVENTIL HFA, VENTOLIN HFA, PROAIR HFA) 90 mcg/actuation inhaler Take 2 Puffs by inhalation every four (4) hours as needed for Wheezing or Shortness of Breath. 1 Inhaler 0    metoprolol succinate (TOPROL-XL) 50 mg XL tablet Take 50 mg by mouth two (2) times a day.  calcitRIOL (ROCALTROL) 0.25 mcg capsule Take 0.25 mcg by mouth daily.  atorvastatin (LIPITOR) 40 mg tablet Take 40 mg by mouth daily. Review of Systems   Constitutional: Positive for chills and fever. HENT: Negative. Eyes: Negative. Respiratory: Negative. Cardiovascular: Negative. Gastrointestinal: Negative. Endocrine: Negative. Genitourinary: Negative. Musculoskeletal: Positive for arthralgias (left shoulder). Skin: Negative. Allergic/Immunologic: Negative. Neurological: Negative. Hematological: Negative. Psychiatric/Behavioral: Negative. Objective     Vital signs for last 24 hours:  Visit Vitals  BP 99/60   Pulse 74   Temp 97.6 °F (36.4 °C)   Resp 18   Ht 5' 10\" (1.778 m)   Wt 174 lb (78.9 kg)   SpO2 94%   BMI 24.97 kg/m²       Intake/Output this shift:  Current Shift: No intake/output data recorded. Last 3 Shifts: 09/07 1901 - 09/09 0700  In: 602.9 [I.V.:602.9]  Out: -     Data Review:   Recent Results (from the past 24 hour(s))   CBC WITH AUTOMATED DIFF    Collection Time: 09/08/21  4:30 PM   Result Value Ref Range    WBC 10.8 4.1 - 11.1 K/uL    RBC 4.22 4.10 - 5.70 M/uL    HGB 11.4 (L) 12.1 - 17.0 g/dL    HCT 36.2 (L) 36.6 - 50.3 %    MCV 85.8 80.0 - 99.0 FL    MCH 27.0 26.0 - 34.0 PG    MCHC 31.5 30.0 - 36.5 g/dL    RDW 19.6 (H) 11.5 - 14.5 %    PLATELET 341 386 - 746 K/uL    MPV 9.6 8.9 - 12.9 FL    NEUTROPHILS 91 (H) 32 - 75 %    LYMPHOCYTES 6 (L) 12 - 49 %    MONOCYTES 3 (L) 5 - 13 %    EOSINOPHILS 0 0 - 7 %    BASOPHILS 0 0 - 1 %    IMMATURE GRANULOCYTES 0 0.0 - 0.5 %    ABS. NEUTROPHILS 9.8 (H) 1.8 - 8.0 K/UL    ABS.  LYMPHOCYTES 0.7 (L) 0.8 - 3.5 K/UL    ABS. MONOCYTES 0.3 0.0 - 1.0 K/UL    ABS. EOSINOPHILS 0.0 0.0 - 0.4 K/UL    ABS. BASOPHILS 0.0 0.0 - 0.1 K/UL    ABS. IMM. GRANS. 0.0 0.00 - 0.04 K/UL    DF AUTOMATED     METABOLIC PANEL, COMPREHENSIVE    Collection Time: 09/08/21  4:30 PM   Result Value Ref Range    Sodium 135 (L) 136 - 145 mmol/L    Potassium 4.2 3.5 - 5.1 mmol/L    Chloride 101 97 - 108 mmol/L    CO2 22 21 - 32 mmol/L    Anion gap 12 5 - 15 mmol/L    Glucose 108 (H) 65 - 100 mg/dL    BUN 37 (H) 6 - 20 mg/dL    Creatinine 6.62 (H) 0.70 - 1.30 mg/dL    BUN/Creatinine ratio 6 (L) 12 - 20      GFR est AA 10 (L) >60 ml/min/1.73m2    GFR est non-AA 8 (L) >60 ml/min/1.73m2    Calcium 8.3 (L) 8.5 - 10.1 mg/dL    Bilirubin, total 0.6 0.2 - 1.0 mg/dL    AST (SGOT) 15 15 - 37 U/L    ALT (SGPT) 9 (L) 12 - 78 U/L    Alk. phosphatase 78 45 - 117 U/L    Protein, total 9.3 (H) 6.4 - 8.2 g/dL    Albumin 2.6 (L) 3.5 - 5.0 g/dL    Globulin 6.7 (H) 2.0 - 4.0 g/dL    A-G Ratio 0.4 (L) 1.1 - 2.2     TROPONIN I    Collection Time: 09/08/21  4:30 PM   Result Value Ref Range    Troponin-I, Qt. <0.05 <0.05 ng/mL   COVID-19 RAPID TEST    Collection Time: 09/08/21  4:30 PM   Result Value Ref Range    Specimen source Please find results under separate order     MAGNESIUM    Collection Time: 09/08/21  4:30 PM   Result Value Ref Range    Magnesium 1.9 1.6 - 2.4 mg/dL   PHOSPHORUS    Collection Time: 09/08/21  4:30 PM   Result Value Ref Range    Phosphorus 3.7 2.6 - 4.7 mg/dL   CULTURE, BLOOD, PAIRED    Collection Time: 09/08/21  4:30 PM    Specimen: Blood   Result Value Ref Range    Special Requests: No Special Requests      Culture result:        One of four bottles has been flagged positive by instrument. Bottle has been sent to [facility] laboratory to assess for possible growth.  Gram Negative Rods CALLED TO AND READ BACK BY TIFFANIE FUNES R.N. [id number] 09/09/2021 BY JAVED   LACTIC ACID    Collection Time: 09/08/21  4:30 PM   Result Value Ref Range    Lactic acid 2.4 (HH) 0.4 - 2.0 mmol/L   SARS-COV-2    Collection Time: 09/08/21  4:30 PM   Result Value Ref Range    SARS-CoV-2 Not Detected Not Detected     EKG, 12 LEAD, INITIAL    Collection Time: 09/08/21  4:34 PM   Result Value Ref Range    Ventricular Rate 164 BPM    Atrial Rate 202 BPM    QRS Duration 92 ms    Q-T Interval 294 ms    QTC Calculation (Bezet) 485 ms    Calculated R Axis -54 degrees    Calculated T Axis 108 degrees    Diagnosis       Supraventricular tachycardia  Left axis deviation  Incomplete right bundle branch block  Inferior infarct , age undetermined  Anterior infarct (cited on or before 16-JAN-2021)  ST & T wave abnormality, consider lateral ischemia  Abnormal ECG  When compared with ECG of 07-JUL-2021 18:08,  Vent.  rate has increased BY  80 BPM  Incomplete right bundle branch block is now Present  Inferior infarct is now Present  Questionable change in initial forces of Septal leads  Confirmed by Kameron Saleem ([id number]) on 9/8/2021 4:48:50 PM     EKG, 12 LEAD, INITIAL    Collection Time: 09/08/21  5:20 PM   Result Value Ref Range    Ventricular Rate 164 BPM    Atrial Rate 159 BPM    QRS Duration 96 ms    Q-T Interval 292 ms    QTC Calculation (Bezet) 482 ms    Calculated R Axis -62 degrees    Calculated T Axis 98 degrees    Diagnosis       Supraventricular tachycardia  Left axis deviation  Inferior infarct (cited on or before 16-JAN-2021)  Anteroseptal infarct (cited on or before 16-JAN-2021)  ST & T wave abnormality, consider lateral ischemia  Abnormal ECG  When compared with ECG of 08-SEP-2021 16:34,  No significant change was found    Confirmed by Hiren Lucio ([id number]) on 9/8/2021 9:35:08 PM     EKG, 12 LEAD, INITIAL    Collection Time: 09/08/21  5:31 PM   Result Value Ref Range    Ventricular Rate 163 BPM    Atrial Rate 178 BPM    QRS Duration 88 ms    Q-T Interval 298 ms    QTC Calculation (Bezet) 490 ms    Calculated R Axis -58 degrees    Calculated T Axis 96 degrees    Diagnosis       Supraventricular tachycardia  Left axis deviation  Inferior infarct (cited on or before 16-JAN-2021)  Anteroseptal infarct (cited on or before 16-JAN-2021)  Marked ST abnormality, possible lateral subendocardial injury  Abnormal ECG  When compared with ECG of 08-SEP-2021 17:20,  No significant change was found  Confirmed by Mathew Gastelum ([id number]) on 9/9/2021 10:38:51 AM     TROPONIN I    Collection Time: 09/08/21  6:15 PM   Result Value Ref Range    Troponin-I, Qt. 0.09 (H) <0.05 ng/mL   EKG, 12 LEAD, SUBSEQUENT    Collection Time: 09/08/21  6:22 PM   Result Value Ref Range    Ventricular Rate 97 BPM    Atrial Rate 97 BPM    P-R Interval 156 ms    QRS Duration 84 ms    Q-T Interval 364 ms    QTC Calculation (Bezet) 462 ms    Calculated P Axis 65 degrees    Calculated R Axis -38 degrees    Calculated T Axis 72 degrees    Diagnosis       Normal sinus rhythm  Left axis deviation  Anteroseptal infarct (cited on or before 16-JAN-2021)  Abnormal ECG  When compared with ECG of 08-SEP-2021 17:36,  Vent. rate has decreased BY  68 BPM  ST no longer depressed in Anterior leads  T wave inversion no longer evident in Lateral leads  Confirmed by Mathew Gastelum ([id number]) on 9/9/2021 04:52:22 AM     METABOLIC PANEL, COMPREHENSIVE    Collection Time: 09/09/21  3:00 AM   Result Value Ref Range    Sodium 135 (L) 136 - 145 mmol/L    Potassium 4.8 3.5 - 5.1 mmol/L    Chloride 104 97 - 108 mmol/L    CO2 22 21 - 32 mmol/L    Anion gap 9 5 - 15 mmol/L    Glucose 122 (H) 65 - 100 mg/dL    BUN 39 (H) 6 - 20 mg/dL    Creatinine 6.92 (H) 0.70 - 1.30 mg/dL    BUN/Creatinine ratio 6 (L) 12 - 20      GFR est AA 10 (L) >60 ml/min/1.73m2    GFR est non-AA 8 (L) >60 ml/min/1.73m2    Calcium 8.4 (L) 8.5 - 10.1 mg/dL    Bilirubin, total 0.6 0.2 - 1.0 mg/dL    AST (SGOT) 49 (H) 15 - 37 U/L    ALT (SGPT) 19 12 - 78 U/L    Alk.  phosphatase 105 45 - 117 U/L    Protein, total 8.5 (H) 6.4 - 8.2 g/dL    Albumin 2.4 (L) 3.5 - 5.0 g/dL    Globulin 6.1 (H) 2.0 - 4.0 g/dL    A-G Ratio 0.4 (L) 1.1 - 2.2     MAGNESIUM    Collection Time: 09/09/21  3:00 AM   Result Value Ref Range    Magnesium 2.0 1.6 - 2.4 mg/dL   PHOSPHORUS    Collection Time: 09/09/21  3:00 AM   Result Value Ref Range    Phosphorus 3.8 2.6 - 4.7 mg/dL   CBC WITH AUTOMATED DIFF    Collection Time: 09/09/21  3:00 AM   Result Value Ref Range    WBC 19.6 (H) 4.1 - 11.1 K/uL    RBC 3.58 (L) 4.10 - 5.70 M/uL    HGB 9.7 (L) 12.1 - 17.0 g/dL    HCT 31.1 (L) 36.6 - 50.3 %    MCV 86.9 80.0 - 99.0 FL    MCH 27.1 26.0 - 34.0 PG    MCHC 31.2 30.0 - 36.5 g/dL    RDW 19.8 (H) 11.5 - 14.5 %    PLATELET 102 349 - 289 K/uL    MPV 10.6 8.9 - 12.9 FL    NRBC 0.2 (H) 0.0  WBC    ABSOLUTE NRBC 0.03 (H) 0.00 - 0.01 K/uL    NEUTROPHILS 83 (H) 32 - 75 %    LYMPHOCYTES 7 (L) 12 - 49 %    MONOCYTES 9 5 - 13 %    EOSINOPHILS 0 0 - 7 %    BASOPHILS 0 0 - 1 %    IMMATURE GRANULOCYTES 1 (H) 0 - 0.5 %    ABS. NEUTROPHILS 16.3 (H) 1.8 - 8.0 K/UL    ABS. LYMPHOCYTES 1.4 0.8 - 3.5 K/UL    ABS. MONOCYTES 1.7 (H) 0.0 - 1.0 K/UL    ABS. EOSINOPHILS 0.0 0.0 - 0.4 K/UL    ABS. BASOPHILS 0.0 0.0 - 0.1 K/UL    ABS. IMM. GRANS. 0.2 (H) 0.00 - 0.04 K/UL    DF AUTOMATED     LACTIC ACID    Collection Time: 09/09/21  3:00 AM   Result Value Ref Range    Lactic acid 1.3 0.4 - 2.0 mmol/L   BLOOD GAS, ARTERIAL    Collection Time: 09/09/21  6:22 AM   Result Value Ref Range    pH 7.44 7.35 - 7.45      PCO2 33 (L) 35 - 45 mmHg    PO2 113 (H) 75 - 100 mmHg    O2 SAT 99 >95 %    BICARBONATE 24 22 - 26 mmol/L    BASE DEFICIT 0.9 0 - 2 mmol/L    O2 METHOD CPAP      FIO2 25 %    EPAP/CPAP/PEEP 11      Sample source Arterial      SITE Left Radial      BALTA'S TEST Positive         Physical Exam  Constitutional:       Appearance: He is ill-appearing. HENT:      Head: Normocephalic and atraumatic. Right Ear: External ear normal.      Left Ear: External ear normal.      Nose: Nose normal.      Mouth/Throat:      Pharynx: Oropharynx is clear.    Eyes:      Pupils: Pupils are equal, round, and reactive to light. Cardiovascular:      Rate and Rhythm: Normal rate and regular rhythm. Heart sounds: No murmur heard. Comments: Right sided Permacath site unremarkable  Pulmonary:      Effort: Pulmonary effort is normal.      Breath sounds: Normal breath sounds. Abdominal:      General: Bowel sounds are normal.      Palpations: Abdomen is soft. Tenderness: There is no abdominal tenderness. There is no right CVA tenderness or left CVA tenderness. Genitourinary:     Comments: No Pleitez  Musculoskeletal:      Cervical back: Neck supple. Right lower leg: No edema. Left lower leg: No edema. Skin:     Findings: No erythema, lesion or rash. Neurological:      General: No focal deficit present. Mental Status: He is alert and oriented to person, place, and time. Psychiatric:         Mood and Affect: Mood normal.         Behavior: Behavior normal.         Thought Content: Thought content normal.         Judgment: Judgment normal.       ASSESSMENT/PLAN    1. Gram-negative bacteremia, source unclear at this point, possibly Permacath related  2. Sepsis with fever, tachycardia and leukocytosis with elevated lactic acid  3. ESRD on HD  4. Left shoulder pain    1. Continue IV Cefepime 1 g every 24 hours,  pending susceptibility results  2. Discontinue Levaquin since pneumonia appears unlikely  3. In am, repeat CBC, check procal and CRP, repeat blood cultures  4. Tylenol prn pain (already ordered)  5. Follow-up blood cultures  6. Urinalysis with reflex culture if specimen becomes available    Keon Russell MD

## 2021-09-09 NOTE — ED NOTES
Dr. Quique Cortes informed about blood pressure increasing to 84/44 with 250mls bolus and ordered N/S 0.9 % at 100mls for 500mls, and cpap for pt due to saturations dropping when falling asleep.

## 2021-09-09 NOTE — H&P
History and Physical              Subjective :   Chief Complaint : From dialysis center due to fever and decreased mental status    Source of information : Patient, ED provider, medical records. History of present illness:   72 y.o. male with a history of cardiomyopathy, end-stage renal disease on dialysis, diabetes mellitus is brought to the emergency room from dialysis unit as he found very lethargic and tired. He is noted to have a temperature in the dialysis unit. Did not have any nausea or vomiting. No significant shortness of breath. Evaluation in the emergency room suggestive of sepsis syndrome, source is unclear at this time suspected of pneumonia. He is found with sinus tachycardia that is not improved with hydration, was given metoprolol with no improvement. Then started on esmolol IV after given adenosine without any success. He was at the stand-alone ER, transferred to the current ER here. He is started on esmolol drip before turning here, as patient needed to move was held for few minutes. Heart rate was stable less than 100 so it was discontinued. Also laboratory data found with some ST segment depression during the severe episode of SVT. Slightly elevated troponin suspect    Non-ST elevation MI.     Past Medical History:   Diagnosis Date    Asthenia 1/24/2021    Cardiomyopathy [facility]) 1/24/2021    CHF (congestive heart failure) (HCC)     Chronic kidney disease     CKD (chronic kidney disease)     4    Diabetes ([address]     End stage renal disease on dialysis ([address] 1/24/2021    Essential hypertension 1/24/2021    Gastroesophageal reflux disease 1/24/2021    Gastroesophageal reflux disease 1/24/2021    Hypertension     Pneumonia due to COVID-19 virus 1/58/0489    Systolic CHF, acute on chronic ([address] 1/24/2021     Past Surgical History:   Procedure Laterality Date    COLONOSCOPY N/A 12/3/2020    COLONOSCOPY performed by Radha Zayas MD at [address] [place] [address]  CATHETERIZATION      HX HERNIA REPAIR      IR FLUORO GUIDE [facility] CVAD  1/22/2021    IR INSERT NON TUNL CVC OVER 5 YRS  1/18/2021    IR INSERT TUNL CVC W/O PORT OVER 5 YR  1/22/2021    IR PLACE CVAD FLUORO GUIDE  1/18/2021     Family History   Problem Relation Age of Onset    Diabetes Mother     Heart Failure Father       Social History     Tobacco Use    Smoking status: Never Smoker    Smokeless tobacco: Never Used   Substance Use Topics    Alcohol use: Not Currently       Prior to Admission medications    Medication Sig Start Date End Date Taking? Authorizing Provider   dextran 70-hypromellose (Artificial Tears,[id number],) ophthalmic solution Administer 1 Drop to both eyes three (3) times daily as needed. Provider, Historical   acyclovir (ZOVIRAX) 800 mg tablet Take 800 mg by mouth two (2) times a day. Other, MD Farhan   hydrALAZINE (APRESOLINE) 100 mg tablet Take 0.5 Tabs by mouth three (3) times daily. For systolic under 613 5/98/71   Bud Shaikh MD   albuterol (PROVENTIL HFA, VENTOLIN HFA, PROAIR HFA) 90 mcg/actuation inhaler Take 2 Puffs by inhalation every four (4) hours as needed for Wheezing or Shortness of Breath. 1/24/21   Jeremie BAEZA MD   metoprolol succinate (TOPROL-XL) 50 mg XL tablet Take 50 mg by mouth two (2) times a day. Provider, Historical   calcitRIOL (ROCALTROL) 0.25 mcg capsule Take 0.25 mcg by mouth daily. Provider, Historical   atorvastatin (LIPITOR) 40 mg tablet Take 40 mg by mouth daily. Provider, Historical     Allergies   Allergen Reactions    Lisinopril Angioedema    Pcn [Penicillins] Rash             Review of Systems:  Constitutional: States appetite is not good, admits weight loss. Admits fatigue and weakness. He is found with a temperature of 101.3. Eye: No recent visual disturbances, no discharge, no double vision.   Ear/nose/mouth/throat : No hearing disturbance, no ear pain, no nasal congestion, no sore throat, no trouble swallowing. Respiratory : No trouble breathing, no cough, +++ shortness of breath, no hemoptysis, no wheezing. Cardiovascular : Denies any chest pain, palpitations. But he is found with the supraventricular tachycardia sinus rhythm. Gastrointestinal : No nausea, no vomiting, admits diarrhea. Genitourinary : He makes very little urine. Lymphatics : He is unable to answer. Endocrine : No excessive thirst, No polyuria No cold intolerance, No heat intolerance. Immunologic : He is unable to answer. Musculoskeletal : Generalized weakness with muscle aches. No specific joint swelling or pain. Integumentary : History of shingles, no issues at this time. .  Hematology : No petechiae, No easy bruising,  No tendency to bleed easy. Neurology : He is with altered mental status and very lethargic and emergency room. [facility] : No mood swings, No anxiety, No depression. Vitals:     Patient Vitals for the past 12 hrs:   Temp Pulse Resp BP SpO2   09/08/21 2203  86 16 (!) 87/36 99 %   09/08/21 2155  86 24 (!) 75/28 97 %   09/08/21 2118  95  (!) 102/33 99 %   09/08/21 2105  93 16 (!) 85/40 95 %   09/08/21 2037 99.2 °F (37.3 °C) 97 23 (!) 93/45 97 %   09/08/21 1944  98 18 99/61 96 %   09/08/21 1910  99  102/64 98 %   09/08/21 1857  (!) 151  111/75 96 %   09/08/21 1841  99  (!) 106/58    09/08/21 1834 (!) 100.6 °F (38.1 °C) 100  107/68 98 %   09/08/21 1818  96  113/75 96 %   09/08/21 1806 (!) 101.1 °F (38.4 °C) (!) 147  120/77 98 %   09/08/21 1803  (!) 148      09/08/21 1759  (!) 149  119/82    09/08/21 1747  (!) 153  130/81    09/08/21 1742  (!) 155  129/85    09/08/21 1734 (!) 102.3 °F (39.1 °C) (!) 163  128/80    09/08/21 1731  (!) 164  113/76    09/08/21 1641  (!) 160 16  98 %   09/08/21 1621 (!) 103 °F (39.4 °C) (!) 120 18 128/82 98 %       Physical Exam:   General : Looks very weak, tired, appears sick. [place]   HEENT : PERRLA, dry oral mucosa, atraumatic normocephalic, Normal ear and nose. Neck : Supple, no JVD, no masses noted, no carotid bruit. Lungs : Breath sounds with moderate air entry bilaterally, no wheezes or rales, no accessory muscle use. CVS : Rhythm rate regular, S1+, S2+, no murmur or gallop. Sinus tachycardia before as mentioned, but by the time I seen the heart rate well controlled. Abdomen : Soft, nontender,  bowel sounds active. Extremities : No edema noted,  pedal pulses palpable. Musculoskeletal : Decreased range of motion, no joint swelling or effusion, muscle tone and power appears decreased. Skin : Dry, poor skin turgor. Lymphatic : No cervical lymphadenopathy. Neurological : Awake, alert, oriented to time place person. No neurological deficits. Psychiatric : Mood and affect appears appropriate to the situation. Data Review:   Recent Results (from the past 24 hour(s))   CBC WITH AUTOMATED DIFF    Collection Time: 09/08/21  4:30 PM   Result Value Ref Range    WBC 10.8 4.1 - 11.1 K/uL    RBC 4.22 4.10 - 5.70 M/uL    HGB 11.4 (L) 12.1 - 17.0 g/dL    HCT 36.2 (L) 36.6 - 50.3 %    MCV 85.8 80.0 - 99.0 FL    MCH 27.0 26.0 - 34.0 PG    MCHC 31.5 30.0 - 36.5 g/dL    RDW 19.6 (H) 11.5 - 14.5 %    PLATELET 731 955 - 728 K/uL    MPV 9.6 8.9 - 12.9 FL    NEUTROPHILS 91 (H) 32 - 75 %    LYMPHOCYTES 6 (L) 12 - 49 %    MONOCYTES 3 (L) 5 - 13 %    EOSINOPHILS 0 0 - 7 %    BASOPHILS 0 0 - 1 %    IMMATURE GRANULOCYTES 0 0.0 - 0.5 %    ABS. NEUTROPHILS 9.8 (H) 1.8 - 8.0 K/UL    ABS. LYMPHOCYTES 0.7 (L) 0.8 - 3.5 K/UL    ABS. MONOCYTES 0.3 0.0 - 1.0 K/UL    ABS. EOSINOPHILS 0.0 0.0 - 0.4 K/UL    ABS. BASOPHILS 0.0 0.0 - 0.1 K/UL    ABS. IMM.  GRANS. 0.0 0.00 - 0.04 K/UL    DF AUTOMATED     METABOLIC PANEL, COMPREHENSIVE    Collection Time: 09/08/21  4:30 PM   Result Value Ref Range    Sodium 135 (L) 136 - 145 mmol/L    Potassium 4.2 3.5 - 5.1 mmol/L    Chloride 101 97 - 108 mmol/L    CO2 22 21 - 32 mmol/L    Anion gap 12 5 - 15 mmol/L    Glucose 108 (H) 65 - 100 mg/dL    BUN 37 (H) 6 - 20 mg/dL    Creatinine 6.62 (H) 0.70 - 1.30 mg/dL    BUN/Creatinine ratio 6 (L) 12 - 20      GFR est AA 10 (L) >60 ml/min/1.73m2    GFR est non-AA 8 (L) >60 ml/min/1.73m2    Calcium 8.3 (L) 8.5 - 10.1 mg/dL    Bilirubin, total 0.6 0.2 - 1.0 mg/dL    AST (SGOT) 15 15 - 37 U/L    ALT (SGPT) 9 (L) 12 - 78 U/L    Alk. phosphatase 78 45 - 117 U/L    Protein, total 9.3 (H) 6.4 - 8.2 g/dL    Albumin 2.6 (L) 3.5 - 5.0 g/dL    Globulin 6.7 (H) 2.0 - 4.0 g/dL    A-G Ratio 0.4 (L) 1.1 - 2.2     TROPONIN I    Collection Time: 09/08/21  4:30 PM   Result Value Ref Range    Troponin-I, Qt. <0.05 <0.05 ng/mL   COVID-19 RAPID TEST    Collection Time: 09/08/21  4:30 PM   Result Value Ref Range    Specimen source Please find results under separate order     MAGNESIUM    Collection Time: 09/08/21  4:30 PM   Result Value Ref Range    Magnesium 1.9 1.6 - 2.4 mg/dL   PHOSPHORUS    Collection Time: 09/08/21  4:30 PM   Result Value Ref Range    Phosphorus 3.7 2.6 - 4.7 mg/dL   LACTIC ACID    Collection Time: 09/08/21  4:30 PM   Result Value Ref Range    Lactic acid 2.4 (HH) 0.4 - 2.0 mmol/L   SARS-COV-2    Collection Time: 09/08/21  4:30 PM   Result Value Ref Range    SARS-CoV-2 Not Detected Not Detected     EKG, 12 LEAD, INITIAL    Collection Time: 09/08/21  4:34 PM   Result Value Ref Range    Ventricular Rate 164 BPM    Atrial Rate 202 BPM    QRS Duration 92 ms    Q-T Interval 294 ms    QTC Calculation (Bezet) 485 ms    Calculated R Axis -54 degrees    Calculated T Axis 108 degrees    Diagnosis       Supraventricular tachycardia  Left axis deviation  Incomplete right bundle branch block  Inferior infarct , age undetermined  Anterior infarct (cited on or before 16-CHICA-2021)  ST & T wave abnormality, consider lateral ischemia  Abnormal ECG  When compared with ECG of 07-JUL-2021 18:08,  Vent.  rate has increased BY  80 BPM  Incomplete right bundle branch block is now Present  Inferior infarct is now Present  Questionable change in initial forces of Septal leads  Confirmed by Radha Murphy ([id number]) on 9/8/2021 4:48:50 PM     EKG, 12 LEAD, INITIAL    Collection Time: 09/08/21  5:20 PM   Result Value Ref Range    Ventricular Rate 164 BPM    Atrial Rate 159 BPM    QRS Duration 96 ms    Q-T Interval 292 ms    QTC Calculation (Bezet) 482 ms    Calculated R Axis -62 degrees    Calculated T Axis 98 degrees    Diagnosis       Supraventricular tachycardia  Left axis deviation  Inferior infarct (cited on or before 16-JAN-2021)  Anteroseptal infarct (cited on or before 16-JAN-2021)  ST & T wave abnormality, consider lateral ischemia  Abnormal ECG  When compared with ECG of 08-SEP-2021 16:34,  No significant change was found    Confirmed by Tera Vallejo ([id number]) on 9/8/2021 9:35:08 PM     TROPONIN I    Collection Time: 09/08/21  6:15 PM   Result Value Ref Range    Troponin-I, Qt. 0.09 (H) <0.05 ng/mL       Radiologic Studies :   CT Results  (Last 48 hours)    None        CXR Results  (Last 48 hours)               09/08/21 1645  XR CHEST PORT Final result    Narrative:  Chest single view. Comparison single view chest July 7, 2021. Right-sided permacath. Similar appearance for the lungs;   no gross interstitial or alveolar pulmonary edema. Cardiac and mediastinal   structures magnified on this AP view. No pneumothorax or sizable pleural   effusion. Assessment and Plan :     Sepsis syndrome: Source unclear, suspected pneumonia but has no respiratory symptoms that are significant. Started on broad-spectrum antibiotic, need to look for other etiology    End-stage renal disease, on hemodialysis, requested consultation from nephrology. Chronic obstructive pulmonary disease, on as needed albuterol inhaler, no evidence of recent exacerbation.     Anemia secondary to chronic kidney disease/end-stage renal disease: Hemoglobin low but acceptable, on regular follow-up with nephrology with hemopoietic factors. Full CODE STATUS, admitted to medical telemetry, home medications reviewed with external Rx history. Unable to verify. Has no advance medical directives. CC : Stephen Christensen MD  Signed By: Breana Tracey MD     September 8, 2021      This dictation was done by dragon, computer voice recognition software. Often unanticipated grammatical, syntax, [place] phones and other interpretive errors are inadvertently transcribed. Please excuse errors that have escaped final proofreading.

## 2021-09-09 NOTE — ED NOTES
0120- /33 with HR 76. Dr. Quique Cortes gave verbal orders for no levophed at this time. 6715- patient placed on waffle.

## 2021-09-09 NOTE — PROGRESS NOTES
Hospitalist Progress Note         Karina Kim MD          Daily Progress Note: 9/9/2021      Subjective: The patient is seen for follow  up.  55-year-old gentleman with history of end-stage renal disease on hemodialysis Monday Wednesday Friday, cardiomyopathy with prior EF of 15% admitted for sepsis. Empirically started on IV antibiotics. Patient seen in follow-up. Offers no complaints. Heart rate seems improved this morning.     Problem List:  Problem List as of 9/9/2021 Date Reviewed: 1/24/2021        Codes Class Noted - Resolved    Herpes zoster with nervous system complication [id number]: F76.41  ICD-9-CM: 053.10  7/8/2021 - Present        Sepsis ([facility] [address] [facility] ICD-10-CM: A41.9  ICD-9-CM: 038.9, 995.91  7/7/2021 - Present        ESRD (end stage renal disease) on dialysis [facility]) ICD-10-CM: N18.6, Z99.2  ICD-9-CM: 585.6, V45.11  7/7/2021 - Present        Line sepsis [facility]) ICD-10-CM: T85.79XA, A41.9  ICD-9-CM: 996.62, 038.9, 995.91  7/7/2021 - Present        End stage renal disease on dialysis [facility]) ICD-10-CM: N18.6, Z99.2  ICD-9-CM: 585.6, V45.11  1/24/2021 - Present        Pneumonia due to COVID-19 virus ICD-10-CM: U07.1, J12.82  ICD-9-CM: 480.8, 079.89  1/24/2021 - Present        Essential hypertension ICD-10-CM: I10  ICD-9-CM: 401.9  1/24/2021 - Present        Cardiomyopathy ([facility] (Chronic) ICD-10-CM: I42.9  ICD-9-CM: 425.4  1/24/2021 - Present        Systolic CHF, acute on chronic (HCC) ICD-10-CM: I50.23  ICD-9-CM: 428.23, 428.0  1/24/2021 - Present        Fluid overload ICD-10-CM: E87.70  ICD-9-CM: 276.69  1/24/2021 - Present        Gastroesophageal reflux disease ICD-10-CM: K21.9  ICD-9-CM: 530.81  1/24/2021 - Present        Asthenia ICD-10-CM: R53.1  ICD-9-CM: 780.79  1/24/2021 - Present        ATN (acute tubular necrosis) (HCC) ICD-10-CM: N17.0  ICD-9-CM: 584.5  1/16/2021 - Present        RESOLVED: Acute hypoxemic respiratory failure (HCC) ICD-10-CM: J96.01  ICD-9-CM: 518.81 1/24/2021 - 1/24/2021              Medications reviewed  Current Facility-Administered Medications   Medication Dose Route Frequency    [START ON 9/10/2021] epoetin sotero-epbx (RETACRIT) injection 10,000 Units  10,000 Units IntraVENous Q MON, WED & FRI    esmolol (BREVIBLOC) 2,500 mg/250 mL (10 mg/mL) infusion  0-200 mcg/kg/min IntraVENous TITRATE    sodium chloride (NS) flush 5-40 mL  5-40 mL IntraVENous Q8H    sodium chloride (NS) flush 5-40 mL  5-40 mL IntraVENous PRN    acetaminophen (TYLENOL) tablet 650 mg  650 mg Oral Q6H PRN    Or    acetaminophen (TYLENOL) suppository 650 mg  650 mg Rectal Q6H PRN    ondansetron (ZOFRAN ODT) tablet 4 mg  4 mg Oral Q8H PRN    Or    ondansetron (ZOFRAN) injection 4 mg  4 mg IntraVENous Q6H PRN    heparin (porcine) injection 5,000 Units  5,000 Units SubCUTAneous Q12H    cefepime (MAXIPIME) 1 g in sterile water (preservative free) 10 mL IV syringe  1 g IntraVENous Q24H     Current Outpatient Medications   Medication Sig    dextran 70-hypromellose (Artificial Tears,[id number],) ophthalmic solution Administer 1 Drop to both eyes three (3) times daily as needed.  acyclovir (ZOVIRAX) 800 mg tablet Take 800 mg by mouth two (2) times a day.  hydrALAZINE (APRESOLINE) 100 mg tablet Take 0.5 Tabs by mouth three (3) times daily. For systolic under 519    albuterol (PROVENTIL HFA, VENTOLIN HFA, PROAIR HFA) 90 mcg/actuation inhaler Take 2 Puffs by inhalation every four (4) hours as needed for Wheezing or Shortness of Breath.  metoprolol succinate (TOPROL-XL) 50 mg XL tablet Take 50 mg by mouth two (2) times a day.  calcitRIOL (ROCALTROL) 0.25 mcg capsule Take 0.25 mcg by mouth daily.  atorvastatin (LIPITOR) 40 mg tablet Take 40 mg by mouth daily. Review of Systems:   A comprehensive review of systems was negative except for that written in the HPI.     Objective:   Physical Exam:     Visit Vitals  BP 99/60   Pulse 74   Temp 97.6 °F (36.4 °C)   Resp 18   Ht 5' 10\" (1.778 m)   Wt 78.9 kg (174 lb)   SpO2 94%   BMI 24.97 kg/m²      O2 Device: None (Room air)    Temp (24hrs), Av.6 °F (38.1 °C), Min:97.6 °F (36.4 °C), Max:103 °F (39.4 °C)    No intake/output data recorded.  1901 -  0700  In: 602.9 [I.V.:602.9]  Out: -     General:  Alert, cooperative, no distress, appears stated age. Lungs:   Clear to auscultation bilaterally. Chest wall:  No tenderness or deformity. Heart:  Regular rate and rhythm, S1, S2 normal, no murmur, click, rub or gallop. Abdomen:   Soft, non-tender. Bowel sounds normal. No masses,  No organomegaly. Extremities: Extremities normal, atraumatic, no cyanosis or edema. Pulses: 2+ and symmetric all extremities. Skin: Skin color, texture, turgor normal. No rashes or lesions   Neurologic: CNII-XII intact. No gross sensory or motor deficits     Data Review:       Recent Days:  Recent Labs     21  0300 21  1630   WBC 19.6* 10.8   HGB 9.7* 11.4*   HCT 31.1* 36.2*    322     Recent Labs     21  0300 21  1630   * 135*   K 4.8 4.2    101   CO2 22 22   * 108*   BUN 39* 37*   CREA 6.92* 6.62*   CA 8.4* 8.3*   MG 2.0 1.9   PHOS 3.8 3.7   ALB 2.4* 2.6*   TBILI 0.6 0.6   ALT 19 9*     Recent Labs     21  0622   PH 7.44   PCO2 33*   PO2 113*   HCO3 24   FIO2 25       24 Hour Results:  Recent Results (from the past 24 hour(s))   CBC WITH AUTOMATED DIFF    Collection Time: 21  4:30 PM   Result Value Ref Range    WBC 10.8 4.1 - 11.1 K/uL    RBC 4.22 4.10 - 5.70 M/uL    HGB 11.4 (L) 12.1 - 17.0 g/dL    HCT 36.2 (L) 36.6 - 50.3 %    MCV 85.8 80.0 - 99.0 FL    MCH 27.0 26.0 - 34.0 PG    MCHC 31.5 30.0 - 36.5 g/dL    RDW 19.6 (H) 11.5 - 14.5 %    PLATELET 184 358 - 478 K/uL    MPV 9.6 8.9 - 12.9 FL    NEUTROPHILS 91 (H) 32 - 75 %    LYMPHOCYTES 6 (L) 12 - 49 %    MONOCYTES 3 (L) 5 - 13 %    EOSINOPHILS 0 0 - 7 %    BASOPHILS 0 0 - 1 %    IMMATURE GRANULOCYTES 0 0.0 - 0.5 %    ABS. NEUTROPHILS 9.8 (H) 1.8 - 8.0 K/UL    ABS. LYMPHOCYTES 0.7 (L) 0.8 - 3.5 K/UL    ABS. MONOCYTES 0.3 0.0 - 1.0 K/UL    ABS. EOSINOPHILS 0.0 0.0 - 0.4 K/UL    ABS. BASOPHILS 0.0 0.0 - 0.1 K/UL    ABS. IMM. GRANS. 0.0 0.00 - 0.04 K/UL    DF AUTOMATED     METABOLIC PANEL, COMPREHENSIVE    Collection Time: 09/08/21  4:30 PM   Result Value Ref Range    Sodium 135 (L) 136 - 145 mmol/L    Potassium 4.2 3.5 - 5.1 mmol/L    Chloride 101 97 - 108 mmol/L    CO2 22 21 - 32 mmol/L    Anion gap 12 5 - 15 mmol/L    Glucose 108 (H) 65 - 100 mg/dL    BUN 37 (H) 6 - 20 mg/dL    Creatinine 6.62 (H) 0.70 - 1.30 mg/dL    BUN/Creatinine ratio 6 (L) 12 - 20      GFR est AA 10 (L) >60 ml/min/1.73m2    GFR est non-AA 8 (L) >60 ml/min/1.73m2    Calcium 8.3 (L) 8.5 - 10.1 mg/dL    Bilirubin, total 0.6 0.2 - 1.0 mg/dL    AST (SGOT) 15 15 - 37 U/L    ALT (SGPT) 9 (L) 12 - 78 U/L    Alk. phosphatase 78 45 - 117 U/L    Protein, total 9.3 (H) 6.4 - 8.2 g/dL    Albumin 2.6 (L) 3.5 - 5.0 g/dL    Globulin 6.7 (H) 2.0 - 4.0 g/dL    A-G Ratio 0.4 (L) 1.1 - 2.2     TROPONIN I    Collection Time: 09/08/21  4:30 PM   Result Value Ref Range    Troponin-I, Qt. <0.05 <0.05 ng/mL   COVID-19 RAPID TEST    Collection Time: 09/08/21  4:30 PM   Result Value Ref Range    Specimen source Please find results under separate order     MAGNESIUM    Collection Time: 09/08/21  4:30 PM   Result Value Ref Range    Magnesium 1.9 1.6 - 2.4 mg/dL   PHOSPHORUS    Collection Time: 09/08/21  4:30 PM   Result Value Ref Range    Phosphorus 3.7 2.6 - 4.7 mg/dL   CULTURE, BLOOD, PAIRED    Collection Time: 09/08/21  4:30 PM    Specimen: Blood   Result Value Ref Range    Special Requests: No Special Requests      Culture result:        One of four bottles has been flagged positive by instrument. Bottle has been sent to [facility] laboratory to assess for possible growth.  Gram Negative Rods CALLED TO AND READ BACK BY TIFFANIE FUNES R.N. [id number] 09/09/2021 BY DPW   LACTIC ACID    Collection Time: 09/08/21  4:30 PM   Result Value Ref Range    Lactic acid 2.4 (HH) 0.4 - 2.0 mmol/L   SARS-COV-2    Collection Time: 09/08/21  4:30 PM   Result Value Ref Range    SARS-CoV-2 Not Detected Not Detected     EKG, 12 LEAD, INITIAL    Collection Time: 09/08/21  4:34 PM   Result Value Ref Range    Ventricular Rate 164 BPM    Atrial Rate 202 BPM    QRS Duration 92 ms    Q-T Interval 294 ms    QTC Calculation (Bezet) 485 ms    Calculated R Axis -54 degrees    Calculated T Axis 108 degrees    Diagnosis       Supraventricular tachycardia  Left axis deviation  Incomplete right bundle branch block  Inferior infarct , age undetermined  Anterior infarct (cited on or before 16-JAN-2021)  ST & T wave abnormality, consider lateral ischemia  Abnormal ECG  When compared with ECG of 07-JUL-2021 18:08,  Vent.  rate has increased BY  80 BPM  Incomplete right bundle branch block is now Present  Inferior infarct is now Present  Questionable change in initial forces of Septal leads  Confirmed by Junious Bio ([id number]) on 9/8/2021 4:48:50 PM     EKG, 12 LEAD, INITIAL    Collection Time: 09/08/21  5:20 PM   Result Value Ref Range    Ventricular Rate 164 BPM    Atrial Rate 159 BPM    QRS Duration 96 ms    Q-T Interval 292 ms    QTC Calculation (Bezet) 482 ms    Calculated R Axis -62 degrees    Calculated T Axis 98 degrees    Diagnosis       Supraventricular tachycardia  Left axis deviation  Inferior infarct (cited on or before 16-JAN-2021)  Anteroseptal infarct (cited on or before 16-JAN-2021)  ST & T wave abnormality, consider lateral ischemia  Abnormal ECG  When compared with ECG of 08-SEP-2021 16:34,  No significant change was found    Confirmed by Lorraine Corley ([id number]) on 9/8/2021 9:35:08 PM     EKG, 12 LEAD, INITIAL    Collection Time: 09/08/21  5:31 PM   Result Value Ref Range    Ventricular Rate 163 BPM    Atrial Rate 178 BPM    QRS Duration 88 ms    Q-T Interval 298 ms    QTC Calculation (Bezet) 490 ms    Calculated R Axis -58 degrees    Calculated T Axis 96 degrees    Diagnosis       Supraventricular tachycardia  Left axis deviation  Inferior infarct (cited on or before 16-JAN-2021)  Anteroseptal infarct (cited on or before 16-JAN-2021)  Marked ST abnormality, possible lateral subendocardial injury  Abnormal ECG  When compared with ECG of 08-SEP-2021 17:20,  No significant change was found  Confirmed by Rosaura Goldstein ([id number]) on 9/9/2021 10:38:51 AM     TROPONIN I    Collection Time: 09/08/21  6:15 PM   Result Value Ref Range    Troponin-I, Qt. 0.09 (H) <0.05 ng/mL   EKG, 12 LEAD, SUBSEQUENT    Collection Time: 09/08/21  6:22 PM   Result Value Ref Range    Ventricular Rate 97 BPM    Atrial Rate 97 BPM    P-R Interval 156 ms    QRS Duration 84 ms    Q-T Interval 364 ms    QTC Calculation (Bezet) 462 ms    Calculated P Axis 65 degrees    Calculated R Axis -38 degrees    Calculated T Axis 72 degrees    Diagnosis       Normal sinus rhythm  Left axis deviation  Anteroseptal infarct (cited on or before 16-JAN-2021)  Abnormal ECG  When compared with ECG of 08-SEP-2021 17:36,  Vent. rate has decreased BY  68 BPM  ST no longer depressed in Anterior leads  T wave inversion no longer evident in Lateral leads  Confirmed by Rosaura Goldstein ([id number]) on 9/9/2021 44:53:80 AM     METABOLIC PANEL, COMPREHENSIVE    Collection Time: 09/09/21  3:00 AM   Result Value Ref Range    Sodium 135 (L) 136 - 145 mmol/L    Potassium 4.8 3.5 - 5.1 mmol/L    Chloride 104 97 - 108 mmol/L    CO2 22 21 - 32 mmol/L    Anion gap 9 5 - 15 mmol/L    Glucose 122 (H) 65 - 100 mg/dL    BUN 39 (H) 6 - 20 mg/dL    Creatinine 6.92 (H) 0.70 - 1.30 mg/dL    BUN/Creatinine ratio 6 (L) 12 - 20      GFR est AA 10 (L) >60 ml/min/1.73m2    GFR est non-AA 8 (L) >60 ml/min/1.73m2    Calcium 8.4 (L) 8.5 - 10.1 mg/dL    Bilirubin, total 0.6 0.2 - 1.0 mg/dL    AST (SGOT) 49 (H) 15 - 37 U/L    ALT (SGPT) 19 12 - 78 U/L    Alk.  phosphatase 105 45 - 117 U/L    Protein, total 8.5 (H) 6.4 - 8.2 g/dL Albumin 2.4 (L) 3.5 - 5.0 g/dL    Globulin 6.1 (H) 2.0 - 4.0 g/dL    A-G Ratio 0.4 (L) 1.1 - 2.2     MAGNESIUM    Collection Time: 09/09/21  3:00 AM   Result Value Ref Range    Magnesium 2.0 1.6 - 2.4 mg/dL   PHOSPHORUS    Collection Time: 09/09/21  3:00 AM   Result Value Ref Range    Phosphorus 3.8 2.6 - 4.7 mg/dL   CBC WITH AUTOMATED DIFF    Collection Time: 09/09/21  3:00 AM   Result Value Ref Range    WBC 19.6 (H) 4.1 - 11.1 K/uL    RBC 3.58 (L) 4.10 - 5.70 M/uL    HGB 9.7 (L) 12.1 - 17.0 g/dL    HCT 31.1 (L) 36.6 - 50.3 %    MCV 86.9 80.0 - 99.0 FL    MCH 27.1 26.0 - 34.0 PG    MCHC 31.2 30.0 - 36.5 g/dL    RDW 19.8 (H) 11.5 - 14.5 %    PLATELET 483 066 - 112 K/uL    MPV 10.6 8.9 - 12.9 FL    NRBC 0.2 (H) 0.0  WBC    ABSOLUTE NRBC 0.03 (H) 0.00 - 0.01 K/uL    NEUTROPHILS 83 (H) 32 - 75 %    LYMPHOCYTES 7 (L) 12 - 49 %    MONOCYTES 9 5 - 13 %    EOSINOPHILS 0 0 - 7 %    BASOPHILS 0 0 - 1 %    IMMATURE GRANULOCYTES 1 (H) 0 - 0.5 %    ABS. NEUTROPHILS 16.3 (H) 1.8 - 8.0 K/UL    ABS. LYMPHOCYTES 1.4 0.8 - 3.5 K/UL    ABS. MONOCYTES 1.7 (H) 0.0 - 1.0 K/UL    ABS. EOSINOPHILS 0.0 0.0 - 0.4 K/UL    ABS. BASOPHILS 0.0 0.0 - 0.1 K/UL    ABS. IMM. GRANS. 0.2 (H) 0.00 - 0.04 K/UL    DF AUTOMATED     LACTIC ACID    Collection Time: 09/09/21  3:00 AM   Result Value Ref Range    Lactic acid 1.3 0.4 - 2.0 mmol/L   BLOOD GAS, ARTERIAL    Collection Time: 09/09/21  6:22 AM   Result Value Ref Range    pH 7.44 7.35 - 7.45      PCO2 33 (L) 35 - 45 mmHg    PO2 113 (H) 75 - 100 mmHg    O2 SAT 99 >95 %    BICARBONATE 24 22 - 26 mmol/L    BASE DEFICIT 0.9 0 - 2 mmol/L    O2 METHOD CPAP      FIO2 25 %    EPAP/CPAP/PEEP 11      Sample source Arterial      SITE Left Radial      BALTA'S TEST Positive             Assessment/     Sepsis of unclear etiology.   Rule out acute bacteremia  End-stage renal disease on hemodialysis Monday Wednesday Friday  COPD without acute exacerbation  Anemia of chronic kidney disease    Plan:  Continue supportive care including empiric IV antibiotics  Follow-up blood cultures and sensitivities  Appreciate nephrology's input for electrolyte management  Monitor routine electrolytes      Care Plan discussed with: Patient/Family    Total time spent with patient: 30 minutes.     Billy Vega MD

## 2021-09-10 LAB
ALBUMIN SERPL-MCNC: 2.4 G/DL (ref 3.5–5)
ANION GAP SERPL CALC-SCNC: 10 MMOL/L (ref 5–15)
ATRIAL RATE: 99 BPM
BASOPHILS # BLD: 0 K/UL (ref 0–0.1)
BASOPHILS NFR BLD: 0 % (ref 0–1)
BUN SERPL-MCNC: 50 MG/DL (ref 6–20)
BUN/CREAT SERPL: 6 (ref 12–20)
CA-I BLD-MCNC: 8.8 MG/DL (ref 8.5–10.1)
CALCULATED P AXIS, ECG09: 66 DEGREES
CALCULATED R AXIS, ECG10: -24 DEGREES
CALCULATED T AXIS, ECG11: -10 DEGREES
CHLORIDE SERPL-SCNC: 102 MMOL/L (ref 97–108)
CO2 SERPL-SCNC: 21 MMOL/L (ref 21–32)
CREAT SERPL-MCNC: 8.42 MG/DL (ref 0.7–1.3)
CRP SERPL-MCNC: 17.1 MG/DL (ref 0–0.6)
DIAGNOSIS, 93000: NORMAL
DIFFERENTIAL METHOD BLD: ABNORMAL
EOSINOPHIL # BLD: 0.1 K/UL (ref 0–0.4)
EOSINOPHIL NFR BLD: 1 % (ref 0–7)
ERYTHROCYTE [DISTWIDTH] IN BLOOD BY AUTOMATED COUNT: 19.3 % (ref 11.5–14.5)
GLUCOSE BLD STRIP.AUTO-MCNC: 197 MG/DL (ref 65–117)
GLUCOSE SERPL-MCNC: 181 MG/DL (ref 65–100)
HCT VFR BLD AUTO: 33.8 % (ref 36.6–50.3)
HGB BLD-MCNC: 10.3 G/DL (ref 12.1–17)
IMM GRANULOCYTES # BLD AUTO: 0.1 K/UL (ref 0–0.04)
IMM GRANULOCYTES NFR BLD AUTO: 1 % (ref 0–0.5)
LYMPHOCYTES # BLD: 1.4 K/UL (ref 0.8–3.5)
LYMPHOCYTES NFR BLD: 12 % (ref 12–49)
MCH RBC QN AUTO: 26.1 PG (ref 26–34)
MCHC RBC AUTO-ENTMCNC: 30.5 G/DL (ref 30–36.5)
MCV RBC AUTO: 85.8 FL (ref 80–99)
MONOCYTES # BLD: 1 K/UL (ref 0–1)
MONOCYTES NFR BLD: 9 % (ref 5–13)
NEUTS SEG # BLD: 8.5 K/UL (ref 1.8–8)
NEUTS SEG NFR BLD: 77 % (ref 32–75)
NRBC # BLD: 0 K/UL (ref 0–0.01)
NRBC BLD-RTO: 0 PER 100 WBC
P-R INTERVAL, ECG05: 148 MS
PERFORMED BY, TECHID: ABNORMAL
PHOSPHATE SERPL-MCNC: 5 MG/DL (ref 2.6–4.7)
PLATELET # BLD AUTO: 326 K/UL (ref 150–400)
PMV BLD AUTO: 10.9 FL (ref 8.9–12.9)
POTASSIUM SERPL-SCNC: 5.1 MMOL/L (ref 3.5–5.1)
PROCALCITONIN SERPL-MCNC: 28.33 NG/ML
Q-T INTERVAL, ECG07: 372 MS
QRS DURATION, ECG06: 96 MS
QTC CALCULATION (BEZET), ECG08: 477 MS
RBC # BLD AUTO: 3.94 M/UL (ref 4.1–5.7)
SODIUM SERPL-SCNC: 133 MMOL/L (ref 136–145)
VANCOMYCIN SERPL-MCNC: 8.7 UG/ML
VENTRICULAR RATE, ECG03: 99 BPM
WBC # BLD AUTO: 11.1 K/UL (ref 4.1–11.1)

## 2021-09-10 PROCEDURE — 74011250636 HC RX REV CODE- 250/636: Performed by: INTERNAL MEDICINE

## 2021-09-10 PROCEDURE — 99232 SBSQ HOSP IP/OBS MODERATE 35: CPT | Performed by: INTERNAL MEDICINE

## 2021-09-10 PROCEDURE — 87040 BLOOD CULTURE FOR BACTERIA: CPT

## 2021-09-10 PROCEDURE — 86140 C-REACTIVE PROTEIN: CPT

## 2021-09-10 PROCEDURE — 74011250637 HC RX REV CODE- 250/637: Performed by: HOSPITALIST

## 2021-09-10 PROCEDURE — 82962 GLUCOSE BLOOD TEST: CPT

## 2021-09-10 PROCEDURE — 5A1D70Z PERFORMANCE OF URINARY FILTRATION, INTERMITTENT, LESS THAN 6 HOURS PER DAY: ICD-10-PCS | Performed by: INTERNAL MEDICINE

## 2021-09-10 PROCEDURE — 80202 ASSAY OF VANCOMYCIN: CPT

## 2021-09-10 PROCEDURE — 36415 COLL VENOUS BLD VENIPUNCTURE: CPT

## 2021-09-10 PROCEDURE — 87077 CULTURE AEROBIC IDENTIFY: CPT

## 2021-09-10 PROCEDURE — 65270000029 HC RM PRIVATE

## 2021-09-10 PROCEDURE — 90935 HEMODIALYSIS ONE EVALUATION: CPT

## 2021-09-10 PROCEDURE — 74011000250 HC RX REV CODE- 250: Performed by: HOSPITALIST

## 2021-09-10 PROCEDURE — 74011250636 HC RX REV CODE- 250/636

## 2021-09-10 PROCEDURE — 87186 SC STD MICRODIL/AGAR DIL: CPT

## 2021-09-10 PROCEDURE — 80069 RENAL FUNCTION PANEL: CPT

## 2021-09-10 PROCEDURE — 84145 PROCALCITONIN (PCT): CPT

## 2021-09-10 PROCEDURE — 85025 COMPLETE CBC W/AUTO DIFF WBC: CPT

## 2021-09-10 PROCEDURE — 74011250637 HC RX REV CODE- 250/637: Performed by: INTERNAL MEDICINE

## 2021-09-10 PROCEDURE — 74011250636 HC RX REV CODE- 250/636: Performed by: HOSPITALIST

## 2021-09-10 RX ORDER — HEPARIN SODIUM 1000 [USP'U]/ML
INJECTION, SOLUTION INTRAVENOUS; SUBCUTANEOUS
Status: COMPLETED
Start: 2021-09-10 | End: 2021-09-10

## 2021-09-10 RX ORDER — HEPARIN SODIUM 1000 [USP'U]/ML
3800 INJECTION, SOLUTION INTRAVENOUS; SUBCUTANEOUS
Status: DISCONTINUED | OUTPATIENT
Start: 2021-09-10 | End: 2021-09-16 | Stop reason: HOSPADM

## 2021-09-10 RX ADMIN — HEPARIN SODIUM 3800 UNITS: 1000 INJECTION, SOLUTION INTRAVENOUS; SUBCUTANEOUS at 11:53

## 2021-09-10 RX ADMIN — HEPARIN SODIUM 5000 UNITS: 5000 INJECTION INTRAVENOUS; SUBCUTANEOUS at 23:24

## 2021-09-10 RX ADMIN — VANCOMYCIN HYDROCHLORIDE 1250 MG: 1.25 INJECTION, POWDER, LYOPHILIZED, FOR SOLUTION INTRAVENOUS at 14:13

## 2021-09-10 RX ADMIN — ACETAMINOPHEN 650 MG: 325 TABLET ORAL at 16:53

## 2021-09-10 RX ADMIN — EPOETIN ALFA-EPBX 10000 UNITS: 10000 INJECTION, SOLUTION INTRAVENOUS; SUBCUTANEOUS at 11:54

## 2021-09-10 RX ADMIN — Medication 10 ML: at 06:00

## 2021-09-10 RX ADMIN — CARVEDILOL 3.12 MG: 3.12 TABLET, FILM COATED ORAL at 16:53

## 2021-09-10 RX ADMIN — Medication 10 ML: at 23:25

## 2021-09-10 RX ADMIN — HEPARIN SODIUM 3800 UNITS: 1000 INJECTION INTRAVENOUS; SUBCUTANEOUS at 11:53

## 2021-09-10 RX ADMIN — CEFEPIME HYDROCHLORIDE 1 G: 1 INJECTION, POWDER, FOR SOLUTION INTRAMUSCULAR; INTRAVENOUS at 23:24

## 2021-09-10 NOTE — PROGRESS NOTES
Reason for Admission:   Sepsis                    RUR Score:   22%               PCP: First and Last name:   Froylan Stinson MD     Name of Practice:    Are you a current patient: Yes/No: yes   Approximate date of last visit:  First week of september 2021   Can you participate in a virtual visit if needed: possibly? Do you (patient/family) have any concerns for transition/discharge? Needs more help at home. Currently is using [facility] and see the patient once a week for SN and PT and OT. Plan for utilizing home health:  Currently using [facility] for [facility] and PT/OT and would like to continue using upon discharge. Current Advanced Directive/Advance Care Plan:  Full Code      Healthcare Decision Maker:   Click here to complete HealthCare Decision Makers including selection of the Healthcare Decision Maker Relationship (ie \"Primary\")            Primary Decision Maker: Braeden Rubalcava - Sister - [phone]    Transition of Care Plan:        Met with patient at bedside. Patient lives in a 2 story house with sister Neeta Sims. Patient uses a wheel chair and walker at home. Also has a ramp on the house and has a hospital bed along with bed side commode and shower chair. Patient goes to [address] renal Monday, Wednesday, and Friday for dialysis from [id number]([phone]).  helps with transportation. Patient also says they use [facility] and are current with them. States they come once a week for SN, PT and OT. Would like to resume care with them if at all possible. Was also at [facility] recently back in July of this year(2021).     DC plan is home with home health vs SNF VS [facility] (need PT and oT notes and recommendations)

## 2021-09-10 NOTE — PROGRESS NOTES
Hospitalist Progress Note         Sandee Watters MD          Daily Progress Note: 9/10/2021      Subjective: The patient is seen for follow  up.  28-year-old gentleman with history of end-stage renal disease on hemodialysis Monday Wednesday Friday, cardiomyopathy with prior EF of 15% admitted for sepsis. Empirically started on IV antibiotics. Patient seen in follow-up. Offers no complaints. Heart rate seems improved this morning.     Problem List:  Problem List as of 9/10/2021 Date Reviewed: 1/24/2021        Codes Class Noted - Resolved    Herpes zoster with nervous system complication [id number]: M89.70  ICD-9-CM: 053.10  7/8/2021 - Present        Sepsis ([facility] ICD-10-CM: A41.9  ICD-9-CM: 038.9, 995.91  7/7/2021 - Present        ESRD (end stage renal disease) on dialysis [facility]) ICD-10-CM: N18.6, Z99.2  ICD-9-CM: 585.6, V45.11  7/7/2021 - Present        Line sepsis [facility]) ICD-10-CM: T85.79XA, A41.9  ICD-9-CM: 996.62, 038.9, 995.91  7/7/2021 - Present        End stage renal disease on dialysis [facility]) ICD-10-CM: N18.6, Z99.2  ICD-9-CM: 585.6, V45.11  1/24/2021 - Present        Pneumonia due to COVID-19 virus ICD-10-CM: U07.1, J12.82  ICD-9-CM: 480.8, 079.89  1/24/2021 - Present        Essential hypertension ICD-10-CM: I10  ICD-9-CM: 401.9  1/24/2021 - Present        Cardiomyopathy ([facility] (Chronic) ICD-10-CM: I42.9  ICD-9-CM: 425.4  1/24/2021 - Present        Systolic CHF, acute on chronic (HCC) ICD-10-CM: I50.23  ICD-9-CM: 428.23, 428.0  1/24/2021 - Present        Fluid overload ICD-10-CM: E87.70  ICD-9-CM: 276.69  1/24/2021 - Present        Gastroesophageal reflux disease ICD-10-CM: K21.9  ICD-9-CM: 530.81  1/24/2021 - Present        Asthenia ICD-10-CM: R53.1  ICD-9-CM: 780.79  1/24/2021 - Present        ATN (acute tubular necrosis) (HCC) ICD-10-CM: N17.0  ICD-9-CM: 584.5  1/16/2021 - Present        RESOLVED: Acute hypoxemic respiratory failure (HCC) ICD-10-CM: J96.01  ICD-9-CM: 518.81  2021 - 2021              Medications reviewed  Current Facility-Administered Medications   Medication Dose Route Frequency    vancomycin dose per pharmacy protocol   Other Rx Dosing/Monitoring    epoetin sotero-epbx (RETACRIT) injection 10,000 Units  10,000 Units IntraVENous Q MON, WED & FRI    carvediloL (COREG) tablet 3.125 mg  3.125 mg Oral BID WITH MEALS    losartan (COZAAR) tablet 25 mg  25 mg Oral DAILY    sodium chloride (NS) flush 5-40 mL  5-40 mL IntraVENous Q8H    sodium chloride (NS) flush 5-40 mL  5-40 mL IntraVENous PRN    acetaminophen (TYLENOL) tablet 650 mg  650 mg Oral Q6H PRN    Or    acetaminophen (TYLENOL) suppository 650 mg  650 mg Rectal Q6H PRN    ondansetron (ZOFRAN ODT) tablet 4 mg  4 mg Oral Q8H PRN    Or    ondansetron (ZOFRAN) injection 4 mg  4 mg IntraVENous Q6H PRN    heparin (porcine) injection 5,000 Units  5,000 Units SubCUTAneous Q12H    cefepime (MAXIPIME) 1 g in sterile water (preservative free) 10 mL IV syringe  1 g IntraVENous Q24H       Review of Systems:   A comprehensive review of systems was negative except for that written in the HPI. Objective:   Physical Exam:     Visit Vitals  /68 (BP 1 Location: Left upper arm, BP Patient Position: At rest)   Pulse 76   Temp 97.6 °F (36.4 °C)   Resp 23   Ht 5' 10\" (1.778 m)   Wt 82.3 kg (181 lb 7 oz)   SpO2 98%   BMI 26.03 kg/m²      O2 Device: None (Room air)    Temp (24hrs), Av.1 °F (36.7 °C), Min:97.6 °F (36.4 °C), Max:98.3 °F (36.8 °C)    No intake/output data recorded.  1901 - 09/10 0700  In: 352.9 [I.V.:352.9]  Out: -     General:  Alert, cooperative, no distress, appears stated age. Lungs:   Clear to auscultation bilaterally. Chest wall:  No tenderness or deformity. Heart:  Regular rate and rhythm, S1, S2 normal, no murmur, click, rub or gallop. Abdomen:   Soft, non-tender. Bowel sounds normal. No masses,  No organomegaly.    Extremities: Extremities normal, atraumatic, no cyanosis or edema. Pulses: 2+ and symmetric all extremities. Skin: Skin color, texture, turgor normal. No rashes or lesions   Neurologic: CNII-XII intact. No gross sensory or motor deficits     Data Review:       Recent Days:  Recent Labs     09/09/21  0300 09/08/21  1630   WBC 19.6* 10.8   HGB 9.7* 11.4*   HCT 31.1* 36.2*    322     Recent Labs     09/09/21  0300 09/08/21  1630   * 135*   K 4.8 4.2    101   CO2 22 22   * 108*   BUN 39* 37*   CREA 6.92* 6.62*   CA 8.4* 8.3*   MG 2.0 1.9   PHOS 3.8 3.7   ALB 2.4* 2.6*   TBILI 0.6 0.6   ALT 19 9*     Recent Labs     09/09/21  0622   PH 7.44   PCO2 33*   PO2 113*   HCO3 24   FIO2 25       24 Hour Results:  Recent Results (from the past 24 hour(s))   CULTURE, WOUND W GRAM STAIN    Collection Time: 09/09/21  3:30 PM    Specimen: Misc. Wound   Result Value Ref Range    Special Requests: No Special Requests      GRAM STAIN Few WBCs seen      GRAM STAIN Few Gram Positive Cocci in pairs      Culture result: PENDING    GLUCOSE, POC    Collection Time: 09/09/21  5:35 PM   Result Value Ref Range    Glucose (POC) 142 (H) 65 - 117 mg/dL    Performed by Magalis Lee    C REACTIVE PROTEIN, QT    Collection Time: 09/10/21  8:30 AM   Result Value Ref Range    C-Reactive protein 17.10 (H) 0.00 - 0.60 mg/dL           Assessment/     Sepsis  Gram negative Bacteremia  End-stage renal disease on hemodialysis Monday Wednesday Friday  COPD without acute exacerbation  Anemia of chronic kidney disease    Plan:  Continue supportive care including empiric IV antibiotics  Follow-up blood cultures and sensitivities  Appreciate nephrology's/ID input for electrolyte management  Monitor routine electrolytes    Dispo: Continue current ICU care    Care Plan discussed with: Patient/Family    Total time spent with patient: 30 minutes.     Corazon Allred MD

## 2021-09-10 NOTE — PROGRESS NOTES
Progress Note    Patient: Carlyle Asher MRN: [id number]  SSN: [id number]    YOB: 1950  Age: 72 y.o. Sex: male      Admit Date: 9/8/2021    LOS: 2 days     Subjective:   Patient followed for sepsis with Gram negative bacteremia source not entirely clear. Blood cultures now also growing GPC in clusters. He remains afebrile. Currently on IV Meropenem. Objective:     Vitals:    09/09/21 2200 09/09/21 2300 09/10/21 0400 09/10/21 0700   BP: 108/63 108/63     Pulse: 79 77     Resp: 26 26     Temp:  98.2 °F (36.8 °C) 98.1 °F (36.7 °C) 97.6 °F (36.4 °C)   SpO2: 98% 98%     Weight:       Height:            Intake and Output:  Current Shift: No intake/output data recorded. Last three shifts: 09/08 1901 - 09/10 0700  In: 352.9 [I.V.:352.9]  Out: -     Physical Exam:   Constitutional:       Appearance: He is ill-appearing. HENT:      Head: Normocephalic and atraumatic. Right Ear: External ear normal.      Left Ear: External ear normal.      Nose: Nose normal.      Mouth/Throat:      Pharynx: Oropharynx is clear. Eyes:      Pupils: Pupils are equal, round, and reactive to light. Cardiovascular:      Rate and Rhythm: Normal rate and regular rhythm. Heart sounds: No murmur heard. Comments: Right sided Permacath site unremarkable  Pulmonary:      Effort: Pulmonary effort is normal.      Breath sounds: Normal breath sounds. Abdominal:      General: Bowel sounds are normal.      Palpations: Abdomen is soft. Tenderness: There is no abdominal tenderness. There is no right CVA tenderness or left CVA tenderness. Genitourinary:     Comments: No Pleitez  Musculoskeletal:      Cervical back: Neck supple. Right lower leg: No edema. Left lower leg: No edema. Skin:     Findings: No erythema, lesion or rash. Neurological:      General: No focal deficit present. Mental Status: He is alert and oriented to person, place, and time.    Psychiatric:         Mood and Affect: Mood normal.         Behavior: Behavior normal.         Thought Content: Thought content      Lab/Data Review:     WBC 11,100    Procal 28.33  CRP 17.10    Blood cultures (9/8) Gram negatrive rods and probable Staphylococcus aureus  Blood cultures (9/10) Pending  Wound culture coccyx (9/9) GPC in pairs and Gram negative rods  Assessment:     Active Problems:    Sepsis ([address] (7/7/2021)    1. Gram-negative bacteremia, source unclear at this point  2. Positive blood cultures with probable Staphylococcus aureus  3. Sepsis with fever, tachycardia and leukocytosis with elevated lactic acid, procal and CRP  4. Wound on coccyx, culture pending  5. ESRD on HD    Comment:  Blood cultures also growing probable S. Aureus. Source unclear but patient has coccygeal wound with culture pending. Plan:   1. Continue IV Cefepime 1 g every 24 hours,  pending susceptibility results  2. Start IV Vancomycin pending identification of Staphylococcus aureus  3. In am, repeat CBC, procal and CRP, repeat blood cultures  4. Follow-up blood cultures  5.  Urinalysis with reflex culture if specimen becomes available       Signed By: Pretty Mariscal MD     September 10, 2021

## 2021-09-10 NOTE — PROGRESS NOTES
Consult for Vancomycin Dosing by Pharmacy by Dr. Debby Hall - 09/10/2021    Consult provided for this 72y.o. year old male , for indication of Bloodstream infection. Results       Procedure Component Value Units Date/Time    CULTURE, BLOOD [[id number]] Collected: 09/10/21 0830    Order Status: Completed Specimen: Blood Updated: 09/10/21 0915    CULTURE, [place] STAIN [[id number]] Collected: 09/09/21 1530    Order Status: Completed Specimen: Misc. Wound Updated: 09/10/21 0708     Special Requests: No Special Requests        GRAM STAIN Few WBCs seen               Few Gram Positive Cocci in pairs           Culture result: PENDING    COVID-19 RAPID TEST [[id number]] Collected: 09/08/21 1630    Order Status: Completed Specimen: Nasopharyngeal Updated: 09/08/21 1943     Specimen source       Please find results under separate order          CULTURE, BLOOD, PAIRED [[id number]]  (Abnormal) Collected: 09/08/21 1630    Order Status: Completed Specimen: Blood Updated: 09/09/21 2309     Special Requests: No Special Requests        Culture result:       Gram Negative Rods growing in 1 of 4 bottles drawn (SITE NOT INDICATED)                  One of four bottles has been flagged positive by instrument. Bottle has been sent to [address] [place] [address] laboratory to assess for possible growth. Gram Negative Rods CALLED TO AND READ BACK BY BENNY FUNES RN 1115 9/9/21. DPW                  Gram Positive Cocci in clusters GROWING IN THE 2ND OF 4 BOTTLES  CALLED TO AND READ BACK BY RAMONA GUZMAN RN [facility] AT 1100 ON 9/9/21. [facility]          SARS-COV-2 [[id number]] Collected: 09/08/21 1630    Order Status: Completed Specimen: Nasopharyngeal Updated: 09/08/21 1943     SARS-CoV-2 Not Detected        Comment:   The specimen is NEGATIVE for SARS-CoV2, the novel coronavirus associated with COVID-19. A negative result does not rule out COVID-19.    This test has been authorized by the FDA under an Emergency Use Authorization (EUA) for use by authorized laboratories. Fact sheet for Healthcare Providers: kstattoo.com Fact sheet for Patients: kstattoo.com   Methodology: Rapid RT-PCR                 Serum Creatinine Creatinine   Date Value Ref Range Status   09/10/2021 8.42 (H) 0.70 - 1.30 mg/dL Final   09/09/2021 6.92 (H) 0.70 - 1.30 mg/dL Final   09/08/2021 6.62 (H) 0.70 - 1.30 mg/dL Final      Creatinine Clearance Estimated Creatinine Clearance: 8.5 mL/min (A) (based on SCr of 8.42 mg/dL (H)). BUN Lab Results   Component Value Date/Time    BUN 50 (H) 09/10/2021 08:30 AM      WBC Lab Results   Component Value Date/Time    WBC 19.6 (H) 09/09/2021 03:00 AM      Temp Temp Readings from Last 1 Encounters:   09/10/21 97.6 °F (36.4 °C)        Last Level: No results found for: VANCT   Vancomycin, random   Date/Time Value Ref Range Status   09/10/2021 08:30 AM 8.7 ug/mL Final     Comment:     No reference range has been established. Ht Readings from Last 1 Encounters:   09/09/21 177.8 cm (70\")        Wt Readings from Last 1 Encounters:   09/09/21 82.3 kg (181 lb 7 oz)     Ideal body weight: 73 kg (160 lb 15 oz)  Adjusted ideal body weight: 76.7 kg (169 lb 2.2 oz)     Patient predialysis level resulted 8.7. Will order 1250mg IV vancomycin x1 dose post dialysis today. Next level ordered for Monday 09/13 in AM.    Pharmacy to follow daily and will make changes to dose and/or frequency based on clinical status.   _________________________________     Pharmacist Devon Ferrera PHARMD

## 2021-09-10 NOTE — PROGRESS NOTES
Progress Note      9/10/2021 2:47 PM  NAME: Xiomara Godoy   MRN:  [id number]   Admit Diagnosis: Sepsis ([facility] [A41.9]                  Assessment:       1. Paroxysmal SVT in the patient with history of known dilated cardiomyopathy with severely reduced LV systolic function. Patient currently has sinus rhythm with  PVCs  2. Sepsis syndrome  3. Borderline flat troponin which is type II non-STEMI related to problem #1 and 2.  4. End-stage renal disease with hemodialysis dependency                     Plan:        1. Low-dose beta-blocker namely Coreg for patient SVT, PVCs and congestive cardiomyopathy. 2. ARBS as tolerated by blood pressure for afterload reduction  keeping patient euvolemic by hemodialysis. 3. Sepsis management as per medical team.              []       High complexity decision making was performed in this patient at high risk for decompensation with multiple organ involvement. Subjective:     Jorge Camargo denies chest pain, dyspnea. Discussed with RN events overnight. Review of Systems:    Symptom Y/N Comments  Symptom Y/N Comments   Fever/Chills N   Chest Pain N    Poor Appetite N   Edema N    Cough N   Abdominal Pain N    Sputum N   Joint Pain N    SOB/[place] N   Pruritis/Rash N    Nausea/vomit N   Tolerating PT/OT Y    Diarrhea N   Tolerating Diet Y    Constipation N   Other       Could NOT obtain due to:      Objective:      Physical Exam:    Last 24hrs VS reviewed since prior progress note. Most recent are:    Visit Vitals  BP (!) 138/91 (BP 1 Location: Right upper arm, BP Patient Position: At rest)   Pulse 84   Temp 97.9 °F (36.6 °C)   Resp 18   Ht 5' 10\" (1.778 m)   Wt 82.3 kg (181 lb 7 oz)   SpO2 97%   BMI 26.03 kg/m²     No intake or output data in the 24 hours ending 09/10/21 7197     General Appearance: Well developed, well nourished, alert & oriented x 3,    no acute distress. Ears/Nose/Mouth/Throat: Hearing grossly normal.  Neck: Supple.   Chest: Lungs clear to auscultation bilaterally. Cardiovascular: Regular rate and rhythm, S1S2 normal, no murmur. Abdomen: Soft, non-tender, bowel sounds are active. Extremities: No edema bilaterally. Skin: Warm and dry. []         Post-cath site without hematoma, bruit, tenderness, or thrill. Distal pulses intact. PMH/SH reviewed - no change compared to H&P    Data Review    Telemetry: normal sinus rhythm     EKG:   []  No new EKG for review    CT CHEST WO CONT   Final Result   1. There are findings suspect for fluid overload with increased edema within   the body wall fat as well as a mild degree of lung base groundglass parenchymal   density suspect for mild hydrostatic edema. 2.  In addition, there is discoid atelectasis as might be associated with mucous   plugging or bronchospasm. 3.  This examination is negative for dense airspace consolidation. Please see   the above text for further details. XR CHEST PORT   Final Result           Recent Results (from the past 24 hour(s))   CULTURE, WOUND Ree Lobe STAIN    Collection Time: 09/09/21  3:30 PM    Specimen: Misc.  Wound   Result Value Ref Range    Special Requests: No Special Requests      GRAM STAIN Few WBCs seen      GRAM STAIN Few Gram Positive Cocci in pairs      Culture result: Light Gram Negative Rods      Culture result: checking for possible  2nd organism       GLUCOSE, POC    Collection Time: 09/09/21  5:35 PM   Result Value Ref Range    Glucose (POC) 142 (H) 65 - 117 mg/dL    Performed by Billy Hampton [facility]    RENAL FUNCTION PANEL    Collection Time: 09/10/21  8:30 AM   Result Value Ref Range    Sodium 133 (L) 136 - 145 mmol/L    Potassium 5.1 3.5 - 5.1 mmol/L    Chloride 102 97 - 108 mmol/L    CO2 21 21 - 32 mmol/L    Anion gap 10 5 - 15 mmol/L    Glucose 181 (H) 65 - 100 mg/dL    BUN 50 (H) 6 - 20 mg/dL    Creatinine 8.42 (H) 0.70 - 1.30 mg/dL    BUN/Creatinine ratio 6 (L) 12 - 20      GFR est AA 8 (L) >60 ml/min/1.73m2    GFR est non-AA 6 (L) >60 ml/min/1.73m2    Calcium 8.8 8.5 - 10.1 mg/dL    Phosphorus 5.0 (H) 2.6 - 4.7 mg/dL    Albumin 2.4 (L) 3.5 - 5.0 g/dL   CBC WITH AUTOMATED DIFF    Collection Time: 09/10/21  8:30 AM   Result Value Ref Range    WBC 11.1 4.1 - 11.1 K/uL    RBC 3.94 (L) 4.10 - 5.70 M/uL    HGB 10.3 (L) 12.1 - 17.0 g/dL    HCT 33.8 (L) 36.6 - 50.3 %    MCV 85.8 80.0 - 99.0 FL    MCH 26.1 26.0 - 34.0 PG    MCHC 30.5 30.0 - 36.5 g/dL    RDW 19.3 (H) 11.5 - 14.5 %    PLATELET 349 330 - 895 K/uL    MPV 10.9 8.9 - 12.9 FL    NRBC 0.0 0.0  WBC    ABSOLUTE NRBC 0.00 0.00 - 0.01 K/uL    NEUTROPHILS 77 (H) 32 - 75 %    LYMPHOCYTES 12 12 - 49 %    MONOCYTES 9 5 - 13 %    EOSINOPHILS 1 0 - 7 %    BASOPHILS 0 0 - 1 %    IMMATURE GRANULOCYTES 1 (H) 0 - 0.5 %    ABS. NEUTROPHILS 8.5 (H) 1.8 - 8.0 K/UL    ABS. LYMPHOCYTES 1.4 0.8 - 3.5 K/UL    ABS. MONOCYTES 1.0 0.0 - 1.0 K/UL    ABS. EOSINOPHILS 0.1 0.0 - 0.4 K/UL    ABS. BASOPHILS 0.0 0.0 - 0.1 K/UL    ABS. IMM. GRANS. 0.1 (H) 0.00 - 0.04 K/UL    DF AUTOMATED     C REACTIVE PROTEIN, QT    Collection Time: 09/10/21  8:30 AM   Result Value Ref Range    C-Reactive protein 17.10 (H) 0.00 - 0.60 mg/dL   PROCALCITONIN    Collection Time: 09/10/21  8:30 AM   Result Value Ref Range    Procalcitonin 28.33 (H) 0 ng/mL   VANCOMYCIN, RANDOM    Collection Time: 09/10/21  8:30 AM   Result Value Ref Range    Vancomycin, random 8.7 ug/mL          Echo:   01/16/21    ECHO ADULT COMPLETE 01/21/2021 1/21/2021    Interpretation Summary  · LV: Estimated LVEF is 15 - 20%. Normal wall thickness. Moderately dilated left ventricle. Severely and globally reduced systolic function. Severe (grade 3) left ventricular diastolic dysfunction. · LA: Moderately dilated left atrium. · RV: Moderately dilated right ventricle. Moderately reduced systolic function. · RA: Severely dilated right atrium. · AV: Aortic valve leaflet calcification present. · MV: Mitral valve non-specific thickening.  Mild mitral annular calcification. Moderate mitral valve regurgitation is present. · TV: Moderate to severe tricuspid valve regurgitation is present. · PV: Mild pulmonic valve regurgitation is present. · PA: Pulmonary arterial systolic pressure is 89 mmHg. · IVC: Severely elevated central venous pressure (15 mmHg); IVC diameter is larger than 21 mm and collapses less than 50% with respiration. · Pericardium: Trivial pericardial effusion. There is left pleural effusion. There is right pleural effusion. Signed by: Ruperto Sharpe MD on 1/21/2021  7:34 AM      Patient's  EKG, laboratory data and echocardiogram were individually reviewed by me. Lab Data:    Recent Labs     09/10/21  0830 09/09/21  0300   WBC 11.1 19.6*   HGB 10.3* 9.7*   HCT 33.8* 31.1*    278     No results for input(s): INR, PTP, APTT, INREXT in the last 72 hours.    Recent Labs     09/10/21  0830 09/09/21 0300 09/08/21  1630   * 135* 135*   K 5.1 4.8 4.2    104 101   CO2 21 22 22   BUN 50* 39* 37*   CREA 8.42* 6.92* 6.62*   * 122* 108*   CA 8.8 8.4* 8.3*   MG  --  2.0 1.9     Recent Labs     09/08/21  1815 09/08/21  1630   TROIQ 0.09* <0.05     No results found for: CHOL, CHOLX, CHLST, CHOLV, HDL, HDLP, LDL, LDLC, DLDLP, TGLX, TRIGL, TRIGP, CHHD, CHHDX    Recent Labs     09/10/21  0830 09/09/21  0300 09/08/21  1630   AP  --  105 78   TP  --  8.5* 9.3*   ALB 2.4* 2.4* 2.6*   GLOB  --  6.1* 6.7*     Recent Labs     09/09/21  0622   PH 7.44   PCO2 33*   PO2 113*       Medications Personally Reviewed:    Current Facility-Administered Medications   Medication Dose Route Frequency    vancomycin dose per pharmacy protocol   Other Rx Dosing/Monitoring    vancomycin (VANCOCIN) 1,250 mg in 0.9% sodium chloride 250 mL (VIAL-MATE)  1,250 mg IntraVENous ONCE    heparin (porcine) 1,000 unit/mL injection 3,800 Units  3,800 Units Hemodialysis DIALYSIS PRN    epoetin sotero-epbx (RETACRIT) injection 10,000 Units  10,000 Units IntraVENous Q MON, WED & FRI    carvediloL (COREG) tablet 3.125 mg  3.125 mg Oral BID WITH MEALS    losartan (COZAAR) tablet 25 mg  25 mg Oral DAILY    sodium chloride (NS) flush 5-40 mL  5-40 mL IntraVENous Q8H    sodium chloride (NS) flush 5-40 mL  5-40 mL IntraVENous PRN    acetaminophen (TYLENOL) tablet 650 mg  650 mg Oral Q6H PRN    Or    acetaminophen (TYLENOL) suppository 650 mg  650 mg Rectal Q6H PRN    ondansetron (ZOFRAN ODT) tablet 4 mg  4 mg Oral Q8H PRN    Or    ondansetron (ZOFRAN) injection 4 mg  4 mg IntraVENous Q6H PRN    heparin (porcine) injection 5,000 Units  5,000 Units SubCUTAneous Q12H    cefepime (MAXIPIME) 1 g in sterile water (preservative free) 10 mL IV syringe  1 g IntraVENous Q24H         Prior to Admission medications    Medication Sig Start Date End Date Taking? Authorizing Provider   prednisoLONE acetate (PRED FORTE) 1 % ophthalmic suspension Administer 1 Drop to both eyes two (2) times a day. Post shingles therapy. Yes Provider, Historical   dextran 70-hypromellose (Artificial Tears,[id number],) ophthalmic solution Administer 1 Drop to both eyes three (3) times daily as needed. Provider, Historical   acyclovir (ZOVIRAX) 800 mg tablet Take 800 mg by mouth two (2) times a day. Other, MD Farhan   hydrALAZINE (APRESOLINE) 100 mg tablet Take 0.5 Tabs by mouth three (3) times daily. For systolic under 237 5/64/00   Suzie Anders MD   albuterol (PROVENTIL HFA, VENTOLIN HFA, PROAIR HFA) 90 mcg/actuation inhaler Take 2 Puffs by inhalation every four (4) hours as needed for Wheezing or Shortness of Breath. 1/24/21   Ama BAEZA MD   metoprolol succinate (TOPROL-XL) 50 mg XL tablet Take 50 mg by mouth two (2) times a day. Provider, Historical   calcitRIOL (ROCALTROL) 0.25 mcg capsule Take 0.25 mcg by mouth daily. Provider, Historical   atorvastatin (LIPITOR) 40 mg tablet Take 40 mg by mouth daily.     Provider, Ryan Hdez MD

## 2021-09-10 NOTE — PROGRESS NOTES
Nephrology Consult    Patient: Laura Salas MRN: [id number]  SSN: [id number]    YOB: 1950  Age: 72 y.o. Sex: male      Subjective:    The patient is seen on dialysis  No new complaints    Past Medical History:   Diagnosis Date    Asthenia 1/24/2021    Cardiomyopathy ([facility] 1/24/2021    CHF (congestive heart failure) (HCC)     Chronic kidney disease     CKD (chronic kidney disease)     4    Diabetes ([facility]     End stage renal disease on dialysis ([facility] 1/24/2021    Essential hypertension 1/24/2021    Gastroesophageal reflux disease 1/24/2021    Gastroesophageal reflux disease 1/24/2021    Hypertension     Pneumonia due to COVID-19 virus 4/81/2466    Systolic CHF, acute on chronic ([facility] 1/24/2021     Past Surgical History:   Procedure Laterality Date    COLONOSCOPY N/A 12/3/2020    COLONOSCOPY performed by Chacorta Saravia MD at [facility] HX HEART CATHETERIZATION      HX HERNIA REPAIR       [address] CVAD  1/22/2021    IR INSERT NON TUNL CVC OVER 5 YRS  1/18/2021    IR INSERT TUNL CVC W/O PORT OVER 5 YR  1/22/2021    IR PLACE CVAD FLUORO GUIDE  1/18/2021      Family History   Problem Relation Age of Onset    Diabetes Mother     Heart Failure Father      Social History     Tobacco Use    Smoking status: Never Smoker    Smokeless tobacco: Never Used   Substance Use Topics    Alcohol use: Not Currently      Current Facility-Administered Medications   Medication Dose Route Frequency Provider Last Rate Last Admin    vancomycin dose per pharmacy protocol   Other Rx Dosing/Monitoring Lazarus Leach, MD        vancomycin (VANCOCIN) 1,250 mg in 0.9% sodium chloride 250 mL (VIAL-MATE)  1,250 mg IntraVENous Dyana Rodriguez MD        epoetin sotero-epbx (RETACRIT) injection 10,000 Units  10,000 Units IntraVENous Q MON, WED & Charlene Roth MD        carvediloL (COREG) tablet 3.125 mg  3.125 mg Oral BID WITH MEALS Ashley Zapata MD        losartan (COZAAR) tablet 25 mg  25 mg Oral DAILY Ashley Zapata MD        sodium chloride (NS) flush 5-40 mL  5-40 mL IntraVENous Q8H Cesilia Jha MD   10 mL at 09/10/21 0600    sodium chloride (NS) flush 5-40 mL  5-40 mL IntraVENous PRN Cesilia Jha MD        acetaminophen (TYLENOL) tablet 650 mg  650 mg Oral Q6H PRN Cesilia Jha MD   650 mg at 09/09/21 1539    Or    acetaminophen (TYLENOL) suppository 650 mg  650 mg Rectal Q6H PRN Cesilia Jha MD        ondansetron (ZOFRAN ODT) tablet 4 mg  4 mg Oral Q8H PRN Cesilia Jha MD        Or    ondansetron [facility]) injection 4 mg  4 mg IntraVENous Q6H PRN Cesilia Jha MD        heparin (porcine) injection 5,000 Units  5,000 Units SubCUTAneous Q12H Cesilia Jha MD   5,000 Units at 09/09/21 2231    cefepime (MAXIPIME) 1 g in sterile water (preservative free) 10 mL IV syringe  1 g IntraVENous Q24H Cesilia Jha MD   1 g at 09/09/21 2230        Allergies   Allergen Reactions    Lisinopril Angioedema    Pcn [Penicillins] Rash       Review of Systems:  A comprehensive review of systems was negative except for that written in the History of Present Illness. Objective:     Vitals:    09/10/21 0900 09/10/21 0930 09/10/21 1000 09/10/21 1030   BP: 112/71 124/78 128/85 131/83   Pulse:       Resp:       Temp:       TempSrc:       SpO2:       Weight:       Height:            Physical Exam:  General: NAD  Eyes: sclera anicteric  Oral Cavity: No thrush or ulcers  Neck: no JVD  Chest: Fair bilateral air entry  Heart: normal sounds  Abdomen: soft and non tender   : no simms  Lower Extremities: no edema  Skin: no rash  Neuro: intact  Psychiatric: non-depressed            Assessment:     Hospital Problems  Date Reviewed: 1/24/2021        Codes Class Noted POA    Sepsis ([facility] ICD-10-CM: A41.9  ICD-9-CM: 038.9, 995.91  7/7/2021 Unknown              Plan:   1 ESRD.     -On hemodialysis Monday Wednesday Friday.    -He was last dialyzed on 9/08.    -No significant volume overload or uremia.    -Patient is getting dialyzed today  -He has right IJ permacath is a dialysis access.    -He has left wrist AV fistula which is still maturing. 2.  Sepsis.    -He came with high temp and hypotensive with leukocytosis. -Blood cultures are pending. -CT chest shows bilateral edema. - on IV antibiotics. -No indication for IV fluids as patient is end-stage renal disease and his EF is low. 2.  Anemia.    -Hemoglobin 10.3.    -will continue on IV erythropoietin    4. Renal osteodystrophy.   - His calcium is okay. His phosphorus is normal.    5.  SVT. -On arrival his heart rate was 117 status post IV adenosine.    -He was put on esmolol transiently which has been discontinued.     Signed By: All Mena MD     September 10, 2021

## 2021-09-10 NOTE — PROGRESS NOTES
Tx initiated via a patent right upper chest  catheter . No s/s of infection or skin break down. Net fluid removal goal is 2.5 kgs and 3.5 hours tx time.  Epo 10k given

## 2021-09-10 NOTE — PROGRESS NOTES
TRANSFER - OUT REPORT:    Verbal report given to JESSICA Iverson(name) on [employer]  being transferred to 211 (unit) for routine progression of care       Report consisted of patients Situation, Background, Assessment and   Recommendations(SBAR). Information from the following report(s) SBAR, ED Summary, Recent Results and Cardiac Rhythm Sinus Rhythm was reviewed with the receiving nurse. Opportunity for questions and clarification was provided. Patient transported with:   Monitor  Tech     Patient still in dialysis at the moment. Dialysis nurse aware of new room assignment.

## 2021-09-11 LAB
BASOPHILS # BLD: 0 K/UL (ref 0–0.1)
BASOPHILS NFR BLD: 0 % (ref 0–1)
DIFFERENTIAL METHOD BLD: ABNORMAL
EOSINOPHIL # BLD: 0.1 K/UL (ref 0–0.4)
EOSINOPHIL NFR BLD: 1 % (ref 0–7)
ERYTHROCYTE [DISTWIDTH] IN BLOOD BY AUTOMATED COUNT: 19.4 % (ref 11.5–14.5)
GLUCOSE BLD STRIP.AUTO-MCNC: 198 MG/DL (ref 65–117)
HCT VFR BLD AUTO: 34.5 % (ref 36.6–50.3)
HGB BLD-MCNC: 10.6 G/DL (ref 12.1–17)
IMM GRANULOCYTES # BLD AUTO: 0 K/UL (ref 0–0.04)
IMM GRANULOCYTES NFR BLD AUTO: 0 % (ref 0–0.5)
LYMPHOCYTES # BLD: 1.2 K/UL (ref 0.8–3.5)
LYMPHOCYTES NFR BLD: 13 % (ref 12–49)
MCH RBC QN AUTO: 26.6 PG (ref 26–34)
MCHC RBC AUTO-ENTMCNC: 30.7 G/DL (ref 30–36.5)
MCV RBC AUTO: 86.7 FL (ref 80–99)
MONOCYTES # BLD: 0.9 K/UL (ref 0–1)
MONOCYTES NFR BLD: 9 % (ref 5–13)
NEUTS SEG # BLD: 7.1 K/UL (ref 1.8–8)
NEUTS SEG NFR BLD: 77 % (ref 32–75)
NRBC # BLD: 0.02 K/UL (ref 0–0.01)
NRBC BLD-RTO: 0.2 PER 100 WBC
PERFORMED BY, TECHID: ABNORMAL
PLATELET # BLD AUTO: 309 K/UL (ref 150–400)
PMV BLD AUTO: 10 FL (ref 8.9–12.9)
PROCALCITONIN SERPL-MCNC: 25.31 NG/ML
RBC # BLD AUTO: 3.98 M/UL (ref 4.1–5.7)
WBC # BLD AUTO: 9.3 K/UL (ref 4.1–11.1)

## 2021-09-11 PROCEDURE — 36415 COLL VENOUS BLD VENIPUNCTURE: CPT

## 2021-09-11 PROCEDURE — 74011250637 HC RX REV CODE- 250/637: Performed by: HOSPITALIST

## 2021-09-11 PROCEDURE — 74011250636 HC RX REV CODE- 250/636: Performed by: HOSPITALIST

## 2021-09-11 PROCEDURE — 65270000029 HC RM PRIVATE

## 2021-09-11 PROCEDURE — 74011000250 HC RX REV CODE- 250: Performed by: HOSPITALIST

## 2021-09-11 PROCEDURE — 84145 PROCALCITONIN (PCT): CPT

## 2021-09-11 PROCEDURE — 74011250637 HC RX REV CODE- 250/637: Performed by: INTERNAL MEDICINE

## 2021-09-11 PROCEDURE — 85025 COMPLETE CBC W/AUTO DIFF WBC: CPT

## 2021-09-11 PROCEDURE — 82962 GLUCOSE BLOOD TEST: CPT

## 2021-09-11 RX ORDER — TRAMADOL HYDROCHLORIDE 50 MG/1
50 TABLET ORAL
Status: DISCONTINUED | OUTPATIENT
Start: 2021-09-11 | End: 2021-09-16 | Stop reason: HOSPADM

## 2021-09-11 RX ADMIN — CARVEDILOL 3.12 MG: 3.12 TABLET, FILM COATED ORAL at 17:08

## 2021-09-11 RX ADMIN — LOSARTAN POTASSIUM 25 MG: 25 TABLET, FILM COATED ORAL at 09:20

## 2021-09-11 RX ADMIN — ACETAMINOPHEN 650 MG: 325 TABLET ORAL at 06:03

## 2021-09-11 RX ADMIN — CEFEPIME HYDROCHLORIDE 1 G: 1 INJECTION, POWDER, FOR SOLUTION INTRAMUSCULAR; INTRAVENOUS at 21:28

## 2021-09-11 RX ADMIN — HEPARIN SODIUM 5000 UNITS: 5000 INJECTION INTRAVENOUS; SUBCUTANEOUS at 09:20

## 2021-09-11 RX ADMIN — CARVEDILOL 3.12 MG: 3.12 TABLET, FILM COATED ORAL at 09:20

## 2021-09-11 RX ADMIN — TRAMADOL HYDROCHLORIDE 50 MG: 50 TABLET, FILM COATED ORAL at 17:08

## 2021-09-11 RX ADMIN — HEPARIN SODIUM 5000 UNITS: 5000 INJECTION INTRAVENOUS; SUBCUTANEOUS at 21:27

## 2021-09-11 RX ADMIN — Medication 10 ML: at 13:26

## 2021-09-11 RX ADMIN — Medication 10 ML: at 21:28

## 2021-09-11 NOTE — MED STUDENT NOTES
Hospitalist Progress Note         Jessica Alba          Daily Progress Note: 9/11/2021      Subjective: The patient is seen for follow up. Patient has history of ESRD on hemodialysis, dilated cardiomyopathy with severely reduced LV systolic function, and paroxysmal SVT. He came to ED from dialysis with due to a fever. Culture grew klebsiella pneumoniae resistant to ampicillin and gram positive cocci. Pt tolerated dialysis well yesterday. He was seen sitting on side of bed eating breakfast. He denies headache, dizziness, fevers, chills, nausea, vomiting, and diarrhea.        Problem List:  Problem List as of 9/11/2021 Date Reviewed: 1/24/2021        Codes Class Noted - Resolved    Herpes zoster with nervous system complication [id number]: L33.78  ICD-9-CM: 053.10  7/8/2021 - Present        Sepsis ([facility] ICD-10-CM: A41.9  ICD-9-CM: 038.9, 995.91  7/7/2021 - Present        ESRD (end stage renal disease) on dialysis [facility]) ICD-10-CM: N18.6, Z99.2  ICD-9-CM: 585.6, V45.11  7/7/2021 - Present        Line sepsis [facility]) ICD-10-CM: T85.79XA, A41.9  ICD-9-CM: 996.62, 038.9, 995.91  7/7/2021 - Present        End stage renal disease on dialysis [facility]) ICD-10-CM: N18.6, Z99.2  ICD-9-CM: 585.6, V45.11  1/24/2021 - Present        Pneumonia due to COVID-19 virus ICD-10-CM: U07.1, J12.82  ICD-9-CM: 480.8, 079.89  1/24/2021 - Present        Essential hypertension ICD-10-CM: I10  ICD-9-CM: 401.9  1/24/2021 - Present        Cardiomyopathy ([facility] (Chronic) ICD-10-CM: I42.9  ICD-9-CM: 425.4  1/24/2021 - Present        Systolic CHF, acute on chronic (HCC) ICD-10-CM: I50.23  ICD-9-CM: 428.23, 428.0  1/24/2021 - Present        Fluid overload ICD-10-CM: E87.70  ICD-9-CM: 276.69  1/24/2021 - Present        Gastroesophageal reflux disease ICD-10-CM: K21.9  ICD-9-CM: 530.81  1/24/2021 - Present        Asthenia ICD-10-CM: R53.1  ICD-9-CM: 780.79  1/24/2021 - Present        ATN (acute tubular necrosis) ([facility] ICD-10-CM: N17.0  ICD-9-CM: 584.5  2021 - Present        RESOLVED: Acute hypoxemic respiratory failure (HCC) ICD-10-CM: J96.01  ICD-9-CM: 518.81  2021 - 2021              Medications reviewed  Current Facility-Administered Medications   Medication Dose Route Frequency    vancomycin dose per pharmacy protocol   Other Rx Dosing/Monitoring    heparin (porcine) 1,000 unit/mL injection 3,800 Units  3,800 Units Hemodialysis DIALYSIS PRN    epoetin sotero-epbx (RETACRIT) injection 10,000 Units  10,000 Units IntraVENous Q MON, WED & FRI    carvediloL (COREG) tablet 3.125 mg  3.125 mg Oral BID WITH MEALS    losartan (COZAAR) tablet 25 mg  25 mg Oral DAILY    sodium chloride (NS) flush 5-40 mL  5-40 mL IntraVENous Q8H    sodium chloride (NS) flush 5-40 mL  5-40 mL IntraVENous PRN    acetaminophen (TYLENOL) tablet 650 mg  650 mg Oral Q6H PRN    Or    acetaminophen (TYLENOL) suppository 650 mg  650 mg Rectal Q6H PRN    ondansetron (ZOFRAN ODT) tablet 4 mg  4 mg Oral Q8H PRN    Or    ondansetron (ZOFRAN) injection 4 mg  4 mg IntraVENous Q6H PRN    heparin (porcine) injection 5,000 Units  5,000 Units SubCUTAneous Q12H    cefepime (MAXIPIME) 1 g in sterile water (preservative free) 10 mL IV syringe  1 g IntraVENous Q24H       Review of Systems:   A comprehensive review of systems was negative except for that written in the HPI. Objective:   Physical Exam:     Visit Vitals  /74 (BP 1 Location: Right upper arm, BP Patient Position: At rest)   Pulse 81   Temp 97.7 °F (36.5 °C)   Resp 18   Ht 5' 10\" (1.778 m)   Wt 181 lb 7 oz (82.3 kg)   SpO2 97%   BMI 26.03 kg/m²      O2 Device: None (Room air)    Temp (24hrs), Av.5 °F (36.9 °C), Min:97.7 °F (36.5 °C), Max:99.4 °F (37.4 °C)    No intake/output data recorded. No intake/output data recorded. General:  Alert, cooperative, no distress, appears stated age. Lungs:   Clear to auscultation bilaterally. Chest wall:  No tenderness or deformity.    Heart: Regular rate and rhythm, S1, S2 normal, no murmur, click, rub or gallop. Abdomen:   Soft, non-tender. Bowel sounds normal. No masses,  No organomegaly. Extremities: Extremities normal, atraumatic, no cyanosis or edema. Pulses: 2+ and symmetric all extremities. Skin: Skin color, texture, turgor normal. No rashes or lesions   Neurologic: CNII-XII intact. No gross sensory or motor deficits     Data Review:       Recent Days:  Recent Labs     09/10/21  0830 09/09/21 0300 09/08/21  1630   WBC 11.1 19.6* 10.8   HGB 10.3* 9.7* 11.4*   HCT 33.8* 31.1* 36.2*    278 322     Recent Labs     09/10/21  0830 09/09/21 0300 09/08/21  1630   * 135* 135*   K 5.1 4.8 4.2    104 101   CO2 21 22 22   * 122* 108*   BUN 50* 39* 37*   CREA 8.42* 6.92* 6.62*   CA 8.8 8.4* 8.3*   MG  --  2.0 1.9   PHOS 5.0* 3.8 3.7   ALB 2.4* 2.4* 2.6*   TBILI  --  0.6 0.6   ALT  --  19 9*     Recent Labs     09/09/21  0622   PH 7.44   PCO2 33*   PO2 113*   HCO3 24   FIO2 25       24 Hour Results:  Recent Results (from the past 24 hour(s))   GLUCOSE, POC    Collection Time: 09/10/21  7:39 PM   Result Value Ref Range    Glucose (POC) 197 (H) 65 - 117 mg/dL    Performed by SHABNAM ERICKSON            Assessment/     Sepsis  Gram negative Bacteremia - klebsiella with ampicillin resistance    End-stage renal disease on hemodialysis Monday Wednesday Friday  COPD without acute exacerbation  Anemia of chronic kidney disease    Plan:  Continue supportive care   Continue IV Cefepime for sepsis   Appreciate nephrology's/ID input for electrolyte management  Monitor routine electrolytes    Care Plan discussed with: Patient/Family    Total time spent with patient: 30 minutes. Laurie Landa  *ATTENTION:  This note has been created by a medical student for educational purposes only. Please do not refer to the content of this note for clinical decision-making, billing, or other purposes.   Please see attending physicians note to obtain clinical information on this patient. *

## 2021-09-11 NOTE — PROGRESS NOTES
Nephrology Consult    Patient: Ryley Polo MRN: [id number]  SSN: [id number]    YOB: 1950  Age: 72 y.o. Sex: male      Subjective:    The patient is seen in the room  Was dialyzed yesterday  Afebrile, wbc 11.1  No new complaints    Past Medical History:   Diagnosis Date    Asthenia 1/24/2021    Cardiomyopathy ([facility] 1/24/2021    CHF (congestive heart failure) (HCC)     Chronic kidney disease     CKD (chronic kidney disease)     4    Diabetes ([address]     End stage renal disease on dialysis ([facility] 1/24/2021    Essential hypertension 1/24/2021    Gastroesophageal reflux disease 1/24/2021    Gastroesophageal reflux disease 1/24/2021    Hypertension     Pneumonia due to COVID-19 virus 5/11/3557    Systolic CHF, acute on chronic ([facility] 1/24/2021     Past Surgical History:   Procedure Laterality Date    COLONOSCOPY N/A 12/3/2020    COLONOSCOPY performed by Nirali Campbell MD at [address] [facility] HX HEART CATHETERIZATION      HX HERNIA REPAIR      IR FLUORO GUIDE [facility]  1/22/2021    IR INSERT NON TUNL CVC OVER 5 YRS  1/18/2021    IR INSERT TUNL CVC W/O PORT OVER 5 YR  1/22/2021    IR PLACE CVAD FLUORO GUIDE  1/18/2021      Family History   Problem Relation Age of Onset    Diabetes Mother     Heart Failure Father      Social History     Tobacco Use    Smoking status: Never Smoker    Smokeless tobacco: Never Used   Substance Use Topics    Alcohol use: Not Currently      Current Facility-Administered Medications   Medication Dose Route Frequency Provider Last Rate Last Admin    vancomycin dose per pharmacy protocol   Other Rx Dosing/Monitoring Tae Balbuena MD        heparin (porcine) 1,000 unit/mL injection 3,800 Units  3,800 Units Hemodialysis DIALYSIS PRN Ally Gleason MD   3,800 Units at 09/10/21 1153    epoetin sotero-epbx (RETACRIT) injection 10,000 Units  10,000 Units IntraVENous Q MON, WED & Juan Daniel Larsen MD   10,000 Units at 09/10/21 1154    carvediloL (COREG) tablet 3.125 mg  3.125 mg Oral BID WITH MEALS Ace Fuentes MD   3.125 mg at 09/10/21 1653    losartan (COZAAR) tablet 25 mg  25 mg Oral DAILY Ace Fuentes MD        sodium chloride (NS) flush 5-40 mL  5-40 mL IntraVENous Q8H Martha Santiago MD   10 mL at 09/10/21 2325    sodium chloride (NS) flush 5-40 mL  5-40 mL IntraVENous PRN Martha Santiago MD        acetaminophen (TYLENOL) tablet 650 mg  650 mg Oral Q6H PRN Martha Santiago MD   650 mg at 09/11/21 5675    Or    acetaminophen (TYLENOL) suppository 650 mg  650 mg Rectal Q6H PRN Martha Santiago MD        ondansetron (ZOFRAN ODT) tablet 4 mg  4 mg Oral Q8H PRN Martha Santiago MD        Or    [facility] injection 4 mg  4 mg IntraVENous Q6H PRN Martha Santiago MD        heparin (porcine) injection 5,000 Units  5,000 Units SubCUTAneous Q12H Martha Santiago MD   5,000 Units at 09/10/21 2324    cefepime (MAXIPIME) 1 g in sterile water (preservative free) 10 mL IV syringe  1 g IntraVENous Q24H Martha Santiago MD   1 g at 09/10/21 2324        Allergies   Allergen Reactions    Lisinopril Angioedema    Pcn [Penicillins] Rash       Review of Systems:  A comprehensive review of systems was negative except for that written in the History of Present Illness.     Objective:     Vitals:    09/10/21 1518 09/10/21 1600 09/10/21 1942 09/11/21 0827   BP: 125/75  98/61 118/74   Pulse: 97 90 84 81   Resp: 18  18 18   Temp: 99.4 °F (37.4 °C)  98.9 °F (37.2 °C) 97.7 °F (36.5 °C)   TempSrc:       SpO2: 96%  98% 97%   Weight:       Height:            Physical Exam:  General: NAD  Eyes: sclera anicteric  Oral Cavity: No thrush or ulcers  Neck: no JVD  Chest: Fair bilateral air entry  Heart: normal sounds  Abdomen: soft and non tender   : no simms  Lower Extremities: no edema  Skin: no rash  Neuro: intact  Psychiatric: non-depressed            Assessment:     Hospital Problems  Date Reviewed: 1/24/2021 Codes Class Noted POA    [facility]eps[facility] ([facility] ICD-10-CM: A41.9  ICD-9-CM: 038.9, 995.91  7/7/2021 Unknown              Plan:   1 ESRD. -On hemodialysis Monday Wednesday Friday.    -He was last dialyzed on 9/08.    -No significant volume overload or uremia.    -Patient was dialyzed on 9/10  -He has right IJ permacath is a dialysis access.    -He has left wrist AV fistula which is still maturing. 2.  Sepsis.    -He came with high temp and hypotensive with leukocytosis. -Blood cultures are pending. -CT chest shows bilateral edema. - on IV antibiotics. -No indication for IV fluids as patient is end-stage renal disease and his EF is low. 2.  Anemia.    -Hemoglobin 10.3.    -will continue on IV erythropoietin    4. Renal osteodystrophy.   - His calcium is okay. His phosphorus is normal.    5.  SVT. -On arrival his heart rate was 117 status post IV adenosine.    -He was put on esmolol transiently which has been discontinued.     Signed By: Mitch Luke MD     September 11, 2021

## 2021-09-11 NOTE — WOUND CARE
IP WOUND CONSULT    Manda Palacios  MEDICAL RECORD NUMBER:  [id number]  AGE: 72 y.o. GENDER: male  : 1950  TODAY'S DATE:  2021    GENERAL     [] Follow-up   [x] New Consult    Eliu Camargo is a 72 y.o. male referred by:   [x] Physician  [] Nursing  [] Other:         PAST MEDICAL HISTORY    Past Medical History:   Diagnosis Date    Asthenia 2021    Cardiomyopathy ([address] [facility] 2021    CHF (congestive heart failure) (HCC)     Chronic kidney disease     CKD (chronic kidney disease)     4    Diabetes ([address] [facility]     End stage renal disease on dialysis ([facility] [facility] 2021    Essential hypertension 2021    Gastroesophageal reflux disease 2021    Gastroesophageal reflux disease 2021    Hypertension     Pneumonia due to COVID-19 virus     Systolic CHF, acute on chronic ([address] 2021        PAST SURGICAL HISTORY    Past Surgical History:   Procedure Laterality Date    COLONOSCOPY N/A 12/3/2020    COLONOSCOPY performed by Wilfred Webster MD at [address] [facility] HX HEART CATHETERIZATION      HX HERNIA REPAIR      IR [facility]UORO [facility]IDE [facility]  2021    IR INSERT NON TUNL CVC OVER 5 YRS  2021    IR INSERT TUNL CVC W/O PORT OVER 5 YR  2021    IR PLACE CVAD FLUORO GUIDE  2021       FAMILY HISTORY    Family History   Problem Relation Age of Onset    Diabetes Mother     Heart Failure Father          ALLERGIES    Allergies   Allergen Reactions    Lisinopril Angioedema    Pcn [Penicillins] Rash       MEDICATIONS    No current facility-administered medications on file prior to encounter. Current Outpatient Medications on File Prior to Encounter   Medication Sig Dispense Refill    prednisoLONE acetate (PRED FORTE) 1 % ophthalmic suspension Administer 1 Drop to both eyes two (2) times a day. Post shingles therapy.  dextran 70-hypromellose (Artificial Tears,[id number],) ophthalmic solution Administer 1 Drop to both eyes three (3) times daily as needed.  acyclovir (ZOVIRAX) 800 mg tablet Take 800 mg by mouth two (2) times a day.  hydrALAZINE (APRESOLINE) 100 mg tablet Take 0.5 Tabs by mouth three (3) times daily. For systolic under 166 90 Tab 0    albuterol (PROVENTIL HFA, VENTOLIN HFA, PROAIR HFA) 90 mcg/actuation inhaler Take 2 Puffs by inhalation every four (4) hours as needed for Wheezing or Shortness of Breath. 1 Inhaler 0    metoprolol succinate (TOPROL-XL) 50 mg XL tablet Take 50 mg by mouth two (2) times a day.  calcitRIOL (ROCALTROL) 0.25 mcg capsule Take 0.25 mcg by mouth daily.  atorvastatin (LIPITOR) 40 mg tablet Take 40 mg by mouth daily. 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 No redness, swelling, or drainage. Present upon arrival to ED (Active)   Number of days: 66       Wound Sacral/coccyx Full Thickness Tissue Loss 09/11/21 (Active)   Wound Image   09/11/21 1559   Wound Etiology Pressure Stage 4 09/11/21 1559   Cleansed Cleansed with saline 09/11/21 1559   Dressing/Treatment Alginate with Ag; Foam 09/11/21 1559   Dressing Change Due 09/11/21 09/11/21 1559   Wound Length (cm) 0.7 cm 09/11/21 1559   Wound Width (cm) 0.3 cm 09/11/21 1559   Wound Depth (cm) 0.5 cm 09/11/21 1559   Wound Surface Area (cm^2) 0.21 cm^2 09/11/21 1559   Wound Volume (cm^3) 0.105 cm^3 09/11/21 1559   Wound Assessment Dry;Pink/red 09/11/21 1559   Drainage Amount None 09/11/21 1559   Wound Odor None 09/11/21 1559   Carole-Wound/Incision Assessment Hyperkeratosis (Callous); Other (Comment) 09/11/21 1559   Edges Unattached edges 09/11/21 1559   Wound Thickness Description Full thickness 09/11/21 1559   Number of days: 0       Incision 01/23/21 Cervical anterior Right (Active)   Number of days: 231       [REMOVED] Wound Buttocks (Removed)   Wound Etiology Pressure Unstageable 09/09/21 1317   Dressing Status Clean;Dry; Intact 09/11/21 1335   Cleansed Cleansed with saline 09/09/21 1317   Dressing/Treatment Foam 09/11/21 1335   Drainage Amount None 09/11/21 1335   Drainage Description Serosanguinous 09/09/21 1317   Wound Odor None 09/11/21 1335   Number of days: 234          PLAN     Skin Care & Pressure Relief Recommendations  Minimize layers of linen  Turn/reposition approximately every 2 hours  Pillow wedges  Offload heels pillows    Vik 16  Blood Glucose: 198 on 9/11/21                             Albumin: 2.4 on 9/10/21  WBCs: 9.3 on 9/11/21    Support Surface: Foam mattress    Physician/Provider notified:   Recommendations: stage 4 PI to coccyx is improved compared to previous admission assessment on 7/10/21. Pack lightly with Opticell Ag rope cut in 1/4 width, see dressing order. Patient is oliguric, no diarrhea. He is ambulatory to the bathroom and repositions independently in the bed as witnessed. Float heels with pillow to offload heels. Ensure turning q2h at 30 degree angle or more to offload sacrum and buttocks. Will continue to follow. Discharge Wound Care Needs: For wound to coccyx: For coccyx: rinse with NS, pack with Opticell Ag Rope (cut in 1/4 width and pack lightly), and cover with Optifoam Border Sacrum daily and prn for soiling.         Teaching completed with:   [] Patient           [] Family member       [] Caregiver          [] Nursing  [] Other    Patient/Caregiver Teaching:  Level of patient/caregiver understanding able to:   [] Indicates understanding       [] Needs reinforcement  [] Unsuccessful      [] Verbal Understanding  [] Demonstrated understanding       [] No evidence of learning  [] Refused teaching         [] N/A       Electronically signed by Britany Clayton RN on 9/11/2021 at 4:03 PM

## 2021-09-11 NOTE — PROGRESS NOTES
Problem: Risk for Spread of Infection  Goal: Prevent transmission of infectious organism to others  Description: Prevent the transmission of infectious organisms to other patients, staff members, and visitors. Outcome: Progressing Towards Goal     Problem: Patient Education:  Go to Education Activity  Goal: Patient/Family Education  Outcome: Progressing Towards Goal     Problem: Falls - Risk of  Goal: *Absence of Falls  Description: Document Teresa Martinez Fall Risk and appropriate interventions in the flowsheet. Outcome: Progressing Towards Goal  Note: Fall Risk Interventions:  Mobility Interventions: Bed/chair exit alarm, OT consult for ADLs, PT Consult for mobility concerns, PT Consult for assist device competence         Medication Interventions: Bed/chair exit alarm, Patient to call before getting OOB    Elimination Interventions: Bed/chair exit alarm, Call light in reach    History of Falls Interventions: Bed/chair exit alarm, Consult care management for discharge planning         Problem: Patient Education: Go to Patient Education Activity  Goal: Patient/Family Education  Outcome: Progressing Towards Goal     Problem: Pressure Injury - Risk of  Goal: *Prevention of pressure injury  Description: Document Vik Scale and appropriate interventions in the flowsheet. Outcome: Progressing Towards Goal  Note: Pressure Injury Interventions:  Sensory Interventions: Float heels, Keep linens dry and wrinkle-free, Minimize linen layers, Turn and reposition approx. every two hours (pillows and wedges if needed)    Moisture Interventions: Absorbent underpads, Minimize layers, Moisture barrier, Apply protective barrier, creams and emollients    Activity Interventions: Increase time out of bed, PT/OT evaluation    Mobility Interventions: Float heels, HOB 30 degrees or less, PT/OT evaluation, Turn and reposition approx.  every two hours(pillow and wedges)    Nutrition Interventions: Document food/fluid/supplement intake, Offer support with meals,snacks and hydration    Friction and Shear Interventions: HOB 30 degrees or less, Minimize layers                Problem: Patient Education: Go to Patient Education Activity  Goal: Patient/Family Education  Outcome: Progressing Towards Goal

## 2021-09-11 NOTE — ROUTINE PROCESS
Patient assessed and without symptom of distress. Resting quietly and received medications without problem. Dressing to HD line dry and intact. Report given to oncoming nurse.

## 2021-09-11 NOTE — PROGRESS NOTES
Hospitalist Progress Note         Andrea Gandhi MD          Daily Progress Note: 9/11/2021      Subjective: The patient is seen for follow  up.  69-year-old gentleman with history of end-stage renal disease on hemodialysis Monday Wednesday Friday, cardiomyopathy with prior EF of 15% admitted for sepsis. Empirically started on IV antibiotics. Patient seen in follow-up. Blood culture growing Klebsiella pneumonia and MRSA.   Overall looking much improved    Problem List:  Problem List as of 9/11/2021 Date Reviewed: 1/24/2021        Codes Class Noted - Resolved    Herpes zoster with nervous system complication SZX-27-SS: N86.77  ICD-9-CM: 053.10  7/8/2021 - Present        Sepsis ([facility] ICD-10-CM: A41.9  ICD-9-CM: 038.9, 995.91  7/7/2021 - Present        ESRD (end stage renal disease) on dialysis [facility]) ICD-10-CM: N18.6, Z99.2  ICD-9-CM: 585.6, V45.11  7/7/2021 - Present        Line sepsis [facility]) ICD-10-CM: T85.79XA, A41.9  ICD-9-CM: 996.62, 038.9, 995.91  7/7/2021 - Present        End stage renal disease on dialysis [facility]) ICD-10-CM: N18.6, Z99.2  ICD-9-CM: 585.6, V45.11  1/24/2021 - Present        Pneumonia due to COVID-19 virus ICD-10-CM: U07.1, J12.82  ICD-9-CM: 480.8, 079.89  1/24/2021 - Present        Essential hypertension ICD-10-CM: I10  ICD-9-CM: 401.9  1/24/2021 - Present        Cardiomyopathy ([facility] (Chronic) ICD-10-CM: I42.9  ICD-9-CM: 425.4  1/24/2021 - Present        Systolic CHF, acute on chronic (HCC) ICD-10-CM: I50.23  ICD-9-CM: 428.23, 428.0  1/24/2021 - Present        Fluid overload ICD-10-CM: E87.70  ICD-9-CM: 276.69  1/24/2021 - Present        Gastroesophageal reflux disease ICD-10-CM: K21.9  ICD-9-CM: 530.81  1/24/2021 - Present        Asthenia ICD-10-CM: R53.1  ICD-9-CM: 780.79  1/24/2021 - Present        ATN (acute tubular necrosis) (HCC) ICD-10-CM: N17.0  ICD-9-CM: 584.5  1/16/2021 - Present        RESOLVED: Acute hypoxemic respiratory failure ([facility] ICD-10-CM: J96.01  ICD-9-CM: 518.81  2021 - 2021              Medications reviewed  Current Facility-Administered Medications   Medication Dose Route Frequency    vancomycin dose per pharmacy protocol   Other Rx Dosing/Monitoring    heparin (porcine) 1,000 unit/mL injection 3,800 Units  3,800 Units Hemodialysis DIALYSIS PRN    epoetin sotero-epbx (RETACRIT) injection 10,000 Units  10,000 Units IntraVENous Q MON, WED & FRI    carvediloL (COREG) tablet 3.125 mg  3.125 mg Oral BID WITH MEALS    losartan (COZAAR) tablet 25 mg  25 mg Oral DAILY    sodium chloride (NS) flush 5-40 mL  5-40 mL IntraVENous Q8H    sodium chloride (NS) flush 5-40 mL  5-40 mL IntraVENous PRN    acetaminophen (TYLENOL) tablet 650 mg  650 mg Oral Q6H PRN    Or    acetaminophen (TYLENOL) suppository 650 mg  650 mg Rectal Q6H PRN    ondansetron (ZOFRAN ODT) tablet 4 mg  4 mg Oral Q8H PRN    Or    ondansetron (ZOFRAN) injection 4 mg  4 mg IntraVENous Q6H PRN    heparin (porcine) injection 5,000 Units  5,000 Units SubCUTAneous Q12H    cefepime (MAXIPIME) 1 g in sterile water (preservative free) 10 mL IV syringe  1 g IntraVENous Q24H       Review of Systems:   A comprehensive review of systems was negative except for that written in the HPI. Objective:   Physical Exam:     Visit Vitals  /74 (BP 1 Location: Right upper arm, BP Patient Position: At rest)   Pulse 81   Temp 97.7 °F (36.5 °C)   Resp 18   Ht 5' 10\" (1.778 m)   Wt 82.3 kg (181 lb 7 oz)   SpO2 97%   BMI 26.03 kg/m²      O2 Device: None (Room air)    Temp (24hrs), Av.5 °F (36.9 °C), Min:97.7 °F (36.5 °C), Max:99.4 °F (37.4 °C)    No intake/output data recorded. No intake/output data recorded. General:  Alert, cooperative, no distress, appears stated age. Lungs:   Clear to auscultation bilaterally. Chest wall:  No tenderness or deformity. Heart:  Regular rate and rhythm, S1, S2 normal, no murmur, click, rub or gallop. Abdomen:   Soft, non-tender.  Bowel sounds normal. No masses,  No organomegaly. Extremities: Extremities normal, atraumatic, no cyanosis or edema. Pulses: 2+ and symmetric all extremities. Skin: Skin color, texture, turgor normal. No rashes or lesions   Neurologic: CNII-XII intact. No gross sensory or motor deficits     Data Review:       Recent Days:  Recent Labs     09/11/21  0720 09/10/21  0830 09/09/21  0300   WBC 9.3 11.1 19.6*   HGB 10.6* 10.3* 9.7*   HCT 34.5* 33.8* 31.1*    326 278     Recent Labs     09/10/21  0830 09/09/21  0300 09/08/21  1630   * 135* 135*   K 5.1 4.8 4.2    104 101   CO2 21 22 22   * 122* 108*   BUN 50* 39* 37*   CREA 8.42* 6.92* 6.62*   CA 8.8 8.4* 8.3*   MG  --  2.0 1.9   PHOS 5.0* 3.8 3.7   ALB 2.4* 2.4* 2.6*   TBILI  --  0.6 0.6   ALT  --  19 9*     Recent Labs     09/09/21  0622   PH 7.44   PCO2 33*   PO2 113*   HCO3 24   FIO2 25       24 Hour Results:  Recent Results (from the past 24 hour(s))   GLUCOSE, POC    Collection Time: 09/10/21  7:39 PM   Result Value Ref Range    Glucose (POC) 197 (H) 65 - 117 mg/dL    Performed by SHABNAM ERICKSON    CBC WITH AUTOMATED DIFF    Collection Time: 09/11/21  7:20 AM   Result Value Ref Range    WBC 9.3 4.1 - 11.1 K/uL    RBC 3.98 (L) 4.10 - 5.70 M/uL    HGB 10.6 (L) 12.1 - 17.0 g/dL    HCT 34.5 (L) 36.6 - 50.3 %    MCV 86.7 80.0 - 99.0 FL    MCH 26.6 26.0 - 34.0 PG    MCHC 30.7 30.0 - 36.5 g/dL    RDW 19.4 (H) 11.5 - 14.5 %    PLATELET 325 080 - 563 K/uL    MPV 10.0 8.9 - 12.9 FL    NRBC 0.2 (H) 0.0  WBC    ABSOLUTE NRBC 0.02 (H) 0.00 - 0.01 K/uL    NEUTROPHILS 77 (H) 32 - 75 %    LYMPHOCYTES 13 12 - 49 %    MONOCYTES 9 5 - 13 %    EOSINOPHILS 1 0 - 7 %    BASOPHILS 0 0 - 1 %    IMMATURE GRANULOCYTES 0 0 - 0.5 %    ABS. NEUTROPHILS 7.1 1.8 - 8.0 K/UL    ABS. LYMPHOCYTES 1.2 0.8 - 3.5 K/UL    ABS. MONOCYTES 0.9 0.0 - 1.0 K/UL    ABS. EOSINOPHILS 0.1 0.0 - 0.4 K/UL    ABS. BASOPHILS 0.0 0.0 - 0.1 K/UL    ABS. IMM.  GRANS. 0.0 0.00 - 0.04 K/UL    DF AUTOMATED     PROCALCITONIN    Collection Time: 09/11/21  7:20 AM   Result Value Ref Range    Procalcitonin 25.31 (H) 0 ng/mL           Assessment/     Sepsis  Klebsiella and MRSA bacteremia  End-stage renal disease on hemodialysis Monday Wednesday Friday  COPD without acute exacerbation  Anemia of chronic kidney disease    Plan:  Continue supportive care including empiric IV antibiotics  Follow-up blood cultures and sensitivities  Monitor routine electrolytes  PT OT evaluation  Anticipate discharge to home Monday morning. Home with home health PT OT    Dispo: Continue current ICU care    Care Plan discussed with: Patient/Family    Total time spent with patient: 30 minutes.     Letha Saldana MD

## 2021-09-12 LAB
BACTERIA SPEC CULT: ABNORMAL
BACTERIA SPEC CULT: NORMAL
GLUCOSE BLD STRIP.AUTO-MCNC: 145 MG/DL (ref 65–117)
GRAM STN SPEC: NORMAL
GRAM STN SPEC: NORMAL
PERFORMED BY, TECHID: ABNORMAL
SPECIAL REQUESTS,SREQ: ABNORMAL
SPECIAL REQUESTS,SREQ: NORMAL

## 2021-09-12 PROCEDURE — 74011250637 HC RX REV CODE- 250/637: Performed by: INTERNAL MEDICINE

## 2021-09-12 PROCEDURE — 65270000029 HC RM PRIVATE

## 2021-09-12 PROCEDURE — 74011000250 HC RX REV CODE- 250: Performed by: HOSPITALIST

## 2021-09-12 PROCEDURE — 97530 THERAPEUTIC ACTIVITIES: CPT

## 2021-09-12 PROCEDURE — 74011250636 HC RX REV CODE- 250/636: Performed by: HOSPITALIST

## 2021-09-12 PROCEDURE — 97161 PT EVAL LOW COMPLEX 20 MIN: CPT

## 2021-09-12 PROCEDURE — 82962 GLUCOSE BLOOD TEST: CPT

## 2021-09-12 RX ADMIN — TRAMADOL HYDROCHLORIDE 50 MG: 50 TABLET, FILM COATED ORAL at 16:01

## 2021-09-12 RX ADMIN — LOSARTAN POTASSIUM 25 MG: 25 TABLET, FILM COATED ORAL at 08:35

## 2021-09-12 RX ADMIN — Medication 10 ML: at 06:18

## 2021-09-12 RX ADMIN — CEFEPIME HYDROCHLORIDE 1 G: 1 INJECTION, POWDER, FOR SOLUTION INTRAMUSCULAR; INTRAVENOUS at 22:06

## 2021-09-12 RX ADMIN — HEPARIN SODIUM 5000 UNITS: 5000 INJECTION INTRAVENOUS; SUBCUTANEOUS at 22:07

## 2021-09-12 RX ADMIN — CARVEDILOL 3.12 MG: 3.12 TABLET, FILM COATED ORAL at 16:01

## 2021-09-12 RX ADMIN — TRAMADOL HYDROCHLORIDE 50 MG: 50 TABLET, FILM COATED ORAL at 09:53

## 2021-09-12 RX ADMIN — TRAMADOL HYDROCHLORIDE 50 MG: 50 TABLET, FILM COATED ORAL at 22:08

## 2021-09-12 RX ADMIN — CARVEDILOL 3.12 MG: 3.12 TABLET, FILM COATED ORAL at 08:35

## 2021-09-12 RX ADMIN — HEPARIN SODIUM 5000 UNITS: 5000 INJECTION INTRAVENOUS; SUBCUTANEOUS at 09:52

## 2021-09-12 RX ADMIN — TRAMADOL HYDROCHLORIDE 50 MG: 50 TABLET, FILM COATED ORAL at 03:15

## 2021-09-12 NOTE — PROGRESS NOTES
PHYSICAL THERAPY EVALUATION/DISCHARGE  Patient: Solo Pablo (2 y.o. male)  Date: 9/12/2021  Primary Diagnosis: Sepsis ([facility] [A41.9]       Precautions:        ASSESSMENT  Based on the objective data described below, the patient presents at functional baseline for mobility and amb. Pt A&O x 4; per pt report pt resides with his sister who is able to provide assist in a 1 SH home with 5 CHANTALE, L handrails; IND with ADLS and IADLS, used RW for majority of mobility. DME at home listed bedlow. Pt seated at EOB upon PT arrival, agreeable to evaluation. Pt demonstrated IND for bed mobility, supine <> sit and mod I sit <> stand transfers. Pt amb with RW in hallway with gt belt, mod I, and good endurance demonstrated; demonstrates slow gt pattern with  no LOB noted. Pt completed 5 steps with single rail without difficulty and supervision only. Pt did well with session today, pt left in bathroom to perform ADLs with RN notified. Pt has no skilled acute PT needs at this time noted by PT or reported by pt, will DC skilled PT following evaluation; pt verbalized understanding and agreement. Other factors to consider for discharge: PLOF, previous services utilized, equipment available, good family/caregiver support     PLAN :  Recommendations and Planned Interventions: DC from skilled PT services following evaluation    Frequency/Duration: Patient will be followed by physical therapy: DC from services following evaluation due to pt being at functional baseline; no skilled therapy required during admission, please reorder if needed     Recommendation for discharge: (in order for the patient to meet his/her long term goals)  Home with HHPT    This discharge recommendation:  Has been made in collaboration with the attending provider and/or case management    IF patient discharges home will need the following DME: patient owns DME required for discharge       SUBJECTIVE:   Patient stated I get around okay.     OBJECTIVE DATA SUMMARY:   HISTORY:    Past Medical History:   Diagnosis Date    Asthenia 1/24/2021    Cardiomyopathy ([facility] 1/24/2021    CHF (congestive heart failure) (HCC)     Chronic kidney disease     CKD (chronic kidney disease)     4    Diabetes ([facility]     End stage renal disease on dialysis ([facility] 1/24/2021    Essential hypertension 1/24/2021    Gastroesophageal reflux disease 1/24/2021    Gastroesophageal reflux disease 1/24/2021    Hypertension     Pneumonia due to COVID-19 virus 6/34/8921    Systolic CHF, acute on chronic ([facility] 1/24/2021     Past Surgical History:   Procedure Laterality Date    COLONOSCOPY N/A 12/3/2020    COLONOSCOPY performed by Chas Krueger MD at [address] [facility] HX HEART CATHETERIZATION      HX HERNIA REPAIR      IR FLUORO GUIDE PLC CVAD  1/22/2021    IR INSERT NON TUNL CVC OVER 5 YRS  1/18/2021    IR INSERT TUNL CVC W/O PORT OVER 5 YR  1/22/2021    IR PLACE CVAD FLUORO GUIDE  1/18/2021       Personal factors and/or comorbidities impacting plan of care:     Home Situation  Home Environment: Private residence  # Steps to Enter: 5  Rails to Enter: Yes  Hand Rails : Left  One/Two Story Residence: Other (Comment) (1 SH with basement (does not use))  Living Alone: No  Support Systems: Other Family Member(s) (lives with sister who is able to provide assist if needed)  Patient Expects to be Discharged to[de-identified] House  Current DME Used/Available at Home: Commode, bedside, Hospital bed, Shower chair, Walker, rolling, Wheelchair    EXAMINATION/PRESENTATION/DECISION MAKING:   Critical Behavior:  Neurologic State: Alert  Orientation Level: Oriented X4  Cognition: Appropriate decision making, Appropriate for age attention/concentration, Appropriate safety awareness     Skin:  Intact where visible  Edema: none noted  Range Of Motion:  AROM: Generally decreased, functional      Strength:    Strength: Generally decreased, functional (Grossly 4/5)                Coordination:  Coordination: Within functional limits  Vision:      Functional Mobility:  Bed Mobility:  Rolling: Independent  Supine to Sit: Independent     Scooting: Independent  Transfers:  Sit to Stand: Modified independent  Stand to Sit: Modified independent                       Balance:   Sitting: Intact  Standing: Intact; With support  Ambulation/Gait Training:  Distance (ft): 100 Feet (ft)  Assistive Device: Gait belt;Walker, rolling  Ambulation - Level of Assistance: Modified independent        Gait Abnormalities: Decreased step clearance        Base of Support: Widened     Speed/Roseanne: Slow                     Stairs:  Number of Stairs Trained: 4  Stairs - Level of Assistance: Supervision   Rail Use: Left     Functional Measure:  [address] [place] [address] AM-PAC 6 Clicks         Basic Mobility Inpatient Short Form  How much difficulty does the patient currently have. .. Unable A Lot A Little None   1. Turning over in bed (including adjusting bedclothes, sheets and blankets)? [] 1   [] 2   [] 3   [x] 4   2. Sitting down on and standing up from a chair with arms ( e.g., wheelchair, bedside commode, etc.)   [] 1   [] 2   [] 3   [x] 4   3. Moving from lying on back to sitting on the side of the bed? [] 1   [] 2   [] 3   [x] 4          How much help from another person does the patient currently need. .. Total A Lot A Little None   4. Moving to and from a bed to a chair (including a wheelchair)? [] 1   [] 2   [] 3   [x] 4   5. Need to walk in hospital room? [] 1   [] 2   [] 3   [x] 4   6. Climbing 3-5 steps with a railing? [] 1   [] 2   [] 3   [x] 4   © 2007, Trustees of [address] [place] [address], under license to [employer]. All rights reserved     Score:  Initial: 24/24 Most Recent: X (Date: 9/12/2021)   Interpretation of Tool:  Represents activities that are increasingly more difficult (i.e. Bed mobility, Transfers, Gait).   Score 24 23 22-20 19-15 14-10 9-7 6   Modifier CH CI CJ CK CL CM CN          Physical Therapy Evaluation Charge Determination   History Examination Presentation Decision-Making   LOW Complexity : Zero comorbidities / personal factors that will impact the outcome / POC LOW Complexity : 1-2 Standardized tests and measures addressing body structure, function, activity limitation and / or participation in recreation  LOW Complexity : Stable, uncomplicated  Other outcome measures [facility] 6  LOW      Based on the above components, the patient evaluation is determined to be of the following complexity level: LOW     Pain Ratin/10 in L shoulder/trap    Activity Tolerance:   Good    After treatment patient left in no apparent distress: In bathroom washing up with RN aware      COMMUNICATION/EDUCATION:   The patients plan of care was discussed with: Registered nurse. Fall prevention education was provided and the patient/caregiver indicated understanding.     Thank you for this referral.  Judith Yanes, PT, DPT   Time Calculation: 33 mins

## 2021-09-12 NOTE — PROGRESS NOTES
Hospitalist Progress Note         Rachel Pacheco MD          Daily Progress Note: 9/12/2021      Subjective: The patient is seen for follow  up.  67-year-old gentleman with history of end-stage renal disease on hemodialysis Monday Wednesday Friday, cardiomyopathy with prior EF of 15% admitted for sepsis. Empirically started on IV antibiotics. Patient seen in follow-up. Blood culture growing Klebsiella pneumonia and MRSA. Overall looking much improved.  No complaints    Problem List:  Problem List as of 9/12/2021 Date Reviewed: 1/24/2021        Codes Class Noted - Resolved    Herpes zoster with nervous system complication VIF-25-GT: E92.19  ICD-9-CM: 053.10  7/8/2021 - Present        Sepsis ([facility] [address] [address] ICD-10-CM: A41.9  ICD-9-CM: 038.9, 995.91  7/7/2021 - Present        ESRD (end stage renal disease) on dialysis [facility]) ICD-10-CM: N18.6, Z99.2  ICD-9-CM: 585.6, V45.11  7/7/2021 - Present        Line sepsis [facility]) ICD-10-CM: T85.79XA, A41.9  ICD-9-CM: 996.62, 038.9, 995.91  7/7/2021 - Present        End stage renal disease on dialysis [facility]) ICD-10-CM: N18.6, Z99.2  ICD-9-CM: 585.6, V45.11  1/24/2021 - Present        Pneumonia due to COVID-19 virus ICD-10-CM: U07.1, J12.82  ICD-9-CM: 480.8, 079.89  1/24/2021 - Present        Essential hypertension ICD-10-CM: I10  ICD-9-CM: 401.9  1/24/2021 - Present        Cardiomyopathy ([facility] (Chronic) ICD-10-CM: I42.9  ICD-9-CM: 425.4  1/24/2021 - Present        Systolic CHF, acute on chronic (HCC) ICD-10-CM: I50.23  ICD-9-CM: 428.23, 428.0  1/24/2021 - Present        Fluid overload ICD-10-CM: E87.70  ICD-9-CM: 276.69  1/24/2021 - Present        Gastroesophageal reflux disease ICD-10-CM: K21.9  ICD-9-CM: 530.81  1/24/2021 - Present        Asthenia ICD-10-CM: R53.1  ICD-9-CM: 780.79  1/24/2021 - Present        ATN (acute tubular necrosis) (HCC) ICD-10-CM: N17.0  ICD-9-CM: 584.5  1/16/2021 - Present        RESOLVED: Acute hypoxemic respiratory failure [facility] ICD-10-CM: J96.01  ICD-9-CM: 518.81  2021 - 2021              Medications reviewed  Current Facility-Administered Medications   Medication Dose Route Frequency    traMADoL (ULTRAM) tablet 50 mg  50 mg Oral Q6H PRN    vancomycin dose per pharmacy protocol   Other Rx Dosing/Monitoring    heparin (porcine) 1,000 unit/mL injection 3,800 Units  3,800 Units Hemodialysis DIALYSIS PRN    epoetin sotero-epbx (RETACRIT) injection 10,000 Units  10,000 Units IntraVENous Q MON, WED & FRI    carvediloL (COREG) tablet 3.125 mg  3.125 mg Oral BID WITH MEALS    losartan (COZAAR) tablet 25 mg  25 mg Oral DAILY    sodium chloride (NS) flush 5-40 mL  5-40 mL IntraVENous Q8H    sodium chloride (NS) flush 5-40 mL  5-40 mL IntraVENous PRN    acetaminophen (TYLENOL) tablet 650 mg  650 mg Oral Q6H PRN    Or    acetaminophen (TYLENOL) suppository 650 mg  650 mg Rectal Q6H PRN    ondansetron (ZOFRAN ODT) tablet 4 mg  4 mg Oral Q8H PRN    Or    ondansetron (ZOFRAN) injection 4 mg  4 mg IntraVENous Q6H PRN    heparin (porcine) injection 5,000 Units  5,000 Units SubCUTAneous Q12H    cefepime (MAXIPIME) 1 g in sterile water (preservative free) 10 mL IV syringe  1 g IntraVENous Q24H       Review of Systems:   A comprehensive review of systems was negative except for that written in the HPI. Objective:   Physical Exam:     Visit Vitals  /74 (BP 1 Location: Right upper arm, BP Patient Position: At rest)   Pulse 70   Temp 97.8 °F (36.6 °C)   Resp 20   Ht 5' 10\" (1.778 m)   Wt 85.1 kg (187 lb 9.8 oz)   SpO2 100%   BMI 26.92 kg/m²      O2 Device: None (Room air)    Temp (24hrs), Av.3 °F (36.8 °C), Min:97.8 °F (36.6 °C), Max:98.6 °F (37 °C)    No intake/output data recorded. 09/10 1901 -  0700  In: 480 [P.O.:470; I.V.:10]  Out: 0     General:  Alert, cooperative, no distress, appears stated age. Lungs:   Clear to auscultation bilaterally. Chest wall:  No tenderness or deformity.    Heart:  Regular rate and rhythm, S1, S2 normal, no murmur, click, rub or gallop. Abdomen:   Soft, non-tender. Bowel sounds normal. No masses,  No organomegaly. Extremities: Extremities normal, atraumatic, no cyanosis or edema. Pulses: 2+ and symmetric all extremities. Skin: Skin color, texture, turgor normal. No rashes or lesions   Neurologic: CNII-XII intact. No gross sensory or motor deficits     Data Review:       Recent Days:  Recent Labs     09/11/21  0720 09/10/21  0830   WBC 9.3 11.1   HGB 10.6* 10.3*   HCT 34.5* 33.8*    326     Recent Labs     09/10/21  0830   *   K 5.1      CO2 21   *   BUN 50*   CREA 8.42*   CA 8.8   PHOS 5.0*   ALB 2.4*     No results for input(s): PH, PCO2, PO2, HCO3, FIO2 in the last 72 hours. 24 Hour Results:  Recent Results (from the past 24 hour(s))   GLUCOSE, POC    Collection Time: 09/11/21 12:19 PM   Result Value Ref Range    Glucose (POC) 198 (H) 65 - 117 mg/dL    Performed by Livia Garcia            Assessment/     Sepsis  Klebsiella and MRSA bacteremia  End-stage renal disease on hemodialysis Monday Wednesday Friday  COPD without acute exacerbation  Anemia of chronic kidney disease    Plan:  Continue supportive care including empiric IV antibiotics  Follow-up blood cultures and sensitivities  Monitor routine electrolytes  PT OT evaluation  Anticipate discharge to home Monday morning. Home with home health PT OT    Dispo: Continue current ICU care    Care Plan discussed with: Patient/Family    Total time spent with patient: 30 minutes.     Radha Deras MD

## 2021-09-12 NOTE — PROGRESS NOTES
Problem: Falls - Risk of  Goal: *Absence of Falls  Description: Document Desiree Allan Fall Risk and appropriate interventions in the flowsheet. Outcome: Progressing Towards Goal  Note: Fall Risk Interventions:  Mobility Interventions: Bed/chair exit alarm         Medication Interventions: Bed/chair exit alarm    Elimination Interventions: Bed/chair exit alarm, Call light in reach    History of Falls Interventions: Bed/chair exit alarm         Problem: Pressure Injury - Risk of  Goal: *Prevention of pressure injury  Description: Document Vik Scale and appropriate interventions in the flowsheet.   Outcome: Progressing Towards Goal  Note: Pressure Injury Interventions:  Sensory Interventions: Float heels, Keep linens dry and wrinkle-free    Moisture Interventions: Absorbent underpads, Apply protective barrier, creams and emollients    Activity Interventions: Increase time out of bed, PT/OT evaluation    Mobility Interventions: Float heels, HOB 30 degrees or less    Nutrition Interventions: Document food/fluid/supplement intake    Friction and Shear Interventions: Feet elevated on foot rest, Apply protective barrier, creams and emollients

## 2021-09-12 NOTE — PROGRESS NOTES
Nephrology Consult    Patient: Mark Mae MRN: [id number]  SSN: [id number]    YOB: 1950  Age: 72 y.o. Sex: male      Subjective:    The patient is seen in the room  Was dialyzed on Friday  Afebrile, wbc 9.3  No new complaints    Past Medical History:   Diagnosis Date    Asthenia 1/24/2021    Cardiomyopathy ([facility] 1/24/2021    CHF (congestive heart failure) (HCC)     Chronic kidney disease     CKD (chronic kidney disease)     4    Diabetes ([address]     End stage renal disease on dialysis ([facility] 1/24/2021    Essential hypertension 1/24/2021    Gastroesophageal reflux disease 1/24/2021    Gastroesophageal reflux disease 1/24/2021    Hypertension     Pneumonia due to COVID-19 virus 3/97/8135    Systolic CHF, acute on chronic ([facility] 1/24/2021     Past Surgical History:   Procedure Laterality Date    COLONOSCOPY N/A 12/3/2020    COLONOSCOPY performed by Oz Gregory MD at [address] [facility] HX HEART CATHETERIZATION      HX HERNIA REPAIR      IR FLUORO GUIDE [facility] CVAD  1/22/2021    IR INSERT NON TUNL CVC OVER 5 YRS  1/18/2021    IR INSERT TUNL CVC W/O PORT OVER 5 YR  1/22/2021    IR PLACE CVAD FLUORO GUIDE  1/18/2021      Family History   Problem Relation Age of Onset    Diabetes Mother     Heart Failure Father      Social History     Tobacco Use    Smoking status: Never Smoker    Smokeless tobacco: Never Used   Substance Use Topics    Alcohol use: Not Currently      Current Facility-Administered Medications   Medication Dose Route Frequency Provider Last Rate Last Admin    traMADoL (ULTRAM) tablet 50 mg  50 mg Oral Q6H PRN Kamilah Melissa MD   50 mg at 09/12/21 0315    vancomycin dose per pharmacy protocol   Other Rx Dosing/Monitoring Edinson Suarez MD        heparin (porcine) 1,000 unit/mL injection 3,800 Units  3,800 Units Hemodialysis DIALYSIS PRN Gerald Spann MD   3,800 Units at 09/10/21 1153    epoetin sotero-epbx (RETACRIT) injection 10,000 Units  10,000 Units IntraVENous Q MON, WED & Anjel Alberto Grigsby MD   10,000 Units at 09/10/21 1154    carvediloL (COREG) tablet 3.125 mg  3.125 mg Oral BID WITH MEALS Josesito Orozco MD   3.125 mg at 09/12/21 0835    losartan (COZAAR) tablet 25 mg  25 mg Oral DAILY Josesito Orozco MD   25 mg at 09/12/21 0835    sodium chloride (NS) flush 5-40 mL  5-40 mL IntraVENous Q8H Pancho Burrell MD   10 mL at 09/12/21 0618    sodium chloride (NS) flush 5-40 mL  5-40 mL IntraVENous PRN Pancho Burrell MD        acetaminophen (TYLENOL) tablet 650 mg  650 mg Oral Q6H PRN Pancho Burrell MD   650 mg at 09/11/21 8047    Or    acetaminophen (TYLENOL) suppository 650 mg  650 mg Rectal Q6H PRN Pancho Burrell MD        ondansetron (ZOFRAN ODT) tablet 4 mg  4 mg Oral Q8H PRN Pancho Burrell MD        Or    ondansetron [facility]) injection 4 mg  4 mg IntraVENous Q6H PRN Pancho Burrell MD        heparin (porcine) injection 5,000 Units  5,000 Units SubCUTAneous Q12H Pancho Burrell MD   5,000 Units at 09/11/21 2127    cefepime (MAXIPIME) 1 g in sterile water (preservative free) 10 mL IV syringe  1 g IntraVENous Q24H Pancho Burrell MD   1 g at 09/11/21 2128        Allergies   Allergen Reactions    Lisinopril Angioedema    Pcn [Penicillins] Rash       Review of Systems:  A comprehensive review of systems was negative except for that written in the History of Present Illness.     Objective:     Vitals:    09/11/21 1600 09/11/21 2129 09/12/21 0255 09/12/21 0758   BP:  116/72 118/74 120/74   Pulse: 74 73 72 70   Resp:  18 20 20   Temp:  98.6 °F (37 °C) 98.5 °F (36.9 °C) 97.8 °F (36.6 °C)   TempSrc:       SpO2:  95% 97% 100%   Weight:    85.1 kg (187 lb 9.8 oz)   Height:            Physical Exam:  General: NAD  Eyes: sclera anicteric  Oral Cavity: No thrush or ulcers  Neck: no JVD  Chest: Fair bilateral air entry  Heart: normal sounds  Abdomen: soft and non tender   : no simms  Lower Extremities: no edema  Skin: no rash  Neuro: intact  Psychiatric: non-depressed            Assessment:     Hospital Problems  Date Reviewed: 1/24/2021        Codes Class Noted POA    Sepsis ([facility] ICD-10-CM: A41.9  ICD-9-CM: 038.9, 995.91  7/7/2021 Unknown              Plan:       1. ESRD. -On hemodialysis Monday Wednesday Friday.    -He was last dialyzed on 9/08.    -No significant volume overload or uremia.    -Patient was dialyzed on 9/10  -He has right IJ permacath is a dialysis access.    -He has left wrist AV fistula which is still maturing. 2.  Sepsis.    -He came with high temp and hypotensive with leukocytosis. -klebsiella and MRSA bacteremia.  -likely catheter related bacteremia. - will treat with IV antibiotics. -ID on board. 2.  Anemia.    -Hemoglobin 10.3.    -will continue on IV erythropoietin    4. Renal osteodystrophy.   - His calcium is okay.    -His phosphorus is normal.    5.  SVT. -On arrival his heart rate was 117 status post IV adenosine.    -He was put on esmolol transiently which has been discontinued.     Signed By: Bill Finch MD     September 12, 2021

## 2021-09-13 LAB
BASOPHILS # BLD: 0 K/UL (ref 0–0.1)
BASOPHILS NFR BLD: 1 % (ref 0–1)
CRP SERPL-MCNC: 10.8 MG/DL (ref 0–0.6)
DIFFERENTIAL METHOD BLD: ABNORMAL
EOSINOPHIL # BLD: 0.2 K/UL (ref 0–0.4)
EOSINOPHIL NFR BLD: 2 % (ref 0–7)
ERYTHROCYTE [DISTWIDTH] IN BLOOD BY AUTOMATED COUNT: 19 % (ref 11.5–14.5)
GLUCOSE BLD STRIP.AUTO-MCNC: 127 MG/DL (ref 65–117)
GLUCOSE BLD STRIP.AUTO-MCNC: 127 MG/DL (ref 65–117)
GLUCOSE BLD STRIP.AUTO-MCNC: 156 MG/DL (ref 65–117)
HCT VFR BLD AUTO: 35.2 % (ref 36.6–50.3)
HGB BLD-MCNC: 11 G/DL (ref 12.1–17)
IMM GRANULOCYTES # BLD AUTO: 0 K/UL (ref 0–0.04)
IMM GRANULOCYTES NFR BLD AUTO: 0 % (ref 0–0.5)
LYMPHOCYTES # BLD: 1.3 K/UL (ref 0.8–3.5)
LYMPHOCYTES NFR BLD: 16 % (ref 12–49)
MCH RBC QN AUTO: 26.8 PG (ref 26–34)
MCHC RBC AUTO-ENTMCNC: 31.3 G/DL (ref 30–36.5)
MCV RBC AUTO: 85.6 FL (ref 80–99)
MONOCYTES # BLD: 0.8 K/UL (ref 0–1)
MONOCYTES NFR BLD: 10 % (ref 5–13)
NEUTS SEG # BLD: 5.9 K/UL (ref 1.8–8)
NEUTS SEG NFR BLD: 71 % (ref 32–75)
NRBC # BLD: 0 K/UL (ref 0–0.01)
NRBC BLD-RTO: 0 PER 100 WBC
PERFORMED BY, TECHID: ABNORMAL
PLATELET # BLD AUTO: 315 K/UL (ref 150–400)
PMV BLD AUTO: 10.4 FL (ref 8.9–12.9)
PROCALCITONIN SERPL-MCNC: 14.31 NG/ML
RBC # BLD AUTO: 4.11 M/UL (ref 4.1–5.7)
VANCOMYCIN SERPL-MCNC: 16.4 UG/ML
WBC # BLD AUTO: 8.2 K/UL (ref 4.1–11.1)

## 2021-09-13 PROCEDURE — 65270000029 HC RM PRIVATE

## 2021-09-13 PROCEDURE — 74011250637 HC RX REV CODE- 250/637: Performed by: INTERNAL MEDICINE

## 2021-09-13 PROCEDURE — 74011000250 HC RX REV CODE- 250: Performed by: HOSPITALIST

## 2021-09-13 PROCEDURE — 36415 COLL VENOUS BLD VENIPUNCTURE: CPT

## 2021-09-13 PROCEDURE — 84145 PROCALCITONIN (PCT): CPT

## 2021-09-13 PROCEDURE — 82962 GLUCOSE BLOOD TEST: CPT

## 2021-09-13 PROCEDURE — 85025 COMPLETE CBC W/AUTO DIFF WBC: CPT

## 2021-09-13 PROCEDURE — 74011250636 HC RX REV CODE- 250/636: Performed by: INTERNAL MEDICINE

## 2021-09-13 PROCEDURE — 86140 C-REACTIVE PROTEIN: CPT

## 2021-09-13 PROCEDURE — 90935 HEMODIALYSIS ONE EVALUATION: CPT

## 2021-09-13 PROCEDURE — 99232 SBSQ HOSP IP/OBS MODERATE 35: CPT | Performed by: INTERNAL MEDICINE

## 2021-09-13 PROCEDURE — 80202 ASSAY OF VANCOMYCIN: CPT

## 2021-09-13 PROCEDURE — 74011250636 HC RX REV CODE- 250/636: Performed by: HOSPITALIST

## 2021-09-13 RX ORDER — HEPARIN SODIUM 1000 [USP'U]/ML
INJECTION, SOLUTION INTRAVENOUS; SUBCUTANEOUS
Status: DISPENSED
Start: 2021-09-13 | End: 2021-09-13

## 2021-09-13 RX ADMIN — HEPARIN SODIUM 5000 UNITS: 5000 INJECTION INTRAVENOUS; SUBCUTANEOUS at 12:19

## 2021-09-13 RX ADMIN — CARVEDILOL 3.12 MG: 3.12 TABLET, FILM COATED ORAL at 12:19

## 2021-09-13 RX ADMIN — TRAMADOL HYDROCHLORIDE 50 MG: 50 TABLET, FILM COATED ORAL at 12:19

## 2021-09-13 RX ADMIN — VANCOMYCIN HYDROCHLORIDE 1250 MG: 1.25 INJECTION, POWDER, LYOPHILIZED, FOR SOLUTION INTRAVENOUS at 17:03

## 2021-09-13 RX ADMIN — TRAMADOL HYDROCHLORIDE 50 MG: 50 TABLET, FILM COATED ORAL at 18:11

## 2021-09-13 RX ADMIN — CEFEPIME HYDROCHLORIDE 1 G: 1 INJECTION, POWDER, FOR SOLUTION INTRAMUSCULAR; INTRAVENOUS at 22:50

## 2021-09-13 RX ADMIN — TRAMADOL HYDROCHLORIDE 50 MG: 50 TABLET, FILM COATED ORAL at 03:54

## 2021-09-13 RX ADMIN — HEPARIN SODIUM 5000 UNITS: 5000 INJECTION INTRAVENOUS; SUBCUTANEOUS at 22:50

## 2021-09-13 RX ADMIN — LOSARTAN POTASSIUM 25 MG: 25 TABLET, FILM COATED ORAL at 12:19

## 2021-09-13 NOTE — PROGRESS NOTES
Progress Note    Patient: Mayelin Bolanos MRN: [id number]  SSN: [id number]    YOB: 1950  Age: 72 y.o. Sex: male      Admit Date: 9/8/2021    LOS: 5 days     Subjective:   Patient followed for sepsis with Gram negative bacteremia with Klebseilla and MRSA bacteremia. Wound cultures grew Morganella and E. Faecalis. Repeat blood cultures from Friday also growing GPC in clusters. He remains afebrile. Currently on IV Cefepime and Vancomycin. Objective:     Vitals:    09/13/21 0800 09/13/21 0820 09/13/21 0835 09/13/21 0905   BP: (P) 119/74 (P) 122/69 (P) 125/69 (P) 118/74   Pulse: (P) 74 (P) 76 (P) 71 (P) 65   Resp:       Temp:       TempSrc:       SpO2:       Weight:       Height:            Intake and Output:  Current Shift: No intake/output data recorded. Last three shifts: 09/11 1901 - 09/13 0700  In: 750 [P.O.:720; I.V.:30]  Out: 0     Physical Exam:   Constitutional:       Appearance: He is ill-appearing. HENT: unremarkable    Heart:  No murmurs     Comments: Right sided Permacath site unremarkable  Pulmonary:      Effort: Pulmonary effort is normal.      Breath sounds: Normal breath sounds. Genitourinary:     Comments: No Pleitez  Musculoskeletal:      Cervical back: Neck supple. Right lower leg: No edema. Left lower leg: No edema. Skin: 1 cm open wound sacrococcygeal area with scarring     Findings: No erythema, lesion or rash. Neurological:      General: No focal deficit present. Mental Status: He is alert and oriented to person, place, and time. Psychiatric:         Mood and Affect: Mood normal.         Behavior: Behavior normal.         Thought Content:  Thought content      Lab/Data Review:     WBC 8,200    Procal 14.31 <25.31 <28.33  CRP 10.80 <17.10    Blood cultures (9/8) Klebsiella pneumoniae and MRSA  Blood cultures (9/10) Gram positive cocci in clusters  Wound culture coccyx (9/9) Morganella morganii and Enterococcus faecalis  Assessment:     Active Problems:    Sepsis ([facility] (7/7/2021)    1. Gram-negative bacteremia, with Klebsiella pneumoniae, possibly dialysis catheter related, Day #6 IV Cefepime  2. MRSA bacteremia, possibly dialysis catheter related, Day #6 IV Vancomycin   3. Sepsis with fever, tachycardia and leukocytosis with elevated lactic acid, procal and CRP, resolved or resolving  4. Wound on coccyx, ?superinfection with Morganella morganii and Enterococcus faecalis, on antibiotics above  5. ESRD on HD    Comment:  Unable to link coccygeal wound with blood cultures isolates. Signifiance of Morganella and E. Faecalis unclear but covered by current antibiotics. Plan:   1. Continue IV Cefepime 1 g every 24 hours  2. Continue IV Vancomycin   3. Follow In am, repeat CBC, procal and CRP, repeat blood cultures  4. Follow-up blood cultures  5. TTE to rule out endocarditis  6.  Duration of antiotics: 14 days after first negative blood cutures if no evidence of endocarditis    Signed By: Lali Moreno MD     September 13, 2021

## 2021-09-13 NOTE — PROGRESS NOTES
Pre HD Random was 16.4 today. Patient tolerated 3.5 hr Dialysis. Scheduled vancomycin 1250 mg x1 at 1800 today and pre hd Random on 09/15.

## 2021-09-13 NOTE — PROGRESS NOTES
Nephrology Consult    Patient: Sameera Nguyen MRN: [id number]  SSN: [id number]    YOB: 1950  Age: 72 y.o. Sex: male      Subjective:    The patient is seen in the room  Dialyzed today and 2.2 L fluid was removed  Afebrile, wbc 8.2  On antibiotics  No new complaints    Past Medical History:   Diagnosis Date    Asthenia 1/24/2021    Cardiomyopathy ([address] 1/24/2021    CHF (congestive heart failure) (HCC)     Chronic kidney disease     CKD (chronic kidney disease)     4    Diabetes ([address]     End stage renal disease on dialysis ([facility] 1/24/2021    Essential hypertension 1/24/2021    Gastroesophageal reflux disease 1/24/2021    Gastroesophageal reflux disease 1/24/2021    Hypertension     Pneumonia due to COVID-19 virus 2/51/4081    Systolic CHF, acute on chronic ([facility] 1/24/2021     Past Surgical History:   Procedure Laterality Date    COLONOSCOPY N/A 12/3/2020    COLONOSCOPY performed by Darius Melendez MD at [address] [facility] HX HEART CATHETERIZATION      HX HERNIA REPAIR       [address] CV[address]  1/22/2021    IR INSERT NON TUNL CVC OVER 5 YRS  1/18/2021    IR INSERT TUNL CVC W/O PORT OVER 5 YR  1/22/2021    IR PLACE CVAD FLUORO GUIDE  1/18/2021      Family History   Problem Relation Age of Onset    Diabetes Mother     Heart Failure Father      Social History     Tobacco Use    Smoking status: Never Smoker    Smokeless tobacco: Never Used   Substance Use Topics    Alcohol use: Not Currently      Current Facility-Administered Medications   Medication Dose Route Frequency Provider Last Rate Last Admin    heparin (porcine) 1,000 unit/mL injection             traMADoL (ULTRAM) tablet 50 mg  50 mg Oral Q6H PRN Анна Velazco MD   50 mg at 09/13/21 1219    vancomycin dose per pharmacy protocol   Other Rx Dosing/Monitoring Dominik Villanueva MD        heparin (porcine) 1,000 unit/mL injection 3,800 Units  3,800 Units Hemodialysis DIALYSIS PRN Ariadna Dalton MD   6,150 Units at 09/10/21 1153    carvediloL (COREG) tablet 3.125 mg  3.125 mg Oral BID WITH MEALS Elba Messina MD   3.125 mg at 09/13/21 1219    losartan (COZAAR) tablet 25 mg  25 mg Oral DAILY Elba Messina MD   25 mg at 09/13/21 1219    sodium chloride (NS) flush 5-40 mL  5-40 mL IntraVENous PRN Candi Gonzales MD        acetaminophen (TYLENOL) tablet 650 mg  650 mg Oral Q6H PRN Candi Gonzales MD   650 mg at 09/11/21 7589    Or    acetaminophen (TYLENOL) suppository 650 mg  650 mg Rectal Q6H PRN Candi Gonzales MD        ondansetron (ZOFRAN ODT) tablet 4 mg  4 mg Oral Q8H PRN Candi Gonzales MD        Or    ondansetron [facility]) injection 4 mg  4 mg IntraVENous Q6H PRN Candi Gonzales MD        heparin (porcine) injection 5,000 Units  5,000 Units SubCUTAneous Q12H Candi Gonzales MD   5,000 Units at 09/13/21 1219    cefepime (MAXIPIME) 1 g in sterile water (preservative free) 10 mL IV syringe  1 g IntraVENous Q24H Candi Gonzales MD   1 g at 09/12/21 2206        Allergies   Allergen Reactions    Lisinopril Angioedema    Pcn [Penicillins] Rash       Review of Systems:  A comprehensive review of systems was negative except for that written in the History of Present Illness.     Objective:     Vitals:    09/13/21 1115 09/13/21 1140 09/13/21 1200 09/13/21 1218   BP: 122/70 126/75  (!) 141/79   Pulse: 73 70 73 79   Resp:  16  16   Temp:    97.5 °F (36.4 °C)   TempSrc:       SpO2:    100%   Weight:       Height:            Physical Exam:  General: NAD  Eyes: sclera anicteric  Oral Cavity: No thrush or ulcers  Neck: no JVD  Chest: Fair bilateral air entry  Heart: normal sounds  Abdomen: soft and non tender   : no simms  Lower Extremities: no edema  Skin: no rash  Neuro: intact  Psychiatric: non-depressed            Assessment:     Hospital Problems  Date Reviewed: 1/24/2021        Codes Class Noted POA    Sepsis ([address] ICD-10-CM: A41.9  ICD-9-CM: 038.9, 995.91 7/7/2021 Unknown              Plan:       1. ESRD. -On hemodialysis Monday Wednesday Friday.    -He was last dialyzed on 9/08.    -No significant volume overload or uremia.    -Patient was dialyzed on 9/13 and removed 2.2 L  -He has right IJ permacath is a dialysis access.    -He has left wrist AV fistula which is still maturing. 2.  Sepsis.    -He came with high temp and hypotensive with leukocytosis. -klebsiella and MRSA bacteremia.  -likely catheter related bacteremia. - will treat with IV antibiotics. -ID on board. 2.  Anemia.    -Hemoglobin 11.0.    -will hold IV erythropoietin    4. Renal osteodystrophy.   - His calcium is okay.    -His phosphorus is normal.    5.  SVT. -On arrival his heart rate was 117 status post IV adenosine.    -He was put on esmolol transiently which has been discontinued.     Signed By: Riddhi Mora MD     September 13, 2021

## 2021-09-13 NOTE — PROGRESS NOTES
Hospitalist Progress Note         Jarad King MD          Daily Progress Note: 9/13/2021      Subjective: The patient is seen for follow  up.  62-year-old gentleman with history of end-stage renal disease on hemodialysis Monday Wednesday Friday, cardiomyopathy with prior EF of 15% admitted for sepsis. Empirically started on IV antibiotics. Patient seen in follow-up. Blood culture growing Klebsiella pneumonia and MRSA. Overall looking much improved.  No complaints    Problem List:  Problem List as of 9/13/2021 Date Reviewed: 1/24/2021        Codes Class Noted - Resolved    Herpes zoster with nervous system complication [id number]: I62.80  ICD-9-CM: 053.10  7/8/2021 - Present        Sepsis ([facility] ICD-10-CM: A41.9  ICD-9-CM: 038.9, 995.91  7/7/2021 - Present        ESRD (end stage renal disease) on dialysis [facility]) ICD-10-CM: N18.6, Z99.2  ICD-9-CM: 585.6, V45.11  7/7/2021 - Present        Line sepsis [facility]) ICD-10-CM: T85.79XA, A41.9  ICD-9-CM: 996.62, 038.9, 995.91  7/7/2021 - Present        End stage renal disease on dialysis [facility]) ICD-10-CM: N18.6, Z99.2  ICD-9-CM: 585.6, V45.11  1/24/2021 - Present        Pneumonia due to COVID-19 virus ICD-10-CM: U07.1, J12.82  ICD-9-CM: 480.8, 079.89  1/24/2021 - Present        Essential hypertension ICD-10-CM: I10  ICD-9-CM: 401.9  1/24/2021 - Present        Cardiomyopathy ([facility] (Chronic) ICD-10-CM: I42.9  ICD-9-CM: 425.4  1/24/2021 - Present        Systolic CHF, acute on chronic (HCC) ICD-10-CM: I50.23  ICD-9-CM: 428.23, 428.0  1/24/2021 - Present        Fluid overload ICD-10-CM: E87.70  ICD-9-CM: 276.69  1/24/2021 - Present        Gastroesophageal reflux disease ICD-10-CM: K21.9  ICD-9-CM: 530.81  1/24/2021 - Present        Asthenia ICD-10-CM: R53.1  ICD-9-CM: 780.79  1/24/2021 - Present        ATN (acute tubular necrosis) (HCC) ICD-10-CM: N17.0  ICD-9-CM: 584.5  1/16/2021 - Present        RESOLVED: Acute hypoxemic respiratory failure [facility]) ICD-10-CM: J96.01  ICD-9-CM: 518.81  2021 - 2021              Medications reviewed  Current Facility-Administered Medications   Medication Dose Route Frequency    heparin (porcine) 1,000 unit/mL injection        traMADoL (ULTRAM) tablet 50 mg  50 mg Oral Q6H PRN    vancomycin dose per pharmacy protocol   Other Rx Dosing/Monitoring    heparin (porcine) 1,000 unit/mL injection 3,800 Units  3,800 Units Hemodialysis DIALYSIS PRN    carvediloL (COREG) tablet 3.125 mg  3.125 mg Oral BID WITH MEALS    losartan (COZAAR) tablet 25 mg  25 mg Oral DAILY    sodium chloride (NS) flush 5-40 mL  5-40 mL IntraVENous PRN    acetaminophen (TYLENOL) tablet 650 mg  650 mg Oral Q6H PRN    Or    acetaminophen (TYLENOL) suppository 650 mg  650 mg Rectal Q6H PRN    ondansetron (ZOFRAN ODT) tablet 4 mg  4 mg Oral Q8H PRN    Or    ondansetron (ZOFRAN) injection 4 mg  4 mg IntraVENous Q6H PRN    heparin (porcine) injection 5,000 Units  5,000 Units SubCUTAneous Q12H    cefepime (MAXIPIME) 1 g in sterile water (preservative free) 10 mL IV syringe  1 g IntraVENous Q24H       Review of Systems:   A comprehensive review of systems was negative except for that written in the HPI. Objective:   Physical Exam:     Visit Vitals  /69   Pulse 67   Temp 98.1 °F (36.7 °C) (Oral)   Resp 18   Ht 5' 10\" (1.778 m)   Wt 85.1 kg (187 lb 9.8 oz)   SpO2 100%   BMI 26.92 kg/m²      O2 Device: None (Room air)    Temp (24hrs), Av.9 °F (36.6 °C), Min:97.5 °F (36.4 °C), Max:98.2 °F (36.8 °C)    No intake/output data recorded.  1901 -  0700  In: 750 [P.O.:720; I.V.:30]  Out: 0     General:  Alert, cooperative, no distress, appears stated age. Lungs:   Clear to auscultation bilaterally. Chest wall:  No tenderness or deformity. Heart:  Regular rate and rhythm, S1, S2 normal, no murmur, click, rub or gallop. Abdomen:   Soft, non-tender. Bowel sounds normal. No masses,  No organomegaly.    Extremities: Extremities normal, atraumatic, no cyanosis or edema. Pulses: 2+ and symmetric all extremities. Skin: Skin color, texture, turgor normal. No rashes or lesions   Neurologic: CNII-XII intact. No gross sensory or motor deficits     Data Review:       Recent Days:  Recent Labs     09/13/21  0633 09/11/21  0720   WBC 8.2 9.3   HGB 11.0* 10.6*   HCT 35.2* 34.5*    309     No results for input(s): NA, K, CL, CO2, GLU, BUN, CREA, CA, MG, PHOS, ALB, TBIL, TBILI, ALT, INR, INREXT, INREXT in the last 72 hours. No lab exists for component: SGOT  No results for input(s): PH, PCO2, PO2, HCO3, FIO2 in the last 72 hours. 24 Hour Results:  Recent Results (from the past 24 hour(s))   GLUCOSE, POC    Collection Time: 09/12/21  8:35 PM   Result Value Ref Range    Glucose (POC) 145 (H) 65 - 117 mg/dL    Performed by Yamel Alvares    CBC WITH AUTOMATED DIFF    Collection Time: 09/13/21  6:33 AM   Result Value Ref Range    WBC 8.2 4.1 - 11.1 K/uL    RBC 4.11 4.10 - 5.70 M/uL    HGB 11.0 (L) 12.1 - 17.0 g/dL    HCT 35.2 (L) 36.6 - 50.3 %    MCV 85.6 80.0 - 99.0 FL    MCH 26.8 26.0 - 34.0 PG    MCHC 31.3 30.0 - 36.5 g/dL    RDW 19.0 (H) 11.5 - 14.5 %    PLATELET 585 705 - 904 K/uL    MPV 10.4 8.9 - 12.9 FL    NRBC 0.0 0.0  WBC    ABSOLUTE NRBC 0.00 0.00 - 0.01 K/uL    NEUTROPHILS 71 32 - 75 %    LYMPHOCYTES 16 12 - 49 %    MONOCYTES 10 5 - 13 %    EOSINOPHILS 2 0 - 7 %    BASOPHILS 1 0 - 1 %    IMMATURE GRANULOCYTES 0 0 - 0.5 %    ABS. NEUTROPHILS 5.9 1.8 - 8.0 K/UL    ABS. LYMPHOCYTES 1.3 0.8 - 3.5 K/UL    ABS. MONOCYTES 0.8 0.0 - 1.0 K/UL    ABS. EOSINOPHILS 0.2 0.0 - 0.4 K/UL    ABS. BASOPHILS 0.0 0.0 - 0.1 K/UL    ABS. IMM.  GRANS. 0.0 0.00 - 0.04 K/UL    DF AUTOMATED     PROCALCITONIN    Collection Time: 09/13/21  6:33 AM   Result Value Ref Range    Procalcitonin 14.31 (H) 0 ng/mL   C REACTIVE PROTEIN, QT    Collection Time: 09/13/21  6:33 AM   Result Value Ref Range    C-Reactive protein 10.80 (H) 0.00 - 0.60 mg/dL VANCOMYCIN, RANDOM    Collection Time: 09/13/21  6:33 AM   Result Value Ref Range    Vancomycin, random 16.4 ug/mL   GLUCOSE, POC    Collection Time: 09/13/21  7:19 AM   Result Value Ref Range    Glucose (POC) 127 (H) 65 - 117 mg/dL    Performed by Alonso Kehr            Assessment/     Sepsis  Klebsiella and MRSA bacteremia  End-stage renal disease on hemodialysis Monday Wednesday Friday  COPD without acute exacerbation  Anemia of chronic kidney disease    Plan:  Continue supportive care including empiric IV antibiotics  Follow-up blood cultures and sensitivities  Monitor routine electrolytes  PT OT evaluation  Anticipate discharge to home when cleared by ID    Dispo: Home with home health and possibly IV antibiotics    Care Plan discussed with: Patient/Family    Total time spent with patient: 30 minutes.     Abdelrahman Drummond MD

## 2021-09-13 NOTE — PROGRESS NOTES
OT eval order received and acknowledged. OT eval attempted at 8:38 however pt currently off of the floor in HD. Will continue to follow patient and attempt OT eval at a later time. Thank you.

## 2021-09-13 NOTE — PROGRESS NOTES
Problem: Falls - Risk of  Goal: *Absence of Falls  Description: Document Bhumika Ellsworth Fall Risk and appropriate interventions in the flowsheet. Outcome: Progressing Towards Goal  Note: Fall Risk Interventions:  Mobility Interventions: Bed/chair exit alarm         Medication Interventions: Bed/chair exit alarm    Elimination Interventions: Bed/chair exit alarm, Call light in reach    History of Falls Interventions: Bed/chair exit alarm         Problem: Pressure Injury - Risk of  Goal: *Prevention of pressure injury  Description: Document Vik Scale and appropriate interventions in the flowsheet.   Outcome: Progressing Towards Goal  Note: Pressure Injury Interventions:  Sensory Interventions: Float heels, Keep linens dry and wrinkle-free    Moisture Interventions: Absorbent underpads, Apply protective barrier, creams and emollients    Activity Interventions: Increase time out of bed, PT/OT evaluation    Mobility Interventions: Float heels, HOB 30 degrees or less    Nutrition Interventions: Document food/fluid/supplement intake    Friction and Shear Interventions: Feet elevated on foot rest, Apply protective barrier, creams and emollients                Problem: Patient Education: Go to Patient Education Activity  Goal: Patient/Family Education  Outcome: Progressing Towards Goal

## 2021-09-14 LAB
BACTERIA SPEC CULT: NORMAL
BACTERIA SPEC CULT: NORMAL
BASOPHILS # BLD: 0.1 K/UL (ref 0–0.1)
BASOPHILS NFR BLD: 1 % (ref 0–1)
CRP SERPL-MCNC: 10.4 MG/DL (ref 0–0.6)
DIFFERENTIAL METHOD BLD: ABNORMAL
EOSINOPHIL # BLD: 0.2 K/UL (ref 0–0.4)
EOSINOPHIL NFR BLD: 2 % (ref 0–7)
ERYTHROCYTE [DISTWIDTH] IN BLOOD BY AUTOMATED COUNT: 19.1 % (ref 11.5–14.5)
GLUCOSE BLD STRIP.AUTO-MCNC: 116 MG/DL (ref 65–117)
GLUCOSE BLD STRIP.AUTO-MCNC: 135 MG/DL (ref 65–117)
GLUCOSE BLD STRIP.AUTO-MCNC: 149 MG/DL (ref 65–117)
GLUCOSE BLD STRIP.AUTO-MCNC: 149 MG/DL (ref 65–117)
HCT VFR BLD AUTO: 34.7 % (ref 36.6–50.3)
HGB BLD-MCNC: 10.7 G/DL (ref 12.1–17)
IMM GRANULOCYTES # BLD AUTO: 0.1 K/UL (ref 0–0.04)
IMM GRANULOCYTES NFR BLD AUTO: 1 % (ref 0–0.5)
LYMPHOCYTES # BLD: 1.1 K/UL (ref 0.8–3.5)
LYMPHOCYTES NFR BLD: 11 % (ref 12–49)
MCH RBC QN AUTO: 26.5 PG (ref 26–34)
MCHC RBC AUTO-ENTMCNC: 30.8 G/DL (ref 30–36.5)
MCV RBC AUTO: 85.9 FL (ref 80–99)
MONOCYTES # BLD: 0.9 K/UL (ref 0–1)
MONOCYTES NFR BLD: 10 % (ref 5–13)
NEUTS SEG # BLD: 7.5 K/UL (ref 1.8–8)
NEUTS SEG NFR BLD: 75 % (ref 32–75)
NRBC # BLD: 0 K/UL (ref 0–0.01)
NRBC BLD-RTO: 0 PER 100 WBC
PERFORMED BY, TECHID: ABNORMAL
PERFORMED BY, TECHID: NORMAL
PLATELET # BLD AUTO: 311 K/UL (ref 150–400)
PMV BLD AUTO: 10.5 FL (ref 8.9–12.9)
PROCALCITONIN SERPL-MCNC: 9.01 NG/ML
RBC # BLD AUTO: 4.04 M/UL (ref 4.1–5.7)
SPECIAL REQUESTS,SREQ: NORMAL
WBC # BLD AUTO: 9.8 K/UL (ref 4.1–11.1)

## 2021-09-14 PROCEDURE — 74011250636 HC RX REV CODE- 250/636: Performed by: HOSPITALIST

## 2021-09-14 PROCEDURE — 36415 COLL VENOUS BLD VENIPUNCTURE: CPT

## 2021-09-14 PROCEDURE — 84145 PROCALCITONIN (PCT): CPT

## 2021-09-14 PROCEDURE — 74011250637 HC RX REV CODE- 250/637: Performed by: INTERNAL MEDICINE

## 2021-09-14 PROCEDURE — 99232 SBSQ HOSP IP/OBS MODERATE 35: CPT | Performed by: INTERNAL MEDICINE

## 2021-09-14 PROCEDURE — 86140 C-REACTIVE PROTEIN: CPT

## 2021-09-14 PROCEDURE — 74011250636 HC RX REV CODE- 250/636: Performed by: INTERNAL MEDICINE

## 2021-09-14 PROCEDURE — 74011000258 HC RX REV CODE- 258: Performed by: INTERNAL MEDICINE

## 2021-09-14 PROCEDURE — 65270000029 HC RM PRIVATE

## 2021-09-14 PROCEDURE — 87040 BLOOD CULTURE FOR BACTERIA: CPT

## 2021-09-14 PROCEDURE — 85025 COMPLETE CBC W/AUTO DIFF WBC: CPT

## 2021-09-14 PROCEDURE — 82962 GLUCOSE BLOOD TEST: CPT

## 2021-09-14 RX ADMIN — LOSARTAN POTASSIUM 25 MG: 25 TABLET, FILM COATED ORAL at 08:49

## 2021-09-14 RX ADMIN — TRAMADOL HYDROCHLORIDE 50 MG: 50 TABLET, FILM COATED ORAL at 21:06

## 2021-09-14 RX ADMIN — TRAMADOL HYDROCHLORIDE 50 MG: 50 TABLET, FILM COATED ORAL at 00:43

## 2021-09-14 RX ADMIN — TRAMADOL HYDROCHLORIDE 50 MG: 50 TABLET, FILM COATED ORAL at 14:49

## 2021-09-14 RX ADMIN — CARVEDILOL 3.12 MG: 3.12 TABLET, FILM COATED ORAL at 08:49

## 2021-09-14 RX ADMIN — HEPARIN SODIUM 5000 UNITS: 5000 INJECTION INTRAVENOUS; SUBCUTANEOUS at 21:23

## 2021-09-14 RX ADMIN — CARVEDILOL 3.12 MG: 3.12 TABLET, FILM COATED ORAL at 16:30

## 2021-09-14 RX ADMIN — TRAMADOL HYDROCHLORIDE 50 MG: 50 TABLET, FILM COATED ORAL at 08:49

## 2021-09-14 RX ADMIN — HEPARIN SODIUM 5000 UNITS: 5000 INJECTION INTRAVENOUS; SUBCUTANEOUS at 08:49

## 2021-09-14 RX ADMIN — TOBRAMYCIN 80 MG: 40 INJECTION INTRAMUSCULAR; INTRAVENOUS at 16:30

## 2021-09-14 NOTE — PROGRESS NOTES
Problem: Falls - Risk of  Goal: *Absence of Falls  Description: Document Desiree Allan Fall Risk and appropriate interventions in the flowsheet. Outcome: Progressing Towards Goal  Note: Fall Risk Interventions:  Mobility Interventions: Patient to call before getting OOB, Bed/chair exit alarm, Strengthening exercises (ROM-active/passive), Utilize walker, cane, or other assistive device         Medication Interventions: Bed/chair exit alarm, Patient to call before getting OOB, Teach patient to arise slowly    Elimination Interventions: Call light in reach, Bed/chair exit alarm, Patient to call for help with toileting needs, Toileting schedule/hourly rounds    History of Falls Interventions: Bed/chair exit alarm, Door open when patient unattended         Problem: Pressure Injury - Risk of  Goal: *Prevention of pressure injury  Description: Document Vik Scale and appropriate interventions in the flowsheet.   Outcome: Progressing Towards Goal  Note: Pressure Injury Interventions:  Sensory Interventions: Keep linens dry and wrinkle-free, Maintain/enhance activity level, Minimize linen layers    Moisture Interventions: Absorbent underpads, Apply protective barrier, creams and emollients, Minimize layers    Activity Interventions: Increase time out of bed    Mobility Interventions: Float heels, HOB 30 degrees or less    Nutrition Interventions: Document food/fluid/supplement intake    Friction and Shear Interventions: HOB 30 degrees or less, Minimize layers

## 2021-09-14 NOTE — PROGRESS NOTES
Consult for Tobramycin Dosing by Pharmacy by Dr. Cristofer Merchant provided for this 72y.o. year old male , for indication of Bacteremia (Synergy)    Day of Therapy: 01  Goal of Level(s): 3-5 mcg/mL(peak),  < 1 mcg/mL    Other Current Antibiotics: Vancomycin    Significant Cultures:        Results       Procedure Component Value Units Date/Time    CULTURE, BLOOD [[id number]] Collected: 09/14/21 0703    Order Status: Completed Specimen: Blood Updated: 09/14/21 1517     Special Requests: No Special Requests        Culture result: No growth after 5 hours       CULTURE, BLOOD [[id number]]  (Susceptibility) Collected: 09/10/21 0830    Order Status: Completed Specimen: Blood Updated: 09/14/21 0849     Special Requests: No Special Requests        Culture result:       one of two bottles has been flagged positive by instrument. Bottle has been sent to [facility] laboratory to assess for possible growth. Gram Positive Cocci in clusters CALLED TO AND READ BACK BY NEHA CLANCY R.N. 123Rafal 09/12/2021 BY JAVED                  * methicillin resistant staphylococcus aureus * GROWING IN THE ANAEROBIC BOTTLE SITE NOT INDICATED (KNOWN MRSA PT)          Susceptibility        Staphylococcus aureus Methcillin Resistant      SUSU      Ciprofloxacin ($) Resistant      Clindamycin ($) Resistant      Daptomycin ($$$$$) Susceptible      Doxycycline ($$) Susceptible      Erythromycin ($$$$) Resistant      Gentamicin ($) Susceptible      Levofloxacin ($) Resistant      Linezolid ($$$$$) Susceptible      Oxacillin Resistant      Rifampin ($$$$) Susceptible  [1]       Tetracycline Susceptible      Trimeth/Sulfa Susceptible      Vancomycin ($) Susceptible                [1]  Rifampin is not to be used for mono-therapy. [address]                       CULTURE, Ulyariess Helling STAIN [[id number]]  (Susceptibility) Collected: 09/09/21 1530    Order Status: Completed Specimen: Misc.  Wound Updated: 09/12/21 1209     Special Requests: No Special Requests GRAM STAIN Few WBCs seen               Few Gram Positive Cocci in pairs           Culture result: Moderate Morganella morganii ssp morganii                  Moderate Enterococcus faecalis            Moderate  Mixed skin dinesh isolated         Moderate Yeast       Susceptibility        Morganella morganii ssp morganii Enterococcus faecalis      SUSU SUSU      Amikacin ($) Susceptible       Ampicillin ($) Resistant Susceptible      Ampicillin/sulbactam ($) Resistant       Cefazolin ($) Resistant       Cefepime ($$) Susceptible       Cefoxitin Intermediate       Ceftazidime ($) Susceptible       Ceftriaxone ($) Susceptible       Ciprofloxacin ($) Intermediate  [1]        Daptomycin ($$$$$)  Susceptible      Gentamicin ($) Resistant       Levofloxacin ($) Intermediate  [2]        Linezolid ($$$$$)  Susceptible      Meropenem ($$) Susceptible       Piperacillin/Tazobac ($) Susceptible       Tobramycin ($) Susceptible       Trimeth/Sulfa Resistant       Vancomycin ($)  Susceptible                  [1]  FDA**FDA INTERPRETATION REFLECTED, REFER TO CLSI FOR ALTERNATE  INTERPRETATIONS.**L     [2]  **FDA INTERPRETATION REFLECTED, REFER TO CLSI FOR ALTERNATE  INTERPRETATIONS. **            [facility]                       COVID-19 RAPID TEST [[id number]] Collected: 09/08/21 1630    Order Status: Completed Specimen: Nasopharyngeal Updated: 09/08/21 1943     Specimen source       Please find results under separate order          CULTURE, BLOOD, PAIRED [[id number]]  (Abnormal)  (Susceptibility) Collected: 09/08/21 1630    Order Status: Completed Specimen: Blood Updated: 09/12/21 0756     Special Requests: No Special Requests        Culture result:       Klebsiella pneumoniae growing in 1 of 4 bottles drawn (ANAEROBIC BOTTLE)                  One of four bottles has been flagged positive by instrument. Bottle has been sent to [facility] laboratory to assess for possible growth.  Gram Negative Rods CALLED TO AND READ BACK BY TIFFANIE MYNOR [place] 70 [id number] 9/9/21. DPW                  Gram Positive Cocci in clusters GROWING IN THE 2ND OF 4 BOTTLES  CALLED TO AND READ BACK BY RAMONA GUZMAN RN [facility] SCAV AT 1100 ON 9/9/21. [facility]/ [facility]                  * methicillin resistant staphylococcus aureus * GROWING IN 3 OF 4 BOTTLES          Susceptibility        Klebsiella pneumoniae Staphylococcus aureus Methcillin Resistant      SUSU SUSU      Amikacin ($) Susceptible       Ampicillin ($) Resistant       Ampicillin/sulbactam ($) Susceptible       Cefazolin ($) Susceptible       Cefepime ($$) Susceptible       Cefoxitin Susceptible       Ceftazidime ($) Susceptible       Ceftriaxone ($) Susceptible       Ciprofloxacin ($) Susceptible Resistant      Clindamycin ($)  Resistant      Daptomycin ($$$$$)  Susceptible      Doxycycline ($$)  Susceptible      Erythromycin ($$$$)  Resistant      Gentamicin ($) Susceptible Susceptible      Levofloxacin ($) Susceptible Resistant      Linezolid ($$$$$)  Susceptible      Meropenem ($$) Susceptible       Oxacillin  Resistant      Piperacillin/Tazobac ($) Susceptible       Rifampin ($$$$)  Susceptible  [1]       Tetracycline  Susceptible      Tobramycin ($) Susceptible       Trimeth/Sulfa Susceptible Susceptible      Vancomycin ($)  Susceptible                  [1]  Rifampin is not to be used for mono-therapy. [facility]                       SARS-COV-2 [[id number]] Collected: 09/08/21 1630    Order Status: Completed Specimen: Nasopharyngeal Updated: 09/08/21 1943     SARS-CoV-2 Not Detected        Comment:   The specimen is NEGATIVE for SARS-CoV2, the novel coronavirus associated with COVID-19. A negative result does not rule out COVID-19. This test has been authorized by the FDA under an Emergency Use Authorization (EUA) for use by authorized laboratories.    Fact sheet for Healthcare Providers: ConventionUpdate.co.nz Fact sheet for Patients: ConventionUpdate.co.nz Methodology: Rapid RT-PCR               Serum Creatinine Creatinine   Date Value Ref Range Status   09/10/2021 8.42 (H) 0.70 - 1.30 mg/dL Final   09/09/2021 6.92 (H) 0.70 - 1.30 mg/dL Final   09/08/2021 6.62 (H) 0.70 - 1.30 mg/dL Final      Creatinine Clearance Estimated Creatinine Clearance: 8.5 mL/min (A) (by C-G formula based on SCr of 8.42 mg/dL (H)). BUN Lab Results   Component Value Date/Time    BUN 50 (H) 09/10/2021 08:30 AM      WBC Lab Results   Component Value Date/Time    WBC 9.8 09/14/2021 07:03 AM      Temp Temp Readings from Last 1 Encounters:   09/14/21 97.7 °F (36.5 °C)        New Regimen:   Start Tobramycin 80 mg x1 IV today. Scheduled Random tomorrow AM.    Pharmacy to follow daily and will make changes to dose and/or frequency based on clinical status.   _________________________________     Pharmacist Eduardo Medina, PHARMD

## 2021-09-14 NOTE — PROGRESS NOTES
Renal Progress Note    Patient: Carlyle Asher MRN: [id number]  SSN: [id number]    YOB: 1950  Age: 72 y.o. Sex: male      Admit Date: 9/8/2021    LOS: 6 days     Subjective:   Patient seen at bedside. Alert and awake, no acute distress. Patient is  not giving any new complaints today. No complaints of any swelling in lower extremities. No complaints of shortness of breath. Current Facility-Administered Medications   Medication Dose Route Frequency    [START ON 9/15/2021] Vancomycin Level Reminder 09/15 at 0400   Other ONCE    traMADoL (ULTRAM) tablet 50 mg  50 mg Oral Q6H PRN    vancomycin dose per pharmacy protocol   Other Rx Dosing/Monitoring    heparin (porcine) 1,000 unit/mL injection 3,800 Units  3,800 Units Hemodialysis DIALYSIS PRN    carvediloL (COREG) tablet 3.125 mg  3.125 mg Oral BID WITH MEALS    losartan (COZAAR) tablet 25 mg  25 mg Oral DAILY    sodium chloride (NS) flush 5-40 mL  5-40 mL IntraVENous PRN    acetaminophen (TYLENOL) tablet 650 mg  650 mg Oral Q6H PRN    Or    acetaminophen (TYLENOL) suppository 650 mg  650 mg Rectal Q6H PRN    ondansetron (ZOFRAN ODT) tablet 4 mg  4 mg Oral Q8H PRN    Or    ondansetron (ZOFRAN) injection 4 mg  4 mg IntraVENous Q6H PRN    heparin (porcine) injection 5,000 Units  5,000 Units SubCUTAneous Q12H        Vitals:    09/14/21 0800 09/14/21 0911 09/14/21 1154 09/14/21 1200   BP:   123/79    Pulse: 72  75 (!) 25   Resp:   18    Temp:   97.7 °F (36.5 °C)    TempSrc:       SpO2:   97%    Weight:       Height:  5' 10\" (1.778 m)       Objective:   General: alert awake well-oriented, no acute distress. HEENT: EOMI, no Icterus, no Pallor,  mucosa moist.  Neck: Neck is supple, No JVD  Lungs: breathsounds normal, no respiratory distress on inspection, no rhonchi, no rales,   CVS: heart sounds normal,  no murmurs, no rubs. GI: soft, nontender, normal BS.   Extremeties: no cyanosis, no edema,   Neuro: Alert, awake, oriented x3, speech is normal, moving all extremeties well. Skin: normal skin turgor, no skin rashes. Intake and Output:  Current Shift: 09/14 0701 - 09/14 1900  In: 680 [P.O.:580; I.V.:100]  Out: -   Last three shifts: 09/12 1901 - 09/14 0700  In: 480 [P.O.:450; I.V.:30]  Out: 2200       Lab/Data Review:  Recent Labs     09/14/21  0703 09/13/21  0633   WBC 9.8 8.2   HGB 10.7* 11.0*   HCT 34.7* 35.2*    315     No results for input(s): NA, K, CL, CO2, GLU, BUN, CREA, CA, MG, PHOS, ALB, TBIL, TBILI, ALT, INR, INREXT in the last 72 hours. No lab exists for component: SGOT  No results for input(s): PH, PCO2, PO2, HCO3, FIO2 in the last 72 hours. Recent Results (from the past 24 hour(s))   GLUCOSE, POC    Collection Time: 09/13/21  3:21 PM   Result Value Ref Range    Glucose (POC) 127 (H) 65 - 117 mg/dL    Performed by Ran Quintanilla    GLUCOSE, POC    Collection Time: 09/13/21  9:19 PM   Result Value Ref Range    Glucose (POC) 156 (H) 65 - 117 mg/dL    Performed by SHABNAM ERICKSON    CBC WITH AUTOMATED DIFF    Collection Time: 09/14/21  7:03 AM   Result Value Ref Range    WBC 9.8 4.1 - 11.1 K/uL    RBC 4.04 (L) 4.10 - 5.70 M/uL    HGB 10.7 (L) 12.1 - 17.0 g/dL    HCT 34.7 (L) 36.6 - 50.3 %    MCV 85.9 80.0 - 99.0 FL    MCH 26.5 26.0 - 34.0 PG    MCHC 30.8 30.0 - 36.5 g/dL    RDW 19.1 (H) 11.5 - 14.5 %    PLATELET 691 016 - 805 K/uL    MPV 10.5 8.9 - 12.9 FL    NRBC 0.0 0.0  WBC    ABSOLUTE NRBC 0.00 0.00 - 0.01 K/uL    NEUTROPHILS 75 32 - 75 %    LYMPHOCYTES 11 (L) 12 - 49 %    MONOCYTES 10 5 - 13 %    EOSINOPHILS 2 0 - 7 %    BASOPHILS 1 0 - 1 %    IMMATURE GRANULOCYTES 1 (H) 0 - 0.5 %    ABS. NEUTROPHILS 7.5 1.8 - 8.0 K/UL    ABS. LYMPHOCYTES 1.1 0.8 - 3.5 K/UL    ABS. MONOCYTES 0.9 0.0 - 1.0 K/UL    ABS. EOSINOPHILS 0.2 0.0 - 0.4 K/UL    ABS. BASOPHILS 0.1 0.0 - 0.1 K/UL    ABS. IMM.  GRANS. 0.1 (H) 0.00 - 0.04 K/UL    DF AUTOMATED     C REACTIVE PROTEIN, QT    Collection Time: 09/14/21  7:03 AM   Result Value Ref Range    C-Reactive protein 10.40 (H) 0.00 - 0.60 mg/dL   PROCALCITONIN    Collection Time: 09/14/21  7:03 AM   Result Value Ref Range    Procalcitonin 9.01 (H) 0 ng/mL   CULTURE, BLOOD    Collection Time: 09/14/21  7:03 AM    Specimen: Blood   Result Value Ref Range    Special Requests: No Special Requests      Culture result: No growth after 2 hours     GLUCOSE, POC    Collection Time: 09/14/21  7:16 AM   Result Value Ref Range    Glucose (POC) 116 65 - 117 mg/dL    Performed by Wendie Brink    GLUCOSE, POC    Collection Time: 09/14/21 11:49 AM   Result Value Ref Range    Glucose (POC) 149 (H) 65 - 117 mg/dL    Performed by Quique Delaney and Plan:     1. ESRD. -On hemodialysis Monday Wednesday Friday.    -Status post hemodialysis yesterday  -No significant volume overload or uremia. --He has right IJ permacath is a dialysis access.    -He has left wrist AV fistula which is still maturing. Next hemodialysis treatment arranged for tomorrow, will continue hemodialysis on MWF     2. Sepsis.    -He came with high temp and hypotensive with leukocytosis. -klebsiella and MRSA bacteremia.  -likely catheter related bacteremia.   -On IV antibiotics vancomycin and cefepime   ID following the patient     2. Anemia. Stable Hb   -monitor H/H     4.  Renal osteodystrophy.   -Stable calcium and phosphorus levels    5. SVT. resolved now,     6. HTN : Stable blood pressures  On low-dose carvedilol and losartan  Continue to monitor blood pressures    7.   History of congestive heart failure/cardiomyopathy: No evidence of any fluid overload  We will continue ultrafiltration with hemodialysis for fluid management      Signed By: Marta Saleem MD     September 14, 2021

## 2021-09-14 NOTE — PROGRESS NOTES
Progress Note    Patient: Edvin Hutchinson MRN: [id number]  SSN: [id number]    YOB: 1950  Age: 72 y.o. Sex: male      Admit Date: 9/8/2021    LOS: 6 days     Subjective:   Patient followed for sepsis with Gram negative bacteremia with Klebseilla and MRSA bacteremia. Wound cultures grew Morganella and E. Faecalis. Repeat blood cultures from Friday also grew MRSA. Blood cultures repeated again this morning. Leida Benson He remains afebrile. Currently on IV Cefepime and Vancomycin. Patient resting comfortably with no complaints. Objective:     Vitals:    09/13/21 1553 09/13/21 1703 09/13/21 2253 09/14/21 0723   BP: 128/80 122/77 107/68 126/64   Pulse: 80 77 74 72   Resp: 16  16 16   Temp: 97.9 °F (36.6 °C)  98.6 °F (37 °C) 98.6 °F (37 °C)   TempSrc:       SpO2: 100%  96% 97%   Weight:       Height:            Intake and Output:  Current Shift: 09/14 0701 - 09/14 1900  In: 200 [P.O.:100; I.V.:100]  Out: -   Last three shifts: 09/12 1901 - 09/14 0700  In: 480 [P.O.:450; I.V.:30]  Out: 2200     Physical Exam:   Constitutional:       Appearance: He is ill-appearing. HENT: unremarkable    Heart:  No murmurs     Comments: Right sided Permacath site unremarkable  Pulmonary:      Effort: Pulmonary effort is normal.      Breath sounds: Normal breath sounds. Genitourinary:     Comments: No Pleitez  Musculoskeletal:      Cervical back: Neck supple. Right lower leg: No edema. Left lower leg: No edema. Skin: 1 cm open wound sacrococcygeal area with scarring     Findings: No erythema, lesion or rash. Neurological:      General: No focal deficit present. Mental Status: He is alert and oriented to person, place, and time. Psychiatric:         Mood and Affect: Mood normal.         Behavior: Behavior normal.         Thought Content:  Thought content      Lab/Data Review:     WBC 9,800    Procal 14.31 <25.31 <28.33  CRP 10.40 < 10.80 <17.10    Blood cultures (9/8) Klebsiella pneumoniae and MRSA  Blood cultures (9/10) MRSA  Blood cultures (9/14) Pending  Wound culture coccyx (9/9) Morganella morganii and Enterococcus faecalis  Assessment:     Active Problems:    Sepsis ([address] (7/7/2021)    1. Gram-negative bacteremia, with Klebsiella pneumoniae, possibly dialysis catheter related, Day #7 IV Cefepime  2. MRSA bacteremia, possibly dialysis catheter related, Day #7 IV Vancomycin   3. Sepsis with fever, tachycardia and leukocytosis with elevated lactic acid, procal and CRP, resolved or resolving  4. Wound on coccyx, ?superinfection with Morganella morganii and Enterococcus faecalis, on antibiotics above  5. ESRD on HD    Comment:  Unable to link coccygeal wound with blood cultures isolates. Signifiance of Morganella and E. Faecalis unclear but covered by current antibiotics. Plan:   1. Discontinue Cefepime and start IV Tobramycin which could be given at hemodialysis (Morganella resistant to Gentamcin)  2. Continue IV Vancomycin   3. In am, repeat CBC, procal and CRP  4. Follow-up blood cultures  5. Awaiting TTE to rule out endocarditis  6.  Duration of antiotics: 14 days after first negative blood cutures if no evidence of endocarditis    Signed By: Vivian Noble MD     September 14, 2021

## 2021-09-14 NOTE — PROGRESS NOTES
Physician Progress Note      Yogi Sun  CSN #:                  [id number]  :                       1950  ADMIT DATE:       2021 4:13 PM  DISCH DATE:  RESPONDING  PROVIDER #:        Connor FRIEDMAN PA-C          QUERY TEXT:    Pt admitted with sepsis. Pt noted to have stage 4 sacral pressure ulcer documented on . If possible, please document in progress notes and discharge summary the present on admission status of stage 4 sacral pressure ulcer :     The medical record reflects the following:  Risk Factors: 73 yo male with ESRD, decreased mobility, anticoagulation therapy  Clinical Indicators: Wound Nurse documentation on  shows stage 4 pressure ulcer to sacral/coccyx  with full thickness tissue loss  Treatment: Alginate with Ag;Foam, Turn/reposition approximately every 2 hours    Thank you,  Anette Lou RN, CCDS, CPC  Options provided:  -- Yes, stage 4 sacral pressure ulcer was present at the time of the order to admit to the hospital  -- No, stage 4 sacral pressure ulcer was not present on admission and developed during the inpatient stay  -- Other - I will add my own diagnosis  -- Disagree - Not applicable / Not valid  -- Disagree - Clinically unable to determine / Unknown  -- Refer to Clinical Documentation Reviewer    PROVIDER RESPONSE TEXT:    Yes, stage 4 sacral pressure ulcer was present at the time of the order to admit to the hospital.    Query created by: Dionicio Ruffin on 2021 12:23 PM      Electronically signed by:  Rahul Bernstein PA-C 2021 12:27 PM

## 2021-09-14 NOTE — PROGRESS NOTES
5584 - received critical procalcitonin level of 9.01  2430- paged Dr. Kathy Avelar and reported lab level, no new orders at this time

## 2021-09-14 NOTE — PROGRESS NOTES
Spiritual Care Assessment/Progress Note  [facility]      NAME: Vee Briggs      MRN: [id number]  AGE: 72 y.o.  SEX: male  Hinduism Affiliation: No preference   Language: English     9/14/2021     Total Time (in minutes): 41     Spiritual Assessment begun in [facility] through conversation with:         [x]Patient        [] Family    [] Friend(s)        Reason for Consult: Initial/Spiritual assessment, patient floor     Spiritual beliefs: (Please include comment if needed)     [x] Identifies with a ananya tradition:         [] Supported by a ananya community:            [] Claims no spiritual orientation:           [] Seeking spiritual identity:                [] Adheres to an individual form of spirituality:           [] Not able to assess:                           Identified resources for coping:      [x] Prayer                               [] Music                  [] Guided Imagery     [x] Family/friends                 [] Pet visits     [] Devotional reading                         [] Unknown     [] Other:                                               Interventions offered during this visit: (See comments for more details)    Patient Interventions: Affirmation of emotions/emotional suffering, Affirmation of ananya, Bridging, Catharsis/review of pertinent events in supportive environment, Coping skills reviewed/reinforced, Iconic (affirming the presence of God/Higher Power), Initial/Spiritual assessment, patient floor, Life review/legacy, Normalization of emotional/spiritual concerns, Prayer (actual), Prayer (assurance of), Hinduism beliefs/image of God discussed           Plan of Care:     [] Support spiritual and/or cultural needs    [] Support AMD and/or advance care planning process      [] Support grieving process   [] Coordinate Rites and/or Rituals    [] Coordination with community clergy   [] No spiritual needs identified at this time   [] Detailed Plan of Care below (See Comments)  [] Make referral to Music Therapy  [] Make referral to Pet Therapy     [] Make referral to Addiction services  [] Make referral to [facility]  [] Make referral to Spiritual Care Partner  [] No future visits requested        [x] Follow up upon further referrals     Comments:  Visited patient in the [facility] unit for initial assessment. Patient was alone during the visit. 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 He expressed gratitude towards God for watching over him over the years. Provided chaplaincy education and listened with empathy, reflection and clarification while facilitating storytelling as well as exploring his needs. Affirmed his familial support and meaningful relationship with God arising from gratitude. Offered and provided prayer per his request and advised of  availability. Chaplains will follow up if further referrals are requested. Chaplain Colleen Palm M.Div.    can be reached by calling the  at General acute hospital  [phone]

## 2021-09-14 NOTE — PROGRESS NOTES
Hospitalist Progress Note    Subjective:   Daily Progress Note: 9/14/2021 11:58 AM    Denies chest pain or shortness of breath. Current Facility-Administered Medications   Medication Dose Route Frequency    [START ON 9/15/2021] Vancomycin Level Reminder 09/15 at 0400   Other ONCE    traMADoL (ULTRAM) tablet 50 mg  50 mg Oral Q6H PRN    vancomycin dose per pharmacy protocol   Other Rx Dosing/Monitoring    heparin (porcine) 1,000 unit/mL injection 3,800 Units  3,800 Units Hemodialysis DIALYSIS PRN    carvediloL (COREG) tablet 3.125 mg  3.125 mg Oral BID WITH MEALS    losartan (COZAAR) tablet 25 mg  25 mg Oral DAILY    sodium chloride (NS) flush 5-40 mL  5-40 mL IntraVENous PRN    acetaminophen (TYLENOL) tablet 650 mg  650 mg Oral Q6H PRN    Or    acetaminophen (TYLENOL) suppository 650 mg  650 mg Rectal Q6H PRN    ondansetron (ZOFRAN ODT) tablet 4 mg  4 mg Oral Q8H PRN    Or    ondansetron (ZOFRAN) injection 4 mg  4 mg IntraVENous Q6H PRN    heparin (porcine) injection 5,000 Units  5,000 Units SubCUTAneous Q12H    cefepime (MAXIPIME) 1 g in sterile water (preservative free) 10 mL IV syringe  1 g IntraVENous Q24H        Review of Systems  Review of Systems   Constitutional: Positive for malaise/fatigue. Negative for chills and fever. HENT: Negative. Eyes: Negative. Respiratory: Negative for cough and shortness of breath. Cardiovascular: Negative for chest pain and leg swelling. Gastrointestinal: Negative for abdominal pain, nausea and vomiting. Genitourinary: Negative. Musculoskeletal: Positive for neck pain. Skin: Negative. Sacral ulcer   Neurological: Negative. Psychiatric/Behavioral: Negative.              Objective:     Visit Vitals  /79 (BP 1 Location: Right upper arm, BP Patient Position: At rest;Semi fowlers)   Pulse 75   Temp 97.7 °F (36.5 °C)   Resp 18   Ht 5' 10\" (1.778 m)   Wt 85.1 kg (187 lb 9.8 oz)   SpO2 97%   BMI 26.92 kg/m²      O2 Device: None (Room air)    Temp (24hrs), Av.1 °F (36.7 °C), Min:97.5 °F (36.4 °C), Max:98.6 °F (37 °C)      701 - 1900  In: 200 [P.O.:100; I.V.:100]  Out: -   1901 -  07  In: 480 [P.O.:450; I.V.:30]  Out: 2200     Recent Results (from the past 24 hour(s))   GLUCOSE, POC    Collection Time: 21  3:21 PM   Result Value Ref Range    Glucose (POC) 127 (H) 65 - 117 mg/dL    Performed by Isaac Chambers    GLUCOSE, POC    Collection Time: 21  9:19 PM   Result Value Ref Range    Glucose (POC) 156 (H) 65 - 117 mg/dL    Performed by SHABNAM ERICKSON    CBC WITH AUTOMATED DIFF    Collection Time: 21  7:03 AM   Result Value Ref Range    WBC 9.8 4.1 - 11.1 K/uL    RBC 4.04 (L) 4.10 - 5.70 M/uL    HGB 10.7 (L) 12.1 - 17.0 g/dL    HCT 34.7 (L) 36.6 - 50.3 %    MCV 85.9 80.0 - 99.0 FL    MCH 26.5 26.0 - 34.0 PG    MCHC 30.8 30.0 - 36.5 g/dL    RDW 19.1 (H) 11.5 - 14.5 %    PLATELET 136 249 - 045 K/uL    MPV 10.5 8.9 - 12.9 FL    NRBC 0.0 0.0  WBC    ABSOLUTE NRBC 0.00 0.00 - 0.01 K/uL    NEUTROPHILS 75 32 - 75 %    LYMPHOCYTES 11 (L) 12 - 49 %    MONOCYTES 10 5 - 13 %    EOSINOPHILS 2 0 - 7 %    BASOPHILS 1 0 - 1 %    IMMATURE GRANULOCYTES 1 (H) 0 - 0.5 %    ABS. NEUTROPHILS 7.5 1.8 - 8.0 K/UL    ABS. LYMPHOCYTES 1.1 0.8 - 3.5 K/UL    ABS. MONOCYTES 0.9 0.0 - 1.0 K/UL    ABS. EOSINOPHILS 0.2 0.0 - 0.4 K/UL    ABS. BASOPHILS 0.1 0.0 - 0.1 K/UL    ABS. IMM.  GRANS. 0.1 (H) 0.00 - 0.04 K/UL    DF AUTOMATED     C REACTIVE PROTEIN, QT    Collection Time: 21  7:03 AM   Result Value Ref Range    C-Reactive protein 10.40 (H) 0.00 - 0.60 mg/dL   PROCALCITONIN    Collection Time: 21  7:03 AM   Result Value Ref Range    Procalcitonin 9.01 (H) 0 ng/mL   GLUCOSE, POC    Collection Time: 21  7:16 AM   Result Value Ref Range    Glucose (POC) 116 65 - 117 mg/dL    Performed by [address]  , POC    Collection Time: 21 11:49 AM   Result Value Ref Range    Glucose (POC) 149 (H) 65 - 117 mg/dL    Performed by Yoshi Jauregui         CT CHEST WO CONT   Final Result   1. There are findings suspect for fluid overload with increased edema within   the body wall fat as well as a mild degree of lung base groundglass parenchymal   density suspect for mild hydrostatic edema. 2.  In addition, there is discoid atelectasis as might be associated with mucous   plugging or bronchospasm. 3.  This examination is negative for dense airspace consolidation. Please see   the above text for further details. XR CHEST PORT   Final Result           PHYSICAL EXAM:    Physical Exam  Vitals reviewed. Constitutional:       General: He is not in acute distress. Appearance: He is not ill-appearing. HENT:      Head: Normocephalic and atraumatic. Mouth/Throat:      Mouth: Mucous membranes are moist.      Pharynx: Oropharynx is clear. Eyes:      Conjunctiva/sclera: Conjunctivae normal.   Neck:      Comments: Mild left cervical paraspinal tenderness  Cardiovascular:      Rate and Rhythm: Normal rate and regular rhythm. Heart sounds: Normal heart sounds. Pulmonary:      Effort: Pulmonary effort is normal.      Breath sounds: Normal breath sounds. Abdominal:      General: Abdomen is flat. Bowel sounds are normal.   Musculoskeletal:         General: Normal range of motion. Cervical back: Normal range of motion and neck supple. Skin:     Comments: Left sacral ulcer is covered with no areas of cellulitis   Neurological:      General: No focal deficit present. Mental Status: He is alert and oriented to person, place, and time. Mental status is at baseline.    Psychiatric:         Mood and Affect: Mood normal.          Data Review    Recent Results (from the past 24 hour(s))   GLUCOSE, POC    Collection Time: 09/13/21  3:21 PM   Result Value Ref Range    Glucose (POC) 127 (H) 65 - 117 mg/dL    Performed by Omaira Pandya, POC    Collection Time: 09/13/21  9:19 PM   Result Value Ref Range    Glucose (POC) 156 (H) 65 - 117 mg/dL    Performed by SHABNAM ERICKSON    CBC WITH AUTOMATED DIFF    Collection Time: 09/14/21  7:03 AM   Result Value Ref Range    WBC 9.8 4.1 - 11.1 K/uL    RBC 4.04 (L) 4.10 - 5.70 M/uL    HGB 10.7 (L) 12.1 - 17.0 g/dL    HCT 34.7 (L) 36.6 - 50.3 %    MCV 85.9 80.0 - 99.0 FL    MCH 26.5 26.0 - 34.0 PG    MCHC 30.8 30.0 - 36.5 g/dL    RDW 19.1 (H) 11.5 - 14.5 %    PLATELET 034 402 - 827 K/uL    MPV 10.5 8.9 - 12.9 FL    NRBC 0.0 0.0  WBC    ABSOLUTE NRBC 0.00 0.00 - 0.01 K/uL    NEUTROPHILS 75 32 - 75 %    LYMPHOCYTES 11 (L) 12 - 49 %    MONOCYTES 10 5 - 13 %    EOSINOPHILS 2 0 - 7 %    BASOPHILS 1 0 - 1 %    IMMATURE GRANULOCYTES 1 (H) 0 - 0.5 %    ABS. NEUTROPHILS 7.5 1.8 - 8.0 K/UL    ABS. LYMPHOCYTES 1.1 0.8 - 3.5 K/UL    ABS. MONOCYTES 0.9 0.0 - 1.0 K/UL    ABS. EOSINOPHILS 0.2 0.0 - 0.4 K/UL    ABS. BASOPHILS 0.1 0.0 - 0.1 K/UL    ABS. IMM. GRANS. 0.1 (H) 0.00 - 0.04 K/UL    DF AUTOMATED     C REACTIVE PROTEIN, QT    Collection Time: 09/14/21  7:03 AM   Result Value Ref Range    C-Reactive protein 10.40 (H) 0.00 - 0.60 mg/dL   PROCALCITONIN    Collection Time: 09/14/21  7:03 AM   Result Value Ref Range    Procalcitonin 9.01 (H) 0 ng/mL   GLUCOSE, POC    Collection Time: 09/14/21  7:16 AM   Result Value Ref Range    Glucose (POC) 116 65 - 117 mg/dL    Performed by Demetrio Perry    GLUCOSE, POC    Collection Time: 09/14/21 11:49 AM   Result Value Ref Range    Glucose (POC) 149 (H) 65 - 117 mg/dL    Performed by Demetrio Perry         Assessment/Plan:     Active Problems:    Sepsis ([facility] [address] (7/7/2021)      Hospital course: This is a 73-year-old male admitted on 9/8/2021 with a history of end-stage renal disease currently on hemodialysis Monday, Wednesday and Friday with cardiomyopathy with a prior EF of 15%. Echocardiogram pending. Patient was found to be bacteremic with Klebsiella pneumonia and MRSA. ID consultation, Dr. Sheng Bermeo. Nephrology consultation Dr. Rima Hobbs. Initial blood cultures with Klebsiella pneumonia sensitive to cefepime and MRSA identified sensitive to vancomycin. Repeat blood culture grew 1 of 2 bottles of MRSA. Repeat blood culture pending. Echocardiogram pending to rule out endocarditis. This may be related to the patient's right chest permacath. There is also additional source with the sacral ulcer. Impression\plan:    1. Sepsis with bacteremia  Blood cultures grew MRSA and Klebsiella pneumonia  Continue cefepime and vancomycin  This may be related to right chest permacath versus sacral ulcer  ID consultation  Repeat blood cultures pending  TTE to rule out endocarditis  Duration of antibiotics recommended is 14 days post first negative blood culture. 2.  Cardiomyopathy with an EF of 15%  Echocardiogram pending  Continue Coreg  Continue losartan  Stable    3. COPD without exacerbation  Continue albuterol as needed    4. Essential hypertension  Continue Coreg  Continue losartan    5. Sacral ulcer  Continue wound care    CODE STATUS: Full code    DVT prophylaxis: Heparin  Ulcer prophylaxis not indicated    Discharge barrier echocardiogram, determination of antibiotics and duration    Care Plan discussed with: Patient/Family    Total time spent with patient: >35 minutes.

## 2021-09-14 NOTE — PROGRESS NOTES
This nurse perfect served Dr. Nimco Ponce regarding patient having 8 beats wide complex. No new orders at this time.

## 2021-09-14 NOTE — PROGRESS NOTES
OT eval order received and acknowledged. OT eval attempted at 10:01, pt noted resting in semi-supine upon arrival. OT role explained, however pt requesting to rest at this time stating he had already been up this morning. Will continue to follow patient and attempt OT eval at a later time. Thank you.

## 2021-09-14 NOTE — PROGRESS NOTES
Comprehensive Nutrition Assessment    Type and Reason for Visit: Initial (Mild -Poor PO)    Nutrition Recommendations/Plan:   Continue current diet  Nursing to monitor BM, I/Os, %PO    Nutrition Assessment:  Admitted 2/2 Sepsis. Current appetite reported as fair. Reports he does not eat a lot normally. Per pt 25-50%PO regular. Denies want for ensure products to increase PO. Nutrition intervention: RD to add cardiac, DM and renal diet. To add extra snack to increase PO intake. Labs: Alb 2.4. Na 133. CRP 10.4. HH 10.7/34.7. BUN 50. Creat 8.42. . Phos 5. Meds reviewed. Malnutrition Assessment:  Malnutrition Status:  Mild malnutrition    Context:  Acute illness     Findings of the 6 clinical characteristics of malnutrition:   Energy Intake:  7 - 50% or less of est energy requirements for 5 or more days  Weight Loss:  No significant weight loss     Body Fat Loss:  No significant body fat loss,     Muscle Mass Loss:  No significant muscle mass loss,    Fluid Accumulation:  No significant fluid accumulation,        Nutrition Related Findings:  No NFPE performed, appears nourished, overweight but eating poorly. Denies N/V/C/D. BM 9/13 per pt and normal. Denies C/S issues. No edema. Wounds:    Full thickness (Sacral/coccyx full thickness tissue loss)       Current Nutrition Therapies:  ADULT DIET Regular; 4 carb choices (60 gm/meal); Low Fat/Low Chol/High Fiber/2 gm Na; Low Potassium (Less than 3000 mg/day); Low Phosphorus (Less than 1000 mg); Breakfast - likes cereal and milk. Lunch- tuna fish sandwich and soups  ADULT ORAL NUTRITION SUPPLEMENT Lunch, Dinner; Snack; Additional fruit cup    Anthropometric Measures:  · Height:  5' 10\" (177.8 cm)  · Current Body Wt:  85.1 kg (187 lb 9.8 oz)   · Admission Body Wt:  174 lb    · Usual Body Wt:  77.1 kg (170 lb)     · Ideal Body Wt:  166 lbs:  113 %     · BMI Category: Overweight (BMI 25.0-29. 9)     Wt Readings from Last 3 Encounters:   09/12/21 85.1 kg (187 lb 9.8 oz)   07/09/21 79.1 kg (174 lb 6.1 oz)   07/06/21 77.1 kg (170 lb)       Nutrition Diagnosis:   · Inadequate oral intake related to inadequate protein-energy intake as evidenced by intake 26-50%      Nutrition Interventions:   Food and/or Nutrient Delivery: Continue current diet  Nutrition Education and Counseling: No recommendations at this time  Coordination of Nutrition Care: Continue to monitor while inpatient      Nutrition Monitoring and Evaluation:   Behavioral-Environmental Outcomes: None identified  Food/Nutrient Intake Outcomes: Food and nutrient intake  Physical Signs/Symptoms Outcomes: Biochemical data, Weight    Discharge Planning:    Continue current diet     Electronically signed by Jamie Castano RD on 9/14/2021 at 9:24 AM    Contact: Ext [phone]

## 2021-09-15 ENCOUNTER — APPOINTMENT (OUTPATIENT)
Dept: NON INVASIVE DIAGNOSTICS | Age: 71
DRG: 871 | End: 2021-09-15
Attending: INTERNAL MEDICINE
Payer: MEDICARE

## 2021-09-15 LAB
ALBUMIN SERPL-MCNC: 2.5 G/DL (ref 3.5–5)
ANION GAP SERPL CALC-SCNC: 9 MMOL/L (ref 5–15)
BASOPHILS # BLD: 0.1 K/UL (ref 0–0.1)
BASOPHILS NFR BLD: 1 % (ref 0–1)
BUN SERPL-MCNC: 43 MG/DL (ref 6–20)
BUN/CREAT SERPL: 6 (ref 12–20)
CA-I BLD-MCNC: 8.9 MG/DL (ref 8.5–10.1)
CHLORIDE SERPL-SCNC: 100 MMOL/L (ref 97–108)
CO2 SERPL-SCNC: 24 MMOL/L (ref 21–32)
CREAT SERPL-MCNC: 7.73 MG/DL (ref 0.7–1.3)
CRP SERPL-MCNC: 8.81 MG/DL (ref 0–0.6)
DATE LAST DOSE: NORMAL
DIFFERENTIAL METHOD BLD: ABNORMAL
ECHO AO ROOT DIAM: 2.9 CM
ECHO AV PEAK GRADIENT: 7 MMHG
ECHO AV PEAK VELOCITY: 128 CM/S
ECHO EST RA PRESSURE: 8 MMHG
ECHO LA AREA 4C: 25.21 CM2
ECHO LA MAJOR AXIS: 4.7 CM
ECHO LA MINOR AXIS: 2.32 CM
ECHO LV E' SEPTAL VELOCITY: 3.7 CM/S
ECHO LV EDV A2C: 207 CM3
ECHO LV EDV A4C: 186 CM3
ECHO LV EJECTION FRACTION A4C: 30 %
ECHO LV EJECTION FRACTION BIPLANE: 32.7 % (ref 55–100)
ECHO LV ESV A2C: 102 CM3
ECHO LV ESV A4C: 130 CM3
ECHO LV INTERNAL DIMENSION DIASTOLIC MMODE: 5.82 CM
ECHO LV INTERNAL DIMENSION DIASTOLIC: 5.92 CM (ref 4.2–5.9)
ECHO LV INTERNAL DIMENSION SYSTOLIC MMODE: 4.91 CM
ECHO LV INTERNAL DIMENSION SYSTOLIC: 4.67 CM
ECHO LV IVSD MMODE: 1.95 CM
ECHO LV IVSD: 1.45 CM (ref 0.6–1)
ECHO LV MASS 2D: 368.3 G (ref 88–224)
ECHO LV MASS INDEX 2D: 181.4 G/M2 (ref 49–115)
ECHO LV POSTERIOR WALL DIASTOLIC MMODE: 1.43 CM
ECHO LV POSTERIOR WALL DIASTOLIC: 1.29 CM (ref 0.6–1)
ECHO LVOT PEAK GRADIENT: 2 MMHG
ECHO LVOT PEAK VELOCITY: 64.2 CM/S
ECHO LVOT SV: 79 CM3
ECHO MV A VELOCITY: 29.6 CM/S
ECHO MV AREA PHT: 3.49 CM2
ECHO MV E DECELERATION TIME (DT): 127 MS
ECHO MV E VELOCITY: 122 CM/S
ECHO MV E/A RATIO: 4.12
ECHO MV E/E' SEPTAL: 32.97
ECHO MV PRESSURE HALF TIME (PHT): 63 MS
ECHO PV MAX VELOCITY: 91.3 CM/S
ECHO PV PEAK INSTANTANEOUS GRADIENT SYSTOLIC: 3 MMHG
ECHO PV PEAK INSTANTANEOUS GRADIENT SYSTOLIC: 6 MMHG
ECHO PV REGURGITANT MAX VELOCITY: 126 CM/S
ECHO RA AREA 4C: 23.91 CM2
ECHO RIGHT VENTRICULAR SYSTOLIC PRESSURE (RVSP): 49 MMHG
ECHO RV INTERNAL DIMENSION: 3.99 CM
ECHO RV TAPSE: 1.6 CM (ref 1.5–2)
ECHO TV MAX VELOCITY: 321 CM/S
ECHO TV REGURGITANT PEAK GRADIENT: 41 MMHG
EOSINOPHIL # BLD: 0.2 K/UL (ref 0–0.4)
EOSINOPHIL NFR BLD: 3 % (ref 0–7)
ERYTHROCYTE [DISTWIDTH] IN BLOOD BY AUTOMATED COUNT: 19.3 % (ref 11.5–14.5)
GLUCOSE BLD STRIP.AUTO-MCNC: 123 MG/DL (ref 65–117)
GLUCOSE BLD STRIP.AUTO-MCNC: 140 MG/DL (ref 65–117)
GLUCOSE BLD STRIP.AUTO-MCNC: 145 MG/DL (ref 65–117)
GLUCOSE SERPL-MCNC: 128 MG/DL (ref 65–100)
HCT VFR BLD AUTO: 35 % (ref 36.6–50.3)
HGB BLD-MCNC: 10.8 G/DL (ref 12.1–17)
IMM GRANULOCYTES # BLD AUTO: 0 K/UL (ref 0–0.04)
IMM GRANULOCYTES NFR BLD AUTO: 1 % (ref 0–0.5)
LYMPHOCYTES # BLD: 1.7 K/UL (ref 0.8–3.5)
LYMPHOCYTES NFR BLD: 20 % (ref 12–49)
MCH RBC QN AUTO: 26.8 PG (ref 26–34)
MCHC RBC AUTO-ENTMCNC: 30.9 G/DL (ref 30–36.5)
MCV RBC AUTO: 86.8 FL (ref 80–99)
MONOCYTES # BLD: 0.9 K/UL (ref 0–1)
MONOCYTES NFR BLD: 10 % (ref 5–13)
MV DEC SLOPE: 6060 MM/S2
MV DEC SLOPE: 6060 MM/S2
NEUTS SEG # BLD: 5.7 K/UL (ref 1.8–8)
NEUTS SEG NFR BLD: 65 % (ref 32–75)
NRBC # BLD: 0.02 K/UL (ref 0–0.01)
NRBC BLD-RTO: 0.2 PER 100 WBC
PERFORMED BY, TECHID: ABNORMAL
PHOSPHATE SERPL-MCNC: 6.1 MG/DL (ref 2.6–4.7)
PLATELET # BLD AUTO: 319 K/UL (ref 150–400)
PMV BLD AUTO: 10.6 FL (ref 8.9–12.9)
POTASSIUM SERPL-SCNC: 4.6 MMOL/L (ref 3.5–5.1)
PROCALCITONIN SERPL-MCNC: 6.87 NG/ML
RBC # BLD AUTO: 4.03 M/UL (ref 4.1–5.7)
REPORTED DOSE,DOSE: NORMAL UNITS
SODIUM SERPL-SCNC: 133 MMOL/L (ref 136–145)
TOBRAMYCIN SERPL-MCNC: 1.2 UG/ML
VANCOMYCIN SERPL-MCNC: 24.4 UG/ML
WBC # BLD AUTO: 8.5 K/UL (ref 4.1–11.1)

## 2021-09-15 PROCEDURE — 80069 RENAL FUNCTION PANEL: CPT

## 2021-09-15 PROCEDURE — 36415 COLL VENOUS BLD VENIPUNCTURE: CPT

## 2021-09-15 PROCEDURE — 74011250637 HC RX REV CODE- 250/637: Performed by: INTERNAL MEDICINE

## 2021-09-15 PROCEDURE — 93306 TTE W/DOPPLER COMPLETE: CPT

## 2021-09-15 PROCEDURE — 82962 GLUCOSE BLOOD TEST: CPT

## 2021-09-15 PROCEDURE — 74011250636 HC RX REV CODE- 250/636: Performed by: INTERNAL MEDICINE

## 2021-09-15 PROCEDURE — 86140 C-REACTIVE PROTEIN: CPT

## 2021-09-15 PROCEDURE — 74011000258 HC RX REV CODE- 258: Performed by: INTERNAL MEDICINE

## 2021-09-15 PROCEDURE — 90935 HEMODIALYSIS ONE EVALUATION: CPT

## 2021-09-15 PROCEDURE — 85025 COMPLETE CBC W/AUTO DIFF WBC: CPT

## 2021-09-15 PROCEDURE — 65270000029 HC RM PRIVATE

## 2021-09-15 PROCEDURE — 84145 PROCALCITONIN (PCT): CPT

## 2021-09-15 PROCEDURE — 74011250636 HC RX REV CODE- 250/636

## 2021-09-15 PROCEDURE — 80202 ASSAY OF VANCOMYCIN: CPT

## 2021-09-15 PROCEDURE — 80200 ASSAY OF TOBRAMYCIN: CPT

## 2021-09-15 PROCEDURE — 99232 SBSQ HOSP IP/OBS MODERATE 35: CPT | Performed by: INTERNAL MEDICINE

## 2021-09-15 PROCEDURE — 74011250636 HC RX REV CODE- 250/636: Performed by: HOSPITALIST

## 2021-09-15 RX ORDER — VANCOMYCIN HYDROCHLORIDE 10 G/100ML
1000 INJECTION, POWDER, LYOPHILIZED, FOR SOLUTION INTRAVENOUS
Qty: 6000 MG | Refills: 0 | Status: SHIPPED | OUTPATIENT
Start: 2021-09-15 | End: 2021-09-29

## 2021-09-15 RX ORDER — HEPARIN SODIUM 1000 [USP'U]/ML
INJECTION, SOLUTION INTRAVENOUS; SUBCUTANEOUS
Status: COMPLETED
Start: 2021-09-15 | End: 2021-09-15

## 2021-09-15 RX ADMIN — TRAMADOL HYDROCHLORIDE 50 MG: 50 TABLET, FILM COATED ORAL at 05:35

## 2021-09-15 RX ADMIN — CARVEDILOL 3.12 MG: 3.12 TABLET, FILM COATED ORAL at 16:14

## 2021-09-15 RX ADMIN — TRAMADOL HYDROCHLORIDE 50 MG: 50 TABLET, FILM COATED ORAL at 13:05

## 2021-09-15 RX ADMIN — HEPARIN SODIUM 5000 UNITS: 5000 INJECTION INTRAVENOUS; SUBCUTANEOUS at 21:11

## 2021-09-15 RX ADMIN — TOBRAMYCIN SULFATE 80 MG: 40 INJECTION, SOLUTION INTRAMUSCULAR; INTRAVENOUS at 21:02

## 2021-09-15 RX ADMIN — HEPARIN SODIUM 5000 UNITS: 5000 INJECTION INTRAVENOUS; SUBCUTANEOUS at 13:05

## 2021-09-15 RX ADMIN — HEPARIN SODIUM 3800 UNITS: 1000 INJECTION, SOLUTION INTRAVENOUS; SUBCUTANEOUS at 11:02

## 2021-09-15 RX ADMIN — LOSARTAN POTASSIUM 25 MG: 25 TABLET, FILM COATED ORAL at 13:05

## 2021-09-15 RX ADMIN — TRAMADOL HYDROCHLORIDE 50 MG: 50 TABLET, FILM COATED ORAL at 18:59

## 2021-09-15 NOTE — PROGRESS NOTES
Progress Note    Patient: Laura Salas MRN: [id number]  SSN: [id number]    YOB: 1950  Age: 72 y.o. Sex: male      Admit Date: 9/8/2021    LOS: 7 days     Subjective:   Patient followed for sepsis with Gram negative bacteremia with Klebseilla and MRSA bacteremia. Wound cultures grew Morganella and E. Faecalis. Repeat blood cultures from Friday also grew MRSA. Most recent blood cultures negative so far. TTE negative for vegetations. He remains afebrile. Currently on IV Tobramycin and Vancomycin. Patient resting comfortably with no complaints. Objective:     Vitals:    09/15/21 0920 09/15/21 0952 09/15/21 1016 09/15/21 1051   BP: 138/78 136/69 126/70 129/76   Pulse: 67 64     Resp:       Temp:       TempSrc:       SpO2:       Weight:       Height:            Intake and Output:  Current Shift: No intake/output data recorded. Last three shifts: 09/13 1901 - 09/15 0700  In: 750 [P.O.:630; I.V.:120]  Out: -     Physical Exam:   Constitutional:       Appearance: He is ill-appearing. HENT: unremarkable    Heart:  No murmurs     Comments: Right sided Permacath site unremarkable  Pulmonary:      Effort: Pulmonary effort is normal.      Breath sounds: Normal breath sounds. Genitourinary:     Comments: No Pleitez  Musculoskeletal:      Cervical back: Neck supple. Right lower leg: No edema. Left lower leg: No edema. Skin: 1 cm open wound sacrococcygeal area with scarring     Findings: No erythema, lesion or rash. Neurological:      General: No focal deficit present. Mental Status: He is alert and oriented to person, place, and time. Psychiatric:         Mood and Affect: Mood normal.         Behavior: Behavior normal.         Thought Content:  Thought content      Lab/Data Review:     WBC 8,500    Procal 6.87 <14.31 <25.31 <28.33  CRP 8.81 <10.40 < 10.80 <17.10    Blood cultures (9/8) Klebsiella pneumoniae and MRSA  Blood cultures (9/10) MRSA  Blood cultures (9/14) No growth 1 day  Wound culture coccyx (9/9) Morganella morganii and Enterococcus faecalis  Assessment:     Active Problems:    Sepsis ([address] (7/7/2021)    1. Gram-negative bacteremia, with Klebsiella pneumoniae, possibly dialysis catheter related, Day #8 IV antibiotics, now Gentamicin  2. MRSA bacteremia, possibly dialysis catheter related, Day #8 IV Vancomycin, TTE negative  3. Sepsis with fever, tachycardia and leukocytosis with elevated lactic acid, procal and CRP, resolved or resolving  4. Wound on coccyx, ?superinfection with Morganella morganii and Enterococcus faecalis, on antibiotics above  5. ESRD on HD    Comment:  ClinicalUnable to link coccygeal wound with blood cultures isolates. Signifiance of Morganella and E. Faecalis unclear but covered by current antibiotics. Plan:   1. Continue IV Tobramycin which could be given at hemodialysis (Morganella resistant to Gentamcin) for 2 weeks  2. Continue IV Vancomycin for 2 weeks  3. Prescriptions placed on chart to be faxed to [address] [facility]   4. In am, repeat CBC, procal and CRP  5.  Follow-up blood cultures      Signed By: Angely Campuzano MD     September 15, 2021

## 2021-09-15 NOTE — PROGRESS NOTES
Renal Progress Note    Patient: Leigh Norwood MRN: [id number]  SSN: [id number]    YOB: 1950  Age: 72 y.o. Sex: male      Admit Date: 9/8/2021    LOS: 7 days     Subjective:   Patient seen on HD. Alert and awake, no acute distress. Patient is  not giving any new complaints today. No complaints of any swelling in lower extremities. Tolerating HD well      Current Facility-Administered Medications   Medication Dose Route Frequency    [START ON 9/17/2021] Vancomycin Lab Due on 09/17 at 0400   Other ONCE    TOBRAMYCIN INFORMATION NOTE 1 Each  1 Each Other Rx Dosing/Monitoring    traMADoL (ULTRAM) tablet 50 mg  50 mg Oral Q6H PRN    vancomycin dose per pharmacy protocol   Other Rx Dosing/Monitoring    heparin (porcine) 1,000 unit/mL injection 3,800 Units  3,800 Units Hemodialysis DIALYSIS PRN    carvediloL (COREG) tablet 3.125 mg  3.125 mg Oral BID WITH MEALS    losartan (COZAAR) tablet 25 mg  25 mg Oral DAILY    sodium chloride (NS) flush 5-40 mL  5-40 mL IntraVENous PRN    acetaminophen (TYLENOL) tablet 650 mg  650 mg Oral Q6H PRN    Or    acetaminophen (TYLENOL) suppository 650 mg  650 mg Rectal Q6H PRN    ondansetron (ZOFRAN ODT) tablet 4 mg  4 mg Oral Q8H PRN    Or    ondansetron (ZOFRAN) injection 4 mg  4 mg IntraVENous Q6H PRN    heparin (porcine) injection 5,000 Units  5,000 Units SubCUTAneous Q12H        Vitals:    09/15/21 1120 09/15/21 1151 09/15/21 1200 09/15/21 1502   BP: 122/70 128/67  130/78   Pulse:   64 77   Resp:    21   Temp:    97.6 °F (36.4 °C)   TempSrc:       SpO2:    100%   Weight:       Height:         Objective:   General: alert awake well-oriented, no acute distress. HEENT: EOMI, no Icterus, no Pallor,  mucosa moist.  Neck: Neck is supple, No JVD  Lungs: breathsounds normal, no respiratory distress on inspection, no rhonchi, no rales,   CVS: heart sounds normal,  no murmurs, no rubs. GI: soft, nontender, normal BS.   Extremeties: no cyanosis, no edema, Neuro: Alert, awake, oriented x3, speech is normal, moving all extremeties well. Skin: normal skin turgor, no skin rashes. Intake and Output:  Current Shift: No intake/output data recorded. Last three shifts: 09/13 1901 - 09/15 0700  In: 750 [P.O.:630; I.V.:120]  Out: -       Lab/Data Review:  Recent Labs     09/15/21  0619 09/14/21  0703 09/13/21  0633   WBC 8.5 9.8 8.2   HGB 10.8* 10.7* 11.0*   HCT 35.0* 34.7* 35.2*    311 315     Recent Labs     09/15/21  0619   *   K 4.6      CO2 24   *   BUN 43*   CREA 7.73*   CA 8.9   PHOS 6.1*   ALB 2.5*     No results for input(s): PH, PCO2, PO2, HCO3, FIO2 in the last 72 hours. Recent Results (from the past 24 hour(s))   GLUCOSE, POC    Collection Time: 09/14/21  8:08 PM   Result Value Ref Range    Glucose (POC) 149 (H) 65 - 117 mg/dL    Performed by Sophie Cantu, RANDOM    Collection Time: 09/15/21  6:19 AM   Result Value Ref Range    Vancomycin, random 24.4 ug/mL    Reported dose date Not provided      Reported dose: Not provided Units   CBC WITH AUTOMATED DIFF    Collection Time: 09/15/21  6:19 AM   Result Value Ref Range    WBC 8.5 4.1 - 11.1 K/uL    RBC 4.03 (L) 4.10 - 5.70 M/uL    HGB 10.8 (L) 12.1 - 17.0 g/dL    HCT 35.0 (L) 36.6 - 50.3 %    MCV 86.8 80.0 - 99.0 FL    MCH 26.8 26.0 - 34.0 PG    MCHC 30.9 30.0 - 36.5 g/dL    RDW 19.3 (H) 11.5 - 14.5 %    PLATELET 246 516 - 319 K/uL    MPV 10.6 8.9 - 12.9 FL    NRBC 0.2 (H) 0.0  WBC    ABSOLUTE NRBC 0.02 (H) 0.00 - 0.01 K/uL    NEUTROPHILS 65 32 - 75 %    LYMPHOCYTES 20 12 - 49 %    MONOCYTES 10 5 - 13 %    EOSINOPHILS 3 0 - 7 %    BASOPHILS 1 0 - 1 %    IMMATURE GRANULOCYTES 1 (H) 0 - 0.5 %    ABS. NEUTROPHILS 5.7 1.8 - 8.0 K/UL    ABS. LYMPHOCYTES 1.7 0.8 - 3.5 K/UL    ABS. MONOCYTES 0.9 0.0 - 1.0 K/UL    ABS. EOSINOPHILS 0.2 0.0 - 0.4 K/UL    ABS. BASOPHILS 0.1 0.0 - 0.1 K/UL    ABS. IMM.  GRANS. 0.0 0.00 - 0.04 K/UL    DF AUTOMATED     C REACTIVE PROTEIN, QT Collection Time: 09/15/21  6:19 AM   Result Value Ref Range    C-Reactive protein 8.81 (H) 0.00 - 0.60 mg/dL   PROCALCITONIN    Collection Time: 09/15/21  6:19 AM   Result Value Ref Range    Procalcitonin 6.87 (H) 0 ng/mL   RENAL FUNCTION PANEL    Collection Time: 09/15/21  6:19 AM   Result Value Ref Range    Sodium 133 (L) 136 - 145 mmol/L    Potassium 4.6 3.5 - 5.1 mmol/L    Chloride 100 97 - 108 mmol/L    CO2 24 21 - 32 mmol/L    Anion gap 9 5 - 15 mmol/L    Glucose 128 (H) 65 - 100 mg/dL    BUN 43 (H) 6 - 20 mg/dL    Creatinine 7.73 (H) 0.70 - 1.30 mg/dL    BUN/Creatinine ratio 6 (L) 12 - 20      GFR est AA 8 (L) >60 ml/min/1.73m2    GFR est non-AA 7 (L) >60 ml/min/1.73m2    Calcium 8.9 8.5 - 10.1 mg/dL    Phosphorus 6.1 (H) 2.6 - 4.7 mg/dL    Albumin 2.5 (L) 3.5 - 5.0 g/dL   GLUCOSE, POC    Collection Time: 09/15/21  7:27 AM   Result Value Ref Range    Glucose (POC) 123 (H) 65 - 117 mg/dL    Performed by Tatiana Adams    ECHO ADULT COMPLETE    Collection Time: 09/15/21 12:45 PM   Result Value Ref Range    LV ED Vol A4C 186.00 cm3    LV ED Vol A2C 207.00 cm3    LV ES Vol A4C 130.00 cm3    LV ES Vol A2C 102.00 cm3    IVSd 1.45 (A) 0.60 - 1.00 cm    IVSd (M-mode) 1.95 (A) cm    LVIDd 5.92 (A) 4.20 - 5.90 cm    LVIDd (M-mode) 5.82 cm    LVIDs 4.67 cm    LVIDs (M-mode) 4.91 cm    LVOT Peak Velocity 64.20 cm/s    LVOT Peak Gradient 2.00 mmHg    LVPWd 1.29 (A) 0.60 - 1.00 cm    LVPWd (M-mode) 1.43 (A) cm    LV E' Septal Velocity 3.70 cm/s    E/E' septal 32.97     LV Mass .3 (A) 88.0 - 224.0 g    LV Mass AL Index 181.4 (A) 49.0 - 115.0 g/m2    LVOT SV 79.0 cm3    Aortic Valve Systolic Peak Velocity 969.53 cm/s    AoV PG 7.00 mmHg    Ao Root D 2.90 cm    Left Atrium Major Axis 4.70 cm    Mitral Valve Deceleration [place] 6,060.00 mm/s2    Mitral Valve Deceleration [place] 6,060.00 mm/s2    Mitral Valve E Wave Deceleration Time 127.00 ms    Mitral Valve Pressure Half-time 63.00 ms    MV A Jack 29.60 cm/s    MV E Jack 122.00 cm/s    MVA (PHT) 3.49 cm2    MV E/A 4.12     Pulmonic Regurgitant End Max Velocity 126.00 cm/s    Pulmonic Valve Systolic Peak Instantaneous Gradient 6.00 mmHg    Pulmonic Valve Max Velocity 91.30 cm/s    Pulmonic Valve Systolic Peak Instantaneous Gradient 3.00 mmHg    Est. RA Pressure 8.00 mmHg    RVIDd 3.99 cm    RVSP 49.00 mmHg    Tricuspid Valve Max Velocity 321.00 cm/s    Triscuspid Valve Regurgitation Peak Gradient 41.00 mmHg    Tapse 1.60 1.50 - 2.00 cm    Right Atrial Area 4C 23.91 cm2    LA Area 4C 25.21 cm2    BP EF 32.7 55.0 - 100.0 %    LV Ejection Fraction MOD 4C 30.0 %    Left Atrium Minor Axis 2.32 cm   GLUCOSE, POC    Collection Time: 09/15/21  3:01 PM   Result Value Ref Range    Glucose (POC) 145 (H) 65 - 117 mg/dL    Performed by Bright Ramirez and Plan:     1. ESRD. -On hemodialysis Monday Wednesday Friday. Seen on HD today, tolerating well  -No significant volume overload or uremia. --He has right IJ permacath is a dialysis access.    -He has left wrist AV fistula which is still maturing. will continue hemodialysis on MWF     2. Sepsis. -klebsiella and MRSA bacteremia.  -likely catheter related bacteremia.   -On IV antibiotics vancomycin and cefepime   ID following the patient     2. Anemia. Stable Hb   -monitor H/H     4.  Renal osteodystrophy.   -Stable calcium and phosphorus levels    5. SVT. resolved now,     6. HTN : Stable blood pressures  On low-dose carvedilol and losartan  Continue to monitor blood pressures    7.   History of congestive heart failure/cardiomyopathy: No evidence of any fluid overload  We will continue ultrafiltration with hemodialysis for fluid management      Signed By: Cindy Ashley MD     September 15, 2021

## 2021-09-15 NOTE — PROGRESS NOTES
Problem: Risk for Spread of Infection  Goal: Prevent transmission of infectious organism to others  Description: Prevent the transmission of infectious organisms to other patients, staff members, and visitors. Outcome: Progressing Towards Goal     Problem: Falls - Risk of  Goal: *Absence of Falls  Description: Document Liliane Vogt Fall Risk and appropriate interventions in the flowsheet. Outcome: Progressing Towards Goal  Note: Fall Risk Interventions:  Mobility Interventions: Patient to call before getting OOB, Utilize walker, cane, or other assistive device         Medication Interventions: Patient to call before getting OOB, Teach patient to arise slowly    Elimination Interventions: Call light in reach, Patient to call for help with toileting needs    History of Falls Interventions: Door open when patient unattended, Bed/chair exit alarm         Problem: Pressure Injury - Risk of  Goal: *Prevention of pressure injury  Description: Document Vik Scale and appropriate interventions in the flowsheet.   Outcome: Progressing Towards Goal  Note: Pressure Injury Interventions:  Sensory Interventions: Keep linens dry and wrinkle-free, Maintain/enhance activity level, Minimize linen layers    Moisture Interventions: Minimize layers    Activity Interventions: Increase time out of bed, PT/OT evaluation    Mobility Interventions: HOB 30 degrees or less, PT/OT evaluation    Nutrition Interventions: Document food/fluid/supplement intake    Friction and Shear Interventions: Foam dressings/transparent film/skin sealants, Minimize layers

## 2021-09-15 NOTE — PROGRESS NOTES
Pre HD Vanc level was over 24. No Vanc after HD today. Patient is also taking Tobramycin for synergy. Last vanc dose was on 09/13 after HD , Vancomycin 1250 mg x1.   Scheduled Pre HD random on 09/17 AM.

## 2021-09-15 NOTE — PROGRESS NOTES
Hospitalist Progress Note    Subjective:   Daily Progress Note: 9/15/2021 11:58 AM    Denies chest pain or shortness of breath. Current Facility-Administered Medications   Medication Dose Route Frequency    [START ON 2021] Vancomycin Lab Due on  at 0400   Other ONCE    TOBRAMYCIN INFORMATION NOTE 1 Each  1 Each Other Rx Dosing/Monitoring    traMADoL (ULTRAM) tablet 50 mg  50 mg Oral Q6H PRN    vancomycin dose per pharmacy protocol   Other Rx Dosing/Monitoring    heparin (porcine) 1,000 unit/mL injection 3,800 Units  3,800 Units Hemodialysis DIALYSIS PRN    carvediloL (COREG) tablet 3.125 mg  3.125 mg Oral BID WITH MEALS    losartan (COZAAR) tablet 25 mg  25 mg Oral DAILY    sodium chloride (NS) flush 5-40 mL  5-40 mL IntraVENous PRN    acetaminophen (TYLENOL) tablet 650 mg  650 mg Oral Q6H PRN    Or    acetaminophen (TYLENOL) suppository 650 mg  650 mg Rectal Q6H PRN    ondansetron (ZOFRAN ODT) tablet 4 mg  4 mg Oral Q8H PRN    Or    ondansetron (ZOFRAN) injection 4 mg  4 mg IntraVENous Q6H PRN    heparin (porcine) injection 5,000 Units  5,000 Units SubCUTAneous Q12H        Review of Systems  Review of Systems   Constitutional: Positive for malaise/fatigue. Negative for chills and fever. HENT: Negative. Eyes: Negative. Respiratory: Negative for cough and shortness of breath. Cardiovascular: Negative for chest pain and leg swelling. Gastrointestinal: Negative for abdominal pain, nausea and vomiting. Genitourinary: Negative. Musculoskeletal: Negative for neck pain. Skin: Negative. Sacral ulcer   Neurological: Negative. Psychiatric/Behavioral: Negative.              Objective:     Visit Vitals  /78 (BP 1 Location: Right upper arm, BP Patient Position: Semi fowlers)   Pulse 77   Temp 97.6 °F (36.4 °C)   Resp 21   Ht 5' 10\" (1.778 m)   Wt 85.1 kg (187 lb 9.8 oz)   SpO2 100%   BMI 26.92 kg/m²      O2 Device: None (Room air)    Temp (24hrs), Av °F (36.7 °C), Min:97.6 °F (36.4 °C), Max:98.2 °F (36.8 °C)      No intake/output data recorded. 09/13 1901 - 09/15 0700  In: 750 [P.O.:630; I.V.:120]  Out: -     Recent Results (from the past 24 hour(s))   GLUCOSE, POC    Collection Time: 09/14/21  8:08 PM   Result Value Ref Range    Glucose (POC) 149 (H) 65 - 117 mg/dL    Performed by Niyah Alexis, RANDOM    Collection Time: 09/15/21  6:19 AM   Result Value Ref Range    Vancomycin, random 24.4 ug/mL    Reported dose date Not provided      Reported dose: Not provided Units   CBC WITH AUTOMATED DIFF    Collection Time: 09/15/21  6:19 AM   Result Value Ref Range    WBC 8.5 4.1 - 11.1 K/uL    RBC 4.03 (L) 4.10 - 5.70 M/uL    HGB 10.8 (L) 12.1 - 17.0 g/dL    HCT 35.0 (L) 36.6 - 50.3 %    MCV 86.8 80.0 - 99.0 FL    MCH 26.8 26.0 - 34.0 PG    MCHC 30.9 30.0 - 36.5 g/dL    RDW 19.3 (H) 11.5 - 14.5 %    PLATELET 387 225 - 561 K/uL    MPV 10.6 8.9 - 12.9 FL    NRBC 0.2 (H) 0.0  WBC    ABSOLUTE NRBC 0.02 (H) 0.00 - 0.01 K/uL    NEUTROPHILS 65 32 - 75 %    LYMPHOCYTES 20 12 - 49 %    MONOCYTES 10 5 - 13 %    EOSINOPHILS 3 0 - 7 %    BASOPHILS 1 0 - 1 %    IMMATURE GRANULOCYTES 1 (H) 0 - 0.5 %    ABS. NEUTROPHILS 5.7 1.8 - 8.0 K/UL    ABS. LYMPHOCYTES 1.7 0.8 - 3.5 K/UL    ABS. MONOCYTES 0.9 0.0 - 1.0 K/UL    ABS. EOSINOPHILS 0.2 0.0 - 0.4 K/UL    ABS. BASOPHILS 0.1 0.0 - 0.1 K/UL    ABS. IMM.  GRANS. 0.0 0.00 - 0.04 K/UL    DF AUTOMATED     C REACTIVE PROTEIN, QT    Collection Time: 09/15/21  6:19 AM   Result Value Ref Range    C-Reactive protein 8.81 (H) 0.00 - 0.60 mg/dL   PROCALCITONIN    Collection Time: 09/15/21  6:19 AM   Result Value Ref Range    Procalcitonin 6.87 (H) 0 ng/mL   RENAL FUNCTION PANEL    Collection Time: 09/15/21  6:19 AM   Result Value Ref Range    Sodium 133 (L) 136 - 145 mmol/L    Potassium 4.6 3.5 - 5.1 mmol/L    Chloride 100 97 - 108 mmol/L    CO2 24 21 - 32 mmol/L    Anion gap 9 5 - 15 mmol/L    Glucose 128 (H) 65 - 100 mg/dL    BUN 43 (H) 6 - 20 mg/dL    Creatinine 7.73 (H) 0.70 - 1.30 mg/dL    BUN/Creatinine ratio 6 (L) 12 - 20      GFR est AA 8 (L) >60 ml/min/1.73m2    GFR est non-AA 7 (L) >60 ml/min/1.73m2    Calcium 8.9 8.5 - 10.1 mg/dL    Phosphorus 6.1 (H) 2.6 - 4.7 mg/dL    Albumin 2.5 (L) 3.5 - 5.0 g/dL   GLUCOSE, POC    Collection Time: 09/15/21  7:27 AM   Result Value Ref Range    Glucose (POC) 123 (H) 65 - 117 mg/dL    Performed by Mal Right    ECHO ADULT COMPLETE    Collection Time: 09/15/21 12:45 PM   Result Value Ref Range    LV ED Vol A4C 186.00 cm3    LV ED Vol A2C 207.00 cm3    LV ES Vol A4C 130.00 cm3    LV ES Vol A2C 102.00 cm3    IVSd 1.45 (A) 0.60 - 1.00 cm    IVSd (M-mode) 1.95 (A) cm    LVIDd 5.92 (A) 4.20 - 5.90 cm    LVIDd (M-mode) 5.82 cm    LVIDs 4.67 cm    LVIDs (M-mode) 4.91 cm    LVOT Peak Velocity 64.20 cm/s    LVOT Peak Gradient 2.00 mmHg    LVPWd 1.29 (A) 0.60 - 1.00 cm    LVPWd (M-mode) 1.43 (A) cm    LV E' Septal Velocity 3.70 cm/s    E/E' septal 32.97     LV Mass .3 (A) 88.0 - 224.0 g    LV Mass AL Index 181.4 (A) 49.0 - 115.0 g/m2    LVOT SV 79.0 cm3    Aortic Valve Systolic Peak Velocity 678.58 cm/s    AoV PG 7.00 mmHg    Ao Root D 2.90 cm    Left Atrium Major Axis 4.70 cm    Mitral Valve Deceleration [place] 6,060.00 mm/s2    Mitral Valve Deceleration [place] 6,060.00 mm/s2    Mitral Valve E Wave Deceleration Time 127.00 ms    Mitral Valve Pressure Half-time 63.00 ms    MV A Jack 29.60 cm/s    MV E Jack 122.00 cm/s    MVA (PHT) 3.49 cm2    MV E/A 4.12     Pulmonic Regurgitant End Max Velocity 126.00 cm/s    Pulmonic Valve Systolic Peak Instantaneous Gradient 6.00 mmHg    Pulmonic Valve Max Velocity 91.30 cm/s    Pulmonic Valve Systolic Peak Instantaneous Gradient 3.00 mmHg    Est. RA Pressure 8.00 mmHg    RVIDd 3.99 cm    RVSP 49.00 mmHg    Tricuspid Valve Max Velocity 321.00 cm/s    Triscuspid Valve Regurgitation Peak Gradient 41.00 mmHg    Tapse 1.60 1.50 - 2.00 cm    Right Atrial Area 4C 23.91 cm2    LA Area 4C 25.21 cm2    BP EF 32.7 55.0 - 100.0 %    LV Ejection Fraction MOD 4C 30.0 %    Left Atrium Minor Axis 2.32 cm   GLUCOSE, POC    Collection Time: 09/15/21  3:01 PM   Result Value Ref Range    Glucose (POC) 145 (H) 65 - 117 mg/dL    Performed by Nino Singh         CT CHEST WO CONT   Final Result   1. There are findings suspect for fluid overload with increased edema within   the body wall fat as well as a mild degree of lung base groundglass parenchymal   density suspect for mild hydrostatic edema. 2.  In addition, there is discoid atelectasis as might be associated with mucous   plugging or bronchospasm. 3.  This examination is negative for dense airspace consolidation. Please see   the above text for further details. XR CHEST PORT   Final Result           PHYSICAL EXAM:    Physical Exam  Vitals reviewed. Constitutional:       General: He is not in acute distress. Appearance: He is not ill-appearing. HENT:      Head: Normocephalic and atraumatic. Mouth/Throat:      Mouth: Mucous membranes are moist.      Pharynx: Oropharynx is clear. Eyes:      Conjunctiva/sclera: Conjunctivae normal.   Cardiovascular:      Rate and Rhythm: Normal rate and regular rhythm. Heart sounds: Normal heart sounds. Pulmonary:      Effort: Pulmonary effort is normal.      Breath sounds: Normal breath sounds. Abdominal:      General: Abdomen is flat. Bowel sounds are normal.   Musculoskeletal:         General: Normal range of motion. Cervical back: Normal range of motion and neck supple. Skin:     Comments: Left sacral ulcer is covered with no areas of cellulitis   Neurological:      General: No focal deficit present. Mental Status: He is alert and oriented to person, place, and time. Mental status is at baseline.    Psychiatric:         Mood and Affect: Mood normal.          Data Review    Recent Results (from the past 24 hour(s))   GLUCOSE, POC    Collection Time: 09/14/21 8:08 PM   Result Value Ref Range    Glucose (POC) 149 (H) 65 - 117 mg/dL    Performed by DC Carolina    Collection Time: 09/15/21  6:19 AM   Result Value Ref Range    Vancomycin, random 24.4 ug/mL    Reported dose date Not provided      Reported dose: Not provided Units   CBC WITH AUTOMATED DIFF    Collection Time: 09/15/21  6:19 AM   Result Value Ref Range    WBC 8.5 4.1 - 11.1 K/uL    RBC 4.03 (L) 4.10 - 5.70 M/uL    HGB 10.8 (L) 12.1 - 17.0 g/dL    HCT 35.0 (L) 36.6 - 50.3 %    MCV 86.8 80.0 - 99.0 FL    MCH 26.8 26.0 - 34.0 PG    MCHC 30.9 30.0 - 36.5 g/dL    RDW 19.3 (H) 11.5 - 14.5 %    PLATELET 637 529 - 183 K/uL    MPV 10.6 8.9 - 12.9 FL    NRBC 0.2 (H) 0.0  WBC    ABSOLUTE NRBC 0.02 (H) 0.00 - 0.01 K/uL    NEUTROPHILS 65 32 - 75 %    LYMPHOCYTES 20 12 - 49 %    MONOCYTES 10 5 - 13 %    EOSINOPHILS 3 0 - 7 %    BASOPHILS 1 0 - 1 %    IMMATURE GRANULOCYTES 1 (H) 0 - 0.5 %    ABS. NEUTROPHILS 5.7 1.8 - 8.0 K/UL    ABS. LYMPHOCYTES 1.7 0.8 - 3.5 K/UL    ABS. MONOCYTES 0.9 0.0 - 1.0 K/UL    ABS. EOSINOPHILS 0.2 0.0 - 0.4 K/UL    ABS. BASOPHILS 0.1 0.0 - 0.1 K/UL    ABS. IMM.  GRANS. 0.0 0.00 - 0.04 K/UL    DF AUTOMATED     C REACTIVE PROTEIN, QT    Collection Time: 09/15/21  6:19 AM   Result Value Ref Range    C-Reactive protein 8.81 (H) 0.00 - 0.60 mg/dL   PROCALCITONIN    Collection Time: 09/15/21  6:19 AM   Result Value Ref Range    Procalcitonin 6.87 (H) 0 ng/mL   RENAL FUNCTION PANEL    Collection Time: 09/15/21  6:19 AM   Result Value Ref Range    Sodium 133 (L) 136 - 145 mmol/L    Potassium 4.6 3.5 - 5.1 mmol/L    Chloride 100 97 - 108 mmol/L    CO2 24 21 - 32 mmol/L    Anion gap 9 5 - 15 mmol/L    Glucose 128 (H) 65 - 100 mg/dL    BUN 43 (H) 6 - 20 mg/dL    Creatinine 7.73 (H) 0.70 - 1.30 mg/dL    BUN/Creatinine ratio 6 (L) 12 - 20      GFR est AA 8 (L) >60 ml/min/1.73m2    GFR est non-AA 7 (L) >60 ml/min/1.73m2    Calcium 8.9 8.5 - 10.1 mg/dL    Phosphorus 6.1 (H) 2.6 - 4.7 mg/dL    Albumin 2.5 (L) 3.5 - 5.0 g/dL   GLUCOSE, POC    Collection Time: 09/15/21  7:27 AM   Result Value Ref Range    Glucose (POC) 123 (H) 65 - 117 mg/dL    Performed by Silke Chacon    ECHO ADULT COMPLETE    Collection Time: 09/15/21 12:45 PM   Result Value Ref Range    LV ED Vol A4C 186.00 cm3    LV ED Vol A2C 207.00 cm3    LV ES Vol A4C 130.00 cm3    LV ES Vol A2C 102.00 cm3    IVSd 1.45 (A) 0.60 - 1.00 cm    IVSd (M-mode) 1.95 (A) cm    LVIDd 5.92 (A) 4.20 - 5.90 cm    LVIDd (M-mode) 5.82 cm    LVIDs 4.67 cm    LVIDs (M-mode) 4.91 cm    LVOT Peak Velocity 64.20 cm/s    LVOT Peak Gradient 2.00 mmHg    LVPWd 1.29 (A) 0.60 - 1.00 cm    LVPWd (M-mode) 1.43 (A) cm    LV E' Septal Velocity 3.70 cm/s    E/E' septal 32.97     LV Mass .3 (A) 88.0 - 224.0 g    LV Mass AL Index 181.4 (A) 49.0 - 115.0 g/m2    LVOT SV 79.0 cm3    Aortic Valve Systolic Peak Velocity 987.32 cm/s    AoV PG 7.00 mmHg    Ao Root D 2.90 cm    Left Atrium Major Axis 4.70 cm    Mitral Valve Deceleration [place] 6,060.00 mm/s2    Mitral Valve Deceleration [place] 6,060.00 mm/s2    Mitral Valve E Wave Deceleration Time 127.00 ms    Mitral Valve Pressure Half-time 63.00 ms    MV A Jack 29.60 cm/s    MV E Jack 122.00 cm/s    MVA (PHT) 3.49 cm2    MV E/A 4.12     Pulmonic Regurgitant End Max Velocity 126.00 cm/s    Pulmonic Valve Systolic Peak Instantaneous Gradient 6.00 mmHg    Pulmonic Valve Max Velocity 91.30 cm/s    Pulmonic Valve Systolic Peak Instantaneous Gradient 3.00 mmHg    Est. RA Pressure 8.00 mmHg    RVIDd 3.99 cm    RVSP 49.00 mmHg    Tricuspid Valve Max Velocity 321.00 cm/s    Triscuspid Valve Regurgitation Peak Gradient 41.00 mmHg    Tapse 1.60 1.50 - 2.00 cm    Right Atrial Area 4C 23.91 cm2    LA Area 4C 25.21 cm2    BP EF 32.7 55.0 - 100.0 %    LV Ejection Fraction MOD 4C 30.0 %    Left Atrium Minor Axis 2.32 cm   GLUCOSE, POC    Collection Time: 09/15/21  3:01 PM   Result Value Ref Range    Glucose (POC) 145 (H) 65 - 117 mg/dL Performed by Charlotte Newman         Assessment/Plan:     Active Problems:    Sepsis ([facility] [address] (7/7/2021)      Hospital course: This is a 23-year-old male admitted on 9/8/2021 with a history of end-stage renal disease currently on hemodialysis Monday, Wednesday and Friday with cardiomyopathy with a prior EF of 15%. Echocardiogram pending. Patient was found to be bacteremic with Klebsiella pneumonia and MRSA. ID consultation, Dr. Isis Melendez. Nephrology consultation Dr. Masha Aguila. Initial blood cultures with Klebsiella pneumonia sensitive to cefepime and MRSA identified sensitive to vancomycin. Repeat blood culture grew 1 of 2 bottles of MRSA. Repeat blood culture MRSA. Echocardiogram pending to rule out endocarditis. This may be related to the patient's right chest permacath. There is also additional source with the sacral ulcer. Impression\plan:    1. Sepsis with bacteremia  Blood cultures grew MRSA and Klebsiella pneumonia  Continue Tobramycin and vancomycin  This may be related to right chest permacath versus sacral ulcer  ID consultation  Repeat blood cultures pending  TTE no endocarditis, EF 25%  Duration of antibiotics recommended is 14 days post first negative blood culture. 2.  Cardiomyopathy with an EF of 25%  Echocardiogram no endocarditis, EF 25%  Continue Coreg  Continue losartan  Stable    3. COPD without exacerbation  Continue albuterol as needed    4. Essential hypertension  Continue Coreg  Continue losartan    5. Sacral ulcer  Continue wound care    CODE STATUS: Full code    DVT prophylaxis: Heparin  Ulcer prophylaxis not indicated    Discharge barrier, determination of antibiotics and duration    Care Plan discussed with: Patient/Family    Total time spent with patient: >35 minutes.

## 2021-09-15 NOTE — PROGRESS NOTES
\"Bedside\" shift change report given to Zachary (oncoming nurse) by Vernia Krabbe (offgoing nurse).

## 2021-09-15 NOTE — PROGRESS NOTES
Paged Dr. Nuris Hunt and let him know patient's procalcitonin level this AM is 6.87 per laboratory. No new orders at this time.

## 2021-09-15 NOTE — PROGRESS NOTES
OT eval order received and acknowledged. OT eval attempted at 2:08 however pt currently resting in semi-supine, declining OT eval at this time stating he had already been up for ADLs prior to dialysis today. Pt requesting OT to return to tomorrow. Will continue to follow patient and attempt OT eval at a later time. Thank you.

## 2021-09-16 ENCOUNTER — TELEPHONE (OUTPATIENT)
Dept: INFECTIOUS DISEASES | Age: 71
End: 2021-09-16

## 2021-09-16 VITALS
RESPIRATION RATE: 18 BRPM | HEART RATE: 74 BPM | WEIGHT: 187.61 LBS | BODY MASS INDEX: 26.86 KG/M2 | OXYGEN SATURATION: 99 % | HEIGHT: 70 IN | DIASTOLIC BLOOD PRESSURE: 81 MMHG | TEMPERATURE: 98 F | SYSTOLIC BLOOD PRESSURE: 127 MMHG

## 2021-09-16 PROBLEM — B95.62 MRSA BACTEREMIA: Status: ACTIVE | Noted: 2021-09-16

## 2021-09-16 PROBLEM — R78.81 GRAM-NEGATIVE BACTEREMIA: Status: ACTIVE | Noted: 2021-09-16

## 2021-09-16 PROBLEM — R78.81 MRSA BACTEREMIA: Status: ACTIVE | Noted: 2021-09-16

## 2021-09-16 LAB
GLUCOSE BLD STRIP.AUTO-MCNC: 143 MG/DL (ref 65–117)
GLUCOSE BLD STRIP.AUTO-MCNC: 96 MG/DL (ref 65–117)
PERFORMED BY, TECHID: ABNORMAL
PERFORMED BY, TECHID: NORMAL
PROCALCITONIN SERPL-MCNC: 4.6 NG/ML

## 2021-09-16 PROCEDURE — 74011250637 HC RX REV CODE- 250/637: Performed by: INTERNAL MEDICINE

## 2021-09-16 PROCEDURE — 84145 PROCALCITONIN (PCT): CPT

## 2021-09-16 PROCEDURE — 99232 SBSQ HOSP IP/OBS MODERATE 35: CPT | Performed by: INTERNAL MEDICINE

## 2021-09-16 PROCEDURE — 74011250636 HC RX REV CODE- 250/636: Performed by: HOSPITALIST

## 2021-09-16 PROCEDURE — 82962 GLUCOSE BLOOD TEST: CPT

## 2021-09-16 PROCEDURE — 80200 ASSAY OF TOBRAMYCIN: CPT

## 2021-09-16 PROCEDURE — 36415 COLL VENOUS BLD VENIPUNCTURE: CPT

## 2021-09-16 RX ORDER — CARVEDILOL 3.12 MG/1
3.12 TABLET ORAL 2 TIMES DAILY WITH MEALS
Qty: 30 TABLET | Refills: 0 | Status: SHIPPED | OUTPATIENT
Start: 2021-09-16 | End: 2021-09-16

## 2021-09-16 RX ORDER — CARVEDILOL 3.12 MG/1
3.12 TABLET ORAL 2 TIMES DAILY WITH MEALS
Qty: 30 TABLET | Refills: 0 | Status: SHIPPED | OUTPATIENT
Start: 2021-09-16

## 2021-09-16 RX ORDER — LOSARTAN POTASSIUM 25 MG/1
25 TABLET ORAL DAILY
Qty: 30 TABLET | Refills: 0 | Status: SHIPPED | OUTPATIENT
Start: 2021-09-16 | End: 2021-09-16

## 2021-09-16 RX ORDER — LOSARTAN POTASSIUM 25 MG/1
25 TABLET ORAL DAILY
Qty: 30 TABLET | Refills: 0 | Status: SHIPPED | OUTPATIENT
Start: 2021-09-16 | End: 2021-10-21

## 2021-09-16 RX ADMIN — HEPARIN SODIUM 5000 UNITS: 5000 INJECTION INTRAVENOUS; SUBCUTANEOUS at 09:18

## 2021-09-16 RX ADMIN — TRAMADOL HYDROCHLORIDE 50 MG: 50 TABLET, FILM COATED ORAL at 03:11

## 2021-09-16 RX ADMIN — TRAMADOL HYDROCHLORIDE 50 MG: 50 TABLET, FILM COATED ORAL at 09:18

## 2021-09-16 RX ADMIN — CARVEDILOL 3.12 MG: 3.12 TABLET, FILM COATED ORAL at 09:18

## 2021-09-16 RX ADMIN — LOSARTAN POTASSIUM 25 MG: 25 TABLET, FILM COATED ORAL at 09:18

## 2021-09-16 NOTE — PROGRESS NOTES
Progress Note    Patient: Rachel Livingston MRN: [id number]  SSN: [id number]    YOB: 1950  Age: 72 y.o. Sex: male      Admit Date: 9/8/2021    LOS: 8 days     Subjective:   Patient followed for sepsis with Gram negative bacteremia with Klebseilla and MRSA bacteremia. Wound cultures grew Morganella and E. Faecalis. Repeat blood cultures from Friday also grew MRSA. Most recent blood cultures negative so far. TTE negative for vegetations. He remains afebrile. Currently on IV Tobramycin and Vancomycin. Patient resting comfortably with no complaints. Objective:     Vitals:    09/15/21 2019 09/16/21 0306 09/16/21 0825 09/16/21 1135   BP: 124/73 127/79 121/76 127/81   Pulse: 72 78 (!) 57 74   Resp: 20 20 20 18   Temp: 97.9 °F (36.6 °C) 97.9 °F (36.6 °C) 98 °F (36.7 °C) 98 °F (36.7 °C)   TempSrc:       SpO2: 100% 99% 97% 99%   Weight:       Height:            Intake and Output:  Current Shift: No intake/output data recorded. Last three shifts: 09/14 1901 - 09/16 0700  In: 79 [P.O.:50; I.V.:20]  Out: -     Physical Exam:   Constitutional:       Appearance: He is ill-appearing. HENT: unremarkable    Heart:  No murmurs     Comments: Right sided Permacath site unremarkable  Pulmonary:      Effort: Pulmonary effort is normal.      Breath sounds: Normal breath sounds. Genitourinary:     Comments: No Pleitez  Musculoskeletal:      Cervical back: Neck supple. Right lower leg: No edema. Left lower leg: No edema. Skin: 1 cm open wound sacrococcygeal area with scarring, decreasing in size, no drainage     Findings: No erythema, lesion or rash. Neurological:      General: No focal deficit present. Mental Status: He is alert and oriented to person, place, and time. Psychiatric:         Mood and Affect: Mood normal.         Behavior: Behavior normal.         Thought Content:  Thought content      Lab/Data Review:     WBC 8,500    Procal 4.60 <6.87 <14.31 <25.31 <28.33  CRP 8.81 <10.40 < 10.80 <17.10    Blood cultures (9/8) Klebsiella pneumoniae and MRSA  Blood cultures (9/10) MRSA  Blood cultures (9/14) No growth 2 days  Wound culture coccyx (9/9) Morganella morganii and Enterococcus faecalis  Assessment:     Active Problems:    End stage renal disease on dialysis ([facility] [address] (0/08/6573)      Systolic CHF, acute on chronic ([facility] [address] (1/24/2021)      Sepsis ([facility] [address] (7/7/2021)      MRSA bacteremia (9/16/2021)      Gram-negative bacteremia (9/16/2021)    1. Gram-negative bacteremia, with Klebsiella pneumoniae, possibly dialysis catheter related, Day #8 IV antibiotics, now Gentamicin  2. MRSA bacteremia, possibly dialysis catheter related, Day #8 IV Vancomycin, TTE negative  3. Sepsis with fever, tachycardia and leukocytosis with elevated lactic acid, procal and CRP, resolved or resolving  4. Wound on coccyx, ?superinfection with Morganella morganii and Enterococcus faecalis, on antibiotics above  5. ESRD on HD    Comment:  ClinicalUnable to link coccygeal wound with blood cultures isolates. Signifiance of Morganella and E. Faecalis unclear but covered by current antibiotics. Plan:   1. Continue IV Tobramycin which could be given at hemodialysis (Morganella resistant to Gentamcin) for 2 weeks  2. Continue IV Vancomycin for 2 weeks  3. Cleared for discharge from ID standpoint  4.  Will follow-up blood cultures until finalized      Signed By: Amilcar Church MD     September 16, 2021

## 2021-09-16 NOTE — PROGRESS NOTES
Renal Progress Note    Patient: Mayelin Bolanos MRN: [id number]  SSN: [id number]    YOB: 1950  Age: 72 y.o. Sex: male      Admit Date: 9/8/2021    LOS: 8 days     Subjective:   Patient seen on HD. Alert and awake, no acute distress. Patient is  not giving any new complaints today. No complaints of any swelling in lower extremities. Tolerating HD well      Current Facility-Administered Medications   Medication Dose Route Frequency    [START ON 9/17/2021] Vancomycin Lab Due on 09/17 at 0400   Other ONCE    Tobramycin Random at 0400 on 09/16/21   Other ONCE    TOBRAMYCIN INFORMATION NOTE 1 Each  1 Each Other Rx Dosing/Monitoring    traMADoL (ULTRAM) tablet 50 mg  50 mg Oral Q6H PRN    vancomycin dose per pharmacy protocol   Other Rx Dosing/Monitoring    heparin (porcine) 1,000 unit/mL injection 3,800 Units  3,800 Units Hemodialysis DIALYSIS PRN    carvediloL (COREG) tablet 3.125 mg  3.125 mg Oral BID WITH MEALS    losartan (COZAAR) tablet 25 mg  25 mg Oral DAILY    sodium chloride (NS) flush 5-40 mL  5-40 mL IntraVENous PRN    acetaminophen (TYLENOL) tablet 650 mg  650 mg Oral Q6H PRN    Or    acetaminophen (TYLENOL) suppository 650 mg  650 mg Rectal Q6H PRN    ondansetron (ZOFRAN ODT) tablet 4 mg  4 mg Oral Q8H PRN    Or    ondansetron (ZOFRAN) injection 4 mg  4 mg IntraVENous Q6H PRN    heparin (porcine) injection 5,000 Units  5,000 Units SubCUTAneous Q12H        Vitals:    09/15/21 2019 09/16/21 0306 09/16/21 0825 09/16/21 1135   BP: 124/73 127/79 121/76 127/81   Pulse: 72 78 (!) 57 74   Resp: 20 20 20 18   Temp: 97.9 °F (36.6 °C) 97.9 °F (36.6 °C) 98 °F (36.7 °C) 98 °F (36.7 °C)   TempSrc:       SpO2: 100% 99% 97% 99%   Weight:       Height:         Objective:   General: alert awake well-oriented, no acute distress.   HEENT: EOMI, no Icterus, no Pallor,  mucosa moist.  Neck: Neck is supple, No JVD  Lungs: breathsounds normal, no respiratory distress on inspection, no rhonchi, no rales,   CVS: heart sounds normal,  no murmurs, no rubs. GI: soft, nontender, normal BS. Extremeties: no cyanosis, no edema,   Neuro: Alert, awake, oriented x3, speech is normal, moving all extremeties well. Skin: normal skin turgor, no skin rashes. Intake and Output:  Current Shift: No intake/output data recorded. Last three shifts: 09/14 1901 - 09/16 0700  In: 70 [P.O.:50; I.V.:20]  Out: -       Lab/Data Review:  Recent Labs     09/15/21  0619 09/14/21  0703   WBC 8.5 9.8   HGB 10.8* 10.7*   HCT 35.0* 34.7*    311     Recent Labs     09/15/21  0619   *   K 4.6      CO2 24   *   BUN 43*   CREA 7.73*   CA 8.9   PHOS 6.1*   ALB 2.5*     No results for input(s): PH, PCO2, PO2, HCO3, FIO2 in the last 72 hours. Recent Results (from the past 24 hour(s))   GLUCOSE, POC    Collection Time: 09/15/21  3:01 PM   Result Value Ref Range    Glucose (POC) 145 (H) 65 - 117 mg/dL    Performed by [address], POC    Collection Time: 09/15/21  8:15 PM   Result Value Ref Range    Glucose (POC) 140 (H) 65 - 117 mg/dL    Performed by SHABNAM ERICKSON    PROCALCITONIN    Collection Time: 09/16/21  4:28 AM   Result Value Ref Range    Procalcitonin 4.60 (H) 0 ng/mL   GLUCOSE, POC    Collection Time: 09/16/21  8:25 AM   Result Value Ref Range    Glucose (POC) 96 65 - 117 mg/dL    Performed by [address], POC    Collection Time: 09/16/21 11:36 AM   Result Value Ref Range    Glucose (POC) 143 (H) 65 - 117 mg/dL    Performed by Annette Gray 930 and Plan:     1. ESRD. -On hemodialysis Monday Wednesday Friday.    -No significant volume overload or uremia. --He has right IJ permacath is a dialysis access.    -He has left wrist AV fistula which is still maturing. will continue hemodialysis on MWF     2. Sepsis.   -klebsiella and MRSA bacteremia.  -likely catheter related bacteremia.   -On IV antibiotics,  vancomycin and tobramycin for 2 weeks as per ID  ID following the patient     2. Anemia. Stable Hb   -monitor H/H     4.  Renal osteodystrophy.   -Stable calcium and phosphorus levels    5. SVT. resolved now,     6. HTN : Stable blood pressures  On low-dose carvedilol and losartan  Continue to monitor blood pressures    7.   History of congestive heart failure/cardiomyopathy: No evidence of any fluid overload  We will continue ultrafiltration with hemodialysis for fluid management]    Stable for dc from renal standpoint    Signed By: Claudine Gonzalez MD     September 16, 2021

## 2021-09-16 NOTE — TELEPHONE ENCOUNTER
[address] called in stated that they do not have the Tobramycin for the patient. They are asking if there is something else to put the patient on.  They can be reach at [phone]

## 2021-09-16 NOTE — PROGRESS NOTES
Problem: Risk for Spread of Infection  Goal: Prevent transmission of infectious organism to others  Description: Prevent the transmission of infectious organisms to other patients, staff members, and visitors. Outcome: Progressing Towards Goal     Problem: Patient Education:  Go to Education Activity  Goal: Patient/Family Education  Outcome: Progressing Towards Goal     Problem: Falls - Risk of  Goal: *Absence of Falls  Description: Document Dusty Fernández Fall Risk and appropriate interventions in the flowsheet. Outcome: Progressing Towards Goal  Note: Fall Risk Interventions:  Mobility Interventions: PT Consult for mobility concerns         Medication Interventions: Patient to call before getting OOB    Elimination Interventions: Call light in reach    History of Falls Interventions: Door open when patient unattended, Bed/chair exit alarm         Problem: Patient Education: Go to Patient Education Activity  Goal: Patient/Family Education  Outcome: Progressing Towards Goal     Problem: Pressure Injury - Risk of  Goal: *Prevention of pressure injury  Description: Document Vik Scale and appropriate interventions in the flowsheet. Outcome: Progressing Towards Goal  Note: Pressure Injury Interventions:  Sensory Interventions: Float heels, Keep linens dry and wrinkle-free, Maintain/enhance activity level, Minimize linen layers, Turn and reposition approx.  every two hours (pillows and wedges if needed)    Moisture Interventions: Absorbent underpads, Minimize layers    Activity Interventions: Increase time out of bed, PT/OT evaluation    Mobility Interventions: HOB 30 degrees or less, PT/OT evaluation    Nutrition Interventions: Document food/fluid/supplement intake    Friction and Shear Interventions: Apply protective barrier, creams and emollients, HOB 30 degrees or less, Minimize layers                Problem: Patient Education: Go to Patient Education Activity  Goal: Patient/Family Education  Outcome: Progressing Towards Goal

## 2021-09-16 NOTE — PROGRESS NOTES
CM received consult to send IV abx orders to [facility] in [place] where the patient receives hemodialysis outpatient. Orders from Dr. Navid Genao have been faxed to [facility] at [phone]. MARTY received call from [place] with  [facility] indicating they did not have Tobramycin, however, after looking further they do have it in center and they are able to accommodate both IV vanc and IV tobramycin. Patient will also resume his home health with [facility]. Start of Care will be 9/17/2021.

## 2021-09-16 NOTE — PROGRESS NOTES
Consult for Tobramycin Dosing by Pharmacy by Dr. Aaron Wilkes provided for this 72y.o. year old male , for indication of Bacteremia ([facility])     Day of Therapy: 0  Goal of Level(s): 3-5 mcg/mL(peak),  < 1 mcg/mL     Other Current Antibiotics: Vancomycin    Tobramycin level drawn in AM (Pre-HD) resulted 1.2 mcg/mL. Patient did receive HD treatment today. Pharmacy will order Tobramycin 80 mg x 1 dose tonight and schedule random tobramycin level in AM.     Pharmacy to follow daily and will make changes to dose and/or frequency based on clinical status.

## 2021-09-16 NOTE — DISCHARGE SUMMARY
Admit date: 9/8/2021   Admitting Provider: Zeeshan Vanessa MD    Discharge date: 9/16/2021  Discharging Provider: Sheryl Marrero PA-C      * Admission Diagnoses: Sepsis [facility]) [A41.9]    * Discharge Diagnoses:    Hospital Problems as of 9/16/2021 Date Reviewed: 1/24/2021        Codes Class Noted - Resolved POA    MRSA bacteremia ICD-10-CM: R78.81, B95.62  ICD-9-CM: 790.7, 041.12  9/16/2021 - Present Unknown        Gram-negative bacteremia ICD-10-CM: R78.81  ICD-9-CM: 790.7, 041.85  9/16/2021 - Present Unknown        Sepsis ([facility] ICD-10-CM: A41.9  ICD-9-CM: 038.9, 995.91  7/7/2021 - Present Unknown        End stage renal disease on dialysis [facility]) ICD-10-CM: N18.6, Z99.2  ICD-9-CM: 585.6, V45.11  1/24/2021 - Present Yes        Systolic CHF, acute on chronic ([address] ICD-10-CM: I50.23  ICD-9-CM: 428.23, 428.0  1/24/2021 - Present Yes              * Hospital Course: This is a 78-year-old male admitted on 9/8/2021 with a history of end-stage renal disease currently on hemodialysis Monday, Wednesday and Friday with cardiomyopathy with a prior EF of 15%. Echocardiogram revealed an EF of 25%. .  Patient was found to be bacteremic with Klebsiella pneumonia and MRSA. ID consultation, Dr. Isis Melendez. Nephrology consultation Dr. Masha Aguila. Initial blood cultures with Klebsiella pneumonia sensitive to cefepime and MRSA identified sensitive to vancomycin. Repeat blood culture grew 1 of 2 bottles of MRSA. Echocardiogram revealed out endocarditis. This may be related to the patient's right chest permacath. There is also additional source with the sacral ulcer. Blood cultures have cleared. Patient will receive tobramycin and vancomycin through 9/29/2021. Repeat blood cultures and follow-up with Dr. Isis Melendez. Patient will follow up with regularly scheduled hemodialysis on Monday Wednesday Friday. May need to consider removing permacath if cultures again grow same bacteremia.   Patient follow-up with Dr. Isis Melendez in probably 2 weeks.     Impression\plan:     1. Sepsis with bacteremia  Blood cultures grew MRSA and Klebsiella pneumonia  Continue Tobramycin and vancomycin with hemodialysis through 9/29/2021  This may be related to right chest permacath versus sacral ulcer  ID consultation  Repeat blood cultures with no growth at this point  TTE no endocarditis, EF 25%  Duration of antibiotics recommended is 14 days post first negative blood culture.     2.  Cardiomyopathy with an EF of 25%  Echocardiogram no endocarditis, EF 25%  Continue Coreg  Continue losartan  Consideration for pacemaker as outpatient  Stable     3. COPD without exacerbation  Continue albuterol as needed     4.  Essential hypertension  Continue Coreg  Continue losartan     5. Sacral ulcer  Continue wound care, For coccyx: rinse with NS, pack with Opticell Ag Rope (cut in 1/4 and pack lightly), and cover with Optifoam Border Sacrum daily and prn for soiling. 6.  End-stage renal disease  Continue hemodialysis Monday Wednesday Friday     CODE STATUS: Full code    * Procedures:   * No surgery found *      Consults:   Infectious disease, Dr. Loida Evans, renal, Dr. Etta Nichols    Significant Diagnostic Studies: As discussed in hospital course    Discharge Exam:  Visit Vitals  /76 (BP 1 Location: Right upper arm, BP Patient Position: At rest)   Pulse (!) 57   Temp 98 °F (36.7 °C)   Resp 20   Ht 5' 10\" (1.778 m)   Wt 85.1 kg (187 lb 9.8 oz)   SpO2 97%   BMI 26.92 kg/m²     PHYSICAL EXAM:  Constitutional: Alert in no acute distress   HEENT: Sclerae anicteric, The neck is supple. Cardiovascular: Regular rate and rhythm. No murmurs, gallops, or rubs. Дмитрий [place] Respiratory: Clear breath sounds with no wheezes, rales, or rhonchi. GI: Abdomen nondistended, soft, and nontender. Normal active bowel sounds. Rectal: Deferred   Musculoskeletal: No pitting edema of the lower legs. Extremities have good range of motion. Neurological:  Patient is alert and oriented.  Cranial nerves II-XII intact  Psychiatric: Mood appears appropriate with judgement intact. Lymphatic: No cervical or supraclavicular adenopathy. Skin: Small coccyx ulceration      * Discharge Condition: stable  * Disposition: Home with home health    Discharge Medications:  Current Discharge Medication List      START taking these medications    Details   losartan (COZAAR) 25 mg tablet Take 1 Tablet by mouth daily. Qty: 30 Tablet, Refills: 0  Start date: 9/16/2021      carvediloL (COREG) 3.125 mg tablet Take 1 Tablet by mouth two (2) times daily (with meals). Qty: 30 Tablet, Refills: 0  Start date: 9/16/2021      vancomycin (VANCOCIN) 10 gram solr 1,000 mg by IntraVENous route DIALYSIS MON, WED & FRI for 14 days. Indications: blood infection caused by Staphylococcus bacteria  Qty: 6000 mg, Refills: 0  Start date: 9/15/2021, End date: 9/29/2021      tobramycin 80 mg 80 mg by IntraVENous route DIALYSIS MON, WED & FRI for 14 days. Qty: 480 Dose, Refills: 0  Start date: 9/15/2021, End date: 9/29/2021         CONTINUE these medications which have NOT CHANGED    Details   prednisoLONE acetate (PRED FORTE) 1 % ophthalmic suspension Administer 1 Drop to both eyes two (2) times a day. Post shingles therapy. dextran 70-hypromellose (Artificial Tears,[id number],) ophthalmic solution Administer 1 Drop to both eyes three (3) times daily as needed. acyclovir (ZOVIRAX) 800 mg tablet Take 800 mg by mouth two (2) times a day. hydrALAZINE (APRESOLINE) 100 mg tablet Take 0.5 Tabs by mouth three (3) times daily. For systolic under 539  Qty: 90 Tab, Refills: 0      albuterol (PROVENTIL HFA, VENTOLIN HFA, PROAIR HFA) 90 mcg/actuation inhaler Take 2 Puffs by inhalation every four (4) hours as needed for Wheezing or Shortness of Breath. Qty: 1 Inhaler, Refills: 0      calcitRIOL (ROCALTROL) 0.25 mcg capsule Take 0.25 mcg by mouth daily. atorvastatin (LIPITOR) 40 mg tablet Take 40 mg by mouth daily.          STOP taking these medications       metoprolol succinate (TOPROL-XL) 50 mg XL tablet Comments:   Reason for Stopping:               * Follow-up Care/Patient Instructions: Activity: Activity as tolerated  Diet: Renal Diet  Wound Care: For coccyx: rinse with NS, pack with Opticell Ag Rope (cut in 1/4 and pack lightly), and cover with Optifoam Border Sacrum daily and prn for soiling.     Follow-up Information     Follow up With Specialties Details Why Contact Info    Mikael Martin MD Internal Medicine On 9/30/2021 Hospital discharge appointment on September 30, 2021 [facility] 11:30 am [facility] Lazarus Leach, MD Infectious Disease In 2 weeks  [address]      Candy Lerma MD Nephrology In 1 week  [address] [place] [address]  [phone]            Discharge summary greater than 35 minutes spent with the patient performing discharge instructions, medication review and physical exam    Signed:  Carlyn Rae PA-C  9/16/2021  9:08 AM

## 2021-09-17 ENCOUNTER — TELEPHONE (OUTPATIENT)
Dept: INFECTIOUS DISEASES | Age: 71
End: 2021-09-17

## 2021-09-17 LAB — TOBRAMYCIN TROUGH SERPL-MCNC: 2.9 UG/ML (ref 0.5–2)

## 2021-09-17 NOTE — TELEPHONE ENCOUNTER
[facility] stated that they got prescriptions for IV antibiotics and they don't carry those medication.  She said they should have went to a home health pharmacy

## 2021-09-17 NOTE — PROGRESS NOTES
Discharge plan of care/case management plan validated with provider discharge order. Discharged home to continue with Home Health,Discharge instructions given and patient Verbalized understanding,IV removed with no complications,Telemetry discontinued. Escorted to the main exit per wheelchair in satisfactory condition. No complains raised.

## 2021-09-20 ENCOUNTER — HOSPITAL ENCOUNTER (EMERGENCY)
Age: 71
Discharge: LEFT BEFORE DISCHARGE | End: 2021-09-20
Payer: MEDICARE

## 2021-09-20 ENCOUNTER — APPOINTMENT (OUTPATIENT)
Dept: GENERAL RADIOLOGY | Age: 71
End: 2021-09-20
Attending: STUDENT IN AN ORGANIZED HEALTH CARE EDUCATION/TRAINING PROGRAM
Payer: MEDICARE

## 2021-09-20 ENCOUNTER — TELEPHONE (OUTPATIENT)
Dept: INFECTIOUS DISEASES | Age: 71
End: 2021-09-20

## 2021-09-20 VITALS
RESPIRATION RATE: 16 BRPM | SYSTOLIC BLOOD PRESSURE: 148 MMHG | OXYGEN SATURATION: 96 % | HEIGHT: 70 IN | HEART RATE: 118 BPM | DIASTOLIC BLOOD PRESSURE: 83 MMHG | WEIGHT: 180 LBS | BODY MASS INDEX: 25.77 KG/M2 | TEMPERATURE: 101.1 F

## 2021-09-20 DIAGNOSIS — E87.20 LACTIC ACIDOSIS: ICD-10-CM

## 2021-09-20 DIAGNOSIS — R50.9 FEVER, UNSPECIFIED FEVER CAUSE: Primary | ICD-10-CM

## 2021-09-20 DIAGNOSIS — A41.9 SEPSIS, DUE TO UNSPECIFIED ORGANISM, UNSPECIFIED WHETHER ACUTE ORGAN DYSFUNCTION PRESENT (HCC): ICD-10-CM

## 2021-09-20 LAB
ALBUMIN SERPL-MCNC: 2.7 G/DL (ref 3.5–5)
ALBUMIN/GLOB SERPL: 0.4 {RATIO} (ref 1.1–2.2)
ALP SERPL-CCNC: 88 U/L (ref 45–117)
ALT SERPL-CCNC: 14 U/L (ref 12–78)
ANION GAP SERPL CALC-SCNC: 9 MMOL/L (ref 5–15)
AST SERPL W P-5'-P-CCNC: 15 U/L (ref 15–37)
BACTERIA SPEC CULT: NORMAL
BASOPHILS # BLD: 0 K/UL (ref 0–0.1)
BASOPHILS NFR BLD: 0 % (ref 0–1)
BILIRUB SERPL-MCNC: 0.4 MG/DL (ref 0.2–1)
BUN SERPL-MCNC: 29 MG/DL (ref 6–20)
BUN/CREAT SERPL: 6 (ref 12–20)
CA-I BLD-MCNC: 8.9 MG/DL (ref 8.5–10.1)
CHLORIDE SERPL-SCNC: 100 MMOL/L (ref 97–108)
CO2 SERPL-SCNC: 25 MMOL/L (ref 21–32)
CREAT SERPL-MCNC: 4.96 MG/DL (ref 0.7–1.3)
DIFFERENTIAL METHOD BLD: ABNORMAL
EOSINOPHIL # BLD: 0.1 K/UL (ref 0–0.4)
EOSINOPHIL NFR BLD: 0 % (ref 0–7)
ERYTHROCYTE [DISTWIDTH] IN BLOOD BY AUTOMATED COUNT: 20 % (ref 11.5–14.5)
GLOBULIN SER CALC-MCNC: 7.3 G/DL (ref 2–4)
GLUCOSE SERPL-MCNC: 101 MG/DL (ref 65–100)
HCT VFR BLD AUTO: 39.4 % (ref 36.6–50.3)
HGB BLD-MCNC: 11.6 G/DL (ref 12.1–17)
IMM GRANULOCYTES # BLD AUTO: 0.1 K/UL (ref 0–0.04)
IMM GRANULOCYTES NFR BLD AUTO: 1 % (ref 0–0.5)
LACTATE SERPL-SCNC: 2.9 MMOL/L (ref 0.4–2)
LYMPHOCYTES # BLD: 1.2 K/UL (ref 0.8–3.5)
LYMPHOCYTES NFR BLD: 9 % (ref 12–49)
MCH RBC QN AUTO: 26.4 PG (ref 26–34)
MCHC RBC AUTO-ENTMCNC: 29.4 G/DL (ref 30–36.5)
MCV RBC AUTO: 89.5 FL (ref 80–99)
MONOCYTES # BLD: 1.1 K/UL (ref 0–1)
MONOCYTES NFR BLD: 8 % (ref 5–13)
NEUTS SEG # BLD: 11 K/UL (ref 1.8–8)
NEUTS SEG NFR BLD: 82 % (ref 32–75)
NRBC # BLD: 0.06 K/UL (ref 0–0.01)
NRBC BLD-RTO: 0.4 PER 100 WBC
PLATELET # BLD AUTO: 323 K/UL (ref 150–400)
PMV BLD AUTO: 11.2 FL (ref 8.9–12.9)
POTASSIUM SERPL-SCNC: 4.3 MMOL/L (ref 3.5–5.1)
PROT SERPL-MCNC: 10 G/DL (ref 6.4–8.2)
RBC # BLD AUTO: 4.4 M/UL (ref 4.1–5.7)
SODIUM SERPL-SCNC: 134 MMOL/L (ref 136–145)
SPECIAL REQUESTS,SREQ: NORMAL
WBC # BLD AUTO: 13.4 K/UL (ref 4.1–11.1)

## 2021-09-20 PROCEDURE — 93005 ELECTROCARDIOGRAM TRACING: CPT

## 2021-09-20 PROCEDURE — 87077 CULTURE AEROBIC IDENTIFY: CPT

## 2021-09-20 PROCEDURE — 36415 COLL VENOUS BLD VENIPUNCTURE: CPT

## 2021-09-20 PROCEDURE — 87040 BLOOD CULTURE FOR BACTERIA: CPT

## 2021-09-20 PROCEDURE — 83605 ASSAY OF LACTIC ACID: CPT

## 2021-09-20 PROCEDURE — 99284 EMERGENCY DEPT VISIT MOD MDM: CPT

## 2021-09-20 PROCEDURE — 85025 COMPLETE CBC W/AUTO DIFF WBC: CPT

## 2021-09-20 PROCEDURE — 74011250637 HC RX REV CODE- 250/637: Performed by: STUDENT IN AN ORGANIZED HEALTH CARE EDUCATION/TRAINING PROGRAM

## 2021-09-20 PROCEDURE — 87186 SC STD MICRODIL/AGAR DIL: CPT

## 2021-09-20 PROCEDURE — 80053 COMPREHEN METABOLIC PANEL: CPT

## 2021-09-20 PROCEDURE — 71045 X-RAY EXAM CHEST 1 VIEW: CPT

## 2021-09-20 RX ORDER — ACETAMINOPHEN 325 MG/1
650 TABLET ORAL
Status: COMPLETED | OUTPATIENT
Start: 2021-09-20 | End: 2021-09-20

## 2021-09-20 RX ADMIN — ACETAMINOPHEN 650 MG: 325 TABLET ORAL at 17:54

## 2021-09-20 NOTE — ED PROVIDER NOTES
EMERGENCY DEPARTMENT HISTORY AND PHYSICAL EXAM      Date: 9/20/2021  Patient Name: Eliu Camargo    History of Presenting Illness     Chief Complaint   Patient presents with    Fever       History Provided By: Patient    HPI: Manda Palacios, 72 y.o. male with a past medical history significant hypertension, renal insufficiency and Previous infections of sacrum, previous sepsis presents to the ED with cc of fever. The patient was sent from dialysis via EMS for reported fever and chills. The patient denies feeling like he has a fever currently. Temperature was 103 upon arrival.  She was made a sepsis alert. The patient denies any associated symptoms of body aches, nausea, vomiting, diarrhea, recent known illness, Covid infection or Covid exposure, difficulty with dialysis, dizziness. The patient sister arrived and relayed that the patient had been admitted previously for infections and he follows with infectious disease Dr. Everett Hardwick. Patient currently is on antibiotic regimen and received vancomycin prior to arrival at dialysis. He states that his dialysis access in his right upper chest has been there for a little less than a year and that it was suspicious if he had an infection there but he has not been tested. He also had a bone infection in his sacrum that has since cleared and been treated with antibiotics. He denies any recent falls. He denies use of alcohol, drugs. He is alert and oriented and presents in no acute distress. There are no other complaints, changes, or physical findings at this time. PCP: Olive Lagos MD    No current facility-administered medications on file prior to encounter. Current Outpatient Medications on File Prior to Encounter   Medication Sig Dispense Refill    losartan (COZAAR) 25 mg tablet Take 1 Tablet by mouth daily. 30 Tablet 0    carvediloL (COREG) 3.125 mg tablet Take 1 Tablet by mouth two (2) times daily (with meals).  30 Tablet 0    vancomycin (VANCOCIN) 10 gram solr 1,000 mg by IntraVENous route DIALYSIS MON, WED & FRI for 14 days. Indications: blood infection caused by Staphylococcus bacteria 6000 mg 0    tobramycin 80 mg 80 mg by IntraVENous route DIALYSIS MON, WED & FRI for 14 days. 480 Dose 0    prednisoLONE acetate (PRED FORTE) 1 % ophthalmic suspension Administer 1 Drop to both eyes two (2) times a day. Post shingles therapy.  dextran 70-hypromellose (Artificial Tears,[id number],) ophthalmic solution Administer 1 Drop to both eyes three (3) times daily as needed.  acyclovir (ZOVIRAX) 800 mg tablet Take 800 mg by mouth two (2) times a day.  hydrALAZINE (APRESOLINE) 100 mg tablet Take 0.5 Tabs by mouth three (3) times daily. For systolic under 625 90 Tab 0    albuterol (PROVENTIL HFA, VENTOLIN HFA, PROAIR HFA) 90 mcg/actuation inhaler Take 2 Puffs by inhalation every four (4) hours as needed for Wheezing or Shortness of Breath. 1 Inhaler 0    calcitRIOL (ROCALTROL) 0.25 mcg capsule Take 0.25 mcg by mouth daily.  atorvastatin (LIPITOR) 40 mg tablet Take 40 mg by mouth daily.          Past History     Past Medical History:  Past Medical History:   Diagnosis Date    Asthenia 1/24/2021    Cardiomyopathy [facility]) 1/24/2021    CHF (congestive heart failure) (HCC)     Chronic kidney disease     CKD (chronic kidney disease)     4    Diabetes ([facility] [address]     End stage renal disease on dialysis ([facility] [address] 1/24/2021    Essential hypertension 1/24/2021    Gastroesophageal reflux disease 1/24/2021    Gastroesophageal reflux disease 1/24/2021    Hypertension     Pneumonia due to COVID-19 virus 5/50/4936    Systolic CHF, acute on chronic ([facility] [address] 1/24/2021       Past Surgical History:  Past Surgical History:   Procedure Laterality Date    COLONOSCOPY N/A 12/3/2020    COLONOSCOPY performed by Kevin Torres MD at [address] [facility] HX HERNIA REPAIR      IR FLUORO GUIDE [address] [facility]  1/22/2021    IR INSERT NON TUNL CVC OVER 5 YRS  1/18/2021    IR INSERT TUNL CVC W/O PORT OVER 5 YR  1/22/2021    IR PLACE CVAD FLUORO GUIDE  1/18/2021       Family History:  Family History   Problem Relation Age of Onset    Diabetes Mother     Heart Failure Father        Social History:  Social History     Tobacco Use    Smoking status: Never Smoker    Smokeless tobacco: Never Used   Vaping Use    Vaping Use: Never used   Substance Use Topics    Alcohol use: Not Currently    Drug use: Never       Allergies: Allergies   Allergen Reactions    Lisinopril Angioedema    Pcn [Penicillins] Rash         Review of Systems     Review of Systems   Constitutional: Positive for chills and fever. Negative for fatigue. HENT: Negative for congestion, ear pain, postnasal drip, rhinorrhea, sinus pressure, sore throat and trouble swallowing. Eyes: Negative for discharge and visual disturbance. Respiratory: Negative for cough, chest tightness, shortness of breath and wheezing. Cardiovascular: Negative for chest pain, palpitations and leg swelling. Gastrointestinal: Negative for abdominal distention, abdominal pain, blood in stool, constipation, diarrhea, nausea and vomiting. Endocrine: Negative. Genitourinary: Negative for difficulty urinating, dysuria, frequency and urgency. Musculoskeletal: Negative for arthralgias, back pain, joint swelling, myalgias, neck pain and neck stiffness. Skin: Negative for rash and wound. Allergic/Immunologic: Negative. Neurological: Negative for dizziness, tremors, syncope, speech difficulty, weakness, numbness and headaches. Hematological: Negative for adenopathy. Does not bruise/bleed easily. Psychiatric/Behavioral: Negative for agitation, confusion, hallucinations, sleep disturbance and suicidal ideas. The patient is not nervous/anxious. All other systems reviewed and are negative. Physical Exam     Physical Exam  Vitals and nursing note reviewed.    Constitutional:       General: He is not in acute distress. Appearance: Normal appearance. He is well-developed and normal weight. He is not ill-appearing, toxic-appearing or diaphoretic. HENT:      Head: Normocephalic and atraumatic. Right Ear: External ear normal.      Left Ear: External ear normal.      Nose: Nose normal.      Mouth/Throat:      Pharynx: Oropharynx is clear. No oropharyngeal exudate or posterior oropharyngeal erythema. Eyes:      General: No scleral icterus. Extraocular Movements: Extraocular movements intact. Conjunctiva/sclera: Conjunctivae normal.      Pupils: Pupils are equal, round, and reactive to light. Neck:      Vascular: No JVD. Trachea: No tracheal deviation. Cardiovascular:      Rate and Rhythm: Normal rate and regular rhythm. No extrasystoles are present. Pulses: Normal pulses. Radial pulses are 2+ on the right side and 2+ on the left side. Heart sounds: Normal heart sounds. No murmur heard. Pulmonary:      Effort: Pulmonary effort is normal. No respiratory distress. Breath sounds: Normal breath sounds. No stridor. No wheezing, rhonchi or rales. Chest:      Chest wall: No tenderness. Abdominal:      General: Bowel sounds are normal. There is no distension. Palpations: Abdomen is soft. There is no mass. Tenderness: There is no abdominal tenderness. There is no right CVA tenderness, left CVA tenderness, guarding or rebound. Musculoskeletal:         General: No swelling, tenderness, deformity or signs of injury. Normal range of motion. Cervical back: Normal range of motion and neck supple. No rigidity or tenderness. Right lower leg: No tenderness. No edema. Left lower leg: No tenderness. No edema. Lymphadenopathy:      Cervical: No cervical adenopathy. Skin:     General: Skin is warm and dry. Capillary Refill: Capillary refill takes less than 2 seconds. Coloration: Skin is not jaundiced.       Findings: No bruising, erythema, lesion or rash. Neurological:      General: No focal deficit present. Mental Status: He is alert and oriented to person, place, and time. Sensory: No sensory deficit. Motor: No weakness. Coordination: Coordination normal.      Gait: Gait normal.   Psychiatric:         Mood and Affect: Mood normal.         Behavior: Behavior normal.         Thought Content: Thought content normal.         Judgment: Judgment normal.         Lab and Diagnostic Study Results     Labs -     Recent Results (from the past 12 hour(s))   CBC WITH AUTOMATED DIFF    Collection Time: 09/20/21  5:45 PM   Result Value Ref Range    WBC 13.4 (H) 4.1 - 11.1 K/uL    RBC 4.40 4. 10 - 5.70 M/uL    HGB 11.6 (L) 12.1 - 17.0 g/dL    HCT 39.4 36.6 - 50.3 %    MCV 89.5 80.0 - 99.0 FL    MCH 26.4 26.0 - 34.0 PG    MCHC 29.4 (L) 30.0 - 36.5 g/dL    RDW 20.0 (H) 11.5 - 14.5 %    PLATELET 343 861 - 815 K/uL    MPV 11.2 8.9 - 12.9 FL    NRBC 0.4 (H) 0.0  WBC    ABSOLUTE NRBC 0.06 (H) 0.00 - 0.01 K/uL    NEUTROPHILS 82 (H) 32 - 75 %    LYMPHOCYTES 9 (L) 12 - 49 %    MONOCYTES 8 5 - 13 %    EOSINOPHILS 0 0 - 7 %    BASOPHILS 0 0 - 1 %    IMMATURE GRANULOCYTES 1 (H) 0 - 0.5 %    ABS. NEUTROPHILS 11.0 (H) 1.8 - 8.0 K/UL    ABS. LYMPHOCYTES 1.2 0.8 - 3.5 K/UL    ABS. MONOCYTES 1.1 (H) 0.0 - 1.0 K/UL    ABS. EOSINOPHILS 0.1 0.0 - 0.4 K/UL    ABS. BASOPHILS 0.0 0.0 - 0.1 K/UL    ABS. IMM.  GRANS. 0.1 (H) 0.00 - 0.04 K/UL    DF AUTOMATED     METABOLIC PANEL, COMPREHENSIVE    Collection Time: 09/20/21  5:45 PM   Result Value Ref Range    Sodium 134 (L) 136 - 145 mmol/L    Potassium 4.3 3.5 - 5.1 mmol/L    Chloride 100 97 - 108 mmol/L    CO2 25 21 - 32 mmol/L    Anion gap 9 5 - 15 mmol/L    Glucose 101 (H) 65 - 100 mg/dL    BUN 29 (H) 6 - 20 mg/dL    Creatinine 4.96 (H) 0.70 - 1.30 mg/dL    BUN/Creatinine ratio 6 (L) 12 - 20      GFR est AA 14 (L) >60 ml/min/1.73m2    GFR est non-AA 12 (L) >60 ml/min/1.73m2    Calcium 8.9 8.5 - 10.1 mg/dL Bilirubin, total 0.4 0.2 - 1.0 mg/dL    AST (SGOT) 15 15 - 37 U/L    ALT (SGPT) 14 12 - 78 U/L    Alk. phosphatase 88 45 - 117 U/L    Protein, total 10.0 (H) 6.4 - 8.2 g/dL    Albumin 2.7 (L) 3.5 - 5.0 g/dL    Globulin 7.3 (H) 2.0 - 4.0 g/dL    A-G Ratio 0.4 (L) 1.1 - 2.2     LACTIC ACID    Collection Time: 09/20/21  5:45 PM   Result Value Ref Range    Lactic acid 2.9 (HH) 0.4 - 2.0 mmol/L       Radiologic Studies -   @lastxrresult@  CT Results  (Last 48 hours)    None        CXR Results  (Last 48 hours)               09/20/21 1830  XR CHEST PORT Final result    Impression:  Stable findings compared to the prior study of 8 September 2021       Narrative:  History: Fever       Single view of the chest was obtained. Heart size is enlarged. There is a poor   depth of inspiration. There is a large bore central venous catheter with the tip   in the right atrium. No focal consolidation is seen. No pneumothorax or   effusion. Medical Decision Making   - I am the first provider for this patient. - I reviewed the vital signs, available nursing notes, past medical history, past surgical history, family history and social history. - Initial assessment performed. The patients presenting problems have been discussed, and they are in agreement with the care plan formulated and outlined with them. I have encouraged them to ask questions as they arise throughout their visit. Vital Signs-Reviewed the patient's vital signs.   Patient Vitals for the past 12 hrs:   Temp Pulse Resp BP SpO2   09/20/21 2029 (!) 101.1 °F (38.4 °C)       09/20/21 1737 (!) 103.1 °F (39.5 °C) (!) 118 16 (!) 148/83 96 %       Records Reviewed: Nursing Notes, Old Medical Records, Previous Radiology Studies and Previous Laboratory Studies    The patient presents with fever with a differential diagnosis of sepsis, UTI, pneumonia, COVID-19 infection, septic shock, infection of dialysis line, sepsis of unknown origin, renal insufficiency, viral illness      ED Course:     ED Course as of Sep 21 0454   Mon Sep 20, 2021   4535 Reviewed available labs and CXR. Patient will be admit to hospitalist group upon consult and acceptance. [KR]   2023 Patient requested to have Dr. Belkys Morris consulted so he could be discharged. Spoke with Dr. Belkys Morris and reviewed patient's current clinical status, labs and imaging and Dr. Belkys Morris agreed patient needs to be admitted and he will consult. Hospitalist to consult for admission. Patient does not want admission but will speak with Hospitalist. Sister with patient at bedside and also made aware and is agreeable with plan of care. Repeat temp 101.1 after acetaminophen. Per Dr. Belkys Morris, no antibiotics to be ordered at this time and he will order with consult. [KR]   2027 Temp(!): 103.1 °F (39.5 °C) [KR]      ED Course User Index  [KR] Chelsey Middleton NP   Recheck patient's temperature over 101.1. Patient requests to leave AMA and refused admission. Patient did agree to speak with Dr. Belkys Morris over the phone and attempt to have patient agree to admission. Contacted Dr. Belkys Morris who spoke with the patient in length. Patient not willing to listen to any explanations or advisement and got off of the stretcher and walked out of the emergency department with his sister refusing admission. Hospitalist was present to admit patient and patient was unwilling to speak with hospitalist or wait. He states that he will follow-up with his doctor and he is fully aware of what he is doing and the decision he is making and continues to refuse admission. Provider Notes (Medical Decision Making):     MDM  Number of Diagnoses or Management Options  Diagnosis management comments: Patient was made a sepsis alert prior to this examination. We will continue with sepsis labs, treat fever with acetaminophen, plan for admission, patient does make urine and add urinalysis to previous orders.   Add BNP to assess fluid status prior to IV fluid administration. Discussed patient has already had vancomycin today. He request Dr. Loida Evans to be called. Will consult with infectious disease Dr. Loida Evans. Further diagnostics and interventions dependent upon results of initial diagnostics, patient's response to intervention, any changes or deterioration in patient's status. Amount and/or Complexity of Data Reviewed  Clinical lab tests: ordered and reviewed  Tests in the radiology section of CPT®: ordered and reviewed  Discussion of test results with the performing providers: yes  Obtain history from someone other than the patient: yes  Review and summarize past medical records: yes  Discuss the patient with other providers: yes    Risk of Complications, Morbidity, and/or Mortality  Presenting problems: high  Diagnostic procedures: minimal  Management options: minimal    Patient Progress  Patient progress: stable           Disposition   Disposition: [place] Discharge: I informed the pt that they needed admission, further observation and further workup for adequate evaluation for their fever and suspected sepsis and infection and that by refusing the above, they at risk for sudden death and further deterioration. They are awake, alert, and they understand their condition and the risks involved in leaving. They are clinically aware of their surroundings and able to ask appropriate questions. The patients relative and the nurse present confirms They are not clinically intoxicated and able to make medical decisions. They have verbalized knowing the risks and understood it was recommended that they stay and could also return at any time. The patient's relative was present for the discussion and also verbalized that they understood both diagnosis, risks and could return at any time.   They were both provided with warnings regarding worsening of Their condition and were provided instructions to follow up with their PCP tomorrow or return to the Emergency Room as soon as possible. This discussion was witnessed by nurse, Consulting physician, ED staff, ED attending. Patient left ED after speaking with staff but refused to accept/wait for discharge instructions and/or prescriptions    AMA    DISCHARGE PLAN:  1. Current Discharge Medication List      CONTINUE these medications which have NOT CHANGED    Details   losartan (COZAAR) 25 mg tablet Take 1 Tablet by mouth daily. Qty: 30 Tablet, Refills: 0      carvediloL (COREG) 3.125 mg tablet Take 1 Tablet by mouth two (2) times daily (with meals). Qty: 30 Tablet, Refills: 0      vancomycin (VANCOCIN) 10 gram solr 1,000 mg by IntraVENous route DIALYSIS MON, WED & FRI for 14 days. Indications: blood infection caused by Staphylococcus bacteria  Qty: 6000 mg, Refills: 0      tobramycin 80 mg 80 mg by IntraVENous route DIALYSIS MON, WED & FRI for 14 days. Qty: 480 Dose, Refills: 0      prednisoLONE acetate (PRED FORTE) 1 % ophthalmic suspension Administer 1 Drop to both eyes two (2) times a day. Post shingles therapy. dextran 70-hypromellose (Artificial Tears,[id number],) ophthalmic solution Administer 1 Drop to both eyes three (3) times daily as needed. acyclovir (ZOVIRAX) 800 mg tablet Take 800 mg by mouth two (2) times a day. hydrALAZINE (APRESOLINE) 100 mg tablet Take 0.5 Tabs by mouth three (3) times daily. For systolic under 488  Qty: 90 Tab, Refills: 0      albuterol (PROVENTIL HFA, VENTOLIN HFA, PROAIR HFA) 90 mcg/actuation inhaler Take 2 Puffs by inhalation every four (4) hours as needed for Wheezing or Shortness of Breath. Qty: 1 Inhaler, Refills: 0      calcitRIOL (ROCALTROL) 0.25 mcg capsule Take 0.25 mcg by mouth daily. atorvastatin (LIPITOR) 40 mg tablet Take 40 mg by mouth daily. 2.   Follow-up Information    None       3. Return to ED if worse   4. Discharge Medication List as of 9/20/2021  8:52 PM            Diagnosis     Clinical Impression:   1.  Fever, unspecified fever cause    2. Lactic acidosis    3. Sepsis, due to unspecified organism, unspecified whether acute organ dysfunction present [facility])        Attestations:    Esteban Martines NP    Please note that this dictation was completed with [email or web address], the computer voice recognition software. Quite often unanticipated grammatical, syntax, homophones, and other interpretive errors are inadvertently transcribed by the computer software. Please disregard these errors. Please excuse any errors that have escaped final proofreading. Thank you.

## 2021-09-20 NOTE — ED TRIAGE NOTES
Completed dialysis. Staff concerned pt tachycardic and hypertensive. Sent to be evaluated. Pt has no complaints.

## 2021-09-20 NOTE — TELEPHONE ENCOUNTER
Phone call from a relative of the patient . She states that the patient is not doing well at all. He dad a dialysis treatment last Friday and has not felt right since then. He is now sick and shivering all over. She said she was told that his temp was not up too much but she states that something is wrong. Wants to talk to Dr Ashli Valenzuela to see what he wants them to do. Dr Otis Gibson is not in the office this pm he is at the hospital I will send the information to Dr Ashli Valenzuela but advised them to take the patient to the ER to get checked since he is on dialysis.

## 2021-09-21 LAB
ATRIAL RATE: 110 BPM
CALCULATED P AXIS, ECG09: 61 DEGREES
CALCULATED R AXIS, ECG10: -52 DEGREES
CALCULATED T AXIS, ECG11: 92 DEGREES
DIAGNOSIS, 93000: NORMAL
P-R INTERVAL, ECG05: 176 MS
Q-T INTERVAL, ECG07: 326 MS
QRS DURATION, ECG06: 84 MS
QTC CALCULATION (BEZET), ECG08: 441 MS
VENTRICULAR RATE, ECG03: 110 BPM

## 2021-09-21 NOTE — ED NOTES
Pt spoke with dr. Kimberlee Dawn on the phone for a period of time. Pt still declining to stay for admission. Pt states understanding of potential sepsis and/or death if he does not stay for medical eval. Pt states he understands and states he just got out of the hospital a few days ago and was not staying again. Pt's sister at bedside and witnessed everything. Instructions of what to look out for while at home was given to the sister and the pt. Highly  Encouraged new trip to ed if any symptoms return.

## 2021-09-26 LAB
BACTERIA SPEC CULT: ABNORMAL
SPECIAL REQUESTS,SREQ: ABNORMAL

## 2021-10-15 ENCOUNTER — APPOINTMENT (OUTPATIENT)
Dept: GENERAL RADIOLOGY | Age: 71
DRG: 314 | End: 2021-10-15
Attending: EMERGENCY MEDICINE
Payer: MEDICARE

## 2021-10-15 ENCOUNTER — APPOINTMENT (OUTPATIENT)
Dept: CT IMAGING | Age: 71
DRG: 314 | End: 2021-10-15
Attending: PHYSICIAN ASSISTANT
Payer: MEDICARE

## 2021-10-15 ENCOUNTER — HOSPITAL ENCOUNTER (INPATIENT)
Age: 71
LOS: 6 days | Discharge: HOME | DRG: 314 | End: 2021-10-21
Attending: EMERGENCY MEDICINE | Admitting: INTERNAL MEDICINE
Payer: MEDICARE

## 2021-10-15 DIAGNOSIS — R78.81 MRSA BACTEREMIA: ICD-10-CM

## 2021-10-15 DIAGNOSIS — R78.81 GRAM-NEGATIVE BACTEREMIA: Primary | ICD-10-CM

## 2021-10-15 DIAGNOSIS — B95.62 MRSA BACTEREMIA: ICD-10-CM

## 2021-10-15 LAB
ALBUMIN SERPL-MCNC: 3 G/DL (ref 3.5–5)
ALBUMIN/GLOB SERPL: 0.5 {RATIO} (ref 1.1–2.2)
ALP SERPL-CCNC: 90 U/L (ref 45–117)
ALT SERPL-CCNC: 13 U/L (ref 12–78)
ANION GAP SERPL CALC-SCNC: 9 MMOL/L (ref 5–15)
AST SERPL W P-5'-P-CCNC: 20 U/L (ref 15–37)
BASOPHILS # BLD: 0.1 K/UL (ref 0–0.1)
BASOPHILS NFR BLD: 0 % (ref 0–1)
BILIRUB SERPL-MCNC: 0.6 MG/DL (ref 0.2–1)
BUN SERPL-MCNC: 26 MG/DL (ref 6–20)
BUN/CREAT SERPL: 5 (ref 12–20)
CA-I BLD-MCNC: 9.1 MG/DL (ref 8.5–10.1)
CHLORIDE SERPL-SCNC: 101 MMOL/L (ref 97–108)
CO2 SERPL-SCNC: 24 MMOL/L (ref 21–32)
CREAT SERPL-MCNC: 5.43 MG/DL (ref 0.7–1.3)
DIFFERENTIAL METHOD BLD: ABNORMAL
EOSINOPHIL # BLD: 0.2 K/UL (ref 0–0.4)
EOSINOPHIL NFR BLD: 1 % (ref 0–7)
ERYTHROCYTE [DISTWIDTH] IN BLOOD BY AUTOMATED COUNT: 17.7 % (ref 11.5–14.5)
GLOBULIN SER CALC-MCNC: 6.6 G/DL (ref 2–4)
GLUCOSE SERPL-MCNC: 105 MG/DL (ref 65–100)
HCT VFR BLD AUTO: 44.5 % (ref 36.6–50.3)
HGB BLD-MCNC: 13.5 G/DL (ref 12.1–17)
IMM GRANULOCYTES # BLD AUTO: 0.1 K/UL (ref 0–0.04)
IMM GRANULOCYTES NFR BLD AUTO: 0 % (ref 0–0.5)
LACTATE SERPL-SCNC: 1 MMOL/L (ref 0.4–2)
LYMPHOCYTES # BLD: 1.2 K/UL (ref 0.8–3.5)
LYMPHOCYTES NFR BLD: 9 % (ref 12–49)
MCH RBC QN AUTO: 26.9 PG (ref 26–34)
MCHC RBC AUTO-ENTMCNC: 30.3 G/DL (ref 30–36.5)
MCV RBC AUTO: 88.8 FL (ref 80–99)
MONOCYTES # BLD: 0.8 K/UL (ref 0–1)
MONOCYTES NFR BLD: 7 % (ref 5–13)
NEUTS SEG # BLD: 10.2 K/UL (ref 1.8–8)
NEUTS SEG NFR BLD: 83 % (ref 32–75)
NRBC # BLD: 0 K/UL (ref 0–0.01)
NRBC BLD-RTO: 0 PER 100 WBC
PLATELET # BLD AUTO: 305 K/UL (ref 150–400)
PMV BLD AUTO: 10.4 FL (ref 8.9–12.9)
POTASSIUM SERPL-SCNC: 4.4 MMOL/L (ref 3.5–5.1)
PROT SERPL-MCNC: 9.6 G/DL (ref 6.4–8.2)
RBC # BLD AUTO: 5.01 M/UL (ref 4.1–5.7)
SODIUM SERPL-SCNC: 134 MMOL/L (ref 136–145)
WBC # BLD AUTO: 12.5 K/UL (ref 4.1–11.1)

## 2021-10-15 PROCEDURE — 87040 BLOOD CULTURE FOR BACTERIA: CPT

## 2021-10-15 PROCEDURE — 93005 ELECTROCARDIOGRAM TRACING: CPT

## 2021-10-15 PROCEDURE — 99284 EMERGENCY DEPT VISIT MOD MDM: CPT

## 2021-10-15 PROCEDURE — 74011250636 HC RX REV CODE- 250/636: Performed by: PHYSICIAN ASSISTANT

## 2021-10-15 PROCEDURE — 87641 MR-STAPH DNA AMP PROBE: CPT

## 2021-10-15 PROCEDURE — 85025 COMPLETE CBC W/AUTO DIFF WBC: CPT

## 2021-10-15 PROCEDURE — 74011250637 HC RX REV CODE- 250/637: Performed by: PHYSICIAN ASSISTANT

## 2021-10-15 PROCEDURE — 71045 X-RAY EXAM CHEST 1 VIEW: CPT

## 2021-10-15 PROCEDURE — 74011000250 HC RX REV CODE- 250: Performed by: PHYSICIAN ASSISTANT

## 2021-10-15 PROCEDURE — 80053 COMPREHEN METABOLIC PANEL: CPT

## 2021-10-15 PROCEDURE — 65270000029 HC RM PRIVATE

## 2021-10-15 PROCEDURE — 74176 CT ABD & PELVIS W/O CONTRAST: CPT

## 2021-10-15 PROCEDURE — 83605 ASSAY OF LACTIC ACID: CPT

## 2021-10-15 PROCEDURE — 87077 CULTURE AEROBIC IDENTIFY: CPT

## 2021-10-15 PROCEDURE — 97602 WOUND(S) CARE NON-SELECTIVE: CPT

## 2021-10-15 PROCEDURE — 87186 SC STD MICRODIL/AGAR DIL: CPT

## 2021-10-15 RX ORDER — PREDNISOLONE ACETATE 10 MG/ML
1 SUSPENSION/ DROPS OPHTHALMIC 2 TIMES DAILY
Status: DISCONTINUED | OUTPATIENT
Start: 2021-10-15 | End: 2021-10-21 | Stop reason: HOSPADM

## 2021-10-15 RX ORDER — ONDANSETRON 4 MG/1
4 TABLET, ORALLY DISINTEGRATING ORAL
Status: DISCONTINUED | OUTPATIENT
Start: 2021-10-15 | End: 2021-10-21 | Stop reason: HOSPADM

## 2021-10-15 RX ORDER — POLYETHYLENE GLYCOL 3350 17 G/17G
17 POWDER, FOR SOLUTION ORAL DAILY PRN
Status: DISCONTINUED | OUTPATIENT
Start: 2021-10-15 | End: 2021-10-21 | Stop reason: HOSPADM

## 2021-10-15 RX ORDER — SODIUM CHLORIDE 0.9 % (FLUSH) 0.9 %
5-40 SYRINGE (ML) INJECTION EVERY 8 HOURS
Status: DISCONTINUED | OUTPATIENT
Start: 2021-10-15 | End: 2021-10-21 | Stop reason: HOSPADM

## 2021-10-15 RX ORDER — ATORVASTATIN CALCIUM 40 MG/1
40 TABLET, FILM COATED ORAL DAILY
Status: DISCONTINUED | OUTPATIENT
Start: 2021-10-16 | End: 2021-10-21 | Stop reason: HOSPADM

## 2021-10-15 RX ORDER — CARVEDILOL 3.12 MG/1
3.12 TABLET ORAL 2 TIMES DAILY WITH MEALS
Status: DISCONTINUED | OUTPATIENT
Start: 2021-10-15 | End: 2021-10-21 | Stop reason: HOSPADM

## 2021-10-15 RX ORDER — LOSARTAN POTASSIUM 25 MG/1
25 TABLET ORAL DAILY
Status: DISCONTINUED | OUTPATIENT
Start: 2021-10-16 | End: 2021-10-21 | Stop reason: HOSPADM

## 2021-10-15 RX ORDER — ACETAMINOPHEN 650 MG/1
650 SUPPOSITORY RECTAL
Status: DISCONTINUED | OUTPATIENT
Start: 2021-10-15 | End: 2021-10-21 | Stop reason: HOSPADM

## 2021-10-15 RX ORDER — ACYCLOVIR 800 MG/1
800 TABLET ORAL 2 TIMES DAILY
Status: DISCONTINUED | OUTPATIENT
Start: 2021-10-15 | End: 2021-10-21 | Stop reason: HOSPADM

## 2021-10-15 RX ORDER — ALBUTEROL SULFATE 90 UG/1
2 AEROSOL, METERED RESPIRATORY (INHALATION)
Status: DISCONTINUED | OUTPATIENT
Start: 2021-10-15 | End: 2021-10-21 | Stop reason: HOSPADM

## 2021-10-15 RX ORDER — HYDRALAZINE HYDROCHLORIDE 50 MG/1
50 TABLET, FILM COATED ORAL 3 TIMES DAILY
Status: DISCONTINUED | OUTPATIENT
Start: 2021-10-15 | End: 2021-10-21 | Stop reason: HOSPADM

## 2021-10-15 RX ORDER — ONDANSETRON 2 MG/ML
4 INJECTION INTRAMUSCULAR; INTRAVENOUS
Status: DISCONTINUED | OUTPATIENT
Start: 2021-10-15 | End: 2021-10-21 | Stop reason: HOSPADM

## 2021-10-15 RX ORDER — ACETAMINOPHEN 325 MG/1
650 TABLET ORAL
Status: DISCONTINUED | OUTPATIENT
Start: 2021-10-15 | End: 2021-10-21 | Stop reason: HOSPADM

## 2021-10-15 RX ORDER — HEPARIN SODIUM 5000 [USP'U]/ML
5000 INJECTION, SOLUTION INTRAVENOUS; SUBCUTANEOUS EVERY 8 HOURS
Status: DISCONTINUED | OUTPATIENT
Start: 2021-10-15 | End: 2021-10-21 | Stop reason: HOSPADM

## 2021-10-15 RX ORDER — SODIUM CHLORIDE 0.9 % (FLUSH) 0.9 %
5-40 SYRINGE (ML) INJECTION AS NEEDED
Status: DISCONTINUED | OUTPATIENT
Start: 2021-10-15 | End: 2021-10-21 | Stop reason: HOSPADM

## 2021-10-15 RX ADMIN — PREDNISOLONE ACETATE 1 DROP: 10 SUSPENSION/ DROPS OPHTHALMIC at 20:37

## 2021-10-15 RX ADMIN — ACYCLOVIR 800 MG: 800 TABLET ORAL at 20:34

## 2021-10-15 RX ADMIN — VANCOMYCIN HYDROCHLORIDE 1250 MG: 1.25 INJECTION, POWDER, LYOPHILIZED, FOR SOLUTION INTRAVENOUS at 20:00

## 2021-10-15 RX ADMIN — Medication 10 ML: at 22:00

## 2021-10-15 RX ADMIN — CARVEDILOL 3.12 MG: 3.12 TABLET, FILM COATED ORAL at 19:18

## 2021-10-15 RX ADMIN — HEPARIN SODIUM 5000 UNITS: 5000 INJECTION INTRAVENOUS; SUBCUTANEOUS at 19:18

## 2021-10-15 RX ADMIN — ACETAMINOPHEN 650 MG: 325 TABLET ORAL at 20:34

## 2021-10-15 RX ADMIN — HYDRALAZINE HYDROCHLORIDE 50 MG: 50 TABLET, FILM COATED ORAL at 20:34

## 2021-10-15 RX ADMIN — MEROPENEM 1 G: 1 INJECTION INTRAVENOUS at 19:18

## 2021-10-15 RX ADMIN — Medication 10 ML: at 19:17

## 2021-10-15 NOTE — WOUND CARE
IP WOUND CONSULT    Roberth Mcneill  MEDICAL RECORD NUMBER:  [id number]  AGE: 72 y.o. GENDER: male  : 1950  TODAY'S DATE:  10/15/2021    GENERAL     [] Follow-up   [x] New Consult    Ishan Camargo is a 72 y.o. male referred by:   [x] Physician  [] Nursing  [] Other:         PAST MEDICAL HISTORY    Past Medical History:   Diagnosis Date    Asthenia 2021    Cardiomyopathy ([facility] 2021    CHF (congestive heart failure) (HCC)     Chronic kidney disease     CKD (chronic kidney disease)     4    Diabetes ([facility] [address] [facility]     End stage renal disease on dialysis ([facility] [address] 2021    Essential hypertension 2021    Gastroesophageal reflux disease 2021    Gastroesophageal reflux disease 2021    Hypertension     Pneumonia due to COVID-19 virus     Systolic CHF, acute on chronic ([facility] [address] 2021        PAST SURGICAL HISTORY    Past Surgical History:   Procedure Laterality Date    COLONOSCOPY N/A 12/3/2020    COLONOSCOPY performed by Geovanni Andrews MD at [address] HX HEART CATHETERIZATION      HX HERNIA REPAIR      IR FLUORO GUIDE PLC CVAD  2021    IR INSERT NON TUNL CVC OVER 5 YRS  2021    IR INSERT TUNL CVC W/O PORT OVER 5 YR  2021    IR PLACE CVAD FLUORO GUIDE  2021       FAMILY HISTORY    Family History   Problem Relation Age of Onset    Diabetes Mother     Heart Failure Father          ALLERGIES    Allergies   Allergen Reactions    Lisinopril Angioedema    Pcn [Penicillins] Rash       MEDICATIONS    No current facility-administered medications on file prior to encounter. Current Outpatient Medications on File Prior to Encounter   Medication Sig Dispense Refill    losartan (COZAAR) 25 mg tablet Take 1 Tablet by mouth daily. 30 Tablet 0    carvediloL (COREG) 3.125 mg tablet Take 1 Tablet by mouth two (2) times daily (with meals).  30 Tablet 0    prednisoLONE acetate (PRED FORTE) 1 % ophthalmic suspension Administer 1 Drop to both eyes two (2) times a day. Post shingles therapy.  dextran 70-hypromellose (Artificial Tears,[id number],) ophthalmic solution Administer 1 Drop to both eyes three (3) times daily as needed.  acyclovir (ZOVIRAX) 800 mg tablet Take 800 mg by mouth two (2) times a day.  hydrALAZINE (APRESOLINE) 100 mg tablet Take 0.5 Tabs by mouth three (3) times daily. For systolic under 941 90 Tab 0    albuterol (PROVENTIL HFA, VENTOLIN HFA, PROAIR HFA) 90 mcg/actuation inhaler Take 2 Puffs by inhalation every four (4) hours as needed for Wheezing or Shortness of Breath. 1 Inhaler 0    calcitRIOL (ROCALTROL) 0.25 mcg capsule Take 0.25 mcg by mouth daily.  atorvastatin (LIPITOR) 40 mg tablet Take 40 mg by mouth daily. [unfilled]  Visit Vitals  BP (!) 141/84 (BP 1 Location: Right arm, BP Patient Position: At rest;Sitting)   Pulse 90   Temp 98.6 °F (37 °C)   Resp 16   Ht 5' 11\" (1.803 m)   Wt 80.7 kg (178 lb)   SpO2 100%   BMI 24.83 kg/m²       ASSESSMENT     Wound Identification & Type: Healing Stage 4 PI to coccyx (POA)  Dressing change: applied Optifoam Border Sacral dressing  Verbal consent for picture: Yes    Contributing Factors: anticoagulation therapy, diabetes and decreased mobility    Wound Heel Left;Posterior small, discolored, blanchable (Active)   Number of days: 267       Wound Wrist Left Healing surgical site. No redness, swelling, or drainage.  Present upon arrival to ED (Active)   Number of days: 100       Wound Sacral/coccyx Full Thickness Tissue Loss 09/11/21 (Active)   Wound Image   10/15/21 1900   Wound Etiology Pressure Stage 4 10/15/21 1900   Cleansed Cleansed with saline 10/15/21 1900   Dressing/Treatment Foam 10/15/21 1900   Dressing Change Due 10/16/21 10/15/21 1900   Wound Length (cm) 0.2 cm 10/15/21 1900   Wound Width (cm) 0.2 cm 10/15/21 1900   Wound Depth (cm) 0.3 cm 10/15/21 1900   Wound Surface Area (cm^2) 0.04 cm^2 10/15/21 1900   Change in Wound Size % 80.95 10/15/21 1900 Wound Volume (cm^3) 0.012 cm^3 10/15/21 1900   Wound Healing % 89 10/15/21 1900   Wound Assessment Dry 10/15/21 1900   Drainage Amount None 10/15/21 1900   Wound Odor None 10/15/21 1900   Carole-Wound/Incision Assessment Fragile 10/15/21 1900   Edges Unattached edges 10/15/21 1900   Wound Thickness Description Full thickness 10/15/21 1900   Number of days: 34       Incision 01/23/21 Cervical anterior Right (Active)   Number of days: 265          PLAN     Skin Care & Pressure Relief Recommendations  Minimize layers of linen  Turn/reposition approximately every 2 hours  Pillow wedges  Offload heels pillows    Blood Glucose: 105 on 10/15/21                             Albumin: 3.0 on 10/15/21  WBCs: 12.5 on 10/15/21    Support Surface: Apply waffle overlay to stretcher. Physician/Provider notified:   Recommendations: Wound to coccyx is improved compared to previous assessment. Area is no longer big enough for packing and is not draining. Keep area clean and dry and cover with Optifoam Border Sacral dressing daily. Patient is oliguric. He confirmed he has been experiencing diarrhea but is ambulatory to the bathroom. Patient able to turn self independently as witnessed. Turn q2h at 30 degree angle or more to offload sacrum/coccyx and buttocks. Continued pressure to coccyx will delay wound closure. No other wound care needs at this time. Will continue to follow. Discharge Wound Care Needs: For wound to coccyx: rinse with NS or wash with soap and water, pat dry, and cover with Optifoam Border Sacral dressing daily.       Teaching completed with:   [] Patient           [] Family member       [] Caregiver          [] Nursing  [] Other    Patient/Caregiver Teaching:  Level of patient/caregiver understanding able to:   [] Indicates understanding       [] Needs reinforcement  [] Unsuccessful      [] Verbal Understanding  [] Demonstrated understanding       [] No evidence of learning  [] Refused teaching [] N/A       Electronically signed by Nora Martinez RN on 10/15/2021 at 7:03 PM

## 2021-10-15 NOTE — H&P
History and Physical    Patient: Emmy Homans MRN: [id number]  SSN: [id number]    YOB: 1950  Age: 72 y.o. Sex: male      Subjective:      Emmy Homans is a 72 y.o. male who presents emergency department with a history of cardiomyopathy and an EF of 25%, end-stage renal disease currently receiving hemodialysis on Monday Wednesday Friday and recent discharge from the hospital with Klebsiella pneumonia and MRSA bacteremia. Source of infection was never determined. Patient was discharged on tobramycin and vancomycin with hemodialysis. Patient again presents to the emergency department with febrile illness and according to the dialysis center gram-negative bacillus is growing in his blood culture. That data is not available at this time. New blood cultures have been obtained. Chest x-ray is normal.  Patient with mild confusion. Patient also complains of severe abdominal pain. CT of the abdomen pelvis pending. .     Past Medical History:   Diagnosis Date    Asthenia 1/24/2021    Cardiomyopathy ([address] 1/24/2021    CHF (congestive heart failure) (HCC)     Chronic kidney disease     CKD (chronic kidney disease)     4    Diabetes ([address]     End stage renal disease on dialysis ([address] 1/24/2021    Essential hypertension 1/24/2021    Gastroesophageal reflux disease 1/24/2021    Gastroesophageal reflux disease 1/24/2021    Hypertension     Pneumonia due to COVID-19 virus 5/60/9802    Systolic CHF, acute on chronic ([address] 1/24/2021     Past Surgical History:   Procedure Laterality Date    COLONOSCOPY N/A 12/3/2020    COLONOSCOPY performed by Justyna Colunga MD at [facility] HX HEART CATHETERIZATION      HX HERNIA REPAIR      IR FLUORO GUIDE PLC CVAD  1/22/2021    IR INSERT NON TUNL CVC OVER 5 YRS  1/18/2021    IR INSERT TUNL CVC W/O PORT OVER 5 YR  1/22/2021    IR PLACE CVAD FLUORO GUIDE  1/18/2021      Family History   Problem Relation Age of Onset    Diabetes Mother     Heart Failure Father      Social History     Tobacco Use    Smoking status: Never Smoker    Smokeless tobacco: Never Used   Substance Use Topics    Alcohol use: Not Currently      Prior to Admission medications    Medication Sig Start Date End Date Taking? Authorizing Provider   losartan (COZAAR) 25 mg tablet Take 1 Tablet by mouth daily. 9/16/21   Rakesh Dale PA-C   carvediloL (COREG) 3.125 mg tablet Take 1 Tablet by mouth two (2) times daily (with meals). 9/16/21   Rakesh Dale PA-C   prednisoLONE acetate (PRED FORTE) 1 % ophthalmic suspension Administer 1 Drop to both eyes two (2) times a day. Post shingles therapy. Provider, Historical   dextran 70-hypromellose (Artificial Tears,[id number],) ophthalmic solution Administer 1 Drop to both eyes three (3) times daily as needed. Provider, Historical   acyclovir (ZOVIRAX) 800 mg tablet Take 800 mg by mouth two (2) times a day. Other, MD Farhan   hydrALAZINE (APRESOLINE) 100 mg tablet Take 0.5 Tabs by mouth three (3) times daily. For systolic under 436 8/25/84   Jodie Dooley MD   albuterol (PROVENTIL HFA, VENTOLIN HFA, PROAIR HFA) 90 mcg/actuation inhaler Take 2 Puffs by inhalation every four (4) hours as needed for Wheezing or Shortness of Breath. 1/24/21   Libby BAEZA MD   calcitRIOL (ROCALTROL) 0.25 mcg capsule Take 0.25 mcg by mouth daily. Provider, Historical   atorvastatin (LIPITOR) 40 mg tablet Take 40 mg by mouth daily. Provider, Historical        Allergies   Allergen Reactions    Lisinopril Angioedema    Pcn [Penicillins] Rash       Review of Systems:  Review of Systems   Constitutional: Positive for chills, fever and malaise/fatigue. HENT: Negative. Eyes: Negative. Respiratory: Negative for cough and shortness of breath. Cardiovascular: Negative for chest pain and leg swelling. Gastrointestinal: Positive for abdominal pain. Negative for diarrhea, nausea and vomiting. Genitourinary: Negative. Musculoskeletal: Negative. Skin:        Small sacral ulcer   Neurological: Positive for weakness. Psychiatric/Behavioral: Negative. Objective:     Recent Results (from the past 24 hour(s))   CBC WITH AUTOMATED DIFF    Collection Time: 10/15/21  2:15 PM   Result Value Ref Range    WBC 12.5 (H) 4.1 - 11.1 K/uL    RBC 5.01 4.10 - 5.70 M/uL    HGB 13.5 12.1 - 17.0 g/dL    HCT 44.5 36.6 - 50.3 %    MCV 88.8 80.0 - 99.0 FL    MCH 26.9 26.0 - 34.0 PG    MCHC 30.3 30.0 - 36.5 g/dL    RDW 17.7 (H) 11.5 - 14.5 %    PLATELET 958 075 - 999 K/uL    MPV 10.4 8.9 - 12.9 FL    NRBC 0.0 0.0  WBC    ABSOLUTE NRBC 0.00 0.00 - 0.01 K/uL    NEUTROPHILS 83 (H) 32 - 75 %    LYMPHOCYTES 9 (L) 12 - 49 %    MONOCYTES 7 5 - 13 %    EOSINOPHILS 1 0 - 7 %    BASOPHILS 0 0 - 1 %    IMMATURE GRANULOCYTES 0 0 - 0.5 %    ABS. NEUTROPHILS 10.2 (H) 1.8 - 8.0 K/UL    ABS. LYMPHOCYTES 1.2 0.8 - 3.5 K/UL    ABS. MONOCYTES 0.8 0.0 - 1.0 K/UL    ABS. EOSINOPHILS 0.2 0.0 - 0.4 K/UL    ABS. BASOPHILS 0.1 0.0 - 0.1 K/UL    ABS. IMM. GRANS. 0.1 (H) 0.00 - 0.04 K/UL    DF AUTOMATED     METABOLIC PANEL, COMPREHENSIVE    Collection Time: 10/15/21  2:15 PM   Result Value Ref Range    Sodium 134 (L) 136 - 145 mmol/L    Potassium 4.4 3.5 - 5.1 mmol/L    Chloride 101 97 - 108 mmol/L    CO2 24 21 - 32 mmol/L    Anion gap 9 5 - 15 mmol/L    Glucose 105 (H) 65 - 100 mg/dL    BUN 26 (H) 6 - 20 mg/dL    Creatinine 5.43 (H) 0.70 - 1.30 mg/dL    BUN/Creatinine ratio 5 (L) 12 - 20      GFR est AA 13 (L) >60 ml/min/1.73m2    GFR est non-AA 11 (L) >60 ml/min/1.73m2    Calcium 9.1 8.5 - 10.1 mg/dL    Bilirubin, total 0.6 0.2 - 1.0 mg/dL    AST (SGOT) 20 15 - 37 U/L    ALT (SGPT) 13 12 - 78 U/L    Alk.  phosphatase 90 45 - 117 U/L    Protein, total 9.6 (H) 6.4 - 8.2 g/dL    Albumin 3.0 (L) 3.5 - 5.0 g/dL    Globulin 6.6 (H) 2.0 - 4.0 g/dL    A-G Ratio 0.5 (L) 1.1 - 2.2     LACTIC ACID    Collection Time: 10/15/21  4:50 PM   Result Value Ref Range    Lactic acid 1.0 0.4 - 2.0 mmol/L        XR CHEST PORT   Final Result      CT ABD PELV WO CONT    (Results Pending)        Vitals:    10/15/21 1402   BP: (!) 141/84   Pulse: 90   Resp: 16   Temp: 98.6 °F (37 °C)   SpO2: 100%   Weight: 80.7 kg (178 lb)   Height: 5' 11\" (1.803 m)        Physical Exam:  Physical Exam  Vitals reviewed. Constitutional:       General: He is not in acute distress. Appearance: He is ill-appearing and toxic-appearing. HENT:      Head: Normocephalic and atraumatic. Mouth/Throat:      Mouth: Mucous membranes are dry. Cardiovascular:      Rate and Rhythm: Normal rate and regular rhythm. Heart sounds: Normal heart sounds. Pulmonary:      Effort: Pulmonary effort is normal.      Breath sounds: Normal breath sounds. Abdominal:      General: Abdomen is flat. Bowel sounds are normal.      Palpations: Abdomen is soft. Comments: Tenderness in the right and left lower quadrant, 2+   Musculoskeletal:         General: Normal range of motion. Cervical back: Normal range of motion and neck supple. Skin:     General: Skin is warm and dry. Comments: Small sacral ulcer   Neurological:      General: No focal deficit present. Mental Status: He is alert and oriented to person, place, and time. Mental status is at baseline.    Psychiatric:         Mood and Affect: Mood normal.          Assessment:     Hospital Problems  Date Reviewed: 1/24/2021        Codes Class Noted POA    Gram-negative bacteremia ICD-10-CM: R78.81  ICD-9-CM: 790.7, 041.85  9/16/2021 Unknown        ESRD (end stage renal disease) on dialysis [facility]) ICD-10-CM: N18.6, Z99.2  ICD-9-CM: 585.6, V45.11  7/7/2021 Unknown              Plan:     Impression\plan:     1.  Sepsis with bacteremia  Suspected source potentially in the permacath  Severe abdominal pain, CT of the abdomen pelvis pending  Meropenem and vancomycin empirically due to penicillin allergy  ID consultation, Dr. Awan Asp  Gram negative bacillus bacteremia  Blood culture pending     2.  Cardiomyopathy with an EF of 25%  Echocardiogram no endocarditis, EF 25%  Continue Coreg  Continue losartan  Consideration for pacemaker as outpatient  Stable     3.  COPD without exacerbation  Continue albuterol as needed     4.  Essential hypertension  Continue Coreg  Continue losartan     5.  Sacral ulcer  Wound care consult  Previous treatment: As below  Continue wound care, For coccyx: rinse with NS, pack with Opticell Ag Rope (cut in 1/4 and pack lightly), and cover with Optifoam Border Sacrum daily and prn for soiling.     6.  End-stage renal disease  Continue hemodialysis Monday Wednesday Friday  Primary nephrologist Dr. Wanda Martinez nephrology    CODE STATUS: Full code    DVT prophylaxis: Heparin  Ulcer prophylaxis: Pepcid    Discharge barriers: Source of bacteremia, ID consultation    Time evaluation 55 minutes    Signed By: Fern Echeverria PA-C     October 15, 2021

## 2021-10-15 NOTE — ED PROVIDER NOTES
EMERGENCY DEPARTMENT HISTORY AND PHYSICAL EXAM      Date: 10/15/2021  Patient Name: Mario Camargo    History of Presenting Illness     Chief Complaint   Patient presents with    Abdominal Pain    Fever    Abnormal Lab Results       History Provided By: Patient    HPI: Brittayn Louis, 72 y.o. male with a past medical history significant diabetes, hypertension and renal insufficiency presents to the ED with cc of positive blood cultures. Patient was at dialysis today when he was told that he needed to seek care at the emergency department for bacteremia. Patient reports having a fever on Wednesday. Chart review demonstrates MRSA grown in 3 out of 4 bottles from cultures obtained on 9/20/2021. He left AMA at that time. There are no other complaints, changes, or physical findings at this time. PCP: Jan Giang MD    No current facility-administered medications on file prior to encounter. Current Outpatient Medications on File Prior to Encounter   Medication Sig Dispense Refill    losartan (COZAAR) 25 mg tablet Take 1 Tablet by mouth daily. 30 Tablet 0    carvediloL (COREG) 3.125 mg tablet Take 1 Tablet by mouth two (2) times daily (with meals). 30 Tablet 0    prednisoLONE acetate (PRED FORTE) 1 % ophthalmic suspension Administer 1 Drop to both eyes two (2) times a day. Post shingles therapy.  dextran 70-hypromellose (Artificial Tears,[id number],) ophthalmic solution Administer 1 Drop to both eyes three (3) times daily as needed.  acyclovir (ZOVIRAX) 800 mg tablet Take 800 mg by mouth two (2) times a day.  hydrALAZINE (APRESOLINE) 100 mg tablet Take 0.5 Tabs by mouth three (3) times daily. For systolic under 963 90 Tab 0    albuterol (PROVENTIL HFA, VENTOLIN HFA, PROAIR HFA) 90 mcg/actuation inhaler Take 2 Puffs by inhalation every four (4) hours as needed for Wheezing or Shortness of Breath. 1 Inhaler 0    calcitRIOL (ROCALTROL) 0.25 mcg capsule Take 0.25 mcg by mouth daily.       atorvastatin (LIPITOR) 40 mg tablet Take 40 mg by mouth daily. Past History     Past Medical History:  Past Medical History:   Diagnosis Date    Asthenia 1/24/2021    Cardiomyopathy ([facility] [address] [address] 1/24/2021    CHF (congestive heart failure) (HCC)     Chronic kidney disease     CKD (chronic kidney disease)     4    [facility]     End stage renal disease on dialysis ([facility] 1/24/2021    Essential hypertension 1/24/2021    Gastroesophageal reflux disease 1/24/2021    Gastroesophageal reflux disease 1/24/2021    Hypertension     Pneumonia due to COVID-19 virus 8/58/5382    Systolic CHF, acute on chronic ([facility] 1/24/2021       Past Surgical History:  Past Surgical History:   Procedure Laterality Date    COLONOSCOPY N/A 12/3/2020    COLONOSCOPY performed by Tyler Porter MD at [address] [facility] HX HEART CATHETERIZATION      HX HERNIA REPAIR      IR FLUORO GUIDE [facility] CVAD  1/22/2021    IR INSERT NON TUNL CVC OVER 5 YRS  1/18/2021    IR INSERT TUNL CVC W/O PORT OVER 5 YR  1/22/2021    IR PLACE CVAD FLUORO GUIDE  1/18/2021       Family History:  Family History   Problem Relation Age of Onset    Diabetes Mother     Heart Failure Father        Social History:  Social History     Tobacco Use    Smoking status: Never Smoker    Smokeless tobacco: Never Used   Vaping Use    Vaping Use: Never used   Substance Use Topics    Alcohol use: Not Currently    Drug use: Never       Allergies: Allergies   Allergen Reactions    Lisinopril Angioedema    Pcn [Penicillins] Rash         Review of Systems     Review of Systems   Constitutional: Positive for fatigue and fever. Negative for chills. HENT: Negative for congestion and rhinorrhea. Eyes: Negative for photophobia and visual disturbance. Respiratory: Negative for cough and shortness of breath. Cardiovascular: Negative for chest pain and palpitations. Gastrointestinal: Negative for abdominal pain, diarrhea, nausea and vomiting.    Endocrine: Negative for cold intolerance and heat intolerance. Genitourinary: Negative for difficulty urinating and dysuria. Musculoskeletal: Negative for arthralgias and myalgias. Skin: Positive for wound. Negative for color change and rash. Allergic/Immunologic: Negative for environmental allergies and food allergies. Neurological: Negative for weakness and headaches. Hematological: Negative for adenopathy. Does not bruise/bleed easily. Psychiatric/Behavioral: Negative for agitation and behavioral problems. Physical Exam     Physical Exam  Constitutional:       General: He is not in acute distress. Appearance: Normal appearance. He is not ill-appearing. HENT:      Head: Normocephalic and atraumatic. Right Ear: External ear normal.      Left Ear: External ear normal.      Nose: Nose normal.      Mouth/Throat:      Mouth: Mucous membranes are moist.   Eyes:      Extraocular Movements: Extraocular movements intact. Conjunctiva/sclera: Conjunctivae normal.      Pupils: Pupils are equal, round, and reactive to light. Cardiovascular:      Rate and Rhythm: Normal rate and regular rhythm. Pulses: Normal pulses. Pulmonary:      Effort: Pulmonary effort is normal. No respiratory distress. Breath sounds: Normal breath sounds. Abdominal:      General: Abdomen is flat. There is no distension. Musculoskeletal:         General: Normal range of motion. Cervical back: Normal range of motion. Skin:     General: Skin is warm and dry. Comments: Small 2 cm diameter, well-healing sacral ulcer   Neurological:      General: No focal deficit present. Mental Status: He is alert and oriented to person, place, and time. Psychiatric:         Mood and Affect: Mood normal.         Behavior: Behavior normal.         Thought Content:  Thought content normal.         Judgment: Judgment normal.         Lab and Diagnostic Study Results     Labs -     Recent Results (from the past 12 hour(s))   CBC WITH AUTOMATED DIFF    Collection Time: 10/15/21  2:15 PM   Result Value Ref Range    WBC 12.5 (H) 4.1 - 11.1 K/uL    RBC 5.01 4.10 - 5.70 M/uL    HGB 13.5 12.1 - 17.0 g/dL    HCT 44.5 36.6 - 50.3 %    MCV 88.8 80.0 - 99.0 FL    MCH 26.9 26.0 - 34.0 PG    MCHC 30.3 30.0 - 36.5 g/dL    RDW 17.7 (H) 11.5 - 14.5 %    PLATELET 953 746 - 419 K/uL    MPV 10.4 8.9 - 12.9 FL    NRBC 0.0 0.0  WBC    ABSOLUTE NRBC 0.00 0.00 - 0.01 K/uL    NEUTROPHILS 83 (H) 32 - 75 %    LYMPHOCYTES 9 (L) 12 - 49 %    MONOCYTES 7 5 - 13 %    EOSINOPHILS 1 0 - 7 %    BASOPHILS 0 0 - 1 %    IMMATURE GRANULOCYTES 0 0 - 0.5 %    ABS. NEUTROPHILS 10.2 (H) 1.8 - 8.0 K/UL    ABS. LYMPHOCYTES 1.2 0.8 - 3.5 K/UL    ABS. MONOCYTES 0.8 0.0 - 1.0 K/UL    ABS. EOSINOPHILS 0.2 0.0 - 0.4 K/UL    ABS. BASOPHILS 0.1 0.0 - 0.1 K/UL    ABS. IMM. GRANS. 0.1 (H) 0.00 - 0.04 K/UL    DF AUTOMATED     METABOLIC PANEL, COMPREHENSIVE    Collection Time: 10/15/21  2:15 PM   Result Value Ref Range    Sodium 134 (L) 136 - 145 mmol/L    Potassium 4.4 3.5 - 5.1 mmol/L    Chloride 101 97 - 108 mmol/L    CO2 24 21 - 32 mmol/L    Anion gap 9 5 - 15 mmol/L    Glucose 105 (H) 65 - 100 mg/dL    BUN 26 (H) 6 - 20 mg/dL    Creatinine 5.43 (H) 0.70 - 1.30 mg/dL    BUN/Creatinine ratio 5 (L) 12 - 20      GFR est AA 13 (L) >60 ml/min/1.73m2    GFR est non-AA 11 (L) >60 ml/min/1.73m2    Calcium 9.1 8.5 - 10.1 mg/dL    Bilirubin, total 0.6 0.2 - 1.0 mg/dL    AST (SGOT) 20 15 - 37 U/L    ALT (SGPT) 13 12 - 78 U/L    Alk.  phosphatase 90 45 - 117 U/L    Protein, total 9.6 (H) 6.4 - 8.2 g/dL    Albumin 3.0 (L) 3.5 - 5.0 g/dL    Globulin 6.6 (H) 2.0 - 4.0 g/dL    A-G Ratio 0.5 (L) 1.1 - 2.2     LACTIC ACID    Collection Time: 10/15/21  4:50 PM   Result Value Ref Range    Lactic acid 1.0 0.4 - 2.0 mmol/L       Radiologic Studies -   @lastxrresult@  CT Results  (Last 48 hours)    None        CXR Results  (Last 48 hours)               10/15/21 1441  XR CHEST PORT Final result Narrative:  1 view comparison September 20       Stable cardiomegaly without congestion. Clear lungs. No effusion or pneumothorax               Medical Decision Making   - I am the first provider for this patient. - I reviewed the vital signs, available nursing notes, past medical history, past surgical history, family history and social history. - Initial assessment performed. The patients presenting problems have been discussed, and they are in agreement with the care plan formulated and outlined with them. I have encouraged them to ask questions as they arise throughout their visit. Vital Signs-Reviewed the patient's vital signs. Patient Vitals for the past 12 hrs:   Temp Pulse Resp BP SpO2   10/15/21 1402 98.6 °F (37 °C) 90 16 (!) 141/84 100 %       Records Reviewed: Old Medical Records        ED Course:          Provider Notes (Medical Decision Making):   Patient is a 81-year-old female who presents for reported gram-negative bacilli bacteremia. He was found to have positive blood cultures at dialysis today and sent to the ED for further evaluation and treatment. Patient reports fever on Wednesday with associated chills. He notes a small sacral ulcer, however no other infectious symptoms. Patient recently underwent 2-week course of vancomycin and daptomycin for MRSA bacteremia. He is seen by Dr. Alix Rincon, ID.    CBC, CMP, blood cultures, lactate, chest x-ray demonstrates slight leukocytosis of 13.4. Signout given to Dr. Felecia Menezes for admission to the floor for further treatment and evaluation. MDM       Procedures   Medical Decision Makingedical Decision Making  Performed by: Fidelia Cowart DO  Procedures       Disposition   Disposition: Admitted to Floor Medical Floor the case was discussed with the admitting physician Felecia Menezes    Admitted    Diagnosis     Clinical Impression:   1.  Gram-negative bacteremia        Attestations:    Fidelia Cowart DO    Please note that this dictation was completed with Dragon, the computer voice recognition software. Quite often unanticipated grammatical, syntax, homophones, and other interpretive errors are inadvertently transcribed by the computer software. Please disregard these errors. Please excuse any errors that have escaped final proofreading. Thank you.

## 2021-10-15 NOTE — ROUTINE PROCESS
.. TRANSFER - OUT REPORT:    Verbal report given to Ricardo Olmos on Jose Juan Nova  being transferred to 2east room 223 for routine progression of care       Report consisted of patients Situation, Background, Assessment and   Recommendations(SBAR). Information from the following report(s) SBAR was reviewed with the receiving nurse. Lines:   Venous Access Device Dialysis port 07/07/21 Upper chest (subclavicular area, right (Active)       Peripheral IV 10/15/21 Posterior;Right Hand (Active)        Opportunity for questions and clarification was provided.       Patient transported with:   "[employer]

## 2021-10-15 NOTE — ED TRIAGE NOTES
G[facility] 15 pt had a syncopal episode Monday while at HD and dialysis center nurse stated that pt had an infection, on Wednesday pt arrived to HD with a fever and today while on HD a report came in that pt had gram - bacillus in his blood; HD was stopped after only 90 minutes of treatment; c/o abd pain

## 2021-10-16 ENCOUNTER — APPOINTMENT (OUTPATIENT)
Dept: NON INVASIVE DIAGNOSTICS | Age: 71
DRG: 314 | End: 2021-10-16
Attending: PHYSICIAN ASSISTANT
Payer: MEDICARE

## 2021-10-16 LAB
ALBUMIN SERPL-MCNC: 2.4 G/DL (ref 3.5–5)
ALBUMIN/GLOB SERPL: 0.4 {RATIO} (ref 1.1–2.2)
ALP SERPL-CCNC: 72 U/L (ref 45–117)
ALT SERPL-CCNC: 9 U/L (ref 12–78)
ANION GAP SERPL CALC-SCNC: 9 MMOL/L (ref 5–15)
AST SERPL W P-5'-P-CCNC: 11 U/L (ref 15–37)
ATRIAL RATE: 108 BPM
BASOPHILS # BLD: 0.1 K/UL (ref 0–0.1)
BASOPHILS NFR BLD: 0 % (ref 0–1)
BILIRUB SERPL-MCNC: 0.6 MG/DL (ref 0.2–1)
BUN SERPL-MCNC: 29 MG/DL (ref 6–20)
BUN/CREAT SERPL: 5 (ref 12–20)
CA-I BLD-MCNC: 8.7 MG/DL (ref 8.5–10.1)
CALCULATED P AXIS, ECG09: 46 DEGREES
CALCULATED R AXIS, ECG10: -52 DEGREES
CALCULATED T AXIS, ECG11: 99 DEGREES
CHLORIDE SERPL-SCNC: 103 MMOL/L (ref 97–108)
CO2 SERPL-SCNC: 24 MMOL/L (ref 21–32)
CREAT SERPL-MCNC: 6.35 MG/DL (ref 0.7–1.3)
DATE LAST DOSE: 0
DIAGNOSIS, 93000: NORMAL
DIFFERENTIAL METHOD BLD: ABNORMAL
ECHO AO ROOT DIAM: 3.3 CM
ECHO AV PEAK GRADIENT: 8 MMHG
ECHO AV PEAK VELOCITY: 141 CM/S
ECHO EST RA PRESSURE: 15 MMHG
ECHO LA AREA 4C: 29.17 CM2
ECHO LA MAJOR AXIS: 4.6 CM
ECHO LA MINOR AXIS: 2.29 CM
ECHO LV EDV A2C: 137 CM3
ECHO LV EDV A4C: 204 CM3
ECHO LV EJECTION FRACTION A4C: 39 %
ECHO LV EJECTION FRACTION BIPLANE: 42.6 % (ref 55–100)
ECHO LV ESV A2C: 67.4 CM3
ECHO LV ESV A4C: 124 CM3
ECHO LV INTERNAL DIMENSION DIASTOLIC: 5.15 CM (ref 4.2–5.9)
ECHO LV INTERNAL DIMENSION SYSTOLIC: 4.07 CM
ECHO LV IVSD: 1.68 CM (ref 0.6–1)
ECHO LV MASS 2D: 399.7 G (ref 88–224)
ECHO LV MASS INDEX 2D: 198.9 G/M2 (ref 49–115)
ECHO LV POSTERIOR WALL DIASTOLIC: 1.68 CM (ref 0.6–1)
ECHO PV MAX VELOCITY: 104 CM/S
ECHO PV PEAK INSTANTANEOUS GRADIENT SYSTOLIC: 4 MMHG
ECHO PV PEAK INSTANTANEOUS GRADIENT SYSTOLIC: 8 MMHG
ECHO PV REGURGITANT MAX VELOCITY: 137 CM/S
ECHO RA AREA 4C: 25.7 CM2
ECHO RIGHT VENTRICULAR SYSTOLIC PRESSURE (RVSP): 52 MMHG
ECHO RV INTERNAL DIMENSION: 3.9 CM
ECHO RV TAPSE: 1.6 CM (ref 1.5–2)
ECHO TV MAX VELOCITY: 305 CM/S
ECHO TV REGURGITANT PEAK GRADIENT: 37 MMHG
EOSINOPHIL # BLD: 0.1 K/UL (ref 0–0.4)
EOSINOPHIL NFR BLD: 1 % (ref 0–7)
ERYTHROCYTE [DISTWIDTH] IN BLOOD BY AUTOMATED COUNT: 17.2 % (ref 11.5–14.5)
GLOBULIN SER CALC-MCNC: 5.5 G/DL (ref 2–4)
GLUCOSE SERPL-MCNC: 73 MG/DL (ref 65–100)
HCT VFR BLD AUTO: 38.6 % (ref 36.6–50.3)
HGB BLD-MCNC: 11.8 G/DL (ref 12.1–17)
IMM GRANULOCYTES # BLD AUTO: 0.1 K/UL (ref 0–0.04)
IMM GRANULOCYTES NFR BLD AUTO: 1 % (ref 0–0.5)
LACTATE SERPL-SCNC: 1.1 MMOL/L (ref 0.4–2)
LYMPHOCYTES # BLD: 1.1 K/UL (ref 0.8–3.5)
LYMPHOCYTES NFR BLD: 9 % (ref 12–49)
MCH RBC QN AUTO: 27.1 PG (ref 26–34)
MCHC RBC AUTO-ENTMCNC: 30.6 G/DL (ref 30–36.5)
MCV RBC AUTO: 88.7 FL (ref 80–99)
MONOCYTES # BLD: 1.3 K/UL (ref 0–1)
MONOCYTES NFR BLD: 11 % (ref 5–13)
MRSA DNA SPEC QL NAA+PROBE: NOT DETECTED
NEUTS SEG # BLD: 9.4 K/UL (ref 1.8–8)
NEUTS SEG NFR BLD: 78 % (ref 32–75)
NRBC # BLD: 0 K/UL (ref 0–0.01)
NRBC BLD-RTO: 0 PER 100 WBC
P-R INTERVAL, ECG05: 170 MS
PLATELET # BLD AUTO: 305 K/UL (ref 150–400)
PMV BLD AUTO: 10.4 FL (ref 8.9–12.9)
POTASSIUM SERPL-SCNC: 4 MMOL/L (ref 3.5–5.1)
PROCALCITONIN SERPL-MCNC: 7.03 NG/ML
PROT SERPL-MCNC: 7.9 G/DL (ref 6.4–8.2)
Q-T INTERVAL, ECG07: 346 MS
QRS DURATION, ECG06: 106 MS
QTC CALCULATION (BEZET), ECG08: 463 MS
RBC # BLD AUTO: 4.35 M/UL (ref 4.1–5.7)
REPORTED DOSE,DOSE: 0 UNITS
SODIUM SERPL-SCNC: 136 MMOL/L (ref 136–145)
VANCOMYCIN SERPL-MCNC: 17.8 UG/ML
VENTRICULAR RATE, ECG03: 108 BPM
WBC # BLD AUTO: 12.1 K/UL (ref 4.1–11.1)

## 2021-10-16 PROCEDURE — 80053 COMPREHEN METABOLIC PANEL: CPT

## 2021-10-16 PROCEDURE — 85025 COMPLETE CBC W/AUTO DIFF WBC: CPT

## 2021-10-16 PROCEDURE — 80202 ASSAY OF VANCOMYCIN: CPT

## 2021-10-16 PROCEDURE — 74011250636 HC RX REV CODE- 250/636: Performed by: PHYSICIAN ASSISTANT

## 2021-10-16 PROCEDURE — 65270000029 HC RM PRIVATE

## 2021-10-16 PROCEDURE — 83605 ASSAY OF LACTIC ACID: CPT

## 2021-10-16 PROCEDURE — 74011250637 HC RX REV CODE- 250/637: Performed by: PHYSICIAN ASSISTANT

## 2021-10-16 PROCEDURE — 99223 1ST HOSP IP/OBS HIGH 75: CPT | Performed by: INTERNAL MEDICINE

## 2021-10-16 PROCEDURE — 74011000250 HC RX REV CODE- 250: Performed by: PHYSICIAN ASSISTANT

## 2021-10-16 PROCEDURE — 93321 DOPPLER ECHO F-UP/LMTD STD: CPT

## 2021-10-16 PROCEDURE — 84145 PROCALCITONIN (PCT): CPT

## 2021-10-16 PROCEDURE — 36415 COLL VENOUS BLD VENIPUNCTURE: CPT

## 2021-10-16 RX ADMIN — HYDRALAZINE HYDROCHLORIDE 50 MG: 50 TABLET, FILM COATED ORAL at 09:10

## 2021-10-16 RX ADMIN — HEPARIN SODIUM 5000 UNITS: 5000 INJECTION INTRAVENOUS; SUBCUTANEOUS at 21:45

## 2021-10-16 RX ADMIN — HEPARIN SODIUM 5000 UNITS: 5000 INJECTION INTRAVENOUS; SUBCUTANEOUS at 18:24

## 2021-10-16 RX ADMIN — PREDNISOLONE ACETATE 1 DROP: 10 SUSPENSION/ DROPS OPHTHALMIC at 21:50

## 2021-10-16 RX ADMIN — ACYCLOVIR 800 MG: 800 TABLET ORAL at 21:45

## 2021-10-16 RX ADMIN — CARVEDILOL 3.12 MG: 3.12 TABLET, FILM COATED ORAL at 09:07

## 2021-10-16 RX ADMIN — HEPARIN SODIUM 5000 UNITS: 5000 INJECTION INTRAVENOUS; SUBCUTANEOUS at 05:03

## 2021-10-16 RX ADMIN — MEROPENEM 1 G: 1 INJECTION INTRAVENOUS at 22:00

## 2021-10-16 RX ADMIN — Medication 10 ML: at 21:50

## 2021-10-16 RX ADMIN — PREDNISOLONE ACETATE 1 DROP: 10 SUSPENSION/ DROPS OPHTHALMIC at 10:40

## 2021-10-16 RX ADMIN — MEROPENEM 1 G: 1 INJECTION INTRAVENOUS at 03:10

## 2021-10-16 RX ADMIN — MEROPENEM 1 G: 1 INJECTION INTRAVENOUS at 14:54

## 2021-10-16 RX ADMIN — Medication 10 ML: at 14:55

## 2021-10-16 RX ADMIN — ACYCLOVIR 800 MG: 800 TABLET ORAL at 09:07

## 2021-10-16 RX ADMIN — Medication 10 ML: at 06:36

## 2021-10-16 RX ADMIN — ATORVASTATIN CALCIUM 40 MG: 40 TABLET, FILM COATED ORAL at 09:10

## 2021-10-16 RX ADMIN — CARVEDILOL 3.12 MG: 3.12 TABLET, FILM COATED ORAL at 18:24

## 2021-10-16 RX ADMIN — LOSARTAN POTASSIUM 25 MG: 25 TABLET, FILM COATED ORAL at 09:10

## 2021-10-16 NOTE — PROGRESS NOTES
Reason for Admission: Gram-negative bacteremia, ESRD (end stage renal disease) on dialysis ([facility] [address]                      RUR Score:  24%                PCP: First and Last name:   Irma Arndt MD     Name of Practice:    Are you a current patient: Yes/No: yes   Approximate date of last visit: October 2021   Can you participate in a virtual visit if needed: yes    Do you (patient/family) have any concerns for transition/discharge? May need home health                 Plan for utilizing home health:  Possibly for wound care pt and ot     Current Advanced Directive/Advance Care Plan:  Full Code      Healthcare Decision Maker:   Click here to complete [address] including selection of the Healthcare Decision Maker Relationship (ie \"Primary\")            Primary Decision Maker: Mary Carmen Pfeiffer - Sister - [phone]    Transition of Care Plan:        Met with patient at bedside. Lives in 2 story house with is sister. He uses a walker, wheel chair and has a hospital bed. His sister is primary transportation. Has used home health through [facility] before in past for wound care. He is a dialysis patient and goes Monday, Wednesday, and Friday at [facility]. Primary care giver is his sister.    DC plan is home with home health

## 2021-10-16 NOTE — PROGRESS NOTES
Hospitalist Progress Note    Subjective:   Daily Progress Note: 10/16/2021 11:26 AM    Hospital Course:  Pt admitted for fever, and blood stream infection concerning for GNR. He was discharged in 9/21 with diagnosis of sepsis bacteremia  W/MRSA and pneumonia. ID consulted and placed on tobramycin and vancomycin with his hemodialysis for 2 weeks. He reported being at dialysis yesterday and was sent over  to the ED. PMH includes cardiomyopathy,  ESRD, and hypertension. ID and nephrology have been consulted on admission. He was placed on IV merrem and vancomycin, after blood cultures were sent for analysis. There is concern for sepsis related to indwelling HD cath. Subjective: Pt seen in room, no complaints of pain, no dyspnea or chest pain.      Current Facility-Administered Medications   Medication Dose Route Frequency    acyclovir (ZOVIRAX) tablet 800 mg  800 mg Oral BID    albuterol (PROVENTIL HFA, VENTOLIN HFA, PROAIR HFA) inhaler 2 Puff  2 Puff Inhalation Q4H PRN    carvediloL (COREG) tablet 3.125 mg  3.125 mg Oral BID WITH MEALS    prednisoLONE acetate (PRED FORTE) 1 % ophthalmic suspension 1 Drop  1 Drop Both Eyes BID    [Held by provider] hydrALAZINE (APRESOLINE) tablet 50 mg  50 mg Oral TID    [Held by provider] losartan (COZAAR) tablet 25 mg  25 mg Oral DAILY    atorvastatin (LIPITOR) tablet 40 mg  40 mg Oral DAILY    meropenem (MERREM) 1 g in sterile water (preservative free) 20 mL IV syringe  1 g IntraVENous Q8H    sodium chloride (NS) flush 5-40 mL  5-40 mL IntraVENous Q8H    sodium chloride (NS) flush 5-40 mL  5-40 mL IntraVENous PRN    acetaminophen (TYLENOL) tablet 650 mg  650 mg Oral Q6H PRN    Or    acetaminophen (TYLENOL) suppository 650 mg  650 mg Rectal Q6H PRN    polyethylene glycol (MIRALAX) packet 17 g  17 g Oral DAILY PRN    ondansetron (ZOFRAN ODT) tablet 4 mg  4 mg Oral Q8H PRN    Or    ondansetron (ZOFRAN) injection 4 mg  4 mg IntraVENous Q6H PRN    heparin (porcine) injection 5,000 Units  5,000 Units SubCUTAneous Q8H    Vancomycin Dosed by Pharmacy    Other Rx Dosing/Monitoring        Review of Systems:    Review of Systems   Constitutional: Negative for chills and fever. HENT: Negative for congestion and sore throat. Respiratory: Negative for cough and shortness of breath. Cardiovascular: Negative for chest pain and palpitations. Gastrointestinal: Negative for abdominal pain, heartburn, nausea and vomiting. Genitourinary: Negative for dysuria and urgency. Neurological: Negative for dizziness and headaches. Objective:     Visit Vitals  /70 (BP 1 Location: Right upper arm, BP Patient Position: At rest)   Pulse 79   Temp 99 °F (37.2 °C)   Resp 16   Ht 5' 11\" (1.803 m)   Wt 80.7 kg (178 lb)   SpO2 98%   BMI 24.83 kg/m²      O2 Device: None (Room air)    Temp (24hrs), Av.7 °F (37.6 °C), Min:98.3 °F (36.8 °C), Max:102.1 °F (38.9 °C)      No intake/output data recorded. No intake/output data recorded. PHYSICAL EXAM:    Physical Exam  Constitutional:       General: He is not in acute distress. Cardiovascular:      Rate and Rhythm: Normal rate. Pulses: Normal pulses. Heart sounds: Murmur heard. Pulmonary:      Effort: Pulmonary effort is normal.      Breath sounds: Normal breath sounds. Abdominal:      General: Bowel sounds are normal.      Palpations: Abdomen is soft. Musculoskeletal:         General: Normal range of motion. Skin:     General: Skin is warm and dry. Comments: Left chest wall HD catheter   Neurological:      Mental Status: He is oriented to person, place, and time.    Psychiatric:         Mood and Affect: Mood normal.         Behavior: Behavior normal.            Data Review    Recent Results (from the past 24 hour(s))   CBC WITH AUTOMATED DIFF    Collection Time: 10/15/21  2:15 PM   Result Value Ref Range    WBC 12.5 (H) 4.1 - 11.1 K/uL    RBC 5.01 4.10 - 5.70 M/uL    HGB 13.5 12.1 - 17.0 g/dL    HCT 44.5 36.6 - 50.3 %    MCV 88.8 80.0 - 99.0 FL    MCH 26.9 26.0 - 34.0 PG    MCHC 30.3 30.0 - 36.5 g/dL    RDW 17.7 (H) 11.5 - 14.5 %    PLATELET 527 484 - 365 K/uL    MPV 10.4 8.9 - 12.9 FL    NRBC 0.0 0.0  WBC    ABSOLUTE NRBC 0.00 0.00 - 0.01 K/uL    NEUTROPHILS 83 (H) 32 - 75 %    LYMPHOCYTES 9 (L) 12 - 49 %    MONOCYTES 7 5 - 13 %    EOSINOPHILS 1 0 - 7 %    BASOPHILS 0 0 - 1 %    IMMATURE GRANULOCYTES 0 0 - 0.5 %    ABS. NEUTROPHILS 10.2 (H) 1.8 - 8.0 K/UL    ABS. LYMPHOCYTES 1.2 0.8 - 3.5 K/UL    ABS. MONOCYTES 0.8 0.0 - 1.0 K/UL    ABS. EOSINOPHILS 0.2 0.0 - 0.4 K/UL    ABS. BASOPHILS 0.1 0.0 - 0.1 K/UL    ABS. IMM. GRANS. 0.1 (H) 0.00 - 0.04 K/UL    DF AUTOMATED     METABOLIC PANEL, COMPREHENSIVE    Collection Time: 10/15/21  2:15 PM   Result Value Ref Range    Sodium 134 (L) 136 - 145 mmol/L    Potassium 4.4 3.5 - 5.1 mmol/L    Chloride 101 97 - 108 mmol/L    CO2 24 21 - 32 mmol/L    Anion gap 9 5 - 15 mmol/L    Glucose 105 (H) 65 - 100 mg/dL    BUN 26 (H) 6 - 20 mg/dL    Creatinine 5.43 (H) 0.70 - 1.30 mg/dL    BUN/Creatinine ratio 5 (L) 12 - 20      GFR est AA 13 (L) >60 ml/min/1.73m2    GFR est non-AA 11 (L) >60 ml/min/1.73m2    Calcium 9.1 8.5 - 10.1 mg/dL    Bilirubin, total 0.6 0.2 - 1.0 mg/dL    AST (SGOT) 20 15 - 37 U/L    ALT (SGPT) 13 12 - 78 U/L    Alk.  phosphatase 90 45 - 117 U/L    Protein, total 9.6 (H) 6.4 - 8.2 g/dL    Albumin 3.0 (L) 3.5 - 5.0 g/dL    Globulin 6.6 (H) 2.0 - 4.0 g/dL    A-G Ratio 0.5 (L) 1.1 - 2.2     CULTURE, BLOOD, PAIRED    Collection Time: 10/15/21  2:15 PM    Specimen: Blood   Result Value Ref Range    Special Requests: No Special Requests      Culture result: No growth after 19 hours     LACTIC ACID    Collection Time: 10/15/21  4:50 PM   Result Value Ref Range    Lactic acid 1.0 0.4 - 2.0 mmol/L   MRSA SCREEN - PCR (NASAL)    Collection Time: 10/15/21 11:00 PM   Result Value Ref Range    MRSA by PCR, Nasal Not Detected Not Detected     METABOLIC PANEL, COMPREHENSIVE Collection Time: 10/16/21  7:36 AM   Result Value Ref Range    Sodium 136 136 - 145 mmol/L    Potassium 4.0 3.5 - 5.1 mmol/L    Chloride 103 97 - 108 mmol/L    CO2 24 21 - 32 mmol/L    Anion gap 9 5 - 15 mmol/L    Glucose 73 65 - 100 mg/dL    BUN 29 (H) 6 - 20 mg/dL    Creatinine 6.35 (H) 0.70 - 1.30 mg/dL    BUN/Creatinine ratio 5 (L) 12 - 20      GFR est AA 11 (L) >60 ml/min/1.73m2    GFR est non-AA 9 (L) >60 ml/min/1.73m2    Calcium 8.7 8.5 - 10.1 mg/dL    Bilirubin, total 0.6 0.2 - 1.0 mg/dL    AST (SGOT) 11 (L) 15 - 37 U/L    ALT (SGPT) 9 (L) 12 - 78 U/L    Alk.  phosphatase 72 45 - 117 U/L    Protein, total 7.9 6.4 - 8.2 g/dL    Albumin 2.4 (L) 3.5 - 5.0 g/dL    Globulin 5.5 (H) 2.0 - 4.0 g/dL    A-G Ratio 0.4 (L) 1.1 - 2.2     PROCALCITONIN    Collection Time: 10/16/21  7:36 AM   Result Value Ref Range    Procalcitonin 7.03 (H) 0 ng/mL   LACTIC ACID    Collection Time: 10/16/21  7:36 AM   Result Value Ref Range    Lactic acid 1.1 0.4 - 2.0 mmol/L   VANCOMYCIN, RANDOM    Collection Time: 10/16/21  7:36 AM   Result Value Ref Range    Vancomycin, random 17.8 ug/mL    Reported dose date 0      Reported dose: 0 Units   ECHO ADULT FOLLOW-UP OR LIMITED    Collection Time: 10/16/21 10:03 AM   Result Value Ref Range    LV ED Vol A4C 204.00 cm3    LV ED Vol A2C 137.00 cm3    LV ES Vol A4C 124.00 cm3    LV ES Vol A2C 67.40 cm3    IVSd 1.68 (A) 0.60 - 1.00 cm    LVIDd 5.15 4.20 - 5.90 cm    LVIDs 4.07 cm    LVPWd 1.68 (A) 0.60 - 1.00 cm    Aortic Valve Systolic Peak Velocity 639.79 cm/s    AoV PG 8.00 mmHg    Ao Root D 3.30 cm    Left Atrium Major Axis 4.60 cm    Pulmonic Regurgitant End Max Velocity 137.00 cm/s    Pulmonic Valve Systolic Peak Instantaneous Gradient 8.00 mmHg    Pulmonic Valve Max Velocity 104.00 cm/s    Pulmonic Valve Systolic Peak Instantaneous Gradient 4.00 mmHg    Est. RA Pressure 15.00 mmHg    RVIDd 3.90 cm    RVSP 52.00 mmHg    Tricuspid Valve Max Velocity 305.00 cm/s    Triscuspid Valve Regurgitation Peak Gradient 37.00 mmHg    Tapse 1.60 1.50 - 2.00 cm    Right Atrial Area 4C 25.70 cm2    LA Area 4C 29.17 cm2    BP EF 42.6 55.0 - 100.0 %    LV Ejection Fraction MOD 4C 39.0 %    LV Mass .7 88.0 - 224.0 g    LV Mass AL Index 198.9 49.0 - 115.0 g/m2    Left Atrium Minor Axis 2.29 cm       CT ABD PELV WO CONT   Final Result   1. There is a moderate volume of ascitic fluid. There also is body wall   anasarca. The differential diagnosis would include fluid overload versus   hypoalbuminemia with the third spacing of fluids. 2.  There is thickening of the wall to the colon and rectum. I favor this is   secondary to the ascitic fluid. 3.  There is thickening of the wall to the bladder. The differential diagnosis   would include muscular hypertrophy versus chronic cystitis. There were similar   findings on prior imaging. Please see the above text for further details. XR CHEST PORT   Final Result          Active Problems:    ESRD (end stage renal disease) on dialysis ([facility] [address] (7/7/2021)      Gram-negative bacteremia (9/16/2021)        Assessment/Plan:   1. Sepsis w/bacteremia- ? HD catheter, ID consulted, on IV merrem, vancomycin  HD catheter to removed on Monday, and place temp HD catheter. 2. ESRD- on HD M/W/F, nephrology consulted. Left AV fistula recently placed, still in maturing stage. 3. Cardiomyopathy- w/reduced systolic function, echocardiogram now with EF 43%, improved since 9/21 which was 25%. Mildly dilated right atrium and dilated right ventricle, continue coreg    4. Hypertension- controlled, hold hydralazine and losartan    5.  Anemia of chronic disease- HGB stable at 13.5          DVT Prophylaxis: heparin sq  Code Status:  Full Code  POA: SOHEILA sister Mary CovarrubiasCecilio discussed with:   _____patient, staff nurse__________________________________________________________    Lisa Virgen NP

## 2021-10-16 NOTE — PROGRESS NOTES
Bedside shift change report given to [facility], LPN (oncoming nurse) by Oren Tovar (offgoing nurse). Report included the following information SBAR.

## 2021-10-16 NOTE — CONSULTS
Nephrology Consult    Patient: Brittany Louis MRN: [id number]  SSN: [id number]    YOB: 1950  Age: 72 y.o. Sex: male      Subjective:   Reason for the consultation: ESRD  HPI: The pt is 73 yo man who was sent from the dialysis center after 1.30 hrs of his Rx for low BP/fever, on arrival temp 102.1, /50, Cxray clear, CT abdomen no acute process,  started on iv ABX.      Past Medical History:   Diagnosis Date    Asthenia 1/24/2021    Cardiomyopathy ([facility] [address] 1/24/2021    CHF (congestive heart failure) (HCC)     Chronic kidney disease     CKD (chronic kidney disease)     4    Diabetes ([facility] [address]     End stage renal disease on dialysis ([facility] [address] 1/24/2021    Essential hypertension 1/24/2021    Gastroesophageal reflux disease 1/24/2021    Gastroesophageal reflux disease 1/24/2021    Hypertension     Pneumonia due to COVID-19 virus 3/10/7505    Systolic CHF, acute on chronic ([facility] [address] 1/24/2021     Past Surgical History:   Procedure Laterality Date    COLONOSCOPY N/A 12/3/2020    COLONOSCOPY performed by Tash Roque MD at [address] HX HEART CATHETERIZATION      HX HERNIA REPAIR       [address] CVAD  1/22/2021    IR INSERT NON TUNL CVC OVER 5 YRS  1/18/2021    IR INSERT TUNL CVC W/O PORT OVER 5 YR  1/22/2021    IR PLACE CVAD FLUORO GUIDE  1/18/2021      Family History   Problem Relation Age of Onset    Diabetes Mother     Heart Failure Father      Social History     Tobacco Use    Smoking status: Never Smoker    Smokeless tobacco: Never Used   Substance Use Topics    Alcohol use: Not Currently      Current Facility-Administered Medications   Medication Dose Route Frequency Provider Last Rate Last Admin    acyclovir (ZOVIRAX) tablet 800 mg  800 mg Oral BID Gabe Dale PA-C   800 mg at 10/16/21 0907    albuterol (PROVENTIL HFA, VENTOLIN HFA, PROAIR HFA) inhaler 2 Puff  2 Puff Inhalation Q4H PRN Maxine Dale PA-C        carvediloL (COREG) tablet 3.125 mg 3.125 mg Oral BID WITH MEALS Skippy Guy, PA-C   3.125 mg at 10/16/21 5466    prednisoLONE acetate (PRED FORTE) 1 % ophthalmic suspension 1 Drop  1 Drop Both Eyes BID Skippy Guy PA-C   1 Drop at 10/16/21 1040    hydrALAZINE (APRESOLINE) tablet 50 mg  50 mg Oral TID Skippy Guy PA-C   50 mg at 10/16/21 0910    losartan (COZAAR) tablet 25 mg  25 mg Oral DAILY Skippy Guy PA-C   25 mg at 10/16/21 0910    atorvastatin (LIPITOR) tablet 40 mg  40 mg Oral DAILY Skippy TATIANA Tovar-C   40 mg at 10/16/21 0910    meropenem (MERREM) 1 g in sterile water (preservative free) 20 mL IV syringe  1 g IntraVENous Q8H Gabe Dale PA-C   1 g at 10/16/21 0310    sodium chloride (NS) flush 5-40 mL  5-40 mL IntraVENous Q8H Gabe Dale PA-C   10 mL at 10/16/21 0636    sodium chloride (NS) flush 5-40 mL  5-40 mL IntraVENous PRN Dilma Dale PA-C        acetaminophen (TYLENOL) tablet 650 mg  650 mg Oral Q6H PRN Skippy MANASA Tovar   650 mg at 10/15/21 2034    Or    acetaminophen (TYLENOL) suppository 650 mg  650 mg Rectal Q6H PRN Dilma Dale PA-C        polyethylene glycol (MIRALAX) packet 17 g  17 g Oral DAILY PRN Dilma Dale PA-C        ondansetron (ZOFRAN ODT) tablet 4 mg  4 mg Oral Q8H PRN Dilma Dale PA-C        Or    ondansetron (ZOFRAN) injection 4 mg  4 mg IntraVENous Q6H PRN Dilma Dale PA-C        heparin (porcine) injection 5,000 Units  5,000 Units SubCUTAneous Q8H Skippy MANASA Tovar   5,000 Units at 10/16/21 0503    Vancomycin Dosed by Pharmacy    Other Rx Dosing/Monitoring Leila Chao MD            Allergies   Allergen Reactions    Lisinopril Angioedema    Pcn [Penicillins] Rash       Review of Systems:  A comprehensive review of systems was negative except for that written in the History of Present Illness.     Objective:     Vitals:    10/16/21 0229 10/16/21 0300 10/16/21 0508 10/16/21 0902   BP: 107/61 117/72  129/70   Pulse: 70 78  79   Resp: 18 18  16   Temp: 98.3 °F (36.8 °C) 100.3 °F (37.9 °C) 99.3 °F (37.4 °C) 99 °F (37.2 °C)   SpO2: 96% 94%  98%   Weight:       Height:            Physical Exam:  General: NAD  Eyes: sclera anicteric  Oral Cavity: No thrush or ulcers  Neck: no JVD  Chest: Fair bilateral air entry  Heart: normal sounds  Abdomen: soft and non tender   : no simms  Lower Extremities: no edema  Skin: no rash  Neuro: intact  Psychiatric: non-depressed            Assessment:     Hospital Problems  Date Reviewed: 1/24/2021        Codes Class Noted POA    Gram-negative bacteremia ICD-10-CM: R78.81  ICD-9-CM: 790.7, 041.85  9/16/2021 Unknown        ESRD (end stage renal disease) on dialysis [facility]) ICD-10-CM: N18.6, Z99.2  ICD-9-CM: 585.6, V45.11  7/7/2021 Unknown              Plan:       1. ESRD. -On hemodialysis Monday Wednesday Friday.    -He was last dialyzed on 10/15 for 1.5 hrs.  -No significant volume overload or uremia.    -will dialyze on Monday.   -He has right IJ permacath is a dialysis access.    -He has left wrist AV fistula which is still maturing. 2.  Fever:  -likely catheter related bacteremia.    -He came with high temp and hypotensive with leukocytosis. -recent klebsiella and MRSA bacteremia in 09/2021  -will remove perm cath on Monday  -will place temp hd cath   -c/w with IV antibiotics. 2.  Anemia.    -Hemoglobin above goal.    -will hold IV erythropoietin    4. Renal osteodystrophy.   - His calcium is okay. -will check phos    5.   HTN:  -low bps  -will hold losartan and hydralazine     Signed By: Lisa Garcia MD     October 16, 2021

## 2021-10-16 NOTE — PROGRESS NOTES
Day:02  Random level was 17.8 today. Patient received Vancomycin 1250 mg x 1 yesterday. Not clear if he received Dialysis yesterday. Patient is on MWF HD schedule. Possible discharge on Monday. If patient stays, Pharmacy will dose Vancomycin after the HD. Nasal MRSA screen was negative.

## 2021-10-16 NOTE — CONSULTS
Consult Date: 10/16/2021    Consults  Bacteremia    Subjective  This is a 72year old male, ESRD on HD, known to me from Sept 2021 when followed for MRSA and Klebsiella bacteremia, presumed to be dialysis catheter related. He had a coccygeal wound but culture grew Morganella and Enterococcus. He was discharged on Tobramycin and Vancomycin to be given at hemodialysis. He was seen in the ED on 9/20 with fever and WBC 13,400. Blood cultures were sent and patient was encouraged to be admitted, including by me, however, he refused. Reportedly he is here now because of bacteremia, but it is not clear there have been more recent blood cultures. Patient would have been still receiving Vancomycin at hemodialysis. Patient states that he was receiving antibiotics. He is afebrile but WBC and procal elevated. Blood cultures repeated. CT Abdomen showed moderate volume of ascitic fluid with thickening of the wall to the bladder. Images reviewed by me. He has been started on Vancomycin and Meropenem. Also on Acyclovir.     He is resting comfortably with no complaints.        Past Medical History:   Diagnosis Date    Asthenia 1/24/2021    Cardiomyopathy ([address] 1/24/2021    CHF (congestive heart failure) (HCC)     Chronic kidney disease     CKD (chronic kidney disease)     4    Diabetes ([address]     End stage renal disease on dialysis ([address] 1/24/2021    Essential hypertension 1/24/2021    Gastroesophageal reflux disease 1/24/2021    Gastroesophageal reflux disease 1/24/2021    Hypertension     Pneumonia due to COVID-19 virus 7/30/7595    Systolic CHF, acute on chronic ([address] 1/24/2021      Past Surgical History:   Procedure Laterality Date    COLONOSCOPY N/A 12/3/2020    COLONOSCOPY performed by Dianna Burks MD at [address] [facility] HX HEART CATHETERIZATION      HX HERNIA REPAIR      IR FLUORO GUIDE [facility] CVAD  1/22/2021    IR INSERT NON TUNL CVC OVER 5 YRS  1/18/2021    IR INSERT TUNL CVC W/O PORT OVER 5 YR 1/22/2021    IR PLACE CVAD FLUORO GUIDE  1/18/2021     Family History   Problem Relation Age of Onset    Diabetes Mother     Heart Failure Father       Social History     Tobacco Use    Smoking status: Never Smoker    Smokeless tobacco: Never Used   Substance Use Topics    Alcohol use: Not Currently       Current Facility-Administered Medications   Medication Dose Route Frequency Provider Last Rate Last Admin    acyclovir (ZOVIRAX) tablet 800 mg  800 mg Oral BID Gabe Dale PA-C   800 mg at 10/16/21 0907    albuterol (PROVENTIL HFA, VENTOLIN HFA, PROAIR HFA) inhaler 2 Puff  2 Puff Inhalation Q4H PRN Cristina Dale PA-C        carvediloL (COREG) tablet 3.125 mg  3.125 mg Oral BID WITH MEALS Cristina Dale PA-C   3.125 mg at 10/16/21 4771    prednisoLONE acetate (PRED FORTE) 1 % ophthalmic suspension 1 Drop  1 Drop Both Eyes BID Glenn Padilla PA-C   1 Drop at 10/16/21 1040    [Held by provider] hydrALAZINE (APRESOLINE) tablet 50 mg  50 mg Oral TID Glenn Padilla PA-C   50 mg at 10/16/21 0910    [Held by provider] losartan (COZAAR) tablet 25 mg  25 mg Oral DAILY Glenn Padilla PA-C   25 mg at 10/16/21 0910    atorvastatin (LIPITOR) tablet 40 mg  40 mg Oral DAILY Cristina Dale PA-C   40 mg at 10/16/21 0910    meropenem (MERREM) 1 g in sterile water (preservative free) 20 mL IV syringe  1 g IntraVENous Q8H Gabe Dale PA-C   1 g at 10/16/21 0310    sodium chloride (NS) flush 5-40 mL  5-40 mL IntraVENous Q8H Gabe Dale PA-C   10 mL at 10/16/21 0636    sodium chloride (NS) flush 5-40 mL  5-40 mL IntraVENous PRN Cristina Dale PA-C        acetaminophen (TYLENOL) tablet 650 mg  650 mg Oral Q6H PRN Glenn Padilla PA-C   650 mg at 10/15/21 2034    Or    acetaminophen (TYLENOL) suppository 650 mg  650 mg Rectal Q6H PRN Cristina Dale PA-C        polyethylene glycol (MIRALAX) packet 17 g  17 g Oral DAILY PRN Cristina Dale PA-C  ondansetron (ZOFRAN ODT) tablet 4 mg  4 mg Oral Q8H PRN Marlin Dale PA-C        Or    ondansetron (ZOFRAN) injection 4 mg  4 mg IntraVENous Q6H PRN Marlin Dale PA-C        heparin (porcine) injection 5,000 Units  5,000 Units SubCUTAneous Q8H Dalton Salas PA-C   5,000 Units at 10/16/21 0503    Vancomycin Dosed by Pharmacy    Other Rx Dosing/Monitoring Reji Hebert MD            Review of Systems   Constitutional: Negative for appetite change, chills and fever. HENT: Negative. Eyes: Negative. Respiratory: Negative. Cardiovascular: Negative. Gastrointestinal: Negative. Endocrine: Negative. Genitourinary: Negative. Musculoskeletal: Negative. Allergic/Immunologic: Negative. Neurological: Negative. Hematological: Negative. Psychiatric/Behavioral: Negative. Objective     Vital signs for last 24 hours:  Visit Vitals  /70 (BP 1 Location: Right upper arm, BP Patient Position: At rest)   Pulse 79   Temp 99 °F (37.2 °C)   Resp 16   Ht 5' 11\" (1.803 m)   Wt 178 lb (80.7 kg)   SpO2 98%   BMI 24.83 kg/m²       Intake/Output this shift:  Current Shift: No intake/output data recorded. Last 3 Shifts: No intake/output data recorded. Data Review:   Recent Results (from the past 24 hour(s))   CBC WITH AUTOMATED DIFF    Collection Time: 10/15/21  2:15 PM   Result Value Ref Range    WBC 12.5 (H) 4.1 - 11.1 K/uL    RBC 5.01 4.10 - 5.70 M/uL    HGB 13.5 12.1 - 17.0 g/dL    HCT 44.5 36.6 - 50.3 %    MCV 88.8 80.0 - 99.0 FL    MCH 26.9 26.0 - 34.0 PG    MCHC 30.3 30.0 - 36.5 g/dL    RDW 17.7 (H) 11.5 - 14.5 %    PLATELET 927 021 - 070 K/uL    MPV 10.4 8.9 - 12.9 FL    NRBC 0.0 0.0  WBC    ABSOLUTE NRBC 0.00 0.00 - 0.01 K/uL    NEUTROPHILS 83 (H) 32 - 75 %    LYMPHOCYTES 9 (L) 12 - 49 %    MONOCYTES 7 5 - 13 %    EOSINOPHILS 1 0 - 7 %    BASOPHILS 0 0 - 1 %    IMMATURE GRANULOCYTES 0 0 - 0.5 %    ABS. NEUTROPHILS 10.2 (H) 1.8 - 8.0 K/UL    ABS. LYMPHOCYTES 1.2 0.8 - 3.5 K/UL    ABS. MONOCYTES 0.8 0.0 - 1.0 K/UL    ABS. EOSINOPHILS 0.2 0.0 - 0.4 K/UL    ABS. BASOPHILS 0.1 0.0 - 0.1 K/UL    ABS. IMM. GRANS. 0.1 (H) 0.00 - 0.04 K/UL    DF AUTOMATED     METABOLIC PANEL, COMPREHENSIVE    Collection Time: 10/15/21  2:15 PM   Result Value Ref Range    Sodium 134 (L) 136 - 145 mmol/L    Potassium 4.4 3.5 - 5.1 mmol/L    Chloride 101 97 - 108 mmol/L    CO2 24 21 - 32 mmol/L    Anion gap 9 5 - 15 mmol/L    Glucose 105 (H) 65 - 100 mg/dL    BUN 26 (H) 6 - 20 mg/dL    Creatinine 5.43 (H) 0.70 - 1.30 mg/dL    BUN/Creatinine ratio 5 (L) 12 - 20      GFR est AA 13 (L) >60 ml/min/1.73m2    GFR est non-AA 11 (L) >60 ml/min/1.73m2    Calcium 9.1 8.5 - 10.1 mg/dL    Bilirubin, total 0.6 0.2 - 1.0 mg/dL    AST (SGOT) 20 15 - 37 U/L    ALT (SGPT) 13 12 - 78 U/L    Alk.  phosphatase 90 45 - 117 U/L    Protein, total 9.6 (H) 6.4 - 8.2 g/dL    Albumin 3.0 (L) 3.5 - 5.0 g/dL    Globulin 6.6 (H) 2.0 - 4.0 g/dL    A-G Ratio 0.5 (L) 1.1 - 2.2     CULTURE, BLOOD, PAIRED    Collection Time: 10/15/21  2:15 PM    Specimen: Blood   Result Value Ref Range    Special Requests: No Special Requests      Culture result: No growth after 22 hours     EKG, 12 LEAD, INITIAL    Collection Time: 10/15/21  3:30 PM   Result Value Ref Range    Ventricular Rate 108 BPM    Atrial Rate 108 BPM    P-R Interval 170 ms    QRS Duration 106 ms    Q-T Interval 346 ms    QTC Calculation (Bezet) 463 ms    Calculated P Axis 46 degrees    Calculated R Axis -52 degrees    Calculated T Axis 99 degrees    Diagnosis       Sinus tachycardia with occasional Premature ventricular complexes  Left axis deviation  Anteroseptal infarct (cited on or before 16-CHICA-2021)  T wave abnormality, consider lateral ischemia  Abnormal ECG  When compared with ECG of 20-SEP-2021 17:59,  Questionable change in initial forces of Septal leads  Questionable change in initial forces of Lateral leads  Confirmed by JO MCCOY, Fede Tobias ([id number]) on 10/16/2021 11:44:25 AM     LACTIC ACID    Collection Time: 10/15/21  4:50 PM   Result Value Ref Range    Lactic acid 1.0 0.4 - 2.0 mmol/L   MRSA SCREEN - PCR (NASAL)    Collection Time: 10/15/21 11:00 PM   Result Value Ref Range    MRSA by PCR, Nasal Not Detected Not Detected     CBC WITH AUTOMATED DIFF    Collection Time: 10/16/21  7:36 AM   Result Value Ref Range    WBC 12.1 (H) 4.1 - 11.1 K/uL    RBC 4.35 4.10 - 5.70 M/uL    HGB 11.8 (L) 12.1 - 17.0 g/dL    HCT 38.6 36.6 - 50.3 %    MCV 88.7 80.0 - 99.0 FL    MCH 27.1 26.0 - 34.0 PG    MCHC 30.6 30.0 - 36.5 g/dL    RDW 17.2 (H) 11.5 - 14.5 %    PLATELET 305 680 - 320 K/uL    MPV 10.4 8.9 - 12.9 FL    NRBC 0.0 0.0  WBC    ABSOLUTE NRBC 0.00 0.00 - 0.01 K/uL    NEUTROPHILS 78 (H) 32 - 75 %    LYMPHOCYTES 9 (L) 12 - 49 %    MONOCYTES 11 5 - 13 %    EOSINOPHILS 1 0 - 7 %    BASOPHILS 0 0 - 1 %    IMMATURE GRANULOCYTES 1 (H) 0 - 0.5 %    ABS. NEUTROPHILS 9.4 (H) 1.8 - 8.0 K/UL    ABS. LYMPHOCYTES 1.1 0.8 - 3.5 K/UL    ABS. MONOCYTES 1.3 (H) 0.0 - 1.0 K/UL    ABS. EOSINOPHILS 0.1 0.0 - 0.4 K/UL    ABS. BASOPHILS 0.1 0.0 - 0.1 K/UL    ABS. IMM. GRANS. 0.1 (H) 0.00 - 0.04 K/UL    DF AUTOMATED     METABOLIC PANEL, COMPREHENSIVE    Collection Time: 10/16/21  7:36 AM   Result Value Ref Range    Sodium 136 136 - 145 mmol/L    Potassium 4.0 3.5 - 5.1 mmol/L    Chloride 103 97 - 108 mmol/L    CO2 24 21 - 32 mmol/L    Anion gap 9 5 - 15 mmol/L    Glucose 73 65 - 100 mg/dL    BUN 29 (H) 6 - 20 mg/dL    Creatinine 6.35 (H) 0.70 - 1.30 mg/dL    BUN/Creatinine ratio 5 (L) 12 - 20      GFR est AA 11 (L) >60 ml/min/1.73m2    GFR est non-AA 9 (L) >60 ml/min/1.73m2    Calcium 8.7 8.5 - 10.1 mg/dL    Bilirubin, total 0.6 0.2 - 1.0 mg/dL    AST (SGOT) 11 (L) 15 - 37 U/L    ALT (SGPT) 9 (L) 12 - 78 U/L    Alk.  phosphatase 72 45 - 117 U/L    Protein, total 7.9 6.4 - 8.2 g/dL    Albumin 2.4 (L) 3.5 - 5.0 g/dL    Globulin 5.5 (H) 2.0 - 4.0 g/dL    A-G Ratio 0.4 (L) 1.1 - 2.2 PROCALCITONIN    Collection Time: 10/16/21  7:36 AM   Result Value Ref Range    Procalcitonin 7.03 (H) 0 ng/mL   LACTIC ACID    Collection Time: 10/16/21  7:36 AM   Result Value Ref Range    Lactic acid 1.1 0.4 - 2.0 mmol/L   VANCOMYCIN, RANDOM    Collection Time: 10/16/21  7:36 AM   Result Value Ref Range    Vancomycin, random 17.8 ug/mL    Reported dose date 0      Reported dose: 0 Units   ECHO ADULT FOLLOW-UP OR LIMITED    Collection Time: 10/16/21 10:03 AM   Result Value Ref Range    LV ED Vol A4C 204.00 cm3    LV ED Vol A2C 137.00 cm3    LV ES Vol A4C 124.00 cm3    LV ES Vol A2C 67.40 cm3    IVSd 1.68 (A) 0.60 - 1.00 cm    LVIDd 5.15 4.20 - 5.90 cm    LVIDs 4.07 cm    LVPWd 1.68 (A) 0.60 - 1.00 cm    Aortic Valve Systolic Peak Velocity 536.07 cm/s    AoV PG 8.00 mmHg    Ao Root D 3.30 cm    Left Atrium Major Axis 4.60 cm    Pulmonic Regurgitant End Max Velocity 137.00 cm/s    Pulmonic Valve Systolic Peak Instantaneous Gradient 8.00 mmHg    Pulmonic Valve Max Velocity 104.00 cm/s    Pulmonic Valve Systolic Peak Instantaneous Gradient 4.00 mmHg    Est. RA Pressure 15.00 mmHg    RVIDd 3.90 cm    RVSP 52.00 mmHg    Tricuspid Valve Max Velocity 305.00 cm/s    Triscuspid Valve Regurgitation Peak Gradient 37.00 mmHg    Tapse 1.60 1.50 - 2.00 cm    Right Atrial Area 4C 25.70 cm2    LA Area 4C 29.17 cm2    BP EF 42.6 55.0 - 100.0 %    LV Ejection Fraction MOD 4C 39.0 %    LV Mass .7 88.0 - 224.0 g    LV Mass AL Index 198.9 49.0 - 115.0 g/m2    Left Atrium Minor Axis 2.29 cm     CT Abdomen (10/15)      Physical Exam  Vitals and nursing note reviewed. Constitutional:       Appearance: He is not ill-appearing. HENT:      Head: Normocephalic and atraumatic. Right Ear: External ear normal.      Left Ear: External ear normal.      Nose: Nose normal.      Mouth/Throat:      Pharynx: Oropharynx is clear. Eyes:      Pupils: Pupils are equal, round, and reactive to light.    Cardiovascular:      Rate and Rhythm: Normal rate and regular rhythm. Heart sounds: No murmur heard. Comments: Right sided hemodialysis catheter, site unremarkable with no erythema or tenderness  Pulmonary:      Effort: Pulmonary effort is normal.      Breath sounds: Normal breath sounds. Chest:       Abdominal:      General: Bowel sounds are normal.      Palpations: Abdomen is soft. Tenderness: There is no abdominal tenderness. Genitourinary:     Comments: No Pleitez  Musculoskeletal:      Cervical back: Neck supple. Right lower leg: No edema. Left lower leg: No edema. Skin:     Findings: No rash. Neurological:      General: No focal deficit present. Mental Status: He is alert and oriented to person, place, and time. Psychiatric:         Mood and Affect: Mood normal.         Behavior: Behavior normal.         Thought Content: Thought content normal.         Judgment: Judgment normal.       ASSESSMENT/PLAN    1. MRSA bacteremia, possibly related to dialysis catheter, negative TTE  2.  Sepsis with leukocytosis, elevated lactic acid and procal  3. ESRD on HD    1. Continue IV Vancomycin  2. Discontinue Meropenem for now  3. In am, repeat CBC and procal, check CRP  4. Follow-up pending blood  5. Speak with [facility] Renal dialysis on Monday to confirm Vancomycin treatment    Keon Snider MD

## 2021-10-17 LAB
ALBUMIN SERPL-MCNC: 2.3 G/DL (ref 3.5–5)
ALBUMIN SERPL-MCNC: 2.3 G/DL (ref 3.5–5)
ALBUMIN/GLOB SERPL: 0.4 {RATIO} (ref 1.1–2.2)
ALP SERPL-CCNC: 69 U/L (ref 45–117)
ALT SERPL-CCNC: 9 U/L (ref 12–78)
ANION GAP SERPL CALC-SCNC: 10 MMOL/L (ref 5–15)
ANION GAP SERPL CALC-SCNC: 9 MMOL/L (ref 5–15)
AST SERPL W P-5'-P-CCNC: 10 U/L (ref 15–37)
BASOPHILS # BLD: 0.1 K/UL (ref 0–0.1)
BASOPHILS NFR BLD: 1 % (ref 0–1)
BILIRUB SERPL-MCNC: 0.5 MG/DL (ref 0.2–1)
BUN SERPL-MCNC: 40 MG/DL (ref 6–20)
BUN SERPL-MCNC: 40 MG/DL (ref 6–20)
BUN/CREAT SERPL: 5 (ref 12–20)
BUN/CREAT SERPL: 5 (ref 12–20)
CA-I BLD-MCNC: 8.6 MG/DL (ref 8.5–10.1)
CA-I BLD-MCNC: 8.6 MG/DL (ref 8.5–10.1)
CHLORIDE SERPL-SCNC: 101 MMOL/L (ref 97–108)
CHLORIDE SERPL-SCNC: 102 MMOL/L (ref 97–108)
CO2 SERPL-SCNC: 23 MMOL/L (ref 21–32)
CO2 SERPL-SCNC: 23 MMOL/L (ref 21–32)
CREAT SERPL-MCNC: 7.32 MG/DL (ref 0.7–1.3)
CREAT SERPL-MCNC: 7.46 MG/DL (ref 0.7–1.3)
CRP SERPL-MCNC: 10.5 MG/DL (ref 0–0.6)
DIFFERENTIAL METHOD BLD: ABNORMAL
EOSINOPHIL # BLD: 0.2 K/UL (ref 0–0.4)
EOSINOPHIL NFR BLD: 3 % (ref 0–7)
ERYTHROCYTE [DISTWIDTH] IN BLOOD BY AUTOMATED COUNT: 17.1 % (ref 11.5–14.5)
GLOBULIN SER CALC-MCNC: 5.5 G/DL (ref 2–4)
GLUCOSE SERPL-MCNC: 117 MG/DL (ref 65–100)
GLUCOSE SERPL-MCNC: 119 MG/DL (ref 65–100)
HCT VFR BLD AUTO: 39.6 % (ref 36.6–50.3)
HGB BLD-MCNC: 12.1 G/DL (ref 12.1–17)
IMM GRANULOCYTES # BLD AUTO: 0 K/UL (ref 0–0.04)
IMM GRANULOCYTES NFR BLD AUTO: 0 % (ref 0–0.5)
LYMPHOCYTES # BLD: 1.3 K/UL (ref 0.8–3.5)
LYMPHOCYTES NFR BLD: 17 % (ref 12–49)
MCH RBC QN AUTO: 26.7 PG (ref 26–34)
MCHC RBC AUTO-ENTMCNC: 30.6 G/DL (ref 30–36.5)
MCV RBC AUTO: 87.4 FL (ref 80–99)
MONOCYTES # BLD: 0.9 K/UL (ref 0–1)
MONOCYTES NFR BLD: 12 % (ref 5–13)
NEUTS SEG # BLD: 4.9 K/UL (ref 1.8–8)
NEUTS SEG NFR BLD: 67 % (ref 32–75)
NRBC # BLD: 0 K/UL (ref 0–0.01)
NRBC BLD-RTO: 0 PER 100 WBC
PHOSPHATE SERPL-MCNC: 4.1 MG/DL (ref 2.6–4.7)
PLATELET # BLD AUTO: 311 K/UL (ref 150–400)
PMV BLD AUTO: 10.6 FL (ref 8.9–12.9)
POTASSIUM SERPL-SCNC: 3.8 MMOL/L (ref 3.5–5.1)
POTASSIUM SERPL-SCNC: 4.1 MMOL/L (ref 3.5–5.1)
PROCALCITONIN SERPL-MCNC: 6.22 NG/ML
PROT SERPL-MCNC: 7.8 G/DL (ref 6.4–8.2)
RBC # BLD AUTO: 4.53 M/UL (ref 4.1–5.7)
SODIUM SERPL-SCNC: 134 MMOL/L (ref 136–145)
SODIUM SERPL-SCNC: 134 MMOL/L (ref 136–145)
WBC # BLD AUTO: 7.5 K/UL (ref 4.1–11.1)

## 2021-10-17 PROCEDURE — 74011250637 HC RX REV CODE- 250/637: Performed by: PHYSICIAN ASSISTANT

## 2021-10-17 PROCEDURE — 84145 PROCALCITONIN (PCT): CPT

## 2021-10-17 PROCEDURE — 65270000029 HC RM PRIVATE

## 2021-10-17 PROCEDURE — 80053 COMPREHEN METABOLIC PANEL: CPT

## 2021-10-17 PROCEDURE — 86140 C-REACTIVE PROTEIN: CPT

## 2021-10-17 PROCEDURE — 99232 SBSQ HOSP IP/OBS MODERATE 35: CPT | Performed by: INTERNAL MEDICINE

## 2021-10-17 PROCEDURE — 74011000250 HC RX REV CODE- 250: Performed by: INTERNAL MEDICINE

## 2021-10-17 PROCEDURE — 74011250636 HC RX REV CODE- 250/636: Performed by: INTERNAL MEDICINE

## 2021-10-17 PROCEDURE — 85025 COMPLETE CBC W/AUTO DIFF WBC: CPT

## 2021-10-17 PROCEDURE — 74011250636 HC RX REV CODE- 250/636: Performed by: PHYSICIAN ASSISTANT

## 2021-10-17 PROCEDURE — 80069 RENAL FUNCTION PANEL: CPT

## 2021-10-17 PROCEDURE — 36415 COLL VENOUS BLD VENIPUNCTURE: CPT

## 2021-10-17 RX ADMIN — ATORVASTATIN CALCIUM 40 MG: 40 TABLET, FILM COATED ORAL at 10:26

## 2021-10-17 RX ADMIN — MEROPENEM 500 MG: 500 INJECTION INTRAVENOUS at 17:02

## 2021-10-17 RX ADMIN — ACYCLOVIR 800 MG: 800 TABLET ORAL at 22:01

## 2021-10-17 RX ADMIN — CARVEDILOL 3.12 MG: 3.12 TABLET, FILM COATED ORAL at 10:24

## 2021-10-17 RX ADMIN — HEPARIN SODIUM 5000 UNITS: 5000 INJECTION INTRAVENOUS; SUBCUTANEOUS at 17:02

## 2021-10-17 RX ADMIN — CARVEDILOL 3.12 MG: 3.12 TABLET, FILM COATED ORAL at 17:02

## 2021-10-17 RX ADMIN — Medication 10 ML: at 22:01

## 2021-10-17 RX ADMIN — Medication 10 ML: at 17:10

## 2021-10-17 RX ADMIN — HEPARIN SODIUM 5000 UNITS: 5000 INJECTION INTRAVENOUS; SUBCUTANEOUS at 22:01

## 2021-10-17 RX ADMIN — Medication 10 ML: at 05:47

## 2021-10-17 RX ADMIN — HEPARIN SODIUM 5000 UNITS: 5000 INJECTION INTRAVENOUS; SUBCUTANEOUS at 05:46

## 2021-10-17 RX ADMIN — PREDNISOLONE ACETATE 1 DROP: 10 SUSPENSION/ DROPS OPHTHALMIC at 22:03

## 2021-10-17 RX ADMIN — ACYCLOVIR 800 MG: 800 TABLET ORAL at 10:25

## 2021-10-17 RX ADMIN — PREDNISOLONE ACETATE 1 DROP: 10 SUSPENSION/ DROPS OPHTHALMIC at 10:27

## 2021-10-17 NOTE — PROGRESS NOTES
Hospitalist Progress Note    Subjective:   Daily Progress Note: 10/17/2021 2:24 PM    Hospital Course:  Pt admitted for fever, and blood stream infection concerning for GNR. He was discharged in 9/21 with diagnosis of sepsis bacteremia  W/MRSA and pneumonia. ID consulted and placed on tobramycin and vancomycin with his hemodialysis for 2 weeks. He reported being at dialysis yesterday and was sent over  to the ED. PMH includes cardiomyopathy,  ESRD, and hypertension. ID and nephrology have been consulted on admission. He was placed on IV merrem and vancomycin, after blood cultures were sent for analysis. There is concern for sepsis related to indwelling HD cath. Subjective: Pt seen in room, no complaints of pain, no overnight concerns reported by staff.     Current Facility-Administered Medications   Medication Dose Route Frequency    acyclovir (ZOVIRAX) tablet 800 mg  800 mg Oral BID    albuterol (PROVENTIL HFA, VENTOLIN HFA, PROAIR HFA) inhaler 2 Puff  2 Puff Inhalation Q4H PRN    carvediloL (COREG) tablet 3.125 mg  3.125 mg Oral BID WITH MEALS    prednisoLONE acetate (PRED FORTE) 1 % ophthalmic suspension 1 Drop  1 Drop Both Eyes BID    [Held by provider] hydrALAZINE (APRESOLINE) tablet 50 mg  50 mg Oral TID    [Held by provider] losartan (COZAAR) tablet 25 mg  25 mg Oral DAILY    atorvastatin (LIPITOR) tablet 40 mg  40 mg Oral DAILY    sodium chloride (NS) flush 5-40 mL  5-40 mL IntraVENous Q8H    sodium chloride (NS) flush 5-40 mL  5-40 mL IntraVENous PRN    acetaminophen (TYLENOL) tablet 650 mg  650 mg Oral Q6H PRN    Or    acetaminophen (TYLENOL) suppository 650 mg  650 mg Rectal Q6H PRN    polyethylene glycol (MIRALAX) packet 17 g  17 g Oral DAILY PRN    ondansetron (ZOFRAN ODT) tablet 4 mg  4 mg Oral Q8H PRN    Or    ondansetron (ZOFRAN) injection 4 mg  4 mg IntraVENous Q6H PRN    heparin (porcine) injection 5,000 Units  5,000 Units SubCUTAneous Q8H    Vancomycin Dosed by Pharmacy Other Rx Dosing/Monitoring        Review of Systems:    Review of Systems   Constitutional: Negative for chills and fever. Respiratory: Negative for cough and shortness of breath. Cardiovascular: Negative for chest pain and orthopnea. Gastrointestinal: Negative for heartburn, nausea and vomiting. Genitourinary: Negative for dysuria and urgency. Neurological: Negative for dizziness and headaches. Objective:     Visit Vitals  /75 (BP 1 Location: Left upper arm, BP Patient Position: At rest;Lying left side)   Pulse 78   Temp 98 °F (36.7 °C)   Resp 18   Ht 5' 11\" (1.803 m)   Wt 80.7 kg (178 lb)   SpO2 98%   BMI 24.83 kg/m²      O2 Device: None (Room air)    Temp (24hrs), Av.5 °F (36.9 °C), Min:98 °F (36.7 °C), Max:98.9 °F (37.2 °C)      No intake/output data recorded. 10/15 1901 - 10/17 0700  In: 850 [P.O.:850]  Out: -     PHYSICAL EXAM:    Physical Exam  Cardiovascular:      Rate and Rhythm: Normal rate and regular rhythm. Pulses: Normal pulses. Heart sounds: Normal heart sounds. Pulmonary:      Effort: Pulmonary effort is normal.      Breath sounds: Normal breath sounds. Abdominal:      General: Bowel sounds are normal.      Palpations: Abdomen is soft. Musculoskeletal:         General: Normal range of motion. Skin:     General: Skin is warm and dry. Comments: Right chest wall HD catheter, left radial AV fistula + thrill/bruit   Neurological:      Mental Status: He is oriented to person, place, and time.    Psychiatric:         Mood and Affect: Mood normal.         Behavior: Behavior normal.            Data Review    Recent Results (from the past 24 hour(s))   CBC WITH AUTOMATED DIFF    Collection Time: 10/17/21  7:46 AM   Result Value Ref Range    WBC 7.5 4.1 - 11.1 K/uL    RBC 4.53 4.10 - 5.70 M/uL    HGB 12.1 12.1 - 17.0 g/dL    HCT 39.6 36.6 - 50.3 %    MCV 87.4 80.0 - 99.0 FL    MCH 26.7 26.0 - 34.0 PG    MCHC 30.6 30.0 - 36.5 g/dL    RDW 17.1 (H) 11.5 - 14.5 % PLATELET 655 257 - 521 K/uL    MPV 10.6 8.9 - 12.9 FL    NRBC 0.0 0.0  WBC    ABSOLUTE NRBC 0.00 0.00 - 0.01 K/uL    NEUTROPHILS 67 32 - 75 %    LYMPHOCYTES 17 12 - 49 %    MONOCYTES 12 5 - 13 %    EOSINOPHILS 3 0 - 7 %    BASOPHILS 1 0 - 1 %    IMMATURE GRANULOCYTES 0 0 - 0.5 %    ABS. NEUTROPHILS 4.9 1.8 - 8.0 K/UL    ABS. LYMPHOCYTES 1.3 0.8 - 3.5 K/UL    ABS. MONOCYTES 0.9 0.0 - 1.0 K/UL    ABS. EOSINOPHILS 0.2 0.0 - 0.4 K/UL    ABS. BASOPHILS 0.1 0.0 - 0.1 K/UL    ABS. IMM. GRANS. 0.0 0.00 - 0.04 K/UL    DF AUTOMATED     METABOLIC PANEL, COMPREHENSIVE    Collection Time: 10/17/21  7:46 AM   Result Value Ref Range    Sodium 134 (L) 136 - 145 mmol/L    Potassium 4.1 3.5 - 5.1 mmol/L    Chloride 101 97 - 108 mmol/L    CO2 23 21 - 32 mmol/L    Anion gap 10 5 - 15 mmol/L    Glucose 117 (H) 65 - 100 mg/dL    BUN 40 (H) 6 - 20 mg/dL    Creatinine 7.46 (H) 0.70 - 1.30 mg/dL    BUN/Creatinine ratio 5 (L) 12 - 20      GFR est AA 9 (L) >60 ml/min/1.73m2    GFR est non-AA 7 (L) >60 ml/min/1.73m2    Calcium 8.6 8.5 - 10.1 mg/dL    Bilirubin, total 0.5 0.2 - 1.0 mg/dL    AST (SGOT) 10 (L) 15 - 37 U/L    ALT (SGPT) 9 (L) 12 - 78 U/L    Alk.  phosphatase 69 45 - 117 U/L    Protein, total 7.8 6.4 - 8.2 g/dL    Albumin 2.3 (L) 3.5 - 5.0 g/dL    Globulin 5.5 (H) 2.0 - 4.0 g/dL    A-G Ratio 0.4 (L) 1.1 - 2.2     RENAL FUNCTION PANEL    Collection Time: 10/17/21  7:46 AM   Result Value Ref Range    Sodium 134 (L) 136 - 145 mmol/L    Potassium 3.8 3.5 - 5.1 mmol/L    Chloride 102 97 - 108 mmol/L    CO2 23 21 - 32 mmol/L    Anion gap 9 5 - 15 mmol/L    Glucose 119 (H) 65 - 100 mg/dL    BUN 40 (H) 6 - 20 mg/dL    Creatinine 7.32 (H) 0.70 - 1.30 mg/dL    BUN/Creatinine ratio 5 (L) 12 - 20      GFR est AA 9 (L) >60 ml/min/1.73m2    GFR est non-AA 7 (L) >60 ml/min/1.73m2    Calcium 8.6 8.5 - 10.1 mg/dL    Phosphorus 4.1 2.6 - 4.7 mg/dL    Albumin 2.3 (L) 3.5 - 5.0 g/dL   C REACTIVE PROTEIN, QT    Collection Time: 10/17/21  7:46 AM   Result Value Ref Range    C-Reactive protein 10.50 (H) 0.00 - 0.60 mg/dL   PROCALCITONIN    Collection Time: 10/17/21  7:46 AM   Result Value Ref Range    Procalcitonin 6.22 (H) 0 ng/mL       CT ABD PELV WO CONT   Final Result   1. There is a moderate volume of ascitic fluid. There also is body wall   anasarca. The differential diagnosis would include fluid overload versus   hypoalbuminemia with the third spacing of fluids. 2.  There is thickening of the wall to the colon and rectum. I favor this is   secondary to the ascitic fluid. 3.  There is thickening of the wall to the bladder. The differential diagnosis   would include muscular hypertrophy versus chronic cystitis. There were similar   findings on prior imaging. Please see the above text for further details. XR CHEST PORT   Final Result          Active Problems:    ESRD (end stage renal disease) on dialysis ([facility] [address] (7/7/2021)      Gram-negative bacteremia (9/16/2021)        Assessment/Plan:     1. Sepsis w/bacteremia- ? HD catheter, ID following ,IV  vancomycin  HD catheter to removed on Monday, and place temp HD catheter. NPO after midnight.     2. ESRD- on HD M/W/F, nephrology following. Left radial  AV fistula recently placed, still in maturing stage.      3. Cardiomyopathy- w/reduced systolic function, echocardiogram now with EF 43%,   Mildly dilated right atrium and dilated right ventricle, on  coreg     4. Hypertension- controlled, hold hydralazine and losartan     5.  Anemia of chronic disease- HGB stable 12.1 today.          DVT Prophylaxis: heparin sq  Code Status:  Full Code  POA: NOK sister Jr Dorado, [address] [place] [address] discussed with:   ____________patient, staff nurse___________________________________________________    Laura Fisher NP

## 2021-10-17 NOTE — PROGRESS NOTES
Progress Note    Patient: Son Bowen MRN: [id number]  SSN: [id number]    YOB: 1950  Age: 72 y.o. Sex: male      Admit Date: 10/15/2021    LOS: 2 days     Subjective:   Patient followed for MRSA bacteremia presumed related to dialysis catheter. Spoke with Nephrology earlier today and it appears that blood cultures on 10/15/21 drawn at dialysis were positive MRSA and Gram negative rods. Blood cultures this time also growing Gram negative rods. He is currently on Vancomycin alone. He remains afebrile with now normal WBC and decreasing procal.  Patient resting comfortably with no complaints. Objective:     Vitals:    10/16/21 1822 10/16/21 2052 10/17/21 0325 10/17/21 0751   BP: 116/67 113/67 120/71 135/75   Pulse: 74 82 71 78   Resp: 16 20 18    Temp: 98.8 °F (37.1 °C) 98.9 °F (37.2 °C) 98.3 °F (36.8 °C) 98 °F (36.7 °C)   SpO2: 97% 97% 95% 98%   Weight:       Height:            Intake and Output:  Current Shift: 10/17 0701 - 10/17 1900  In: 600 [P.O.:600]  Out: -   Last three shifts: 10/15 1901 - 10/17 0700  In: 850 [P.O.:850]  Out: -     Physical Exam:   Vitals and nursing note reviewed. Constitutional:       Appearance: He is not ill-appearing. HENT: unremarkable   Eyes:      Pupils: Pupils are equal, round, and reactive to light. Cardiovascular:      Rate and Rhythm: Normal rate and regular rhythm. Heart sounds: No murmur heard. Comments: Right sided hemodialysis catheter, site unremarkable with no erythema or tenderness  Pulmonary:      Effort: Pulmonary effort is normal.      Breath sounds: Normal breath sounds. Chest:       Abdominal:      General: Bowel sounds are normal.      Palpations: Abdomen is soft. Tenderness: There is no abdominal tenderness. Genitourinary:     Comments: No Pleitez   Neurological:      General: No focal deficit present. Mental Status: He is alert and oriented to person, place, and time.    Psychiatric:         Mood and Affect: Mood normal.         Behavior: Behavior normal.         Thought Content: Thought content normal.         Judgment: Judgment normal     Lab/Data Review:     WBC 7,500    Procal 6.22 <7.03  CRP 10.50    Blood cultures (10/15) Gram negative rods      Assessment:     Active Problems:    ESRD (end stage renal disease) on dialysis ([facility] [address] (7/7/2021)      Gram-negative bacteremia (9/16/2021)    1. MRSA bacteremia, possibly related to dialysis catheter, negative TTE, Day  #3 IV Vancomycin  2. Gram negative bacteremia, also likely related to dialysis catheter  3. Sepsis with leukocytosis, elevated lactic acid and procal, resolving  4. ESRD on HD    Plan:   1. Continue IV Vancomycin  2. Restart IV Meropenem for now  3. In am, repeat CBC and procal, CRP  4. Follow-up blood cultures  5. Agree with Nephrology that Permacath should be removed until bacteremia is cleared, with temporary dialysis catheter.      Signed By: Sheila Kirkpatrick MD     October 17, 2021

## 2021-10-17 NOTE — PROGRESS NOTES
Bedside shift change report given to FELICE Parrish (oncoming nurse) by Valentino Hinkle (offgoing nurse). Report included the following information SBAR.

## 2021-10-17 NOTE — PROGRESS NOTES
Nephrology Consult    Patient: Rosey Ames MRN: [id number]  SSN: [id number]    YOB: 1950  Age: 72 y.o. Sex: male      Subjective:   Reason for the consultation: ESRD  HPI: The pt is 73 yo man who was sent from the dialysis center after 1.30 hrs of his Rx for low BP/fever, on arrival temp 102.1, /50, Cxray clear, CT abdomen no acute process,  started on iv ABX. BC 10/15 grew GNR. ID on board.      Past Medical History:   Diagnosis Date    Asthenia 1/24/2021    Cardiomyopathy ([facility] [address] 1/24/2021    CHF (congestive heart failure) (HCC)     Chronic kidney disease     CKD (chronic kidney disease)     4    Diabetes ([facility] [address]     End stage renal disease on dialysis ([facility] [address] 1/24/2021    Essential hypertension 1/24/2021    Gastroesophageal reflux disease 1/24/2021    Gastroesophageal reflux disease 1/24/2021    Hypertension     Pneumonia due to COVID-19 virus 0/60/3506    Systolic CHF, acute on chronic ([facility] [address] 1/24/2021     Past Surgical History:   Procedure Laterality Date    COLONOSCOPY N/A 12/3/2020    COLONOSCOPY performed by Kem Epstein MD at [address] [place] [address] HX HEART CATHETERIZATION      HX HERNIA REPAIR      IR FLUORO GUIDE PLC CVAD  1/22/2021    IR INSERT NON TUNL CVC OVER 5 YRS  1/18/2021    IR INSERT TUNL CVC W/O PORT OVER 5 YR  1/22/2021    IR PLACE CVAD FLUORO GUIDE  1/18/2021      Family History   Problem Relation Age of Onset    Diabetes Mother     Heart Failure Father      Social History     Tobacco Use    Smoking status: Never Smoker    Smokeless tobacco: Never Used   Substance Use Topics    Alcohol use: Not Currently      Current Facility-Administered Medications   Medication Dose Route Frequency Provider Last Rate Last Admin    acyclovir (ZOVIRAX) tablet 800 mg  800 mg Oral BID Gabe Dale PA-C   800 mg at 10/17/21 1025    albuterol (PROVENTIL HFA, VENTOLIN HFA, PROAIR HFA) inhaler 2 Puff  2 Puff Inhalation Q4H PRN Rakesh Dale PA-C        carvediloL (COREG) tablet 3.125 mg  3.125 mg Oral BID WITH MEALS Cordelia Dale PA-C   3.125 mg at 10/17/21 1024    prednisoLONE acetate (PRED FORTE) 1 % ophthalmic suspension 1 Drop  1 Drop Both Eyes BID Ofilia Remedies, PA-C   1 Drop at 10/17/21 1027    [Held by provider] hydrALAZINE (APRESOLINE) tablet 50 mg  50 mg Oral TID Ofilia Remedies, PA-C   50 mg at 10/16/21 0910    [Held by provider] losartan (COZAAR) tablet 25 mg  25 mg Oral DAILY Ofilia Remedies, PA-C   25 mg at 10/16/21 0910    atorvastatin (LIPITOR) tablet 40 mg  40 mg Oral DAILY Ofilia Remedies, PA-C   40 mg at 10/17/21 1026    sodium chloride (NS) flush 5-40 mL  5-40 mL IntraVENous Q8H Gabe Dale PA-C   10 mL at 10/17/21 0547    sodium chloride (NS) flush 5-40 mL  5-40 mL IntraVENous PRN Cordelia Dale PA-C        acetaminophen (TYLENOL) tablet 650 mg  650 mg Oral Q6H PRN Ofilia RemedTATIANA raza-C   650 mg at 10/15/21 2034    Or    acetaminophen (TYLENOL) suppository 650 mg  650 mg Rectal Q6H PRN Cordelia Dale PA-C        polyethylene glycol (MIRALAX) packet 17 g  17 g Oral DAILY PRN Cordelia Dale PA-C        ondansetron (ZOFRAN ODT) tablet 4 mg  4 mg Oral Q8H PRN Cordelia Dale PA-C        Or    ondansetron (ZOFRAN) injection 4 mg  4 mg IntraVENous Q6H PRN Cordelia Dale PA-C        heparin (porcine) injection 5,000 Units  5,000 Units SubCUTAneous Q8H Ofilia Remedies, PA-C   5,000 Units at 10/17/21 0546    Vancomycin Dosed by Pharmacy    Other Rx Dosing/Monitoring Juan Manuel Estrada MD            Allergies   Allergen Reactions    Lisinopril Angioedema    Pcn [Penicillins] Rash       Review of Systems:  A comprehensive review of systems was negative except for that written in the History of Present Illness.     Objective:     Vitals:    10/16/21 1822 10/16/21 2052 10/17/21 0325 10/17/21 0751   BP: 116/67 113/67 120/71 135/75   Pulse: 74 82 71 78   Resp: 16 20 18    Temp: 98.8 °F (37.1 °C) 98.9 °F (37.2 °C) 98.3 °F (36.8 °C) 98 °F (36.7 °C)   SpO2: 97% 97% 95% 98%   Weight:       Height:            Physical Exam:  General: NAD  Eyes: sclera anicteric  Oral Cavity: No thrush or ulcers  Neck: no JVD  Chest: Fair bilateral air entry  Heart: normal sounds  Abdomen: soft and non tender   : no simms  Lower Extremities: no edema  Skin: no rash  Neuro: intact  Psychiatric: non-depressed            Assessment:     Hospital Problems  Date Reviewed: 1/24/2021        Codes Class Noted POA    Gram-negative bacteremia ICD-10-CM: R78.81  ICD-9-CM: 790.7, 041.85  9/16/2021 Unknown        ESRD (end stage renal disease) on dialysis [facility]) ICD-10-CM: N18.6, Z99.2  ICD-9-CM: 585.6, V45.11  7/7/2021 Unknown              Plan:       1. ESRD. -On hemodialysis Monday Wednesday Friday.    -He was last dialyzed on 10/15 for 1.5 hrs.  -No significant volume overload or uremia.    -will dialyze in AM.  -He has right IJ permacath is a dialysis access.    -He has left wrist AV fistula which is still maturing. 2.  Fever:  -likely catheter related bacteremia.    -He came with high temp and hypotensive with leukocytosis. -recent klebsiella and MRSA bacteremia in 09/2021  -was dced on iv vanc and tobramycin per ID recs   -re admitted with fever, BC 10/15  GNR  -will remove perm cath in am  -will place temp hd cath   -c/w with IV antibiotics. -I spoke with ID Dr Brandon Escobedo    2. Anemia.    -Hemoglobin above goal.    -will hold IV erythropoietin    4. Renal osteodystrophy.   - His calcium is okay. -will check phos    5. HTN:  -low bps  -will hold losartan and hydralazine.     Signed By: Nigel Jaffe MD     October 17, 2021

## 2021-10-18 ENCOUNTER — APPOINTMENT (OUTPATIENT)
Dept: INTERVENTIONAL RADIOLOGY/VASCULAR | Age: 71
DRG: 314 | End: 2021-10-18
Attending: INTERNAL MEDICINE
Payer: MEDICARE

## 2021-10-18 LAB
ALBUMIN SERPL-MCNC: 2.3 G/DL (ref 3.5–5)
ALBUMIN SERPL-MCNC: 2.4 G/DL (ref 3.5–5)
ALBUMIN/GLOB SERPL: 0.4 {RATIO} (ref 1.1–2.2)
ALP SERPL-CCNC: 72 U/L (ref 45–117)
ALT SERPL-CCNC: 18 U/L (ref 12–78)
ANION GAP SERPL CALC-SCNC: 11 MMOL/L (ref 5–15)
ANION GAP SERPL CALC-SCNC: 11 MMOL/L (ref 5–15)
AST SERPL W P-5'-P-CCNC: 24 U/L (ref 15–37)
BACTERIA SPEC CULT: ABNORMAL
BACTERIA SPEC CULT: ABNORMAL
BASOPHILS # BLD: 0.1 K/UL (ref 0–0.1)
BASOPHILS NFR BLD: 1 % (ref 0–1)
BILIRUB SERPL-MCNC: 0.4 MG/DL (ref 0.2–1)
BUN SERPL-MCNC: 48 MG/DL (ref 6–20)
BUN SERPL-MCNC: 50 MG/DL (ref 6–20)
BUN/CREAT SERPL: 6 (ref 12–20)
BUN/CREAT SERPL: 6 (ref 12–20)
CA-I BLD-MCNC: 8.7 MG/DL (ref 8.5–10.1)
CA-I BLD-MCNC: 8.7 MG/DL (ref 8.5–10.1)
CHLORIDE SERPL-SCNC: 101 MMOL/L (ref 97–108)
CHLORIDE SERPL-SCNC: 101 MMOL/L (ref 97–108)
CO2 SERPL-SCNC: 22 MMOL/L (ref 21–32)
CO2 SERPL-SCNC: 22 MMOL/L (ref 21–32)
CREAT SERPL-MCNC: 8.11 MG/DL (ref 0.7–1.3)
CREAT SERPL-MCNC: 8.11 MG/DL (ref 0.7–1.3)
CRP SERPL-MCNC: 6.37 MG/DL (ref 0–0.6)
DIFFERENTIAL METHOD BLD: ABNORMAL
EOSINOPHIL # BLD: 0.3 K/UL (ref 0–0.4)
EOSINOPHIL NFR BLD: 5 % (ref 0–7)
ERYTHROCYTE [DISTWIDTH] IN BLOOD BY AUTOMATED COUNT: 17 % (ref 11.5–14.5)
GLOBULIN SER CALC-MCNC: 5.7 G/DL (ref 2–4)
GLUCOSE SERPL-MCNC: 86 MG/DL (ref 65–100)
GLUCOSE SERPL-MCNC: 89 MG/DL (ref 65–100)
HCT VFR BLD AUTO: 40.5 % (ref 36.6–50.3)
HGB BLD-MCNC: 12.3 G/DL (ref 12.1–17)
IMM GRANULOCYTES # BLD AUTO: 0 K/UL (ref 0–0.04)
IMM GRANULOCYTES NFR BLD AUTO: 0 % (ref 0–0.5)
LYMPHOCYTES # BLD: 1.5 K/UL (ref 0.8–3.5)
LYMPHOCYTES NFR BLD: 22 % (ref 12–49)
MCH RBC QN AUTO: 26.5 PG (ref 26–34)
MCHC RBC AUTO-ENTMCNC: 30.4 G/DL (ref 30–36.5)
MCV RBC AUTO: 87.1 FL (ref 80–99)
MONOCYTES # BLD: 0.8 K/UL (ref 0–1)
MONOCYTES NFR BLD: 12 % (ref 5–13)
NEUTS SEG # BLD: 3.9 K/UL (ref 1.8–8)
NEUTS SEG NFR BLD: 60 % (ref 32–75)
NRBC # BLD: 0 K/UL (ref 0–0.01)
NRBC BLD-RTO: 0 PER 100 WBC
PHOSPHATE SERPL-MCNC: 4.7 MG/DL (ref 2.6–4.7)
PLATELET # BLD AUTO: 321 K/UL (ref 150–400)
PMV BLD AUTO: 10.5 FL (ref 8.9–12.9)
POTASSIUM SERPL-SCNC: 4.4 MMOL/L (ref 3.5–5.1)
POTASSIUM SERPL-SCNC: 4.7 MMOL/L (ref 3.5–5.1)
PROCALCITONIN SERPL-MCNC: 4.89 NG/ML
PROT SERPL-MCNC: 8.1 G/DL (ref 6.4–8.2)
RBC # BLD AUTO: 4.65 M/UL (ref 4.1–5.7)
SODIUM SERPL-SCNC: 134 MMOL/L (ref 136–145)
SODIUM SERPL-SCNC: 134 MMOL/L (ref 136–145)
SPECIAL REQUESTS,SREQ: ABNORMAL
WBC # BLD AUTO: 6.5 K/UL (ref 4.1–11.1)

## 2021-10-18 PROCEDURE — 77002 NEEDLE LOCALIZATION BY XRAY: CPT

## 2021-10-18 PROCEDURE — 86140 C-REACTIVE PROTEIN: CPT

## 2021-10-18 PROCEDURE — 74011250637 HC RX REV CODE- 250/637: Performed by: PHYSICIAN ASSISTANT

## 2021-10-18 PROCEDURE — 99232 SBSQ HOSP IP/OBS MODERATE 35: CPT | Performed by: INTERNAL MEDICINE

## 2021-10-18 PROCEDURE — 05HP33Z INSERTION OF INFUSION DEVICE INTO RIGHT EXTERNAL JUGULAR VEIN, PERCUTANEOUS APPROACH: ICD-10-PCS | Performed by: RADIOLOGY

## 2021-10-18 PROCEDURE — C1769 GUIDE WIRE: HCPCS

## 2021-10-18 PROCEDURE — 05PY33Z REMOVAL OF INFUSION DEVICE FROM UPPER VEIN, PERCUTANEOUS APPROACH: ICD-10-PCS | Performed by: RADIOLOGY

## 2021-10-18 PROCEDURE — 74011250636 HC RX REV CODE- 250/636: Performed by: PHYSICIAN ASSISTANT

## 2021-10-18 PROCEDURE — 36415 COLL VENOUS BLD VENIPUNCTURE: CPT

## 2021-10-18 PROCEDURE — 84145 PROCALCITONIN (PCT): CPT

## 2021-10-18 PROCEDURE — 87186 SC STD MICRODIL/AGAR DIL: CPT

## 2021-10-18 PROCEDURE — 74011250636 HC RX REV CODE- 250/636: Performed by: INTERNAL MEDICINE

## 2021-10-18 PROCEDURE — B5131ZA FLUOROSCOPY OF RIGHT JUGULAR VEINS USING LOW OSMOLAR CONTRAST, GUIDANCE: ICD-10-PCS | Performed by: RADIOLOGY

## 2021-10-18 PROCEDURE — 74011000250 HC RX REV CODE- 250: Performed by: INTERNAL MEDICINE

## 2021-10-18 PROCEDURE — 76937 US GUIDE VASCULAR ACCESS: CPT

## 2021-10-18 PROCEDURE — 85025 COMPLETE CBC W/AUTO DIFF WBC: CPT

## 2021-10-18 PROCEDURE — 80069 RENAL FUNCTION PANEL: CPT

## 2021-10-18 PROCEDURE — 0JPT3XZ REMOVAL OF TUNNELED VASCULAR ACCESS DEVICE FROM TRUNK SUBCUTANEOUS TISSUE AND FASCIA, PERCUTANEOUS APPROACH: ICD-10-PCS | Performed by: RADIOLOGY

## 2021-10-18 PROCEDURE — 87077 CULTURE AEROBIC IDENTIFY: CPT

## 2021-10-18 PROCEDURE — 36589 REMOVAL TUNNELED CV CATH: CPT

## 2021-10-18 PROCEDURE — 90935 HEMODIALYSIS ONE EVALUATION: CPT

## 2021-10-18 PROCEDURE — 65270000029 HC RM PRIVATE

## 2021-10-18 PROCEDURE — B543ZZA ULTRASONOGRAPHY OF RIGHT JUGULAR VEINS, GUIDANCE: ICD-10-PCS | Performed by: RADIOLOGY

## 2021-10-18 PROCEDURE — 80053 COMPREHEN METABOLIC PANEL: CPT

## 2021-10-18 PROCEDURE — 87205 SMEAR GRAM STAIN: CPT

## 2021-10-18 RX ORDER — HEPARIN SODIUM 1000 [USP'U]/ML
2500 INJECTION, SOLUTION INTRAVENOUS; SUBCUTANEOUS
Status: DISCONTINUED | OUTPATIENT
Start: 2021-10-18 | End: 2021-10-21 | Stop reason: HOSPADM

## 2021-10-18 RX ORDER — HEPARIN SODIUM 1000 [USP'U]/ML
INJECTION, SOLUTION INTRAVENOUS; SUBCUTANEOUS
Status: DISPENSED
Start: 2021-10-18 | End: 2021-10-19

## 2021-10-18 RX ORDER — GUAIFENESIN 100 MG/5ML
81 LIQUID (ML) ORAL DAILY
Status: DISCONTINUED | OUTPATIENT
Start: 2021-10-18 | End: 2021-10-21 | Stop reason: HOSPADM

## 2021-10-18 RX ADMIN — HEPARIN SODIUM 5000 UNITS: 5000 INJECTION INTRAVENOUS; SUBCUTANEOUS at 23:11

## 2021-10-18 RX ADMIN — ACETAMINOPHEN 650 MG: 325 TABLET ORAL at 17:07

## 2021-10-18 RX ADMIN — CARVEDILOL 3.12 MG: 3.12 TABLET, FILM COATED ORAL at 16:06

## 2021-10-18 RX ADMIN — Medication 10 ML: at 14:19

## 2021-10-18 RX ADMIN — VANCOMYCIN HYDROCHLORIDE 750 MG: 750 INJECTION, POWDER, LYOPHILIZED, FOR SOLUTION INTRAVENOUS at 22:57

## 2021-10-18 RX ADMIN — MEROPENEM 500 MG: 500 INJECTION INTRAVENOUS at 05:01

## 2021-10-18 RX ADMIN — ACYCLOVIR 800 MG: 800 TABLET ORAL at 22:55

## 2021-10-18 RX ADMIN — MEROPENEM 500 MG: 500 INJECTION INTRAVENOUS at 16:06

## 2021-10-18 RX ADMIN — HEPARIN SODIUM 5000 UNITS: 5000 INJECTION INTRAVENOUS; SUBCUTANEOUS at 14:19

## 2021-10-18 RX ADMIN — PREDNISOLONE ACETATE 1 DROP: 10 SUSPENSION/ DROPS OPHTHALMIC at 22:56

## 2021-10-18 RX ADMIN — HEPARIN SODIUM 2500 UNITS: 1000 INJECTION, SOLUTION INTRAVENOUS; SUBCUTANEOUS at 13:20

## 2021-10-18 RX ADMIN — ASPIRIN 81 MG CHEWABLE TABLET 81 MG: 81 TABLET CHEWABLE at 14:19

## 2021-10-18 RX ADMIN — Medication 10 ML: at 05:01

## 2021-10-18 RX ADMIN — Medication 10 ML: at 23:11

## 2021-10-18 NOTE — PROGRESS NOTES
Progress Note    Patient: Tripp Bryan MRN: [id number]  SSN: [id number]    YOB: 1950  Age: 72 y.o. Sex: male      Admit Date: 10/15/2021    LOS: 3 days     Subjective:   Patient followed for MRSA bacteremia presumed related to dialysis catheter. Spoke with Nephrology earlier today and it appears that blood cultures on 10/15/21 drawn at dialysis were positive MRSA and Gram negative rods. Blood cultures this time also growing Klebsiella pneumoniae. He is currently on Vancomycin and Meropenem. He remains afebrile with now normal WBC and decreasing procal and CRPl. Objective:     Vitals:    10/18/21 1250 10/18/21 1320 10/18/21 1347 10/18/21 1517   BP: (!) 143/90 (!) 148/88 (!) 148/87 131/81   Pulse: 75 77 85 87   Resp:  18 18 18   Temp:  97.9 °F (36.6 °C) 97.9 °F (36.6 °C) 98.5 °F (36.9 °C)   TempSrc:  Oral     SpO2:   99% 100%   Weight:       Height:            Intake and Output:  Current Shift: 10/18 0701 - 10/18 1900  In: -   Out: 2000   Last three shifts: 10/16 1901 - 10/18 0700  In: 860 [P.O.:850; I.V.:10]  Out: -     Physical Exam:   Vitals and nursing note reviewed. Constitutional:       Appearance: He is not ill-appearing. HENT: unremarkable   Eyes:      Pupils: Pupils are equal, round, and reactive to light. Cardiovascular:      Rate and Rhythm: Normal rate and regular rhythm. Heart sounds: No murmur heard. Comments: Right sided hemodialysis catheter, site unremarkable with no erythema or tenderness  Pulmonary:      Effort: Pulmonary effort is normal.      Breath sounds: Normal breath sounds. Chest:       Abdominal:      General: Bowel sounds are normal.      Palpations: Abdomen is soft. Tenderness: There is no abdominal tenderness. Genitourinary:     Comments: No Pleitez   Neurological:      General: No focal deficit present. Mental Status: He is alert and oriented to person, place, and time.    Psychiatric:         Mood and Affect: Mood normal. Behavior: Behavior normal.         Thought Content: Thought content normal.         Judgment: Judgment normal     Lab/Data Review:     WBC 6,500    Procal 4.89 < 6.22 <7.03  CRP 6.37 <10.50    Blood cultures (10/15) Klebsiella pneumoniae  Right Permacath (10/18) Pending  Assessment:     Active Problems:    ESRD (end stage renal disease) on dialysis ([facility] [address] (7/7/2021)      Gram-negative bacteremia (9/16/2021)    1. MRSA bacteremia, possibly related to dialysis catheter, negative TTE, Day #4 IV Vancomycin  2. Gram negative bacteremia with Klebsiella pneumoniae, Day #2 IV Meropenem  3. Sepsis with leukocytosis, elevated lactic acid and procal, resolving  4. ESRD on HD  5. Right Permacath removed, culture pending    Plan:   1. Continue IV Vancomycin  2. Restart IV Meropenem for now  3. In am, repeat CBC and procal, CRP  4. Follow-up blood cultures  5. Agree with Nephrology that Permacath should be removed until bacteremia is cleared, with temporary dialysis catheter.      Signed By: Jatin Mobley MD     October 18, 2021

## 2021-10-18 NOTE — PROGRESS NOTES
PT eval order received and acknowledged. PT eval attempted at 1025 however pt off the floor in angio at time of attempt. Will continue to follow patient and attempt PT eval at a later time. Thank you.

## 2021-10-18 NOTE — PROGRESS NOTES
Hospitalist Progress Note    Subjective:   Daily Progress Note: 10/18/2021 9:23 AM    Hospital Course: Patient is a 36-year-old male with a history of cardiomyopathy with an EF of 25%, end-stage renal disease on hemodialysis that presented to the emergency room on 10/15/2021 for fever and gram-negative bacillus. Chest x-ray unremarkable. WBC elevated at 28.5, lactic acid 1.1, procalcitonin 7.03, CRP 10.50. CT of the abdomen pelvis without contrast showed moderate volume of ascitic fluid, body wall anasarca, thickening of the wall of the bladder. Patient was made to hospital for further evaluation and started on IV vancomycin and meropenem. Infectious disease and nephrology consulted. Patient's TTE was negative. HD catheter removed on 10/18/2021 with a temp placement. Subjective: Patient seen in IR. Denies any abdominal pain, nausea, vomiting.     Current Facility-Administered Medications   Medication Dose Route Frequency    meropenem (MERREM) 500 mg in sterile water (preservative free) 10 mL IV syringe  0.5 g IntraVENous Q12H    acyclovir (ZOVIRAX) tablet 800 mg  800 mg Oral BID    albuterol (PROVENTIL HFA, VENTOLIN HFA, PROAIR HFA) inhaler 2 Puff  2 Puff Inhalation Q4H PRN    carvediloL (COREG) tablet 3.125 mg  3.125 mg Oral BID WITH MEALS    prednisoLONE acetate (PRED FORTE) 1 % ophthalmic suspension 1 Drop  1 Drop Both Eyes BID    [Held by provider] hydrALAZINE (APRESOLINE) tablet 50 mg  50 mg Oral TID    [Held by provider] losartan (COZAAR) tablet 25 mg  25 mg Oral DAILY    atorvastatin (LIPITOR) tablet 40 mg  40 mg Oral DAILY    sodium chloride (NS) flush 5-40 mL  5-40 mL IntraVENous Q8H    sodium chloride (NS) flush 5-40 mL  5-40 mL IntraVENous PRN    acetaminophen (TYLENOL) tablet 650 mg  650 mg Oral Q6H PRN    Or    acetaminophen (TYLENOL) suppository 650 mg  650 mg Rectal Q6H PRN    polyethylene glycol (MIRALAX) packet 17 g  17 g Oral DAILY PRN    ondansetron (ZOFRAN ODT) tablet 4 mg  4 mg Oral Q8H PRN    Or    ondansetron (ZOFRAN) injection 4 mg  4 mg IntraVENous Q6H PRN    heparin (porcine) injection 5,000 Units  5,000 Units SubCUTAneous Q8H    Vancomycin Dosed by Pharmacy    Other Rx Dosing/Monitoring        Review of Systems  Constitutional: No fevers, No chills, No sweats, No fatigue, No Weakness  Eyes: No redness  Ears, nose, mouth, throat, and face: No nasal congestion, No sore throat, No voice change  Respiratory: No Shortness of Breath, No cough, No wheezing  Cardiovascular: No chest pain, No palpitations, No extremity edema  Gastrointestinal: No nausea, No vomiting, No diarrhea, No abdominal pain  Genitourinary: No frequency, No dysuria, No hematuria  Integument/breast: No skin lesion(s)   Neurological: No Confusion, No headaches, No dizziness      Objective:     Visit Vitals  /71 (BP 1 Location: Left upper arm, BP Patient Position: At rest)   Pulse 63   Temp 97.9 °F (36.6 °C)   Resp 18   Ht 5' 11\" (1.803 m)   Wt 84.9 kg (187 lb 2.7 oz)   SpO2 96%   BMI 26.11 kg/m²      O2 Device: None (Room air)    Temp (24hrs), Av.1 °F (36.7 °C), Min:97.8 °F (36.6 °C), Max:98.7 °F (37.1 °C)      No intake/output data recorded. 10/16 1901 - 10/18 0700  In: 860 [P.O.:850; I.V.:10]  Out: -     PHYSICAL EXAM:  Constitutional: No acute distress  Skin: Extremities and face reveal no rashes. HEENT: Sclerae anicteric. Extra-occular muscles are intact. No oral ulcers. The neck is supple and no masses. Cardiovascular: Regular rate and rhythm. Respiratory:  Clear breath sounds bilaterally with no wheezes, rales, or rhonchi. GI: Abdomen nondistended, soft, and nontender. Normal active bowel sounds. Musculoskeletal: No pitting edema of the lower legs. Able to move all ext  Neurological:  Patient is alert and oriented.  Cranial nerves II-XII grossly intact  Psychiatric: Mood appears appropriate       Data Review    Recent Results (from the past 24 hour(s))   CBC WITH AUTOMATED DIFF Collection Time: 10/18/21  7:04 AM   Result Value Ref Range    WBC 6.5 4.1 - 11.1 K/uL    RBC 4.65 4.10 - 5.70 M/uL    HGB 12.3 12.1 - 17.0 g/dL    HCT 40.5 36.6 - 50.3 %    MCV 87.1 80.0 - 99.0 FL    MCH 26.5 26.0 - 34.0 PG    MCHC 30.4 30.0 - 36.5 g/dL    RDW 17.0 (H) 11.5 - 14.5 %    PLATELET 689 232 - 451 K/uL    MPV 10.5 8.9 - 12.9 FL    NRBC 0.0 0.0  WBC    ABSOLUTE NRBC 0.00 0.00 - 0.01 K/uL    NEUTROPHILS 60 32 - 75 %    LYMPHOCYTES 22 12 - 49 %    MONOCYTES 12 5 - 13 %    EOSINOPHILS 5 0 - 7 %    BASOPHILS 1 0 - 1 %    IMMATURE GRANULOCYTES 0 0 - 0.5 %    ABS. NEUTROPHILS 3.9 1.8 - 8.0 K/UL    ABS. LYMPHOCYTES 1.5 0.8 - 3.5 K/UL    ABS. MONOCYTES 0.8 0.0 - 1.0 K/UL    ABS. EOSINOPHILS 0.3 0.0 - 0.4 K/UL    ABS. BASOPHILS 0.1 0.0 - 0.1 K/UL    ABS. IMM. GRANS. 0.0 0.00 - 0.04 K/UL    DF AUTOMATED     METABOLIC PANEL, COMPREHENSIVE    Collection Time: 10/18/21  7:04 AM   Result Value Ref Range    Sodium 134 (L) 136 - 145 mmol/L    Potassium 4.7 3.5 - 5.1 mmol/L    Chloride 101 97 - 108 mmol/L    CO2 22 21 - 32 mmol/L    Anion gap 11 5 - 15 mmol/L    Glucose 89 65 - 100 mg/dL    BUN 48 (H) 6 - 20 mg/dL    Creatinine 8.11 (H) 0.70 - 1.30 mg/dL    BUN/Creatinine ratio 6 (L) 12 - 20      GFR est AA 8 (L) >60 ml/min/1.73m2    GFR est non-AA 7 (L) >60 ml/min/1.73m2    Calcium 8.7 8.5 - 10.1 mg/dL    Bilirubin, total 0.4 0.2 - 1.0 mg/dL    AST (SGOT) 24 15 - 37 U/L    ALT (SGPT) 18 12 - 78 U/L    Alk.  phosphatase 72 45 - 117 U/L    Protein, total 8.1 6.4 - 8.2 g/dL    Albumin 2.4 (L) 3.5 - 5.0 g/dL    Globulin 5.7 (H) 2.0 - 4.0 g/dL    A-G Ratio 0.4 (L) 1.1 - 2.2     RENAL FUNCTION PANEL    Collection Time: 10/18/21  7:04 AM   Result Value Ref Range    Sodium 134 (L) 136 - 145 mmol/L    Potassium 4.4 3.5 - 5.1 mmol/L    Chloride 101 97 - 108 mmol/L    CO2 22 21 - 32 mmol/L    Anion gap 11 5 - 15 mmol/L    Glucose 86 65 - 100 mg/dL    BUN 50 (H) 6 - 20 mg/dL    Creatinine 8.11 (H) 0.70 - 1.30 mg/dL BUN/Creatinine ratio 6 (L) 12 - 20      GFR est AA 8 (L) >60 ml/min/1.73m2    GFR est non-AA 7 (L) >60 ml/min/1.73m2    Calcium 8.7 8.5 - 10.1 mg/dL    Phosphorus 4.7 2.6 - 4.7 mg/dL    Albumin 2.3 (L) 3.5 - 5.0 g/dL   C REACTIVE PROTEIN, QT    Collection Time: 10/18/21  7:04 AM   Result Value Ref Range    C-Reactive protein 6.37 (H) 0.00 - 0.60 mg/dL       CBC:   Lab Results   Component Value Date/Time    WBC 6.5 10/18/2021 07:04 AM    RBC 4.65 10/18/2021 07:04 AM    HGB 12.3 10/18/2021 07:04 AM    HCT 40.5 10/18/2021 07:04 AM    PLATELET 464 24/32/6422 07:04 AM     BMP:   Lab Results   Component Value Date/Time    Glucose 89 10/18/2021 07:04 AM    Glucose 86 10/18/2021 07:04 AM    Sodium 134 (L) 10/18/2021 07:04 AM    Sodium 134 (L) 10/18/2021 07:04 AM    Potassium 4.7 10/18/2021 07:04 AM    Potassium 4.4 10/18/2021 07:04 AM    Chloride 101 10/18/2021 07:04 AM    Chloride 101 10/18/2021 07:04 AM    CO2 22 10/18/2021 07:04 AM    CO2 22 10/18/2021 07:04 AM    BUN 48 (H) 10/18/2021 07:04 AM    BUN 50 (H) 10/18/2021 07:04 AM    Creatinine 8.11 (H) 10/18/2021 07:04 AM    Creatinine 8.11 (H) 10/18/2021 07:04 AM    Calcium 8.7 10/18/2021 07:04 AM    Calcium 8.7 10/18/2021 07:04 AM     Radiology review: Chest xray    Assessment:   1. Sepsis/MRSA bacteremia  2. End-stage renal disease on hemodialysis  3. Chronic systolic heart failure EF 43%  4. Hypertension  5. Acute on chronic anemia    Plan:    1. HD catheter removed on 10/18/2021. Blood cultures are positive. Patient is on IV vancomycin and meropenem. Inflammatory markers trending down. 2.  Temporary catheter placed. Will need permacath placement once infection clears. Nephrology consulted. Dialysis Monday, Wednesday, Friday  3. Repeat follow-up 2D echo showed an EF of 43%. Patient is on beta-blocker, statin. Holding ACE due to renal function. Add ASA  4. HGB stable. No signs of active bleeding. 5.  CBC BMP in a.m.     Dispo: >48 hours, home with home health. Barries include clearance from infectious disease with oral antibiotic regimen, permacath placement per nephrology with dialysis chair set up. CODE STATUS Full     DVT prophylaxis: Heparin  Ulcer prophylaxis: Tolerating PO intake    Care Plan discussed with: Patient/Family, Nurse and     Total time spent with patient: 34 minutes.

## 2021-10-18 NOTE — PROGRESS NOTES
OT eval order received and acknowledged and attempted at 1020 however pt off the floor at angio. Will continue to follow pt and attempt OT eval at a later. Thank you.

## 2021-10-18 NOTE — PROGRESS NOTES
OT eval order received and acknowledged. Pt screened and demonstrating baseline modified independence for self care tasks and functional mobility/transfers. OT evaluation order will be discontinued at this time as pt has no acute OT needs. Please reorder OT if pt functional status changes. Thank you.

## 2021-10-18 NOTE — PROGRESS NOTES
Problem: Falls - Risk of  Goal: *Absence of Falls  Description: Document Carlton Hsieh Fall Risk and appropriate interventions in the flowsheet. Outcome: Progressing Towards Goal  Note: Fall Risk Interventions:  Mobility Interventions: Utilize walker, cane, or other assistive device         Medication Interventions: Patient to call before getting OOB, Teach patient to arise slowly    Elimination Interventions: Call light in reach    History of Falls Interventions: Bed/chair exit alarm         Problem: Patient Education: Go to Patient Education Activity  Goal: Patient/Family Education  Outcome: Progressing Towards Goal     Problem: Discharge Planning  Goal: *Discharge to safe environment  Outcome: Progressing Towards Goal  Goal: *Knowledge of medication management  Outcome: Progressing Towards Goal  Goal: *Knowledge of discharge instructions  Outcome: Progressing Towards Goal     Problem: Hypertension  Goal: *Blood pressure within specified parameters  Outcome: Progressing Towards Goal     Problem: Risk for Spread of Infection  Goal: Prevent transmission of infectious organism to others  Description: Prevent the transmission of infectious organisms to other patients, staff members, and visitors.   Outcome: Progressing Towards Goal     Problem: Patient Education:  Go to Education Activity  Goal: Patient/Family Education  Outcome: Progressing Towards Goal

## 2021-10-18 NOTE — PROGRESS NOTES
CM reviewed chart. Patient getting temporary dialysis catheter replaced today then HD.     DC plan is still home with home health

## 2021-10-18 NOTE — PROGRESS NOTES
Nephrology Consult    Patient: Paola Figueroa MRN: [id number]  SSN: [id number]    YOB: 1950  Age: 72 y.o. Sex: male      Subjective:   Reason for the consultation: ESRD  HPI: The pt is 73 yo man who was sent from the dialysis center after 1.30 hrs of his Rx for low BP/fever, on arrival temp 102.1, /50, Cxray clear, CT abdomen no acute process,  started on iv ABX. BC 10/15 grew GNR. ID on board.      Past Medical History:   Diagnosis Date    Asthenia 1/24/2021    Cardiomyopathy ([facility] [address] 1/24/2021    CHF (congestive heart failure) (HCC)     Chronic kidney disease     CKD (chronic kidney disease)     4    Diabetes ([facility] [address]     End stage renal disease on dialysis ([facility] [address] 1/24/2021    Essential hypertension 1/24/2021    Gastroesophageal reflux disease 1/24/2021    Gastroesophageal reflux disease 1/24/2021    Hypertension     Pneumonia due to COVID-19 virus 0/70/5731    Systolic CHF, acute on chronic ([facility] [address] 1/24/2021     Past Surgical History:   Procedure Laterality Date    COLONOSCOPY N/A 12/3/2020    COLONOSCOPY performed by Dianna Burks MD at [facility] HX HEART CATHETERIZATION      HX HERNIA REPAIR      IR FLUORO GUIDE [facility] CVAD  1/22/2021    IR INSERT NON TUNL CVC OVER 5 YRS  1/18/2021    IR INSERT TUNL CVC W/O PORT OVER 5 YR  1/22/2021    IR PLACE CVAD FLUORO GUIDE  1/18/2021      Family History   Problem Relation Age of Onset    Diabetes Mother     Heart Failure Father      Social History     Tobacco Use    Smoking status: Never Smoker    Smokeless tobacco: Never Used   Substance Use Topics    Alcohol use: Not Currently      Current Facility-Administered Medications   Medication Dose Route Frequency Provider Last Rate Last Admin    aspirin chewable tablet 81 mg  81 mg Oral DAILY Veronica Mulligan PA-C        heparin (porcine) 1,000 unit/mL injection 2,500 Units  2,500 Units Hemodialysis DIALYSIS PRN Gelacio Tate MD        meropenem (MERREM) 500 mg in sterile water (preservative free) 10 mL IV syringe  0.5 g IntraVENous Q12H Charmayne Case, MD   500 mg at 10/18/21 0501    acyclovir (ZOVIRAX) tablet 800 mg  800 mg Oral BID Tad Hayes PA-C   800 mg at 10/17/21 2201    albuterol (PROVENTIL HFA, VENTOLIN HFA, PROAIR HFA) inhaler 2 Puff  2 Puff Inhalation Q4H PRN Maxine Dale PA-C        carvediloL (COREG) tablet 3.125 mg  3.125 mg Oral BID WITH MEALS Maxine Dale PA-C   3.125 mg at 10/17/21 1702    prednisoLONE acetate (PRED FORTE) 1 % ophthalmic suspension 1 Drop  1 Drop Both Eyes BID Tad Hayes PA-C   1 Drop at 10/17/21 2203    [Held by provider] hydrALAZINE (APRESOLINE) tablet 50 mg  50 mg Oral TID Tad Hayes PA-C   50 mg at 10/16/21 0910    [Held by provider] losartan (COZAAR) tablet 25 mg  25 mg Oral DAILY Tad Hayes PA-C   25 mg at 10/16/21 0910    atorvastatin (LIPITOR) tablet 40 mg  40 mg Oral DAILY Tad Hayes PA-C   40 mg at 10/17/21 1026    sodium chloride (NS) flush 5-40 mL  5-40 mL IntraVENous Q8H Gabe Dale PA-C   10 mL at 10/18/21 0501    sodium chloride (NS) flush 5-40 mL  5-40 mL IntraVENous PRN Maxine Dale PA-C        acetaminophen (TYLENOL) tablet 650 mg  650 mg Oral Q6H PRN Tad Hayse PA-C   650 mg at 10/15/21 2034    Or    acetaminophen (TYLENOL) suppository 650 mg  650 mg Rectal Q6H PRN Maxine Dale PA-C        polyethylene glycol (MIRALAX) packet 17 g  17 g Oral DAILY PRN Maxine Dale PA-C        ondansetron (ZOFRAN ODT) tablet 4 mg  4 mg Oral Q8H PRN Maxine Dale PA-C        Or    ondansetron (ZOFRAN) injection 4 mg  4 mg IntraVENous Q6H PRN Maxine Dale PA-C        heparin (porcine) injection 5,000 Units  5,000 Units SubCUTAneous Q8H Tad Hayes PA-C   5,000 Units at 10/17/21 2201    Vancomycin Dosed by Pharmacy    Other Rx Dosing/Monitoring Charmayne Case, MD            Allergies   Allergen Reactions   Yuri Loser Lisinopril Angioedema    Pcn [Penicillins] Rash       Review of Systems:  A comprehensive review of systems was negative except for that written in the History of Present Illness. Objective:     Vitals:    10/17/21 1659 10/17/21 1943 10/18/21 0744 10/18/21 0950   BP: 122/72 115/73 122/71 130/85   Pulse: 81 79 63 75   Resp: 18 16 18 18   Temp: 98.7 °F (37.1 °C) 97.8 °F (36.6 °C) 97.9 °F (36.6 °C) 97.7 °F (36.5 °C)   TempSrc:    Oral   SpO2: 99% 99% 96%    Weight:  84.9 kg (187 lb 2.7 oz)     Height:            Physical Exam:  General: NAD  Eyes: sclera anicteric  Oral Cavity: No thrush or ulcers  Neck: no JVD  Chest: Fair bilateral air entry  Heart: normal sounds  Abdomen: soft and non tender   : no simms  Lower Extremities: no edema  Skin: no rash  Neuro: intact  Psychiatric: non-depressed            Assessment:     Hospital Problems  Date Reviewed: 1/24/2021        Codes Class Noted POA    Gram-negative bacteremia ICD-10-CM: R78.81  ICD-9-CM: 790.7, 041.85  9/16/2021 Unknown        ESRD (end stage renal disease) on dialysis [facility]) ICD-10-CM: N18.6, Z99.2  ICD-9-CM: 585.6, V45.11  7/7/2021 Unknown              Plan:       1. ESRD. -On hemodialysis Monday Wednesday Friday.    -He was last dialyzed on 10/15 for 1.5 hrs only.  -No significant volume overload or uremia.    -will dialyze today.  -He has right IJ permacath is a dialysis access.    -He has left wrist AV fistula which is still maturing. 2.  Fever:  -likely catheter related bacteremia.    -He came with high temp and hypotensive with leukocytosis. -recent klebsiella and MRSA bacteremia in 09/2021  -was dced on iv vanc and tobramycin per ID recs   -re admitted with fever, BC 10/15  GNR  -plan to remove perm cath today  -and place temp hd cath   -c/w with IV antibiotics. -I spoke with ID Dr Gloria Crespo    2. Anemia.    -Hemoglobin above goal.    -will hold IV erythropoietin    4. Renal osteodystrophy.   - His calcium is okay.     -will check phos    5. HTN:  -low bps  -will hold losartan and hydralazine.     Signed By: Mariusz Villegas MD     October 18, 2021

## 2021-10-19 LAB
ALBUMIN SERPL-MCNC: 2.6 G/DL (ref 3.5–5)
ALBUMIN/GLOB SERPL: 0.4 {RATIO} (ref 1.1–2.2)
ALP SERPL-CCNC: 95 U/L (ref 45–117)
ALT SERPL-CCNC: 30 U/L (ref 12–78)
ANION GAP SERPL CALC-SCNC: 8 MMOL/L (ref 5–15)
AST SERPL W P-5'-P-CCNC: 38 U/L (ref 15–37)
BASOPHILS # BLD: 0.1 K/UL (ref 0–0.1)
BASOPHILS NFR BLD: 1 % (ref 0–1)
BILIRUB SERPL-MCNC: 0.5 MG/DL (ref 0.2–1)
BUN SERPL-MCNC: 35 MG/DL (ref 6–20)
BUN/CREAT SERPL: 6 (ref 12–20)
CA-I BLD-MCNC: 8.6 MG/DL (ref 8.5–10.1)
CHLORIDE SERPL-SCNC: 100 MMOL/L (ref 97–108)
CO2 SERPL-SCNC: 27 MMOL/L (ref 21–32)
CREAT SERPL-MCNC: 6.33 MG/DL (ref 0.7–1.3)
DIFFERENTIAL METHOD BLD: ABNORMAL
EOSINOPHIL # BLD: 0.3 K/UL (ref 0–0.4)
EOSINOPHIL NFR BLD: 4 % (ref 0–7)
ERYTHROCYTE [DISTWIDTH] IN BLOOD BY AUTOMATED COUNT: 17.1 % (ref 11.5–14.5)
GLOBULIN SER CALC-MCNC: 6.3 G/DL (ref 2–4)
GLUCOSE SERPL-MCNC: 66 MG/DL (ref 65–100)
HCT VFR BLD AUTO: 41.7 % (ref 36.6–50.3)
HGB BLD-MCNC: 12.8 G/DL (ref 12.1–17)
IMM GRANULOCYTES # BLD AUTO: 0 K/UL (ref 0–0.04)
IMM GRANULOCYTES NFR BLD AUTO: 0 % (ref 0–0.5)
LYMPHOCYTES # BLD: 1.7 K/UL (ref 0.8–3.5)
LYMPHOCYTES NFR BLD: 23 % (ref 12–49)
MCH RBC QN AUTO: 26.9 PG (ref 26–34)
MCHC RBC AUTO-ENTMCNC: 30.7 G/DL (ref 30–36.5)
MCV RBC AUTO: 87.8 FL (ref 80–99)
MONOCYTES # BLD: 0.7 K/UL (ref 0–1)
MONOCYTES NFR BLD: 9 % (ref 5–13)
NEUTS SEG # BLD: 4.8 K/UL (ref 1.8–8)
NEUTS SEG NFR BLD: 63 % (ref 32–75)
NRBC # BLD: 0 K/UL (ref 0–0.01)
NRBC BLD-RTO: 0 PER 100 WBC
PLATELET # BLD AUTO: 359 K/UL (ref 150–400)
PMV BLD AUTO: 10 FL (ref 8.9–12.9)
POTASSIUM SERPL-SCNC: 3.9 MMOL/L (ref 3.5–5.1)
PROT SERPL-MCNC: 8.9 G/DL (ref 6.4–8.2)
RBC # BLD AUTO: 4.75 M/UL (ref 4.1–5.7)
SODIUM SERPL-SCNC: 135 MMOL/L (ref 136–145)
WBC # BLD AUTO: 7.5 K/UL (ref 4.1–11.1)

## 2021-10-19 PROCEDURE — 80053 COMPREHEN METABOLIC PANEL: CPT

## 2021-10-19 PROCEDURE — 65270000029 HC RM PRIVATE

## 2021-10-19 PROCEDURE — 85025 COMPLETE CBC W/AUTO DIFF WBC: CPT

## 2021-10-19 PROCEDURE — 74011250637 HC RX REV CODE- 250/637: Performed by: PHYSICIAN ASSISTANT

## 2021-10-19 PROCEDURE — 36415 COLL VENOUS BLD VENIPUNCTURE: CPT

## 2021-10-19 PROCEDURE — 99232 SBSQ HOSP IP/OBS MODERATE 35: CPT | Performed by: INTERNAL MEDICINE

## 2021-10-19 PROCEDURE — 74011250636 HC RX REV CODE- 250/636: Performed by: PHYSICIAN ASSISTANT

## 2021-10-19 PROCEDURE — 74011000250 HC RX REV CODE- 250: Performed by: INTERNAL MEDICINE

## 2021-10-19 PROCEDURE — 74011250636 HC RX REV CODE- 250/636: Performed by: INTERNAL MEDICINE

## 2021-10-19 RX ADMIN — MEROPENEM 500 MG: 500 INJECTION INTRAVENOUS at 16:55

## 2021-10-19 RX ADMIN — Medication 10 ML: at 21:27

## 2021-10-19 RX ADMIN — ACYCLOVIR 800 MG: 800 TABLET ORAL at 21:22

## 2021-10-19 RX ADMIN — PREDNISOLONE ACETATE 1 DROP: 10 SUSPENSION/ DROPS OPHTHALMIC at 21:23

## 2021-10-19 RX ADMIN — ASPIRIN 81 MG CHEWABLE TABLET 81 MG: 81 TABLET CHEWABLE at 09:12

## 2021-10-19 RX ADMIN — Medication 10 ML: at 07:23

## 2021-10-19 RX ADMIN — CARVEDILOL 3.12 MG: 3.12 TABLET, FILM COATED ORAL at 09:12

## 2021-10-19 RX ADMIN — ATORVASTATIN CALCIUM 40 MG: 40 TABLET, FILM COATED ORAL at 09:12

## 2021-10-19 RX ADMIN — ACETAMINOPHEN 650 MG: 325 TABLET ORAL at 16:57

## 2021-10-19 RX ADMIN — CARVEDILOL 3.12 MG: 3.12 TABLET, FILM COATED ORAL at 16:55

## 2021-10-19 RX ADMIN — MEROPENEM 500 MG: 500 INJECTION INTRAVENOUS at 04:10

## 2021-10-19 RX ADMIN — ACETAMINOPHEN 650 MG: 325 TABLET ORAL at 21:22

## 2021-10-19 RX ADMIN — HEPARIN SODIUM 5000 UNITS: 5000 INJECTION INTRAVENOUS; SUBCUTANEOUS at 13:42

## 2021-10-19 RX ADMIN — ACYCLOVIR 800 MG: 800 TABLET ORAL at 09:12

## 2021-10-19 RX ADMIN — HEPARIN SODIUM 5000 UNITS: 5000 INJECTION INTRAVENOUS; SUBCUTANEOUS at 07:21

## 2021-10-19 RX ADMIN — HEPARIN SODIUM 5000 UNITS: 5000 INJECTION INTRAVENOUS; SUBCUTANEOUS at 21:22

## 2021-10-19 RX ADMIN — Medication 10 ML: at 13:42

## 2021-10-19 RX ADMIN — PREDNISOLONE ACETATE 1 DROP: 10 SUSPENSION/ DROPS OPHTHALMIC at 09:13

## 2021-10-19 NOTE — PROGRESS NOTES
Nephrology Consult    Patient: Bandar Ch MRN: [id number]  SSN: [id number]    YOB: 1950  Age: 72 y.o.   Sex: male      Subjective:   Pt is seen in the room  He feels well  Afebrile  BP ok  Wbc 7.5  Perm cath is out, temp hd cath in  Was dialyzed yesterday    Past Medical History:   Diagnosis Date    Asthenia 1/24/2021    Cardiomyopathy ([address] 1/24/2021    CHF (congestive heart failure) ([address]     Chronic kidney disease     CKD (chronic kidney disease)     4    Diabetes ([address]     End stage renal disease on dialysis ([facility] [address] 1/24/2021    Essential hypertension 1/24/2021    Gastroesophageal reflux disease 1/24/2021    Gastroesophageal reflux disease 1/24/2021    Hypertension     Pneumonia due to COVID-19 virus 4/45/4831    Systolic CHF, acute on chronic ([address] 1/24/2021     Past Surgical History:   Procedure Laterality Date    COLONOSCOPY N/A 12/3/2020    COLONOSCOPY performed by Divina Alvares MD at [facility] HX HEART CATHETERIZATION      HX HERNIA REPAIR      IR FLUORO GUIDE [facility] CVAD  1/22/2021    IR FLUORO GUIDED NEEDLE PLACEMENT  10/18/2021    IR INSERT NON TUNL CVC OVER 5 YRS  1/18/2021    IR INSERT NON TUNL CVC OVER 5 YRS  10/18/2021    IR INSERT TUNL CVC W/O PORT OVER 5 YR  1/22/2021    IR PLACE CVAD FLUORO GUIDE  1/18/2021    IR REMOVE TUNL CVAD W/O PORT / PUMP  10/18/2021      Family History   Problem Relation Age of Onset    Diabetes Mother     Heart Failure Father      Social History     Tobacco Use    Smoking status: Never Smoker    Smokeless tobacco: Never Used   Substance Use Topics    Alcohol use: Not Currently      Current Facility-Administered Medications   Medication Dose Route Frequency Provider Last Rate Last Admin    aspirin chewable tablet 81 mg  81 mg Oral DAILY Veronica Mulligan PA-C   81 mg at 10/19/21 0912    heparin (porcine) 1,000 unit/mL injection 2,500 Units  2,500 Units Hemodialysis DIALYSIS PRN Sophia Echavarria MD   2,500 Units at 10/18/21 1320    meropenem (MERREM) 500 mg in sterile water (preservative free) 10 mL IV syringe  0.5 g IntraVENous Q12H Kylie Dempsey MD   500 mg at 10/19/21 0410    acyclovir (ZOVIRAX) tablet 800 mg  800 mg Oral BID Des Sheth PA-C   800 mg at 10/19/21 0912    albuterol (PROVENTIL HFA, VENTOLIN HFA, PROAIR HFA) inhaler 2 Puff  2 Puff Inhalation Q4H PRN Gin Dale PA-C        carvediloL (COREG) tablet 3.125 mg  3.125 mg Oral BID WITH MEALS Gin Dale PA-C   3.125 mg at 10/19/21 0912    prednisoLONE acetate (PRED FORTE) 1 % ophthalmic suspension 1 Drop  1 Drop Both Eyes BID Des Sheth PA-C   1 Drop at 10/19/21 0913    [Held by provider] hydrALAZINE (APRESOLINE) tablet 50 mg  50 mg Oral TID Des Sheth PA-C   50 mg at 10/16/21 0910    [Held by provider] losartan (COZAAR) tablet 25 mg  25 mg Oral DAILY Des Sheth PA-C   25 mg at 10/16/21 0910    atorvastatin (LIPITOR) tablet 40 mg  40 mg Oral DAILY Des Sheth PA-C   40 mg at 10/19/21 0912    sodium chloride (NS) flush 5-40 mL  5-40 mL IntraVENous Q8H Gin Dale PA-C   10 mL at 10/19/21 0723    sodium chloride (NS) flush 5-40 mL  5-40 mL IntraVENous PRN Gin Dale PA-C        acetaminophen (TYLENOL) tablet 650 mg  650 mg Oral Q6H PRN Des Sheth PA-C   650 mg at 10/18/21 1707    Or    acetaminophen (TYLENOL) suppository 650 mg  650 mg Rectal Q6H PRN Gin Dale PA-C        polyethylene glycol (MIRALAX) packet 17 g  17 g Oral DAILY PRN Gin Dale PA-C        ondansetron (ZOFRAN ODT) tablet 4 mg  4 mg Oral Q8H PRN Gin Dale PA-C        Or    ondansetron (ZOFRAN) injection 4 mg  4 mg IntraVENous Q6H PRN Gin Dale PA-C        heparin (porcine) injection 5,000 Units  5,000 Units SubCUTAneous Q8H Des Sheth PA-C   5,000 Units at 10/19/21 9229    Vancomycin Dosed by Pharmacy    Other Rx Dosing/Monitoring Kylie Dempsey MD Allergies   Allergen Reactions    Lisinopril Angioedema    Pcn [Penicillins] Rash       Review of Systems:  A comprehensive review of systems was negative except for that written in the History of Present Illness. Objective:     Vitals:    10/18/21 1347 10/18/21 1517 10/18/21 2249 10/19/21 0751   BP: (!) 148/87 131/81 118/71 127/76   Pulse: 85 87 78 76   Resp: 18 18 18 18   Temp: 97.9 °F (36.6 °C) 98.5 °F (36.9 °C) 98.1 °F (36.7 °C) 97.8 °F (36.6 °C)   TempSrc:       SpO2: 99% 100% 99% 98%   Weight:       Height:            Physical Exam:  General: NAD  Eyes: sclera anicteric  Oral Cavity: No thrush or ulcers  Neck: no JVD  Chest: Fair bilateral air entry  Heart: normal sounds  Abdomen: soft and non tender   : no simms  Lower Extremities: no edema  Skin: no rash  Neuro: intact  Psychiatric: non-depressed            Assessment:     Hospital Problems  Date Reviewed: 1/24/2021        Codes Class Noted POA    Gram-negative bacteremia ICD-10-CM: R78.81  ICD-9-CM: 790.7, 041.85  9/16/2021 Unknown        ESRD (end stage renal disease) on dialysis [facility]) ICD-10-CM: N18.6, Z99.2  ICD-9-CM: 585.6, V45.11  7/7/2021 Unknown              Plan:       1. ESRD. -On hemodialysis Monday Wednesday Friday.    -He was last dialyzed on 10/15 for 1.5 hrs only.  -No significant volume overload or uremia.    -was dialyzed on 10/18  -He has right temp hd cath as active dialysis access.    -He has left wrist AV fistula which is still maturing. 2.  Fever:  -likely catheter related bacteremia.    -He came with high temp and hypotensive with leukocytosis. -recent klebsiella and MRSA bacteremia in 09/2021  -was dced on iv vanc and tobramycin per ID recs   -re admitted with fever, BC 10/15  Klebsiella  -s/p removed perm cath 10/18  -placed temp hd cath   -c/w with IV antibiotics per ID recs.     -from renal standpoint can be discharged with temp hd cath  -can repeat BC in 1 week and can place perm cath again as out once [facility] negative. 2.  Anemia.    -Hemoglobin above goal.    -will hold IV erythropoietin    4. Renal osteodystrophy.   - His calcium is okay. -will check phos    5. HTN:  -low bps  -will hold losartan and hydralazine.     Signed By: Cira Deleno MD     October 19, 2021

## 2021-10-19 NOTE — PROGRESS NOTES
Problem: Falls - Risk of  Goal: *Absence of Falls  Description: Document Tim Salas Fall Risk and appropriate interventions in the flowsheet. 10/19/2021 0608 by Tori Long RN  Outcome: Progressing Towards Goal  Note: Fall Risk Interventions:  Mobility Interventions: Bed/chair exit alarm, PT Consult for mobility concerns, Utilize walker, cane, or other assistive device         Medication Interventions: Assess postural VS orthostatic hypotension, Bed/chair exit alarm, Teach patient to arise slowly    Elimination Interventions: Bed/chair exit alarm, Call light in reach    History of Falls Interventions: Bed/chair exit alarm      10/19/2021 0607 by Tori Long RN  Outcome: Progressing Towards Goal  Note: Fall Risk Interventions:  Mobility Interventions: Bed/chair exit alarm, PT Consult for mobility concerns, Utilize walker, cane, or other assistive device         Medication Interventions: Assess postural VS orthostatic hypotension, Bed/chair exit alarm, Teach patient to arise slowly    Elimination Interventions: Bed/chair exit alarm, Call light in reach    History of Falls Interventions: Bed/chair exit alarm         Problem: Patient Education: Go to Patient Education Activity  Goal: Patient/Family Education  Outcome: Progressing Towards Goal     Problem: Risk for Spread of Infection  Goal: Prevent transmission of infectious organism to others  Description: Prevent the transmission of infectious organisms to other patients, staff members, and visitors. 10/19/2021 0608 by Tori Long RN  Outcome: Progressing Towards Goal  10/19/2021 0607 by Tori Long RN  Outcome: Progressing Towards Goal  Note: Maintain precautions. Teach patient about appropriate PPE.      Problem: Patient Education:  Go to Education Activity  Goal: Patient/Family Education  10/19/2021 0608 by Tori Long RN  Outcome: Progressing Towards Goal  10/19/2021 0607 by Tori Long RN  Outcome: Progressing Towards Goal

## 2021-10-19 NOTE — PROGRESS NOTES
PT eval order received and acknowledged. Pt screened and is currently presenting at baseline for functional mobility/transfers, amb in room with RW without assistance. PT evaluation order will be discontinued at this time as pt has no acute PT needs. Please reorder PT if pt functional status changes. Thank you.

## 2021-10-19 NOTE — PROGRESS NOTES
Consult for Vancomycin Dosing by Pharmacy by TATIANA Harrison / Dr. Tre Hunt provided for this 72y.o. year old male , for indication of Sepsis with bacteremia (suspected source potentially in the permacath). Day of Therapy: 4  Goal of Level(s): trough = 15-20mcg/dL    Other Current Antibiotics: Meropenem    Significant Cultures:    Date          Culture             Organism   10/15          Blood                K. pneumoniae    Serum Creatinine Creatinine   Date Value Ref Range Status   10/18/2021 8.11 (H) 0.70 - 1.30 mg/dL Final   10/18/2021 8.11 (H) 0.70 - 1.30 mg/dL Final   10/17/2021 7.46 (H) 0.70 - 1.30 mg/dL Final   10/17/2021 7.32 (H) 0.70 - 1.30 mg/dL Final      Creatinine Clearance Estimated Creatinine Clearance: 9.2 mL/min (A) (based on SCr of 8.11 mg/dL (H)). BUN Lab Results   Component Value Date/Time    BUN 48 (H) 10/18/2021 07:04 AM    BUN 50 (H) 10/18/2021 07:04 AM      WBC Lab Results   Component Value Date/Time    WBC 6.5 10/18/2021 07:04 AM      Temp Temp Readings from Last 1 Encounters:   10/18/21 98.5 °F (36.9 °C)        Last Level: No results found for: VANCT   Vancomycin, random   Date/Time Value Ref Range Status   10/16/2021 07:36 AM 17.8 ug/mL Final     Comment:     No reference range has been established. Ht Readings from Last 1 Encounters:   10/15/21 180.3 cm (71\")        Wt Readings from Last 1 Encounters:   10/17/21 84.9 kg (187 lb 2.7 oz)     Ideal body weight: 75.3 kg (166 lb 0.1 oz)  Adjusted ideal body weight: 79.1 kg (174 lb 7.6 oz)     Previous Regimen: n/a    New Regimen:   Patient on HD MWF. Recent admission with MRSA. GNR in blood culture, Klebsiella. Give 750mg x 1 and draw random level tomorrow 10/20 in the morning pre HD. Pharmacy to follow daily and will make changes to dose and/or frequency based on clinical status.   _________________________________     JENNY Lea [facility]

## 2021-10-19 NOTE — PROGRESS NOTES
Hospitalist Progress Note    Subjective:   Daily Progress Note: 10/19/2021 9:23 AM    Hospital Course: Patient is a 36-year-old male with a history of cardiomyopathy with an EF of 25%, end-stage renal disease on hemodialysis that presented to the emergency room on 10/15/2021 for fever and gram-negative bacillus. Chest x-ray unremarkable. WBC elevated at 28.5, lactic acid 1.1, procalcitonin 7.03, CRP 10.50. CT of the abdomen pelvis without contrast showed moderate volume of ascitic fluid, body wall anasarca, thickening of the wall of the bladder. Patient was made to hospital for further evaluation and started on IV vancomycin and meropenem. Infectious disease and nephrology consulted. Patient's TTE was negative. HD catheter removed on 10/18/2021 with a temp placement. Blood culture positive for Klebsiella pneumonia.       Subjective: Patient denies any chest pain, shortness of breath, nausea, vomiting, diarrhea    Current Facility-Administered Medications   Medication Dose Route Frequency    aspirin chewable tablet 81 mg  81 mg Oral DAILY    heparin (porcine) 1,000 unit/mL injection 2,500 Units  2,500 Units Hemodialysis DIALYSIS PRN    meropenem (MERREM) 500 mg in sterile water (preservative free) 10 mL IV syringe  0.5 g IntraVENous Q12H    acyclovir (ZOVIRAX) tablet 800 mg  800 mg Oral BID    albuterol (PROVENTIL HFA, VENTOLIN HFA, PROAIR HFA) inhaler 2 Puff  2 Puff Inhalation Q4H PRN    carvediloL (COREG) tablet 3.125 mg  3.125 mg Oral BID WITH MEALS    prednisoLONE acetate (PRED FORTE) 1 % ophthalmic suspension 1 Drop  1 Drop Both Eyes BID    [Held by provider] hydrALAZINE (APRESOLINE) tablet 50 mg  50 mg Oral TID    [Held by provider] losartan (COZAAR) tablet 25 mg  25 mg Oral DAILY    atorvastatin (LIPITOR) tablet 40 mg  40 mg Oral DAILY    sodium chloride (NS) flush 5-40 mL  5-40 mL IntraVENous Q8H    sodium chloride (NS) flush 5-40 mL  5-40 mL IntraVENous PRN    acetaminophen (TYLENOL) tablet 650 mg  650 mg Oral Q6H PRN    Or    acetaminophen (TYLENOL) suppository 650 mg  650 mg Rectal Q6H PRN    polyethylene glycol (MIRALAX) packet 17 g  17 g Oral DAILY PRN    ondansetron (ZOFRAN ODT) tablet 4 mg  4 mg Oral Q8H PRN    Or    ondansetron (ZOFRAN) injection 4 mg  4 mg IntraVENous Q6H PRN    heparin (porcine) injection 5,000 Units  5,000 Units SubCUTAneous Q8H    Vancomycin Dosed by Pharmacy    Other Rx Dosing/Monitoring        Review of Systems  Constitutional: No fevers, No chills, No sweats, No fatigue, No Weakness  Eyes: No redness  Ears, nose, mouth, throat, and face: No nasal congestion, No sore throat, No voice change  Respiratory: No Shortness of Breath, No cough, No wheezing  Cardiovascular: No chest pain, No palpitations, No extremity edema  Gastrointestinal: No nausea, No vomiting, No diarrhea, No abdominal pain  Genitourinary: No frequency, No dysuria, No hematuria  Integument/breast: No skin lesion(s)   Neurological: No Confusion, No headaches, No dizziness      Objective:     Visit Vitals  /76 (BP 1 Location: Right upper arm, BP Patient Position: Sitting)   Pulse 76   Temp 97.8 °F (36.6 °C)   Resp 18   Ht 5' 11\" (1.803 m)   Wt 84.9 kg (187 lb 2.7 oz)   SpO2 98%   BMI 26.11 kg/m²      O2 Device: None (Room air)    Temp (24hrs), Av °F (36.7 °C), Min:97.8 °F (36.6 °C), Max:98.5 °F (36.9 °C)      No intake/output data recorded. 10/17 1901 - 10/19 0700  In: 320 [P.O.:300; I.V.:20]  Out: 2000     PHYSICAL EXAM:  Constitutional: No acute distress  Skin: Extremities and face reveal no rashes. HEENT: Sclerae anicteric. Extra-occular muscles are intact. No oral ulcers. The neck is supple and no masses. Cardiovascular: RRR  Respiratory:  Clear breath sounds bilaterally with no wheezes, rales, or rhonchi. GI: Abdomen nondistended, soft, and nontender. Normal active bowel sounds. Musculoskeletal: No pitting edema of the lower legs.  Able to move all ext  Neurological:  Patient is alert and oriented. Cranial nerves II-XII grossly intact  Psychiatric: Mood appears appropriate       Data Review    Recent Results (from the past 24 hour(s))   CBC WITH AUTOMATED DIFF    Collection Time: 10/19/21  7:15 AM   Result Value Ref Range    WBC 7.5 4.1 - 11.1 K/uL    RBC 4.75 4.10 - 5.70 M/uL    HGB 12.8 12.1 - 17.0 g/dL    HCT 41.7 36.6 - 50.3 %    MCV 87.8 80.0 - 99.0 FL    MCH 26.9 26.0 - 34.0 PG    MCHC 30.7 30.0 - 36.5 g/dL    RDW 17.1 (H) 11.5 - 14.5 %    PLATELET 930 567 - 158 K/uL    MPV 10.0 8.9 - 12.9 FL    NRBC 0.0 0.0  WBC    ABSOLUTE NRBC 0.00 0.00 - 0.01 K/uL    NEUTROPHILS 63 32 - 75 %    LYMPHOCYTES 23 12 - 49 %    MONOCYTES 9 5 - 13 %    EOSINOPHILS 4 0 - 7 %    BASOPHILS 1 0 - 1 %    IMMATURE GRANULOCYTES 0 0 - 0.5 %    ABS. NEUTROPHILS 4.8 1.8 - 8.0 K/UL    ABS. LYMPHOCYTES 1.7 0.8 - 3.5 K/UL    ABS. MONOCYTES 0.7 0.0 - 1.0 K/UL    ABS. EOSINOPHILS 0.3 0.0 - 0.4 K/UL    ABS. BASOPHILS 0.1 0.0 - 0.1 K/UL    ABS. IMM. GRANS. 0.0 0.00 - 0.04 K/UL    DF AUTOMATED     METABOLIC PANEL, COMPREHENSIVE    Collection Time: 10/19/21  7:15 AM   Result Value Ref Range    Sodium 135 (L) 136 - 145 mmol/L    Potassium 3.9 3.5 - 5.1 mmol/L    Chloride 100 97 - 108 mmol/L    CO2 27 21 - 32 mmol/L    Anion gap 8 5 - 15 mmol/L    Glucose 66 65 - 100 mg/dL    BUN 35 (H) 6 - 20 mg/dL    Creatinine 6.33 (H) 0.70 - 1.30 mg/dL    BUN/Creatinine ratio 6 (L) 12 - 20      GFR est AA 11 (L) >60 ml/min/1.73m2    GFR est non-AA 9 (L) >60 ml/min/1.73m2    Calcium 8.6 8.5 - 10.1 mg/dL    Bilirubin, total 0.5 0.2 - 1.0 mg/dL    AST (SGOT) 38 (H) 15 - 37 U/L    ALT (SGPT) 30 12 - 78 U/L    Alk.  phosphatase 95 45 - 117 U/L    Protein, total 8.9 (H) 6.4 - 8.2 g/dL    Albumin 2.6 (L) 3.5 - 5.0 g/dL    Globulin 6.3 (H) 2.0 - 4.0 g/dL    A-G Ratio 0.4 (L) 1.1 - 2.2         CBC:   Lab Results   Component Value Date/Time    WBC 7.5 10/19/2021 07:15 AM    RBC 4.75 10/19/2021 07:15 AM    HGB 12.8 10/19/2021 07:15 AM HCT 41.7 10/19/2021 07:15 AM    PLATELET 041 03/61/9503 07:15 AM     BMP:   Lab Results   Component Value Date/Time    Glucose 66 10/19/2021 07:15 AM    Sodium 135 (L) 10/19/2021 07:15 AM    Potassium 3.9 10/19/2021 07:15 AM    Chloride 100 10/19/2021 07:15 AM    CO2 27 10/19/2021 07:15 AM    BUN 35 (H) 10/19/2021 07:15 AM    Creatinine 6.33 (H) 10/19/2021 07:15 AM    Calcium 8.6 10/19/2021 07:15 AM     Radiology review: Chest xray    Assessment:   1. Sepsis/MRSA/Klebsiella pneumonia bacteremia  2. End-stage renal disease on hemodialysis  3. Chronic systolic heart failure EF 43%  4. Hypertension  5. Acute on chronic anemia    Plan:    1. HD catheter removed on 10/18/2021. Blood cultures are positive for Klebsiella pneumonia. Patient is on IV vancomycin and meropenem. Inflammatory markers trending down. 2.  Temporary catheter placed. Will need permacath placement once infection clears. Nephrology consulted. Dialysis Monday, Wednesday, Friday  3. Repeat follow-up 2D echo showed an EF of 43%. Patient is on beta-blocker, statin. Holding ACE due to renal function. Add ASA  4. HGB stable. No signs of active bleeding. 5.  CBC BMP in a.m. Dispo: >48 hours, home with home health. Barries include clearance from infectious disease with oral antibiotic regimen, permacath placement per nephrology with dialysis chair set up. CODE STATUS Full     DVT prophylaxis: Heparin  Ulcer prophylaxis: Tolerating PO intake    Care Plan discussed with: Patient/Family, Nurse and     Total time spent with patient: 33  minutes.

## 2021-10-19 NOTE — PROGRESS NOTES
Problem: Falls - Risk of  Goal: *Absence of Falls  Description: Document Mercer Reason Fall Risk and appropriate interventions in the flowsheet. Outcome: Progressing Towards Goal  Note: Fall Risk Interventions:  Mobility Interventions: Bed/chair exit alarm, PT Consult for mobility concerns, Utilize walker, cane, or other assistive device         Medication Interventions: Assess postural VS orthostatic hypotension, Bed/chair exit alarm, Teach patient to arise slowly    Elimination Interventions: Bed/chair exit alarm, Call light in reach    History of Falls Interventions: Bed/chair exit alarm         Problem: Patient Education: Go to Patient Education Activity  Goal: Patient/Family Education  Outcome: Progressing Towards Goal     Problem: Discharge Planning  Goal: *Discharge to safe environment  Outcome: Progressing Towards Goal  Goal: *Knowledge of medication management  Outcome: Progressing Towards Goal  Goal: *Knowledge of discharge instructions  Outcome: Progressing Towards Goal     Problem: Hypertension  Goal: *Blood pressure within specified parameters  Outcome: Progressing Towards Goal     Problem: Risk for Spread of Infection  Goal: Prevent transmission of infectious organism to others  Description: Prevent the transmission of infectious organisms to other patients, staff members, and visitors.   Outcome: Progressing Towards Goal     Problem: Patient Education:  Go to Education Activity  Goal: Patient/Family Education  Outcome: Progressing Towards Goal

## 2021-10-19 NOTE — PROGRESS NOTES
Progress Note    Patient: Arlyn Minaya MRN: [id number]  SSN: [id number]    YOB: 1950  Age: 72 y.o. Sex: male      Admit Date: 10/15/2021    LOS: 4 days     Subjective:   Patient followed for MRSA bacteremia presumed related to dialysis catheter. Spoke with Nephrology earlier today and it appears that blood cultures on 10/15/21 drawn at dialysis were positive MRSA and Gram negative rods. Blood cultures this time also growing Klebsiella pneumoniae. He is currently on Vancomycin and Meropenem. He remains afebrile with now normal WBC and decreasing procal and CRP. Patient resting comfortably with no complaints. Objective:     Vitals:    10/18/21 1347 10/18/21 1517 10/18/21 2249 10/19/21 0751   BP: (!) 148/87 131/81 118/71 127/76   Pulse: 85 87 78 76   Resp: 18 18 18 18   Temp: 97.9 °F (36.6 °C) 98.5 °F (36.9 °C) 98.1 °F (36.7 °C) 97.8 °F (36.6 °C)   TempSrc:       SpO2: 99% 100% 99% 98%   Weight:       Height:            Intake and Output:  Current Shift: No intake/output data recorded. Last three shifts: 10/17 1901 - 10/19 0700  In: 320 [P.O.:300; I.V.:20]  Out: 2000     Physical Exam:   Vitals and nursing note reviewed. Constitutional:       Appearance: He is not ill-appearing. HENT: unremarkable   Eyes:      Pupils: Pupils are equal, round, and reactive to light. Cardiovascular:      Rate and Rhythm: Normal rate and regular rhythm. Heart sounds: No murmur heard. Comments: Right sided hemodialysis catheter, site unremarkable with no erythema or tenderness  Pulmonary:      Effort: Pulmonary effort is normal.      Breath sounds: Normal breath sounds. Chest:       Abdominal:      General: Bowel sounds are normal.      Palpations: Abdomen is soft. Tenderness: There is no abdominal tenderness. Genitourinary:     Comments: No Pleitez   Neurological:      General: No focal deficit present. Mental Status: He is alert and oriented to person, place, and time. Psychiatric:         Mood and Affect: Mood normal.         Behavior: Behavior normal.         Thought Content: Thought content normal.         Judgment: Judgment normal     Lab/Data Review:     WBC 7,500    Procal 4.89 < 6.22 <7.03  CRP 6.37 <10.50    Blood cultures (10/15) Klebsiella pneumoniae sensitive to Levaquin  Right Permacath (10/18) >100,000 cfu/ml Gram negative rods  Assessment:     Active Problems:    ESRD (end stage renal disease) on dialysis ([facility] [address] (7/7/2021)      Gram-negative bacteremia (9/16/2021)    1. MRSA bacteremia, possibly related to dialysis catheter, negative TTE, Day #5 IV Vancomycin  2. Permacath related bacteremia with Klebsiella pneumoniae, Day #3 IV Meropenem  3. Sepsis with leukocytosis, elevated lactic acid and procal, resolving  4. ESRD on HD  5. Permacath tip growing Gram negative rods  6. Penicillin allergy    Comment: Procal and CRP decreasing. Plan:   1. Continue IV Vancomycin and Meropenem  2. In am, repeat procal, CRP, blood cultures  3.  Follow-up permacath tip culture    Signed By: Bren Fox MD     October 19, 2021

## 2021-10-20 LAB
ALBUMIN SERPL-MCNC: 2.4 G/DL (ref 3.5–5)
ALBUMIN/GLOB SERPL: 0.4 {RATIO} (ref 1.1–2.2)
ALP SERPL-CCNC: 88 U/L (ref 45–117)
ALT SERPL-CCNC: 38 U/L (ref 12–78)
ANION GAP SERPL CALC-SCNC: 9 MMOL/L (ref 5–15)
AST SERPL W P-5'-P-CCNC: 58 U/L (ref 15–37)
BACTERIA SPEC CULT: NORMAL
BASOPHILS # BLD: 0.1 K/UL (ref 0–0.1)
BASOPHILS NFR BLD: 1 % (ref 0–1)
BILIRUB SERPL-MCNC: 0.4 MG/DL (ref 0.2–1)
BUN SERPL-MCNC: 44 MG/DL (ref 6–20)
BUN/CREAT SERPL: 6 (ref 12–20)
CA-I BLD-MCNC: 8.4 MG/DL (ref 8.5–10.1)
CHLORIDE SERPL-SCNC: 105 MMOL/L (ref 97–108)
CO2 SERPL-SCNC: 24 MMOL/L (ref 21–32)
CREAT SERPL-MCNC: 7.25 MG/DL (ref 0.7–1.3)
CRP SERPL-MCNC: 3.69 MG/DL (ref 0–0.6)
DIFFERENTIAL METHOD BLD: ABNORMAL
EOSINOPHIL # BLD: 0.3 K/UL (ref 0–0.4)
EOSINOPHIL NFR BLD: 4 % (ref 0–7)
ERYTHROCYTE [DISTWIDTH] IN BLOOD BY AUTOMATED COUNT: 16.8 % (ref 11.5–14.5)
GLOBULIN SER CALC-MCNC: 5.6 G/DL (ref 2–4)
GLUCOSE SERPL-MCNC: 108 MG/DL (ref 65–100)
GRAM STN SPEC: NORMAL
HCT VFR BLD AUTO: 36.3 % (ref 36.6–50.3)
HGB BLD-MCNC: 11.4 G/DL (ref 12.1–17)
IMM GRANULOCYTES # BLD AUTO: 0 K/UL (ref 0–0.04)
IMM GRANULOCYTES NFR BLD AUTO: 0 % (ref 0–0.5)
LYMPHOCYTES # BLD: 1.6 K/UL (ref 0.8–3.5)
LYMPHOCYTES NFR BLD: 23 % (ref 12–49)
MCH RBC QN AUTO: 27 PG (ref 26–34)
MCHC RBC AUTO-ENTMCNC: 31.4 G/DL (ref 30–36.5)
MCV RBC AUTO: 85.8 FL (ref 80–99)
MONOCYTES # BLD: 0.9 K/UL (ref 0–1)
MONOCYTES NFR BLD: 12 % (ref 5–13)
NEUTS SEG # BLD: 4.2 K/UL (ref 1.8–8)
NEUTS SEG NFR BLD: 60 % (ref 32–75)
NRBC # BLD: 0 K/UL (ref 0–0.01)
NRBC BLD-RTO: 0 PER 100 WBC
PLATELET # BLD AUTO: 337 K/UL (ref 150–400)
PMV BLD AUTO: 10.2 FL (ref 8.9–12.9)
POTASSIUM SERPL-SCNC: 4.3 MMOL/L (ref 3.5–5.1)
PROT SERPL-MCNC: 8 G/DL (ref 6.4–8.2)
RBC # BLD AUTO: 4.23 M/UL (ref 4.1–5.7)
SODIUM SERPL-SCNC: 138 MMOL/L (ref 136–145)
SPECIAL REQUESTS,SREQ: NORMAL
VANCOMYCIN SERPL-MCNC: 17.9 UG/ML
WBC # BLD AUTO: 7.1 K/UL (ref 4.1–11.1)

## 2021-10-20 PROCEDURE — 74011250636 HC RX REV CODE- 250/636: Performed by: INTERNAL MEDICINE

## 2021-10-20 PROCEDURE — 36415 COLL VENOUS BLD VENIPUNCTURE: CPT

## 2021-10-20 PROCEDURE — 74011250636 HC RX REV CODE- 250/636: Performed by: PHYSICIAN ASSISTANT

## 2021-10-20 PROCEDURE — 74011250637 HC RX REV CODE- 250/637: Performed by: PHYSICIAN ASSISTANT

## 2021-10-20 PROCEDURE — 5A1D70Z PERFORMANCE OF URINARY FILTRATION, INTERMITTENT, LESS THAN 6 HOURS PER DAY: ICD-10-PCS | Performed by: INTERNAL MEDICINE

## 2021-10-20 PROCEDURE — 80053 COMPREHEN METABOLIC PANEL: CPT

## 2021-10-20 PROCEDURE — 86140 C-REACTIVE PROTEIN: CPT

## 2021-10-20 PROCEDURE — 90935 HEMODIALYSIS ONE EVALUATION: CPT

## 2021-10-20 PROCEDURE — 87040 BLOOD CULTURE FOR BACTERIA: CPT

## 2021-10-20 PROCEDURE — 85025 COMPLETE CBC W/AUTO DIFF WBC: CPT

## 2021-10-20 PROCEDURE — 65270000029 HC RM PRIVATE

## 2021-10-20 PROCEDURE — 74011000250 HC RX REV CODE- 250: Performed by: INTERNAL MEDICINE

## 2021-10-20 PROCEDURE — 80202 ASSAY OF VANCOMYCIN: CPT

## 2021-10-20 RX ORDER — VANCOMYCIN HYDROCHLORIDE 10 G/100ML
1000 INJECTION, POWDER, LYOPHILIZED, FOR SOLUTION INTRAVENOUS
Qty: 6000 MG | Refills: 0 | Status: SHIPPED | OUTPATIENT
Start: 2021-10-20 | End: 2021-11-03

## 2021-10-20 RX ORDER — GENTAMICIN SULFATE 80 MG/100ML
80 INJECTION, SOLUTION INTRAVENOUS
Qty: 600 ML | Refills: 0 | Status: SHIPPED | OUTPATIENT
Start: 2021-10-20 | End: 2021-12-08 | Stop reason: ALTCHOICE

## 2021-10-20 RX ADMIN — HEPARIN SODIUM 5000 UNITS: 5000 INJECTION INTRAVENOUS; SUBCUTANEOUS at 05:27

## 2021-10-20 RX ADMIN — ACYCLOVIR 800 MG: 800 TABLET ORAL at 21:02

## 2021-10-20 RX ADMIN — MEROPENEM 500 MG: 500 INJECTION INTRAVENOUS at 18:36

## 2021-10-20 RX ADMIN — ASPIRIN 81 MG CHEWABLE TABLET 81 MG: 81 TABLET CHEWABLE at 13:16

## 2021-10-20 RX ADMIN — ACYCLOVIR 800 MG: 800 TABLET ORAL at 13:14

## 2021-10-20 RX ADMIN — HEPARIN SODIUM 2500 UNITS: 1000 INJECTION, SOLUTION INTRAVENOUS; SUBCUTANEOUS at 11:31

## 2021-10-20 RX ADMIN — ATORVASTATIN CALCIUM 40 MG: 40 TABLET, FILM COATED ORAL at 13:14

## 2021-10-20 RX ADMIN — HEPARIN SODIUM 5000 UNITS: 5000 INJECTION INTRAVENOUS; SUBCUTANEOUS at 13:14

## 2021-10-20 RX ADMIN — PREDNISOLONE ACETATE 1 DROP: 10 SUSPENSION/ DROPS OPHTHALMIC at 21:02

## 2021-10-20 RX ADMIN — MEROPENEM 500 MG: 500 INJECTION INTRAVENOUS at 03:06

## 2021-10-20 RX ADMIN — PREDNISOLONE ACETATE 1 DROP: 10 SUSPENSION/ DROPS OPHTHALMIC at 13:19

## 2021-10-20 RX ADMIN — HEPARIN SODIUM 5000 UNITS: 5000 INJECTION INTRAVENOUS; SUBCUTANEOUS at 21:02

## 2021-10-20 RX ADMIN — CARVEDILOL 3.12 MG: 3.12 TABLET, FILM COATED ORAL at 18:36

## 2021-10-20 RX ADMIN — ACETAMINOPHEN 650 MG: 325 TABLET ORAL at 19:41

## 2021-10-20 RX ADMIN — VANCOMYCIN HYDROCHLORIDE 750 MG: 750 INJECTION, POWDER, LYOPHILIZED, FOR SOLUTION INTRAVENOUS at 18:36

## 2021-10-20 RX ADMIN — Medication 10 ML: at 21:06

## 2021-10-20 RX ADMIN — Medication 10 ML: at 14:00

## 2021-10-20 RX ADMIN — Medication 10 ML: at 05:11

## 2021-10-20 RX ADMIN — CARVEDILOL 3.12 MG: 3.12 TABLET, FILM COATED ORAL at 13:14

## 2021-10-20 NOTE — PROGRESS NOTES
Nephrology Consult    Patient: Rosey Ames MRN: [id number]  SSN: [id number]    YOB: 1950  Age: 72 y.o.   Sex: male      Subjective:   Pt is seen in the room  He feels well  Afebrile  BP ok  Wbc 7.1  Perm cath is out, temp hd cath in  Dialyzed today    Past Medical History:   Diagnosis Date    Asthenia 1/24/2021    Cardiomyopathy ([address] 1/24/2021    CHF (congestive heart failure) (HCC)     Chronic kidney disease     CKD (chronic kidney disease)     4    Diabetes ([address]     End stage renal disease on dialysis ([facility] [address] 1/24/2021    Essential hypertension 1/24/2021    Gastroesophageal reflux disease 1/24/2021    Gastroesophageal reflux disease 1/24/2021    Hypertension     Pneumonia due to COVID-19 virus 5/33/9042    Systolic CHF, acute on chronic ([facility] [address] 1/24/2021     Past Surgical History:   Procedure Laterality Date    COLONOSCOPY N/A 12/3/2020    COLONOSCOPY performed by Kem Epstein MD at [address] [facility] HX HEART CATHETERIZATION      HX HERNIA REPAIR      IR FLUORO GUIDE [facility] CVAD  1/22/2021    IR FLUORO GUIDED NEEDLE PLACEMENT  10/18/2021    IR INSERT NON TUNL CVC OVER 5 YRS  1/18/2021    IR INSERT NON TUNL CVC OVER 5 YRS  10/18/2021    IR INSERT TUNL CVC W/O PORT OVER 5 YR  1/22/2021    IR PLACE CVAD FLUORO GUIDE  1/18/2021    IR REMOVE TUNL CVAD W/O PORT / PUMP  10/18/2021      Family History   Problem Relation Age of Onset    Diabetes Mother     Heart Failure Father      Social History     Tobacco Use    Smoking status: Never Smoker    Smokeless tobacco: Never Used   Substance Use Topics    Alcohol use: Not Currently      Current Facility-Administered Medications   Medication Dose Route Frequency Provider Last Rate Last Admin    vancomycin (VANCOCIN) 750 mg in 0.9% sodium chloride 250 mL (VIAL-MATE)  750 mg IntraVENous Maria Eugenia Barry MD  Jerome Lawrence ON 10/22/2021] DUE: Vancomycin level 10/22 at 0400 (pre-HD)   Other MD Garcia Choudhary aspirin chewable tablet 81 mg  81 mg Oral DAILY Veronica Mulligan PA-C   81 mg at 10/19/21 0912    heparin (porcine) 1,000 unit/mL injection 2,500 Units  2,500 Units Hemodialysis DIALYSIS PRN Drayl Canchola MD   2,500 Units at 10/20/21 1131    meropenem (MERREM) 500 mg in sterile water (preservative free) 10 mL IV syringe  0.5 g IntraVENous Q12H Miguel A Guallpa MD   500 mg at 10/20/21 0306    acyclovir (ZOVIRAX) tablet 800 mg  800 mg Oral BID Malachy Drivers, PA-C   800 mg at 10/19/21 2122    albuterol (PROVENTIL HFA, VENTOLIN HFA, PROAIR HFA) inhaler 2 Puff  2 Puff Inhalation Q4H PRN Ela Dale PA-C        carvediloL (COREG) tablet 3.125 mg  3.125 mg Oral BID WITH MEALS Ela Dale PA-C   3.125 mg at 10/19/21 1655    prednisoLONE acetate (PRED FORTE) 1 % ophthalmic suspension 1 Drop  1 Drop Both Eyes BID Judson Suarez, PA-C   1 Drop at 10/19/21 2123    [Held by provider] hydrALAZINE (APRESOLINE) tablet 50 mg  50 mg Oral TID Judson Suarez, PA-C   50 mg at 10/16/21 0910    [Held by provider] losartan (COZAAR) tablet 25 mg  25 mg Oral DAILY Malachy Drivers, PA-C   25 mg at 10/16/21 0910    atorvastatin (LIPITOR) tablet 40 mg  40 mg Oral DAILY Judson Suarez, PA-C   40 mg at 10/19/21 0912    sodium chloride (NS) flush 5-40 mL  5-40 mL IntraVENous Q8H Gabe Dale PA-C   10 mL at 10/20/21 0511    sodium chloride (NS) flush 5-40 mL  5-40 mL IntraVENous PRN Ela Dale PA-C        acetaminophen (TYLENOL) tablet 650 mg  650 mg Oral Q6H PRN Judson Suarez, PA-C   650 mg at 10/19/21 2122    Or    acetaminophen (TYLENOL) suppository 650 mg  650 mg Rectal Q6H PRN Ela Dale PA-C        polyethylene glycol (MIRALAX) packet 17 g  17 g Oral DAILY PRN Reasoner, Ela Davis PA-C        ondansetron (ZOFRAN ODT) tablet 4 mg  4 mg Oral Q8H PRN Reasoner, Ela Davis PA-C        Or    ondansetron (ZOFRAN) injection 4 mg  4 mg IntraVENous Q6H PRN Reasoner, Gordon Meyer PA-C        heparin (porcine) injection 5,000 Units  5,000 Units SubCUTAneous Q8H Estefani Ball PA-C   5,000 Units at 10/20/21 7376    Vancomycin Dosed by Pharmacy    Other Rx Dosing/Monitoring Leti Guevara MD            Allergies   Allergen Reactions    Lisinopril Angioedema    Pcn [Penicillins] Rash       Review of Systems:  A comprehensive review of systems was negative except for that written in the History of Present Illness. Objective:     Vitals:    10/20/21 1000 10/20/21 1030 10/20/21 1100 10/20/21 1131   BP: (!) 138/99 (!) 141/87 (!) 160/85 (!) 143/85   Pulse: 81 77 76 76   Resp:    18   Temp:    98.2 °F (36.8 °C)   TempSrc:    Oral   SpO2:       Weight:       Height:            Physical Exam:  General: NAD  Eyes: sclera anicteric  Oral Cavity: No thrush or ulcers  Neck: no JVD  Chest: Fair bilateral air entry  Heart: normal sounds  Abdomen: soft and non tender   : no simms  Lower Extremities: no edema  Skin: no rash  Neuro: intact  Psychiatric: non-depressed            Assessment:     Hospital Problems  Date Reviewed: 1/24/2021        Codes Class Noted POA    Gram-negative bacteremia ICD-10-CM: R78.81  ICD-9-CM: 790.7, 041.85  9/16/2021 Unknown        ESRD (end stage renal disease) on dialysis [facility]) ICD-10-CM: N18.6, Z99.2  ICD-9-CM: 585.6, V45.11  7/7/2021 Unknown              Plan:       1. ESRD. -On hemodialysis Monday Wednesday Friday.    -He was last dialyzed on 10/15 for 1.5 hrs only.  -No significant volume overload or uremia.    -was dialyzed on 10/18 and 10/20  -He has right temp hd cath as active dialysis access.    -He has left wrist AV fistula which is still maturing. 2.  Fever:  -likely catheter related bacteremia.    -He came with high temp and hypotensive with leukocytosis.     -recent klebsiella and MRSA bacteremia in 09/2021  -was dced on iv vanc and tobramycin per ID recs   -re admitted with fever, BC 10/15  Klebsiella  -s/p removed perm cath 10/18  -placed temp hd cath   -c/w with IV antibiotics per ID recs. -from renal standpoint can be discharged with temp hd cath  -can repeat BC in 1 week and can place perm cath again as out once [facility] negative. 2.  Anemia.    -Hemoglobin above goal.    -will hold IV erythropoietin    4. Renal osteodystrophy.   - His calcium is okay. -will check phos    5. HTN:  -low bps  -will hold losartan and hydralazine.     Signed By: Lisa Garcia MD     October 20, 2021

## 2021-10-20 NOTE — PROGRESS NOTES
Spiritual Care Assessment/Progress Note  [facility]      NAME: Lyly Whitfield      MRN: [id number]  AGE: 72 y.o.  SEX: male  Worship Affiliation: No preference   Language: English     10/20/2021     Total Time (in minutes): 19     Spiritual Assessment begun in [facility] through conversation with:         [x]Patient        [] Family    [] Friend(s)        Reason for Consult: Initial/Spiritual assessment, patient floor     Spiritual beliefs: (Please include comment if needed)     [x] Identifies with a ananya tradition:         [] Supported by a ananya community:            [] Claims no spiritual orientation:           [] Seeking spiritual identity:                [] Adheres to an individual form of spirituality:           [] Not able to assess:                           Identified resources for coping:      [] Prayer                               [] Music                  [] Guided Imagery     [x] Family/friends                 [] Pet visits     [x] Devotional reading                         [] Unknown     [] Other:                                              Interventions offered during this visit: (See comments for more details)    Patient Interventions: Affirmation of emotions/emotional suffering, Affirmation of ananya, Catharsis/review of pertinent events in supportive environment, Coping skills reviewed/reinforced, Guidance concerning next steps/process to be expected, Iconic (affirming the presence of God/Higher Power), Initial/Spiritual assessment, patient floor, Life review/legacy, Normalization of emotional/spiritual concerns, Prayer (assurance of), Worship beliefs/image of God discussed, Bible or other spiritual literature provided           Plan of Care:     [] Support spiritual and/or cultural needs    [] Support AMD and/or advance care planning process      [] Support grieving process   [] Coordinate Rites and/or Rituals    [] Coordination with community clergy   [] No spiritual needs identified at this time   [] Detailed Plan of Care below (See Comments)  [] Make referral to Music Therapy  [] Make referral to Pet Therapy     [] Make referral to Addiction services  [] Make referral to [facility]  [] Make referral to Spiritual Care Partner  [] No future visits requested        [x] Follow up upon further referrals     Comments:  Visited patient in the [facility] unit for initial assessment. Patient was alone during the visit. Patient engaged in life review and shared about his medical difficulties, his supportive family, and reflected on possibly going back to his Jainism. He spoke about enjoying things in and around the house. Provided chaplaincy education, listened with empathy and recalled visiting previously during his last hospitalization. Normalized his emotions and experiences while affirming contemplation of restarting his spiritual commitments. Provided spiritual literature per his request and advised of  availability. Chaplains will follow up if further referrals are requested. [email or web address].    can be reached by calling the  at General acute hospital  [phone]

## 2021-10-20 NOTE — PROGRESS NOTES
Bedside shift change report given to MYNOR Poon (oncoming nurse) by Jimmie Ulloa (offgoing nurse). Report included the following information SBAR.

## 2021-10-20 NOTE — PROGRESS NOTES
Consult for Vancomycin Dosing by Pharmacy by TATIANA Harrison / Dr. Tre Hunt provided for this 72y.o. year old male , for indication of Sepsis with bacteremia (suspected source potentially in the permacath). Day of Therapy: 6  Goal of Level(s): trough = 15-20mcg/dL    Other Current Antibiotics: Meropenem    Significant Cultures:    Date          Culture             Organism   10/15          Blood                K. pneumoniae    Serum Creatinine Creatinine   Date Value Ref Range Status   10/20/2021 7.25 (H) 0.70 - 1.30 mg/dL Final   10/19/2021 6.33 (H) 0.70 - 1.30 mg/dL Final   10/18/2021 8.11 (H) 0.70 - 1.30 mg/dL Final   10/18/2021 8.11 (H) 0.70 - 1.30 mg/dL Final      Creatinine Clearance Estimated Creatinine Clearance: 10.2 mL/min (A) (based on SCr of 7.25 mg/dL (H)). BUN Lab Results   Component Value Date/Time    BUN 44 (H) 10/20/2021 08:20 AM      WBC Lab Results   Component Value Date/Time    WBC 7.1 10/20/2021 08:00 AM      Temp Temp Readings from Last 1 Encounters:   10/20/21 97.8 °F (36.6 °C) (Oral)        Last Level: No results found for: VANCT   Vancomycin, random   Date/Time Value Ref Range Status   10/20/2021 03:12 AM 17.9 ug/mL Final     Comment:     No reference range has been established. Ht Readings from Last 1 Encounters:   10/15/21 180.3 cm (71\")        Wt Readings from Last 1 Encounters:   10/17/21 84.9 kg (187 lb 2.7 oz)     Ideal body weight: 75.3 kg (166 lb 0.1 oz)  Adjusted ideal body weight: 79.1 kg (174 lb 7.6 oz)     Previous Regimen: received 750 mg x 1 on 10/17 at 2257    New Regimen:   Level therapeutic at 17. 9. Patient on HD MWF. Recent admission with MRSA. Blood cx positive for Klebsiella. Blood cx at HD positive for MRSA (per Dr. Juan Manuel Felix note). Inflammatory markers trending down. Give 750mg x 1 this afternoon and draw random level 10/22 in the morning pre HD.      Pharmacy to follow daily and will make changes to dose and/or frequency based on clinical status.   _________________________________     Jalen Hernandez PHARMD

## 2021-10-20 NOTE — PROGRESS NOTES
Progress Note    Patient: Brandon Gates MRN: [id number]  SSN: [id number]    YOB: 1950  Age: 72 y.o. Sex: male      Admit Date: 10/15/2021    LOS: 5 days     Subjective:   Patient followed for MRSA and Klebsiella bacteremia related to dialysis catheter which has been removed with catheter tip growing Gram negative rods. Patient currently on IV Vancomycin and Meropenem. He is afebrile with normal WBC and decreasing procal and CRP. He has temporary dialysis catheter. Blood cultures this time also growing Klebsiella pneumoniae. He remains afebrile with now normal WBC and decreasing procal and CRP. It appears that Nephrology has cleared patient for discharge with temporary dialysis catheter. Patient was concerned because he was reported told that he should not leave the hospital with this catheter. Patient resting comfortably with no complaints. Objective:     Vitals:    10/20/21 1030 10/20/21 1100 10/20/21 1131 10/20/21 1309   BP: (!) 141/87 (!) 160/85 (!) 143/85 132/79   Pulse: 77 76 76    Resp:   18 18   Temp:   98.2 °F (36.8 °C) 97.1 °F (36.2 °C)   TempSrc:   Oral    SpO2:    99%   Weight:       Height:            Intake and Output:  Current Shift: 10/20 0701 - 10/20 1900  In: -   Out: 2000   Last three shifts: 10/18 1901 - 10/20 0700  In: 720 [P.O.:700; I.V.:20]  Out: -     Physical Exam:   Vitals and nursing note reviewed. Constitutional:       Appearance: He is not ill-appearing. HENT: unremarkable   Eyes:      Pupils: Pupils are equal, round, and reactive to light. Cardiovascular:      Rate and Rhythm: Normal rate and regular rhythm. Heart sounds: No murmur heard. Comments: Right sided temporary hemodialysis catheter, site unremarkable with no erythema or tenderness  Pulmonary:      Effort: Pulmonary effort is normal.      Breath sounds: Normal breath sounds.    Chest: temporary dialysis catheter right side  Abdominal:      General: Bowel sounds are normal. Palpations: Abdomen is soft. Tenderness: There is no abdominal tenderness. Genitourinary:     Comments: No Pleitez   Neurological:      General: No focal deficit present. Mental Status: He is alert and oriented to person, place, and time. Psychiatric:         Mood and Affect: Mood normal.         Behavior: Behavior normal.         Thought Content: Thought content normal.         Judgment: Judgment normal     Lab/Data Review:     WBC 7,100    Procal 4.89 < 6.22 <7.03  CRP 3.69 <6.37 <10.50    Blood cultures (10/15) Klebsiella pneumoniae sensitive to Levaquin  Right Permacath (10/18) >100,000 cfu/ml Gram negative rods  Assessment:     Active Problems:    ESRD (end stage renal disease) on dialysis ([facility] [address] (7/7/2021)      Gram-negative bacteremia (9/16/2021)    1. MRSA bacteremia, possibly related to dialysis catheter, negative TTE, Day #6 IV Vancomycin  2. Permacath related bacteremia with Klebsiella pneumoniae, Day #4 IV Meropenem  3. Sepsis with leukocytosis, elevated lactic acid and procal, resolving  4. ESRD on HD  5. Permacath tip growing Gram negative rods  6. Penicillin allergy    Comment: Procal and CRP decreasing. If patient is being discharged, would treat with IV Vancomycin and Gentamicin at hemodialysis. Plan:   1. Continue IV Vancomycin and  Gentamicin  MWF at hemodialysis for 2 weeks; Rx on chart  2. Follow-up repeat blood cultures  3. Plan would be to repeat blood cultures at the end of treatment and ensure that they are negative before placing permanent catheter  4. Discussed results and plan with patient  5. Follow-up permacath tip culture  6.  Cleared for discharge from ID standpoint    Signed By: Patricia Foy MD     October 20, 2021

## 2021-10-20 NOTE — PROGRESS NOTES
Problem: Falls - Risk of  Goal: *Absence of Falls  Description: Document Kris Sol Fall Risk and appropriate interventions in the flowsheet.   Outcome: Progressing Towards Goal  Note: Fall Risk Interventions:  Mobility Interventions: Bed/chair exit alarm, Patient to call before getting OOB, Utilize walker, cane, or other assistive device         Medication Interventions: Bed/chair exit alarm, Patient to call before getting OOB, Teach patient to arise slowly    Elimination Interventions: Bed/chair exit alarm, Call light in reach, Patient to call for help with toileting needs    History of Falls Interventions: Bed/chair exit alarm, Door open when patient unattended

## 2021-10-20 NOTE — PROGRESS NOTES
CM reviewed chart. No needs identified at this time. Per PT and OT patient is at baseline. DC plan is home self care.

## 2021-10-20 NOTE — PROGRESS NOTES
Hospitalist Progress Note    Subjective:   Daily Progress Note: 10/20/2021 9:23 AM    Hospital Course: Patient is a 28-year-old male with a history of cardiomyopathy with an EF of 25%, end-stage renal disease on hemodialysis that presented to the emergency room on 10/15/2021 for fever and gram-negative bacillus. Chest x-ray unremarkable. WBC elevated at 28.5, lactic acid 1.1, procalcitonin 7.03, CRP 10.50. CT of the abdomen pelvis without contrast showed moderate volume of ascitic fluid, body wall anasarca, thickening of the wall of the bladder. Patient was made to hospital for further evaluation and started on IV vancomycin and meropenem. Infectious disease and nephrology consulted. Patient's TTE was negative. HD catheter removed on 10/18/2021 with a temp placement and culture pending. Blood culture positive for Klebsiella pneumonia. Subjective: Patient seen in HD. No pain.      Current Facility-Administered Medications   Medication Dose Route Frequency    aspirin chewable tablet 81 mg  81 mg Oral DAILY    heparin (porcine) 1,000 unit/mL injection 2,500 Units  2,500 Units Hemodialysis DIALYSIS PRN    meropenem (MERREM) 500 mg in sterile water (preservative free) 10 mL IV syringe  0.5 g IntraVENous Q12H    acyclovir (ZOVIRAX) tablet 800 mg  800 mg Oral BID    albuterol (PROVENTIL HFA, VENTOLIN HFA, PROAIR HFA) inhaler 2 Puff  2 Puff Inhalation Q4H PRN    carvediloL (COREG) tablet 3.125 mg  3.125 mg Oral BID WITH MEALS    prednisoLONE acetate (PRED FORTE) 1 % ophthalmic suspension 1 Drop  1 Drop Both Eyes BID    [Held by provider] hydrALAZINE (APRESOLINE) tablet 50 mg  50 mg Oral TID    [Held by provider] losartan (COZAAR) tablet 25 mg  25 mg Oral DAILY    atorvastatin (LIPITOR) tablet 40 mg  40 mg Oral DAILY    sodium chloride (NS) flush 5-40 mL  5-40 mL IntraVENous Q8H    sodium chloride (NS) flush 5-40 mL  5-40 mL IntraVENous PRN    acetaminophen (TYLENOL) tablet 650 mg  650 mg Oral Q6H PRN    Or    acetaminophen (TYLENOL) suppository 650 mg  650 mg Rectal Q6H PRN    polyethylene glycol (MIRALAX) packet 17 g  17 g Oral DAILY PRN    ondansetron (ZOFRAN ODT) tablet 4 mg  4 mg Oral Q8H PRN    Or    ondansetron (ZOFRAN) injection 4 mg  4 mg IntraVENous Q6H PRN    heparin (porcine) injection 5,000 Units  5,000 Units SubCUTAneous Q8H    Vancomycin Dosed by Pharmacy    Other Rx Dosing/Monitoring        Review of Systems  Constitutional: No fevers, No chills, No sweats, No fatigue, No Weakness  Eyes: No redness  Ears, nose, mouth, throat, and face: No nasal congestion, No sore throat, No voice change  Respiratory: No Shortness of Breath, No cough, No wheezing  Cardiovascular: No chest pain, No palpitations, No extremity edema  Gastrointestinal: No nausea, No vomiting, No diarrhea, No abdominal pain  Genitourinary: No frequency, No dysuria, No hematuria  Integument/breast: No skin lesion(s)   Neurological: No Confusion, No headaches, No dizziness      Objective:     Visit Vitals  BP (!) 143/95   Pulse 77   Temp 97.8 °F (36.6 °C) (Oral)   Resp 18   Ht 5' 11\" (1.803 m)   Wt 84.9 kg (187 lb 2.7 oz)   SpO2 (!) 88% Comment: notified RN   BMI 26.11 kg/m²      O2 Device: None (Room air)    Temp (24hrs), Av.1 °F (36.7 °C), Min:97.7 °F (36.5 °C), Max:98.4 °F (36.9 °C)      No intake/output data recorded. 10/18 1901 - 10/20 0700  In: 720 [P.O.:700; I.V.:20]  Out: -     PHYSICAL EXAM:  Constitutional: No acute distress  Skin: Extremities and face reveal no rashes. HEENT: Sclerae anicteric. Extra-occular muscles are intact. Cardiovascular: RRR  Respiratory:  Clear breath sounds bilaterally with no wheezes, rales, or rhonchi. GI: Abdomen nondistended, soft, and nontender. Normal active bowel sounds. Musculoskeletal: No pitting edema of the lower legs. Able to move all ext  Neurological:  Patient is alert and oriented.  Cranial nerves II-XII grossly intact  Psychiatric: Mood appears appropriate       Data Review    Recent Results (from the past 24 hour(s))   VANCOMYCIN, RANDOM    Collection Time: 10/20/21  3:12 AM   Result Value Ref Range    Vancomycin, random 17.9 ug/mL   CBC WITH AUTOMATED DIFF    Collection Time: 10/20/21  8:00 AM   Result Value Ref Range    WBC 7.1 4.1 - 11.1 K/uL    RBC 4.23 4. 10 - 5.70 M/uL    HGB 11.4 (L) 12.1 - 17.0 g/dL    HCT 36.3 (L) 36.6 - 50.3 %    MCV 85.8 80.0 - 99.0 FL    MCH 27.0 26.0 - 34.0 PG    MCHC 31.4 30.0 - 36.5 g/dL    RDW 16.8 (H) 11.5 - 14.5 %    PLATELET 949 097 - 712 K/uL    MPV 10.2 8.9 - 12.9 FL    NRBC 0.0 0.0  WBC    ABSOLUTE NRBC 0.00 0.00 - 0.01 K/uL    NEUTROPHILS 60 32 - 75 %    LYMPHOCYTES 23 12 - 49 %    MONOCYTES 12 5 - 13 %    EOSINOPHILS 4 0 - 7 %    BASOPHILS 1 0 - 1 %    IMMATURE GRANULOCYTES 0 0 - 0.5 %    ABS. NEUTROPHILS 4.2 1.8 - 8.0 K/UL    ABS. LYMPHOCYTES 1.6 0.8 - 3.5 K/UL    ABS. MONOCYTES 0.9 0.0 - 1.0 K/UL    ABS. EOSINOPHILS 0.3 0.0 - 0.4 K/UL    ABS. BASOPHILS 0.1 0.0 - 0.1 K/UL    ABS. IMM. GRANS. 0.0 0.00 - 0.04 K/UL    DF AUTOMATED         CBC:   Lab Results   Component Value Date/Time    WBC 7.1 10/20/2021 08:00 AM    RBC 4.23 10/20/2021 08:00 AM    HGB 11.4 (L) 10/20/2021 08:00 AM    HCT 36.3 (L) 10/20/2021 08:00 AM    PLATELET 108 74/41/3715 08:00 AM     BMP:   Lab Results   Component Value Date/Time    Glucose 66 10/19/2021 07:15 AM    Sodium 135 (L) 10/19/2021 07:15 AM    Potassium 3.9 10/19/2021 07:15 AM    Chloride 100 10/19/2021 07:15 AM    CO2 27 10/19/2021 07:15 AM    BUN 35 (H) 10/19/2021 07:15 AM    Creatinine 6.33 (H) 10/19/2021 07:15 AM    Calcium 8.6 10/19/2021 07:15 AM     Radiology review: Chest xray    Assessment:   1. Sepsis/MRSA/Klebsiella pneumonia bacteremia  2. End-stage renal disease on hemodialysis  3. Chronic systolic heart failure EF 43%  4. Hypertension  5. Acute on chronic anemia    Plan:    1. HD catheter removed on 10/18/2021. Blood cultures are positive for Klebsiella pneumonia.   Patient is on IV vancomycin and meropenem. Inflammatory markers trending down. 2.  Permacath removed on 10/18/2021 and culture pending. Temporary catheter placed. Will need permacath placement once infection clears. Nephrology consulted. Dialysis Monday, Wednesday, Friday  3. Repeat follow-up 2D echo showed an EF of 43%. Patient is on beta-blocker, statin. Holding ACE due to renal function. Add ASA  4. HGB stable. No signs of active bleeding. 5.  CBC BMP in a.m. Dispo: >48 hours, home with home health. Barries include clearance from infectious disease with oral antibiotic regimen, permacath placement per nephrology     CODE STATUS Full     DVT prophylaxis: Heparin  Ulcer prophylaxis: Tolerating PO intake    Care Plan discussed with: Patient/Family, Nurse and     Total time spent with patient: 34  minutes.

## 2021-10-21 VITALS
BODY MASS INDEX: 25.62 KG/M2 | DIASTOLIC BLOOD PRESSURE: 81 MMHG | WEIGHT: 182.98 LBS | RESPIRATION RATE: 16 BRPM | SYSTOLIC BLOOD PRESSURE: 126 MMHG | HEART RATE: 76 BPM | HEIGHT: 71 IN | OXYGEN SATURATION: 99 % | TEMPERATURE: 98.1 F

## 2021-10-21 LAB
ALBUMIN SERPL-MCNC: 2.5 G/DL (ref 3.5–5)
ALBUMIN/GLOB SERPL: 0.4 {RATIO} (ref 1.1–2.2)
ALP SERPL-CCNC: 85 U/L (ref 45–117)
ALT SERPL-CCNC: 49 U/L (ref 12–78)
ANION GAP SERPL CALC-SCNC: 8 MMOL/L (ref 5–15)
AST SERPL W P-5'-P-CCNC: 72 U/L (ref 15–37)
BASOPHILS # BLD: 0.1 K/UL (ref 0–0.1)
BASOPHILS NFR BLD: 1 % (ref 0–1)
BILIRUB SERPL-MCNC: 0.4 MG/DL (ref 0.2–1)
BUN SERPL-MCNC: 34 MG/DL (ref 6–20)
BUN/CREAT SERPL: 6 (ref 12–20)
CA-I BLD-MCNC: 8.3 MG/DL (ref 8.5–10.1)
CHLORIDE SERPL-SCNC: 100 MMOL/L (ref 97–108)
CO2 SERPL-SCNC: 26 MMOL/L (ref 21–32)
CREAT SERPL-MCNC: 6 MG/DL (ref 0.7–1.3)
CRP SERPL-MCNC: 3.3 MG/DL (ref 0–0.6)
DIFFERENTIAL METHOD BLD: ABNORMAL
EOSINOPHIL # BLD: 0.2 K/UL (ref 0–0.4)
EOSINOPHIL NFR BLD: 4 % (ref 0–7)
ERYTHROCYTE [DISTWIDTH] IN BLOOD BY AUTOMATED COUNT: 16.9 % (ref 11.5–14.5)
GLOBULIN SER CALC-MCNC: 6.1 G/DL (ref 2–4)
GLUCOSE SERPL-MCNC: 115 MG/DL (ref 65–100)
HCT VFR BLD AUTO: 38.9 % (ref 36.6–50.3)
HGB BLD-MCNC: 11.7 G/DL (ref 12.1–17)
IMM GRANULOCYTES # BLD AUTO: 0 K/UL (ref 0–0.04)
IMM GRANULOCYTES NFR BLD AUTO: 0 % (ref 0–0.5)
LYMPHOCYTES # BLD: 1.5 K/UL (ref 0.8–3.5)
LYMPHOCYTES NFR BLD: 25 % (ref 12–49)
MCH RBC QN AUTO: 26.5 PG (ref 26–34)
MCHC RBC AUTO-ENTMCNC: 30.1 G/DL (ref 30–36.5)
MCV RBC AUTO: 88 FL (ref 80–99)
MONOCYTES # BLD: 0.8 K/UL (ref 0–1)
MONOCYTES NFR BLD: 13 % (ref 5–13)
NEUTS SEG # BLD: 3.4 K/UL (ref 1.8–8)
NEUTS SEG NFR BLD: 57 % (ref 32–75)
NRBC # BLD: 0 K/UL (ref 0–0.01)
NRBC BLD-RTO: 0 PER 100 WBC
PLATELET # BLD AUTO: 324 K/UL (ref 150–400)
PMV BLD AUTO: 10.3 FL (ref 8.9–12.9)
POTASSIUM SERPL-SCNC: 4.4 MMOL/L (ref 3.5–5.1)
PROT SERPL-MCNC: 8.6 G/DL (ref 6.4–8.2)
RBC # BLD AUTO: 4.42 M/UL (ref 4.1–5.7)
SODIUM SERPL-SCNC: 134 MMOL/L (ref 136–145)
WBC # BLD AUTO: 6 K/UL (ref 4.1–11.1)

## 2021-10-21 PROCEDURE — 74011250637 HC RX REV CODE- 250/637: Performed by: PHYSICIAN ASSISTANT

## 2021-10-21 PROCEDURE — 74011250636 HC RX REV CODE- 250/636: Performed by: PHYSICIAN ASSISTANT

## 2021-10-21 PROCEDURE — 85025 COMPLETE CBC W/AUTO DIFF WBC: CPT

## 2021-10-21 PROCEDURE — 74011250636 HC RX REV CODE- 250/636: Performed by: INTERNAL MEDICINE

## 2021-10-21 PROCEDURE — 36415 COLL VENOUS BLD VENIPUNCTURE: CPT

## 2021-10-21 PROCEDURE — 80053 COMPREHEN METABOLIC PANEL: CPT

## 2021-10-21 PROCEDURE — 86140 C-REACTIVE PROTEIN: CPT

## 2021-10-21 PROCEDURE — 74011000250 HC RX REV CODE- 250: Performed by: INTERNAL MEDICINE

## 2021-10-21 PROCEDURE — 99232 SBSQ HOSP IP/OBS MODERATE 35: CPT | Performed by: INTERNAL MEDICINE

## 2021-10-21 RX ADMIN — PREDNISOLONE ACETATE 1 DROP: 10 SUSPENSION/ DROPS OPHTHALMIC at 09:07

## 2021-10-21 RX ADMIN — ASPIRIN 81 MG CHEWABLE TABLET 81 MG: 81 TABLET CHEWABLE at 09:05

## 2021-10-21 RX ADMIN — CARVEDILOL 3.12 MG: 3.12 TABLET, FILM COATED ORAL at 09:05

## 2021-10-21 RX ADMIN — ATORVASTATIN CALCIUM 40 MG: 40 TABLET, FILM COATED ORAL at 09:05

## 2021-10-21 RX ADMIN — ACYCLOVIR 800 MG: 800 TABLET ORAL at 09:05

## 2021-10-21 RX ADMIN — Medication 10 ML: at 06:10

## 2021-10-21 RX ADMIN — MEROPENEM 500 MG: 500 INJECTION INTRAVENOUS at 04:33

## 2021-10-21 RX ADMIN — HEPARIN SODIUM 5000 UNITS: 5000 INJECTION INTRAVENOUS; SUBCUTANEOUS at 06:08

## 2021-10-21 RX ADMIN — ACETAMINOPHEN 650 MG: 325 TABLET ORAL at 07:43

## 2021-10-21 NOTE — PROGRESS NOTES
Progress Note    Patient: Mecca Glass MRN: [id number]  SSN: [id number]    YOB: 1950  Age: 72 y.o. Sex: male      Admit Date: 10/15/2021    LOS: 6 days     Subjective:   Patient followed for MRSA and Klebsiella bacteremia related to dialysis catheter which has been removed with catheter tip growing Klebsiella. Patient currently on IV Vancomycin and Meropenem. He is afebrile with normal WBC and decreasing procal and CRP. He has temporary dialysis catheter. He remains afebrile with now normal WBC and decreasing procal and CRP. It appears that patient has been discharged. Objective:     Vitals:    10/20/21 1947 10/21/21 0325 10/21/21 0754 10/21/21 0904   BP: 139/86 126/82 126/81    Pulse: 84 78 76    Resp: 18 18 16    Temp: 98.9 °F (37.2 °C) 98.4 °F (36.9 °C) 98.1 °F (36.7 °C)    TempSrc:       SpO2: 99% 98% 99%    Weight:    182 lb 15.7 oz (83 kg)   Height:            Intake and Output:  Current Shift: No intake/output data recorded. Last three shifts: 10/19 1901 - 10/21 0700  In: 1210 [P.O.:1200; I.V.:10]  Out: 2000     Physical Exam:   Vitals and nursing note reviewed. Patient unavailable for re-examination  Constitutional:       Appearance: He is not ill-appearing. HENT: unremarkable   Eyes:      Pupils: Pupils are equal, round, and reactive to light. Cardiovascular:      Rate and Rhythm: Normal rate and regular rhythm. Heart sounds: No murmur heard. Comments: Right sided temporary hemodialysis catheter, site unremarkable with no erythema or tenderness  Pulmonary:      Effort: Pulmonary effort is normal.      Breath sounds: Normal breath sounds. Chest: temporary dialysis catheter right side  Abdominal:      General: Bowel sounds are normal.      Palpations: Abdomen is soft. Tenderness: There is no abdominal tenderness. Genitourinary:     Comments: No Pleitez   Neurological:      General: No focal deficit present.       Mental Status: He is alert and oriented to person, place, and time. Psychiatric:         Mood and Affect: Mood normal.         Behavior: Behavior normal.         Thought Content: Thought content normal.         Judgment: Judgment normal     Lab/Data Review:     WBC 6,000    Procal 4.89 < 6.22 <7.03  CRP 3.30 <3.69 <6.37 <10.50    Blood cultures (10/15) Klebsiella pneumoniae sensitive to Levaquin  Blood cultures (10/20) Pending  Right Permacath (10/18) >100,000 cfu/ml Klebsiella pneumoniae  Assessment:     Active Problems:    ESRD (end stage renal disease) on dialysis ([facility] [address] (7/7/2021)      Gram-negative bacteremia (9/16/2021)    1. MRSA bacteremia, possibly related to dialysis catheter, negative TTE, Day #6 IV Vancomycin  2. Permacath related bacteremia with Klebsiella pneumoniae, Day #4 IV Meropenem  3. Sepsis with leukocytosis, elevated lactic acid and procal, resolving  4. ESRD on HD  5. Permacath tip growing Klebsiella pneumoniae, removed  6. Penicillin allergy    Comment: Pemacath tip positive for Klebsiella, confirming source of bacteremia. Plan:   1. Continue IV Vancomycin and  Gentamicin  MWF at hemodialysis for 2 weeks; Rx on chart  2. Follow-up repeat blood cultures  3. Plan would be to repeat blood cultures at the end of treatment and ensure that they are negative before placing permanent catheter  4.  Cleared for discharge from ID standpoint    Signed By: Omari Pino MD     October 21, 2021

## 2021-10-21 NOTE — DISCHARGE SUMMARY
Hospitalist Discharge Summary     Patient ID:    Kit Apley  [id number]  72 y.o.  1950    Admit date: 10/15/2021    Discharge date : 10/21/2021    Chronic Diagnoses:    Problem List as of 10/21/2021 Date Reviewed: 1/24/2021        Codes Class Noted - Resolved    MRSA bacteremia ICD-10-CM: R78.81, B95.62  ICD-9-CM: 790.7, 041.12  9/16/2021 - Present        Gram-negative bacteremia ICD-10-CM: R78.81  ICD-9-CM: 790.7, 041.85  9/16/2021 - Present        Herpes zoster with nervous system complication [id number]: Z60.56  ICD-9-CM: 053.10  7/8/2021 - Present        Sepsis ([facility] ICD-10-CM: A41.9  ICD-9-CM: 038.9, 995.91  7/7/2021 - Present        ESRD (end stage renal disease) on dialysis [facility]) ICD-10-CM: N18.6, Z99.2  ICD-9-CM: 585.6, V45.11  7/7/2021 - Present        Line sepsis [facility]) ICD-10-CM: T85.79XA, A41.9  ICD-9-CM: 996.62, 038.9, 995.91  7/7/2021 - Present        End stage renal disease on dialysis [facility]) ICD-10-CM: N18.6, Z99.2  ICD-9-CM: 585.6, V45.11  1/24/2021 - Present        Pneumonia due to COVID-19 virus ICD-10-CM: U07.1, J12.82  ICD-9-CM: 480.8, 079.89  1/24/2021 - Present        Essential hypertension ICD-10-CM: I10  ICD-9-CM: 401.9  1/24/2021 - Present        Cardiomyopathy ([facility] (Chronic) ICD-10-CM: I42.9  ICD-9-CM: 425.4  1/24/2021 - Present        Systolic CHF, acute on chronic (HCC) ICD-10-CM: I50.23  ICD-9-CM: 428.23, 428.0  1/24/2021 - Present        Fluid overload ICD-10-CM: E87.70  ICD-9-CM: 276.69  1/24/2021 - Present        Gastroesophageal reflux disease ICD-10-CM: K21.9  ICD-9-CM: 530.81  1/24/2021 - Present        Asthenia ICD-10-CM: R53.1  ICD-9-CM: 780.79  1/24/2021 - Present        ATN (acute tubular necrosis) ([facility] ICD-10-CM: N17.0  ICD-9-CM: 584.5  1/16/2021 - Present        RESOLVED: Acute hypoxemic respiratory failure ([facility] ICD-10-CM: J96.01  ICD-9-CM: 518.81  1/24/2021 - 1/24/2021              Final Diagnoses:   1.   Sepsis/MRSA/Klebsiella pneumonia bacteremia  2. End-stage renal disease on hemodialysis  3. Chronic systolic heart failure EF 43%  4. Hypertension  5. Acute on chronic anemia    Reason for Hospitalization: Fever      Hospital Course: Patient is a 58-year-old male with a history of cardiomyopathy with an EF of 25%, end-stage renal disease on hemodialysis that presented to the emergency room on 10/15/2021 for fever and gram-negative bacillus. Chest x-ray unremarkable. WBC elevated at 28.5, lactic acid 1.1, procalcitonin 7.03, CRP 10.50. CT of the abdomen pelvis without contrast showed moderate volume of ascitic fluid, body wall anasarca, thickening of the wall of the bladder. Patient was made to hospital for further evaluation and started on IV vancomycin and meropenem. Infectious disease and nephrology consulted. Patient's TTE was negative. HD catheter removed on 10/18/2021 with a temp placement and culture shows Klebsiella pneumoniae. Blood culture positive for Klebsiella pneumonia. Per ID continue with IV vancomycin and gentamicin with dialysis for approximately 2 weeks and then plan to repeat blood cultures at the end of the treatment to ensure that they are negative before placing permacatheter in place. Nephrology also confirmed that patient can be discharged with a temporary catheter and come back as an outpatient for a permacath after treatment of bacteremia         Discharge Medications:   Current Discharge Medication List      START taking these medications    Details   vancomycin (VANCOCIN) 10 gram solr 1,000 mg by IntraVENous route DIALYSIS MON, WED & FRI for 14 days. Indications: blood infection caused by Staphylococcus bacteria  Qty: 6000 mg, Refills: 0  Start date: 10/20/2021, End date: 11/3/2021      gentamicin in Saline, Iso-osm, (GARAMYCIN) 80 mg/100 mL 80 mg by IntraVENous route DIALYSIS MON, WED & FRI for 14 days.  Indications: infection of the blood caused by Klebsiella bacteria  Qty: 600 mL, Refills: 0  Start date: 10/20/2021, End date: 11/3/2021         CONTINUE these medications which have NOT CHANGED    Details   carvediloL (COREG) 3.125 mg tablet Take 1 Tablet by mouth two (2) times daily (with meals). Qty: 30 Tablet, Refills: 0      prednisoLONE acetate (PRED FORTE) 1 % ophthalmic suspension Administer 1 Drop to both eyes two (2) times a day. Post shingles therapy. dextran 70-hypromellose (Artificial Tears,[id number],) ophthalmic solution Administer 1 Drop to both eyes three (3) times daily as needed. acyclovir (ZOVIRAX) 800 mg tablet Take 800 mg by mouth two (2) times a day. albuterol (PROVENTIL HFA, VENTOLIN HFA, PROAIR HFA) 90 mcg/actuation inhaler Take 2 Puffs by inhalation every four (4) hours as needed for Wheezing or Shortness of Breath. Qty: 1 Inhaler, Refills: 0      calcitRIOL (ROCALTROL) 0.25 mcg capsule Take 0.25 mcg by mouth daily. atorvastatin (LIPITOR) 40 mg tablet Take 40 mg by mouth daily. STOP taking these medications       losartan (COZAAR) 25 mg tablet Comments:   Reason for Stopping:         hydrALAZINE (APRESOLINE) 100 mg tablet Comments:   Reason for Stopping: Follow up Care:    1. Christopher Lawrence MD in 1-2 weeks. Follow-up Information     Follow up With Specialties Details Why Contact Info    Christopher Lawrence MD Internal Medicine In 3 weeks  Annette Mcmahon 1997 [address] [facility] [address]      Leila Chao MD Infectious Disease In 2 weeks  [address] [id number]  [phone]              Patient Follow Up Instructions:    Activity: Activity as tolerated  Diet:  Regular Diet  Wound care: None Needed    Condition at Discharge:  Stable  __________________________________________________________________    Disposition  Home with family and home health services  ____________________________________________________________________    Code Status: Full Code  ___________________________________________________________________    Discharge Exam:  Patient seen and examined by me on discharge day. Pertinent Findings:  Gen:    Not in distress  Chest: Clear lungs  CVS:   Regular rhythm. No edema  Abd:  Soft, not distended, not tender  Neuro:  Alert    CONSULTATIONS: ID and Nephrology    Significant Diagnostic Studies:   10/15/2021: BUN 26 mg/dL* (Ref range: 6 - 20 mg/dL); Calcium 9.1 mg/dL (Ref range: 8.5 - 10.1 mg/dL); CO2 24 mmol/L (Ref range: 21 - 32 mmol/L); Creatinine 5.43 mg/dL* (Ref range: 0.70 - 1.30 mg/dL); Glucose 105 mg/dL* (Ref range: 65 - 100 mg/dL); HCT 44.5 % (Ref range: 36.6 - 50.3 %); HGB 13.5 g/dL (Ref range: 12.1 - 17.0 g/dL); Potassium 4.4 mmol/L (Ref range: 3.5 - 5.1 mmol/L); Sodium 134 mmol/L* (Ref range: 136 - 145 mmol/L)  10/16/2021: BUN 29 mg/dL* (Ref range: 6 - 20 mg/dL); Calcium 8.7 mg/dL (Ref range: 8.5 - 10.1 mg/dL); CO2 24 mmol/L (Ref range: 21 - 32 mmol/L); Creatinine 6.35 mg/dL* (Ref range: 0.70 - 1.30 mg/dL); Glucose 73 mg/dL (Ref range: 65 - 100 mg/dL); HCT 38.6 % (Ref range: 36.6 - 50.3 %); HGB 11.8 g/dL* (Ref range: 12.1 - 17.0 g/dL); Potassium 4.0 mmol/L (Ref range: 3.5 - 5.1 mmol/L); Sodium 136 mmol/L (Ref range: 136 - 145 mmol/L)  Recent Labs     10/20/21  0800 10/19/21  0715   WBC 7.1 7.5   HGB 11.4* 12.8   HCT 36.3* 41.7    359     Recent Labs     10/20/21  0820 10/19/21  0715    135*   K 4.3 3.9    100   CO2 24 27   BUN 44* 35*   CREA 7.25* 6.33*   * 66   CA 8.4* 8.6     Recent Labs     10/20/21  0820 10/19/21  0715   ALT 38 30   AP 88 95   TBILI 0.4 0.5   TP 8.0 8.9*   ALB 2.4* 2.6*   GLOB 5.6* 6.3*     No results for input(s): INR, PTP, APTT, INREXT in the last 72 hours. No results for input(s): FE, TIBC, PSAT, FERR in the last 72 hours. No results for input(s): PH, PCO2, PO2 in the last 72 hours. No results for input(s): CPK, CKMB in the last 72 hours.     No lab exists for component: TROPONINI  Lab Results   Component Value Date/Time    Glucose (POC) 143 (H) 09/16/2021 11:36 AM    Glucose (POC) 96 09/16/2021 08:25 AM    Glucose (POC) 140 (H) 09/15/2021 08:15 PM    Glucose (POC) 145 (H) 09/15/2021 03:01 PM    Glucose (POC) 123 (H) 09/15/2021 07:27 AM         Total Time: >30 min     Signed:  Pascual Esposito PA-C  10/21/2021  9:15 AM

## 2021-10-21 NOTE — PROGRESS NOTES
Patient being discharged home today self care. Patient is agreeable with dicharge plan. Discharge plan of care/case management plan validated with provider discharge order. Medicare pt has received, reviewed, and signed 2nd IM letter informing them of their right to appeal the discharge. Signed copied has been placed on pt bedside chart.

## 2021-10-21 NOTE — DISCHARGE INSTRUCTIONS
Patient Education        Hemodialysis Access Surgery: What to Expect at Home  Your Recovery  Hemodialysis is a way to remove wastes from the blood when your kidneys can no longer do the job. It's not a cure, but it can help you live longer and feel better. It's a lifesaving treatment when you have kidney failure. Hemodialysis is often called dialysis. Your doctor created a place (called an access) in your arm for your blood to flow in and out of your body during your dialysis sessions. Your arm will probably be bruised and swollen. It may hurt. The cut (incision) may bleed. The pain and bleeding will get better over several days. You will probably need only over-the-counter pain medicine. You can reduce swelling by propping up your arm on 1 or 2 pillows and keeping your elbow straight. You will have stitches. These may dissolve on their own, or your doctor will tell you when to come in to have them removed. You should also be able to return to work in a few days. You may feel some coolness or numbness in your hand. These feelings usually go away in a few weeks. Your doctor may suggest squeezing a soft object. This will strengthen your access and may make hemodialysis faster and easier. You should always be able to feel blood rushing through the fistula or graft. It feels like a slight vibration when you put your fingers on the skin over the fistula or graft. This feeling is called a thrill or a pulse. This care sheet gives you a general idea about how long it will take for you to recover. But each person recovers at a different pace. Follow the steps below to get better as quickly as possible. How can you care for yourself at home? Activity    · Rest when you feel tired. Getting enough sleep will help you recover.  Do not lie on or sleep on the arm with the access.     · Avoid activities such as washing windows or gardening that put stress on the arm with the access.     · You may use your arm, but do not lift anything that weighs more than about 15 pounds. This may include a child, heavy grocery bags, a heavy briefcase or backpack, cat litter or dog food bags, or a vacuum .     · You can shower, but keep the access dry for the first 2 days. Cover the area with a plastic bag to keep it dry.     · Do not soak or scrub the incision until it has healed.     · Wear an arm guard to protect the area if you play sports or work with your arms.     · You may drive when your doctor says it is okay. This is usually in 1 to 2 days.     · Most people are able to return to work about 1 or 2 days after surgery. Diet    · Follow an eating plan that is good for your kidneys. A registered dietitian can help you make a meal plan that is right for you. You may need to limit protein, salt, fluids, and certain foods. Medicines    · Your doctor will tell you if and when you can restart your medicines. You will also be given instructions about taking any new medicines.     · If you take aspirin or some other blood thinner, ask your doctor if and when to start taking it again. Make sure that you understand exactly what your doctor wants you to do.     · Take pain medicines exactly as directed. ? If the doctor gave you a prescription medicine for pain, take it as prescribed. ? If you are not taking a prescription pain medicine, ask your doctor if you can take acetaminophen (Tylenol). Do not take ibuprofen (Advil, Motrin) or naproxen (Aleve), or similar medicines, unless your doctor tells you to. They may make chronic kidney disease worse. ? Do not take two or more pain medicines at the same time unless the doctor told you to. Many pain medicines have acetaminophen, which is Tylenol. Too much acetaminophen (Tylenol) can be harmful.     · If you think your pain medicine is making you sick to your stomach:  ? Take your medicine after meals (unless your doctor has told you not to). ? Ask your doctor for a different pain medicine.   · If your doctor prescribed antibiotics, take them as directed. Do not stop taking them just because you feel better. You need to take the full course of antibiotics. Incision care    · Keep the area dry for 2 days. After 2 days, wash the area with soap and water every day, and always before dialysis.     · Do not soak or scrub the incision until it has healed.     · If you have a bandage, change it every day or as your doctor recommends. Your doctor will tell you when you can remove it. Exercise    · Squeeze a soft ball or other object as your doctor tells you. This will help blood flow through the access and help prevent blood clots. Elevation    · Prop up the sore arm on a pillow anytime you sit or lie down during the next 3 days. Try to keep it above the level of your heart. This will help reduce swelling. Other instructions    · Every day, check your access for a pulse or thrill in the fistula or graft area. A thrill is a vibration. To feel a pulse or thrill, place the first two fingers of your hand over the access.     · Do not bump your arm.     · Do not wear tight clothing, jewelry, or anything else that may squeeze the access.     · Use your other arm to have blood drawn or blood pressure taken.     · Do not put cream or lotion on or near the access.     · Make sure all doctors you deal with know that you have a vascular access. Follow-up care is a key part of your treatment and safety. Be sure to make and go to all appointments, and call your doctor if you are having problems. It's also a good idea to know your test results and keep a list of the medicines you take. When should you call for help? Call 911 anytime you think you may need emergency care. For example, call if:    · You passed out (lost consciousness).     · You have chest pain, are short of breath, or cough up blood.    Call your doctor now or seek immediate medical care if:    · Your hand or arm is cold or dark-colored.     · You have no pulse in your access.     · You have nausea or you vomit for more than four hours.     · You have pain that does not get better after you take pain medicine.     · You have loose stitches, or your incision comes open.     · You are bleeding from the incision.     · You have signs of infection, such as:  ? Increased pain, swelling, warmth, or redness. ? Red streaks leading from the area. ? Pus draining from the area. ? A fever.     · You have signs of a blood clot in your leg (called a deep vein thrombosis), such as:  ? Pain in your calf, back of the knee, thigh, or groin. ? Redness or swelling in your leg. Watch closely for changes in your health, and be sure to contact your doctor if you have any problems. Where can you learn more? Go to http://www.gray.com/  Enter P616 in the search box to learn more about \"Hemodialysis Access Surgery: What to Expect at Home. \"  Current as of: December 17, 2020               Content Version: 13.0  © 2006-2021 [employer]. Care instructions adapted under license by [employer] (which disclaims liability or warranty for this information). If you have questions about a medical condition or this instruction, always ask your healthcare professional. [address] any warranty or liability for your use of this information. Patient Education        Hemodialysis Vascular Access: Care Instructions  Overview  Hemodialysis, or dialysis, is the use of a machine to remove wastes from your blood. You need it if your kidneys are not able to remove wastes on their own. A dialysis access is the place in your arm, or sometimes in your leg, where a doctor creates a blood vessel that carries a large flow of blood. Your doctor creates an access during a minor surgery. You need to take care of your access to keep it working and to prevent infection.   When you have dialysis, two needles are placed in this blood vessel and are connected to the dialysis machine. Your blood flows out of one needle and into the machine to be cleaned. Then your cleaned blood flows back into your body through the other needle. Sometimes a doctor makes a short-term access through a tube, called a catheter, placed in your neck, upper chest, or groin. Follow-up care is a key part of your treatment and safety. Be sure to make and go to all appointments, and call your doctor if you are having problems. It's also a good idea to know your test results and keep a list of the medicines you take. How can you care for yourself at home? · After your doctor creates an access, keep it dry for at least 2 days. · Squeeze a soft ball or other object as instructed after the access is placed. This will help blood flow through the access and help prevent blood clots. · After you have dialysis, check to see if the access bleeds or swells. Let your doctor know if your arm bleeds or swells. · Do not lift anything heavy with the arm that has the access. · Do not bump your arm. · Don't wear tight clothing or jewelry over the access. · Don't sleep with your access arm under your body. · Have blood drawn or blood pressure taken from your other arm. · Keep the access clean and dry. · Don't put cream or lotion on or near the access. When should you call for help? Call your doctor now or seek immediate medical care if:    · You have signs of infection, such as:  ? Increased pain, swelling, warmth, or redness around the access. ? Red streaks leading from the access. ? Pus draining from the access. ? A fever.     · You do not feel a pulse in your access. Watch closely for changes in your health, and be sure to contact your doctor if:    · You do not get better as expected. Where can you learn more?   Go to http://www.gray.com/  Enter L169 in the search box to learn more about \"Hemodialysis Vascular Access: Care Instructions. \"  Current as of: December 17, 2020               Content Version: 13.0  © 8847-7811 [employer]. Care instructions adapted under license by [employer] (which disclaims liability or warranty for this information). If you have questions about a medical condition or this instruction, always ask your healthcare professional. Schuyler Peoples any warranty or liability for your use of this information. Hospitalist Discharge Instructions     Patient ID:    Rosey Ames  [id number]  72 y.o.  1950    Admit date: 10/15/2021    Discharge date : 10/21/2021    Final Diagnoses:   1. Sepsis/MRSA/Klebsiella pneumonia bacteremia  2. End-stage renal disease on hemodialysis  3. Chronic systolic heart failure EF 43%  4. Hypertension  5. Acute on chronic anemia    Reason for Hospitalization: Fever    Discharge Medications:   Current Discharge Medication List      START taking these medications    Details   vancomycin (VANCOCIN) 10 gram solr 1,000 mg by IntraVENous route DIALYSIS MON, WED & FRI for 14 days. Indications: blood infection caused by Staphylococcus bacteria  Qty: 6000 mg, Refills: 0  Start date: 10/20/2021, End date: 11/3/2021      gentamicin in Saline, Iso-osm, (GARAMYCIN) 80 mg/100 mL 80 mg by IntraVENous route DIALYSIS MON, WED & FRI for 14 days. Indications: infection of the blood caused by Klebsiella bacteria  Qty: 600 mL, Refills: 0  Start date: 10/20/2021, End date: 11/3/2021         CONTINUE these medications which have NOT CHANGED    Details   carvediloL (COREG) 3.125 mg tablet Take 1 Tablet by mouth two (2) times daily (with meals). Qty: 30 Tablet, Refills: 0      prednisoLONE acetate (PRED FORTE) 1 % ophthalmic suspension Administer 1 Drop to both eyes two (2) times a day. Post shingles therapy.       dextran 70-hypromellose (Artificial Tears,[id number],) ophthalmic solution Administer 1 Drop to both eyes three (3) times daily as needed. acyclovir (ZOVIRAX) 800 mg tablet Take 800 mg by mouth two (2) times a day. albuterol (PROVENTIL HFA, VENTOLIN HFA, PROAIR HFA) 90 mcg/actuation inhaler Take 2 Puffs by inhalation every four (4) hours as needed for Wheezing or Shortness of Breath. Qty: 1 Inhaler, Refills: 0      calcitRIOL (ROCALTROL) 0.25 mcg capsule Take 0.25 mcg by mouth daily. atorvastatin (LIPITOR) 40 mg tablet Take 40 mg by mouth daily. STOP taking these medications       losartan (COZAAR) 25 mg tablet Comments:   Reason for Stopping:         hydrALAZINE (APRESOLINE) 100 mg tablet Comments:   Reason for Stopping: Follow up Care:    1. Alexsander Garcia MD in 1-2 weeks. Follow-up Information     Follow up With Specialties Details Why Contact Info    Alexsander Garcia MD Internal Medicine In 3 weeks  Annette Mcmahon [facility] [address]      Yung Van MD Infectious Disease In 2 weeks  [address] [id number] [phone]              Patient Follow Up Instructions:    Activity: Activity as tolerated  Diet:  Regular Diet  Wound care: None Needed    Condition at Discharge:  Stable  __________________________________________________________________    Disposition  Home with family and home health services  ____________________________________________________________________    Code Status: Full Code  ___________________________________________________________________      CONSULTATIONS: ID and Nephrology    Signed:  Debi Valadez PA-C  10/21/2021  9:15 AM

## 2021-10-21 NOTE — PROGRESS NOTES
Discharge plan of care/case management plan validated with provider discharge order. Patient discharged home. IV removed. Patient education given with no questions or concerns. Patient wheeled down safely.

## 2021-10-21 NOTE — PROGRESS NOTES
Nephrology Consult    Patient: Unique Ahmadi MRN: [id number]  SSN: [id number]    YOB: 1950  Age: 72 y.o. Sex: male      Subjective:   Pt is seen in the room  He feels well  Afebrile  BP ok  Wbc 7.1  Perm cath is out, temp hd cath in  Dialyzed yesterday    Melina Pleas to dc with temp hd cath  Iv vancomycin and iv gentamicin for 2 weeks per ID recs  I have already spoken with charge nurse Jaren at [address] to exchange temp hd cath to perm cath once [facility] from 10/20 comes negative. I have explained to the pt also. I have informed the FA at the [facility].      Past Medical History:   Diagnosis Date    Asthenia 1/24/2021    Cardiomyopathy ([address] 1/24/2021    CHF (congestive heart failure) (HCC)     Chronic kidney disease     CKD (chronic kidney disease)     4    Diabetes ([address]     End stage renal disease on dialysis ([address] 1/24/2021    Essential hypertension 1/24/2021    Gastroesophageal reflux disease 1/24/2021    Gastroesophageal reflux disease 1/24/2021    Hypertension     Pneumonia due to COVID-19 virus 6/30/1704    Systolic CHF, acute on chronic ([address] 1/24/2021     Past Surgical History:   Procedure Laterality Date    COLONOSCOPY N/A 12/3/2020    COLONOSCOPY performed by Sara Caballero MD at [facility] HX HEART CATHETERIZATION      HX HERNIA REPAIR      IR FLUORO GUIDE [facility] CVAD  1/22/2021    IR FLUORO GUIDED NEEDLE PLACEMENT  10/18/2021    IR INSERT NON TUNL CVC OVER 5 YRS  1/18/2021    IR INSERT NON TUNL CVC OVER 5 YRS  10/18/2021    IR INSERT TUNL CVC W/O PORT OVER 5 YR  1/22/2021    IR PLACE CVAD FLUORO GUIDE  1/18/2021    IR REMOVE TUNL CVAD W/O PORT / PUMP  10/18/2021      Family History   Problem Relation Age of Onset    Diabetes Mother     Heart Failure Father      Social History     Tobacco Use    Smoking status: Never Smoker    Smokeless tobacco: Never Used   Substance Use Topics    Alcohol use: Not Currently      Current Facility-Administered Medications   Medication Dose Route Frequency Provider Last Rate Last Admin    [START ON 10/22/2021] DUE: Vancomycin level 10/22 at 0400 (pre-HD)   Other Chon Blake MD        aspirin chewable tablet 81 mg  81 mg Oral DAILY Veronica Mulligan PA-C   81 mg at 10/21/21 0905    heparin (porcine) 1,000 unit/mL injection 2,500 Units  2,500 Units Hemodialysis DIALYSIS PRN Yessenia Sanabria MD   2,500 Units at 10/20/21 1131    meropenem (MERREM) 500 mg in sterile water (preservative free) 10 mL IV syringe  0.5 g IntraVENous Q12H Ambrose Edmondson MD   500 mg at 10/21/21 0433    acyclovir (ZOVIRAX) tablet 800 mg  800 mg Oral BID Carrillo Snow PA-C   800 mg at 10/21/21 0905    albuterol (PROVENTIL HFA, VENTOLIN HFA, PROAIR HFA) inhaler 2 Puff  2 Puff Inhalation Q4H PRN Diana Dale PA-C        carvediloL (COREG) tablet 3.125 mg  3.125 mg Oral BID WITH MEALS Diana Dale PA-C   3.125 mg at 10/21/21 0905    prednisoLONE acetate (PRED FORTE) 1 % ophthalmic suspension 1 Drop  1 Drop Both Eyes BID Carrillo Snow PA-C   1 Drop at 10/21/21 5250    [Held by provider] hydrALAZINE (APRESOLINE) tablet 50 mg  50 mg Oral TID Carrillo Snow PA-C   50 mg at 10/16/21 0910    [Held by provider] losartan (COZAAR) tablet 25 mg  25 mg Oral DAILY Carrillo Snow PA-C   25 mg at 10/16/21 0910    atorvastatin (LIPITOR) tablet 40 mg  40 mg Oral DAILY Carrillo Snow PA-C   40 mg at 10/21/21 0905    sodium chloride (NS) flush 5-40 mL  5-40 mL IntraVENous Q8H Gabe Dale PA-C   10 mL at 10/21/21 0610    sodium chloride (NS) flush 5-40 mL  5-40 mL IntraVENous PRN Diana Dale PA-C        acetaminophen (TYLENOL) tablet 650 mg  650 mg Oral Q6H PRN Carrillo Snow PA-C   650 mg at 10/21/21 8820    Or    acetaminophen (TYLENOL) suppository 650 mg  650 mg Rectal Q6H PRN Diana Dale PA-C        polyethylene glycol (MIRALAX) packet 17 g  17 g Oral DAILY PRN Des Sheth PA-C        ondansetron (ZOFRAN ODT) tablet 4 mg  4 mg Oral Q8H PRN Gin Dale PA-C        Or    ondansetron [facility]) injection 4 mg  4 mg IntraVENous Q6H PRN Gin Dale PA-C        heparin (porcine) injection 5,000 Units  5,000 Units SubCUTAneous Q8H Des Sheth PA-C   5,000 Units at 10/21/21 0608    Vancomycin Dosed by Pharmacy    Other Rx Dosing/Monitoring Kylie Dempsey MD            Allergies   Allergen Reactions    Lisinopril Angioedema    Pcn [Penicillins] Rash       Review of Systems:  A comprehensive review of systems was negative except for that written in the History of Present Illness. Objective:     Vitals:    10/20/21 1947 10/21/21 0325 10/21/21 0754 10/21/21 0904   BP: 139/86 126/82 126/81    Pulse: 84 78 76    Resp: 18 18 16    Temp: 98.9 °F (37.2 °C) 98.4 °F (36.9 °C) 98.1 °F (36.7 °C)    TempSrc:       SpO2: 99% 98% 99%    Weight:    83 kg (182 lb 15.7 oz)   Height:            Physical Exam:  General: NAD  Eyes: sclera anicteric  Oral Cavity: No thrush or ulcers  Neck: no JVD  Chest: Fair bilateral air entry  Heart: normal sounds  Abdomen: soft and non tender   : no simms  Lower Extremities: no edema  Skin: no rash  Neuro: intact  Psychiatric: non-depressed            Assessment:     Hospital Problems  Date Reviewed: 1/24/2021        Codes Class Noted POA    Gram-negative bacteremia ICD-10-CM: R78.81  ICD-9-CM: 790.7, 041.85  9/16/2021 Unknown        ESRD (end stage renal disease) on dialysis [facility]) ICD-10-CM: N18.6, Z99.2  ICD-9-CM: 585.6, V45.11  7/7/2021 Unknown              Plan:       1. ESRD. -On hemodialysis Monday Wednesday Friday.    -He was last dialyzed on 10/15 for 1.5 hrs only.  -No significant volume overload or uremia.    -was dialyzed on 10/18 and 10/20  -He has right temp hd cath as active dialysis access.    -He has left wrist AV fistula which is still maturing. 2.  Fever:  -likely catheter related bacteremia. -He came with high temp and hypotensive with leukocytosis. -recent klebsiella and MRSA bacteremia in 09/2021  -was dced on iv vanc and tobramycin per ID recs   -re admitted with fever, BC 10/15  Klebsiella  -s/p removed perm cath 10/18  -placed temp hd cath   [id number] [address] Bella to dc with temp hd cath  Iv vancomycin and iv gentamicin for 2 weeks per ID recs  I have already spoken with charge nurse Kely Monroe at [address] to exchange temp hd cath to perm cath once [facility] from 10/20 comes negative. I have explained to the pt also. I have informed the FA at the [facility]. 2.  Anemia.    -Hemoglobin above goal.    -will hold IV erythropoietin    4. Renal osteodystrophy.   - His calcium is okay. -will check phos    5. HTN:  -low bps  -will hold losartan and hydralazine.     Signed By: Nigel Jaffe MD     October 21, 2021

## 2021-10-22 ENCOUNTER — HOSPITAL ENCOUNTER (EMERGENCY)
Age: 71
Discharge: HOME | End: 2021-10-22
Payer: MEDICARE

## 2021-10-22 VITALS
TEMPERATURE: 98.2 F | OXYGEN SATURATION: 100 % | RESPIRATION RATE: 18 BRPM | SYSTOLIC BLOOD PRESSURE: 137 MMHG | HEART RATE: 83 BPM | BODY MASS INDEX: 26.05 KG/M2 | HEIGHT: 70 IN | WEIGHT: 182 LBS | DIASTOLIC BLOOD PRESSURE: 103 MMHG

## 2021-10-22 DIAGNOSIS — T82.9XXA COMPLICATION OF VASCULAR ACCESS FOR DIALYSIS, INITIAL ENCOUNTER: Primary | ICD-10-CM

## 2021-10-22 PROCEDURE — 99283 EMERGENCY DEPT VISIT LOW MDM: CPT

## 2021-10-22 NOTE — DIALYSIS
Called to ER for dressing change to [facility] double lumen temporary dialysis catheter. Current dressing partially intact and bloody. Old dressing removed, area cleansed and new pressure dressing applied. Currently there is an extremely small amount of blood seeping from skin at catheter. Pressure held 7 minutes and pressure dressing applied. Dressing change at next dialysis if no further issues. Pt notified.

## 2021-10-22 NOTE — ED PROVIDER NOTES
EMERGENCY DEPARTMENT HISTORY AND PHYSICAL EXAM      Date: 10/22/2021  Patient Name: Jennifer Camargo    History of Presenting Illness     Chief Complaint   Patient presents with    Vascular Access Problem       History Provided By: Patient    HPI: Ricardo Gabriel, 72 y.o. male with a past medical history significant diabetes, hypertension, hyperlipidemia, obesity and renal insufficiency presents to the ED with cc of dialysis access problem. Patient recently had a temporary dialysis access put into his right anterior chest.  Patient woke up this morning his access was dropping a small amount of blood and he had blood on his sheets. Patient comes to the ER for evaluation of his dialysis access prior to going to dialysis later on today. Moderate severity, no known exacerbating or relieving factors, no other associated signs and symptoms    There are no other complaints, changes, or physical findings at this time.     PCP: Kip Gates MD    Current Facility-Administered Medications on File Prior to Encounter   Medication Dose Route Frequency Provider Last Rate Last Miladisnoe Alexanderw [DISCONTINUED] DUE: Vancomycin level 10/22 at 0400 (pre-HD)   Other Sehila Pickett MD        [DISCONTINUED] aspirin chewable tablet 81 mg  81 mg Oral DAILY Veronica Mulligan PA-C   81 mg at 10/21/21 0905    [DISCONTINUED] heparin (porcine) 1,000 unit/mL injection 2,500 Units  2,500 Units Hemodialysis DIALYSIS PRN Juvencio Menjivar MD   2,500 Units at 10/20/21 1131    [DISCONTINUED] meropenem (MERREM) 500 mg in sterile water (preservative free) 10 mL IV syringe  0.5 g IntraVENous Q12H Leti Guevara MD   500 mg at 10/21/21 0433    [DISCONTINUED] acyclovir (ZOVIRAX) tablet 800 mg  800 mg Oral BID Estefani Ball PA-C   800 mg at 10/21/21 0905    [DISCONTINUED] albuterol (PROVENTIL HFA, VENTOLIN HFA, PROAIR HFA) inhaler 2 Puff  2 Puff Inhalation Q4H PRN Gordon Dale PA-C        [DISCONTINUED] carvediloL (COREG) tablet 3.125 mg 3.125 mg Oral BID WITH MEALS Maria Esther Shin PA-C   3.125 mg at 10/21/21 2678    [DISCONTINUED] prednisoLONE acetate (PRED FORTE) 1 % ophthalmic suspension 1 Drop  1 Drop Both Eyes BID Maria Esther Shin PA-C   1 Drop at 10/21/21 0643    [DISCONTINUED] hydrALAZINE (APRESOLINE) tablet 50 mg  50 mg Oral TID Maria Esther Shin PA-C   50 mg at 10/16/21 5324    [DISCONTINUED] losartan (COZAAR) tablet 25 mg  25 mg Oral DAILY Maria Esther Shin PA-C   25 mg at 10/16/21 0910    [DISCONTINUED] atorvastatin (LIPITOR) tablet 40 mg  40 mg Oral DAILY Maria Esther Shin PA-C   40 mg at 10/21/21 2364    [DISCONTINUED] sodium chloride (NS) flush 5-40 mL  5-40 mL IntraVENous Q8H Yadi Dale PA-C   10 mL at 10/21/21 0610    [DISCONTINUED] sodium chloride (NS) flush 5-40 mL  5-40 mL IntraVENous PRN Yadi Dale PA-C        [DISCONTINUED] acetaminophen (TYLENOL) tablet 650 mg  650 mg Oral Q6H PRN Maria Esther Shin PA-C   650 mg at 10/21/21 1526    [DISCONTINUED] acetaminophen (TYLENOL) suppository 650 mg  650 mg Rectal Q6H PRN Yadi Dale PA-C        [DISCONTINUED] polyethylene glycol (MIRALAX) packet 17 g  17 g Oral DAILY PRN Yadi Dale PA-C        [DISCONTINUED] ondansetron (ZOFRAN ODT) tablet 4 mg  4 mg Oral Q8H PRN Yadi Dale PA-C        [DISCONTINUED] ondansetron [facility]) injection 4 mg  4 mg IntraVENous Q6H PRN Yadi Dale PA-C        [DISCONTINUED] heparin (porcine) injection 5,000 Units  5,000 Units SubCUTAneous Q8H Maria Esther Shin PA-C   5,000 Units at 10/21/21 0608    [DISCONTINUED] Vancomycin Dosed by Pharmacy    Other Rx Dosing/Monitoring Shawn Snyder MD         Current Outpatient Medications on File Prior to Encounter   Medication Sig Dispense Refill    vancomycin (VANCOCIN) 10 gram solr 1,000 mg by IntraVENous route DIALYSIS MON, WED & FRI for 14 days.  Indications: blood infection caused by Staphylococcus bacteria 6000 mg 0    gentamicin in Saline, Iso-osm, (GARAMYCIN) 80 mg/100 mL 80 mg by IntraVENous route DIALYSIS MON, WED & FRI for 14 days. Indications: infection of the blood caused by Klebsiella bacteria 600 mL 0    carvediloL (COREG) 3.125 mg tablet Take 1 Tablet by mouth two (2) times daily (with meals). 30 Tablet 0    prednisoLONE acetate (PRED FORTE) 1 % ophthalmic suspension Administer 1 Drop to both eyes two (2) times a day. Post shingles therapy.  dextran 70-hypromellose (Artificial Tears,[id number],) ophthalmic solution Administer 1 Drop to both eyes three (3) times daily as needed.  acyclovir (ZOVIRAX) 800 mg tablet Take 800 mg by mouth two (2) times a day.  albuterol (PROVENTIL HFA, VENTOLIN HFA, PROAIR HFA) 90 mcg/actuation inhaler Take 2 Puffs by inhalation every four (4) hours as needed for Wheezing or Shortness of Breath. 1 Inhaler 0    calcitRIOL (ROCALTROL) 0.25 mcg capsule Take 0.25 mcg by mouth daily.  atorvastatin (LIPITOR) 40 mg tablet Take 40 mg by mouth daily.          Past History     Past Medical History:  Past Medical History:   Diagnosis Date    Asthenia 1/24/2021    Cardiomyopathy ([facility] 1/24/2021    CHF (congestive heart failure) (HCC)     Chronic kidney disease     CKD (chronic kidney disease)     4    Diabetes ([facility]     End stage renal disease on dialysis ([facility] 1/24/2021    Essential hypertension 1/24/2021    Gastroesophageal reflux disease 1/24/2021    Gastroesophageal reflux disease 1/24/2021    Hypertension     Pneumonia due to COVID-19 virus 1/53/2403    Systolic CHF, acute on chronic ([facility] 1/24/2021       Past Surgical History:  Past Surgical History:   Procedure Laterality Date    COLONOSCOPY N/A 12/3/2020    COLONOSCOPY performed by Meeta Sharma MD at [address] HX HERNIA REPAIR      IR FLUORO GUIDE [facility] CVAD  1/22/2021    IR FLUORO GUIDED NEEDLE PLACEMENT  10/18/2021    IR INSERT NON TUNL CVC OVER 5 YRS  1/18/2021    IR INSERT NON TUNL CVC OVER 5 YRS  10/18/2021    IR INSERT TUNL CVC W/O PORT OVER 5 YR  1/22/2021    IR PLACE CVAD FLUORO GUIDE  1/18/2021    IR REMOVE TUNL CVAD W/O PORT / PUMP  10/18/2021       Family History:  Family History   Problem Relation Age of Onset    Diabetes Mother     Heart Failure Father        Social History:  Social History     Tobacco Use    Smoking status: Never Smoker    Smokeless tobacco: Never Used   Vaping Use    Vaping Use: Never used   Substance Use Topics    Alcohol use: Not Currently    Drug use: Never       Allergies: Allergies   Allergen Reactions    Lisinopril Angioedema    Pcn [Penicillins] Rash         Review of Systems     Review of Systems   Constitutional: Negative for chills and fever. HENT: Negative for dental problem and sore throat. Eyes: Negative for pain and visual disturbance. Respiratory: Negative for cough and chest tightness. Cardiovascular: Negative for chest pain. Gastrointestinal: Negative for diarrhea and nausea. Genitourinary: Negative for difficulty urinating and frequency. Musculoskeletal: Negative for gait problem and joint swelling. Skin: Positive for wound. Neurological: Negative for numbness and headaches. Hematological: Negative for adenopathy. Does not bruise/bleed easily. Psychiatric/Behavioral: Negative for behavioral problems and suicidal ideas. Physical Exam   Physical Exam  Constitutional:       General: He is not in acute distress. Appearance: Normal appearance. He is not ill-appearing or toxic-appearing. HENT:      Head: Normocephalic and atraumatic. Nose: Nose normal.      Mouth/Throat:      Mouth: Mucous membranes are moist.   Eyes:      Extraocular Movements: Extraocular movements intact. Pupils: Pupils are equal, round, and reactive to light. Cardiovascular:      Rate and Rhythm: Normal rate and regular rhythm.    Pulmonary:      Effort: Pulmonary effort is normal.      Breath sounds: Normal breath sounds. Abdominal:      General: Bowel sounds are normal.   Musculoskeletal:         General: Normal range of motion. Cervical back: Normal range of motion and neck supple. Skin:     General: Skin is warm and dry. Capillary Refill: Capillary refill takes less than 2 seconds. Comments: Bleeding permacath   Neurological:      General: No focal deficit present. Mental Status: He is alert and oriented to person, place, and time. Psychiatric:         Mood and Affect: Mood normal.         Behavior: Behavior normal.         Lab and Diagnostic Study Results     Labs -   No results found for this or any previous visit (from the past 12 hour(s)). Radiologic Studies -   @lastxrresult@  CT Results  (Last 48 hours)    None        CXR Results  (Last 48 hours)    None            Medical Decision Making   - I am the first provider for this patient. - I reviewed the vital signs, available nursing notes, past medical history, past surgical history, family history and social history. - Initial assessment performed. The patients presenting problems have been discussed, and they are in agreement with the care plan formulated and outlined with them. I have encouraged them to ask questions as they arise throughout their visit. Vital Signs-Reviewed the patient's vital signs. Patient Vitals for the past 12 hrs:   Temp Pulse Resp BP SpO2   10/22/21 0842 98.2 °F (36.8 °C) 83 18 (!) 137/103 100 %       Records Reviewed: Nursing Notes and Old Medical Records          ED Course:          Provider Notes (Medical Decision Making):   Patient presents with vascular access problem. Differential diagnosis include bleeding permacath, occluded permacath, infected. Patient's dressing was changed by our dialysis nurse. No noted bleeding at this time. Patient will be discharged to his dialysis center to get his scheduled dialysis.   MDM       Procedures   Medical Decision Makingedical Decision Making  Performed by: Sebastian Sunshine NP  PROCEDURES:  Procedures       Disposition   Disposition: DC- Adult Discharges: All of the diagnostic tests were reviewed and questions answered. Diagnosis, care plan and treatment options were discussed. The patient understands the instructions and will follow up as directed. The patients results have been reviewed with them. They have been counseled regarding their diagnosis. The patient verbally convey understanding and agreement of the signs, symptoms, diagnosis, treatment and prognosis and additionally agrees to follow up as recommended with their PCP in 24 - 48 hours. They also agree with the care-plan and convey that all of their questions have been answered. I have also put together some discharge instructions for them that include: 1) educational information regarding their diagnosis, 2) how to care for their diagnosis at home, as well a 3) list of reasons why they would want to return to the ED prior to their follow-up appointment, should their condition change. Discharged    DISCHARGE PLAN:  1. Current Discharge Medication List      CONTINUE these medications which have NOT CHANGED    Details   vancomycin (VANCOCIN) 10 gram solr 1,000 mg by IntraVENous route DIALYSIS MON, WED & FRI for 14 days. Indications: blood infection caused by Staphylococcus bacteria  Qty: 6000 mg, Refills: 0      gentamicin in Saline, Iso-osm, (GARAMYCIN) 80 mg/100 mL 80 mg by IntraVENous route DIALYSIS MON, WED & FRI for 14 days. Indications: infection of the blood caused by Klebsiella bacteria  Qty: 600 mL, Refills: 0      carvediloL (COREG) 3.125 mg tablet Take 1 Tablet by mouth two (2) times daily (with meals). Qty: 30 Tablet, Refills: 0      prednisoLONE acetate (PRED FORTE) 1 % ophthalmic suspension Administer 1 Drop to both eyes two (2) times a day. Post shingles therapy.       dextran 70-hypromellose (Artificial Tears,[id number],) ophthalmic solution Administer 1 Drop to both eyes three (3) times daily as needed. acyclovir (ZOVIRAX) 800 mg tablet Take 800 mg by mouth two (2) times a day. albuterol (PROVENTIL HFA, VENTOLIN HFA, PROAIR HFA) 90 mcg/actuation inhaler Take 2 Puffs by inhalation every four (4) hours as needed for Wheezing or Shortness of Breath. Qty: 1 Inhaler, Refills: 0      calcitRIOL (ROCALTROL) 0.25 mcg capsule Take 0.25 mcg by mouth daily. atorvastatin (LIPITOR) 40 mg tablet Take 40 mg by mouth daily. 2.   Follow-up Information     Follow up With Specialties Details Why Contact Info    Tanja Ren MD Internal Medicine   Annette Mcmahon [id number] [phone]          3. Return to ED if worse   4. Current Discharge Medication List            Diagnosis     Clinical Impression:   1. Complication of vascular access for dialysis, initial encounter        Attestations:    Enrique Walsh NP    Please note that this dictation was completed with [employer], the computer voice recognition software. Quite often unanticipated grammatical, syntax, homophones, and other interpretive errors are inadvertently transcribed by the computer software. Please disregard these errors. Please excuse any errors that have escaped final proofreading. Thank you.

## 2021-10-25 ENCOUNTER — TELEPHONE (OUTPATIENT)
Dept: INFECTIOUS DISEASES | Age: 71
End: 2021-10-25

## 2021-10-25 NOTE — TELEPHONE ENCOUNTER
Patient's caregiver/sister is calling stating he is bleeding from his neck and unable to get the medication from the pharmacy. She stated can you please give her a call at [phone]?

## 2021-10-26 NOTE — TELEPHONE ENCOUNTER
Spoke to caller and explained that medication Vancomycin and Gentamicin to be given at dialysis. Also there was a concern about an appointment on ?97/8 and conflict with hemodialysis. Explained that I am following his blood cultures and will let him know if he needs to come to the [address].

## 2021-10-27 LAB
BACTERIA SPEC CULT: NORMAL
SPECIAL REQUESTS,SREQ: NORMAL

## 2021-12-08 ENCOUNTER — HOSPITAL ENCOUNTER (INPATIENT)
Age: 71
LOS: 5 days | Discharge: HOME HEALTH | DRG: 871 | End: 2021-12-13
Attending: STUDENT IN AN ORGANIZED HEALTH CARE EDUCATION/TRAINING PROGRAM | Admitting: INTERNAL MEDICINE
Payer: MEDICARE

## 2021-12-08 ENCOUNTER — APPOINTMENT (OUTPATIENT)
Dept: GENERAL RADIOLOGY | Age: 71
DRG: 871 | End: 2021-12-08
Attending: STUDENT IN AN ORGANIZED HEALTH CARE EDUCATION/TRAINING PROGRAM
Payer: MEDICARE

## 2021-12-08 DIAGNOSIS — A41.9 SEPSIS, DUE TO UNSPECIFIED ORGANISM, UNSPECIFIED WHETHER ACUTE ORGAN DYSFUNCTION PRESENT (HCC): Primary | ICD-10-CM

## 2021-12-08 PROBLEM — N18.6 ESRD ON DIALYSIS (HCC): Status: ACTIVE | Noted: 2021-12-08

## 2021-12-08 PROBLEM — Z99.2 ESRD ON DIALYSIS (HCC): Status: ACTIVE | Noted: 2021-12-08

## 2021-12-08 LAB
ALBUMIN SERPL-MCNC: 2.6 G/DL (ref 3.5–5)
ALBUMIN/GLOB SERPL: 0.5 {RATIO} (ref 1.1–2.2)
ALP SERPL-CCNC: 67 U/L (ref 45–117)
ALT SERPL-CCNC: 18 U/L (ref 12–78)
ANION GAP SERPL CALC-SCNC: 7 MMOL/L (ref 5–15)
AST SERPL W P-5'-P-CCNC: 29 U/L (ref 15–37)
BASOPHILS # BLD: 0.1 K/UL (ref 0–0.1)
BASOPHILS NFR BLD: 1 % (ref 0–1)
BILIRUB SERPL-MCNC: 0.7 MG/DL (ref 0.2–1)
BUN SERPL-MCNC: 22 MG/DL (ref 6–20)
BUN/CREAT SERPL: 3 (ref 12–20)
CA-I BLD-MCNC: 8.5 MG/DL (ref 8.5–10.1)
CHLORIDE SERPL-SCNC: 105 MMOL/L (ref 97–108)
CO2 SERPL-SCNC: 28 MMOL/L (ref 21–32)
CREAT SERPL-MCNC: 6.44 MG/DL (ref 0.7–1.3)
DIFFERENTIAL METHOD BLD: ABNORMAL
EOSINOPHIL # BLD: 0 K/UL (ref 0–0.4)
EOSINOPHIL NFR BLD: 0 % (ref 0–7)
ERYTHROCYTE [DISTWIDTH] IN BLOOD BY AUTOMATED COUNT: 20 % (ref 11.5–14.5)
GLOBULIN SER CALC-MCNC: 5.6 G/DL (ref 2–4)
GLUCOSE BLD STRIP.AUTO-MCNC: 92 MG/DL (ref 65–117)
GLUCOSE SERPL-MCNC: 101 MG/DL (ref 65–100)
HCT VFR BLD AUTO: 38.7 % (ref 36.6–50.3)
HGB BLD-MCNC: 11.9 G/DL (ref 12.1–17)
IMM GRANULOCYTES # BLD AUTO: 0.1 K/UL (ref 0–0.04)
IMM GRANULOCYTES NFR BLD AUTO: 1 % (ref 0–0.5)
LACTATE SERPL-SCNC: 2.6 MMOL/L (ref 0.4–2)
LYMPHOCYTES # BLD: 0.7 K/UL (ref 0.8–3.5)
LYMPHOCYTES NFR BLD: 7 % (ref 12–49)
MCH RBC QN AUTO: 28.1 PG (ref 26–34)
MCHC RBC AUTO-ENTMCNC: 30.7 G/DL (ref 30–36.5)
MCV RBC AUTO: 91.3 FL (ref 80–99)
MONOCYTES # BLD: 0.8 K/UL (ref 0–1)
MONOCYTES NFR BLD: 8 % (ref 5–13)
NEUTS SEG # BLD: 8 K/UL (ref 1.8–8)
NEUTS SEG NFR BLD: 83 % (ref 32–75)
NRBC # BLD: 0.03 K/UL (ref 0–0.01)
NRBC BLD-RTO: 0.3 PER 100 WBC
PERFORMED BY, TECHID: NORMAL
PLATELET # BLD AUTO: 210 K/UL (ref 150–400)
PMV BLD AUTO: 10 FL (ref 8.9–12.9)
POTASSIUM SERPL-SCNC: 3.4 MMOL/L (ref 3.5–5.1)
PROT SERPL-MCNC: 8.2 G/DL (ref 6.4–8.2)
RBC # BLD AUTO: 4.24 M/UL (ref 4.1–5.7)
SARS-COV-2, COV2: NORMAL
SODIUM SERPL-SCNC: 140 MMOL/L (ref 136–145)
WBC # BLD AUTO: 9.6 K/UL (ref 4.1–11.1)

## 2021-12-08 PROCEDURE — 74011250636 HC RX REV CODE- 250/636: Performed by: INTERNAL MEDICINE

## 2021-12-08 PROCEDURE — 74011250637 HC RX REV CODE- 250/637: Performed by: STUDENT IN AN ORGANIZED HEALTH CARE EDUCATION/TRAINING PROGRAM

## 2021-12-08 PROCEDURE — 80053 COMPREHEN METABOLIC PANEL: CPT

## 2021-12-08 PROCEDURE — 82962 GLUCOSE BLOOD TEST: CPT

## 2021-12-08 PROCEDURE — 99223 1ST HOSP IP/OBS HIGH 75: CPT | Performed by: INTERNAL MEDICINE

## 2021-12-08 PROCEDURE — 74011250637 HC RX REV CODE- 250/637: Performed by: INTERNAL MEDICINE

## 2021-12-08 PROCEDURE — 96375 TX/PRO/DX INJ NEW DRUG ADDON: CPT

## 2021-12-08 PROCEDURE — 96365 THER/PROPH/DIAG IV INF INIT: CPT

## 2021-12-08 PROCEDURE — 74011000250 HC RX REV CODE- 250: Performed by: INTERNAL MEDICINE

## 2021-12-08 PROCEDURE — 36415 COLL VENOUS BLD VENIPUNCTURE: CPT

## 2021-12-08 PROCEDURE — 65270000029 HC RM PRIVATE

## 2021-12-08 PROCEDURE — 83605 ASSAY OF LACTIC ACID: CPT

## 2021-12-08 PROCEDURE — 71045 X-RAY EXAM CHEST 1 VIEW: CPT

## 2021-12-08 PROCEDURE — U0005 INFEC AGEN DETEC AMPLI PROBE: HCPCS

## 2021-12-08 PROCEDURE — 85025 COMPLETE CBC W/AUTO DIFF WBC: CPT

## 2021-12-08 PROCEDURE — 99285 EMERGENCY DEPT VISIT HI MDM: CPT

## 2021-12-08 PROCEDURE — 87040 BLOOD CULTURE FOR BACTERIA: CPT

## 2021-12-08 RX ORDER — POLYETHYLENE GLYCOL 3350 17 G/17G
17 POWDER, FOR SOLUTION ORAL DAILY PRN
Status: DISCONTINUED | OUTPATIENT
Start: 2021-12-08 | End: 2021-12-13 | Stop reason: HOSPADM

## 2021-12-08 RX ORDER — ACETAMINOPHEN 325 MG/1
650 TABLET ORAL
Status: DISCONTINUED | OUTPATIENT
Start: 2021-12-08 | End: 2021-12-13 | Stop reason: HOSPADM

## 2021-12-08 RX ORDER — ACETAMINOPHEN 325 MG/1
650 TABLET ORAL
Status: COMPLETED | OUTPATIENT
Start: 2021-12-08 | End: 2021-12-08

## 2021-12-08 RX ORDER — HEPARIN SODIUM 5000 [USP'U]/ML
5000 INJECTION, SOLUTION INTRAVENOUS; SUBCUTANEOUS EVERY 12 HOURS
Status: DISCONTINUED | OUTPATIENT
Start: 2021-12-08 | End: 2021-12-13 | Stop reason: HOSPADM

## 2021-12-08 RX ORDER — CALCITRIOL 0.25 UG/1
0.25 CAPSULE ORAL DAILY
Status: DISCONTINUED | OUTPATIENT
Start: 2021-12-09 | End: 2021-12-13 | Stop reason: HOSPADM

## 2021-12-08 RX ORDER — ACETAMINOPHEN 650 MG/1
650 SUPPOSITORY RECTAL
Status: DISCONTINUED | OUTPATIENT
Start: 2021-12-08 | End: 2021-12-13 | Stop reason: HOSPADM

## 2021-12-08 RX ORDER — ALBUTEROL SULFATE 90 UG/1
2 AEROSOL, METERED RESPIRATORY (INHALATION)
Status: DISCONTINUED | OUTPATIENT
Start: 2021-12-08 | End: 2021-12-13 | Stop reason: HOSPADM

## 2021-12-08 RX ORDER — SODIUM CHLORIDE 0.9 % (FLUSH) 0.9 %
5-40 SYRINGE (ML) INJECTION EVERY 8 HOURS
Status: DISCONTINUED | OUTPATIENT
Start: 2021-12-08 | End: 2021-12-13 | Stop reason: HOSPADM

## 2021-12-08 RX ORDER — ONDANSETRON 2 MG/ML
4 INJECTION INTRAMUSCULAR; INTRAVENOUS
Status: DISCONTINUED | OUTPATIENT
Start: 2021-12-08 | End: 2021-12-13 | Stop reason: HOSPADM

## 2021-12-08 RX ORDER — SODIUM CHLORIDE 0.9 % (FLUSH) 0.9 %
5-40 SYRINGE (ML) INJECTION AS NEEDED
Status: DISCONTINUED | OUTPATIENT
Start: 2021-12-08 | End: 2021-12-13 | Stop reason: HOSPADM

## 2021-12-08 RX ORDER — PREDNISOLONE ACETATE 10 MG/ML
1 SUSPENSION/ DROPS OPHTHALMIC 2 TIMES DAILY
Status: DISCONTINUED | OUTPATIENT
Start: 2021-12-08 | End: 2021-12-13 | Stop reason: HOSPADM

## 2021-12-08 RX ORDER — ONDANSETRON 4 MG/1
4 TABLET, ORALLY DISINTEGRATING ORAL
Status: DISCONTINUED | OUTPATIENT
Start: 2021-12-08 | End: 2021-12-13 | Stop reason: HOSPADM

## 2021-12-08 RX ORDER — ATORVASTATIN CALCIUM 40 MG/1
40 TABLET, FILM COATED ORAL DAILY
Status: DISCONTINUED | OUTPATIENT
Start: 2021-12-09 | End: 2021-12-13 | Stop reason: HOSPADM

## 2021-12-08 RX ORDER — CARVEDILOL 3.12 MG/1
3.12 TABLET ORAL 2 TIMES DAILY WITH MEALS
Status: DISCONTINUED | OUTPATIENT
Start: 2021-12-08 | End: 2021-12-13 | Stop reason: HOSPADM

## 2021-12-08 RX ORDER — ACYCLOVIR 800 MG/1
800 TABLET ORAL 2 TIMES DAILY
Status: DISCONTINUED | OUTPATIENT
Start: 2021-12-08 | End: 2021-12-13 | Stop reason: HOSPADM

## 2021-12-08 RX ORDER — SEVELAMER CARBONATE 800 MG/1
1 TABLET, FILM COATED ORAL
COMMUNITY
Start: 2021-12-03

## 2021-12-08 RX ADMIN — ACYCLOVIR 800 MG: 800 TABLET ORAL at 23:38

## 2021-12-08 RX ADMIN — CEFEPIME HYDROCHLORIDE 2 G: 2 INJECTION, POWDER, FOR SOLUTION INTRAVENOUS at 16:55

## 2021-12-08 RX ADMIN — HEPARIN SODIUM 5000 UNITS: 5000 INJECTION INTRAVENOUS; SUBCUTANEOUS at 16:56

## 2021-12-08 RX ADMIN — CEFEPIME HYDROCHLORIDE 1 G: 1 INJECTION, POWDER, FOR SOLUTION INTRAMUSCULAR; INTRAVENOUS at 19:17

## 2021-12-08 RX ADMIN — ACETAMINOPHEN 650 MG: 325 TABLET ORAL at 19:19

## 2021-12-08 RX ADMIN — VANCOMYCIN HYDROCHLORIDE 1000 MG: 1 INJECTION, POWDER, LYOPHILIZED, FOR SOLUTION INTRAVENOUS at 16:55

## 2021-12-08 RX ADMIN — ACETAMINOPHEN 650 MG: 325 TABLET ORAL at 11:35

## 2021-12-08 NOTE — H&P
GENERAL GENERIC H&P/CONSULT  Presenting complaint: Fevers/generalized weakness    Subjective: 28-year-old male with past medical history multiple comorbidities presents [facility] with complaints of fever as well as generalized weakness. Patient states he was in his usual state of health until earlier this a.m. with complaints of fevers as well as generalized weakness. Patient states he was in his usual state of health until earlier this a.m. he started experiencing gradual onset persistent progressive generalized weakness, which is associated with sudden onset intermittent subjective fevers associated with chills following which patient presented to the ED. Patient denies any nausea vomiting lightheadedness dizziness dyspnea orthopnea paroxysmal nocturnal dyspnea chest pain palpitations headache focal weakness loss of sensation auditory or visual symptoms abdominal stool or urinary complaints or any other associated symptoms.   Patient endorses no recent sick contacts or travel activity    Past Medical History:   Diagnosis Date    Asthenia 1/24/2021    Cardiomyopathy ([address] 1/24/2021    CHF (congestive heart failure) (HCC)     Chronic kidney disease     CKD (chronic kidney disease)     4    Diabetes ([address]     End stage renal disease on dialysis ([address] 1/24/2021    Essential hypertension 1/24/2021    Gastroesophageal reflux disease 1/24/2021    Gastroesophageal reflux disease 1/24/2021    Hypertension     Pneumonia due to COVID-19 virus 7/84/6371    Systolic CHF, acute on chronic ([address] 1/24/2021      Past Surgical History:   Procedure Laterality Date    COLONOSCOPY N/A 12/3/2020    COLONOSCOPY performed by Tash Roque MD at [address] [facility] HX HEART CATHETERIZATION      HX HERNIA REPAIR      IR FLUORO GUIDE [facility] CVAD  1/22/2021    IR FLUORO GUIDED NEEDLE PLACEMENT  10/18/2021    IR INSERT NON TUNL CVC OVER 5 YRS  1/18/2021    IR INSERT NON TUNL CVC OVER 5 YRS  10/18/2021  IR INSERT TUNL CVC W/O PORT OVER 5 YR  1/22/2021    IR PLACE CVAD FLUORO GUIDE  1/18/2021    IR REMOVE TUNL CVAD W/O PORT / PUMP  10/18/2021      Prior to Admission medications    Medication Sig Start Date End Date Taking? Authorizing Provider   gentamicin in Saline, Iso-osm, (GARAMYCIN) 80 mg/100 mL 80 mg by IntraVENous route DIALYSIS MON, WED & FRI for 14 days. Indications: infection of the blood caused by Klebsiella bacteria 10/20/21 11/3/21  Juan Manuel Estrada MD   carvediloL (COREG) 3.125 mg tablet Take 1 Tablet by mouth two (2) times daily (with meals). 9/16/21   Cordelia Dale PA-C   prednisoLONE acetate (PRED FORTE) 1 % ophthalmic suspension Administer 1 Drop to both eyes two (2) times a day. Post shingles therapy. Provider, Historical   dextran 70-hypromellose (Artificial Tears,[id number],) ophthalmic solution Administer 1 Drop to both eyes three (3) times daily as needed. Provider, Historical   acyclovir (ZOVIRAX) 800 mg tablet Take 800 mg by mouth two (2) times a day. Other, MD Farhan   albuterol (PROVENTIL HFA, VENTOLIN HFA, PROAIR HFA) 90 mcg/actuation inhaler Take 2 Puffs by inhalation every four (4) hours as needed for Wheezing or Shortness of Breath. 1/24/21   Viviana BAEZA MD   calcitRIOL (ROCALTROL) 0.25 mcg capsule Take 0.25 mcg by mouth daily. Provider, Historical   atorvastatin (LIPITOR) 40 mg tablet Take 40 mg by mouth daily. Provider, Historical     Allergies   Allergen Reactions    Lisinopril Angioedema    Pcn [Penicillins] Rash      Social History     Tobacco Use    Smoking status: Never Smoker    Smokeless tobacco: Never Used   Substance Use Topics    Alcohol use: Not Currently      Family History   Problem Relation Age of Onset    Diabetes Mother     Heart Failure Father       Review of Systems   Constitutional: Positive for activity change, appetite change and fatigue. Negative for chills, diaphoresis, fever and unexpected weight change.    HENT: Negative for congestion, dental problem, drooling, ear discharge, ear pain, facial swelling, hearing loss, mouth sores, nosebleeds, postnasal drip, rhinorrhea, sinus pressure, sinus pain, sneezing, sore throat, tinnitus, trouble swallowing and voice change. Eyes: Negative for photophobia, pain, discharge, redness, itching and visual disturbance. Respiratory: Negative for apnea, cough, choking, chest tightness, shortness of breath, wheezing and stridor. Cardiovascular: Negative for chest pain, palpitations and leg swelling. Gastrointestinal: Negative for abdominal distention, abdominal pain, anal bleeding, blood in stool, constipation, diarrhea, nausea, rectal pain and vomiting. Endocrine: Negative for cold intolerance, heat intolerance, polydipsia, polyphagia and polyuria. Genitourinary: Negative for decreased urine volume, difficulty urinating, dysuria, enuresis, flank pain, frequency, genital sores, hematuria, penile discharge, penile pain, penile swelling, scrotal swelling, testicular pain and urgency. Musculoskeletal: Negative for arthralgias, back pain, gait problem, joint swelling, myalgias, neck pain and neck stiffness. Skin: Negative for color change, pallor, rash and wound. Allergic/Immunologic: Negative for environmental allergies, food allergies and immunocompromised state. Neurological: Positive for weakness. Negative for dizziness, tremors, seizures, syncope, facial asymmetry, speech difficulty, light-headedness, numbness and headaches. Hematological: Negative for adenopathy. Does not bruise/bleed easily. Psychiatric/Behavioral: Negative for agitation, behavioral problems, confusion, decreased concentration, dysphoric mood, hallucinations, self-injury, sleep disturbance and suicidal ideas. The patient is not nervous/anxious and is not hyperactive. Objective:    No intake/output data recorded. No intake/output data recorded.   Patient Vitals for the past 8 hrs:   BP Temp Pulse Resp SpO2   12/08/21 1555 110/66 (!) 101.5 °F (38.6 °C) 89 20 96 %   12/08/21 1126 (!) 116/57 (!) 103.6 °F (39.8 °C) 95 30 98 %     Physical Exam  Vitals reviewed. Constitutional:       General: He is not in acute distress. Appearance: He is normal weight. He is ill-appearing. He is not toxic-appearing or diaphoretic. HENT:      Head: Normocephalic and atraumatic. Nose: Nose normal. No congestion or rhinorrhea. Mouth/Throat:      Mouth: Mucous membranes are moist.      Pharynx: Oropharynx is clear. No oropharyngeal exudate or posterior oropharyngeal erythema. Eyes:      General: No scleral icterus. Right eye: No discharge. Left eye: No discharge. Extraocular Movements: Extraocular movements intact. Conjunctiva/sclera: Conjunctivae normal.      Pupils: Pupils are equal, round, and reactive to light. Neck:      Vascular: No carotid bruit. Cardiovascular:      Rate and Rhythm: Normal rate and regular rhythm. Pulses: Normal pulses. Heart sounds: Normal heart sounds. No murmur heard. No friction rub. No gallop. Pulmonary:      Effort: Pulmonary effort is normal. No respiratory distress. Breath sounds: Normal breath sounds. No stridor. No wheezing, rhonchi or rales. Chest:      Chest wall: No tenderness. Abdominal:      General: Abdomen is flat. Bowel sounds are normal. There is no distension. Palpations: Abdomen is soft. There is no mass. Tenderness: There is no abdominal tenderness. There is no right CVA tenderness, left CVA tenderness, guarding or rebound. Hernia: No hernia is present. Musculoskeletal:         General: No swelling, tenderness, deformity or signs of injury. Normal range of motion. Cervical back: Normal range of motion and neck supple. No rigidity or tenderness. Right lower leg: No edema. Left lower leg: No edema. Lymphadenopathy:      Cervical: No cervical adenopathy.    Skin:     General: Skin is warm. Capillary Refill: Capillary refill takes less than 2 seconds. Coloration: Skin is not jaundiced or pale. Findings: No bruising, erythema, lesion or rash. Neurological:      General: No focal deficit present. Mental Status: He is alert and oriented to person, place, and time. Mental status is at baseline. Cranial Nerves: No cranial nerve deficit. Sensory: No sensory deficit. Motor: No weakness. Coordination: Coordination normal.      Gait: Gait normal.      Deep Tendon Reflexes: Reflexes normal.   Psychiatric:         Mood and Affect: Mood normal.         Behavior: Behavior normal.         Thought Content: Thought content normal.         Judgment: Judgment normal.          Labs:    Recent Results (from the past 24 hour(s))   GLUCOSE, POC    Collection Time: 12/08/21 11:27 AM   Result Value Ref Range    Glucose (POC) 92 65 - 117 mg/dL    Performed by Kandis Riley    SARS-COV-2    Collection Time: 12/08/21  2:00 PM   Result Value Ref Range    SARS-CoV-2 Please find results under separate order     CBC WITH AUTOMATED DIFF    Collection Time: 12/08/21  2:12 PM   Result Value Ref Range    WBC 9.6 4.1 - 11.1 K/uL    RBC 4.24 4.10 - 5.70 M/uL    HGB 11.9 (L) 12.1 - 17.0 g/dL    HCT 38.7 36.6 - 50.3 %    MCV 91.3 80.0 - 99.0 FL    MCH 28.1 26.0 - 34.0 PG    MCHC 30.7 30.0 - 36.5 g/dL    RDW 20.0 (H) 11.5 - 14.5 %    PLATELET 663 548 - 673 K/uL    MPV 10.0 8.9 - 12.9 FL    NRBC 0.3 (H) 0.0  WBC    ABSOLUTE NRBC 0.03 (H) 0.00 - 0.01 K/uL    NEUTROPHILS 83 (H) 32 - 75 %    LYMPHOCYTES 7 (L) 12 - 49 %    MONOCYTES 8 5 - 13 %    EOSINOPHILS 0 0 - 7 %    BASOPHILS 1 0 - 1 %    IMMATURE GRANULOCYTES 1 (H) 0 - 0.5 %    ABS. NEUTROPHILS 8.0 1.8 - 8.0 K/UL    ABS. LYMPHOCYTES 0.7 (L) 0.8 - 3.5 K/UL    ABS. MONOCYTES 0.8 0.0 - 1.0 K/UL    ABS. EOSINOPHILS 0.0 0.0 - 0.4 K/UL    ABS. BASOPHILS 0.1 0.0 - 0.1 K/UL    ABS. IMM.  GRANS. 0.1 (H) 0.00 - 0.04 K/UL    DF AUTOMATED METABOLIC PANEL, COMPREHENSIVE    Collection Time: 12/08/21  2:12 PM   Result Value Ref Range    Sodium 140 136 - 145 mmol/L    Potassium 3.4 (L) 3.5 - 5.1 mmol/L    Chloride 105 97 - 108 mmol/L    CO2 28 21 - 32 mmol/L    Anion gap 7 5 - 15 mmol/L    Glucose 101 (H) 65 - 100 mg/dL    BUN 22 (H) 6 - 20 mg/dL    Creatinine 6.44 (H) 0.70 - 1.30 mg/dL    BUN/Creatinine ratio 3 (L) 12 - 20      GFR est AA 10 (L) >60 ml/min/1.73m2    GFR est non-AA 9 (L) >60 ml/min/1.73m2    Calcium 8.5 8.5 - 10.1 mg/dL    Bilirubin, total 0.7 0.2 - 1.0 mg/dL    AST (SGOT) 29 15 - 37 U/L    ALT (SGPT) 18 12 - 78 U/L    Alk. phosphatase 67 45 - 117 U/L    Protein, total 8.2 6.4 - 8.2 g/dL    Albumin 2.6 (L) 3.5 - 5.0 g/dL    Globulin 5.6 (H) 2.0 - 4.0 g/dL    A-G Ratio 0.5 (L) 1.1 - 2.2     LACTIC ACID    Collection Time: 12/08/21  2:53 PM   Result Value Ref Range    Lactic acid 2.6 (HH) 0.4 - 2.0 mmol/L       ECG: nonspecific ST and T waves changes   Chest X-Ray:  Reduced lung volumes. This appears to accentuate central reticular  markings/central vessel crowding. No gross interstitial or alveolar pulmonary  edema. Cardiac silhouette enlargement. Nontunneled right side temporary dialysis catheter with its tip overlying the RA  region. No pneumothorax or sizable pleural effusion.     Assessment:  Active Problems:    Sepsis ([address] (7/7/2021)      ESRD on dialysis [facility]) (12/8/2021)        Plan:    Ulysses Mariscal presents with above-mentioned symptomatology based on patient's clinical presentation consistent with sepsis given the fact the patient was febrile as well as tachycardic upon presentation, of note patient cannot receive fluid resuscitation as per sepsis protocol secondary to patient being end-stage renal disease on hemodialysis, on review of chart patient does have a history of MRSA/Klebsiella bacteremia  Follow blood cultures  Start vancomycin and cefepime for broad-spectrum antibiotic coverage  Obtain infectious disease consult for evaluation    End-stage renal disease on hemodialysis-of note patient has a history of end-stage renal disease on hemodialysis, unclear as to compliance with dialysis  Obtain nephrology consult for evaluation    Hyperlipidemia-continue statin    Prophylaxis-Heparin subcu  FEN-renal diet, potassium and magnesium to be maintained with dialysis  Full code, will clarify about surrogate decision-maker, admitted to telemetry further management    Signed:   Neil Ellis MD 12/8/2021

## 2021-12-08 NOTE — ED TRIAGE NOTES
Pt arrives by EMS from home. Received covid booster yesterday. Fever, weakness, fatigue. Reports similar reactions to 1st and 2nd dose.

## 2021-12-08 NOTE — Clinical Note
Status[de-identified] INPATIENT [101]   Type of Bed: Medical [8]   Cardiac Monitoring Required?: No   Inpatient Hospitalization Certified Necessary for the Following Reasons: 3.  Patient receiving treatment that can only be provided in an inpatient setting (further clarification in H&P documentation)   Admitting Diagnosis: Sepsis [facility]) [[id number]]   Admitting Diagnosis: ESRD on dialysis [facility] [[id number]]   Admitting Physician: Merlinda Goring [[id number]]   Attending Physician: Merlinda Goring [[id number]]   Estimated Length of Stay: 2 Midnights   Discharge Plan[de-identified] Home with Office Follow-up

## 2021-12-08 NOTE — ACP (ADVANCE CARE PLANNING)
Advance Care Planning   Healthcare Decision Maker:       Primary Decision Maker: Kamryn Ramos [facility] - [phone]

## 2021-12-08 NOTE — ED PROVIDER NOTES
EMERGENCY DEPARTMENT HISTORY AND PHYSICAL EXAM      Date: 12/8/2021  Patient Name: Nitin Camargo    History of Presenting Illness     Chief Complaint   Patient presents with    Fever    Fatigue       History Provided By: Patient    HPI: Emmy Homans, 80 y.o. male with a past medical history significant diabetes, hypertension, COPD and CHF presents to the ED with cc of generalized weakness, sacral ulceration pain. Patient states that over the last several days he has felt pain to his sacral area and has been feeling generally weak. Patient denies any cough, chest pain, shortness of breath no abdominal pain nausea or vomiting. Of note patient received his Covid booster several days ago. There are no other complaints, changes, or physical findings at this time. PCP: Tanja Ren MD    Current Facility-Administered Medications   Medication Dose Route Frequency Provider Last Rate Last Admin    vancomycin (VANCOCIN) 1,000 mg in 0.9% sodium chloride 250 mL (VIAL-MATE)  1,000 mg IntraVENous ONCE Horace Haseeb KAPLAN MD        cefepime (MAXIPIME) 2 g in sterile water (preservative free) 10 mL IV syringe  2 g IntraVENous ONCE Devan Warren MD         Current Outpatient Medications   Medication Sig Dispense Refill    gentamicin in Saline, Iso-osm, (GARAMYCIN) 80 mg/100 mL 80 mg by IntraVENous route DIALYSIS MON, WED & FRI for 14 days. Indications: infection of the blood caused by Klebsiella bacteria 600 mL 0    carvediloL (COREG) 3.125 mg tablet Take 1 Tablet by mouth two (2) times daily (with meals). 30 Tablet 0    prednisoLONE acetate (PRED FORTE) 1 % ophthalmic suspension Administer 1 Drop to both eyes two (2) times a day. Post shingles therapy.  dextran 70-hypromellose (Artificial Tears,[id number],) ophthalmic solution Administer 1 Drop to both eyes three (3) times daily as needed.  acyclovir (ZOVIRAX) 800 mg tablet Take 800 mg by mouth two (2) times a day.       albuterol (PROVENTIL HFA, VENTOLIN HFA, PROAIR HFA) 90 mcg/actuation inhaler Take 2 Puffs by inhalation every four (4) hours as needed for Wheezing or Shortness of Breath. 1 Inhaler 0    calcitRIOL (ROCALTROL) 0.25 mcg capsule Take 0.25 mcg by mouth daily.  atorvastatin (LIPITOR) 40 mg tablet Take 40 mg by mouth daily. Past History     Past Medical History:  Past Medical History:   Diagnosis Date    Asthenia 1/24/2021    Cardiomyopathy ([facility] 1/24/2021    CHF (congestive heart failure) (HCC)     Chronic kidney disease     CKD (chronic kidney disease)     4    Diabetes ([facility]     End stage renal disease on dialysis ([facility] 1/24/2021    Essential hypertension 1/24/2021    Gastroesophageal reflux disease 1/24/2021    Gastroesophageal reflux disease 1/24/2021    Hypertension     Pneumonia due to COVID-19 virus 4/50/9233    Systolic CHF, acute on chronic ([facility] 1/24/2021       Past Surgical History:  Past Surgical History:   Procedure Laterality Date    COLONOSCOPY N/A 12/3/2020    COLONOSCOPY performed by Ritika Villatoro MD at [address] [facility] HX HEART CATHETERIZATION      HX HERNIA REPAIR      IR FLUORO GUIDE [facility] CVAD  1/22/2021    IR FLUORO GUIDED NEEDLE PLACEMENT  10/18/2021    IR INSERT NON TUNL CVC OVER 5 YRS  1/18/2021    IR INSERT NON TUNL CVC OVER 5 YRS  10/18/2021    IR INSERT TUNL CVC W/O PORT OVER 5 YR  1/22/2021    IR PLACE CVAD FLUORO GUIDE  1/18/2021    IR REMOVE TUNL CVAD W/O PORT / PUMP  10/18/2021       Family History:  Family History   Problem Relation Age of Onset    Diabetes Mother     Heart Failure Father        Social History:  Social History     Tobacco Use    Smoking status: Never Smoker    Smokeless tobacco: Never Used   Vaping Use    Vaping Use: Never used   Substance Use Topics    Alcohol use: Not Currently    Drug use: Never       Allergies:   Allergies   Allergen Reactions    Lisinopril Angioedema    Pcn [Penicillins] Rash         Review of Systems     Review of Systems Constitutional: Positive for activity change, fatigue and fever. Negative for appetite change and chills. HENT: Negative for congestion, sore throat and trouble swallowing. Eyes: Negative for photophobia and visual disturbance. Respiratory: Negative for cough, chest tightness and shortness of breath. Cardiovascular: Negative for chest pain and palpitations. Gastrointestinal: Negative for abdominal pain and nausea. Genitourinary: Negative for dysuria and flank pain. Musculoskeletal: Negative for arthralgias and neck pain. Skin: Positive for wound. Negative for color change and pallor. Neurological: Negative for dizziness, weakness, numbness and headaches. Psychiatric/Behavioral: Negative for agitation and confusion. Physical Exam     Physical Exam  Vitals and nursing note reviewed. Constitutional:       Appearance: Normal appearance. He is normal weight. HENT:      Head: Normocephalic and atraumatic. Nose: Nose normal.      Mouth/Throat:      Mouth: Mucous membranes are moist.   Eyes:      Extraocular Movements: Extraocular movements intact. Pupils: Pupils are equal, round, and reactive to light. Cardiovascular:      Rate and Rhythm: Normal rate and regular rhythm. Pulses: Normal pulses. Heart sounds: Normal heart sounds. Pulmonary:      Effort: Pulmonary effort is normal.      Breath sounds: Normal breath sounds. Chest:       Abdominal:      General: Abdomen is flat. Bowel sounds are normal.      Palpations: Abdomen is soft. Musculoskeletal:         General: No swelling or tenderness. Normal range of motion. Cervical back: Normal range of motion and neck supple. Skin:     General: Skin is warm and dry. Capillary Refill: Capillary refill takes less than 2 seconds. Comments: Sacral Area shows 3 cm circular erythematous area, no skin breakdown   Neurological:      General: No focal deficit present.       Mental Status: He is alert and oriented to person, place, and time. Cranial Nerves: No cranial nerve deficit. Sensory: No sensory deficit. Motor: No weakness. Psychiatric:         Mood and Affect: Mood normal.         Behavior: Behavior normal.         Diagnostic Study Results     Labs -     Recent Results (from the past 12 hour(s))   GLUCOSE, POC    Collection Time: 12/08/21 11:27 AM   Result Value Ref Range    Glucose (POC) 92 65 - 117 mg/dL    Performed by Jetty Inch    SARS-COV-2    Collection Time: 12/08/21  2:00 PM   Result Value Ref Range    SARS-CoV-2 Please find results under separate order     CBC WITH AUTOMATED DIFF    Collection Time: 12/08/21  2:12 PM   Result Value Ref Range    WBC 9.6 4.1 - 11.1 K/uL    RBC 4.24 4.10 - 5.70 M/uL    HGB 11.9 (L) 12.1 - 17.0 g/dL    HCT 38.7 36.6 - 50.3 %    MCV 91.3 80.0 - 99.0 FL    MCH 28.1 26.0 - 34.0 PG    MCHC 30.7 30.0 - 36.5 g/dL    RDW 20.0 (H) 11.5 - 14.5 %    PLATELET 103 715 - 424 K/uL    MPV 10.0 8.9 - 12.9 FL    NRBC 0.3 (H) 0.0  WBC    ABSOLUTE NRBC 0.03 (H) 0.00 - 0.01 K/uL    NEUTROPHILS 83 (H) 32 - 75 %    LYMPHOCYTES 7 (L) 12 - 49 %    MONOCYTES 8 5 - 13 %    EOSINOPHILS 0 0 - 7 %    BASOPHILS 1 0 - 1 %    IMMATURE GRANULOCYTES 1 (H) 0 - 0.5 %    ABS. NEUTROPHILS 8.0 1.8 - 8.0 K/UL    ABS. LYMPHOCYTES 0.7 (L) 0.8 - 3.5 K/UL    ABS. MONOCYTES 0.8 0.0 - 1.0 K/UL    ABS. EOSINOPHILS 0.0 0.0 - 0.4 K/UL    ABS. BASOPHILS 0.1 0.0 - 0.1 K/UL    ABS. IMM.  GRANS. 0.1 (H) 0.00 - 0.04 K/UL    DF AUTOMATED     METABOLIC PANEL, COMPREHENSIVE    Collection Time: 12/08/21  2:12 PM   Result Value Ref Range    Sodium 140 136 - 145 mmol/L    Potassium 3.4 (L) 3.5 - 5.1 mmol/L    Chloride 105 97 - 108 mmol/L    CO2 28 21 - 32 mmol/L    Anion gap 7 5 - 15 mmol/L    Glucose 101 (H) 65 - 100 mg/dL    BUN 22 (H) 6 - 20 mg/dL    Creatinine 6.44 (H) 0.70 - 1.30 mg/dL    BUN/Creatinine ratio 3 (L) 12 - 20      GFR est AA 10 (L) >60 ml/min/1.73m2    GFR est non-AA 9 (L) >60 ml/min/1.73m2    Calcium 8.5 8.5 - 10.1 mg/dL    Bilirubin, total 0.7 0.2 - 1.0 mg/dL    AST (SGOT) 29 15 - 37 U/L    ALT (SGPT) 18 12 - 78 U/L    Alk. phosphatase 67 45 - 117 U/L    Protein, total 8.2 6.4 - 8.2 g/dL    Albumin 2.6 (L) 3.5 - 5.0 g/dL    Globulin 5.6 (H) 2.0 - 4.0 g/dL    A-G Ratio 0.5 (L) 1.1 - 2.2     LACTIC ACID    Collection Time: 12/08/21  2:53 PM   Result Value Ref Range    Lactic acid 2.6 (HH) 0.4 - 2.0 mmol/L       Radiologic Studies -   [unfilled]  CT Results  (Last 48 hours)    None        CXR Results  (Last 48 hours)               12/08/21 1435  XR CHEST PORT Final result    Narrative:  Chest single view. Comparison single view chest October 15, 2021. Reduced lung volumes. This appears to accentuate central reticular   markings/central vessel crowding. No gross interstitial or alveolar pulmonary   edema. Cardiac silhouette enlargement. Nontunneled right side temporary dialysis catheter with its tip overlying the RA   region. No pneumothorax or sizable pleural effusion. Medical Decision Making and ED Course   I am the first provider for this patient. I reviewed the vital signs, available nursing notes, past medical history, past surgical history, family history and social history. Vital Signs-Reviewed the patient's vital signs. Patient Vitals for the past 12 hrs:   Temp Pulse Resp BP SpO2   12/08/21 1555 (!) 101.5 °F (38.6 °C) 89 20 110/66 96 %   12/08/21 1126 (!) 103.6 °F (39.8 °C) 95 30 (!) 116/57 98 %           Records Reviewed: Nursing Notes and Old Medical Records    The patient presents with weakness fevers chills with a differential diagnosis of sepsis, urinalysis, pneumonia, UTI, bronchitis, COVID-19      Provider Notes (Medical Decision Making):     MDM   21-year-old male, past medical history of hypertension diabetes, CHF, ESRD presents emergency department for generalized weakness chills, pain to sacral area.     Physical exam shows febrile male 103.6, normotensive, saturations 98%, posterior sacral area does show small erythematous area however no actual skin breakdown, no suspicion for infected decubitus ulcer. will draw basic lab work, including UA, lactic acid, blood cultures, chest x-ray, Tylenol ordered we will continue to observe patient. ED Course:   Initial assessment performed. The patients presenting problems have been discussed, and they are in agreement with the care plan formulated and outlined with them. I have encouraged them to ask questions as they arise throughout their visit. ED Course as of 12/08/21 1617   Wed Dec 08, 2021   1615 Lab work resulting, lactic acid elevated 2.6, CBC shows no leukocytosis however patient has had history of septicemia including line sepsis, will admit patient for fever, rule out sepsis. Discussed with Dr. Benjamin Roger, accepts admission, recommend cefepime 2 g IV. [PZ]      ED Course User Index  [PZ] Bishop Annette MD         Procedures       Usama Lazo MD  Procedures               Disposition     Admit patient         Diagnosis     Clinical Impression:   1. Sepsis, due to unspecified organism, unspecified whether acute organ dysfunction present [facility])        Attestations:    Usama Lazo MD    Please note that this dictation was completed with Dream[employer]ize, the computer voice recognition software. Quite often unanticipated grammatical, syntax, homophones, and other interpretive errors are inadvertently transcribed by the computer software. Please disregard these errors. Please excuse any errors that have escaped final proofreading. Thank you.

## 2021-12-08 NOTE — CONSULTS
Consult Date: 12/8/2021    Consults  Sepsis      Subjective   This is a 80year old male with CKD on HD known to me from Oct 2021 when followed for MRSA and Klebsiella bacteremia related to dialysis catheter which was removed with catheter tip growing Klebsiella. Patient was treated with Vancomycin and Gentamicin at dialysis. He was brought to ED by EMS from home because of fever, weakness and fatigue, which he thought might be related to Covid-19 booster. He was febrile to 103.6 with normal WBC but elevated lactic acid. CXR showed only reduced lung volumes. Images reviewed by me. Blood and urine cultures ordered and patient started on Vancomycin and Cefepime. ID has been consulted for this reason. Patient seen in the ED. He states that he had Covid earlier this year and got the booster vaccine yesterday. He affirms weakness and fatigue. No SOB or cough.   Past Medical History:   Diagnosis Date    Asthenia 1/24/2021    Cardiomyopathy ([address] 1/24/2021    CHF (congestive heart failure) (HCC)     Chronic kidney disease     CKD (chronic kidney disease)     4    Diabetes ([address]     End stage renal disease on dialysis ([address] 1/24/2021    Essential hypertension 1/24/2021    Gastroesophageal reflux disease 1/24/2021    Gastroesophageal reflux disease 1/24/2021    Hypertension     Pneumonia due to COVID-19 virus 9/20/3432    Systolic CHF, acute on chronic ([address] 1/24/2021      Past Surgical History:   Procedure Laterality Date    COLONOSCOPY N/A 12/3/2020    COLONOSCOPY performed by Zonia Rosas MD at [address] [place] [address] HX HEART CATHETERIZATION      HX HERNIA REPAIR      IR FLUORO GUIDE [facility] CVAD  1/22/2021    IR FLUORO GUIDED NEEDLE PLACEMENT  10/18/2021    IR INSERT NON TUNL CVC OVER 5 YRS  1/18/2021    IR INSERT NON TUNL CVC OVER 5 YRS  10/18/2021    IR INSERT TUNL CVC W/O PORT OVER 5 YR  1/22/2021    IR PLACE CVAD FLUORO GUIDE  1/18/2021    IR REMOVE TUNL CVAD W/O PORT / PUMP 10/18/2021     Family History   Problem Relation Age of Onset    Diabetes Mother     Heart Failure Father       Social History     Tobacco Use    Smoking status: Never Smoker    Smokeless tobacco: Never Used   Substance Use Topics    Alcohol use: Not Currently       Current Facility-Administered Medications   Medication Dose Route Frequency Provider Last Rate Last Admin    cefepime (MAXIPIME) 1 g in sterile water (preservative free) 10 mL IV syringe  1 g IntraVENous Q8H Giovanny Waters MD        albuterol (PROVENTIL HFA, VENTOLIN HFA, PROAIR HFA) inhaler 2 Puff  2 Puff Inhalation Q4H PRN Nilesh Thao MD        acyclovir (ZOVIRAX) tablet 800 mg  800 mg Oral BID Nilesh Thao MD        [START ON 12/9/2021] atorvastatin (LIPITOR) tablet 40 mg  40 mg Oral DAILY Nilesh Thao MD        [START ON 12/9/2021] calcitRIOL (ROCALTROL) capsule 0.25 mcg  0.25 mcg Oral DAILY Nilesh Thao MD        carvediloL (COREG) tablet 3.125 mg  3.125 mg Oral BID WITH MEALS Miguel Waters MD        . PHARMACY TO SUBSTITUTE PER PROTOCOL (Reordered from: dextran 70-hypromellose (Artificial Tears,[id number],) ophthalmic solution)    Per Protocol Nilesh Thao MD        prednisoLONE acetate (PRED FORTE) 1 % ophthalmic suspension 1 Drop  1 Drop Both Eyes BID Nilesh Thao MD        sodium chloride (NS) flush 5-40 mL  5-40 mL IntraVENous Giovanny Tellez MD        sodium chloride (NS) flush 5-40 mL  5-40 mL IntraVENous PRN Nilesh Thao MD        acetaminophen (TYLENOL) tablet 650 mg  650 mg Oral Q6H PRN Nilesh Thao MD        Or    acetaminophen (TYLENOL) suppository 650 mg  650 mg Rectal Q6H PRN Nilesh Thao MD        polyethylene glycol [facility]) packet 17 g  17 g Oral DAILY PRN Nilesh Thao MD        ondansetron [facility] ODT) tablet 4 mg  4 mg Oral Q8H PRN Jaylen Teresa MD        Or    ondansetron [facility]) injection 4 mg  4 mg IntraVENous Q6H PRN Jaylen Teresa MD        heparin (porcine) injection 5,000 Units  5,000 Units SubCUTAneous Q12H Jaylen Teresa MD   5,000 Units at 12/08/21 1659     Current Outpatient Medications   Medication Sig Dispense Refill    sevelamer carbonate (RENVELA) 800 mg tab tab Take 1 Tablet by mouth three (3) times daily (with meals).  carvediloL (COREG) 3.125 mg tablet Take 1 Tablet by mouth two (2) times daily (with meals). 30 Tablet 0    prednisoLONE acetate (PRED FORTE) 1 % ophthalmic suspension Administer 1 Drop to both eyes two (2) times a day. Post shingles therapy.  dextran 70-hypromellose (Artificial Tears,[id number],) ophthalmic solution Administer 1 Drop to both eyes three (3) times daily as needed.  acyclovir (ZOVIRAX) 800 mg tablet Take 800 mg by mouth two (2) times a day.  albuterol (PROVENTIL HFA, VENTOLIN HFA, PROAIR HFA) 90 mcg/actuation inhaler Take 2 Puffs by inhalation every four (4) hours as needed for Wheezing or Shortness of Breath. 1 Inhaler 0    atorvastatin (LIPITOR) 40 mg tablet Take 40 mg by mouth daily. Review of Systems   Constitutional: Positive for fatigue and fever. Negative for chills. HENT: Negative. Eyes: Negative. Respiratory: Negative. Cardiovascular: Negative. Gastrointestinal: Negative. Endocrine: Negative. Genitourinary: Negative. Musculoskeletal: Negative. Skin: Negative. Allergic/Immunologic: Negative. Neurological: Negative. Hematological: Negative. Psychiatric/Behavioral: Negative. Objective     Vital signs for last 24 hours:  Visit Vitals  /66 (BP 1 Location: Right upper arm, BP Patient Position: At rest)   Pulse 89   Temp (!) 101.5 °F (38.6 °C)   Resp 20   SpO2 96%       Intake/Output this shift:  Current Shift: No intake/output data recorded.   Last 3 Shifts: No intake/output data recorded. Data Review:   Recent Results (from the past 24 hour(s))   GLUCOSE, POC    Collection Time: 12/08/21 11:27 AM   Result Value Ref Range    Glucose (POC) 92 65 - 117 mg/dL    Performed by Vicente Suarez    SARS-COV-2    Collection Time: 12/08/21  2:00 PM   Result Value Ref Range    SARS-CoV-2 Please find results under separate order     CBC WITH AUTOMATED DIFF    Collection Time: 12/08/21  2:12 PM   Result Value Ref Range    WBC 9.6 4.1 - 11.1 K/uL    RBC 4.24 4.10 - 5.70 M/uL    HGB 11.9 (L) 12.1 - 17.0 g/dL    HCT 38.7 36.6 - 50.3 %    MCV 91.3 80.0 - 99.0 FL    MCH 28.1 26.0 - 34.0 PG    MCHC 30.7 30.0 - 36.5 g/dL    RDW 20.0 (H) 11.5 - 14.5 %    PLATELET 804 025 - 573 K/uL    MPV 10.0 8.9 - 12.9 FL    NRBC 0.3 (H) 0.0  WBC    ABSOLUTE NRBC 0.03 (H) 0.00 - 0.01 K/uL    NEUTROPHILS 83 (H) 32 - 75 %    LYMPHOCYTES 7 (L) 12 - 49 %    MONOCYTES 8 5 - 13 %    EOSINOPHILS 0 0 - 7 %    BASOPHILS 1 0 - 1 %    IMMATURE GRANULOCYTES 1 (H) 0 - 0.5 %    ABS. NEUTROPHILS 8.0 1.8 - 8.0 K/UL    ABS. LYMPHOCYTES 0.7 (L) 0.8 - 3.5 K/UL    ABS. MONOCYTES 0.8 0.0 - 1.0 K/UL    ABS. EOSINOPHILS 0.0 0.0 - 0.4 K/UL    ABS. BASOPHILS 0.1 0.0 - 0.1 K/UL    ABS. IMM. GRANS. 0.1 (H) 0.00 - 0.04 K/UL    DF AUTOMATED     METABOLIC PANEL, COMPREHENSIVE    Collection Time: 12/08/21  2:12 PM   Result Value Ref Range    Sodium 140 136 - 145 mmol/L    Potassium 3.4 (L) 3.5 - 5.1 mmol/L    Chloride 105 97 - 108 mmol/L    CO2 28 21 - 32 mmol/L    Anion gap 7 5 - 15 mmol/L    Glucose 101 (H) 65 - 100 mg/dL    BUN 22 (H) 6 - 20 mg/dL    Creatinine 6.44 (H) 0.70 - 1.30 mg/dL    BUN/Creatinine ratio 3 (L) 12 - 20      GFR est AA 10 (L) >60 ml/min/1.73m2    GFR est non-AA 9 (L) >60 ml/min/1.73m2    Calcium 8.5 8.5 - 10.1 mg/dL    Bilirubin, total 0.7 0.2 - 1.0 mg/dL    AST (SGOT) 29 15 - 37 U/L    ALT (SGPT) 18 12 - 78 U/L    Alk.  phosphatase 67 45 - 117 U/L    Protein, total 8.2 6.4 - 8.2 g/dL    Albumin 2.6 (L) 3.5 - 5.0 g/dL    Globulin 5.6 (H) 2.0 - 4.0 g/dL    A-G Ratio 0.5 (L) 1.1 - 2.2     LACTIC ACID    Collection Time: 12/08/21  2:53 PM   Result Value Ref Range    Lactic acid 2.6 (HH) 0.4 - 2.0 mmol/L     CXR (12/8)        Physical Exam  Vitals and nursing note reviewed. Constitutional:       Appearance: He is not ill-appearing. HENT:      Head: Normocephalic and atraumatic. Nose: Nose normal. No congestion or rhinorrhea. Mouth/Throat:      Pharynx: Oropharynx is clear. No posterior oropharyngeal erythema. Eyes:      Pupils: Pupils are equal, round, and reactive to light. Cardiovascular:      Rate and Rhythm: Regular rhythm. Heart sounds: No murmur heard. Comments: Right sided Permacath, site unremarkable  Pulmonary:      Effort: Pulmonary effort is normal.      Breath sounds: Normal breath sounds. Chest:       Abdominal:      General: Abdomen is flat. Bowel sounds are normal.      Palpations: Abdomen is soft. Genitourinary:     Comments: No Pleitez catheter    Musculoskeletal:      Cervical back: Neck supple. Right lower leg: No edema. Left lower leg: No edema. Skin:     Findings: No rash. Comments: Chronic sacrococcygeal wound   Neurological:      General: No focal deficit present. Mental Status: He is alert and oriented to person, place, and time. Psychiatric:         Mood and Affect: Mood normal.         Behavior: Behavior normal.         Thought Content: Thought content normal.         Judgment: Judgment normal.       ASSESSMENT/PLAN    1. Sepsis with fever and elevated lactic acid, etiology unclear  2. Status post Covid-19 booster  3. ESRD on hemodialysis  4. Permacath in situ  5. Penicillin allergy    Comment:  Fever following vaccination not uncommon but would not expect it to reach 103. This patient is at risk for bacteremia as was demonstrated in October 2021. Agree with empiric IV antibiotics.      1. Continue IV Vancomycin and Cefepime (watch for cross-reactivity given penicillin allergy)  2. Check CRP and procal  3. Follow-up blood cultures    Keon Barrow MD

## 2021-12-08 NOTE — PROGRESS NOTES
12/8/21. PCP is MEETA Mclain - seen 3 weeks ago. D/C Plan is home with sister Janell Pitts @ [phone]) & she will transport pt home upon discharge. Pt uses walker/no home health. Will await therapy evals/recs.

## 2021-12-09 LAB
ANION GAP SERPL CALC-SCNC: 9 MMOL/L (ref 5–15)
BASOPHILS # BLD: 0.1 K/UL (ref 0–0.1)
BASOPHILS NFR BLD: 1 % (ref 0–1)
BUN SERPL-MCNC: 30 MG/DL (ref 6–20)
BUN/CREAT SERPL: 4 (ref 12–20)
CA-I BLD-MCNC: 8.2 MG/DL (ref 8.5–10.1)
CHLORIDE SERPL-SCNC: 108 MMOL/L (ref 97–108)
CO2 SERPL-SCNC: 24 MMOL/L (ref 21–32)
COVID-19 RAPID TEST, COVR: NOT DETECTED
CREAT SERPL-MCNC: 7.2 MG/DL (ref 0.7–1.3)
CRP SERPL-MCNC: 9.67 MG/DL (ref 0–0.6)
DATE LAST DOSE: NORMAL
DIFFERENTIAL METHOD BLD: ABNORMAL
EOSINOPHIL # BLD: 0 K/UL (ref 0–0.4)
EOSINOPHIL NFR BLD: 0 % (ref 0–7)
ERYTHROCYTE [DISTWIDTH] IN BLOOD BY AUTOMATED COUNT: 19.9 % (ref 11.5–14.5)
GLUCOSE SERPL-MCNC: 76 MG/DL (ref 65–100)
HCT VFR BLD AUTO: 36 % (ref 36.6–50.3)
HGB BLD-MCNC: 11 G/DL (ref 12.1–17)
IMM GRANULOCYTES # BLD AUTO: 0.1 K/UL (ref 0–0.04)
IMM GRANULOCYTES NFR BLD AUTO: 1 % (ref 0–0.5)
LACTATE SERPL-SCNC: 2.1 MMOL/L (ref 0.4–2)
LYMPHOCYTES # BLD: 0.6 K/UL (ref 0.8–3.5)
LYMPHOCYTES NFR BLD: 5 % (ref 12–49)
MAGNESIUM SERPL-MCNC: 2 MG/DL (ref 1.6–2.4)
MCH RBC QN AUTO: 27.4 PG (ref 26–34)
MCHC RBC AUTO-ENTMCNC: 30.6 G/DL (ref 30–36.5)
MCV RBC AUTO: 89.6 FL (ref 80–99)
MONOCYTES # BLD: 0.7 K/UL (ref 0–1)
MONOCYTES NFR BLD: 5 % (ref 5–13)
NEUTS SEG # BLD: 10.9 K/UL (ref 1.8–8)
NEUTS SEG NFR BLD: 88 % (ref 32–75)
NRBC # BLD: 0 K/UL (ref 0–0.01)
NRBC BLD-RTO: 0 PER 100 WBC
PLATELET # BLD AUTO: 193 K/UL (ref 150–400)
PMV BLD AUTO: 10.4 FL (ref 8.9–12.9)
POTASSIUM SERPL-SCNC: 3.7 MMOL/L (ref 3.5–5.1)
PROCALCITONIN SERPL-MCNC: 11.98 NG/ML
RBC # BLD AUTO: 4.02 M/UL (ref 4.1–5.7)
REPORTED DOSE,DOSE: NORMAL UNITS
SODIUM SERPL-SCNC: 141 MMOL/L (ref 136–145)
SPECIMEN SOURCE: NORMAL
VANCOMYCIN SERPL-MCNC: 10.9 UG/ML
WBC # BLD AUTO: 12.3 K/UL (ref 4.1–11.1)

## 2021-12-09 PROCEDURE — 74011000250 HC RX REV CODE- 250: Performed by: INTERNAL MEDICINE

## 2021-12-09 PROCEDURE — 97530 THERAPEUTIC ACTIVITIES: CPT

## 2021-12-09 PROCEDURE — 85025 COMPLETE CBC W/AUTO DIFF WBC: CPT

## 2021-12-09 PROCEDURE — 74011250636 HC RX REV CODE- 250/636: Performed by: INTERNAL MEDICINE

## 2021-12-09 PROCEDURE — 80202 ASSAY OF VANCOMYCIN: CPT

## 2021-12-09 PROCEDURE — 86140 C-REACTIVE PROTEIN: CPT

## 2021-12-09 PROCEDURE — 84145 PROCALCITONIN (PCT): CPT

## 2021-12-09 PROCEDURE — 83735 ASSAY OF MAGNESIUM: CPT

## 2021-12-09 PROCEDURE — 83605 ASSAY OF LACTIC ACID: CPT

## 2021-12-09 PROCEDURE — 87635 SARS-COV-2 COVID-19 AMP PRB: CPT

## 2021-12-09 PROCEDURE — 80048 BASIC METABOLIC PNL TOTAL CA: CPT

## 2021-12-09 PROCEDURE — 36415 COLL VENOUS BLD VENIPUNCTURE: CPT

## 2021-12-09 PROCEDURE — 74011250637 HC RX REV CODE- 250/637: Performed by: INTERNAL MEDICINE

## 2021-12-09 PROCEDURE — 74011250637 HC RX REV CODE- 250/637: Performed by: NURSE PRACTITIONER

## 2021-12-09 PROCEDURE — 99232 SBSQ HOSP IP/OBS MODERATE 35: CPT | Performed by: INTERNAL MEDICINE

## 2021-12-09 PROCEDURE — 65270000029 HC RM PRIVATE

## 2021-12-09 PROCEDURE — 97165 OT EVAL LOW COMPLEX 30 MIN: CPT

## 2021-12-09 PROCEDURE — 97161 PT EVAL LOW COMPLEX 20 MIN: CPT

## 2021-12-09 RX ADMIN — PREDNISOLONE ACETATE 1 DROP: 10 SUSPENSION/ DROPS OPHTHALMIC at 11:18

## 2021-12-09 RX ADMIN — ACYCLOVIR 800 MG: 800 TABLET ORAL at 11:18

## 2021-12-09 RX ADMIN — ACYCLOVIR 800 MG: 800 TABLET ORAL at 21:27

## 2021-12-09 RX ADMIN — PREDNISOLONE ACETATE 1 DROP: 10 SUSPENSION/ DROPS OPHTHALMIC at 01:54

## 2021-12-09 RX ADMIN — Medication 10 ML: at 13:32

## 2021-12-09 RX ADMIN — CALCITRIOL CAPSULES 0.25 MCG 0.25 MCG: 0.25 CAPSULE ORAL at 11:18

## 2021-12-09 RX ADMIN — WHITE PETROLATUM,ZINC OXIDE: 57; 17 PASTE TOPICAL at 21:27

## 2021-12-09 RX ADMIN — Medication 10 ML: at 21:28

## 2021-12-09 RX ADMIN — PREDNISOLONE ACETATE 1 DROP: 10 SUSPENSION/ DROPS OPHTHALMIC at 21:27

## 2021-12-09 RX ADMIN — CARVEDILOL 3.12 MG: 3.12 TABLET, FILM COATED ORAL at 18:41

## 2021-12-09 RX ADMIN — HEPARIN SODIUM 5000 UNITS: 5000 INJECTION INTRAVENOUS; SUBCUTANEOUS at 06:07

## 2021-12-09 RX ADMIN — CEFEPIME HYDROCHLORIDE 1 G: 1 INJECTION, POWDER, FOR SOLUTION INTRAMUSCULAR; INTRAVENOUS at 21:27

## 2021-12-09 RX ADMIN — Medication 10 ML: at 06:08

## 2021-12-09 RX ADMIN — VANCOMYCIN HYDROCHLORIDE 750 MG: 750 INJECTION, POWDER, LYOPHILIZED, FOR SOLUTION INTRAVENOUS at 18:40

## 2021-12-09 RX ADMIN — HEPARIN SODIUM 5000 UNITS: 5000 INJECTION INTRAVENOUS; SUBCUTANEOUS at 18:41

## 2021-12-09 RX ADMIN — Medication 10 ML: at 01:55

## 2021-12-09 RX ADMIN — ATORVASTATIN CALCIUM 40 MG: 40 TABLET, FILM COATED ORAL at 11:18

## 2021-12-09 RX ADMIN — CARVEDILOL 3.12 MG: 3.12 TABLET, FILM COATED ORAL at 11:18

## 2021-12-09 NOTE — PROGRESS NOTES
Problem: Mobility Impaired (Adult and Pediatric)  Goal: *Acute Goals and Plan of Care (Insert Text)  Description: I with LE HEP x7 days  Supine to sit EOB with CGAx1 x7 days  Sit to stand with CGAx1 x7 days  Stand pivot from bed to chair with CGAx1 x7 days  Progress to amb 15-25ft with RW and CGAx 7 days  Outcome: Not Met    PHYSICAL THERAPY EVALUATION  Patient: Va Garibay (85 y.o. male)  Date: 12/9/2021  Primary Diagnosis: Sepsis ([facility] [A41.9]  ESRD on dialysis ([facility] [N18.6, Z99.2]        Precautions:       ASSESSMENT    68yo M admitted to hospital with sepsis presents to PT with decreased bed mob, transfers, LE strength, gt, activity tolerance, and overall functional mobility. PMH listed below, of significance is pt is VERY [place]. Rapid COVID neg. Pt supine in bed upon PT arrival, PCT present. Pt states he lives with his sister, lives on 1st floor and has 5 CHANTALE home with rail on L. Pt states PTA he was I with ADLs and amb with walker. Pt alert and oriented, just required inc time as pt does not have hearing aid. Currently, pt is mod A for supine to sit EOB, min A with rolling L and R to get cleaned up from having BM. Pt c/o feeling lightheaded upon sitting, took inc time to maintain static sitting balance at EOB with support. PT checked BP and was 134/38. Did not attempt standing due to low diastolic, pt with symptoms, and per nurse pt had been having low BP. Will attempt standing next session. Pt may benefit from skilled PT to address his functional deficits. Recommend HHPT with 24/7 care vs. SNF depending on progress and family support upon d/c. PLAN :  Recommendations and Planned Interventions: bed mobility training, transfer training, gait training, therapeutic exercises, patient and family training/education, and therapeutic activities      Frequency/Duration: Patient will be followed by physical therapy:  5 times a week to address goals.     Recommendation for discharge: (in order for the patient to meet his/her long term goals)  See assessment above             SUBJECTIVE:   Patient supine in bed upon PT arrival, PCT present    OBJECTIVE DATA SUMMARY:   HISTORY:    Past Medical History:   Diagnosis Date    Asthenia 1/24/2021    Cardiomyopathy ([facility] 1/24/2021    CHF (congestive heart failure) (HCC)     Chronic kidney disease     CKD (chronic kidney disease)     4    Diabetes ([facility]     End stage renal disease on dialysis ([facility] 1/24/2021    Essential hypertension 1/24/2021    Gastroesophageal reflux disease 1/24/2021    Gastroesophageal reflux disease 1/24/2021    Hypertension     Pneumonia due to COVID-19 virus 9/47/4259    Systolic CHF, acute on chronic ([facility] 1/24/2021     Past Surgical History:   Procedure Laterality Date    COLONOSCOPY N/A 12/3/2020    COLONOSCOPY performed by Naomi Lan MD at [facility]      IR FLUORO GUIDE [facility] CVAD  1/22/2021    IR FLUORO GUIDED NEEDLE PLACEMENT  10/18/2021    IR INSERT NON TUNL CVC OVER 5 YRS  1/18/2021    IR INSERT NON TUNL CVC OVER 5 YRS  10/18/2021    IR INSERT TUNL CVC W/O PORT OVER 5 YR  1/22/2021    IR PLACE CVAD FLUORO GUIDE  1/18/2021    IR REMOVE TUNL CVAD W/O PORT / PUMP  10/18/2021       Personal factors and/or comorbidities impacting plan of care:     Home Situation  Home Environment: Private residence  # Steps to Enter: 5  Rails to Enter: Yes  Hand Rails : Left  One/Two Story Residence: Two story, live on 1st floor  Living Alone: No  Support Systems: Other Family Member(s) (Sister)  Current DME Used/Available at Home: Walker, rolling  Tub or Shower Type: Shower        EXAMINATION/PRESENTATION/DECISION MAKING:   Critical Behavior:  Neurologic State: Alert  Orientation Level: Disoriented to time, Oriented to person, Oriented to place  Cognition: Decreased attention/concentration, Memory loss, Poor safety awareness, Impaired decision making           Range Of Motion:  AROM: Generally decreased, functional     B LE    Strength:    Strength: Generally decreased, functional     Grossly 3+/5 B LE    Tone & Sensation:     Sensation: Intact               Coordination:  Coordination: Generally decreased, functional    Functional Mobility:  Bed Mobility:  Rolling: Minimum assistance; Assist x1; Additional time  Supine to Sit: Moderate assistance; Assist x1; Additional time  Sit to Supine: Minimum assistance; Assist x1; Additional time  Scooting: Contact guard assistance  Transfers:    Deferred OOB due to low diastolic BP and pt symptomatic        Functional Measure:  [facility] AM-PAC 6 Clicks         Basic Mobility Inpatient Short Form  How much difficulty does the patient currently have. .. Unable A Lot A Little None   1. Turning over in bed (including adjusting bedclothes, sheets and blankets)? [] 1   [] 2   [x] 3   [] 4   2. Sitting down on and standing up from a chair with arms ( e.g., wheelchair, bedside commode, etc.)   [] 1   [] 2   [x] 3   [] 4   3. Moving from lying on back to sitting on the side of the bed? [] 1   [] 2   [x] 3   [] 4          How much help from another person does the patient currently need. .. Total A Lot A Little None   4. Moving to and from a bed to a chair (including a wheelchair)? [] 1   [] 2   [x] 3   [] 4   5. Need to walk in hospital room? [] 1   [x] 2   [] 3   [] 4   6. Climbing 3-5 steps with a railing? [] 1   [x] 2   [] 3   [] 4   © 2007, Trustees of [facility], under license to [employer]. All rights reserved     Score:  Initial: 16 Most Recent: X (Date: -- )   Interpretation of Tool:  Represents activities that are increasingly more difficult (i.e. Bed mobility, Transfers, Gait).   Score 24 23 22-20 19-15 14-10 9-7 6   Modifier CH CI CJ CK CL CM CN           Physical Therapy Evaluation Charge Determination   History Examination Presentation Decision-Making   MEDIUM  Complexity : 1-2 comorbidities / personal factors will impact the outcome/ POC  MEDIUM Complexity : 3 Standardized tests and measures addressing body structure, function, activity limitation and / or participation in recreation  MEDIUM Complexity : Evolving with changing characteristics  Other outcome measures [facility]  MEDIUM      Based on the above components, the patient evaluation is determined to be of the following complexity level: MEDIUM    Pain Rating:  No c/o pain during session today    Activity Tolerance:   Fair      After treatment patient left in no apparent distress:   Supine in bed, Call bell within reach, and Caregiver / family present          COMMUNICATION/EDUCATION:   The patients plan of care was discussed with: Registered nurse. Patient/family agree to work toward stated goals and plan of care.     Thank you for this referral.  Bryce Rodriguez   Time Calculation: 20 mins

## 2021-12-09 NOTE — PROGRESS NOTES
Progress Note    Patient: Ricardo Gabriel MRN: [id number]  SSN: [id number]    YOB: 1950  Age: 80 y.o. Sex: male      Admit Date: 12/8/2021    LOS: 1 day     Subjective:   Patient followed for sepsis following Covid-19 booster but no obvious source of infection. Blood cultures negative so far. His temperature has trended downward but WBC increased and procal and CRP both elevated. He is on Vancomycin and Cefepime. Patient is awake and responsive but sitter at bedside. He offered no complaints, still with mild confusion. Objective:     Vitals:    12/08/21 2102 12/09/21 0013 12/09/21 0612 12/09/21 0753   BP: 99/73 (!) 106/57  (!) 98/58   Pulse: 90 85  75   Resp: 22 20  20   Temp: (!) 101.2 °F (38.4 °C) 99.3 °F (37.4 °C)  99 °F (37.2 °C)   SpO2: 95%   97%   Weight:   168 lb 6.9 oz (76.4 kg)         Intake and Output:  Current Shift: 12/09 0701 - 12/09 1900  In: 360 [P.O.:360]  Out: 2   Last three shifts: 12/07 1901 - 12/09 0700  In: 260 [I.V.:260]  Out: -     Physical Exam:    Vitals and nursing note reviewed. Constitutional:       Appearance: He is not ill-appearing. HENT:      Head: Normocephalic and atraumatic. Nose: Nose normal. No congestion or rhinorrhea. Mouth/Throat:      Pharynx: Oropharynx is clear. No posterior oropharyngeal erythema. Eyes:      Pupils: Pupils are equal, round, and reactive to light. Cardiovascular:      Rate and Rhythm: Regular rhythm. Heart sounds: No murmur heard. Comments: Right sided Permacath, site unremarkable  Pulmonary:      Effort: Pulmonary effort is normal.      Breath sounds: Normal breath sounds. Chest:       Abdominal:      General: Abdomen is flat. Bowel sounds are normal.      Palpations: Abdomen is soft. Genitourinary:     Comments: No Pleitez catheter    Musculoskeletal:      Cervical back: Neck supple. Right lower leg: No edema. Left lower leg: No edema. Skin:     Findings: No rash.       Comments: Chronic sacrococcygeal wound   Neurological:      General: No focal deficit present. Mental Status: He is alert and oriented to person, place, and time. Psychiatric:         Mood and Affect: Mood normal.         Behavior: Behavior normal.         Thought Content: Thought content normal.         Judgment: Judgment normal.     Lab/Data Review:     WBC 12,300  Lactic acid 2.1 <2.6    Procal 11.98  CRP 9.67     Covid-19 Not detected    Blood cultures (12/8) No growth thus far  Assessment:     Active Problems:    [facility] (7/7/2021)      ESRD on dialysis ([facility] (12/8/2021)       1. Sepsis with fever, elevated lactic acid, leukocytosis elevated procal and CRP, etiology unclear, Day #2 IV Vancomycin and Cefepime  2. Status post Covid-19 booster  3. ESRD on hemodialysis  4. Permacath in situ  5. Penicillin allergy     Comment:  With elevated procal and CRP, fever less likely related to Covid-19 booster and more likely infection. Vancomycin and Cefepime still reasonable empiric coverage, though anaerobic activity is missing. Plan:     1. Continue IV Vancomycin and Cefepime (watch for cross-reactivity given penicillin allergy)  2. In am, repeat CBC, procal and CRP  3.  Follow-up blood cultures       Signed By: Blaze Victoria MD     December 9, 2021

## 2021-12-09 NOTE — PROGRESS NOTES
OCCUPATIONAL THERAPY EVALUATION  Patient: Caity Torers (17 y.o. male)  Date: 12/9/2021  Primary Diagnosis: Sepsis ([facility] [A41.9]  ESRD on dialysis ([facility] [N18.6, Z99.2]        Precautions: Fall risk       ASSESSMENT  Pt is a 81 y/o M with PMH of asthenia, cardiomyopathy, CHF, chronic kidney disease, diabetes, ESRD on dialysis, essential hypertension, gastroesophageal reflux disease, COVID-19, and pneumonia due to COVID-19 presenting to [facility] with c/o fever, weakness, and fatigue following COVID Booster shot, admitted 12/8/21 and being treated for Sepsis. Pt received semi-supine in bed upon arrival, AXO x3, disoriented to time as pt stated it was November. Pt agreeable to OT evaluation at this time. Pt is a poor historian as he is confused and hard of hearing so information was also obtained from his sister via telephone. Per pt report, pt lives with sister in a two-story home, however, he lives primarily on the first floor. Pt reports having a walk-in shower. Per pt's sister, there are 5 CHANTALE and L HR with the door that pt uses most frequently. Per pt's sister, pt was IND with ADLs and Mod I using RW for mobility at [facility]. Other DME owned includes: hospital bed and wheelchair (per chart review). Based on current observations, pt presents with deficits in generalized strength/AROM, standing balance, functional activity tolerance, coordination, and cognition impacting overall performance of ADLs and functional transfers/mobility. Pt's chux and hospital gown changed with 1:1 CNA upon therapist arrival as pt had a BM. Pt currently requires min A/mod A with additional time for bed mobility including rolling, sup <> sit EOB, and sit EOB <> sup. Pt able to perform scooting with CGA. Pt doffed socks IND and threaded toes with mod I, additional time, while seated on EOB, however, pt required assist to pull socks over heel. Pt performed sit EOB <> stand with RW with CGA and additional time.  Pt ambulated to toilet with RW and CGA. Pt performed toilet transfer using grab bars with CGA and additional time. Pt required verbal cuing and visual cuing to initiate wiping self while seated on toilet. Pt wiped self once and then required total A to complete wiping. Pt ambulated to and stood at sink with RW and CGA. Pt washed hands with verbal and visual cuing to recall washing hands while standing at sink. Pt attempted to place toothbrush near rectum when he picked up toothbrush, once redirected with verbal and visual cuing to brush teeth pt was able to brush teeth with set up to take the top off of the toothpaste. Pt ambulated back to bedside using RW and CGA then performed stand <> sit EOB with CGA and additional time. Overall, pt tolerates session fair. Pt would benefit from continued skilled OT services to address current impairments and improve IND and safety with self cares and functional transfers/mobility. Recommend discharge to SNF vs OT with 24/7 care once medically appropriate. Other factors to consider for discharge: PLOF, current level of assist     Patient will benefit from skilled therapy intervention to address the above noted impairments. PLAN :  Recommendations and Planned Interventions: self care training, functional mobility training, therapeutic exercise, therapeutic activities, cognitive retraining, endurance activities and patient education    Frequency/Duration: Patient will be followed by occupational therapy:  2-3x/week to address goals. Recommendation for discharge: (in order for the patient to meet his/her long term goals)  [facility] vs [facility] with 24/7 care. This discharge recommendation:  Has been made in collaboration with the attending provider and/or case management    IF patient discharges home will need the following DME: patient owns DME required for discharge       SUBJECTIVE:   Patient stated All of a sudden I can't really hear.     OBJECTIVE DATA SUMMARY:   HISTORY:   Past Medical History:   Diagnosis Date    Asthenia 1/24/2021    Cardiomyopathy ([facility] 1/24/2021    CHF (congestive heart failure) (HCC)     Chronic kidney disease     CKD (chronic kidney disease)     4    Diabetes ([facility]     End stage renal disease on dialysis ([facility] 1/24/2021    Essential hypertension 1/24/2021    Gastroesophageal reflux disease 1/24/2021    Gastroesophageal reflux disease 1/24/2021    Hypertension     Pneumonia due to COVID-19 virus 4/51/6643    Systolic CHF, acute on chronic ([facility] 1/24/2021     Past Surgical History:   Procedure Laterality Date    COLONOSCOPY N/A 12/3/2020    COLONOSCOPY performed by Divina Alvares MD at [facility] HX HEART CATHETERIZATION      HX HERNIA REPAIR      IR FLUORO GUIDE [facility] CVAD  1/22/2021    IR FLUORO GUIDED NEEDLE PLACEMENT  10/18/2021    IR INSERT NON TUNL CVC OVER 5 YRS  1/18/2021    IR INSERT NON TUNL CVC OVER 5 YRS  10/18/2021    IR INSERT TUNL CVC W/O PORT OVER 5 YR  1/22/2021    IR PLACE CVAD FLUORO GUIDE  1/18/2021    IR REMOVE TUNL CVAD W/O PORT / PUMP  10/18/2021       Expanded or extensive additional review of patient history:     Home Situation  Home Environment: Private residence  # Steps to Enter: 5  Rails to Enter: Yes  Hand Rails : Left  One/Two Story Residence: Two story, live on 1st floor  Living Alone: No  Support Systems: Other Family Member(s) (Sister)  Current DME Used/Available at Home: Walker, rolling  Tub or Shower Type: Shower    Hand dominance: Right    EXAMINATION OF PERFORMANCE DEFICITS:  Cognitive/Behavioral Status:  Neurologic State: Alert  Orientation Level: Disoriented to time; Oriented to person; Oriented to place  Cognition: Decreased attention/concentration; Memory loss; Poor safety awareness; Impaired decision making         Hearing:   Auditory  Auditory Impairment: Hard of hearing, bilateral    Vision/Perceptual:              Acuity: Able to read clock/calendar on wall without difficulty         Range of Motion:  AROM: Generally decreased, functional                         Strength:  Strength: Generally decreased, functional                Coordination:  Coordination: Generally decreased, functional  Fine Motor Skills-Upper: Left Impaired; Right Impaired (Required a few tries to reach sink knob)    Gross Motor Skills-Upper: Left Impaired; Right Impaired    Tone & Sensation:     Sensation: Intact                      Balance:  Sitting: Intact; Without support  Standing: Impaired; With support  Standing - Static: Fair; Constant support  Standing - Dynamic : Fair; Constant support    Functional Mobility and Transfers for ADLs:  Bed Mobility:  Rolling: Minimum assistance; Assist x1; Additional time  Supine to Sit: Moderate assistance; Assist x1; Additional time  Sit to Supine: Minimum assistance; Assist x1; Additional time  Scooting: Contact guard assistance    Transfers:  Sit to Stand: Contact guard assistance; Stand-by assistance; Additional time  Stand to Sit: Contact guard assistance; Stand-by assistance; Additional time  Bathroom Mobility: Minimum assistance; Contact guard assistance  Toilet Transfer : Contact guard assistance; Minimum assistance    ADL Assessment:                                            ADL Intervention and task modifications:       Grooming  Position Performed: Standing (At sink)  Washing Hands: Supervision; Stand-by assistance; Contact guard assistance (Stood at sink with CGA, verbal and visual cues required)  Brushing Teeth: Contact guard assistance; Stand-by assistance; Supervision (Stood at sink, verbal and visual cues required)  Cues: Verbal cues provided; Visual cues provided              Upper Body [address]: Set-up (Seated EOB)    Lower Body Dressing Assistance  Socks: Maximum assistance (Can reach toes, A to pull over heels with additional time)  Position Performed: Seated edge of bed    Toileting  Bowel Hygiene: Total assistance (dependent);  Set-up (Wiped self once with set up, total A to finish wiping)  Clothing Management: Independent  Cues: Verbal cues provided; Visual cues provided (Verbal and visual cuing with toilet paper to initiate wiping)         Therapeutic Exercise:  Pt would benefit from UE HEP initiated at next session. Functional Measure:    [address] [place] [address] AM-PACTM \"6 Clicks\"                                                       Daily Activity Inpatient Short Form  How much help from another person does the patient currently need. .. Total; A Lot A Little None   1. Putting on and taking off regular lower body clothing? []  1 []  2 [x]  3 []  4   2. Bathing (including washing, rinsing, drying)? []  1 [x]  2 []  3 []  4   3. Toileting, which includes using toilet, bedpan or urinal? [] 1 [x]  2 []  3 []  4   4. Putting on and taking off regular upper body clothing? []  1 []  2 [x]  3 []  4   5. Taking care of personal grooming such as brushing teeth? []  1 []  2 [x]  3 []  4   6. Eating meals? []  1 []  2 [x]  3 []  4   © 2007, Trustees of [address] [place] [address], under license to [employer]. All rights reserved     Score: 16/24     Interpretation of Tool:  Represents clinically-significant functional categories (i.e. Activities of daily living).   Percentage of Impairment CH    0%   CI    1-19% CJ    20-39% CK    40-59% CL    60-79% CM    80-99% CN     100%   [facility]  Score 6-24 24 23 20-22 15-19 10-14 7-9 6         Occupational Therapy Evaluation Charge Determination   History Examination Decision-Making   LOW Complexity : Brief history review  LOW Complexity : 1-3 performance deficits relating to physical, cognitive , or psychosocial skils that result in activity limitations and / or participation restrictions  LOW Complexity : No comorbidities that affect functional and no verbal or physical assistance needed to complete eval tasks       Based on the above components, the patient evaluation is determined to be of the following complexity level: LOW   Pain Ratin/10    Activity Tolerance:   Fair  Please refer to the flowsheet for vital signs taken during this treatment. After treatment patient left in no apparent distress:    Supine in bed, Call bell within reach, Bed / chair alarm activated, Side rails x 3 and 1:1 CNA present    COMMUNICATION/EDUCATION:   The patients plan of care was discussed with: Registered nurse and Certified nursing assistant/patient care technician. Patient/family agree to work toward stated goals and plan of care. This patients plan of care is appropriate for delegation to [facility]. Thank you for this referral.  Enrique Walsh OT  Time Calculation: 45 mins    Problem: Self Care Deficits Care Plan (Adult)  Goal: *Acute Goals and Plan of Care (Insert Text)  Description: 1. Pt will be mod I sup <> sit in prep for EOB ADLs  2. Pt will be mod I grooming standing at sink  3. Pt will be mod I LE dressing sitting EOB/long sit  4. Pt will be mod I sit <>  prep for toileting LRAD  5. Pt will be mod I toileting/toilet transfer/cloth mgmt LRAD  6.  Pt will be IND following UE HEP in prep for self care tasks      Outcome: Not Met

## 2021-12-09 NOTE — PROGRESS NOTES
Problem: Patient Education:  Go to Education Activity  Goal: Patient/Family Education  Outcome: Progressing Towards Goal     Problem: Pressure Injury - Risk of  Goal: *Prevention of pressure injury  Description: Document Vik Scale and appropriate interventions in the flowsheet.   Outcome: Progressing Towards Goal  Note: Pressure Injury Interventions:  Sensory Interventions: Assess changes in LOC    Moisture Interventions: Absorbent underpads, Check for incontinence Q2 hours and as needed    Activity Interventions: PT/OT evaluation    Mobility Interventions: PT/OT evaluation    Nutrition Interventions: Document food/fluid/supplement intake, Offer support with meals,snacks and hydration    Friction and Shear Interventions: Apply protective barrier, creams and emollients                Problem: Patient Education: Go to Patient Education Activity  Goal: Patient/Family Education  Outcome: Progressing Towards Goal

## 2021-12-09 NOTE — CONSULTS
Consult requested by: Author Iqra MD    ADMIT DATE: 12/8/2021  CONSULT DATE: December 9, 2021               Admission diagnosis: sepsis    Reason for Nephrology Consultation: Management of ESRD  HPI: Pearl Kaufman is a 80 y.o. male BLACK/ with known diagnosis of ESRD for which he receives hemodialysis on MWF at [facility] . Patient's nephrologist is Dr. Carlie Soulier  He missed Dialysis yesterday as he felt very week     He presented to [facility] with complaints of fever and generalized weakness. He also had subjective fevers and chills. Oct 2021 - 1. Successful removal of the right IJ tunneled dialysis catheter. Tip sent for cultures. Ollie Villela.  Got Vanc and Genta with HD  From recent Nephro Note - recent klebsiella and MRSA bacteremia in 09/2021  -was dced on iv vanc and tobramycin per ID recs   -re admitted with fever, BC 10/15  Klebsiella  -s/p removed perm cath 10/18  -placed temp hd cath      Past Medical History:   Diagnosis Date    Asthenia 1/24/2021    Cardiomyopathy ([address] 1/24/2021    CHF (congestive heart failure) (HCC)     Chronic kidney disease     CKD (chronic kidney disease)     4    Diabetes ([address]     End stage renal disease on dialysis ([address] 1/24/2021    Essential hypertension 1/24/2021    Gastroesophageal reflux disease 1/24/2021    Gastroesophageal reflux disease 1/24/2021    Hypertension     Pneumonia due to COVID-19 virus 3/87/1415    Systolic CHF, acute on chronic ([address] 1/24/2021      Past Surgical History:   Procedure Laterality Date    COLONOSCOPY N/A 12/3/2020    COLONOSCOPY performed by Zonia Rosas MD at [facility] HX HEART CATHETERIZATION      HX HERNIA REPAIR      IR FLUORO GUIDE [facility] CVAD  1/22/2021    IR FLUORO GUIDED NEEDLE PLACEMENT  10/18/2021    IR INSERT NON TUNL CVC OVER 5 YRS  1/18/2021    IR INSERT NON TUNL CVC OVER 5 YRS  10/18/2021    IR INSERT TUNL CVC W/O PORT OVER 5 YR  1/22/2021    IR PLACE CVAD FLUORO GUIDE  1/18/2021    IR REMOVE TUNL CVAD W/O PORT / PUMP  10/18/2021       Social History     Socioeconomic History    Marital status:      Spouse name: Not on file    Number of children: Not on file    Years of education: Not on file    Highest education level: Not on file   Occupational History    Not on file   Tobacco Use    Smoking status: Never Smoker    Smokeless tobacco: Never Used   Vaping Use    Vaping Use: Never used   Substance and Sexual Activity    Alcohol use: Not Currently    Drug use: Never    Sexual activity: Not Currently   Other Topics Concern     Service Not Asked    Blood Transfusions Not Asked    Caffeine Concern Not Asked    Occupational Exposure Not Asked    Hobby Hazards Not Asked    Sleep Concern Not Asked    Stress Concern Not Asked    Weight Concern Not Asked    Special Diet Not Asked    Back Care Not Asked    Exercise Not Asked    Bike Helmet Not Asked   [address] Not Asked    Self-Exams Not Asked   Social History Narrative    Not on file     Social Determinants of Health     Financial Resource Strain: Low Risk     Difficulty of Paying Living Expenses: Not very hard   Food Insecurity: No Food Insecurity    Worried About Running Out of Food in the Last Year: Never true    Ronald of Food in the Last Year: Never true   Transportation Needs: No Transportation Needs    Lack of Transportation (Medical): No    Lack of Transportation (Non-Medical): No   Physical Activity: Insufficiently Active    Days of Exercise per Week: 2 days    Minutes of Exercise per Session: 10 min   Stress: Stress Concern Present    Feeling of Stress :  To some extent   Social Connections: Socially Isolated    Frequency of Communication with Friends and Family: Once a week    Frequency of Social Gatherings with Friends and Family: Once a week    Attends Adventism Services: Never    Active Member of Clubs or Organizations: No    Attends Club or Organization Meetings: Never    Marital Status:    Intimate Partner Violence: Not At Risk    Fear of Current or Ex-Partner: No    Emotionally Abused: No    Physically Abused: No    Sexually Abused: No   Housing Stability: Unknown    Unable to Pay for Housing in the Last Year: No    Number of [place] in the Last Year: Not on file    Unstable Housing in the Last Year: No       Family History   Problem Relation Age of Onset    Diabetes Mother     Heart Failure Father      Allergies   Allergen Reactions    Lisinopril Angioedema    Pcn [Penicillins] Rash        Home Medications:     Medications Prior to Admission   Medication Sig    sevelamer carbonate (RENVELA) 800 mg tab tab Take 1 Tablet by mouth three (3) times daily (with meals).  carvediloL (COREG) 3.125 mg tablet Take 1 Tablet by mouth two (2) times daily (with meals).  prednisoLONE acetate (PRED FORTE) 1 % ophthalmic suspension Administer 1 Drop to both eyes two (2) times a day. Post shingles therapy.  dextran 70-hypromellose (Artificial Tears,[id number],) ophthalmic solution Administer 1 Drop to both eyes three (3) times daily as needed.  acyclovir (ZOVIRAX) 800 mg tablet Take 800 mg by mouth two (2) times a day.  albuterol (PROVENTIL HFA, VENTOLIN HFA, PROAIR HFA) 90 mcg/actuation inhaler Take 2 Puffs by inhalation every four (4) hours as needed for Wheezing or Shortness of Breath.  atorvastatin (LIPITOR) 40 mg tablet Take 40 mg by mouth daily.        Current Inpatient Medications:     Current Facility-Administered Medications   Medication Dose Route Frequency    albuterol (PROVENTIL HFA, VENTOLIN HFA, PROAIR HFA) inhaler 2 Puff  2 Puff Inhalation Q4H PRN    acyclovir (ZOVIRAX) tablet 800 mg  800 mg Oral BID    atorvastatin (LIPITOR) tablet 40 mg  40 mg Oral DAILY    calcitRIOL (ROCALTROL) capsule 0.25 mcg  0.25 mcg Oral DAILY    carvediloL (COREG) tablet 3.125 mg  3.125 mg Oral BID WITH MEALS    peg 400-hypromellose-glycerin 1-0.2-0.2 % opthalmic solution   Both Eyes TID PRN    prednisoLONE acetate (PRED FORTE) 1 % ophthalmic suspension 1 Drop  1 Drop Both Eyes BID    sodium chloride (NS) flush 5-40 mL  5-40 mL IntraVENous Q8H    sodium chloride (NS) flush 5-40 mL  5-40 mL IntraVENous PRN    acetaminophen (TYLENOL) tablet 650 mg  650 mg Oral Q6H PRN    Or    acetaminophen (TYLENOL) suppository 650 mg  650 mg Rectal Q6H PRN    polyethylene glycol (MIRALAX) packet 17 g  17 g Oral DAILY PRN    ondansetron (ZOFRAN ODT) tablet 4 mg  4 mg Oral Q8H PRN    Or    ondansetron (ZOFRAN) injection 4 mg  4 mg IntraVENous Q6H PRN    heparin (porcine) injection 5,000 Units  5,000 Units SubCUTAneous Q12H    VANCOMYCIN INFORMATION NOTE   Other PRN    Vancomycin random level to be drawn on 12/9/21 at  0400   Other ONCE    cefepime (MAXIPIME) 1 g in sterile water (preservative free) 10 mL IV syringe  1 g IntraVENous Q24H       Review of Systems:   No fever or chills. No sore throat. No cough or hemoptysis. No shortness of breath or chest pain. No orthopnea or paroxysmal nocturnal dyspnea. No nausea, vomiting, abdominal pain, melena or hematochezia. No ankle swelling, no joint paints. No muscle aches. No skin changes. No dizziness or lightheadedness. No headaches.        Physical Assessment:     Vitals:    12/08/21 2102 12/09/21 0013 12/09/21 0612 12/09/21 0753   BP: 99/73 (!) 106/57  (!) 98/58   Pulse: 90 85  75   Resp: 22 20  20   Temp: (!) 101.2 °F (38.4 °C) 99.3 °F (37.4 °C)  99 °F (37.2 °C)   SpO2: 95%   97%   Weight:   76.4 kg (168 lb 6.9 oz)      Last 3 Recorded Weights in this Encounter    12/09/21 0612   Weight: 76.4 kg (168 lb 6.9 oz)     Admission weight: Weight: 76.4 kg (168 lb 6.9 oz) (12/09/21 0612)      Intake/Output Summary (Last 24 hours) at 12/9/2021 1541  Last data filed at 12/9/2021 1325  Gross per 24 hour   Intake 620 ml   Output 5 ml   Net 615 ml       Patient is in no apparent distress. HEENT: Head is normocephalic and atraumatic. Pupils are round, equal, reactive to light. Sclerae are anicteric. Oropharynx clear. Neck: no cervical lymphadenopathy or thyromegaly. Lungs: good air entry, clear to auscultation bilaterally. Trachea at the midline. Cardiovascular system: S1, S2, regular rate and rhythm. No murmurs, gallops or rubs. No jvd. Abdomen: soft, non tender, non distended. Positive bowel sounds. No hepatosplenomegaly. No abdominal bruits. Extremities: no clubbing, cyanosis or edema. Strong dorsalis pedis pulses. Brisk capillary refill on the toes bilaterally. Integumentary: skin is grossly intact. Neurologic: Alert, oriented time three. Cooperative and appropriate. No gross motor or sensory deficits. Dialysis access: temporary catheter site right and IJ.    AV Graft/Fistula: AVF - Left arm = patient said its not ready for use     Data Review:    Labs: Results:       Chemistry Recent Labs     12/09/21  0551 12/08/21  1412   GLU 76 101*    140   K 3.7 3.4*    105   CO2 24 28   BUN 30* 22*   CREA 7.20* 6.44*   CA 8.2* 8.5   AGAP 9 7   BUCR 4* 3*   AP  --  67   TP  --  8.2   ALB  --  2.6*   GLOB  --  5.6*   AGRAT  --  0.5*         CBC w/Diff Recent Labs     12/09/21  0551 12/08/21  1412   WBC 12.3* 9.6   RBC 4.02* 4.24   HGB 11.0* 11.9*   HCT 36.0* 38.7    210   GRANS 88* 83*   LYMPH 5* 7*   EOS 0 0         Iron/Ferritin No results for input(s): IRON in the last 72 hours. No lab exists for component: TIBCCALC   PTH/VIT D No results for input(s): PTH in the last 72 hours. No lab exists for component: VITD            Assessment & Plan     ESRD. Not in any volume overload condition    Stable electrolytes.    HD tomorrow   - H&H are good   -Check Ca and Phos with am labs     Sepsis - Blood Cx were sent and on brod spectrum abxs   Follow up Cxs   Seen by ID   R/o line sepsis     HTN - Home meds on Hold as BP is borderline   Will hold Before Dialysis tomorrow     Thank you for the consult, we will follow the patient very closely while he is here . Avoid Gadolinium due to its association with nephrogenic systemic fibrosis in a patients with severe ARF and ESRD. Please dose all medications for  creatinine clearance <15/dialysis. Avoid PICC lines on either arm in order to preserve veins for dialysis access creation.

## 2021-12-09 NOTE — WOUND CARE
IP WOUND CONSULT    Kit Apley  MEDICAL RECORD NUMBER:  [id number]  AGE: 80 y.o. GENDER: male  : 1950  TODAY'S DATE:  2021    GENERAL     [] Follow-up   [x] New Consult    Linus Camargo is a 80 y.o. male referred by:   [x] Physician  [] Nursing  [] Other:         PAST MEDICAL HISTORY    Past Medical History:   Diagnosis Date    Asthenia 2021    [facility]    CHF (congestive heart failure) (HCC)     Chronic kidney disease     CKD (chronic kidney disease)     4    Diabetes [facility]     End stage renal disease on dialysis ([facility] 2021    Essential hypertension 2021    Gastroesophageal reflux disease 2021    Gastroesophageal reflux disease 2021    Hypertension     Pneumonia due to COVID-19 virus     Systolic CHF, acute on chronic ([facility] 2021        PAST SURGICAL HISTORY    Past Surgical History:   Procedure Laterality Date    COLONOSCOPY N/A 12/3/2020    COLONOSCOPY performed by Yazmin Leon MD at [address] [facility] HX HEART CATHETERIZATION      HX HERNIA REPAIR      IR FLUORO GUIDE [facility] CVAD  2021    IR FLUORO GUIDED NEEDLE PLACEMENT  10/18/2021    IR INSERT NON TUNL CVC OVER 5 YRS  2021    IR INSERT NON TUNL CVC OVER 5 YRS  10/18/2021    IR INSERT TUNL CVC W/O PORT OVER 5 YR  2021    IR PLACE CVAD FLUORO GUIDE  2021    IR REMOVE TUNL CVAD W/O PORT / PUMP  10/18/2021       FAMILY HISTORY    Family History   Problem Relation Age of Onset    Diabetes Mother     Heart Failure Father          ALLERGIES    Allergies   Allergen Reactions    Lisinopril Angioedema    Pcn [Penicillins] Rash       MEDICATIONS    No current facility-administered medications on file prior to encounter. Current Outpatient Medications on File Prior to Encounter   Medication Sig Dispense Refill    sevelamer carbonate (RENVELA) 800 mg tab tab Take 1 Tablet by mouth three (3) times daily (with meals).       carvediloL (COREG) 3.125 mg tablet Take 1 Tablet by mouth two (2) times daily (with meals). 30 Tablet 0    prednisoLONE acetate (PRED FORTE) 1 % ophthalmic suspension Administer 1 Drop to both eyes two (2) times a day. Post shingles therapy.  dextran 70-hypromellose (Artificial Tears,[id number],) ophthalmic solution Administer 1 Drop to both eyes three (3) times daily as needed.  acyclovir (ZOVIRAX) 800 mg tablet Take 800 mg by mouth two (2) times a day.  albuterol (PROVENTIL HFA, VENTOLIN HFA, PROAIR HFA) 90 mcg/actuation inhaler Take 2 Puffs by inhalation every four (4) hours as needed for Wheezing or Shortness of Breath. 1 Inhaler 0    atorvastatin (LIPITOR) 40 mg tablet Take 40 mg by mouth daily. [unfilled]  Visit Vitals  BP (!) 98/50   Pulse 82   Temp 97.6 °F (36.4 °C)   Resp 20   Wt 76.4 kg (168 lb 6.9 oz)   SpO2 94%   BMI 24.17 kg/m²       ASSESSMENT     Wound Identification & Type: Well-healed stage 4 PI, closed and no drainage. Dressing change: zinc paste   Verbal consent for picture: Patient is not A&O x 4 at this time. Contributing Factors: anticoagulation therapy, diabetes, decreased mobility, shear force, incontinence of stool and incontinence of urine    Wound Heel Left;Posterior small, discolored, blanchable (Active)   Number of days: 322       Wound Wrist Left Healing surgical site. No redness, swelling, or drainage.  Present upon arrival to ED (Active)   Number of days: 155       Wound Sacral/coccyx Full Thickness Tissue Loss 09/11/21 (Active)   Wound Image   12/09/21 1752   Wound Etiology Other (Comment) 12/09/21 1752   Cleansed Other (Comment) 12/09/21 1752   Dressing/Treatment Zinc paste 12/09/21 1752   Wound Assessment Hyper granulation tissue; Pink/red 12/09/21 1752   Drainage Amount None 12/09/21 1752   Wound Odor None 12/09/21 1752   Carole-Wound/Incision Assessment Hyperpigmented 12/09/21 1752   Edges Attached edges 12/09/21 1752   Number of days: 89       Incision 01/23/21 Cervical anterior Right (Active)   Dressing Status Clean; Dry; Intact 12/09/21 1118   Number of days: 320          PLAN     Skin Care & Pressure Relief Recommendations  Minimize layers of linen  Turn/reposition approximately every 2 hours  Pillow wedges  Manage incontinence   Promote continence; Skin Protective lotion/cream to buttocks and sacrum daily and as needed with incontinence care  Offload heels pillows    Vik 14  Blood Glucose: 76 on 12/9/21                             Albumin: 2.6 on 12/8/21  WBCs: 12.3 on 12/9/21    Support Surface: Foam mattress    Physician/Provider notified:   Recommendations: Previous stage 4 PI to sacrum is well-healed, no open areas, scar tissue intact. Patient is having frequent loose stools with mucous. Incontinent care provided and documented in flow chart. Apply zinc paste TID and prn for soiling to buttocks and sacrum/coccyx. Patient turns well with assistance and lays on sides well. Ensure turning q2h at 30 degree angle or more to offload sacrum and buttocks. Maintain HOB at 30 degrees or less, if not contraindicated, to reduce pressure to sacrum and buttocks. Float heels with 2-3 pillows while in bed to offload heels and prevent pressure injury. Will continue to follow.         Discharge Wound Care Needs: TBD    Teaching completed with:   [] Patient           [] Family member       [] Caregiver          [] Nursing  [] Other    Patient/Caregiver Teaching:  Level of patient/caregiver understanding able to:   [] Indicates understanding       [] Needs reinforcement  [] Unsuccessful      [] Verbal Understanding  [] Demonstrated understanding       [] No evidence of learning  [] Refused teaching         [] N/A       Electronically signed by Jerry Rojo RN on 12/9/2021 at 5:57 PM

## 2021-12-09 NOTE — PROGRESS NOTES
Hospitalist Progress Note    Subjective:   Daily Progress Note: 12/9/2021 4:06 PM    Hospital Course:  Pt admitted for fevers, and general weakness. He recently reported receiving the covid 19 booster and temperature was 103.6 on admission. History of MRSA and klebsiella bacteremia in 10/21 due to infected HD catheter which was removed at that time. Infectious disease is consulted for IV antibiotics and blood  cultures were sent on admission. Subjective: Pt seen in room, very [place], sitter at bedside. No pain complaints    Current Facility-Administered Medications   Medication Dose Route Frequency    albuterol (PROVENTIL HFA, VENTOLIN HFA, PROAIR HFA) inhaler 2 Puff  2 Puff Inhalation Q4H PRN    acyclovir (ZOVIRAX) tablet 800 mg  800 mg Oral BID    atorvastatin (LIPITOR) tablet 40 mg  40 mg Oral DAILY    calcitRIOL (ROCALTROL) capsule 0.25 mcg  0.25 mcg Oral DAILY    carvediloL (COREG) tablet 3.125 mg  3.125 mg Oral BID WITH MEALS    peg 400-hypromellose-glycerin 1-0.2-0.2 % opthalmic solution   Both Eyes TID PRN    prednisoLONE acetate (PRED FORTE) 1 % ophthalmic suspension 1 Drop  1 Drop Both Eyes BID    sodium chloride (NS) flush 5-40 mL  5-40 mL IntraVENous Q8H    sodium chloride (NS) flush 5-40 mL  5-40 mL IntraVENous PRN    acetaminophen (TYLENOL) tablet 650 mg  650 mg Oral Q6H PRN    Or    acetaminophen (TYLENOL) suppository 650 mg  650 mg Rectal Q6H PRN    polyethylene glycol (MIRALAX) packet 17 g  17 g Oral DAILY PRN    ondansetron (ZOFRAN ODT) tablet 4 mg  4 mg Oral Q8H PRN    Or    ondansetron (ZOFRAN) injection 4 mg  4 mg IntraVENous Q6H PRN    heparin (porcine) injection 5,000 Units  5,000 Units SubCUTAneous Q12H    VANCOMYCIN INFORMATION NOTE   Other PRN    cefepime (MAXIPIME) 1 g in sterile water (preservative free) 10 mL IV syringe  1 g IntraVENous Q24H        Review of Systems:    Review of Systems   Constitutional: Positive for malaise/fatigue. Negative for chills and fever. HENT: Negative for congestion and sore throat. Respiratory: Negative for cough and shortness of breath. Cardiovascular: Negative for chest pain and palpitations. Gastrointestinal: Negative for heartburn and nausea. Genitourinary: Negative for dysuria and urgency. Neurological: Negative for dizziness and headaches. Objective:     Visit Vitals  BP (!) 95/57 (BP 1 Location: Right upper arm, BP Patient Position: At rest)   Pulse 82   Temp 99.9 °F (37.7 °C)   Resp 20   Wt 76.4 kg (168 lb 6.9 oz)   SpO2 97%   BMI 24.17 kg/m²      O2 Device: None (Room air)    Temp (24hrs), Av.9 °F (37.7 °C), Min:99 °F (37.2 °C), Max:101.2 °F (38.4 °C)      701 - 1900  In: 960 [P.O.:960]  Out: 5   1901 - 700  In: 260 [I.V.:260]  Out: -     PHYSICAL EXAM:    Physical Exam  Constitutional:       General: He is not in acute distress. HENT:      Right Ear: Decreased hearing noted. Left Ear: Decreased hearing noted. Cardiovascular:      Rate and Rhythm: Normal rate and regular rhythm. Pulses: Normal pulses. Heart sounds: Normal heart sounds. Pulmonary:      Effort: Pulmonary effort is normal.      Breath sounds: Normal breath sounds. Abdominal:      General: Bowel sounds are normal.      Palpations: Abdomen is soft. Musculoskeletal:         General: Normal range of motion. Skin:     General: Skin is warm and dry. Comments: Right chest HD catheter. Neurological:      Mental Status: He is oriented to person, place, and time.             Data Review    Recent Results (from the past 24 hour(s))   METABOLIC PANEL, BASIC    Collection Time: 21  5:51 AM   Result Value Ref Range    Sodium 141 136 - 145 mmol/L    Potassium 3.7 3.5 - 5.1 mmol/L    Chloride 108 97 - 108 mmol/L    CO2 24 21 - 32 mmol/L    Anion gap 9 5 - 15 mmol/L    Glucose 76 65 - 100 mg/dL    BUN 30 (H) 6 - 20 mg/dL    Creatinine 7.20 (H) 0.70 - 1.30 mg/dL    BUN/Creatinine ratio 4 (L) 12 - 20 GFR est AA 9 (L) >60 ml/min/1.73m2    GFR est non-AA 8 (L) >60 ml/min/1.73m2    Calcium 8.2 (L) 8.5 - 10.1 mg/dL   MAGNESIUM    Collection Time: 12/09/21  5:51 AM   Result Value Ref Range    Magnesium 2.0 1.6 - 2.4 mg/dL   CBC WITH AUTOMATED DIFF    Collection Time: 12/09/21  5:51 AM   Result Value Ref Range    WBC 12.3 (H) 4.1 - 11.1 K/uL    RBC 4.02 (L) 4.10 - 5.70 M/uL    HGB 11.0 (L) 12.1 - 17.0 g/dL    HCT 36.0 (L) 36.6 - 50.3 %    MCV 89.6 80.0 - 99.0 FL    MCH 27.4 26.0 - 34.0 PG    MCHC 30.6 30.0 - 36.5 g/dL    RDW 19.9 (H) 11.5 - 14.5 %    PLATELET 549 906 - 254 K/uL    MPV 10.4 8.9 - 12.9 FL    NRBC 0.0 0.0  WBC    ABSOLUTE NRBC 0.00 0.00 - 0.01 K/uL    NEUTROPHILS 88 (H) 32 - 75 %    LYMPHOCYTES 5 (L) 12 - 49 %    MONOCYTES 5 5 - 13 %    EOSINOPHILS 0 0 - 7 %    BASOPHILS 1 0 - 1 %    IMMATURE GRANULOCYTES 1 (H) 0 - 0.5 %    ABS. NEUTROPHILS 10.9 (H) 1.8 - 8.0 K/UL    ABS. LYMPHOCYTES 0.6 (L) 0.8 - 3.5 K/UL    ABS. MONOCYTES 0.7 0.0 - 1.0 K/UL    ABS. EOSINOPHILS 0.0 0.0 - 0.4 K/UL    ABS. BASOPHILS 0.1 0.0 - 0.1 K/UL    ABS. IMM.  GRANS. 0.1 (H) 0.00 - 0.04 K/UL    DF AUTOMATED     C REACTIVE PROTEIN, QT    Collection Time: 12/09/21  5:51 AM   Result Value Ref Range    C-Reactive protein 9.67 (H) 0.00 - 0.60 mg/dL   PROCALCITONIN    Collection Time: 12/09/21  5:51 AM   Result Value Ref Range    Procalcitonin 11.98 (H) 0 ng/mL   LACTIC ACID    Collection Time: 12/09/21  5:51 AM   Result Value Ref Range    Lactic acid 2.1 (HH) 0.4 - 2.0 mmol/L   COVID-19 RAPID TEST    Collection Time: 12/09/21  8:05 AM   Result Value Ref Range    Specimen source Please find results under separate order      COVID-19 rapid test Not Detected Not Detected     VANCOMYCIN, RANDOM    Collection Time: 12/09/21  8:42 AM   Result Value Ref Range    Vancomycin, random 10.9 ug/mL    Reported dose date Not provided      Reported dose: Not provided Units       XR CHEST PORT   Final Result          Active Problems:    Sepsis ([facility] [address] (7/7/2021)      ESRD on dialysis ([facility] [address] (12/8/2021)        Assessment/Plan:   1. Sepsis- fever, elevated lactic acid, unknown etiology, BC pending  ID following on IV vancomycin, cefepime, follow sepsis markers,    2. ESRD- HD Tues/thurs/sat, nephrology following    3. Generalized weakness- OT/PT , OOB to chair    4. Hypertension- coreg,     5. Discharge plan- HH, CM following, possible next 24-48 hrs depending on clinical course.          DVT Prophylaxis: heparin sq  Code Status:  Full Code  POA: sister Luz Maria Sol,     Care Plan discussed with:   ______patient, staff nurse_________________________________________________________    Hilary Eli NP

## 2021-12-09 NOTE — PROGRESS NOTES
Patient is alert and confused. Patient was admitted with an area to mid sacrum. Area cleaned and dressing  Applied. Wound consult order in.

## 2021-12-09 NOTE — PROGRESS NOTES
Unable to fully complete patient admission. Patient is confused and alert but repeats some of the questions I ask him.

## 2021-12-09 NOTE — PROGRESS NOTES
Vancomycin Note-12/09/2021    Admission date [id number]   Attending Shy Gil sepsis        Height Ht Readings from Last 1 Encounters:   10/22/21 177.8 cm (70\")       Weight Wt Readings from Last 1 Encounters:   12/09/21 76.4 kg (168 lb 6.9 oz)      IBW ideal body weight      SCr Creatinine   Date Value Ref Range Status   12/09/2021 7.20 (H) 0.70 - 1.30 mg/dL Final   12/08/2021 6.44 (H) 0.70 - 1.30 mg/dL Final   10/21/2021 6.00 (H) 0.70 - 1.30 mg/dL Final      CrCl (based on IBW) 9.9 ml/min       Load/ER dose 1000mg x 1 12/08   Current regimen  Pulse dosing   Level Type / Date / Result random / 12/09 / 10.9   NEW regimen 750mg x1   Level Scheduled for 12/10 in AM         Current Antimicrobial Therapy (168h ago, onward)       Ordered     Start Stop    12/09/21 1732  vancomycin (VANCOCIN) 750 mg in 0.9% sodium chloride 250 mL (VIAL-MATE)  750 mg,   IntraVENous,   ONCE        References:&nbsp;&nbsp;&nbsp;&nbsp; Lexicomp    12/09/21 1800 12/10/21 0559    12/08/21 1644  acyclovir (ZOVIRAX) tablet 800 mg  800 mg,   Oral,   2 TIMES DAILY        Note to Pharmacy: OP SIG:Take 800 mg by mouth two (2) times a day. References:&nbsp;&nbsp;&nbsp;&nbsp; Lexicomp    12/08/21 2100 --    12/08/21 1901  cefepime (MAXIPIME) 1 g in sterile water (preservative free) 10 mL IV syringe  1 g,   IntraVENous,   EVERY 24 HOURS        References:&nbsp;&nbsp;&nbsp;&nbsp; Lexicomp    12/08/21 1902 --    12/08/21 1847  VANCOMYCIN INFORMATION NOTE  Other,   AS NEEDED        References:&nbsp;&nbsp;&nbsp;&nbsp; Lexicomp    12/08/21 1846 --                      Submitted by:  Genesis Hernandez, WILLISD

## 2021-12-10 LAB
ANION GAP SERPL CALC-SCNC: 6 MMOL/L (ref 5–15)
BASOPHILS # BLD: 0.1 K/UL (ref 0–0.1)
BASOPHILS NFR BLD: 1 % (ref 0–1)
BUN SERPL-MCNC: 42 MG/DL (ref 6–20)
BUN/CREAT SERPL: 5 (ref 12–20)
CA-I BLD-MCNC: 8.2 MG/DL (ref 8.5–10.1)
CHLORIDE SERPL-SCNC: 109 MMOL/L (ref 97–108)
CO2 SERPL-SCNC: 25 MMOL/L (ref 21–32)
CREAT SERPL-MCNC: 8.41 MG/DL (ref 0.7–1.3)
CRP SERPL-MCNC: 9.63 MG/DL (ref 0–0.6)
DIFFERENTIAL METHOD BLD: ABNORMAL
EOSINOPHIL # BLD: 0.3 K/UL (ref 0–0.4)
EOSINOPHIL NFR BLD: 4 % (ref 0–7)
ERYTHROCYTE [DISTWIDTH] IN BLOOD BY AUTOMATED COUNT: 19.8 % (ref 11.5–14.5)
GLUCOSE SERPL-MCNC: 77 MG/DL (ref 65–100)
HCT VFR BLD AUTO: 34.5 % (ref 36.6–50.3)
HGB BLD-MCNC: 10.7 G/DL (ref 12.1–17)
IMM GRANULOCYTES # BLD AUTO: 0 K/UL (ref 0–0.04)
IMM GRANULOCYTES NFR BLD AUTO: 0 % (ref 0–0.5)
LYMPHOCYTES # BLD: 1 K/UL (ref 0.8–3.5)
LYMPHOCYTES NFR BLD: 14 % (ref 12–49)
MCH RBC QN AUTO: 28 PG (ref 26–34)
MCHC RBC AUTO-ENTMCNC: 31 G/DL (ref 30–36.5)
MCV RBC AUTO: 90.3 FL (ref 80–99)
MONOCYTES # BLD: 1 K/UL (ref 0–1)
MONOCYTES NFR BLD: 14 % (ref 5–13)
NEUTS SEG # BLD: 4.9 K/UL (ref 1.8–8)
NEUTS SEG NFR BLD: 67 % (ref 32–75)
NRBC # BLD: 0.02 K/UL (ref 0–0.01)
NRBC BLD-RTO: 0.3 PER 100 WBC
PHOSPHATE SERPL-MCNC: 2.8 MG/DL (ref 2.6–4.7)
PLATELET # BLD AUTO: 149 K/UL (ref 150–400)
PMV BLD AUTO: 10.6 FL (ref 8.9–12.9)
POTASSIUM SERPL-SCNC: 3.3 MMOL/L (ref 3.5–5.1)
PROCALCITONIN SERPL-MCNC: 15.04 NG/ML
RBC # BLD AUTO: 3.82 M/UL (ref 4.1–5.7)
SARS-COV-2, XPLCVT: NOT DETECTED
SODIUM SERPL-SCNC: 140 MMOL/L (ref 136–145)
SOURCE, COVRS: NORMAL
VANCOMYCIN SERPL-MCNC: 11.5 UG/ML
WBC # BLD AUTO: 7.2 K/UL (ref 4.1–11.1)

## 2021-12-10 PROCEDURE — 86140 C-REACTIVE PROTEIN: CPT

## 2021-12-10 PROCEDURE — 65270000029 HC RM PRIVATE

## 2021-12-10 PROCEDURE — 74011250636 HC RX REV CODE- 250/636

## 2021-12-10 PROCEDURE — 90935 HEMODIALYSIS ONE EVALUATION: CPT

## 2021-12-10 PROCEDURE — 85025 COMPLETE CBC W/AUTO DIFF WBC: CPT

## 2021-12-10 PROCEDURE — 74011250636 HC RX REV CODE- 250/636: Performed by: INTERNAL MEDICINE

## 2021-12-10 PROCEDURE — 80048 BASIC METABOLIC PNL TOTAL CA: CPT

## 2021-12-10 PROCEDURE — 84145 PROCALCITONIN (PCT): CPT

## 2021-12-10 PROCEDURE — 74011250637 HC RX REV CODE- 250/637: Performed by: INTERNAL MEDICINE

## 2021-12-10 PROCEDURE — 5A1D70Z PERFORMANCE OF URINARY FILTRATION, INTERMITTENT, LESS THAN 6 HOURS PER DAY: ICD-10-PCS | Performed by: INTERNAL MEDICINE

## 2021-12-10 PROCEDURE — 99232 SBSQ HOSP IP/OBS MODERATE 35: CPT | Performed by: INTERNAL MEDICINE

## 2021-12-10 PROCEDURE — 97530 THERAPEUTIC ACTIVITIES: CPT

## 2021-12-10 PROCEDURE — 36415 COLL VENOUS BLD VENIPUNCTURE: CPT

## 2021-12-10 PROCEDURE — 80202 ASSAY OF VANCOMYCIN: CPT

## 2021-12-10 PROCEDURE — 84100 ASSAY OF PHOSPHORUS: CPT

## 2021-12-10 RX ORDER — HEPARIN SODIUM 1000 [USP'U]/ML
INJECTION, SOLUTION INTRAVENOUS; SUBCUTANEOUS
Status: COMPLETED
Start: 2021-12-10 | End: 2021-12-10

## 2021-12-10 RX ORDER — HEPARIN SODIUM 1000 [USP'U]/ML
2500 INJECTION, SOLUTION INTRAVENOUS; SUBCUTANEOUS
Status: DISCONTINUED | OUTPATIENT
Start: 2021-12-10 | End: 2021-12-13 | Stop reason: HOSPADM

## 2021-12-10 RX ORDER — POTASSIUM CHLORIDE 750 MG/1
40 TABLET, FILM COATED, EXTENDED RELEASE ORAL
Status: ACTIVE | OUTPATIENT
Start: 2021-12-10 | End: 2021-12-10

## 2021-12-10 RX ADMIN — PREDNISOLONE ACETATE 1 DROP: 10 SUSPENSION/ DROPS OPHTHALMIC at 12:10

## 2021-12-10 RX ADMIN — MEROPENEM 500 MG: 500 INJECTION, POWDER, FOR SOLUTION INTRAVENOUS at 21:31

## 2021-12-10 RX ADMIN — ACYCLOVIR 800 MG: 800 TABLET ORAL at 21:32

## 2021-12-10 RX ADMIN — Medication 10 ML: at 21:32

## 2021-12-10 RX ADMIN — CARVEDILOL 3.12 MG: 3.12 TABLET, FILM COATED ORAL at 12:08

## 2021-12-10 RX ADMIN — Medication 10 ML: at 05:21

## 2021-12-10 RX ADMIN — HEPARIN SODIUM 5000 UNITS: 5000 INJECTION INTRAVENOUS; SUBCUTANEOUS at 05:20

## 2021-12-10 RX ADMIN — ATORVASTATIN CALCIUM 40 MG: 40 TABLET, FILM COATED ORAL at 12:08

## 2021-12-10 RX ADMIN — ACYCLOVIR 800 MG: 800 TABLET ORAL at 12:08

## 2021-12-10 RX ADMIN — PREDNISOLONE ACETATE 1 DROP: 10 SUSPENSION/ DROPS OPHTHALMIC at 21:32

## 2021-12-10 RX ADMIN — WHITE PETROLATUM,ZINC OXIDE: 57; 17 PASTE TOPICAL at 22:00

## 2021-12-10 RX ADMIN — HEPARIN SODIUM 2500 UNITS: 1000 INJECTION, SOLUTION INTRAVENOUS; SUBCUTANEOUS at 10:43

## 2021-12-10 RX ADMIN — WHITE PETROLATUM,ZINC OXIDE: 57; 17 PASTE TOPICAL at 18:53

## 2021-12-10 RX ADMIN — CALCITRIOL CAPSULES 0.25 MCG 0.25 MCG: 0.25 CAPSULE ORAL at 18:53

## 2021-12-10 RX ADMIN — VANCOMYCIN HYDROCHLORIDE 750 MG: 750 INJECTION, POWDER, LYOPHILIZED, FOR SOLUTION INTRAVENOUS at 18:54

## 2021-12-10 RX ADMIN — HEPARIN SODIUM 5000 UNITS: 5000 INJECTION INTRAVENOUS; SUBCUTANEOUS at 18:53

## 2021-12-10 RX ADMIN — Medication 10 ML: at 18:54

## 2021-12-10 NOTE — DIALYSIS
GCS 15 pt tolerated 3 h of dialysis well with 1 L fluid removal visited during treatment by Dr. Bud Quintanilla.

## 2021-12-10 NOTE — PROGRESS NOTES
Patient: Jodie Quiroz MRN: [id number]  SSN: [id number]    YOB: 1950  Age: 80 y.o. Sex: male          Subjective:     Seen patient while getting HD today . Using Rt IJ temp catheter. He was comfortable , didn't have any complaints     Objective:     Vitals:    12/10/21 0830 12/10/21 0900 12/10/21 0930 12/10/21 1000   BP: 101/60 (!) 101/58 (!) 110/58 (!) 104/58   Pulse: (!) 58 (!) 56 (!) 58 62   Resp: 18 18 18 18   Temp:       TempSrc:       SpO2:       Weight:            Intake and Output:  Yesterday's Intake and Output:  12/09 0701 - 12/10 0700  In: 1080 [P.O.:1080]  Out: 5      Physical Exam:   GENERAL: alert, cooperative, no distress, appears stated age  EYE: negative  LUNG: clear to auscultation bilaterally  HEART: regular rate and rhythm, S1, S2 normal  ABDOMEN: soft, non-tender. Bowel sounds normal. No masses,  no organomegaly  EXTREMITIES:  extremities normal, atraumatic, no cyanosis or edema, no ulcers,SKIN: Normal.   Skin -  no rash or abnormalities  NEUROLOGIC: negative      Dialysis access: temporary catheter site right and IJ.     Left arm AV Graft/Fistula: maturing    Data Review    Meds    Current Facility-Administered Medications   Medication Dose Route Frequency    vancomycin (VANCOCIN) 750 mg in 0.9% sodium chloride 250 mL (VIAL-MATE)  750 mg IntraVENous ONCE    heparin (porcine) 1,000 unit/mL injection 2,500 Units  2,500 Units Hemodialysis DIALYSIS PRN    zinc oxide-white petrolatum 17-57 % topical paste   Topical TID    albuterol (PROVENTIL HFA, VENTOLIN HFA, PROAIR HFA) inhaler 2 Puff  2 Puff Inhalation Q4H PRN    acyclovir (ZOVIRAX) tablet 800 mg  800 mg Oral BID    atorvastatin (LIPITOR) tablet 40 mg  40 mg Oral DAILY    calcitRIOL (ROCALTROL) capsule 0.25 mcg  0.25 mcg Oral DAILY    carvediloL (COREG) tablet 3.125 mg  3.125 mg Oral BID WITH MEALS    peg 400-hypromellose-glycerin 1-0.2-0.2 % opthalmic solution   Both Eyes TID PRN    prednisoLONE acetate (PRED FORTE) 1 % ophthalmic suspension 1 Drop  1 Drop Both Eyes BID    sodium chloride (NS) flush 5-40 mL  5-40 mL IntraVENous Q8H    sodium chloride (NS) flush 5-40 mL  5-40 mL IntraVENous PRN    acetaminophen (TYLENOL) tablet 650 mg  650 mg Oral Q6H PRN    Or    acetaminophen (TYLENOL) suppository 650 mg  650 mg Rectal Q6H PRN    polyethylene glycol (MIRALAX) packet 17 g  17 g Oral DAILY PRN    ondansetron (ZOFRAN ODT) tablet 4 mg  4 mg Oral Q8H PRN    Or    ondansetron (ZOFRAN) injection 4 mg  4 mg IntraVENous Q6H PRN    heparin (porcine) injection 5,000 Units  5,000 Units SubCUTAneous Q12H    VANCOMYCIN INFORMATION NOTE   Other PRN    cefepime (MAXIPIME) 1 g in sterile water (preservative free) 10 mL IV syringe  1 g IntraVENous Q24H       Lab      Recent Results (from the past 24 hour(s))   C REACTIVE PROTEIN, QT    Collection Time: 12/10/21  4:25 AM   Result Value Ref Range    C-Reactive protein 9.63 (H) 0.00 - 0.60 mg/dL   PROCALCITONIN    Collection Time: 12/10/21  4:25 AM   Result Value Ref Range    Procalcitonin 15.04 (H) 0 ng/mL   PHOSPHORUS    Collection Time: 12/10/21  4:25 AM   Result Value Ref Range    Phosphorus 2.8 2.6 - 4.7 mg/dL   METABOLIC PANEL, BASIC    Collection Time: 12/10/21  4:25 AM   Result Value Ref Range    Sodium 140 136 - 145 mmol/L    Potassium 3.3 (L) 3.5 - 5.1 mmol/L    Chloride 109 (H) 97 - 108 mmol/L    CO2 25 21 - 32 mmol/L    Anion gap 6 5 - 15 mmol/L    Glucose 77 65 - 100 mg/dL    BUN 42 (H) 6 - 20 mg/dL    Creatinine 8.41 (H) 0.70 - 1.30 mg/dL    BUN/Creatinine ratio 5 (L) 12 - 20      GFR est AA 8 (L) >60 ml/min/1.73m2    GFR est non-AA 6 (L) >60 ml/min/1.73m2    Calcium 8.2 (L) 8.5 - 10.1 mg/dL   CBC WITH AUTOMATED DIFF    Collection Time: 12/10/21  4:25 AM   Result Value Ref Range    WBC 7.2 4.1 - 11.1 K/uL    RBC 3.82 (L) 4.10 - 5.70 M/uL    HGB 10.7 (L) 12.1 - 17.0 g/dL    HCT 34.5 (L) 36.6 - 50.3 %    MCV 90.3 80.0 - 99.0 FL    MCH 28.0 26.0 - 34.0 PG    MCHC 31.0 30.0 - 36.5 g/dL    RDW 19.8 (H) 11.5 - 14.5 %    PLATELET 371 (L) 425 - 400 K/uL    MPV 10.6 8.9 - 12.9 FL    NRBC 0.3 (H) 0.0  WBC    ABSOLUTE NRBC 0.02 (H) 0.00 - 0.01 K/uL    NEUTROPHILS 67 32 - 75 %    LYMPHOCYTES 14 12 - 49 %    MONOCYTES 14 (H) 5 - 13 %    EOSINOPHILS 4 0 - 7 %    BASOPHILS 1 0 - 1 %    IMMATURE GRANULOCYTES 0 0 - 0.5 %    ABS. NEUTROPHILS 4.9 1.8 - 8.0 K/UL    ABS. LYMPHOCYTES 1.0 0.8 - 3.5 K/UL    ABS. MONOCYTES 1.0 0.0 - 1.0 K/UL    ABS. EOSINOPHILS 0.3 0.0 - 0.4 K/UL    ABS. BASOPHILS 0.1 0.0 - 0.1 K/UL    ABS. IMM. GRANS. 0.0 0.00 - 0.04 K/UL    DF AUTOMATED     VANCOMYCIN, RANDOM    Collection Time: 12/10/21  5:00 AM   Result Value Ref Range    Vancomycin, random 11.5 ug/mL        Lab Results   Component Value Date/Time    Color Yellow/Straw 07/07/2021 02:30 AM    Appearance Clear 07/07/2021 02:30 AM    Specific gravity 1.016 07/07/2021 02:30 AM    Specific gravity 1.028 07/08/2010 04:20 AM    pH (UA) 7.0 07/07/2021 02:30 AM    Protein >300 (A) 07/07/2021 02:30 AM    Glucose Negative 07/07/2021 02:30 AM    Ketone Negative 07/07/2021 02:30 AM    Bilirubin Negative 07/07/2021 02:30 AM    Urobilinogen 0.1 07/07/2021 02:30 AM    Nitrites Negative 07/07/2021 02:30 AM    Leukocyte Esterase Trace (A) 07/07/2021 02:30 AM    Epithelial cells 0-5 07/08/2010 04:20 AM    Bacteria Negative 07/07/2021 02:30 AM    WBC 20-50 07/07/2021 02:30 AM    RBC 0-5 07/07/2021 02:30 AM         Assessment & Plan: Active Problems:    Sepsis ([address] (7/7/2021)      ESRD on dialysis [facility]) (12/8/2021)      ESRD. HD today    Stable electrolytes. - H&H are good   -Ca and Phos good.   On calcitriol   Currently not on Phosphate binders   Will follow labs and start if trending up .     Sepsis - Blood Cx were sent and on brod spectrum abxs   Follow up Cxs  - prelim no growth so far   On cefepime and Vanc   Seen by ID   R/o line sepsis      HTN - Home meds on Hold as BP is borderline   Will hold meds Before Dialysis sessions.     On High dose of acyclovir - for recent shingles infection     Signed By: Lucia Diaz MD     December 10, 2021      This note was prepared using voice recognition system and is likely to have sound alike errors that may have been overlooked even during proofreading.   Please contact the author for any clarifications

## 2021-12-10 NOTE — PROGRESS NOTES
Problem: Mobility Impaired (Adult and Pediatric)  Goal: *Acute Goals and Plan of Care (Insert Text)  Description: I with LE HEP x7 days  Supine to sit EOB with sup x1 x7 days  Sit to stand with sup x1 x7 days  Stand pivot from bed to chair with sup x1 x7 days  Progress to amb 300ft with RW and CGAx 7 days  Outcome: Progressing Towards Goal   PHYSICAL THERAPY TREATMENT  Patient: Emmy Homans (86 y.o. male)  Date: 12/10/2021  Diagnosis: Sepsis ([facility] [address] [A41.9]  ESRD on dialysis ([facility] [address] [N18.6, Z99.2]   <principal problem not specified>       Precautions:    Chart, physical therapy assessment, plan of care and goals were reviewed. ASSESSMENT  Patient continues with skilled PT services and is progressing towards goals. Pt A & Ox 4 and agreeable to PT. Pt reports he is feeling much better today. Pt requiring CG-min A for sup to sit transfer with no reports of dizziness or lightheadedness. Pt required CGA for sit to stand transfer and able to amb 200ft with RW and CGA. Pt amb with slow panda and decreased step length around the nursing unit with no lightheadedness, SOB or pain. Pt assisted to to the bathroom for BM with CGA. During treatment, pt asked multiple times what happened to him and I advised him to speak with the doctor concerning his condition. According to his notes, he was dx with sepsis with unknown etiology due to symptoms of tachycardia, hypotension, generalized weakness and fever. Pt also reported that his hearing came back. . Patient reports he could not hear staff members at all yesterday, but this morning it has resolved. While sitting on the toilet, pt reported he had his COVID-19 booster on 12/7/21 and was admitted on 12/8/21. Pt reported having COVID -23 in Dec 2020 and both vaccines following his bout with COVID-19. Pt's symptoms that brought him to the ED seem to have resolved.   Notified Martha Spence NP about this since pt reported he did not tell staff about his booster shot; however, she stated that his symptoms are unrelated to the booster shot. Pt would benefit from continued skilled PT services to improve LE strength, balance, functional mobility, and gait so pt can return home safely to [facility]. Recommend d/c to home with family care and HHPT. Other factors to consider for discharge: impaired mobility, level of assist         PLAN :  Patient continues to benefit from skilled intervention to address the above impairments. Continue treatment per established plan of care. to address goals. Recommendation for discharge: (in order for the patient to meet his/her long term goals)  Home with [address] [place] [address]    This discharge recommendation:  Has been made in collaboration with the attending provider and/or case management    IF patient discharges home will need the following DME: none       SUBJECTIVE:   Patient stated i've been getting up to the bathroom.     OBJECTIVE DATA SUMMARY:   Critical Behavior:  Neurologic State: Alert  Orientation Level: Oriented X4  Cognition: Follows commands     Functional Mobility Training:  Bed Mobility:  Rolling: Contact guard assistance  Supine to Sit: Minimum assistance  Sit to Supine: Contact guard assistance  Scooting: Contact guard assistance        Transfers:  Sit to Stand: Contact guard assistance  Stand to Sit: Contact guard assistance                             Balance:  Sitting: Intact  Standing: Impaired  Standing - Static: Fair; Constant support  Standing - Dynamic : Fair; Constant support  Ambulation/Gait Training:  Distance (ft): 200 Feet (ft)  Assistive Device: Gait belt; Walker, rolling  Ambulation - Level of Assistance: Contact guard assistance     Gait Description (WDL): Exceptions to WDL                 Speed/Roseanne: Slow  Step Length: Left shortened; Right shortened                      Therapeutic Exercises:   Seated marches, LAQ, ankle pumps x 15 each  Pain Ratin/10    Activity Tolerance:   Fair  Please refer to the flowsheet for vital signs taken during this treatment. After treatment patient left in no apparent distress:   Supine in bed and Call bell within reach    COMMUNICATION/COLLABORATION:   The patients plan of care was discussed with: PhysicianUsha Palm   Time Calculation: 34 mins

## 2021-12-10 NOTE — PROGRESS NOTES
Hospitalist Progress Note    Subjective:   Daily Progress Note: 12/10/2021 11:12 AM    Hospital Course:     Patient is a 50-year-old male with a PMH significant for CM, CHF systolic, CKD stage IV, ESRD on HD, DM, HTN and GERD. Presents to the emergency room with complaints of gradual onset of persistent progressive generalized weakness. He has a history of MRSA and Klebsiella bacteremia on 10/21 due to infected HD catheter which was removed at that time. Associated symptoms of chills. Vital signs on presentation temperature 101.5, hypertensive 110/66, tachycardic 100. Admitted for further management of sepsis. Started on IV vancomycin and cefepime. ID and nephrology consulted. Blood cultures obtained. Subjective:  Examined patient at the bedside. He is resting comfortably with no complaints.     Current Facility-Administered Medications   Medication Dose Route Frequency    vancomycin (VANCOCIN) 750 mg in 0.9% sodium chloride 250 mL (VIAL-MATE)  750 mg IntraVENous ONCE    heparin (porcine) 1,000 unit/mL injection 2,500 Units  2,500 Units Hemodialysis DIALYSIS PRN    zinc oxide-white petrolatum 17-57 % topical paste   Topical TID    albuterol (PROVENTIL HFA, VENTOLIN HFA, PROAIR HFA) inhaler 2 Puff  2 Puff Inhalation Q4H PRN    acyclovir (ZOVIRAX) tablet 800 mg  800 mg Oral BID    atorvastatin (LIPITOR) tablet 40 mg  40 mg Oral DAILY    calcitRIOL (ROCALTROL) capsule 0.25 mcg  0.25 mcg Oral DAILY    carvediloL (COREG) tablet 3.125 mg  3.125 mg Oral BID WITH MEALS    peg 400-hypromellose-glycerin 1-0.2-0.2 % opthalmic solution   Both Eyes TID PRN    prednisoLONE acetate (PRED FORTE) 1 % ophthalmic suspension 1 Drop  1 Drop Both Eyes BID    sodium chloride (NS) flush 5-40 mL  5-40 mL IntraVENous Q8H    sodium chloride (NS) flush 5-40 mL  5-40 mL IntraVENous PRN    acetaminophen (TYLENOL) tablet 650 mg  650 mg Oral Q6H PRN    Or    acetaminophen (TYLENOL) suppository 650 mg  650 mg Rectal Q6H PRN  polyethylene glycol (MIRALAX) packet 17 g  17 g Oral DAILY PRN    ondansetron (ZOFRAN ODT) tablet 4 mg  4 mg Oral Q8H PRN    Or    ondansetron (ZOFRAN) injection 4 mg  4 mg IntraVENous Q6H PRN    heparin (porcine) injection 5,000 Units  5,000 Units SubCUTAneous Q12H    VANCOMYCIN INFORMATION NOTE   Other PRN    cefepime (MAXIPIME) 1 g in sterile water (preservative free) 10 mL IV syringe  1 g IntraVENous Q24H        REVIEW OF SYSTEMS    Review of Systems   Constitutional: Positive for malaise/fatigue. HENT: Negative for congestion. Respiratory: Negative for cough and shortness of breath. Cardiovascular: Negative for chest pain. Musculoskeletal: Positive for myalgias. Neurological: Positive for weakness. Objective:     Visit Vitals  /63   Pulse 68   Temp 98.2 °F (36.8 °C)   Resp 18   Wt 76.4 kg (168 lb 6.9 oz)   SpO2 96%   BMI 24.17 kg/m²      O2 Device: None (Room air)    Temp (24hrs), Av.5 °F (36.9 °C), Min:97.6 °F (36.4 °C), Max:99.9 °F (37.7 °C)      12/10 0701 - 12/10 1900  In: -   Out: 1000   1901 - 12/10 0700  In: 1080 [P.O.:1080]  Out: 5     PHYSICAL EXAM:    Physical Exam  Constitutional:       Appearance: He is ill-appearing. Cardiovascular:      Rate and Rhythm: Normal rate and regular rhythm. Pulmonary:      Effort: No respiratory distress. Breath sounds: No wheezing. Skin:     General: Skin is warm. Neurological:      Motor: Weakness present.       Gait: Gait abnormal.          Data Review    Recent Results (from the past 24 hour(s))   C REACTIVE PROTEIN, QT    Collection Time: 12/10/21  4:25 AM   Result Value Ref Range    C-Reactive protein 9.63 (H) 0.00 - 0.60 mg/dL   PROCALCITONIN    Collection Time: 12/10/21  4:25 AM   Result Value Ref Range    Procalcitonin 15.04 (H) 0 ng/mL   PHOSPHORUS    Collection Time: 12/10/21  4:25 AM   Result Value Ref Range    Phosphorus 2.8 2.6 - 4.7 mg/dL   METABOLIC PANEL, BASIC    Collection Time: 12/10/21  4:25 AM Result Value Ref Range    Sodium 140 136 - 145 mmol/L    Potassium 3.3 (L) 3.5 - 5.1 mmol/L    Chloride 109 (H) 97 - 108 mmol/L    CO2 25 21 - 32 mmol/L    Anion gap 6 5 - 15 mmol/L    Glucose 77 65 - 100 mg/dL    BUN 42 (H) 6 - 20 mg/dL    Creatinine 8.41 (H) 0.70 - 1.30 mg/dL    BUN/Creatinine ratio 5 (L) 12 - 20      GFR est AA 8 (L) >60 ml/min/1.73m2    GFR est non-AA 6 (L) >60 ml/min/1.73m2    Calcium 8.2 (L) 8.5 - 10.1 mg/dL   CBC WITH AUTOMATED DIFF    Collection Time: 12/10/21  4:25 AM   Result Value Ref Range    WBC 7.2 4.1 - 11.1 K/uL    RBC 3.82 (L) 4.10 - 5.70 M/uL    HGB 10.7 (L) 12.1 - 17.0 g/dL    HCT 34.5 (L) 36.6 - 50.3 %    MCV 90.3 80.0 - 99.0 FL    MCH 28.0 26.0 - 34.0 PG    MCHC 31.0 30.0 - 36.5 g/dL    RDW 19.8 (H) 11.5 - 14.5 %    PLATELET 744 (L) 696 - 400 K/uL    MPV 10.6 8.9 - 12.9 FL    NRBC 0.3 (H) 0.0  WBC    ABSOLUTE NRBC 0.02 (H) 0.00 - 0.01 K/uL    NEUTROPHILS 67 32 - 75 %    LYMPHOCYTES 14 12 - 49 %    MONOCYTES 14 (H) 5 - 13 %    EOSINOPHILS 4 0 - 7 %    BASOPHILS 1 0 - 1 %    IMMATURE GRANULOCYTES 0 0 - 0.5 %    ABS. NEUTROPHILS 4.9 1.8 - 8.0 K/UL    ABS. LYMPHOCYTES 1.0 0.8 - 3.5 K/UL    ABS. MONOCYTES 1.0 0.0 - 1.0 K/UL    ABS. EOSINOPHILS 0.3 0.0 - 0.4 K/UL    ABS. BASOPHILS 0.1 0.0 - 0.1 K/UL    ABS. IMM.  GRANS. 0.0 0.00 - 0.04 K/UL    DF AUTOMATED     VANCOMYCIN, RANDOM    Collection Time: 12/10/21  5:00 AM   Result Value Ref Range    Vancomycin, random 11.5 ug/mL       XR CHEST PORT   Final Result          Active Problems:    Sepsis ([address] (7/7/2021)      ESRD on dialysis [facility]) (12/8/2021)        Assessment/Plan:     Sepsis  History of recent MRSA/Klebsiella bacteremia from HD cath  ID following  Started on IV vancomycin and cefepime  Inflammatory markers elevated    ESRD on HD TTS  Nephrology following    HTN-currently hypotensive  Continue Coreg as tolerated we will place hold parameters    Generalized weakness  PT, OT, CM for disposition needs      DVT Prophylaxis: Heparin subcu  Code Status: Full Code  POA/NOK:    Disposition and discharge barriers:   · Treatment for sepsis, IV antibiotics  · Placement  Care Plan discussed with: Patient, RN, IDR team  _____________________________________________________________________________  Time spent in direct care including coordination of service, review of data and examination: > 35 minutes    ______________________________________________________________________________    Emil Bailey NP    This is dictation was done by dragon, computer voice recognition software. Quite often unanticipated grammatical, syntax, homophones and other interpretive errors or inadvertently transcribed by the computer software. Please excuse errors that have escaped final proofreading. Thank you.

## 2021-12-10 NOTE — PROGRESS NOTES
Progress Note    Patient: Arlyn Minaya MRN: [id number]  SSN: [id number]    YOB: 1950  Age: 80 y.o. Sex: male      Admit Date: 12/8/2021    LOS: 2 days     Subjective:   Patient followed for sepsis following Covid-19 booster but no obvious source of infection. Blood cultures negative so far. His temperature has trended downward but WBC increased and procal and CRP both elevated. He is on Vancomycin and Cefepime. Patient is currently on the toilet and unable to communicate. Objective:     Vitals:    12/10/21 0830 12/10/21 0900 12/10/21 0930 12/10/21 1000   BP: 101/60 (!) 101/58 (!) 110/58 (!) 104/58   Pulse: (!) 58 (!) 56 (!) 58 62   Resp: 18 18 18 18   Temp:       TempSrc:       SpO2:       Weight:            Intake and Output:  Current Shift: No intake/output data recorded. Last three shifts: 12/08 1901 - 12/10 0700  In: 1080 [P.O.:1080]  Out: 5     Physical Exam:    Vitals and nursing note reviewed. Constitutional:       Appearance: He is not ill-appearing. HENT:      Head: Normocephalic and atraumatic. Nose: Nose normal. No congestion or rhinorrhea. Mouth/Throat:      Pharynx: Oropharynx is clear. No posterior oropharyngeal erythema. Eyes:      Pupils: Pupils are equal, round, and reactive to light. Cardiovascular:      Rate and Rhythm: Regular rhythm. Heart sounds: No murmur heard. Comments: Right sided Permacath, site unremarkable  Pulmonary:      Effort: Pulmonary effort is normal.      Breath sounds: Normal breath sounds. Chest:       Abdominal:      General: Abdomen is flat. Bowel sounds are normal.      Palpations: Abdomen is soft. Genitourinary:     Comments: No Pleitez catheter    Musculoskeletal:      Cervical back: Neck supple. Right lower leg: No edema. Left lower leg: No edema. Skin:     Findings: No rash. Comments: Chronic sacrococcygeal wound   Neurological:      General: No focal deficit present.       Mental Status: He is alert and oriented to person, place, and time. Psychiatric:         Mood and Affect: Mood normal.         Behavior: Behavior normal.         Thought Content: Thought content normal.         Judgment: Judgment normal.     Lab/Data Review:     WBC 7,200  Lactic acid 2.1 <2.6    Procal 15.04 <11.98  CRP 9.63 <9.67     Covid-19 Not detected    Blood cultures (12/8) No growth 2 days  Assessment:     Active Problems:    Sepsis ([address] (7/7/2021)      ESRD on dialysis ([address] (12/8/2021)       1. Sepsis with fever, elevated lactic acid, leukocytosis elevated procal and CRP, etiology unclear, Day #3 IV Vancomycin and Cefepime, resolving except for procal  2. Status post Covid-19 booster  3. ESRD on hemodialysis  4. Permacath in situ  5. Penicillin allergy     Comment:  Sepsis resolving except for procal, with no clear source of infection. Plan:     1. Continue IV Vancomycin  2. Discontinue Cefepime   3. Start Meropenem because of increasing procal  4. In am, repeat CBC, procal and CRP  5.  Follow-up blood cultures       Signed By: Kelsey Gil MD     December 10, 2021

## 2021-12-10 NOTE — PROGRESS NOTES
Bedside and Verbal shift change report given to Tamara Isabel LPN (oncoming nurse) by Lita Mccain LPN (offgoing nurse). Report included the following information SBAR, Kardex, Intake/Output, MAR, Accordion, Recent Results and Med Rec Status.

## 2021-12-10 NOTE — PROGRESS NOTES
Consult for Vancomycin Dosing by Pharmacy by Dr. Romi Dahl provided for this 80y.o. year old male on HD (?), for indication of sepsis (UTI). Day of Therapy: 3  Goal of Level(s): 10-15mcg/dL     Other Current Antibiotics: Zosyn    Significant Cultures:   Results       Procedure Component Value Units Date/Time    COVID-19 RAPID TEST [[id number]] Collected: 12/09/21 0805    Order Status: Completed Specimen: Nasopharyngeal Updated: 12/09/21 0845     Specimen source       Please find results under separate order           COVID-19 rapid test Not Detected        Comment: Rapid Abbott ID Now   Rapid NAAT:  The specimen is NEGATIVE for SARS-CoV-2, the novel coronavirus associated with COVID-19. Negative results should be treated as presumptive and, if inconsistent with clinical signs and symptoms or necessary for patient management, should be tested with an alternative molecular assay. Negative results do not preclude SARS-CoV-2 infection and should not be used as the sole basis for patient management decisions. This test has been authorized by the FDA under   an Emergency Use Authorization (EUA) for use by authorized laboratories. Fact sheet for Healthcare Providers: ConventionUpdate.co.nz Fact sheet for Patients: ConventionUpdate.co.nz   Methodology: Isothermal Nucleic Acid Amplification         CULTURE, BLOOD, PAIRED [[id number]] Collected: 12/08/21 1411    Order Status: Completed Specimen: Blood Updated: 12/09/21 0747     Special Requests: No Special Requests        Culture result: No growth after 16 hours                 Serum Creatinine Creatinine   Date Value Ref Range Status   12/10/2021 8.41 (H) 0.70 - 1.30 mg/dL Final   12/09/2021 7.20 (H) 0.70 - 1.30 mg/dL Final   12/08/2021 6.44 (H) 0.70 - 1.30 mg/dL Final      Creatinine Clearance Estimated Creatinine Clearance: 8.4 mL/min (A) (based on SCr of 8.41 mg/dL (H)).    BUN Lab Results   Component Value Date/Time BUN 42 (H) 12/10/2021 04:25 AM      WBC Lab Results   Component Value Date/Time    WBC 7.2 12/10/2021 04:25 AM      Temp Temp Readings from Last 1 Encounters:   12/10/21 98.1 °F (36.7 °C) (Oral)        Last Level:    Vancomycin, random   Date/Time Value Ref Range Status   12/10/2021 05:00 AM 11.5 ug/mL Final     Comment:     No reference range has been established. Ht Readings from Last 1 Encounters:   10/22/21 177.8 cm (70\")        Wt Readings from Last 1 Encounters:   12/09/21 76.4 kg (168 lb 6.9 oz)     Ideal body weight: 73 kg (160 lb 15 oz)  Adjusted ideal body weight: 74.4 kg (163 lb 14.9 oz)     Previous Regimen: 750mg IV x1 (12/09)    New Regimen:   Start Vancomycin 750mg IV x1 post HD. Pharmacy ordered a random level tomorrow morning. Pharmacy to follow daily and will make changes to dose and/or frequency based on clinical status.      _________________________________     Pharmacist Estephania Chung

## 2021-12-11 PROCEDURE — 99232 SBSQ HOSP IP/OBS MODERATE 35: CPT | Performed by: INTERNAL MEDICINE

## 2021-12-11 PROCEDURE — 74011250637 HC RX REV CODE- 250/637: Performed by: NURSE PRACTITIONER

## 2021-12-11 PROCEDURE — 65270000029 HC RM PRIVATE

## 2021-12-11 PROCEDURE — 74011250637 HC RX REV CODE- 250/637: Performed by: INTERNAL MEDICINE

## 2021-12-11 PROCEDURE — 74011250636 HC RX REV CODE- 250/636: Performed by: INTERNAL MEDICINE

## 2021-12-11 RX ORDER — MIDODRINE HYDROCHLORIDE 5 MG/1
5 TABLET ORAL
Status: DISCONTINUED | OUTPATIENT
Start: 2021-12-11 | End: 2021-12-13 | Stop reason: HOSPADM

## 2021-12-11 RX ADMIN — CARVEDILOL 3.12 MG: 3.12 TABLET, FILM COATED ORAL at 08:46

## 2021-12-11 RX ADMIN — CARVEDILOL 3.12 MG: 3.12 TABLET, FILM COATED ORAL at 17:02

## 2021-12-11 RX ADMIN — ATORVASTATIN CALCIUM 40 MG: 40 TABLET, FILM COATED ORAL at 08:46

## 2021-12-11 RX ADMIN — HEPARIN SODIUM 5000 UNITS: 5000 INJECTION INTRAVENOUS; SUBCUTANEOUS at 17:02

## 2021-12-11 RX ADMIN — Medication 10 ML: at 21:40

## 2021-12-11 RX ADMIN — WHITE PETROLATUM,ZINC OXIDE: 57; 17 PASTE TOPICAL at 22:00

## 2021-12-11 RX ADMIN — Medication 10 ML: at 05:51

## 2021-12-11 RX ADMIN — ACYCLOVIR 800 MG: 800 TABLET ORAL at 21:39

## 2021-12-11 RX ADMIN — WHITE PETROLATUM,ZINC OXIDE: 57; 17 PASTE TOPICAL at 08:52

## 2021-12-11 RX ADMIN — PREDNISOLONE ACETATE 1 DROP: 10 SUSPENSION/ DROPS OPHTHALMIC at 08:53

## 2021-12-11 RX ADMIN — PREDNISOLONE ACETATE 1 DROP: 10 SUSPENSION/ DROPS OPHTHALMIC at 21:39

## 2021-12-11 RX ADMIN — MIDODRINE HYDROCHLORIDE 5 MG: 5 TABLET ORAL at 17:03

## 2021-12-11 RX ADMIN — HEPARIN SODIUM 5000 UNITS: 5000 INJECTION INTRAVENOUS; SUBCUTANEOUS at 05:51

## 2021-12-11 RX ADMIN — CALCITRIOL CAPSULES 0.25 MCG 0.25 MCG: 0.25 CAPSULE ORAL at 09:00

## 2021-12-11 RX ADMIN — MIDODRINE HYDROCHLORIDE 5 MG: 5 TABLET ORAL at 12:15

## 2021-12-11 RX ADMIN — MEROPENEM 500 MG: 500 INJECTION, POWDER, FOR SOLUTION INTRAVENOUS at 17:03

## 2021-12-11 RX ADMIN — MIDODRINE HYDROCHLORIDE 5 MG: 5 TABLET ORAL at 08:50

## 2021-12-11 RX ADMIN — WHITE PETROLATUM,ZINC OXIDE: 57; 17 PASTE TOPICAL at 17:11

## 2021-12-11 RX ADMIN — ACYCLOVIR 800 MG: 800 TABLET ORAL at 08:46

## 2021-12-11 RX ADMIN — Medication 10 ML: at 14:35

## 2021-12-11 NOTE — PROGRESS NOTES
PT attempted to see patient for therapy but upon entry into room, patient was in the restroom and reporting that he has diarrhea from his lunch and would be a while on the toilet. Instructed him to call for nursing when ready to get back to bed for safety. Will follow up on a later date for PT services.

## 2021-12-11 NOTE — PROGRESS NOTES
Hospitalist Progress Note    Subjective:   Daily Progress Note: 12/11/2021 11:12 AM    Hospital Course:     Patient is a 67-year-old male with a PMH significant for CM, CHF systolic, CKD stage IV, ESRD on HD, DM, HTN and GERD. Presents to the emergency room with complaints of gradual onset of persistent progressive generalized weakness. He has a history of MRSA and Klebsiella bacteremia on 10/21 due to infected HD catheter which was removed at that time. Associated symptoms of chills. Vital signs on presentation temperature 101.5, hypertensive 110/66, tachycardic 100. Admitted for further management of sepsis. Started on IV vancomycin and cefepime. ID and nephrology consulted. Blood cultures obtained. Subjective:  Examined patient at the bedside. He is resting comfortably with no complaints. He appears weak and tired.     Current Facility-Administered Medications   Medication Dose Route Frequency    midodrine (PROAMATINE) tablet 5 mg  5 mg Oral TID WITH MEALS    heparin (porcine) 1,000 unit/mL injection 2,500 Units  2,500 Units Hemodialysis DIALYSIS PRN    meropenem (MERREM) 500 mg in 0.9% sodium chloride 10 mL IV syringe  0.5 g IntraVENous Q24H    zinc oxide-white petrolatum 17-57 % topical paste   Topical TID    albuterol (PROVENTIL HFA, VENTOLIN HFA, PROAIR HFA) inhaler 2 Puff  2 Puff Inhalation Q4H PRN    acyclovir (ZOVIRAX) tablet 800 mg  800 mg Oral BID    atorvastatin (LIPITOR) tablet 40 mg  40 mg Oral DAILY    calcitRIOL (ROCALTROL) capsule 0.25 mcg  0.25 mcg Oral DAILY    carvediloL (COREG) tablet 3.125 mg  3.125 mg Oral BID WITH MEALS    peg 400-hypromellose-glycerin 1-0.2-0.2 % opthalmic solution   Both Eyes TID PRN    prednisoLONE acetate (PRED FORTE) 1 % ophthalmic suspension 1 Drop  1 Drop Both Eyes BID    sodium chloride (NS) flush 5-40 mL  5-40 mL IntraVENous Q8H    sodium chloride (NS) flush 5-40 mL  5-40 mL IntraVENous PRN    acetaminophen (TYLENOL) tablet 650 mg  650 mg Oral Q6H PRN    Or    acetaminophen (TYLENOL) suppository 650 mg  650 mg Rectal Q6H PRN    polyethylene glycol (MIRALAX) packet 17 g  17 g Oral DAILY PRN    ondansetron (ZOFRAN ODT) tablet 4 mg  4 mg Oral Q8H PRN    Or    ondansetron (ZOFRAN) injection 4 mg  4 mg IntraVENous Q6H PRN    heparin (porcine) injection 5,000 Units  5,000 Units SubCUTAneous Q12H    VANCOMYCIN INFORMATION NOTE   Other PRN        REVIEW OF SYSTEMS    Review of Systems   Constitutional: Positive for malaise/fatigue. HENT: Negative for congestion. Respiratory: Negative for cough and shortness of breath. Cardiovascular: Negative for chest pain. Musculoskeletal: Positive for myalgias. Neurological: Positive for weakness. Objective:     Visit Vitals  BP (!) 112/58 (BP 1 Location: Right lower arm)   Pulse 62   Temp 97.7 °F (36.5 °C)   Resp 20   Wt 78 kg (171 lb 15.3 oz)   SpO2 93%   BMI 24.67 kg/m²      O2 Device: None (Room air)    Temp (24hrs), Av.7 °F (36.5 °C), Min:97.6 °F (36.4 °C), Max:97.7 °F (36.5 °C)      No intake/output data recorded.  1901 -  0700  In: -   Out: 1000     PHYSICAL EXAM:    Physical Exam  Constitutional:       Appearance: He is ill-appearing. Cardiovascular:      Rate and Rhythm: Normal rate and regular rhythm. Pulmonary:      Effort: No respiratory distress. Breath sounds: No wheezing. Skin:     General: Skin is warm. Neurological:      Motor: Weakness present. Gait: Gait abnormal.          Data Review    No results found for this or any previous visit (from the past 24 hour(s)).     XR CHEST PORT   Final Result          Active Problems:    Sepsis ([address] (2021)      ESRD on dialysis [facility]) (2021)        Assessment/Plan:     Sepsis  History of recent MRSA/Klebsiella bacteremia from HD cath  ID following  IV vancomycin, discontinued cefepime and started meropenem  Inflammatory markers elevated    ESRD on HD TTS  Nephrology following    HTN-currently hypotensive  Continue Coreg as tolerated we will place hold parameters  Darted on midodrine for continued low blood pressures. Generalized weakness  PT, OT, CM for disposition needs      DVT Prophylaxis: Heparin subcu  Code Status: Full Code  POA/NOK:    Disposition and discharge barriers:   · Treatment for sepsis, IV antibiotics  · Placement  Care Plan discussed with: Patient, RN, IDR team  _____________________________________________________________________________  Time spent in direct care including coordination of service, review of data and examination: > 35 minutes    ______________________________________________________________________________    Kenton Hung NP    This is dictation was done by dragon, computer voice recognition software. Quite often unanticipated grammatical, syntax, homophones and other interpretive errors or inadvertently transcribed by the computer software. Please excuse errors that have escaped final proofreading. Thank you.

## 2021-12-11 NOTE — PROGRESS NOTES
Bedside and Verbal shift change report given to Panchito Chaidez LPN (oncoming nurse) by Young Prasad LPN (offgoing nurse). Report included the following information SBAR, Kardex, Intake/Output, MAR, Accordion, Recent Results and Med Rec Status.

## 2021-12-11 NOTE — PROGRESS NOTES
Progress Note    Patient: Everett Escobar MRN: [id number]  SSN: [id number]    YOB: 1950  Age: 80 y.o. Sex: male      Admit Date: 12/8/2021    LOS: 3 days     Subjective:   Patient followed for sepsis following Covid-19 booster but no obvious source of infection. Blood cultures negative so far. His temperature has trended downward but WBC increased and procal and CRP both elevated. He is on Vancomycin and Cefepime. Objective:     Vitals:    12/10/21 2000 12/10/21 2130 12/11/21 0000 12/11/21 0835   BP:  93/60  (!) 112/58   Pulse: 67 67 64 62   Resp:  18  20   Temp:  97.6 °F (36.4 °C)  97.7 °F (36.5 °C)   TempSrc:       SpO2:  98%  93%   Weight:            Intake and Output:  Current Shift: No intake/output data recorded. Last three shifts: 12/09 1901 - 12/11 0700  In: -   Out: 1000     Physical Exam:    Vitals and nursing note reviewed. Constitutional:       Appearance: He is not ill-appearing. HENT:      Head: Normocephalic and atraumatic. Nose: Nose normal. No congestion or rhinorrhea. Mouth/Throat:      Pharynx: Oropharynx is clear. No posterior oropharyngeal erythema. Eyes:      Pupils: Pupils are equal, round, and reactive to light. Cardiovascular:      Rate and Rhythm: Regular rhythm. Heart sounds: No murmur heard. Comments: Right sided Permacath, site unremarkable  Pulmonary:      Effort: Pulmonary effort is normal.      Breath sounds: Normal breath sounds. Chest:       Abdominal:      General: Abdomen is flat. Bowel sounds are normal.      Palpations: Abdomen is soft. Genitourinary:     Comments: No Pleitez catheter    Musculoskeletal:      Cervical back: Neck supple. Right lower leg: No edema. Left lower leg: No edema. Skin:     Findings: No rash. Comments: Chronic sacrococcygeal wound   Neurological:      General: No focal deficit present. Mental Status: He is alert and oriented to person, place, and time.    Psychiatric: Mood and Affect: Mood normal.         Behavior: Behavior normal.         Thought Content: Thought content normal.         Judgment: Judgment normal.     Lab/Data Review:     WBC 7,200  Lactic acid 2.1 <2.6    Procal 15.04 <11.98  CRP 9.63 <9.67     Covid-19 Not detected    Blood cultures (12/8) No growth 3 days  Assessment:     Active Problems:    Sepsis ([facility] [address] (7/7/2021)      ESRD on dialysis ([facility] [address] (12/8/2021)       1. Sepsis with fever, elevated lactic acid, leukocytosis elevated procal and CRP, etiology unclear, Day #5 IV Vancomycin and Day #2 Meropenem, resolving except for procal  2. Status post Covid-19 booster  3. ESRD on hemodialysis  4. Permacath in situ  5. Penicillin allergy     Comment:  Sepsis resolving except for procal, with no clear source of infection. Plan:     1. Continue IV Vancomycin and Meropenem  2. In am, repeat procal and CRP  3.  Follow-up blood cultures       Signed By: Kelsey Gil MD     December 11, 2021

## 2021-12-11 NOTE — PROGRESS NOTES
Patient: Emmy Homans MRN: [id number]  SSN: [id number]    YOB: 1950  Age: 80 y.o. Sex: male          Subjective:     Patient was seen at bedside  He appeared cheerful  Did well on Dialysis  He said he didn't have any fevers since yesterday     Objective:     Vitals:    12/10/21 2000 12/10/21 2130 12/11/21 0000 12/11/21 0835   BP:  93/60  (!) 112/58   Pulse: 67 67 64 62   Resp:  18  20   Temp:  97.6 °F (36.4 °C)  97.7 °F (36.5 °C)   TempSrc:       SpO2:  98%  93%   Weight:            Intake and Output:  Yesterday's Intake and Output:  12/10 0701 - 12/11 0700  In: -   Out: 1000      Physical Exam:   GENERAL: alert, cooperative, no distress, appears stated age  EYE: negative  LUNG: clear to auscultation bilaterally  HEART: regular rate and rhythm, S1, S2 normal  ABDOMEN: soft, non-tender. Bowel sounds normal. No masses,  no organomegaly  EXTREMITIES:  extremities normal, atraumatic, no cyanosis or edema, no ulcers,SKIN: Normal.   Skin -  no rash or abnormalities  NEUROLOGIC: negative      Dialysis access: temporary catheter site right and IJ.     Left arm AV Graft/Fistula: maturing   Patient said it was done 2 weeks ago  + Thrill     Data Review    Meds    Current Facility-Administered Medications   Medication Dose Route Frequency    midodrine (PROAMATINE) tablet 5 mg  5 mg Oral TID WITH MEALS    heparin (porcine) 1,000 unit/mL injection 2,500 Units  2,500 Units Hemodialysis DIALYSIS PRN    meropenem (MERREM) 500 mg in 0.9% sodium chloride 10 mL IV syringe  0.5 g IntraVENous Q24H    zinc oxide-white petrolatum 17-57 % topical paste   Topical TID    albuterol (PROVENTIL HFA, VENTOLIN HFA, PROAIR HFA) inhaler 2 Puff  2 Puff Inhalation Q4H PRN    acyclovir (ZOVIRAX) tablet 800 mg  800 mg Oral BID    atorvastatin (LIPITOR) tablet 40 mg  40 mg Oral DAILY    calcitRIOL (ROCALTROL) capsule 0.25 mcg  0.25 mcg Oral DAILY    carvediloL (COREG) tablet 3.125 mg  3.125 mg Oral BID WITH MEALS    peg 400-hypromellose-glycerin 1-0.2-0.2 % opthalmic solution   Both Eyes TID PRN    prednisoLONE acetate (PRED FORTE) 1 % ophthalmic suspension 1 Drop  1 Drop Both Eyes BID    sodium chloride (NS) flush 5-40 mL  5-40 mL IntraVENous Q8H    sodium chloride (NS) flush 5-40 mL  5-40 mL IntraVENous PRN    acetaminophen (TYLENOL) tablet 650 mg  650 mg Oral Q6H PRN    Or    acetaminophen (TYLENOL) suppository 650 mg  650 mg Rectal Q6H PRN    polyethylene glycol (MIRALAX) packet 17 g  17 g Oral DAILY PRN    ondansetron (ZOFRAN ODT) tablet 4 mg  4 mg Oral Q8H PRN    Or    ondansetron (ZOFRAN) injection 4 mg  4 mg IntraVENous Q6H PRN    heparin (porcine) injection 5,000 Units  5,000 Units SubCUTAneous Q12H    VANCOMYCIN INFORMATION NOTE   Other PRN       Lab      No results found for this or any previous visit (from the past 24 hour(s)). Lab Results   Component Value Date/Time    Color Yellow/Straw 07/07/2021 02:30 AM    Appearance Clear 07/07/2021 02:30 AM    Specific gravity 1.016 07/07/2021 02:30 AM    Specific gravity 1.028 07/08/2010 04:20 AM    pH (UA) 7.0 07/07/2021 02:30 AM    Protein >300 (A) 07/07/2021 02:30 AM    Glucose Negative 07/07/2021 02:30 AM    Ketone Negative 07/07/2021 02:30 AM    Bilirubin Negative 07/07/2021 02:30 AM    Urobilinogen 0.1 07/07/2021 02:30 AM    Nitrites Negative 07/07/2021 02:30 AM    Leukocyte Esterase Trace (A) 07/07/2021 02:30 AM    Epithelial cells 0-5 07/08/2010 04:20 AM    Bacteria Negative 07/07/2021 02:30 AM    WBC 20-50 07/07/2021 02:30 AM    RBC 0-5 07/07/2021 02:30 AM         Assessment & Plan: Active Problems:    Sepsis ([address] (7/7/2021)      ESRD on dialysis [facility]) (12/8/2021)      ESRD. HD done yesterday  Again on Monday     Stable electrolytes. - H&H are good   -Ca and Phos good. On calcitriol   Currently not on Phosphate binders . Will follow labs and start phosphate binders if trending up .     Sepsis - Blood Cx were sent on 12/8/ and is on brod spectrum abxs now. Follow up Cxs  - prelim no growth so far   On cefepime and Vanc   Seen by ID   R/o line sepsis     Left arm AVF - apparently done just 2 weeks ago      HTN - Home meds on Hold as BP is borderline   Will hold meds Before Dialysis sessions.     On High dose of acyclovir - for recent shingles infection     Signed By: Corrina Viveros MD     December 11, 2021      This note was prepared using voice recognition system and is likely to have sound alike errors that may have been overlooked even during proofreading.   Please contact the author for any clarifications

## 2021-12-11 NOTE — PROGRESS NOTES
Progress Note    Patient: Emmy Homans MRN: [id number]  SSN: [id number]    YOB: 1950  Age: 80 y.o. Sex: male      Admit Date: 12/8/2021    LOS: 3 days     Subjective:   Patient followed for sepsis following Covid-19 booster but no obvious source of infection. Blood cultures negative so far. His temperature has trended downward and WBC is normal but procal and CRP both elevated. He is on Vancomycin and Meropenem. Objective:     Vitals:    12/10/21 2000 12/10/21 2130 12/11/21 0000 12/11/21 0835   BP:  93/60  (!) 112/58   Pulse: 67 67 64 62   Resp:  18  20   Temp:  97.6 °F (36.4 °C)  97.7 °F (36.5 °C)   TempSrc:       SpO2:  98%  93%   Weight:            Intake and Output:  Current Shift: No intake/output data recorded. Last three shifts: 12/09 1901 - 12/11 0700  In: -   Out: 1000     Physical Exam:    Vitals and nursing note reviewed. Constitutional:       Appearance: He is not ill-appearing. HENT:      Head: Normocephalic and atraumatic. Nose: Nose normal. No congestion or rhinorrhea. Mouth/Throat:      Pharynx: Oropharynx is clear. No posterior oropharyngeal erythema. Eyes:      Pupils: Pupils are equal, round, and reactive to light. Cardiovascular:      Rate and Rhythm: Regular rhythm. Heart sounds: No murmur heard. Comments: Right sided Permacath, site unremarkable  Pulmonary:      Effort: Pulmonary effort is normal.      Breath sounds: Normal breath sounds. Chest:       Abdominal:      General: Abdomen is flat. Bowel sounds are normal.      Palpations: Abdomen is soft. Genitourinary:     Comments: No Pleitez catheter    Musculoskeletal:      Cervical back: Neck supple. Right lower leg: No edema. Left lower leg: No edema. Skin:     Findings: No rash. Comments: Chronic sacrococcygeal wound   Neurological:      General: No focal deficit present. Mental Status: He is alert and oriented to person, place, and time.    Psychiatric: Mood and Affect: Mood normal.         Behavior: Behavior normal.         Thought Content: Thought content normal.         Judgment: Judgment normal.     Lab/Data Review:     WBC 7,200  Lactic acid 2.1 <2.6    Procal 15.04 <11.98  CRP 9.63 <9.67     Covid-19 Not detected    Blood cultures (12/8) No growth 2 days  Assessment:     Active Problems:    Sepsis ([facility] [address] (7/7/2021)      ESRD on dialysis ([facility] [address] (12/8/2021)       1. Sepsis with fever, elevated lactic acid, leukocytosis elevated procal and CRP, etiology unclear, Day #4 IV Vancomycin and Merompenem  2. Status post Covid-19 booster  3. ESRD on hemodialysis  4. Permacath in situ  5. Penicillin allergy     Comment:  Sepsis resolving except for procal, with no clear source of infection. Plan:     1. Continue IV Vancomycin and Meropenem  2. In am, repeat CBC, procal and CRP  3.  Follow-up blood cultures       Signed By: Kelsey Gil MD     December 11, 2021

## 2021-12-12 LAB
ANION GAP SERPL CALC-SCNC: 7 MMOL/L (ref 5–15)
BASOPHILS # BLD: 0.1 K/UL (ref 0–0.1)
BASOPHILS NFR BLD: 1 % (ref 0–1)
BUN SERPL-MCNC: 39 MG/DL (ref 6–20)
BUN/CREAT SERPL: 5 (ref 12–20)
CA-I BLD-MCNC: 8.4 MG/DL (ref 8.5–10.1)
CHLORIDE SERPL-SCNC: 100 MMOL/L (ref 97–108)
CO2 SERPL-SCNC: 26 MMOL/L (ref 21–32)
CREAT SERPL-MCNC: 7.64 MG/DL (ref 0.7–1.3)
CRP SERPL-MCNC: 4 MG/DL (ref 0–0.6)
DIFFERENTIAL METHOD BLD: ABNORMAL
EOSINOPHIL # BLD: 0.2 K/UL (ref 0–0.4)
EOSINOPHIL NFR BLD: 3 % (ref 0–7)
ERYTHROCYTE [DISTWIDTH] IN BLOOD BY AUTOMATED COUNT: 18.8 % (ref 11.5–14.5)
GLUCOSE SERPL-MCNC: 87 MG/DL (ref 65–100)
HCT VFR BLD AUTO: 35.5 % (ref 36.6–50.3)
HGB BLD-MCNC: 11.5 G/DL (ref 12.1–17)
IMM GRANULOCYTES # BLD AUTO: 0 K/UL
IMM GRANULOCYTES NFR BLD AUTO: 0 %
LYMPHOCYTES # BLD: 2.7 K/UL (ref 0.8–3.5)
LYMPHOCYTES NFR BLD: 39 % (ref 12–49)
MCH RBC QN AUTO: 28.2 PG (ref 26–34)
MCHC RBC AUTO-ENTMCNC: 32.4 G/DL (ref 30–36.5)
MCV RBC AUTO: 87 FL (ref 80–99)
MONOCYTES # BLD: 0.8 K/UL (ref 0–1)
MONOCYTES NFR BLD: 11 % (ref 5–13)
NEUTS SEG # BLD: 3.1 K/UL (ref 1.8–8)
NEUTS SEG NFR BLD: 46 % (ref 32–75)
NRBC # BLD: 0.03 K/UL (ref 0–0.01)
NRBC BLD-RTO: 0.4 PER 100 WBC
PLATELET # BLD AUTO: 164 K/UL (ref 150–400)
PMV BLD AUTO: 12 FL (ref 8.9–12.9)
POTASSIUM SERPL-SCNC: 3.3 MMOL/L (ref 3.5–5.1)
PROCALCITONIN SERPL-MCNC: 8.71 NG/ML
RBC # BLD AUTO: 4.08 M/UL (ref 4.1–5.7)
RBC MORPH BLD: ABNORMAL
SODIUM SERPL-SCNC: 133 MMOL/L (ref 136–145)
WBC # BLD AUTO: 6.9 K/UL (ref 4.1–11.1)

## 2021-12-12 PROCEDURE — 36415 COLL VENOUS BLD VENIPUNCTURE: CPT

## 2021-12-12 PROCEDURE — 84145 PROCALCITONIN (PCT): CPT

## 2021-12-12 PROCEDURE — 65270000029 HC RM PRIVATE

## 2021-12-12 PROCEDURE — 99232 SBSQ HOSP IP/OBS MODERATE 35: CPT | Performed by: INTERNAL MEDICINE

## 2021-12-12 PROCEDURE — 74011250637 HC RX REV CODE- 250/637: Performed by: NURSE PRACTITIONER

## 2021-12-12 PROCEDURE — 80048 BASIC METABOLIC PNL TOTAL CA: CPT

## 2021-12-12 PROCEDURE — 74011250637 HC RX REV CODE- 250/637: Performed by: INTERNAL MEDICINE

## 2021-12-12 PROCEDURE — 74011250636 HC RX REV CODE- 250/636: Performed by: INTERNAL MEDICINE

## 2021-12-12 PROCEDURE — 86140 C-REACTIVE PROTEIN: CPT

## 2021-12-12 PROCEDURE — 85025 COMPLETE CBC W/AUTO DIFF WBC: CPT

## 2021-12-12 PROCEDURE — 97116 GAIT TRAINING THERAPY: CPT

## 2021-12-12 RX ORDER — POTASSIUM CHLORIDE 750 MG/1
40 TABLET, FILM COATED, EXTENDED RELEASE ORAL
Status: COMPLETED | OUTPATIENT
Start: 2021-12-12 | End: 2021-12-12

## 2021-12-12 RX ADMIN — MIDODRINE HYDROCHLORIDE 5 MG: 5 TABLET ORAL at 09:08

## 2021-12-12 RX ADMIN — PREDNISOLONE ACETATE 1 DROP: 10 SUSPENSION/ DROPS OPHTHALMIC at 09:09

## 2021-12-12 RX ADMIN — POTASSIUM CHLORIDE 40 MEQ: 750 TABLET, FILM COATED, EXTENDED RELEASE ORAL at 17:30

## 2021-12-12 RX ADMIN — Medication 10 ML: at 05:11

## 2021-12-12 RX ADMIN — HEPARIN SODIUM 5000 UNITS: 5000 INJECTION INTRAVENOUS; SUBCUTANEOUS at 17:32

## 2021-12-12 RX ADMIN — WHITE PETROLATUM,ZINC OXIDE: 57; 17 PASTE TOPICAL at 21:12

## 2021-12-12 RX ADMIN — MIDODRINE HYDROCHLORIDE 5 MG: 5 TABLET ORAL at 13:13

## 2021-12-12 RX ADMIN — ACYCLOVIR 800 MG: 800 TABLET ORAL at 21:09

## 2021-12-12 RX ADMIN — WHITE PETROLATUM,ZINC OXIDE: 57; 17 PASTE TOPICAL at 13:15

## 2021-12-12 RX ADMIN — CALCITRIOL CAPSULES 0.25 MCG 0.25 MCG: 0.25 CAPSULE ORAL at 09:08

## 2021-12-12 RX ADMIN — HEPARIN SODIUM 5000 UNITS: 5000 INJECTION INTRAVENOUS; SUBCUTANEOUS at 05:11

## 2021-12-12 RX ADMIN — CARVEDILOL 3.12 MG: 3.12 TABLET, FILM COATED ORAL at 09:07

## 2021-12-12 RX ADMIN — CARVEDILOL 3.12 MG: 3.12 TABLET, FILM COATED ORAL at 17:30

## 2021-12-12 RX ADMIN — WHITE PETROLATUM,ZINC OXIDE: 57; 17 PASTE TOPICAL at 17:34

## 2021-12-12 RX ADMIN — ACYCLOVIR 800 MG: 800 TABLET ORAL at 09:07

## 2021-12-12 RX ADMIN — Medication 10 ML: at 21:09

## 2021-12-12 RX ADMIN — MIDODRINE HYDROCHLORIDE 5 MG: 5 TABLET ORAL at 17:30

## 2021-12-12 RX ADMIN — ATORVASTATIN CALCIUM 40 MG: 40 TABLET, FILM COATED ORAL at 09:07

## 2021-12-12 RX ADMIN — PREDNISOLONE ACETATE 1 DROP: 10 SUSPENSION/ DROPS OPHTHALMIC at 21:09

## 2021-12-12 RX ADMIN — Medication 10 ML: at 13:13

## 2021-12-12 NOTE — PROGRESS NOTES
PT attempted to see patient for therapy but patient asking to finish watching the sermon currently on TV so will try back at a later time/date as time allows.

## 2021-12-12 NOTE — PROGRESS NOTES
Patient: Mecca Glass MRN: [id number]  SSN: [id number]    YOB: 1950  Age: 80 y.o. Sex: male          Subjective:     Patient was seen at bedside  And was comfortably sitting up in bed. He said he was feeling well this morning. Did not have any fevers in the last 24 hours. S/p dialysis on Friday. Objective:     Vitals:    12/11/21 2021 12/12/21 0104 12/12/21 0839 12/12/21 0905   BP: 114/70 103/63 113/65    Pulse: 63 65 60    Resp: 20 20 20    Temp: 97.7 °F (36.5 °C) 97.6 °F (36.4 °C) 97.7 °F (36.5 °C)    TempSrc:       SpO2: 100% 99% 99%    Weight:    83.1 kg (183 lb 3.2 oz)        Intake and Output:  Yesterday's Intake and Output:  No intake/output data recorded. Physical Exam:   GENERAL: alert, cooperative, no distress, appears stated age  EYE: negative  LUNG: clear to auscultation bilaterally  HEART: regular rate and rhythm, S1, S2 normal  ABDOMEN: soft, non-tender. Bowel sounds normal. No masses,  no organomegaly  EXTREMITIES:  extremities normal, atraumatic, no cyanosis or edema, no ulcers,SKIN: Normal.   Skin -  no rash or abnormalities  NEUROLOGIC: negative      Dialysis access: temporary catheter site right and IJ.     Left arm AV Graft/Fistula: maturing   Patient said it was done 2 weeks ago  + Thrill     Data Review    Meds    Current Facility-Administered Medications   Medication Dose Route Frequency    midodrine (PROAMATINE) tablet 5 mg  5 mg Oral TID WITH MEALS    heparin (porcine) 1,000 unit/mL injection 2,500 Units  2,500 Units Hemodialysis DIALYSIS PRN    meropenem (MERREM) 500 mg in 0.9% sodium chloride 10 mL IV syringe  0.5 g IntraVENous Q24H    zinc oxide-white petrolatum 17-57 % topical paste   Topical TID    albuterol (PROVENTIL HFA, VENTOLIN HFA, PROAIR HFA) inhaler 2 Puff  2 Puff Inhalation Q4H PRN    acyclovir (ZOVIRAX) tablet 800 mg  800 mg Oral BID    atorvastatin (LIPITOR) tablet 40 mg  40 mg Oral DAILY    calcitRIOL (ROCALTROL) capsule 0.25 mcg  0.25 mcg Oral DAILY    carvediloL (COREG) tablet 3.125 mg  3.125 mg Oral BID WITH MEALS    peg 400-hypromellose-glycerin 1-0.2-0.2 % opthalmic solution   Both Eyes TID PRN    prednisoLONE acetate (PRED FORTE) 1 % ophthalmic suspension 1 Drop  1 Drop Both Eyes BID    sodium chloride (NS) flush 5-40 mL  5-40 mL IntraVENous Q8H    sodium chloride (NS) flush 5-40 mL  5-40 mL IntraVENous PRN    acetaminophen (TYLENOL) tablet 650 mg  650 mg Oral Q6H PRN    Or    acetaminophen (TYLENOL) suppository 650 mg  650 mg Rectal Q6H PRN    polyethylene glycol (MIRALAX) packet 17 g  17 g Oral DAILY PRN    ondansetron (ZOFRAN ODT) tablet 4 mg  4 mg Oral Q8H PRN    Or    ondansetron (ZOFRAN) injection 4 mg  4 mg IntraVENous Q6H PRN    heparin (porcine) injection 5,000 Units  5,000 Units SubCUTAneous Q12H    VANCOMYCIN INFORMATION NOTE   Other PRN       Lab      No results found for this or any previous visit (from the past 24 hour(s)). Lab Results   Component Value Date/Time    Color Yellow/Straw 07/07/2021 02:30 AM    Appearance Clear 07/07/2021 02:30 AM    Specific gravity 1.016 07/07/2021 02:30 AM    Specific gravity 1.028 07/08/2010 04:20 AM    pH (UA) 7.0 07/07/2021 02:30 AM    Protein >300 (A) 07/07/2021 02:30 AM    Glucose Negative 07/07/2021 02:30 AM    Ketone Negative 07/07/2021 02:30 AM    Bilirubin Negative 07/07/2021 02:30 AM    Urobilinogen 0.1 07/07/2021 02:30 AM    Nitrites Negative 07/07/2021 02:30 AM    Leukocyte Esterase Trace (A) 07/07/2021 02:30 AM    Epithelial cells 0-5 07/08/2010 04:20 AM    Bacteria Negative 07/07/2021 02:30 AM    WBC 20-50 07/07/2021 02:30 AM    RBC 0-5 07/07/2021 02:30 AM         Assessment & Plan: Active Problems:    Sepsis ([address] (7/7/2021)      ESRD on dialysis [facility]) (12/8/2021)      ESRD. HD done on 12/10/21  Again on Monday     Stable electrolytes. Volume status is good - no peripheral edema, No respiratory symptoms etc . Probably at his dry weight .   Volume removal dialysis tomorrow up to 1.5 L as tolerated. - H&H are good  - epo with HD tomorrow . Orders placed. Will give 1 dose of IV albumin if he experiences any low blood pressure. If that is not effective he will get 1 dose of midodrine.  -Ca and Phos good. On calcitriol   Currently not on Phosphate binders . Will follow labs and start phosphate binders if trending up .     Sepsis - Blood Cx were sent on 12/8/ and is on brod spectrum abxs now. Follow up Cxs  - prelim no growth so far   On cefepime and Vanc   Seen by ID   R/o line sepsis     Left arm AVF - apparently done just 2 weeks ago      HTN - Home meds on Hold as BP is borderline   Will hold meds Before Dialysis sessions.     On High dose of acyclovir - for recent shingles infection     Signed By: Glenn Willett MD     December 12, 2021      This note was prepared using voice recognition system and is likely to have sound alike errors that may have been overlooked even during proofreading.   Please contact the author for any clarifications

## 2021-12-12 NOTE — PROGRESS NOTES
Progress Note    Patient: Pearl Kaufman MRN: [id number]  SSN: [id number]    YOB: 1950  Age: 80 y.o. Sex: male      Admit Date: 12/8/2021    LOS: 4 days     Subjective:   Patient followed for sepsis following Covid-19 booster but no obvious source of infection. Blood cultures negative so far. His temperature has trended downward with normal WBC and decreasing procal and CRP. He is on Vancomycin and Meropenem. Objective:     Vitals:    12/11/21 2021 12/12/21 0104 12/12/21 0839 12/12/21 0905   BP: 114/70 103/63 113/65    Pulse: 63 65 60    Resp: 20 20 20    Temp: 97.7 °F (36.5 °C) 97.6 °F (36.4 °C) 97.7 °F (36.5 °C)    TempSrc:       SpO2: 100% 99% 99%    Weight:    183 lb 3.2 oz (83.1 kg)        Intake and Output:  Current Shift: No intake/output data recorded. Last three shifts: No intake/output data recorded. Physical Exam:    Vitals and nursing note reviewed. Constitutional:       Appearance: He is not ill-appearing. HENT:      Head: Normocephalic and atraumatic. Nose: Nose normal. No congestion or rhinorrhea. Mouth/Throat:      Pharynx: Oropharynx is clear. No posterior oropharyngeal erythema. Eyes:      Pupils: Pupils are equal, round, and reactive to light. Cardiovascular:      Rate and Rhythm: Regular rhythm. Heart sounds: No murmur heard. Comments: Right sided Permacath, site unremarkable  Pulmonary:      Effort: Pulmonary effort is normal.      Breath sounds: Normal breath sounds. Chest:       Abdominal:      General: Abdomen is flat. Bowel sounds are normal.      Palpations: Abdomen is soft. Genitourinary:     Comments: No Pleitez catheter    Musculoskeletal:      Cervical back: Neck supple. Right lower leg: No edema. Left lower leg: No edema. Skin:     Findings: No rash. Comments: Chronic sacrococcygeal wound   Neurological:      General: No focal deficit present.       Mental Status: He is alert and oriented to person, place, and time. Psychiatric:         Mood and Affect: Mood normal.         Behavior: Behavior normal.         Thought Content: Thought content normal.         Judgment: Judgment normal.     Lab/Data Review:     WBC 6,900  Lactic acid 2.1 <2.6    Procal 8.71 <15.04 <11.98  CRP 4.00 <9.63 <9.67     Covid-19 Not detected    Blood cultures (12/8) No growth 4 days  Assessment:     Active Problems:    Sepsis ([facility] (7/7/2021)      ESRD on dialysis ([facility] (12/8/2021)       1. Sepsis with fever, elevated lactic acid, leukocytosis elevated procal and CRP, etiology unclear, Day #7 IV Vancomycin and Day #3 Meropenem, resolving except for procal  2. Status post Covid-19 booster  3. ESRD on hemodialysis  4. Permacath in situ  5. Penicillin allergy     Comment:  Sepsis resolving with no clear source of infection. Plan:     1. Continue IV Vancomycin at hemodialysis for 1 more week  2. Discontinue Meropenem  3. Cleared for discharge from ID standpoint  4. Follow-up blood cultures  5.  Discussed recommendations with patient    Signed By: Jocy Ling MD     December 12, 2021

## 2021-12-12 NOTE — PROGRESS NOTES
Problem: Mobility Impaired (Adult and Pediatric)  Goal: *Acute Goals and Plan of Care (Insert Text)  Description: I with LE HEP x7 days  Supine to sit EOB with sup x1 x7 days  Sit to stand with sup x1 x7 days  Stand pivot from bed to chair with sup x1 x7 days  Progress to amb 300ft with RW and CGAx 7 days  Outcome: Progressing Towards Goal   PHYSICAL THERAPY TREATMENT  Patient: Jose Juan Mcgee (43 y.o. male)  Date: 12/12/2021  Diagnosis: Sepsis ([facility] [A41.9]  ESRD on dialysis ([facility] [N18.6, Z99.2]   <principal problem not specified>       Precautions:    Chart, physical therapy assessment, plan of care and goals were reviewed. ASSESSMENT  Patient continues with skilled PT services and is progressing towards goals. Upon entry into room, patient sitting EOB and agreeable to walk with PT. Sit<>stand with RW and SBA and good standing balance noted. Patient performed gait training into hallway for 500 feet x 1 with RW around nursing unit and CGA for safety. Patient ambulates at a significantly slow pace so majority of session was spent on gait training. Multiple standing rest breaks throughout session to stretch his UEs and back. No LOB or lightheadedness with gait training. \"I feel really good doing this! \" Returned patient to room to sit up on side of bed and showed him LE therex. Left patient sitting EOB with good sitting balance at end of session. He will benefit from continued skilled PT services to improve his strength and endurance. Current Level of Function Impacting Discharge (mobility/balance): decr mobility, decr endurance, AD for gait    Other factors to consider for discharge: medical hx, motivated to get better          PLAN :  Patient continues to benefit from skilled intervention to address the above impairments. Continue treatment per established plan of care. to address goals.     Recommendation for discharge: (in order for the patient to meet his/her long term goals)  Home with Family Care    This discharge recommendation:  Has been made in collaboration with the attending provider and/or case management    IF patient discharges home will need the following DME: to be determined (TBD)       SUBJECTIVE:   Patient stated my son just called me. He lives in [place]. I can't wait to get home.     OBJECTIVE DATA SUMMARY:   Critical Behavior:  Neurologic State: Alert  Orientation Level: Oriented X4  Cognition: Appropriate for age attention/concentration, Appropriate safety awareness, Follows commands     Functional Mobility Training:  Bed Mobility:   Patient seated EOB upon entry into room so did not assess bed mobility today         Scooting: Stand-by assistance        Transfers:  Sit to Stand: Stand-by assistance  Stand to Sit: Stand-by assistance  Good standing balance noted    Balance:  Sitting: Intact  Standing: Impaired; Without support  Standing - Static: Good; Constant support  Standing - Dynamic : Fair; Constant support  Ambulation/Gait Training:  Distance (ft): 500 Feet (ft)  Assistive Device: Gait belt; Walker, rolling  Ambulation - Level of Assistance: Contact guard assistance    Speed/Roseanne: Slow (significantly slow pace)  Significantly slow pace during ambulation so majority of time during session spent on ambulation. Therapeutic Exercises:   10x in sitting EOB: LAQ, marching, heel raises, toe raises   Pain Rating:  No pain     Activity Tolerance:   Good and requires rest breaks  Please refer to the flowsheet for vital signs taken during this treatment.     After treatment patient left in no apparent distress:   Call bell within reach and left patient sitting EOB at end of session with good sitting balance and all needs in reach     COMMUNICATION/COLLABORATION:   The patients plan of care was discussed with: Physical therapist.     Aron Mtz   Time Calculation: 23 mins

## 2021-12-12 NOTE — PROGRESS NOTES
Hospitalist Progress Note    Subjective:   Daily Progress Note: 12/12/2021 11:12 AM    Hospital Course:     Patient is a 60-year-old male with a PMH significant for CM, CHF systolic, CKD stage IV, ESRD on HD, DM, HTN and GERD. Presents to the emergency room with complaints of gradual onset of persistent progressive generalized weakness. He has a history of MRSA and Klebsiella bacteremia on 10/21 due to infected HD catheter which was removed at that time. He did recently received his Covid vaccination booster. Associated symptoms of chills. Inflammatory markers are all elevated. Vital signs on presentation temperature 101.5, hypertensive 110/66, tachycardic 100. Admitted for further management of sepsis. Started on IV vancomycin and cefepime. ID and nephrology consulted. Blood cultures obtained. Now on vancomycin and IV meropenem. Subjective: Follow-up examination of patient at the bedside. He is resting comfortably with no complaints. He appears weak and tired but better today.     Current Facility-Administered Medications   Medication Dose Route Frequency    [START ON 12/13/2021] epoetin sotero-epbx (RETACRIT) injection 10,000 Units  10,000 Units SubCUTAneous Q MON, WED & FRI    midodrine (PROAMATINE) tablet 5 mg  5 mg Oral TID WITH MEALS    heparin (porcine) 1,000 unit/mL injection 2,500 Units  2,500 Units Hemodialysis DIALYSIS PRN    meropenem (MERREM) 500 mg in 0.9% sodium chloride 10 mL IV syringe  0.5 g IntraVENous Q24H    zinc oxide-white petrolatum 17-57 % topical paste   Topical TID    albuterol (PROVENTIL HFA, VENTOLIN HFA, PROAIR HFA) inhaler 2 Puff  2 Puff Inhalation Q4H PRN    acyclovir (ZOVIRAX) tablet 800 mg  800 mg Oral BID    atorvastatin (LIPITOR) tablet 40 mg  40 mg Oral DAILY    calcitRIOL (ROCALTROL) capsule 0.25 mcg  0.25 mcg Oral DAILY    carvediloL (COREG) tablet 3.125 mg  3.125 mg Oral BID WITH MEALS    peg 400-hypromellose-glycerin 1-0.2-0.2 % opthalmic solution Both Eyes TID PRN    prednisoLONE acetate (PRED FORTE) 1 % ophthalmic suspension 1 Drop  1 Drop Both Eyes BID    sodium chloride (NS) flush 5-40 mL  5-40 mL IntraVENous Q8H    sodium chloride (NS) flush 5-40 mL  5-40 mL IntraVENous PRN    acetaminophen (TYLENOL) tablet 650 mg  650 mg Oral Q6H PRN    Or    acetaminophen (TYLENOL) suppository 650 mg  650 mg Rectal Q6H PRN    polyethylene glycol (MIRALAX) packet 17 g  17 g Oral DAILY PRN    ondansetron (ZOFRAN ODT) tablet 4 mg  4 mg Oral Q8H PRN    Or    ondansetron (ZOFRAN) injection 4 mg  4 mg IntraVENous Q6H PRN    heparin (porcine) injection 5,000 Units  5,000 Units SubCUTAneous Q12H    VANCOMYCIN INFORMATION NOTE   Other PRN        REVIEW OF SYSTEMS    Review of Systems   Constitutional: Positive for malaise/fatigue. HENT: Negative for congestion. Respiratory: Negative for cough and shortness of breath. Cardiovascular: Negative for chest pain. Musculoskeletal: Positive for myalgias. Neurological: Positive for weakness. Objective:     Visit Vitals  /65   Pulse 60   Temp 97.7 °F (36.5 °C)   Resp 20   Wt 83.1 kg (183 lb 3.2 oz)   SpO2 99%   BMI 26.29 kg/m²      O2 Device: None (Room air)    Temp (24hrs), Av.7 °F (36.5 °C), Min:97.6 °F (36.4 °C), Max:97.7 °F (36.5 °C)      No intake/output data recorded. No intake/output data recorded. PHYSICAL EXAM:    Physical Exam  Constitutional:       Appearance: He is ill-appearing. Cardiovascular:      Rate and Rhythm: Normal rate and regular rhythm. Pulmonary:      Effort: No respiratory distress. Breath sounds: No wheezing. Skin:     General: Skin is warm. Neurological:      Motor: Weakness present. Gait: Gait abnormal.          Data Review    No results found for this or any previous visit (from the past 24 hour(s)).     XR CHEST PORT   Final Result          Active Problems:    Sepsis ([address] (2021)      ESRD on dialysis [facility]) (2021)        Assessment/Plan: Sepsis  History of recent MRSA/Klebsiella bacteremia from HD cath  ID following  IV vancomycin, discontinued cefepime and started meropenem  Inflammatory markers elevated    ESRD on HD TTS  Nephrology following    HTN-currently hypotensive  Continue Coreg as tolerated we will place hold parameters  Darted on midodrine for continued low blood pressures. Generalized weakness  PT, OT, CM for disposition needs      DVT Prophylaxis: Heparin subcu  Code Status: Full Code  POA/NOK:    Disposition and discharge barriers:   · Treatment for sepsis, IV antibiotics  · Placement  · Currently has temporary HD catheter  Care Plan discussed with: Patient, RN, IDR team  _____________________________________________________________________________  Time spent in direct care including coordination of service, review of data and examination: > 35 minutes    ______________________________________________________________________________    Aldo Anthony NP    This is dictation was done by dragon, computer voice recognition software. Quite often unanticipated grammatical, syntax, homophones and other interpretive errors or inadvertently transcribed by the computer software. Please excuse errors that have escaped final proofreading. Thank you.

## 2021-12-13 ENCOUNTER — APPOINTMENT (OUTPATIENT)
Dept: INTERVENTIONAL RADIOLOGY/VASCULAR | Age: 71
DRG: 871 | End: 2021-12-13
Attending: INTERNAL MEDICINE
Payer: MEDICARE

## 2021-12-13 VITALS
WEIGHT: 183.2 LBS | TEMPERATURE: 98.2 F | SYSTOLIC BLOOD PRESSURE: 122 MMHG | RESPIRATION RATE: 18 BRPM | BODY MASS INDEX: 26.29 KG/M2 | HEART RATE: 64 BPM | OXYGEN SATURATION: 98 % | DIASTOLIC BLOOD PRESSURE: 68 MMHG

## 2021-12-13 LAB
ANION GAP SERPL CALC-SCNC: 9 MMOL/L (ref 5–15)
BASOPHILS # BLD: 0.1 K/UL (ref 0–0.1)
BASOPHILS NFR BLD: 1 % (ref 0–1)
BUN SERPL-MCNC: 50 MG/DL (ref 6–20)
BUN/CREAT SERPL: 6 (ref 12–20)
CA-I BLD-MCNC: 8.4 MG/DL (ref 8.5–10.1)
CHLORIDE SERPL-SCNC: 103 MMOL/L (ref 97–108)
CO2 SERPL-SCNC: 24 MMOL/L (ref 21–32)
CREAT SERPL-MCNC: 8.68 MG/DL (ref 0.7–1.3)
CRP SERPL-MCNC: 2.8 MG/DL (ref 0–0.6)
DIFFERENTIAL METHOD BLD: ABNORMAL
EOSINOPHIL # BLD: 0.2 K/UL (ref 0–0.4)
EOSINOPHIL NFR BLD: 2 % (ref 0–7)
ERYTHROCYTE [DISTWIDTH] IN BLOOD BY AUTOMATED COUNT: 18.9 % (ref 11.5–14.5)
GLUCOSE SERPL-MCNC: 80 MG/DL (ref 65–100)
HCT VFR BLD AUTO: 34.7 % (ref 36.6–50.3)
HGB BLD-MCNC: 11.3 G/DL (ref 12.1–17)
IMM GRANULOCYTES # BLD AUTO: 0 K/UL
IMM GRANULOCYTES NFR BLD AUTO: 0 %
LYMPHOCYTES # BLD: 4.1 K/UL (ref 0.8–3.5)
LYMPHOCYTES NFR BLD: 49 % (ref 12–49)
MCH RBC QN AUTO: 28.1 PG (ref 26–34)
MCHC RBC AUTO-ENTMCNC: 32.6 G/DL (ref 30–36.5)
MCV RBC AUTO: 86.3 FL (ref 80–99)
MONOCYTES # BLD: 0.7 K/UL (ref 0–1)
MONOCYTES NFR BLD: 9 % (ref 5–13)
NEUTS SEG # BLD: 3.2 K/UL (ref 1.8–8)
NEUTS SEG NFR BLD: 39 % (ref 32–75)
NRBC # BLD: 0 K/UL (ref 0–0.01)
NRBC BLD-RTO: 0 PER 100 WBC
PLATELET # BLD AUTO: 164 K/UL (ref 150–400)
PMV BLD AUTO: 11.2 FL (ref 8.9–12.9)
POTASSIUM SERPL-SCNC: 3.4 MMOL/L (ref 3.5–5.1)
PROCALCITONIN SERPL-MCNC: 6.32 NG/ML
RBC # BLD AUTO: 4.02 M/UL (ref 4.1–5.7)
RBC MORPH BLD: ABNORMAL
SODIUM SERPL-SCNC: 136 MMOL/L (ref 136–145)
WBC # BLD AUTO: 8.3 K/UL (ref 4.1–11.1)

## 2021-12-13 PROCEDURE — 0JH63XZ INSERTION OF TUNNELED VASCULAR ACCESS DEVICE INTO CHEST SUBCUTANEOUS TISSUE AND FASCIA, PERCUTANEOUS APPROACH: ICD-10-PCS | Performed by: RADIOLOGY

## 2021-12-13 PROCEDURE — 80048 BASIC METABOLIC PNL TOTAL CA: CPT

## 2021-12-13 PROCEDURE — 02H633Z INSERTION OF INFUSION DEVICE INTO RIGHT ATRIUM, PERCUTANEOUS APPROACH: ICD-10-PCS | Performed by: RADIOLOGY

## 2021-12-13 PROCEDURE — 74011250637 HC RX REV CODE- 250/637: Performed by: NURSE PRACTITIONER

## 2021-12-13 PROCEDURE — 74011250636 HC RX REV CODE- 250/636: Performed by: INTERNAL MEDICINE

## 2021-12-13 PROCEDURE — 80202 ASSAY OF VANCOMYCIN: CPT

## 2021-12-13 PROCEDURE — 77001 FLUOROGUIDE FOR VEIN DEVICE: CPT

## 2021-12-13 PROCEDURE — 02PYX3Z REMOVAL OF INFUSION DEVICE FROM GREAT VESSEL, EXTERNAL APPROACH: ICD-10-PCS | Performed by: RADIOLOGY

## 2021-12-13 PROCEDURE — B5181ZA FLUOROSCOPY OF SUPERIOR VENA CAVA USING LOW OSMOLAR CONTRAST, GUIDANCE: ICD-10-PCS | Performed by: RADIOLOGY

## 2021-12-13 PROCEDURE — 84145 PROCALCITONIN (PCT): CPT

## 2021-12-13 PROCEDURE — 74011250637 HC RX REV CODE- 250/637: Performed by: INTERNAL MEDICINE

## 2021-12-13 PROCEDURE — 90935 HEMODIALYSIS ONE EVALUATION: CPT

## 2021-12-13 PROCEDURE — 74011250636 HC RX REV CODE- 250/636

## 2021-12-13 PROCEDURE — 86140 C-REACTIVE PROTEIN: CPT

## 2021-12-13 PROCEDURE — C1750 CATH, HEMODIALYSIS,LONG-TERM: HCPCS

## 2021-12-13 PROCEDURE — 85025 COMPLETE CBC W/AUTO DIFF WBC: CPT

## 2021-12-13 PROCEDURE — 99232 SBSQ HOSP IP/OBS MODERATE 35: CPT | Performed by: INTERNAL MEDICINE

## 2021-12-13 RX ORDER — VANCOMYCIN HYDROCHLORIDE 500 MG/10ML
1 INJECTION, POWDER, LYOPHILIZED, FOR SOLUTION INTRAVENOUS
Status: DISCONTINUED | OUTPATIENT
Start: 2021-12-13 | End: 2021-12-13 | Stop reason: SDUPTHER

## 2021-12-13 RX ORDER — HEPARIN SODIUM 1000 [USP'U]/ML
INJECTION, SOLUTION INTRAVENOUS; SUBCUTANEOUS
Status: COMPLETED
Start: 2021-12-13 | End: 2021-12-13

## 2021-12-13 RX ORDER — MIDODRINE HYDROCHLORIDE 5 MG/1
5 TABLET ORAL
Qty: 90 TABLET | Refills: 0 | Status: SHIPPED | OUTPATIENT
Start: 2021-12-13 | End: 2022-01-12

## 2021-12-13 RX ADMIN — ATORVASTATIN CALCIUM 40 MG: 40 TABLET, FILM COATED ORAL at 09:17

## 2021-12-13 RX ADMIN — Medication 10 ML: at 16:46

## 2021-12-13 RX ADMIN — ACYCLOVIR 800 MG: 800 TABLET ORAL at 09:17

## 2021-12-13 RX ADMIN — WHITE PETROLATUM,ZINC OXIDE: 57; 17 PASTE TOPICAL at 09:22

## 2021-12-13 RX ADMIN — WHITE PETROLATUM,ZINC OXIDE: 57; 17 PASTE TOPICAL at 17:17

## 2021-12-13 RX ADMIN — HEPARIN SODIUM 2500 UNITS: 1000 INJECTION, SOLUTION INTRAVENOUS; SUBCUTANEOUS at 14:46

## 2021-12-13 RX ADMIN — CARVEDILOL 3.12 MG: 3.12 TABLET, FILM COATED ORAL at 17:16

## 2021-12-13 RX ADMIN — MIDODRINE HYDROCHLORIDE 5 MG: 5 TABLET ORAL at 09:17

## 2021-12-13 RX ADMIN — CARVEDILOL 3.12 MG: 3.12 TABLET, FILM COATED ORAL at 09:17

## 2021-12-13 RX ADMIN — HEPARIN SODIUM 5000 UNITS: 5000 INJECTION INTRAVENOUS; SUBCUTANEOUS at 05:22

## 2021-12-13 RX ADMIN — PREDNISOLONE ACETATE 1 DROP: 10 SUSPENSION/ DROPS OPHTHALMIC at 09:18

## 2021-12-13 RX ADMIN — MIDODRINE HYDROCHLORIDE 5 MG: 5 TABLET ORAL at 17:16

## 2021-12-13 RX ADMIN — HEPARIN SODIUM 5000 UNITS: 5000 INJECTION INTRAVENOUS; SUBCUTANEOUS at 17:16

## 2021-12-13 RX ADMIN — CALCITRIOL CAPSULES 0.25 MCG 0.25 MCG: 0.25 CAPSULE ORAL at 09:17

## 2021-12-13 NOTE — PROGRESS NOTES
OT tx session attempted at 1100, however pt currently being taken off floor to dialysis at this time. Will continue to follow pt and attempt tx session at a later time as time allows. Thank you.

## 2021-12-13 NOTE — PROGRESS NOTES
Progress Note    Patient: Son Bowen MRN: [id number]  SSN: [id number]    YOB: 1950  Age: 80 y.o. Sex: male      Admit Date: 12/8/2021    LOS: 5 days     Subjective:   Patient followed for sepsis following Covid-19 booster but no obvious source of infection. Blood cultures negative so far. His temperature has trended downward with normal WBC and decreasing procal and CRP. He is on Vancomycin alone. Objective:     Vitals:    12/13/21 1315 12/13/21 1345 12/13/21 1415 12/13/21 1445   BP: 114/71 (!) 150/68 116/65 122/68   Pulse: 64 69 60 64   Resp: 18 18 18 18   Temp:    98.2 °F (36.8 °C)   TempSrc:    Oral   SpO2:       Weight:            Intake and Output:  Current Shift: 12/13 0701 - 12/13 1900  In: -   Out: 2010   Last three shifts: No intake/output data recorded. Physical Exam:    Vitals and nursing note reviewed. Constitutional:       Appearance: He is not ill-appearing. HENT:      Head: Normocephalic and atraumatic. Nose: Nose normal. No congestion or rhinorrhea. Mouth/Throat:      Pharynx: Oropharynx is clear. No posterior oropharyngeal erythema. Eyes:      Pupils: Pupils are equal, round, and reactive to light. Cardiovascular:      Rate and Rhythm: Regular rhythm. Heart sounds: No murmur heard. Comments: Right sided Permacath, site unremarkable  Pulmonary:      Effort: Pulmonary effort is normal.      Breath sounds: Normal breath sounds. Chest:       Abdominal:      General: Abdomen is flat. Bowel sounds are normal.      Palpations: Abdomen is soft. Genitourinary:     Comments: No Pleitez catheter    Musculoskeletal:      Cervical back: Neck supple. Right lower leg: No edema. Left lower leg: No edema. Skin:     Findings: No rash. Comments: Chronic sacrococcygeal wound   Neurological:      General: No focal deficit present. Mental Status: He is alert and oriented to person, place, and time.    Psychiatric:         Mood and Affect: Mood normal.         Behavior: Behavior normal.         Thought Content: Thought content normal.         Judgment: Judgment normal.     Lab/Data Review:     WBC 8,300  Lactic acid 2.1 <2.6    Procal 6.32 <8.71 <15.04 <11.98  CRP 2.80 <4.00 <9.63 <9.67     Covid-19 Not detected    Blood cultures (12/8) No growth 5 days  Assessment:     Active Problems:    Sepsis ([facility] (7/7/2021)      ESRD on dialysis ([facility] (12/8/2021)       1. Sepsis with fever, elevated lactic acid, leukocytosis elevated procal and CRP, etiology unclear, Day #7 IV Vancomycin and Day #3 Meropenem, resolving except for procal  2. Status post Covid-19 booster  3. ESRD on hemodialysis  4. Permacath in situ  5. Penicillin allergy     Comment:  Sepsis resolving with no clear source of infection. Plan:     1. Continue IV Vancomycin at hemodialysis for 1 more week  2. Cleared for discharge from ID standpoint  3.  Follow-up blood cultures    Signed By: Citlaly Constantino MD     December 13, 2021

## 2021-12-13 NOTE — DIALYSIS
Pt tolerated 3.5h dialysis well with 2 L fluid removal. Leaves dialysis for IR for catheter exchange.

## 2021-12-13 NOTE — DISCHARGE INSTRUCTIONS
Hospitalist Discharge Instructions     Patient ID:    Mecca Glass  [id number]  80 y.o.  1950    Admit date: 12/8/2021    Discharge date : 12/13/2021    Final Diagnoses:   1. Sepsis  2. History of MRSA/Klebsiella bacteremia from HD cath  3. End-stage renal disease on hemodialysis  4. Hypertension with hypotensive episode  5. Generalized Weakness    Discharge Medications:   Current Discharge Medication List      START taking these medications    Details   midodrine (PROAMATINE) 5 mg tablet Take 1 Tablet by mouth three (3) times daily (with meals) for 30 days. Qty: 90 Tablet, Refills: 0  Start date: 12/13/2021, End date: 1/12/2022         CONTINUE these medications which have NOT CHANGED    Details   sevelamer carbonate (RENVELA) 800 mg tab tab Take 1 Tablet by mouth three (3) times daily (with meals). carvediloL (COREG) 3.125 mg tablet Take 1 Tablet by mouth two (2) times daily (with meals). Qty: 30 Tablet, Refills: 0      prednisoLONE acetate (PRED FORTE) 1 % ophthalmic suspension Administer 1 Drop to both eyes two (2) times a day. Post shingles therapy. dextran 70-hypromellose (Artificial Tears,[id number],) ophthalmic solution Administer 1 Drop to both eyes three (3) times daily as needed. acyclovir (ZOVIRAX) 800 mg tablet Take 800 mg by mouth two (2) times a day. albuterol (PROVENTIL HFA, VENTOLIN HFA, PROAIR HFA) 90 mcg/actuation inhaler Take 2 Puffs by inhalation every four (4) hours as needed for Wheezing or Shortness of Breath. Qty: 1 Inhaler, Refills: 0      atorvastatin (LIPITOR) 40 mg tablet Take 40 mg by mouth daily. STOP taking these medications       gentamicin in Saline, Iso-osm, (GARAMYCIN) 80 mg/100 mL Comments:   Reason for Stopping:         calcitRIOL (ROCALTROL) 0.25 mcg capsule Comments:   Reason for Stopping:             Vancomycin to be continued with dialysis for 1 more week.   Nephrology will arrange as an outpatient    Follow up Care:    1. Shashank Quiles MD in 1-2 weeks. Follow-up Information     Follow up With Specialties Details Why Geetha Joy MD Internal Medicine In 2 weeks  [facility]  [phone]              Patient Follow Up Instructions:    Activity: Activity as tolerated  Diet:  Renal Diet  Wound care: None Needed    Condition at Discharge:  Stable  __________________________________________________________________    Disposition  Home with family and home health services  ____________________________________________________________________    Code Status: Full Code  ___________________________________________________________________    CONSULTATIONS: ID and Nephrology    Signed:  Basil Evangelista PA-C  12/13/2021  8:17 AM

## 2021-12-13 NOTE — PROGRESS NOTES
Day #6 of Vancomycin  Consult provided for this 80 y.o. male for indication of sepsis.   Antibiotic regimen:  Vancomycin monotherapy  Concomitant nephrotoxic drugs: None  Frequency of BMP: Not scheduled    Recent Labs     21  1115 21  1048   WBC 8.3 6.9   CREA 8.68* 7.64*   BUN 50* 39*     Est CrCl: ESRD on HD MWF  Temp (24hrs), Av.7 °F (36.5 °C), Min:97.4 °F (36.3 °C), Max:98.4 °F (36.9 °C)    Cultures:    Blood: NGTD, preliminary    MRSA Swab: Not ordered    Target range: Trough 20-25 mcg/mL    Recent level history:  Date/Time Dose & Interval Measured Level (mcg/mL)    @ 0842 1000 mg x 1 10.9   12/10 @ 0500 1000 mg x 1 11.5    @ 0300 750 mg x 1  14.0        Assessment/Plan:   Afebrile since , leukocytosis resolved, CRP and procal trending down  Pre-HD level subtherapeutic, will give 1000 mg x 1 today  Discharge orders placed, will enter a pre-HD prior to dialysis 12/15 in the event patient has not been discharged

## 2021-12-13 NOTE — PROGRESS NOTES
CM met with patient at bedside to discuss DC plans and therapy recommendations for [place]. Patient agrees with [facility] [place] and said he recently was receiving services through an agency, but can't remember the name of the agency. CM called patients sister, Radha Clark @ [phone] and was informed that the [place] agency's name is [facility]. CM sent referral.    -----------------------------------------------------------------------------------------------------    9253- CM notified patient of acceptance and SOC date. Discharge plan of care/case management plan validated with provider discharge order.

## 2021-12-13 NOTE — PROGRESS NOTES
Patient: Lyly Whitfield MRN: [id number]  SSN: [id number]    YOB: 1950  Age: 80 y.o. Sex: male          Subjective:     Patient was seen at bedside  And was comfortably sitting up in bed. He said he was feeling well this morning. Did not have any fevers in the last 24 hours. For Dialysis today  S/p dialysis on Friday. Objective:     Vitals:    12/12/21 1932 12/13/21 0932 12/13/21 1115 12/13/21 1130   BP: 116/74 134/80 129/87 117/68   Pulse: 64 69 71 62   Resp: 18 18 18 18   Temp: 97.4 °F (36.3 °C) 97.4 °F (36.3 °C) 98.4 °F (36.9 °C)    TempSrc:   Oral    SpO2: 93% 98%     Weight:            Intake and Output:  Yesterday's Intake and Output:  No intake/output data recorded. Physical Exam:   GENERAL: alert, cooperative, no distress, appears stated age  EYE: negative  LUNG: clear to auscultation bilaterally  HEART: regular rate and rhythm, S1, S2 normal  ABDOMEN: soft, non-tender. Bowel sounds normal. No masses,  no organomegaly  EXTREMITIES:  extremities normal, atraumatic, no cyanosis or edema, no ulcers,SKIN: Normal.   Skin -  no rash or abnormalities  NEUROLOGIC: negative      Dialysis access: temporary catheter site right and IJ.     Left arm AV Graft/Fistula: maturing   Patient said it was done 2 weeks ago  + Thrill     Data Review    Meds    Current Facility-Administered Medications   Medication Dose Route Frequency    midodrine (PROAMATINE) tablet 5 mg  5 mg Oral TID WITH MEALS    heparin (porcine) 1,000 unit/mL injection 2,500 Units  2,500 Units Hemodialysis DIALYSIS PRN    zinc oxide-white petrolatum 17-57 % topical paste   Topical TID    albuterol (PROVENTIL HFA, VENTOLIN HFA, PROAIR HFA) inhaler 2 Puff  2 Puff Inhalation Q4H PRN    acyclovir (ZOVIRAX) tablet 800 mg  800 mg Oral BID    atorvastatin (LIPITOR) tablet 40 mg  40 mg Oral DAILY    calcitRIOL (ROCALTROL) capsule 0.25 mcg  0.25 mcg Oral DAILY    carvediloL (COREG) tablet 3.125 mg  3.125 mg Oral BID WITH MEALS    peg 400-hypromellose-glycerin 1-0.2-0.2 % opthalmic solution   Both Eyes TID PRN    prednisoLONE acetate (PRED FORTE) 1 % ophthalmic suspension 1 Drop  1 Drop Both Eyes BID    sodium chloride (NS) flush 5-40 mL  5-40 mL IntraVENous Q8H    sodium chloride (NS) flush 5-40 mL  5-40 mL IntraVENous PRN    acetaminophen (TYLENOL) tablet 650 mg  650 mg Oral Q6H PRN    Or    acetaminophen (TYLENOL) suppository 650 mg  650 mg Rectal Q6H PRN    polyethylene glycol (MIRALAX) packet 17 g  17 g Oral DAILY PRN    ondansetron (ZOFRAN ODT) tablet 4 mg  4 mg Oral Q8H PRN    Or    ondansetron (ZOFRAN) injection 4 mg  4 mg IntraVENous Q6H PRN    heparin (porcine) injection 5,000 Units  5,000 Units SubCUTAneous Q12H    VANCOMYCIN INFORMATION NOTE   Other PRN       Lab      Recent Results (from the past 24 hour(s))   CBC WITH AUTOMATED DIFF    Collection Time: 12/13/21 11:15 AM   Result Value Ref Range    WBC 8.3 4.1 - 11.1 K/uL    RBC 4.02 (L) 4.10 - 5.70 M/uL    HGB 11.3 (L) 12.1 - 17.0 g/dL    HCT 34.7 (L) 36.6 - 50.3 %    MCV 86.3 80.0 - 99.0 FL    MCH 28.1 26.0 - 34.0 PG    MCHC 32.6 30.0 - 36.5 g/dL    RDW 18.9 (H) 11.5 - 14.5 %    PLATELET 462 630 - 268 K/uL    MPV 11.2 8.9 - 12.9 FL    NRBC 0.0 0.0  WBC    ABSOLUTE NRBC 0.00 0.00 - 0.01 K/uL    NEUTROPHILS PENDING %    LYMPHOCYTES PENDING %    MONOCYTES PENDING %    EOSINOPHILS PENDING %    BASOPHILS PENDING %    IMMATURE GRANULOCYTES PENDING %    ABS. NEUTROPHILS PENDING K/UL    ABS. LYMPHOCYTES PENDING K/UL    ABS. MONOCYTES PENDING K/UL    ABS. EOSINOPHILS PENDING K/UL    ABS. BASOPHILS PENDING K/UL    ABS. IMM. GRANS.  PENDING K/UL    DF PENDING         Lab Results   Component Value Date/Time    Color Yellow/Straw 07/07/2021 02:30 AM    Appearance Clear 07/07/2021 02:30 AM    Specific gravity 1.016 07/07/2021 02:30 AM    Specific gravity 1.028 07/08/2010 04:20 AM    pH (UA) 7.0 07/07/2021 02:30 AM    Protein >300 (A) 07/07/2021 02:30 AM    Glucose Negative 07/07/2021 02:30 AM    Ketone Negative 07/07/2021 02:30 AM    Bilirubin Negative 07/07/2021 02:30 AM    Urobilinogen 0.1 07/07/2021 02:30 AM    Nitrites Negative 07/07/2021 02:30 AM    Leukocyte Esterase Trace (A) 07/07/2021 02:30 AM    Epithelial cells 0-5 07/08/2010 04:20 AM    Bacteria Negative 07/07/2021 02:30 AM    WBC 20-50 07/07/2021 02:30 AM    RBC 0-5 07/07/2021 02:30 AM         Assessment & Plan: Active Problems:    Sepsis ([address] (7/7/2021)      ESRD on dialysis [facility]) (12/8/2021)      ESRD. Patient is on dialysis every Monday Wednesday Friday at [address] [facility] renal care  Last dialyzed Friday per schedule  I will plan for dialysis today with 4K bath since potassium is 3.4  Clinically euvolemic. We will plan for removal of 1.5 to 2 L of fluid  Albumin with dialysis for hemodynamic stability if needed  Temporary dialysis catheter is current access. We will consult IR for permanent dialysis catheter placement since plan is to discharge patient today  Left arm AVF - apparently done just 2 weeks ago     Anemia  Hemoglobin is 11.3. No need for Procrit    Renal osteodystrophy  Ca and Phos good. On calcitriol   not on Phosphate binders . Will follow labs and start phosphate binders if trending up .     Sepsis   Blood Cx were sent on 12/8/ and is on brod spectrum abxs now. Follow up Cxs  - prelim no growth so far   On cefepime and Vanc   Seen by TOMMY Patel'ed for discharge  WBC count is 8.3. Afebrile. Will have temporary dialysis catheter exchanged today  On High dose of acyclovir - for recent shingles infection      HTN   Blood pressure is very well controlled. Home BP meds are on hold which I will continue to hold      Signed By: Mirian Dean MD     December 13, 2021      This note was prepared using voice recognition system and is likely to have sound alike errors that may have been overlooked even during proofreading.   Please contact the author for any clarifications

## 2021-12-13 NOTE — PROGRESS NOTES
I have reviewed discharge instructions, follow up appointments, and medications with patient. Patient verbalized understanding. Patient escorted via wheelchair with all belongings to front entrance with family.

## 2021-12-13 NOTE — DISCHARGE SUMMARY
Hospitalist Discharge Summary     Patient ID:    Bandar Ch  [id number]  80 y.o.  1950    Admit date: 12/8/2021    Discharge date : 12/13/2021    Chronic Diagnoses:    Problem List as of 12/13/2021 Date Reviewed: 1/24/2021          Codes Class Noted - Resolved    ESRD on dialysis [facility]) ICD-10-CM: N18.6, Z99.2  ICD-9-CM: 585.6, V45.11  12/8/2021 - Present        MRSA bacteremia ICD-10-CM: R78.81, B95.62  ICD-9-CM: 790.7, 041.12  9/16/2021 - Present        Gram-negative bacteremia ICD-10-CM: R78.81  ICD-9-CM: 790.7, 041.85  9/16/2021 - Present        Herpes zoster with nervous system complication CGU-02-TW: I05.43  ICD-9-CM: 053.10  7/8/2021 - Present        Sepsis ([facility] ICD-10-CM: A41.9  ICD-9-CM: 038.9, 995.91  7/7/2021 - Present        ESRD (end stage renal disease) on dialysis [facility]) ICD-10-CM: N18.6, Z99.2  ICD-9-CM: 585.6, V45.11  7/7/2021 - Present        Line sepsis [facility]) ICD-10-CM: T85.79XA, A41.9  ICD-9-CM: 996.62, 038.9, 995.91  7/7/2021 - Present        End stage renal disease on dialysis [facility]) ICD-10-CM: N18.6, Z99.2  ICD-9-CM: 585.6, V45.11  1/24/2021 - Present        Pneumonia due to COVID-19 virus ICD-10-CM: U07.1, J12.82  ICD-9-CM: 480.8, 079.89  1/24/2021 - Present        Essential hypertension ICD-10-CM: I10  ICD-9-CM: 401.9  1/24/2021 - Present        Cardiomyopathy ([address] (Chronic) ICD-10-CM: I42.9  ICD-9-CM: 425.4  1/24/2021 - Present        Systolic CHF, acute on chronic (HCC) ICD-10-CM: I50.23  ICD-9-CM: 428.23, 428.0  1/24/2021 - Present        Fluid overload ICD-10-CM: E87.70  ICD-9-CM: 276.69  1/24/2021 - Present        Gastroesophageal reflux disease ICD-10-CM: K21.9  ICD-9-CM: 530.81  1/24/2021 - Present        Asthenia ICD-10-CM: R53.1  ICD-9-CM: 780.79  1/24/2021 - Present        ATN (acute tubular necrosis) ([facility] ICD-10-CM: N17.0  ICD-9-CM: 584.5  1/16/2021 - Present        RESOLVED: Acute hypoxemic respiratory failure (HCC) ICD-10-CM: J96.01  ICD-9-CM: 518.81  1/24/2021 - 1/24/2021          22    Final Diagnoses:   1. Sepsis  2. History of MRSA/Klebsiella bacteremia from HD cath  3. End-stage renal disease on hemodialysis  4. Hypertension with hypotensive episode  5. Generalized    Reason for Hospitalization: Generalized weak      Hospital Course: Patient is a 80-year-old male with a PMH significant for CM, CHF systolic, CKD stage IV, ESRD on HD, DM, HTN and GERD. Presents to the emergency room with complaints of gradual onset of persistent progressive generalized weakness on 12/8/2020 for He has a history of MRSA and Klebsiella bacteremia on 10/21 due to infected HD catheter which was removed at that time. He did recently received his Covid vaccination booster. Associated symptoms of chills. Inflammatory markers are all elevated. Vital signs on presentation temperature 101.5, hypertensive 110/66, tachycardic 100. Admitted for further management of sepsis. Started on IV vancomycin and cefepime. ID and nephrology consulted. Blood cultures remain negative. Now on vancomycin and IV meropenem. Per infectious disease can continue with IV vancomycin hemodialysis for 1 more week. Discontinued meropenem on 12/12/2021. Cleared for discharge from an ID standpoint. Possibly discharge later today after dialysis and clearance from nephrology      Discharge Medications:   Current Discharge Medication List      START taking these medications    Details   midodrine (PROAMATINE) 5 mg tablet Take 1 Tablet by mouth three (3) times daily (with meals) for 30 days. Qty: 90 Tablet, Refills: 0  Start date: 12/13/2021, End date: 1/12/2022         CONTINUE these medications which have NOT CHANGED    Details   sevelamer carbonate (RENVELA) 800 mg tab tab Take 1 Tablet by mouth three (3) times daily (with meals). carvediloL (COREG) 3.125 mg tablet Take 1 Tablet by mouth two (2) times daily (with meals).   Qty: 30 Tablet, Refills: 0      prednisoLONE acetate (PRED FORTE) 1 % ophthalmic suspension Administer 1 Drop to both eyes two (2) times a day. Post shingles therapy. dextran 70-hypromellose (Artificial Tears,[id number],) ophthalmic solution Administer 1 Drop to both eyes three (3) times daily as needed. acyclovir (ZOVIRAX) 800 mg tablet Take 800 mg by mouth two (2) times a day. albuterol (PROVENTIL HFA, VENTOLIN HFA, PROAIR HFA) 90 mcg/actuation inhaler Take 2 Puffs by inhalation every four (4) hours as needed for Wheezing or Shortness of Breath. Qty: 1 Inhaler, Refills: 0      atorvastatin (LIPITOR) 40 mg tablet Take 40 mg by mouth daily. STOP taking these medications       gentamicin in Saline, Iso-osm, (GARAMYCIN) 80 mg/100 mL Comments:   Reason for Stopping:         calcitRIOL (ROCALTROL) 0.25 mcg capsule Comments:   Reason for Stopping:             Vancomycin to be continued with dialysis for 1 more week. Nephrology will arrange as an outpatient    Follow up Care:    1. Seferino Bhat MD in 1-2 weeks. Follow-up Information     Follow up With Specialties Details Why Patricio Jensen MD Internal Medicine In 2 weeks  [facility]  [phone]              Patient Follow Up Instructions: Activity: Activity as tolerated  Diet:  Renal Diet  Wound care: None Needed    Condition at Discharge:  Stable  __________________________________________________________________    Disposition  Home with family and home health services  ____________________________________________________________________    Code Status: Full Code  ___________________________________________________________________    Discharge Exam:  Patient seen and examined by me on discharge day. Pertinent Findings:  Gen:    Not in distress  Chest: Clear lungs  CVS:   Regular rhythm.   No edema  Abd:  Soft, not distended, not tender  Neuro:  Alert    CONSULTATIONS: ID and Nephrology    Significant Diagnostic Studies:   12/8/2021: BUN 22 mg/dL (H; Ref range: 6 - 20 mg/dL); Calcium 8.5 mg/dL (Ref range: 8.5 - 10.1 mg/dL); CO2 28 mmol/L (Ref range: 21 - 32 mmol/L); Creatinine 6.44 mg/dL (H; Ref range: 0.70 - 1.30 mg/dL); Glucose 101 mg/dL (H; Ref range: 65 - 100 mg/dL); HCT 38.7 % (Ref range: 36.6 - 50.3 %); HGB 11.9 g/dL (L; Ref range: 12.1 - 17.0 g/dL); Potassium 3.4 mmol/L (L; Ref range: 3.5 - 5.1 mmol/L); Sodium 140 mmol/L (Ref range: 136 - 145 mmol/L)  12/9/2021: BUN 30 mg/dL (H; Ref range: 6 - 20 mg/dL); Calcium 8.2 mg/dL (L; Ref range: 8.5 - 10.1 mg/dL); CO2 24 mmol/L (Ref range: 21 - 32 mmol/L); Creatinine 7.20 mg/dL (H; Ref range: 0.70 - 1.30 mg/dL); Glucose 76 mg/dL (Ref range: 65 - 100 mg/dL); HCT 36.0 % (L; Ref range: 36.6 - 50.3 %); HGB 11.0 g/dL (L; Ref range: 12.1 - 17.0 g/dL); Potassium 3.7 mmol/L (Ref range: 3.5 - 5.1 mmol/L); Sodium 141 mmol/L (Ref range: 136 - 145 mmol/L)  Recent Labs     12/12/21  1048   WBC 6.9   HGB 11.5*   HCT 35.5*        Recent Labs     12/12/21  1048   *   K 3.3*      CO2 26   BUN 39*   CREA 7.64*   GLU 87   CA 8.4*     No results for input(s): ALT, AP, TBIL, TBILI, TP, ALB, GLOB, GGT, AML, LPSE in the last 72 hours. No lab exists for component: SGOT, GPT, AMYP, HLPSE  No results for input(s): INR, PTP, APTT, INREXT in the last 72 hours. No results for input(s): FE, TIBC, PSAT, FERR in the last 72 hours. No results for input(s): PH, PCO2, PO2 in the last 72 hours. No results for input(s): CPK, CKMB in the last 72 hours.     No lab exists for component: TROPONINI  Lab Results   Component Value Date/Time    Glucose (POC) 92 12/08/2021 11:27 AM    Glucose (POC) 143 (H) 09/16/2021 11:36 AM    Glucose (POC) 96 09/16/2021 08:25 AM    Glucose (POC) 140 (H) 09/15/2021 08:15 PM    Glucose (POC) 145 (H) 09/15/2021 03:01 PM         Total Time: >30 min     Signed:  Dilan Lazcano PA-C  12/13/2021  8:17 AM

## 2021-12-14 LAB
BACTERIA SPEC CULT: NORMAL
SPECIAL REQUESTS,SREQ: NORMAL

## 2022-02-07 NOTE — ED NOTES
Airway  Date/Time: 2/7/2022 8:18 AM  Urgency: Elective    Airway not difficult    General Information and Staff    Patient location during procedure: OR  Anesthesiologist: Julienne Al MD  Performed: anesthesiologist     Indications and Patient Condition  Indications for airway management: anesthesia  Sedation level: deep  Preoxygenated: yes  Patient position: sniffing  Mask difficulty assessment: 1 - vent by mask    Final Airway Details  Final airway type: endotracheal airway      Successful airway: ETT  Cuffed: yes   Successful intubation technique: direct laryngoscopy  Endotracheal tube insertion site: oral  Blade: [place]  Blade size: #3  ETT size (mm): 7.0    Cormack-Lehane Classification: grade I - full view of glottis  Placement verified by: chest auscultation and capnometry   Measured from: gums  ETT to gums (cm): 20  Number of attempts at approach: 1    Additional Comments  Atx x 1 Patient blood pressure 85/40. Dr. Renetta Deleon paged and ordered to discontinue the esmolol drip and consult cardiology.

## 2022-02-18 LAB
DATE LAST DOSE: NORMAL
REPORTED DOSE,DOSE: NORMAL UNITS
VANCOMYCIN SERPL-MCNC: 14.1 UG/ML

## 2022-03-18 PROBLEM — N18.6 END STAGE RENAL DISEASE ON DIALYSIS (HCC): Status: ACTIVE | Noted: 2021-01-24

## 2022-03-18 PROBLEM — N18.6 ESRD ON DIALYSIS (HCC): Status: ACTIVE | Noted: 2021-12-08

## 2022-03-18 PROBLEM — B02.29 HERPES ZOSTER WITH NERVOUS SYSTEM COMPLICATION: Status: ACTIVE | Noted: 2021-07-08

## 2022-03-18 PROBLEM — N17.0 ATN (ACUTE TUBULAR NECROSIS) (HCC): Status: ACTIVE | Noted: 2021-01-16

## 2022-03-18 PROBLEM — A41.9 SEPSIS (HCC): Status: ACTIVE | Noted: 2021-07-07

## 2022-03-18 PROBLEM — Z99.2 ESRD ON DIALYSIS (HCC): Status: ACTIVE | Noted: 2021-12-08

## 2022-03-18 PROBLEM — Z99.2 END STAGE RENAL DISEASE ON DIALYSIS (HCC): Status: ACTIVE | Noted: 2021-01-24

## 2022-03-18 PROBLEM — R78.81 MRSA BACTEREMIA: Status: ACTIVE | Noted: 2021-09-16

## 2022-03-18 PROBLEM — B95.62 MRSA BACTEREMIA: Status: ACTIVE | Noted: 2021-09-16

## 2022-03-19 PROBLEM — U07.1 PNEUMONIA DUE TO COVID-19 VIRUS: Status: ACTIVE | Noted: 2021-01-24

## 2022-03-19 PROBLEM — J12.82 PNEUMONIA DUE TO COVID-19 VIRUS: Status: ACTIVE | Noted: 2021-01-24

## 2022-03-19 PROBLEM — Z99.2 ESRD (END STAGE RENAL DISEASE) ON DIALYSIS (HCC): Status: ACTIVE | Noted: 2021-07-07

## 2022-03-19 PROBLEM — N18.6 ESRD (END STAGE RENAL DISEASE) ON DIALYSIS (HCC): Status: ACTIVE | Noted: 2021-07-07

## 2022-03-19 PROBLEM — I10 ESSENTIAL HYPERTENSION: Status: ACTIVE | Noted: 2021-01-24

## 2022-03-19 PROBLEM — R78.81 GRAM-NEGATIVE BACTEREMIA: Status: ACTIVE | Noted: 2021-09-16

## 2022-03-19 PROBLEM — R53.1 ASTHENIA: Status: ACTIVE | Noted: 2021-01-24

## 2022-03-19 PROBLEM — E87.70 FLUID OVERLOAD: Status: ACTIVE | Noted: 2021-01-24

## 2022-03-19 PROBLEM — I42.9 CARDIOMYOPATHY (HCC): Status: ACTIVE | Noted: 2021-01-24

## 2022-03-20 PROBLEM — A41.9 LINE SEPSIS (HCC): Status: ACTIVE | Noted: 2021-07-07

## 2022-03-20 PROBLEM — T85.79XA LINE SEPSIS (HCC): Status: ACTIVE | Noted: 2021-07-07

## 2022-03-20 PROBLEM — K21.9 GASTROESOPHAGEAL REFLUX DISEASE: Status: ACTIVE | Noted: 2021-01-24

## 2022-03-20 PROBLEM — I50.23 SYSTOLIC CHF, ACUTE ON CHRONIC (HCC): Status: ACTIVE | Noted: 2021-01-24

## 2022-11-23 NOTE — PROGRESS NOTES
Begin daily probiotic 20 billion units  Begin cranberry supplement 500 mg daily or 100% cranberry juice daily  Begin vitamin-C 1000 mg daily  Begin D mannose 2000 mg daily-recommend purchasing powder form on [employer]  Change diapers when wet, check every 2 hours  Timed voiding during the day, every 2 hours  Call urology for uti symptoms Consult for Vancomycin Dosing by Pharmacy by TATIANA Bolanos / Dr. Jay Michaud provided for this 72y.o. year old male , for indication of Sepsis with bacteremia (suspected source potentially in the permacath). Day of Therapy: 1  Goal of Level(s): trough = 15-20mcg/dL    Other Current Antibiotics: Meropenem    Significant Cultures:    Date          Culture             Organism   10/15          Blood                Pending    Serum Creatinine Creatinine   Date Value Ref Range Status   10/15/2021 5.43 (H) 0.70 - 1.30 mg/dL Final   09/20/2021 4.96 (H) 0.70 - 1.30 mg/dL Final     Comment:     Investigated per delta check protocol   09/15/2021 7.73 (H) 0.70 - 1.30 mg/dL Final      Creatinine Clearance Estimated Creatinine Clearance: 13.7 mL/min (A) (based on SCr of 5.43 mg/dL (H)). BUN Lab Results   Component Value Date/Time    BUN 26 (H) 10/15/2021 02:15 PM      WBC Lab Results   Component Value Date/Time    WBC 12.5 (H) 10/15/2021 02:15 PM      Temp Temp Readings from Last 1 Encounters:   10/15/21 98.6 °F (37 °C)        Last Level: No results found for: VANCT   Vancomycin, random   Date/Time Value Ref Range Status   09/15/2021 06:19 AM 24.4 ug/mL Final     Comment:     No reference range has been established. Ht Readings from Last 1 Encounters:   10/15/21 180.3 cm (71\")        Wt Readings from Last 1 Encounters:   10/15/21 80.7 kg (178 lb)     Ideal body weight: 75.3 kg (166 lb 0.1 oz)  Adjusted ideal body weight: 77.5 kg (170 lb 12.9 oz)     Previous Regimen: n/a    New Regimen:   Patient on HD MWF (no inpatient orders yet, but nephro consult placed). Recent admission with MRSA. GNR in blood culture per dialysis center today (see H&P). Give 1250 mg x 1 and draw random level tomorrow 10/16 in the morning. Pharmacy to follow daily and will make changes to dose and/or frequency based on clinical status.   _________________________________     Pharmacist Tiffany Berry PHARMD

## 2022-12-02 ENCOUNTER — ANESTHESIA (OUTPATIENT)
Dept: ENDOSCOPY | Age: 72
End: 2022-12-02
Payer: MEDICARE

## 2022-12-02 ENCOUNTER — ANESTHESIA EVENT (OUTPATIENT)
Dept: ENDOSCOPY | Age: 72
End: 2022-12-02
Payer: MEDICARE

## 2022-12-02 ENCOUNTER — HOSPITAL ENCOUNTER (OUTPATIENT)
Age: 72
Setting detail: OUTPATIENT SURGERY
Discharge: HOME | End: 2022-12-02
Attending: INTERNAL MEDICINE | Admitting: INTERNAL MEDICINE
Payer: MEDICARE

## 2022-12-02 ENCOUNTER — APPOINTMENT (OUTPATIENT)
Dept: ENDOSCOPY | Age: 72
End: 2022-12-02
Attending: INTERNAL MEDICINE
Payer: MEDICARE

## 2022-12-02 VITALS
SYSTOLIC BLOOD PRESSURE: 138 MMHG | BODY MASS INDEX: 26.34 KG/M2 | HEART RATE: 73 BPM | RESPIRATION RATE: 18 BRPM | TEMPERATURE: 97.5 F | HEIGHT: 70 IN | OXYGEN SATURATION: 100 % | WEIGHT: 184 LBS | DIASTOLIC BLOOD PRESSURE: 62 MMHG

## 2022-12-02 LAB
CA-I BLD-MCNC: 0.85 MMOL/L (ref 1.12–1.32)
CHLORIDE BLD-SCNC: 106 MMOL/L (ref 98–107)
CREAT UR-MCNC: 9.18 MG/DL (ref 0.6–1.3)
GLUCOSE BLD STRIP.AUTO-MCNC: 97 MG/DL (ref 65–100)
POTASSIUM BLD-SCNC: 5.5 MMOL/L (ref 3.5–5.5)
SODIUM BLD-SCNC: 138 MMOL/L (ref 136–145)

## 2022-12-02 PROCEDURE — 2709999900 HC NON-CHARGEABLE SUPPLY: Performed by: INTERNAL MEDICINE

## 2022-12-02 PROCEDURE — 76060000032 HC ANESTHESIA 0.5 TO 1 HR: Performed by: INTERNAL MEDICINE

## 2022-12-02 PROCEDURE — 77030009426 HC FCPS BIOP ENDOSC BSC -B: Performed by: INTERNAL MEDICINE

## 2022-12-02 PROCEDURE — 80047 BASIC METABLC PNL IONIZED CA: CPT

## 2022-12-02 PROCEDURE — 76040000007: Performed by: INTERNAL MEDICINE

## 2022-12-02 PROCEDURE — 88305 TISSUE EXAM BY PATHOLOGIST: CPT

## 2022-12-02 PROCEDURE — 74011000250 HC RX REV CODE- 250: Performed by: ANESTHESIOLOGY

## 2022-12-02 PROCEDURE — 74011250636 HC RX REV CODE- 250/636: Performed by: ANESTHESIOLOGY

## 2022-12-02 RX ORDER — SODIUM CHLORIDE 9 MG/ML
INJECTION, SOLUTION INTRAVENOUS
Status: DISCONTINUED | OUTPATIENT
Start: 2022-12-02 | End: 2022-12-02 | Stop reason: HOSPADM

## 2022-12-02 RX ORDER — SODIUM CHLORIDE, SODIUM LACTATE, POTASSIUM CHLORIDE, CALCIUM CHLORIDE 600; 310; 30; 20 MG/100ML; MG/100ML; MG/100ML; MG/100ML
50 INJECTION, SOLUTION INTRAVENOUS CONTINUOUS
Status: DISCONTINUED | OUTPATIENT
Start: 2022-12-02 | End: 2022-12-02 | Stop reason: HOSPADM

## 2022-12-02 RX ORDER — PROPOFOL 10 MG/ML
INJECTION, EMULSION INTRAVENOUS AS NEEDED
Status: DISCONTINUED | OUTPATIENT
Start: 2022-12-02 | End: 2022-12-02 | Stop reason: HOSPADM

## 2022-12-02 RX ADMIN — PROPOFOL 25 MG: 10 INJECTION, EMULSION INTRAVENOUS at 11:49

## 2022-12-02 RX ADMIN — PROPOFOL 50 MG: 10 INJECTION, EMULSION INTRAVENOUS at 11:37

## 2022-12-02 RX ADMIN — PHENYLEPHRINE HYDROCHLORIDE 200 MCG: 10 INJECTION INTRAVENOUS at 11:47

## 2022-12-02 RX ADMIN — PROPOFOL 50 MG: 10 INJECTION, EMULSION INTRAVENOUS at 11:40

## 2022-12-02 RX ADMIN — SODIUM CHLORIDE: 9 INJECTION, SOLUTION INTRAVENOUS at 11:33

## 2022-12-02 RX ADMIN — PHENYLEPHRINE HYDROCHLORIDE 100 MCG: 10 INJECTION INTRAVENOUS at 11:49

## 2022-12-02 RX ADMIN — PROPOFOL 50 MG: 10 INJECTION, EMULSION INTRAVENOUS at 11:42

## 2022-12-02 NOTE — ANESTHESIA POSTPROCEDURE EVALUATION
Procedure(s):  COLONOSCOPY  COLON BIOPSY. total IV anesthesia, MAC    Anesthesia Post Evaluation      Multimodal analgesia: multimodal analgesia not used between 6 hours prior to anesthesia start to PACU discharge  Patient location during evaluation: bedside (Endoscopy suite)  Patient participation: complete - patient cannot participate  Level of consciousness: sleepy but conscious  Pain score: 0  Pain management: adequate  Airway patency: patent  Anesthetic complications: no  Cardiovascular status: acceptable  Respiratory status: acceptable and nasal cannula  Hydration status: acceptable  Comments: This patient remained on the stretcher. The patient was handed off to the endoscopy nursing team.  All questions regarding pre-, intra-, and postoperative care were answered. Post anesthesia nausea and vomiting:  none      INITIAL Post-op Vital signs: No vitals data found for the desired time range.

## 2022-12-02 NOTE — PERIOP NOTES
Patient alert and oriented x 4 with respirations even and unlabored on RA. Patient and patient's brother in law verbalizes understanding of patient's discharge instructions. Patient transported via wheelchair to front hospital entrance with patient's brother-in-law present to transport patient via private vehicle.

## 2022-12-02 NOTE — ANESTHESIA PREPROCEDURE EVALUATION
Relevant Problems   RESPIRATORY SYSTEM   (+) Pneumonia due to COVID-19 virus      CARDIOVASCULAR   (+) Essential hypertension      GASTROINTESTINAL   (+) Gastroesophageal reflux disease      RENAL FAILURE   (+) ATN (acute tubular necrosis) (HCC)   (+) ESRD (end stage renal disease) on dialysis (HCC)   (+) ESRD on dialysis (HCC)   (+) End stage renal disease on dialysis [facility])       Anesthetic History   No history of anesthetic complications            Review of Systems / Medical History  Patient summary reviewed, nursing notes reviewed and pertinent labs reviewed    Pulmonary  Within defined limits                 Neuro/Psych   Within defined limits           Cardiovascular    Hypertension      CHF    CAD and hyperlipidemia         GI/Hepatic/Renal         Renal disease: dialysis and ESRD       Endo/Other        Anemia     Other Findings   Comments: Procedure Information    Case: [id number] Date/Time: 12/02/22 1125  Procedure: COLONOSCOPY (Lower GI Region)  Anesthesia type: MAC  Pre-op diagnosis: SCREENING  Location: [facility] ENDO 01 / [facility] ENDOSCOPY  Providers: Quang Boyd MD          Medical History  Hypertension  CHF (congestive heart failure) ([facility]  CKD (chronic kidney disease)  Chronic kidney disease  End stage renal disease on dialysis ([facility]  Pneumonia due to COVID-19 virus  Essential hypertension  Cardiomyopathy ([facility]  Systolic CHF, acute on chronic (HCC)  Gastroesophageal reflux disease  Asthenia  Gastroesophageal reflux disease           Physical Exam    Airway  Mallampati: II  TM Distance: 4 - 6 cm  Neck ROM: normal range of motion   Mouth opening: Normal     Cardiovascular    Rhythm: regular  Rate: normal         Dental  No notable dental hx       Pulmonary  Breath sounds clear to auscultation               Abdominal  GI exam deferred       Other Findings            Anesthetic Plan    ASA: 4  Anesthesia type: total IV anesthesia and MAC    Monitoring Plan: Continuous noninvasive hemodynamic monitoring      Induction: Intravenous  Anesthetic plan and risks discussed with: Patient and Family

## 2023-05-08 ENCOUNTER — HOSPITAL ENCOUNTER (INPATIENT)
Facility: HOSPITAL | Age: 73
LOS: 10 days | Discharge: HOME | DRG: 853 | End: 2023-05-19
Attending: STUDENT IN AN ORGANIZED HEALTH CARE EDUCATION/TRAINING PROGRAM | Admitting: INTERNAL MEDICINE
Payer: MEDICARE

## 2023-05-08 DIAGNOSIS — I73.9 PVD (PERIPHERAL VASCULAR DISEASE) (HCC): ICD-10-CM

## 2023-05-08 DIAGNOSIS — A41.9 SEPTICEMIA (HCC): ICD-10-CM

## 2023-05-08 DIAGNOSIS — I96 TOE GANGRENE (HCC): Primary | ICD-10-CM

## 2023-05-08 DIAGNOSIS — L97.509 DIABETIC FOOT ULCER WITH OSTEOMYELITIS (HCC): ICD-10-CM

## 2023-05-08 DIAGNOSIS — E11.69 DIABETIC FOOT ULCER WITH OSTEOMYELITIS (HCC): ICD-10-CM

## 2023-05-08 DIAGNOSIS — L08.9 FOOT INFECTION: ICD-10-CM

## 2023-05-08 DIAGNOSIS — E11.621 DIABETIC FOOT ULCER WITH OSTEOMYELITIS (HCC): ICD-10-CM

## 2023-05-08 DIAGNOSIS — M86.9 DIABETIC FOOT ULCER WITH OSTEOMYELITIS (HCC): ICD-10-CM

## 2023-05-08 DIAGNOSIS — L03.115 CELLULITIS OF RIGHT LOWER EXTREMITY: ICD-10-CM

## 2023-05-08 DIAGNOSIS — I96 GANGRENE (HCC): ICD-10-CM

## 2023-05-08 PROCEDURE — 82550 ASSAY OF CK (CPK): CPT

## 2023-05-08 PROCEDURE — 99285 EMERGENCY DEPT VISIT HI MDM: CPT

## 2023-05-08 PROCEDURE — 86140 C-REACTIVE PROTEIN: CPT

## 2023-05-08 PROCEDURE — 80053 COMPREHEN METABOLIC PANEL: CPT

## 2023-05-08 PROCEDURE — 85652 RBC SED RATE AUTOMATED: CPT

## 2023-05-08 PROCEDURE — 85025 COMPLETE CBC W/AUTO DIFF WBC: CPT

## 2023-05-08 PROCEDURE — 83605 ASSAY OF LACTIC ACID: CPT

## 2023-05-08 PROCEDURE — 36415 COLL VENOUS BLD VENIPUNCTURE: CPT

## 2023-05-08 PROCEDURE — 96365 THER/PROPH/DIAG IV INF INIT: CPT

## 2023-05-08 PROCEDURE — 96367 TX/PROPH/DG ADDL SEQ IV INF: CPT

## 2023-05-08 ASSESSMENT — PAIN - FUNCTIONAL ASSESSMENT: PAIN_FUNCTIONAL_ASSESSMENT: NONE - DENIES PAIN

## 2023-05-08 ASSESSMENT — LIFESTYLE VARIABLES
HOW OFTEN DO YOU HAVE A DRINK CONTAINING ALCOHOL: NEVER
HOW MANY STANDARD DRINKS CONTAINING ALCOHOL DO YOU HAVE ON A TYPICAL DAY: PATIENT DOES NOT DRINK

## 2023-05-09 ENCOUNTER — APPOINTMENT (OUTPATIENT)
Facility: HOSPITAL | Age: 73
DRG: 853 | End: 2023-05-09
Payer: MEDICARE

## 2023-05-09 ENCOUNTER — ANESTHESIA EVENT (OUTPATIENT)
Facility: HOSPITAL | Age: 73
End: 2023-05-09
Payer: MEDICARE

## 2023-05-09 PROBLEM — L03.115 CELLULITIS OF RIGHT FOOT: Status: ACTIVE | Noted: 2023-05-09

## 2023-05-09 PROBLEM — L08.9 FOOT INFECTION: Status: ACTIVE | Noted: 2023-05-09

## 2023-05-09 PROBLEM — I96 TOE GANGRENE (HCC): Status: ACTIVE | Noted: 2023-05-09

## 2023-05-09 LAB
ALBUMIN SERPL-MCNC: 2.4 G/DL (ref 3.5–5)
ALBUMIN SERPL-MCNC: 2.6 G/DL (ref 3.5–5)
ALBUMIN/GLOB SERPL: 0.4 (ref 1.1–2.2)
ALP SERPL-CCNC: 98 U/L (ref 45–117)
ALT SERPL-CCNC: 8 U/L (ref 12–78)
ANION GAP SERPL CALC-SCNC: 3 MMOL/L (ref 5–15)
ANION GAP SERPL CALC-SCNC: 7 MMOL/L (ref 5–15)
AST SERPL W P-5'-P-CCNC: 10 U/L (ref 15–37)
BASOPHILS # BLD: 0.1 K/UL (ref 0–0.1)
BASOPHILS NFR BLD: 0 % (ref 0–1)
BILIRUB SERPL-MCNC: 0.5 MG/DL (ref 0.2–1)
BUN SERPL-MCNC: 22 MG/DL (ref 6–20)
BUN SERPL-MCNC: 25 MG/DL (ref 6–20)
BUN/CREAT SERPL: 4 (ref 12–20)
BUN/CREAT SERPL: 4 (ref 12–20)
CA-I BLD-MCNC: 7.9 MG/DL (ref 8.5–10.1)
CA-I BLD-MCNC: 8.3 MG/DL (ref 8.5–10.1)
CHLORIDE SERPL-SCNC: 93 MMOL/L (ref 97–108)
CHLORIDE SERPL-SCNC: 95 MMOL/L (ref 97–108)
CK SERPL-CCNC: 66 U/L (ref 39–308)
CO2 SERPL-SCNC: 31 MMOL/L (ref 21–32)
CO2 SERPL-SCNC: 32 MMOL/L (ref 21–32)
CREAT SERPL-MCNC: 5.57 MG/DL (ref 0.7–1.3)
CREAT SERPL-MCNC: 5.97 MG/DL (ref 0.7–1.3)
CRP SERPL-MCNC: 20.9 MG/DL (ref 0–0.6)
DIFFERENTIAL METHOD BLD: ABNORMAL
EOSINOPHIL # BLD: 0.1 K/UL (ref 0–0.4)
EOSINOPHIL NFR BLD: 0 % (ref 0–7)
ERYTHROCYTE [DISTWIDTH] IN BLOOD BY AUTOMATED COUNT: 14.1 % (ref 11.5–14.5)
ERYTHROCYTE [SEDIMENTATION RATE] IN BLOOD: 126 MM/HR (ref 0–20)
EST. AVERAGE GLUCOSE BLD GHB EST-MCNC: 114 MG/DL
GLOBULIN SER CALC-MCNC: 6.6 G/DL (ref 2–4)
GLUCOSE BLD STRIP.AUTO-MCNC: 165 MG/DL (ref 65–100)
GLUCOSE BLD STRIP.AUTO-MCNC: 190 MG/DL (ref 65–100)
GLUCOSE SERPL-MCNC: 149 MG/DL (ref 65–100)
GLUCOSE SERPL-MCNC: 168 MG/DL (ref 65–100)
HBA1C MFR BLD: 5.6 % (ref 4–5.6)
HCT VFR BLD AUTO: 30.9 % (ref 36.6–50.3)
HGB BLD-MCNC: 9.8 G/DL (ref 12.1–17)
IMM GRANULOCYTES # BLD AUTO: 0.2 K/UL (ref 0–0.04)
IMM GRANULOCYTES NFR BLD AUTO: 1 % (ref 0–0.5)
LACTATE SERPL-SCNC: 1.6 MMOL/L (ref 0.4–2)
LYMPHOCYTES # BLD: 1.9 K/UL (ref 0.8–3.5)
LYMPHOCYTES NFR BLD: 8 % (ref 12–49)
MCH RBC QN AUTO: 31.1 PG (ref 26–34)
MCHC RBC AUTO-ENTMCNC: 31.7 G/DL (ref 30–36.5)
MCV RBC AUTO: 98.1 FL (ref 80–99)
MONOCYTES # BLD: 2 K/UL (ref 0–1)
MONOCYTES NFR BLD: 9 % (ref 5–13)
NEUTS SEG # BLD: 19.4 K/UL (ref 1.8–8)
NEUTS SEG NFR BLD: 82 % (ref 32–75)
NRBC # BLD: 0 K/UL (ref 0–0.01)
NRBC BLD-RTO: 0 PER 100 WBC
PERFORMED BY:: ABNORMAL
PERFORMED BY:: ABNORMAL
PHOSPHATE SERPL-MCNC: 2.1 MG/DL (ref 2.6–4.7)
PLATELET # BLD AUTO: 346 K/UL (ref 150–400)
PMV BLD AUTO: 11.3 FL (ref 8.9–12.9)
POTASSIUM SERPL-SCNC: 3.6 MMOL/L (ref 3.5–5.1)
POTASSIUM SERPL-SCNC: 3.9 MMOL/L (ref 3.5–5.1)
PROT SERPL-MCNC: 9.2 G/DL (ref 6.4–8.2)
RBC # BLD AUTO: 3.15 M/UL (ref 4.1–5.7)
SODIUM SERPL-SCNC: 130 MMOL/L (ref 136–145)
SODIUM SERPL-SCNC: 131 MMOL/L (ref 136–145)
WBC # BLD AUTO: 23.6 K/UL (ref 4.1–11.1)

## 2023-05-09 PROCEDURE — 2580000003 HC RX 258: Performed by: INTERNAL MEDICINE

## 2023-05-09 PROCEDURE — 99223 1ST HOSP IP/OBS HIGH 75: CPT | Performed by: INTERNAL MEDICINE

## 2023-05-09 PROCEDURE — 5A1D70Z PERFORMANCE OF URINARY FILTRATION, INTERMITTENT, LESS THAN 6 HOURS PER DAY: ICD-10-PCS | Performed by: INTERNAL MEDICINE

## 2023-05-09 PROCEDURE — 87070 CULTURE OTHR SPECIMN AEROBIC: CPT

## 2023-05-09 PROCEDURE — 6360000002 HC RX W HCPCS: Performed by: STUDENT IN AN ORGANIZED HEALTH CARE EDUCATION/TRAINING PROGRAM

## 2023-05-09 PROCEDURE — 87077 CULTURE AEROBIC IDENTIFY: CPT

## 2023-05-09 PROCEDURE — 96367 TX/PROPH/DG ADDL SEQ IV INF: CPT

## 2023-05-09 PROCEDURE — 96365 THER/PROPH/DIAG IV INF INIT: CPT

## 2023-05-09 PROCEDURE — 87205 SMEAR GRAM STAIN: CPT

## 2023-05-09 PROCEDURE — 2580000003 HC RX 258: Performed by: STUDENT IN AN ORGANIZED HEALTH CARE EDUCATION/TRAINING PROGRAM

## 2023-05-09 PROCEDURE — 87186 SC STD MICRODIL/AGAR DIL: CPT

## 2023-05-09 PROCEDURE — 83036 HEMOGLOBIN GLYCOSYLATED A1C: CPT

## 2023-05-09 PROCEDURE — 6370000000 HC RX 637 (ALT 250 FOR IP): Performed by: INTERNAL MEDICINE

## 2023-05-09 PROCEDURE — 87040 BLOOD CULTURE FOR BACTERIA: CPT

## 2023-05-09 PROCEDURE — 99223 1ST HOSP IP/OBS HIGH 75: CPT | Performed by: PODIATRIST

## 2023-05-09 PROCEDURE — 6360000002 HC RX W HCPCS: Performed by: INTERNAL MEDICINE

## 2023-05-09 PROCEDURE — 87147 CULTURE TYPE IMMUNOLOGIC: CPT

## 2023-05-09 PROCEDURE — 80069 RENAL FUNCTION PANEL: CPT

## 2023-05-09 PROCEDURE — 82962 GLUCOSE BLOOD TEST: CPT

## 2023-05-09 PROCEDURE — 1100000000 HC RM PRIVATE

## 2023-05-09 PROCEDURE — 87185 SC STD ENZYME DETCJ PER NZM: CPT

## 2023-05-09 PROCEDURE — 73630 X-RAY EXAM OF FOOT: CPT

## 2023-05-09 RX ORDER — ALBUMIN (HUMAN) 12.5 G/50ML
25 SOLUTION INTRAVENOUS
Status: DISPENSED | OUTPATIENT
Start: 2023-05-09 | End: 2023-05-10

## 2023-05-09 RX ORDER — SEVELAMER CARBONATE 800 MG/1
800 TABLET, FILM COATED ORAL
Status: DISCONTINUED | OUTPATIENT
Start: 2023-05-09 | End: 2023-05-12

## 2023-05-09 RX ORDER — SODIUM CHLORIDE 9 MG/ML
INJECTION, SOLUTION INTRAVENOUS PRN
Status: DISCONTINUED | OUTPATIENT
Start: 2023-05-09 | End: 2023-05-24 | Stop reason: HOSPADM

## 2023-05-09 RX ORDER — ONDANSETRON 2 MG/ML
4 INJECTION INTRAMUSCULAR; INTRAVENOUS EVERY 6 HOURS PRN
Status: DISCONTINUED | OUTPATIENT
Start: 2023-05-09 | End: 2023-05-24 | Stop reason: HOSPADM

## 2023-05-09 RX ORDER — ACETAMINOPHEN 650 MG/1
650 SUPPOSITORY RECTAL EVERY 6 HOURS PRN
Status: DISCONTINUED | OUTPATIENT
Start: 2023-05-09 | End: 2023-05-24 | Stop reason: HOSPADM

## 2023-05-09 RX ORDER — HYDROMORPHONE HYDROCHLORIDE 1 MG/ML
0.25 INJECTION, SOLUTION INTRAMUSCULAR; INTRAVENOUS; SUBCUTANEOUS EVERY 4 HOURS PRN
Status: DISPENSED | OUTPATIENT
Start: 2023-05-09 | End: 2023-05-11

## 2023-05-09 RX ORDER — 0.9 % SODIUM CHLORIDE 0.9 %
100 INTRAVENOUS SOLUTION INTRAVENOUS PRN
Status: DISCONTINUED | OUTPATIENT
Start: 2023-05-09 | End: 2023-05-24 | Stop reason: HOSPADM

## 2023-05-09 RX ORDER — CARVEDILOL 3.12 MG/1
3.12 TABLET ORAL 2 TIMES DAILY WITH MEALS
Status: DISCONTINUED | OUTPATIENT
Start: 2023-05-09 | End: 2023-05-24 | Stop reason: HOSPADM

## 2023-05-09 RX ORDER — HEPARIN SODIUM 1000 [USP'U]/ML
1000 INJECTION, SOLUTION INTRAVENOUS; SUBCUTANEOUS ONCE
Status: DISCONTINUED | OUTPATIENT
Start: 2023-05-09 | End: 2023-05-09

## 2023-05-09 RX ORDER — ONDANSETRON 4 MG/1
4 TABLET, ORALLY DISINTEGRATING ORAL EVERY 8 HOURS PRN
Status: DISCONTINUED | OUTPATIENT
Start: 2023-05-09 | End: 2023-05-24 | Stop reason: HOSPADM

## 2023-05-09 RX ORDER — SODIUM CHLORIDE 0.9 % (FLUSH) 0.9 %
5-40 SYRINGE (ML) INJECTION EVERY 12 HOURS SCHEDULED
Status: DISCONTINUED | OUTPATIENT
Start: 2023-05-09 | End: 2023-05-24 | Stop reason: HOSPADM

## 2023-05-09 RX ORDER — ALBUTEROL SULFATE 90 UG/1
2 AEROSOL, METERED RESPIRATORY (INHALATION) EVERY 4 HOURS PRN
Status: DISCONTINUED | OUTPATIENT
Start: 2023-05-09 | End: 2023-05-24 | Stop reason: HOSPADM

## 2023-05-09 RX ORDER — POLYETHYLENE GLYCOL 3350 17 G/17G
17 POWDER, FOR SOLUTION ORAL DAILY PRN
Status: DISCONTINUED | OUTPATIENT
Start: 2023-05-09 | End: 2023-05-24 | Stop reason: HOSPADM

## 2023-05-09 RX ORDER — ACETAMINOPHEN 325 MG/1
650 TABLET ORAL EVERY 6 HOURS PRN
Status: DISCONTINUED | OUTPATIENT
Start: 2023-05-09 | End: 2023-05-24 | Stop reason: HOSPADM

## 2023-05-09 RX ORDER — SODIUM CHLORIDE 0.9 % (FLUSH) 0.9 %
5-40 SYRINGE (ML) INJECTION PRN
Status: DISCONTINUED | OUTPATIENT
Start: 2023-05-09 | End: 2023-05-24 | Stop reason: HOSPADM

## 2023-05-09 RX ORDER — ATORVASTATIN CALCIUM 40 MG/1
40 TABLET, FILM COATED ORAL DAILY
Status: DISCONTINUED | OUTPATIENT
Start: 2023-05-09 | End: 2023-05-24 | Stop reason: HOSPADM

## 2023-05-09 RX ORDER — HYDROMORPHONE HYDROCHLORIDE 1 MG/ML
0.5 INJECTION, SOLUTION INTRAMUSCULAR; INTRAVENOUS; SUBCUTANEOUS EVERY 4 HOURS PRN
Status: DISPENSED | OUTPATIENT
Start: 2023-05-09 | End: 2023-05-11

## 2023-05-09 RX ORDER — PREDNISOLONE ACETATE 10 MG/ML
1 SUSPENSION/ DROPS OPHTHALMIC 2 TIMES DAILY
Status: DISCONTINUED | OUTPATIENT
Start: 2023-05-09 | End: 2023-05-24 | Stop reason: HOSPADM

## 2023-05-09 RX ADMIN — VANCOMYCIN HYDROCHLORIDE 500 MG: 500 INJECTION, POWDER, LYOPHILIZED, FOR SOLUTION INTRAVENOUS at 09:52

## 2023-05-09 RX ADMIN — CARVEDILOL 3.12 MG: 3.12 TABLET, FILM COATED ORAL at 10:04

## 2023-05-09 RX ADMIN — PREDNISOLONE ACETATE 1 DROP: 10 SUSPENSION/ DROPS OPHTHALMIC at 11:10

## 2023-05-09 RX ADMIN — HYDROMORPHONE HYDROCHLORIDE 0.5 MG: 1 INJECTION, SOLUTION INTRAMUSCULAR; INTRAVENOUS; SUBCUTANEOUS at 16:05

## 2023-05-09 RX ADMIN — MEROPENEM 1000 MG: 1 INJECTION, POWDER, FOR SOLUTION INTRAVENOUS at 01:22

## 2023-05-09 RX ADMIN — PREDNISOLONE ACETATE 1 DROP: 10 SUSPENSION/ DROPS OPHTHALMIC at 21:17

## 2023-05-09 RX ADMIN — PIPERACILLIN AND TAZOBACTAM 3375 MG: 3; .375 INJECTION, POWDER, LYOPHILIZED, FOR SOLUTION INTRAVENOUS at 23:39

## 2023-05-09 RX ADMIN — SODIUM CHLORIDE, PRESERVATIVE FREE 10 ML: 5 INJECTION INTRAVENOUS at 21:21

## 2023-05-09 RX ADMIN — SEVELAMER CARBONATE 800 MG: 800 TABLET, FILM COATED ORAL at 18:20

## 2023-05-09 RX ADMIN — ATORVASTATIN CALCIUM 40 MG: 40 TABLET, FILM COATED ORAL at 10:00

## 2023-05-09 RX ADMIN — SEVELAMER CARBONATE 800 MG: 800 TABLET, FILM COATED ORAL at 12:55

## 2023-05-09 RX ADMIN — PIPERACILLIN AND TAZOBACTAM 3375 MG: 3; .375 INJECTION, POWDER, LYOPHILIZED, FOR SOLUTION INTRAVENOUS at 12:54

## 2023-05-09 RX ADMIN — VANCOMYCIN HYDROCHLORIDE 1000 MG: 1 INJECTION, POWDER, LYOPHILIZED, FOR SOLUTION INTRAVENOUS at 02:08

## 2023-05-09 RX ADMIN — SEVELAMER CARBONATE 800 MG: 800 TABLET, FILM COATED ORAL at 10:06

## 2023-05-09 RX ADMIN — SODIUM CHLORIDE, PRESERVATIVE FREE 10 ML: 5 INJECTION INTRAVENOUS at 10:01

## 2023-05-09 RX ADMIN — CARVEDILOL 3.12 MG: 3.12 TABLET, FILM COATED ORAL at 18:19

## 2023-05-09 ASSESSMENT — PAIN DESCRIPTION - LOCATION
LOCATION: TOE (COMMENT WHICH ONE)
LOCATION: TOE (COMMENT WHICH ONE);FOOT

## 2023-05-09 ASSESSMENT — ENCOUNTER SYMPTOMS
RESPIRATORY NEGATIVE: 1
SHORTNESS OF BREATH: 0
VOMITING: 0
EYES NEGATIVE: 1
DIARRHEA: 0
GASTROINTESTINAL NEGATIVE: 1
ABDOMINAL PAIN: 0
ALLERGIC/IMMUNOLOGIC NEGATIVE: 1

## 2023-05-09 ASSESSMENT — PAIN DESCRIPTION - ORIENTATION: ORIENTATION: RIGHT

## 2023-05-09 ASSESSMENT — PAIN DESCRIPTION - DESCRIPTORS: DESCRIPTORS: ACHING

## 2023-05-09 ASSESSMENT — PAIN SCALES - GENERAL
PAINLEVEL_OUTOF10: 10
PAINLEVEL_OUTOF10: 10
PAINLEVEL_OUTOF10: 4

## 2023-05-09 NOTE — ANESTHESIA PRE PROCEDURE
Department of Anesthesiology  Preprocedure Note       Name:  Be Sequeira   Age:  71 y.o.  :  1950                                          MRN:  [id number]         Date:  2023      Surgeon: Yaya Murphy):  Zee Day DPM    Procedure: Procedure(s):  AMPUTATION THIRD TOE RIGHT RIGHT    Medications prior to admission:   Prior to Admission medications    Medication Sig Start Date End Date Taking?  Authorizing Provider   acyclovir (ZOVIRAX) 800 MG tablet Take 1 tablet by mouth 2 times daily    Ar Automatic Reconciliation   albuterol sulfate HFA (PROVENTIL;VENTOLIN;PROAIR) 108 (90 Base) MCG/ACT inhaler Inhale 2 puffs into the lungs every 4 hours as needed  Patient not taking: Reported on 2023   Ar Automatic Reconciliation   atorvastatin (LIPITOR) 40 MG tablet Take 1 tablet by mouth daily    Ar Automatic Reconciliation   carvedilol (COREG) 3.125 MG tablet Take 1 tablet by mouth 2 times daily (with meals) 21   Ar Automatic Reconciliation   dextran 70-hypromellose (TEARS NATURALE) 0.1-0.3 % SOLN opthalmic solution Apply 1 drop to eye 3 times daily as needed  Patient not taking: Reported on 2023    Ar Automatic Reconciliation   prednisoLONE acetate (PRED FORTE) 1 % ophthalmic suspension Apply 1 drop to eye 2 times daily  Patient not taking: Reported on 2023    Ar Automatic Reconciliation   sevelamer (RENVELA) 800 MG tablet Take 1 tablet by mouth 3 times daily (with meals) 12/3/21   Ar Automatic Reconciliation       Current medications:    Current Facility-Administered Medications   Medication Dose Route Frequency Provider Last Rate Last Admin    albuterol sulfate HFA (PROVENTIL;VENTOLIN;PROAIR) 108 (90 Base) MCG/ACT inhaler 2 puff  2 puff Inhalation Q4H PRN Melvina Cavanaugh MD        atorvastatin (LIPITOR) tablet 40 mg  40 mg Oral Daily Melvina Cavanaugh MD   40 mg at 23 1000    carvedilol (COREG) tablet 3.125 mg  3.125 mg Oral BID  Melvina Cavanaugh MD   3.125 mg at 23 1004

## 2023-05-10 ENCOUNTER — ANESTHESIA (OUTPATIENT)
Facility: HOSPITAL | Age: 73
End: 2023-05-10
Payer: MEDICARE

## 2023-05-10 LAB
ALBUMIN SERPL-MCNC: 2.3 G/DL (ref 3.5–5)
ANION GAP SERPL CALC-SCNC: 4 MMOL/L (ref 5–15)
BASOPHILS # BLD: 0 K/UL (ref 0–0.1)
BASOPHILS NFR BLD: 0 % (ref 0–1)
BUN SERPL-MCNC: 38 MG/DL (ref 6–20)
BUN/CREAT SERPL: 5 (ref 12–20)
CA-I BLD-MCNC: 8.1 MG/DL (ref 8.5–10.1)
CHLORIDE SERPL-SCNC: 95 MMOL/L (ref 97–108)
CO2 SERPL-SCNC: 29 MMOL/L (ref 21–32)
CREAT SERPL-MCNC: 7.7 MG/DL (ref 0.7–1.3)
DIFFERENTIAL METHOD BLD: ABNORMAL
EOSINOPHIL # BLD: 0.2 K/UL (ref 0–0.4)
EOSINOPHIL NFR BLD: 1 % (ref 0–7)
ERYTHROCYTE [DISTWIDTH] IN BLOOD BY AUTOMATED COUNT: 14.2 % (ref 11.5–14.5)
GLUCOSE SERPL-MCNC: 149 MG/DL (ref 65–100)
HCT VFR BLD AUTO: 29.7 % (ref 36.6–50.3)
HGB BLD-MCNC: 9.6 G/DL (ref 12.1–17)
IMM GRANULOCYTES # BLD AUTO: 0.2 K/UL
IMM GRANULOCYTES NFR BLD AUTO: 1 %
LYMPHOCYTES # BLD: 1.7 K/UL (ref 0.8–3.5)
LYMPHOCYTES NFR BLD: 7 % (ref 12–49)
MCH RBC QN AUTO: 31.3 PG (ref 26–34)
MCHC RBC AUTO-ENTMCNC: 32.3 G/DL (ref 30–36.5)
MCV RBC AUTO: 96.7 FL (ref 80–99)
MONOCYTES # BLD: 2.2 K/UL (ref 0–1)
MONOCYTES NFR BLD: 9 % (ref 5–13)
NEUTS SEG # BLD: 20.3 K/UL (ref 1.8–8)
NEUTS SEG NFR BLD: 82 % (ref 32–75)
NRBC # BLD: 0 K/UL (ref 0–0.01)
NRBC BLD-RTO: 0 PER 100 WBC
PHOSPHATE SERPL-MCNC: 2.1 MG/DL (ref 2.6–4.7)
PLATELET # BLD AUTO: 332 K/UL (ref 150–400)
PMV BLD AUTO: 10.9 FL (ref 8.9–12.9)
POTASSIUM SERPL-SCNC: 3.7 MMOL/L (ref 3.5–5.1)
RBC # BLD AUTO: 3.07 M/UL (ref 4.1–5.7)
RBC MORPH BLD: ABNORMAL
RBC MORPH BLD: ABNORMAL
SODIUM SERPL-SCNC: 128 MMOL/L (ref 136–145)
VANCOMYCIN SERPL-MCNC: 12.9 UG/ML
WBC # BLD AUTO: 24.6 K/UL (ref 4.1–11.1)

## 2023-05-10 PROCEDURE — 90935 HEMODIALYSIS ONE EVALUATION: CPT

## 2023-05-10 PROCEDURE — 99222 1ST HOSP IP/OBS MODERATE 55: CPT | Performed by: SURGERY

## 2023-05-10 PROCEDURE — 6360000002 HC RX W HCPCS: Performed by: INTERNAL MEDICINE

## 2023-05-10 PROCEDURE — 2500000003 HC RX 250 WO HCPCS: Performed by: INTERNAL MEDICINE

## 2023-05-10 PROCEDURE — 85025 COMPLETE CBC W/AUTO DIFF WBC: CPT

## 2023-05-10 PROCEDURE — 80202 ASSAY OF VANCOMYCIN: CPT

## 2023-05-10 PROCEDURE — 1100000000 HC RM PRIVATE

## 2023-05-10 PROCEDURE — 2580000003 HC RX 258: Performed by: INTERNAL MEDICINE

## 2023-05-10 PROCEDURE — 2709999900 HC NON-CHARGEABLE SUPPLY

## 2023-05-10 PROCEDURE — 6370000000 HC RX 637 (ALT 250 FOR IP): Performed by: INTERNAL MEDICINE

## 2023-05-10 PROCEDURE — 36415 COLL VENOUS BLD VENIPUNCTURE: CPT

## 2023-05-10 PROCEDURE — 99232 SBSQ HOSP IP/OBS MODERATE 35: CPT | Performed by: INTERNAL MEDICINE

## 2023-05-10 PROCEDURE — 80069 RENAL FUNCTION PANEL: CPT

## 2023-05-10 RX ADMIN — HYDROMORPHONE HYDROCHLORIDE 0.25 MG: 1 INJECTION, SOLUTION INTRAMUSCULAR; INTRAVENOUS; SUBCUTANEOUS at 10:44

## 2023-05-10 RX ADMIN — SEVELAMER CARBONATE 800 MG: 800 TABLET, FILM COATED ORAL at 14:44

## 2023-05-10 RX ADMIN — ATORVASTATIN CALCIUM 40 MG: 40 TABLET, FILM COATED ORAL at 14:42

## 2023-05-10 RX ADMIN — HYDROMORPHONE HYDROCHLORIDE 0.5 MG: 1 INJECTION, SOLUTION INTRAMUSCULAR; INTRAVENOUS; SUBCUTANEOUS at 02:28

## 2023-05-10 RX ADMIN — EPOETIN ALFA-EPBX 10000 UNITS: 10000 INJECTION, SOLUTION INTRAVENOUS; SUBCUTANEOUS at 13:37

## 2023-05-10 RX ADMIN — MEROPENEM 1000 MG: 1 INJECTION, POWDER, FOR SOLUTION INTRAVENOUS at 22:42

## 2023-05-10 RX ADMIN — VANCOMYCIN HYDROCHLORIDE 1000 MG: 1 INJECTION, POWDER, LYOPHILIZED, FOR SOLUTION INTRAVENOUS at 17:57

## 2023-05-10 RX ADMIN — PREDNISOLONE ACETATE 1 DROP: 10 SUSPENSION/ DROPS OPHTHALMIC at 14:52

## 2023-05-10 RX ADMIN — SODIUM CHLORIDE, PRESERVATIVE FREE 10 ML: 5 INJECTION INTRAVENOUS at 14:43

## 2023-05-10 RX ADMIN — PREDNISOLONE ACETATE 1 DROP: 10 SUSPENSION/ DROPS OPHTHALMIC at 21:43

## 2023-05-10 RX ADMIN — SEVELAMER CARBONATE 800 MG: 800 TABLET, FILM COATED ORAL at 17:49

## 2023-05-10 RX ADMIN — HYDROMORPHONE HYDROCHLORIDE 0.5 MG: 1 INJECTION, SOLUTION INTRAMUSCULAR; INTRAVENOUS; SUBCUTANEOUS at 21:40

## 2023-05-10 RX ADMIN — PIPERACILLIN AND TAZOBACTAM 3375 MG: 3; .375 INJECTION, POWDER, LYOPHILIZED, FOR SOLUTION INTRAVENOUS at 14:43

## 2023-05-10 RX ADMIN — SODIUM CHLORIDE, PRESERVATIVE FREE 10 ML: 5 INJECTION INTRAVENOUS at 22:39

## 2023-05-10 ASSESSMENT — PAIN SCALES - GENERAL
PAINLEVEL_OUTOF10: 5
PAINLEVEL_OUTOF10: 1
PAINLEVEL_OUTOF10: 5
PAINLEVEL_OUTOF10: 0
PAINLEVEL_OUTOF10: 10
PAINLEVEL_OUTOF10: 0
PAINLEVEL_OUTOF10: 8
PAINLEVEL_OUTOF10: 5

## 2023-05-10 ASSESSMENT — PAIN DESCRIPTION - PAIN TYPE: TYPE: ACUTE PAIN

## 2023-05-10 ASSESSMENT — PAIN DESCRIPTION - LOCATION
LOCATION: FOOT

## 2023-05-10 ASSESSMENT — PAIN DESCRIPTION - ORIENTATION
ORIENTATION: RIGHT
ORIENTATION: LEFT
ORIENTATION: RIGHT

## 2023-05-10 ASSESSMENT — PAIN DESCRIPTION - DESCRIPTORS
DESCRIPTORS: THROBBING
DESCRIPTORS: ACHING;JABBING;STABBING;THROBBING
DESCRIPTORS: THROBBING
DESCRIPTORS: THROBBING
DESCRIPTORS: DISCOMFORT

## 2023-05-10 ASSESSMENT — ENCOUNTER SYMPTOMS: RESPIRATORY NEGATIVE: 1

## 2023-05-10 ASSESSMENT — PAIN DESCRIPTION - ONSET: ONSET: PROGRESSIVE

## 2023-05-11 ENCOUNTER — APPOINTMENT (OUTPATIENT)
Facility: HOSPITAL | Age: 73
DRG: 853 | End: 2023-05-11
Payer: MEDICARE

## 2023-05-11 PROBLEM — I73.9 PVD (PERIPHERAL VASCULAR DISEASE) (HCC): Status: ACTIVE | Noted: 2023-05-11

## 2023-05-11 PROBLEM — L03.115 CELLULITIS OF RIGHT FOOT: Status: ACTIVE | Noted: 2023-05-11

## 2023-05-11 LAB
ALBUMIN SERPL-MCNC: 2.2 G/DL (ref 3.5–5)
ANION GAP SERPL CALC-SCNC: 4 MMOL/L (ref 5–15)
BASOPHILS # BLD: 0.1 K/UL (ref 0–0.1)
BASOPHILS NFR BLD: 0 % (ref 0–1)
BUN SERPL-MCNC: 26 MG/DL (ref 6–20)
BUN/CREAT SERPL: 4 (ref 12–20)
CA-I BLD-MCNC: 8.2 MG/DL (ref 8.5–10.1)
CHLORIDE SERPL-SCNC: 98 MMOL/L (ref 97–108)
CO2 SERPL-SCNC: 31 MMOL/L (ref 21–32)
CREAT SERPL-MCNC: 5.87 MG/DL (ref 0.7–1.3)
CRP SERPL-MCNC: 23.8 MG/DL (ref 0–0.6)
DIFFERENTIAL METHOD BLD: ABNORMAL
ECHO BSA: 2.01 M2
EOSINOPHIL # BLD: 0.2 K/UL (ref 0–0.4)
EOSINOPHIL NFR BLD: 1 % (ref 0–7)
ERYTHROCYTE [DISTWIDTH] IN BLOOD BY AUTOMATED COUNT: 14.3 % (ref 11.5–14.5)
GLUCOSE SERPL-MCNC: 143 MG/DL (ref 65–100)
HCT VFR BLD AUTO: 28.4 % (ref 36.6–50.3)
HGB BLD-MCNC: 9 G/DL (ref 12.1–17)
IMM GRANULOCYTES # BLD AUTO: 0.3 K/UL (ref 0–0.04)
IMM GRANULOCYTES NFR BLD AUTO: 1 % (ref 0–0.5)
LYMPHOCYTES # BLD: 1.8 K/UL (ref 0.8–3.5)
LYMPHOCYTES NFR BLD: 8 % (ref 12–49)
MCH RBC QN AUTO: 30.9 PG (ref 26–34)
MCHC RBC AUTO-ENTMCNC: 31.7 G/DL (ref 30–36.5)
MCV RBC AUTO: 97.6 FL (ref 80–99)
MONOCYTES # BLD: 2.1 K/UL (ref 0–1)
MONOCYTES NFR BLD: 10 % (ref 5–13)
NEUTS SEG # BLD: 17.9 K/UL (ref 1.8–8)
NEUTS SEG NFR BLD: 80 % (ref 32–75)
NRBC # BLD: 0 K/UL (ref 0–0.01)
NRBC BLD-RTO: 0 PER 100 WBC
PHOSPHATE SERPL-MCNC: 2.2 MG/DL (ref 2.6–4.7)
PLATELET # BLD AUTO: 333 K/UL (ref 150–400)
PMV BLD AUTO: 11.5 FL (ref 8.9–12.9)
POTASSIUM SERPL-SCNC: 4.4 MMOL/L (ref 3.5–5.1)
PROCALCITONIN SERPL-MCNC: 6.53 NG/ML
RBC # BLD AUTO: 2.91 M/UL (ref 4.1–5.7)
SODIUM SERPL-SCNC: 133 MMOL/L (ref 136–145)
VAS LEFT ABI: 0.53
VAS LEFT CALF PRESSURE: 91 MMHG
VAS LEFT DORSALIS PEDIS BP: 72 MMHG
VAS LEFT HIGH THIGH PRESSURE: 131 MMHG
VAS LEFT PTA BP: 65 MMHG
VAS RIGHT ABI: 0.57
VAS RIGHT ARM BP: 136 MMHG
VAS RIGHT CALF PRESSURE: 116 MMHG
VAS RIGHT DORSALIS PEDIS BP: 55 MMHG
VAS RIGHT HIGH THIGH PRESSURE: 147 MMHG
VAS RIGHT PTA BP: 78 MMHG
WBC # BLD AUTO: 22.3 K/UL (ref 4.1–11.1)

## 2023-05-11 PROCEDURE — 2500000003 HC RX 250 WO HCPCS: Performed by: NURSE PRACTITIONER

## 2023-05-11 PROCEDURE — 3700000001 HC ADD 15 MINUTES (ANESTHESIA): Performed by: PODIATRIST

## 2023-05-11 PROCEDURE — 86140 C-REACTIVE PROTEIN: CPT

## 2023-05-11 PROCEDURE — 99232 SBSQ HOSP IP/OBS MODERATE 35: CPT | Performed by: PODIATRIST

## 2023-05-11 PROCEDURE — 87077 CULTURE AEROBIC IDENTIFY: CPT

## 2023-05-11 PROCEDURE — 87186 SC STD MICRODIL/AGAR DIL: CPT

## 2023-05-11 PROCEDURE — 88305 TISSUE EXAM BY PATHOLOGIST: CPT

## 2023-05-11 PROCEDURE — 93923 UPR/LXTR ART STDY 3+ LVLS: CPT | Performed by: SURGERY

## 2023-05-11 PROCEDURE — 2580000003 HC RX 258: Performed by: INTERNAL MEDICINE

## 2023-05-11 PROCEDURE — 6360000002 HC RX W HCPCS: Performed by: NURSE PRACTITIONER

## 2023-05-11 PROCEDURE — 36415 COLL VENOUS BLD VENIPUNCTURE: CPT

## 2023-05-11 PROCEDURE — 88311 DECALCIFY TISSUE: CPT

## 2023-05-11 PROCEDURE — 93923 UPR/LXTR ART STDY 3+ LVLS: CPT

## 2023-05-11 PROCEDURE — 7100000001 HC PACU RECOVERY - ADDTL 15 MIN: Performed by: PODIATRIST

## 2023-05-11 PROCEDURE — 85025 COMPLETE CBC W/AUTO DIFF WBC: CPT

## 2023-05-11 PROCEDURE — 3700000000 HC ANESTHESIA ATTENDED CARE: Performed by: PODIATRIST

## 2023-05-11 PROCEDURE — 87070 CULTURE OTHR SPECIMN AEROBIC: CPT

## 2023-05-11 PROCEDURE — 2709999900 HC NON-CHARGEABLE SUPPLY: Performed by: PODIATRIST

## 2023-05-11 PROCEDURE — 6370000000 HC RX 637 (ALT 250 FOR IP): Performed by: INTERNAL MEDICINE

## 2023-05-11 PROCEDURE — 3600000012 HC SURGERY LEVEL 2 ADDTL 15MIN: Performed by: PODIATRIST

## 2023-05-11 PROCEDURE — 2580000003 HC RX 258

## 2023-05-11 PROCEDURE — 2500000003 HC RX 250 WO HCPCS: Performed by: PODIATRIST

## 2023-05-11 PROCEDURE — 80069 RENAL FUNCTION PANEL: CPT

## 2023-05-11 PROCEDURE — 87205 SMEAR GRAM STAIN: CPT

## 2023-05-11 PROCEDURE — 0Y6T0Z0 DETACHMENT AT RIGHT 3RD TOE, COMPLETE, OPEN APPROACH: ICD-10-PCS | Performed by: PODIATRIST

## 2023-05-11 PROCEDURE — 7100000000 HC PACU RECOVERY - FIRST 15 MIN: Performed by: PODIATRIST

## 2023-05-11 PROCEDURE — 28820 AMPUTATION OF TOE: CPT | Performed by: PODIATRIST

## 2023-05-11 PROCEDURE — 1100000000 HC RM PRIVATE

## 2023-05-11 PROCEDURE — 6360000002 HC RX W HCPCS: Performed by: INTERNAL MEDICINE

## 2023-05-11 PROCEDURE — 99232 SBSQ HOSP IP/OBS MODERATE 35: CPT | Performed by: SURGERY

## 2023-05-11 PROCEDURE — 84145 PROCALCITONIN (PCT): CPT

## 2023-05-11 PROCEDURE — 3600000002 HC SURGERY LEVEL 2 BASE: Performed by: PODIATRIST

## 2023-05-11 PROCEDURE — 2500000003 HC RX 250 WO HCPCS

## 2023-05-11 PROCEDURE — 99232 SBSQ HOSP IP/OBS MODERATE 35: CPT | Performed by: INTERNAL MEDICINE

## 2023-05-11 RX ORDER — 0.9 % SODIUM CHLORIDE 0.9 %
INTRAVENOUS SOLUTION INTRAVENOUS PRN
Status: DISCONTINUED | OUTPATIENT
Start: 2023-05-11 | End: 2023-05-11

## 2023-05-11 RX ORDER — FENTANYL CITRATE 50 UG/ML
INJECTION, SOLUTION INTRAMUSCULAR; INTRAVENOUS PRN
Status: DISCONTINUED | OUTPATIENT
Start: 2023-05-11 | End: 2023-05-11 | Stop reason: SDUPTHER

## 2023-05-11 RX ORDER — OXYCODONE HYDROCHLORIDE 5 MG/1
5 TABLET ORAL
Status: DISCONTINUED | OUTPATIENT
Start: 2023-05-11 | End: 2023-05-11 | Stop reason: HOSPADM

## 2023-05-11 RX ORDER — FENTANYL CITRATE 50 UG/ML
25 INJECTION, SOLUTION INTRAMUSCULAR; INTRAVENOUS EVERY 5 MIN PRN
Status: DISCONTINUED | OUTPATIENT
Start: 2023-05-11 | End: 2023-05-11 | Stop reason: HOSPADM

## 2023-05-11 RX ORDER — SODIUM CHLORIDE 0.9 % (FLUSH) 0.9 %
5-40 SYRINGE (ML) INJECTION EVERY 12 HOURS SCHEDULED
Status: DISCONTINUED | OUTPATIENT
Start: 2023-05-11 | End: 2023-05-11 | Stop reason: HOSPADM

## 2023-05-11 RX ORDER — HYDROMORPHONE HYDROCHLORIDE 1 MG/ML
0.5 INJECTION, SOLUTION INTRAMUSCULAR; INTRAVENOUS; SUBCUTANEOUS EVERY 5 MIN PRN
Status: DISCONTINUED | OUTPATIENT
Start: 2023-05-11 | End: 2023-05-11 | Stop reason: HOSPADM

## 2023-05-11 RX ORDER — SODIUM CHLORIDE 9 MG/ML
INJECTION, SOLUTION INTRAVENOUS PRN
Status: DISCONTINUED | OUTPATIENT
Start: 2023-05-11 | End: 2023-05-11 | Stop reason: HOSPADM

## 2023-05-11 RX ORDER — LIDOCAINE HYDROCHLORIDE 10 MG/ML
INJECTION, SOLUTION INFILTRATION; PERINEURAL PRN
Status: DISCONTINUED | OUTPATIENT
Start: 2023-05-11 | End: 2023-05-11 | Stop reason: ALTCHOICE

## 2023-05-11 RX ORDER — PHENYLEPHRINE HCL IN 0.9% NACL 1 MG/10 ML
SYRINGE (ML) INTRAVENOUS PRN
Status: DISCONTINUED | OUTPATIENT
Start: 2023-05-11 | End: 2023-05-11 | Stop reason: SDUPTHER

## 2023-05-11 RX ORDER — SODIUM CHLORIDE 9 MG/ML
INJECTION, SOLUTION INTRAVENOUS CONTINUOUS
Status: DISCONTINUED | OUTPATIENT
Start: 2023-05-11 | End: 2023-05-11

## 2023-05-11 RX ORDER — ONDANSETRON 2 MG/ML
4 INJECTION INTRAMUSCULAR; INTRAVENOUS
Status: DISCONTINUED | OUTPATIENT
Start: 2023-05-11 | End: 2023-05-11 | Stop reason: HOSPADM

## 2023-05-11 RX ORDER — METOCLOPRAMIDE HYDROCHLORIDE 5 MG/ML
10 INJECTION INTRAMUSCULAR; INTRAVENOUS
Status: DISCONTINUED | OUTPATIENT
Start: 2023-05-11 | End: 2023-05-11 | Stop reason: HOSPADM

## 2023-05-11 RX ORDER — HYDROMORPHONE HYDROCHLORIDE 1 MG/ML
0.25 INJECTION, SOLUTION INTRAMUSCULAR; INTRAVENOUS; SUBCUTANEOUS EVERY 4 HOURS PRN
Status: DISPENSED | OUTPATIENT
Start: 2023-05-11 | End: 2023-05-13

## 2023-05-11 RX ORDER — HYDROMORPHONE HYDROCHLORIDE 1 MG/ML
0.5 INJECTION, SOLUTION INTRAMUSCULAR; INTRAVENOUS; SUBCUTANEOUS EVERY 4 HOURS PRN
Status: DISPENSED | OUTPATIENT
Start: 2023-05-11 | End: 2023-05-13

## 2023-05-11 RX ORDER — PROPOFOL 10 MG/ML
INJECTION, EMULSION INTRAVENOUS PRN
Status: DISCONTINUED | OUTPATIENT
Start: 2023-05-11 | End: 2023-05-11 | Stop reason: SDUPTHER

## 2023-05-11 RX ORDER — SODIUM CHLORIDE 0.9 % (FLUSH) 0.9 %
5-40 SYRINGE (ML) INJECTION PRN
Status: DISCONTINUED | OUTPATIENT
Start: 2023-05-11 | End: 2023-05-11 | Stop reason: HOSPADM

## 2023-05-11 RX ORDER — MIDAZOLAM HYDROCHLORIDE 1 MG/ML
INJECTION INTRAMUSCULAR; INTRAVENOUS PRN
Status: DISCONTINUED | OUTPATIENT
Start: 2023-05-11 | End: 2023-05-11 | Stop reason: SDUPTHER

## 2023-05-11 RX ADMIN — PREDNISOLONE ACETATE 1 DROP: 10 SUSPENSION/ DROPS OPHTHALMIC at 22:25

## 2023-05-11 RX ADMIN — SODIUM CHLORIDE, PRESERVATIVE FREE 10 ML: 5 INJECTION INTRAVENOUS at 22:55

## 2023-05-11 RX ADMIN — PROPOFOL 20 MG: 10 INJECTION, EMULSION INTRAVENOUS at 13:21

## 2023-05-11 RX ADMIN — Medication 200 MCG: at 13:24

## 2023-05-11 RX ADMIN — SODIUM CHLORIDE, PRESERVATIVE FREE 10 ML: 5 INJECTION INTRAVENOUS at 11:06

## 2023-05-11 RX ADMIN — SEVELAMER CARBONATE 800 MG: 800 TABLET, FILM COATED ORAL at 18:44

## 2023-05-11 RX ADMIN — ATORVASTATIN CALCIUM 40 MG: 40 TABLET, FILM COATED ORAL at 10:44

## 2023-05-11 RX ADMIN — HYDROMORPHONE HYDROCHLORIDE 0.5 MG: 1 INJECTION, SOLUTION INTRAMUSCULAR; INTRAVENOUS; SUBCUTANEOUS at 22:44

## 2023-05-11 RX ADMIN — FENTANYL CITRATE 50 MCG: 50 INJECTION, SOLUTION INTRAMUSCULAR; INTRAVENOUS at 13:21

## 2023-05-11 RX ADMIN — PREDNISOLONE ACETATE 1 DROP: 10 SUSPENSION/ DROPS OPHTHALMIC at 10:51

## 2023-05-11 RX ADMIN — SODIUM CHLORIDE: 9 INJECTION, SOLUTION INTRAVENOUS at 10:45

## 2023-05-11 RX ADMIN — CARVEDILOL 3.12 MG: 3.12 TABLET, FILM COATED ORAL at 10:44

## 2023-05-11 RX ADMIN — MIDAZOLAM HYDROCHLORIDE 2 MG: 2 INJECTION, SOLUTION INTRAMUSCULAR; INTRAVENOUS at 13:19

## 2023-05-11 RX ADMIN — MEROPENEM 500 MG: 500 INJECTION INTRAVENOUS at 22:27

## 2023-05-11 RX ADMIN — ACETAMINOPHEN 650 MG: 325 TABLET ORAL at 22:42

## 2023-05-11 RX ADMIN — SEVELAMER CARBONATE 800 MG: 800 TABLET, FILM COATED ORAL at 10:44

## 2023-05-11 ASSESSMENT — ENCOUNTER SYMPTOMS
DIARRHEA: 0
ABDOMINAL PAIN: 0
RESPIRATORY NEGATIVE: 1
SHORTNESS OF BREATH: 0
VOMITING: 0

## 2023-05-11 ASSESSMENT — PAIN SCALES - GENERAL
PAINLEVEL_OUTOF10: 6
PAINLEVEL_OUTOF10: 7

## 2023-05-11 ASSESSMENT — PAIN DESCRIPTION - LOCATION: LOCATION: FOOT

## 2023-05-11 ASSESSMENT — PAIN DESCRIPTION - ORIENTATION: ORIENTATION: RIGHT

## 2023-05-11 NOTE — ANESTHESIA POSTPROCEDURE EVALUATION
Department of Anesthesiology  Postprocedure Note    Patient: Ludmila Charles  MRN: [id number]  YOB: 1950  Date of evaluation: 5/11/2023      Procedure Summary     Date: 05/11/23 Room / Location: [facility] MAIN OR 05 / [facility] MAIN OR    Anesthesia Start: 5352 Anesthesia Stop: 3680    Procedure: AMPUTATION THIRD TOE RIGHT (Right: Foot) Diagnosis:       Diabetic foot ulcer with osteomyelitis ([address]      (Diabetic foot ulcer with osteomyelitis ([address] [X40.020, E11.69, L97.509, M86.9])    Surgeons: Laura Baker DPM Responsible Provider: Glenda Karimi MD    Anesthesia Type: MAC ASA Status: 3          Anesthesia Type: No value filed.     [place] Phase I: Haleigh Score: 9    [place] Phase II:        Anesthesia Post Evaluation    Patient location during evaluation: PACU  Patient participation: complete - patient cannot participate  Level of consciousness: awake  Pain score: 0  Airway patency: patent  Nausea & Vomiting: no nausea and no vomiting  Complications: no  Cardiovascular status: hemodynamically stable  Respiratory status: acceptable  Hydration status: stable  Multimodal analgesia pain management approach

## 2023-05-12 LAB
BASOPHILS # BLD: 0.1 K/UL (ref 0–0.1)
BASOPHILS NFR BLD: 0 % (ref 0–1)
CRP SERPL-MCNC: 23.9 MG/DL (ref 0–0.6)
DIFFERENTIAL METHOD BLD: ABNORMAL
EOSINOPHIL # BLD: 0.3 K/UL (ref 0–0.4)
EOSINOPHIL NFR BLD: 1 % (ref 0–7)
ERYTHROCYTE [DISTWIDTH] IN BLOOD BY AUTOMATED COUNT: 14.6 % (ref 11.5–14.5)
HCT VFR BLD AUTO: 30.5 % (ref 36.6–50.3)
HGB BLD-MCNC: 9.6 G/DL (ref 12.1–17)
IMM GRANULOCYTES # BLD AUTO: 0.2 K/UL (ref 0–0.04)
IMM GRANULOCYTES NFR BLD AUTO: 1 % (ref 0–0.5)
LYMPHOCYTES # BLD: 2 K/UL (ref 0.8–3.5)
LYMPHOCYTES NFR BLD: 9 % (ref 12–49)
MCH RBC QN AUTO: 31.1 PG (ref 26–34)
MCHC RBC AUTO-ENTMCNC: 31.5 G/DL (ref 30–36.5)
MCV RBC AUTO: 98.7 FL (ref 80–99)
MONOCYTES # BLD: 1.6 K/UL (ref 0–1)
MONOCYTES NFR BLD: 7 % (ref 5–13)
NEUTS SEG # BLD: 18.6 K/UL (ref 1.8–8)
NEUTS SEG NFR BLD: 82 % (ref 32–75)
NRBC # BLD: 0 K/UL (ref 0–0.01)
NRBC BLD-RTO: 0 PER 100 WBC
PLATELET # BLD AUTO: 330 K/UL (ref 150–400)
PMV BLD AUTO: 11.9 FL (ref 8.9–12.9)
PROCALCITONIN SERPL-MCNC: 6.97 NG/ML
RBC # BLD AUTO: 3.09 M/UL (ref 4.1–5.7)
VANCOMYCIN SERPL-MCNC: 15.7 UG/ML
WBC # BLD AUTO: 22.7 K/UL (ref 4.1–11.1)

## 2023-05-12 PROCEDURE — 99232 SBSQ HOSP IP/OBS MODERATE 35: CPT | Performed by: INTERNAL MEDICINE

## 2023-05-12 PROCEDURE — 6360000002 HC RX W HCPCS

## 2023-05-12 PROCEDURE — 6360000002 HC RX W HCPCS: Performed by: INTERNAL MEDICINE

## 2023-05-12 PROCEDURE — 99232 SBSQ HOSP IP/OBS MODERATE 35: CPT | Performed by: PODIATRIST

## 2023-05-12 PROCEDURE — 87102 FUNGUS ISOLATION CULTURE: CPT

## 2023-05-12 PROCEDURE — 36415 COLL VENOUS BLD VENIPUNCTURE: CPT

## 2023-05-12 PROCEDURE — 80202 ASSAY OF VANCOMYCIN: CPT

## 2023-05-12 PROCEDURE — 85025 COMPLETE CBC W/AUTO DIFF WBC: CPT

## 2023-05-12 PROCEDURE — 90935 HEMODIALYSIS ONE EVALUATION: CPT

## 2023-05-12 PROCEDURE — 2580000003 HC RX 258: Performed by: INTERNAL MEDICINE

## 2023-05-12 PROCEDURE — 6370000000 HC RX 637 (ALT 250 FOR IP): Performed by: INTERNAL MEDICINE

## 2023-05-12 PROCEDURE — 84145 PROCALCITONIN (PCT): CPT

## 2023-05-12 PROCEDURE — 1100000000 HC RM PRIVATE

## 2023-05-12 PROCEDURE — 99232 SBSQ HOSP IP/OBS MODERATE 35: CPT | Performed by: SURGERY

## 2023-05-12 PROCEDURE — 86140 C-REACTIVE PROTEIN: CPT

## 2023-05-12 RX ADMIN — PREDNISOLONE ACETATE 1 DROP: 10 SUSPENSION/ DROPS OPHTHALMIC at 12:10

## 2023-05-12 RX ADMIN — SODIUM CHLORIDE, PRESERVATIVE FREE 10 ML: 5 INJECTION INTRAVENOUS at 12:10

## 2023-05-12 RX ADMIN — SEVELAMER CARBONATE 800 MG: 800 TABLET, FILM COATED ORAL at 16:50

## 2023-05-12 RX ADMIN — CARVEDILOL 3.12 MG: 3.12 TABLET, FILM COATED ORAL at 12:09

## 2023-05-12 RX ADMIN — SODIUM CHLORIDE, PRESERVATIVE FREE 10 ML: 5 INJECTION INTRAVENOUS at 21:08

## 2023-05-12 RX ADMIN — ATORVASTATIN CALCIUM 40 MG: 40 TABLET, FILM COATED ORAL at 12:09

## 2023-05-12 RX ADMIN — CARVEDILOL 3.12 MG: 3.12 TABLET, FILM COATED ORAL at 16:57

## 2023-05-12 RX ADMIN — EPOETIN ALFA-EPBX 10000 UNITS: 10000 INJECTION, SOLUTION INTRAVENOUS; SUBCUTANEOUS at 09:03

## 2023-05-12 RX ADMIN — PREDNISOLONE ACETATE 1 DROP: 10 SUSPENSION/ DROPS OPHTHALMIC at 21:08

## 2023-05-12 RX ADMIN — SEVELAMER CARBONATE 800 MG: 800 TABLET, FILM COATED ORAL at 12:09

## 2023-05-12 RX ADMIN — HYDROMORPHONE HYDROCHLORIDE 0.5 MG: 1 INJECTION, SOLUTION INTRAMUSCULAR; INTRAVENOUS; SUBCUTANEOUS at 09:06

## 2023-05-12 RX ADMIN — VANCOMYCIN HYDROCHLORIDE 1000 MG: 1 INJECTION, POWDER, LYOPHILIZED, FOR SOLUTION INTRAVENOUS at 16:50

## 2023-05-12 RX ADMIN — MEROPENEM 500 MG: 500 INJECTION INTRAVENOUS at 21:08

## 2023-05-12 ASSESSMENT — PAIN DESCRIPTION - ORIENTATION: ORIENTATION: RIGHT

## 2023-05-12 ASSESSMENT — PAIN SCALES - GENERAL
PAINLEVEL_OUTOF10: 0
PAINLEVEL_OUTOF10: 7

## 2023-05-12 ASSESSMENT — ENCOUNTER SYMPTOMS: RESPIRATORY NEGATIVE: 1

## 2023-05-12 ASSESSMENT — PAIN DESCRIPTION - LOCATION: LOCATION: ABDOMEN

## 2023-05-12 ASSESSMENT — PAIN DESCRIPTION - DESCRIPTORS: DESCRIPTORS: ACHING

## 2023-05-13 ENCOUNTER — APPOINTMENT (OUTPATIENT)
Facility: HOSPITAL | Age: 73
DRG: 853 | End: 2023-05-13
Payer: MEDICARE

## 2023-05-13 LAB
ALBUMIN SERPL-MCNC: 2.2 G/DL (ref 3.5–5)
ANION GAP SERPL CALC-SCNC: 8 MMOL/L (ref 5–15)
BACTERIA SPEC CULT: ABNORMAL
BASOPHILS # BLD: 0.1 K/UL (ref 0–0.1)
BASOPHILS NFR BLD: 0 % (ref 0–1)
BUN SERPL-MCNC: 28 MG/DL (ref 6–20)
BUN/CREAT SERPL: 4 (ref 12–20)
CA-I BLD-MCNC: 8.8 MG/DL (ref 8.5–10.1)
CHLORIDE SERPL-SCNC: 95 MMOL/L (ref 97–108)
CO2 SERPL-SCNC: 28 MMOL/L (ref 21–32)
CREAT SERPL-MCNC: 6.76 MG/DL (ref 0.7–1.3)
CRP SERPL-MCNC: 21.9 MG/DL (ref 0–0.6)
DIFFERENTIAL METHOD BLD: ABNORMAL
EOSINOPHIL # BLD: 0.3 K/UL (ref 0–0.4)
EOSINOPHIL NFR BLD: 1 % (ref 0–7)
ERYTHROCYTE [DISTWIDTH] IN BLOOD BY AUTOMATED COUNT: 14.6 % (ref 11.5–14.5)
GLUCOSE SERPL-MCNC: 167 MG/DL (ref 65–100)
GRAM STN SPEC: ABNORMAL
GRAM STN SPEC: ABNORMAL
HCT VFR BLD AUTO: 30 % (ref 36.6–50.3)
HGB BLD-MCNC: 9.5 G/DL (ref 12.1–17)
IMM GRANULOCYTES # BLD AUTO: 0.2 K/UL (ref 0–0.04)
IMM GRANULOCYTES NFR BLD AUTO: 1 % (ref 0–0.5)
LYMPHOCYTES # BLD: 2.2 K/UL (ref 0.8–3.5)
LYMPHOCYTES NFR BLD: 9 % (ref 12–49)
Lab: ABNORMAL
MCH RBC QN AUTO: 31 PG (ref 26–34)
MCHC RBC AUTO-ENTMCNC: 31.7 G/DL (ref 30–36.5)
MCV RBC AUTO: 98 FL (ref 80–99)
MONOCYTES # BLD: 1.6 K/UL (ref 0–1)
MONOCYTES NFR BLD: 7 % (ref 5–13)
NEUTS SEG # BLD: 20.1 K/UL (ref 1.8–8)
NEUTS SEG NFR BLD: 82 % (ref 32–75)
NRBC # BLD: 0 K/UL (ref 0–0.01)
NRBC BLD-RTO: 0 PER 100 WBC
PHOSPHATE SERPL-MCNC: 1.4 MG/DL (ref 2.6–4.7)
PLATELET # BLD AUTO: 344 K/UL (ref 150–400)
PMV BLD AUTO: 11.8 FL (ref 8.9–12.9)
POTASSIUM SERPL-SCNC: 4.2 MMOL/L (ref 3.5–5.1)
PROCALCITONIN SERPL-MCNC: 6.71 NG/ML
RBC # BLD AUTO: 3.06 M/UL (ref 4.1–5.7)
SODIUM SERPL-SCNC: 131 MMOL/L (ref 136–145)
WBC # BLD AUTO: 24.5 K/UL (ref 4.1–11.1)

## 2023-05-13 PROCEDURE — 2580000003 HC RX 258: Performed by: INTERNAL MEDICINE

## 2023-05-13 PROCEDURE — 6360000002 HC RX W HCPCS: Performed by: INTERNAL MEDICINE

## 2023-05-13 PROCEDURE — 85025 COMPLETE CBC W/AUTO DIFF WBC: CPT

## 2023-05-13 PROCEDURE — 99232 SBSQ HOSP IP/OBS MODERATE 35: CPT | Performed by: INTERNAL MEDICINE

## 2023-05-13 PROCEDURE — 6360000002 HC RX W HCPCS

## 2023-05-13 PROCEDURE — 86140 C-REACTIVE PROTEIN: CPT

## 2023-05-13 PROCEDURE — 80069 RENAL FUNCTION PANEL: CPT

## 2023-05-13 PROCEDURE — 36415 COLL VENOUS BLD VENIPUNCTURE: CPT

## 2023-05-13 PROCEDURE — 73620 X-RAY EXAM OF FOOT: CPT

## 2023-05-13 PROCEDURE — 99232 SBSQ HOSP IP/OBS MODERATE 35: CPT | Performed by: PODIATRIST

## 2023-05-13 PROCEDURE — 84145 PROCALCITONIN (PCT): CPT

## 2023-05-13 PROCEDURE — 1100000000 HC RM PRIVATE

## 2023-05-13 PROCEDURE — 6370000000 HC RX 637 (ALT 250 FOR IP): Performed by: INTERNAL MEDICINE

## 2023-05-13 RX ORDER — HYDROMORPHONE HYDROCHLORIDE 1 MG/ML
0.25 INJECTION, SOLUTION INTRAMUSCULAR; INTRAVENOUS; SUBCUTANEOUS EVERY 4 HOURS PRN
Status: DISPENSED | OUTPATIENT
Start: 2023-05-13 | End: 2023-05-15

## 2023-05-13 RX ORDER — HYDROMORPHONE HYDROCHLORIDE 1 MG/ML
0.5 INJECTION, SOLUTION INTRAMUSCULAR; INTRAVENOUS; SUBCUTANEOUS EVERY 4 HOURS PRN
Status: DISPENSED | OUTPATIENT
Start: 2023-05-13 | End: 2023-05-15

## 2023-05-13 RX ADMIN — PREDNISOLONE ACETATE 1 DROP: 10 SUSPENSION/ DROPS OPHTHALMIC at 07:53

## 2023-05-13 RX ADMIN — MEROPENEM 500 MG: 500 INJECTION INTRAVENOUS at 22:27

## 2023-05-13 RX ADMIN — CARVEDILOL 3.12 MG: 3.12 TABLET, FILM COATED ORAL at 17:13

## 2023-05-13 RX ADMIN — SODIUM CHLORIDE, PRESERVATIVE FREE 10 ML: 5 INJECTION INTRAVENOUS at 10:51

## 2023-05-13 RX ADMIN — ATORVASTATIN CALCIUM 40 MG: 40 TABLET, FILM COATED ORAL at 07:53

## 2023-05-13 RX ADMIN — PREDNISOLONE ACETATE 1 DROP: 10 SUSPENSION/ DROPS OPHTHALMIC at 21:14

## 2023-05-13 RX ADMIN — CARVEDILOL 3.12 MG: 3.12 TABLET, FILM COATED ORAL at 07:53

## 2023-05-13 RX ADMIN — HYDROMORPHONE HYDROCHLORIDE 0.25 MG: 1 INJECTION, SOLUTION INTRAMUSCULAR; INTRAVENOUS; SUBCUTANEOUS at 07:43

## 2023-05-13 RX ADMIN — HYDROMORPHONE HYDROCHLORIDE 0.5 MG: 1 INJECTION, SOLUTION INTRAMUSCULAR; INTRAVENOUS; SUBCUTANEOUS at 23:02

## 2023-05-13 RX ADMIN — SODIUM CHLORIDE, PRESERVATIVE FREE 10 ML: 5 INJECTION INTRAVENOUS at 21:14

## 2023-05-13 ASSESSMENT — PAIN DESCRIPTION - ORIENTATION: ORIENTATION: RIGHT

## 2023-05-13 ASSESSMENT — ENCOUNTER SYMPTOMS
SHORTNESS OF BREATH: 0
VOMITING: 0
RESPIRATORY NEGATIVE: 1
DIARRHEA: 0
ABDOMINAL PAIN: 0

## 2023-05-13 ASSESSMENT — PAIN SCALES - GENERAL
PAINLEVEL_OUTOF10: 0
PAINLEVEL_OUTOF10: 2
PAINLEVEL_OUTOF10: 8
PAINLEVEL_OUTOF10: 8

## 2023-05-13 ASSESSMENT — PAIN DESCRIPTION - LOCATION: LOCATION: FOOT

## 2023-05-13 ASSESSMENT — PAIN - FUNCTIONAL ASSESSMENT: PAIN_FUNCTIONAL_ASSESSMENT: PREVENTS OR INTERFERES SOME ACTIVE ACTIVITIES AND ADLS

## 2023-05-13 ASSESSMENT — PAIN DESCRIPTION - DESCRIPTORS: DESCRIPTORS: ACHING

## 2023-05-13 ASSESSMENT — PAIN DESCRIPTION - PAIN TYPE: TYPE: ACUTE PAIN

## 2023-05-14 PROCEDURE — 2580000003 HC RX 258: Performed by: INTERNAL MEDICINE

## 2023-05-14 PROCEDURE — 6370000000 HC RX 637 (ALT 250 FOR IP): Performed by: INTERNAL MEDICINE

## 2023-05-14 PROCEDURE — 99232 SBSQ HOSP IP/OBS MODERATE 35: CPT | Performed by: INTERNAL MEDICINE

## 2023-05-14 PROCEDURE — 6360000002 HC RX W HCPCS: Performed by: INTERNAL MEDICINE

## 2023-05-14 PROCEDURE — 94761 N-INVAS EAR/PLS OXIMETRY MLT: CPT

## 2023-05-14 PROCEDURE — 6360000002 HC RX W HCPCS

## 2023-05-14 PROCEDURE — 1100000000 HC RM PRIVATE

## 2023-05-14 RX ADMIN — SODIUM CHLORIDE, PRESERVATIVE FREE 10 ML: 5 INJECTION INTRAVENOUS at 20:53

## 2023-05-14 RX ADMIN — HYDROMORPHONE HYDROCHLORIDE 0.25 MG: 1 INJECTION, SOLUTION INTRAMUSCULAR; INTRAVENOUS; SUBCUTANEOUS at 16:17

## 2023-05-14 RX ADMIN — HYDROMORPHONE HYDROCHLORIDE 0.25 MG: 1 INJECTION, SOLUTION INTRAMUSCULAR; INTRAVENOUS; SUBCUTANEOUS at 07:59

## 2023-05-14 RX ADMIN — SODIUM CHLORIDE, PRESERVATIVE FREE 10 ML: 5 INJECTION INTRAVENOUS at 09:52

## 2023-05-14 RX ADMIN — POTASSIUM & SODIUM PHOSPHATES POWDER PACK 280-160-250 MG 250 MG: 280-160-250 PACK at 16:06

## 2023-05-14 RX ADMIN — ATORVASTATIN CALCIUM 40 MG: 40 TABLET, FILM COATED ORAL at 08:00

## 2023-05-14 RX ADMIN — PREDNISOLONE ACETATE 1 DROP: 10 SUSPENSION/ DROPS OPHTHALMIC at 09:51

## 2023-05-14 RX ADMIN — CARVEDILOL 3.12 MG: 3.12 TABLET, FILM COATED ORAL at 16:06

## 2023-05-14 RX ADMIN — PREDNISOLONE ACETATE 1 DROP: 10 SUSPENSION/ DROPS OPHTHALMIC at 20:53

## 2023-05-14 RX ADMIN — HYDROMORPHONE HYDROCHLORIDE 0.5 MG: 1 INJECTION, SOLUTION INTRAMUSCULAR; INTRAVENOUS; SUBCUTANEOUS at 22:34

## 2023-05-14 RX ADMIN — CARVEDILOL 3.12 MG: 3.12 TABLET, FILM COATED ORAL at 08:00

## 2023-05-14 RX ADMIN — MEROPENEM 500 MG: 500 INJECTION INTRAVENOUS at 22:19

## 2023-05-14 ASSESSMENT — PAIN DESCRIPTION - LOCATION
LOCATION: FOOT

## 2023-05-14 ASSESSMENT — PAIN DESCRIPTION - ORIENTATION
ORIENTATION: RIGHT
ORIENTATION: RIGHT

## 2023-05-14 ASSESSMENT — PAIN DESCRIPTION - DESCRIPTORS: DESCRIPTORS: ACHING

## 2023-05-14 ASSESSMENT — PAIN SCALES - GENERAL
PAINLEVEL_OUTOF10: 8
PAINLEVEL_OUTOF10: 6
PAINLEVEL_OUTOF10: 5
PAINLEVEL_OUTOF10: 2

## 2023-05-14 ASSESSMENT — PAIN - FUNCTIONAL ASSESSMENT: PAIN_FUNCTIONAL_ASSESSMENT: PREVENTS OR INTERFERES SOME ACTIVE ACTIVITIES AND ADLS

## 2023-05-14 ASSESSMENT — ENCOUNTER SYMPTOMS: RESPIRATORY NEGATIVE: 1

## 2023-05-15 LAB
ALBUMIN SERPL-MCNC: 2.2 G/DL (ref 3.5–5)
ANION GAP SERPL CALC-SCNC: 10 MMOL/L (ref 5–15)
ANION GAP SERPL CALC-SCNC: 8 MMOL/L (ref 5–15)
BACTERIA SPEC CULT: ABNORMAL
BASOPHILS # BLD: 0.1 K/UL (ref 0–0.1)
BASOPHILS # BLD: 0.1 K/UL (ref 0–0.1)
BASOPHILS NFR BLD: 0 % (ref 0–1)
BASOPHILS NFR BLD: 0 % (ref 0–1)
BUN SERPL-MCNC: 45 MG/DL (ref 6–20)
BUN SERPL-MCNC: 47 MG/DL (ref 6–20)
BUN/CREAT SERPL: 5 (ref 12–20)
BUN/CREAT SERPL: 5 (ref 12–20)
CA-I BLD-MCNC: 8.2 MG/DL (ref 8.5–10.1)
CA-I BLD-MCNC: 8.2 MG/DL (ref 8.5–10.1)
CHLORIDE SERPL-SCNC: 93 MMOL/L (ref 97–108)
CHLORIDE SERPL-SCNC: 93 MMOL/L (ref 97–108)
CO2 SERPL-SCNC: 26 MMOL/L (ref 21–32)
CO2 SERPL-SCNC: 27 MMOL/L (ref 21–32)
CREAT SERPL-MCNC: 8.98 MG/DL (ref 0.7–1.3)
CREAT SERPL-MCNC: 9 MG/DL (ref 0.7–1.3)
CRP SERPL-MCNC: 18.6 MG/DL (ref 0–0.6)
DIFFERENTIAL METHOD BLD: ABNORMAL
DIFFERENTIAL METHOD BLD: ABNORMAL
EOSINOPHIL # BLD: 0.4 K/UL (ref 0–0.4)
EOSINOPHIL # BLD: 0.4 K/UL (ref 0–0.4)
EOSINOPHIL NFR BLD: 2 % (ref 0–7)
EOSINOPHIL NFR BLD: 2 % (ref 0–7)
ERYTHROCYTE [DISTWIDTH] IN BLOOD BY AUTOMATED COUNT: 15.1 % (ref 11.5–14.5)
ERYTHROCYTE [DISTWIDTH] IN BLOOD BY AUTOMATED COUNT: 15.1 % (ref 11.5–14.5)
GLUCOSE SERPL-MCNC: 137 MG/DL (ref 65–100)
GLUCOSE SERPL-MCNC: 137 MG/DL (ref 65–100)
GRAM STN SPEC: ABNORMAL
HCT VFR BLD AUTO: 28.2 % (ref 36.6–50.3)
HCT VFR BLD AUTO: 28.3 % (ref 36.6–50.3)
HGB BLD-MCNC: 9.2 G/DL (ref 12.1–17)
HGB BLD-MCNC: 9.2 G/DL (ref 12.1–17)
IMM GRANULOCYTES # BLD AUTO: 0.2 K/UL (ref 0–0.04)
IMM GRANULOCYTES # BLD AUTO: 0.2 K/UL (ref 0–0.04)
IMM GRANULOCYTES NFR BLD AUTO: 1 % (ref 0–0.5)
IMM GRANULOCYTES NFR BLD AUTO: 1 % (ref 0–0.5)
LACTATE SERPL-SCNC: 1.7 MMOL/L (ref 0.4–2)
LYMPHOCYTES # BLD: 2.4 K/UL (ref 0.8–3.5)
LYMPHOCYTES # BLD: 2.5 K/UL (ref 0.8–3.5)
LYMPHOCYTES NFR BLD: 12 % (ref 12–49)
LYMPHOCYTES NFR BLD: 12 % (ref 12–49)
Lab: ABNORMAL
MCH RBC QN AUTO: 31.1 PG (ref 26–34)
MCH RBC QN AUTO: 31.4 PG (ref 26–34)
MCHC RBC AUTO-ENTMCNC: 32.5 G/DL (ref 30–36.5)
MCHC RBC AUTO-ENTMCNC: 32.6 G/DL (ref 30–36.5)
MCV RBC AUTO: 95.6 FL (ref 80–99)
MCV RBC AUTO: 96.2 FL (ref 80–99)
MONOCYTES # BLD: 1.7 K/UL (ref 0–1)
MONOCYTES # BLD: 1.8 K/UL (ref 0–1)
MONOCYTES NFR BLD: 8 % (ref 5–13)
MONOCYTES NFR BLD: 9 % (ref 5–13)
NEUTS SEG # BLD: 15.9 K/UL (ref 1.8–8)
NEUTS SEG # BLD: 16.7 K/UL (ref 1.8–8)
NEUTS SEG NFR BLD: 76 % (ref 32–75)
NEUTS SEG NFR BLD: 77 % (ref 32–75)
NRBC # BLD: 0 K/UL (ref 0–0.01)
NRBC # BLD: 0 K/UL (ref 0–0.01)
NRBC BLD-RTO: 0 PER 100 WBC
NRBC BLD-RTO: 0 PER 100 WBC
PHOSPHATE SERPL-MCNC: 3.1 MG/DL (ref 2.6–4.7)
PLATELET # BLD AUTO: 320 K/UL (ref 150–400)
PLATELET # BLD AUTO: 343 K/UL (ref 150–400)
PMV BLD AUTO: 11.7 FL (ref 8.9–12.9)
PMV BLD AUTO: 11.8 FL (ref 8.9–12.9)
POTASSIUM SERPL-SCNC: 4.9 MMOL/L (ref 3.5–5.1)
POTASSIUM SERPL-SCNC: 4.9 MMOL/L (ref 3.5–5.1)
PROCALCITONIN SERPL-MCNC: 5.6 NG/ML
RBC # BLD AUTO: 2.93 M/UL (ref 4.1–5.7)
RBC # BLD AUTO: 2.96 M/UL (ref 4.1–5.7)
SODIUM SERPL-SCNC: 128 MMOL/L (ref 136–145)
SODIUM SERPL-SCNC: 129 MMOL/L (ref 136–145)
VANCOMYCIN TROUGH SERPL-MCNC: 18.5 UG/ML (ref 5–10)
WBC # BLD AUTO: 20.8 K/UL (ref 4.1–11.1)
WBC # BLD AUTO: 21.6 K/UL (ref 4.1–11.1)

## 2023-05-15 PROCEDURE — 90935 HEMODIALYSIS ONE EVALUATION: CPT

## 2023-05-15 PROCEDURE — 80048 BASIC METABOLIC PNL TOTAL CA: CPT

## 2023-05-15 PROCEDURE — 6360000002 HC RX W HCPCS

## 2023-05-15 PROCEDURE — 86140 C-REACTIVE PROTEIN: CPT

## 2023-05-15 PROCEDURE — 2580000003 HC RX 258: Performed by: INTERNAL MEDICINE

## 2023-05-15 PROCEDURE — 94761 N-INVAS EAR/PLS OXIMETRY MLT: CPT

## 2023-05-15 PROCEDURE — 84145 PROCALCITONIN (PCT): CPT

## 2023-05-15 PROCEDURE — 80202 ASSAY OF VANCOMYCIN: CPT

## 2023-05-15 PROCEDURE — 6360000002 HC RX W HCPCS: Performed by: INTERNAL MEDICINE

## 2023-05-15 PROCEDURE — 36415 COLL VENOUS BLD VENIPUNCTURE: CPT

## 2023-05-15 PROCEDURE — 87040 BLOOD CULTURE FOR BACTERIA: CPT

## 2023-05-15 PROCEDURE — 2709999900 HC NON-CHARGEABLE SUPPLY

## 2023-05-15 PROCEDURE — 85025 COMPLETE CBC W/AUTO DIFF WBC: CPT

## 2023-05-15 PROCEDURE — 1100000000 HC RM PRIVATE

## 2023-05-15 PROCEDURE — 99232 SBSQ HOSP IP/OBS MODERATE 35: CPT | Performed by: INTERNAL MEDICINE

## 2023-05-15 PROCEDURE — 83605 ASSAY OF LACTIC ACID: CPT

## 2023-05-15 PROCEDURE — 6370000000 HC RX 637 (ALT 250 FOR IP): Performed by: INTERNAL MEDICINE

## 2023-05-15 PROCEDURE — 80069 RENAL FUNCTION PANEL: CPT

## 2023-05-15 PROCEDURE — 99232 SBSQ HOSP IP/OBS MODERATE 35: CPT | Performed by: SURGERY

## 2023-05-15 RX ORDER — TRAMADOL HYDROCHLORIDE 50 MG/1
50 TABLET ORAL EVERY 6 HOURS PRN
Status: DISCONTINUED | OUTPATIENT
Start: 2023-05-15 | End: 2023-05-24 | Stop reason: HOSPADM

## 2023-05-15 RX ORDER — OXYCODONE HYDROCHLORIDE 5 MG/1
5 TABLET ORAL EVERY 4 HOURS PRN
Status: DISCONTINUED | OUTPATIENT
Start: 2023-05-15 | End: 2023-05-24 | Stop reason: HOSPADM

## 2023-05-15 RX ADMIN — PREDNISOLONE ACETATE 1 DROP: 10 SUSPENSION/ DROPS OPHTHALMIC at 21:32

## 2023-05-15 RX ADMIN — ATORVASTATIN CALCIUM 40 MG: 40 TABLET, FILM COATED ORAL at 08:59

## 2023-05-15 RX ADMIN — PREDNISOLONE ACETATE 1 DROP: 10 SUSPENSION/ DROPS OPHTHALMIC at 08:59

## 2023-05-15 RX ADMIN — SODIUM CHLORIDE, PRESERVATIVE FREE 10 ML: 5 INJECTION INTRAVENOUS at 09:05

## 2023-05-15 RX ADMIN — CARVEDILOL 3.12 MG: 3.12 TABLET, FILM COATED ORAL at 16:27

## 2023-05-15 RX ADMIN — EPOETIN ALFA-EPBX 10000 UNITS: 10000 INJECTION, SOLUTION INTRAVENOUS; SUBCUTANEOUS at 13:21

## 2023-05-15 RX ADMIN — MEROPENEM 500 MG: 500 INJECTION INTRAVENOUS at 22:32

## 2023-05-15 RX ADMIN — HYDROMORPHONE HYDROCHLORIDE 0.5 MG: 1 INJECTION, SOLUTION INTRAMUSCULAR; INTRAVENOUS; SUBCUTANEOUS at 05:03

## 2023-05-15 RX ADMIN — CARVEDILOL 3.12 MG: 3.12 TABLET, FILM COATED ORAL at 08:59

## 2023-05-15 RX ADMIN — SODIUM CHLORIDE, PRESERVATIVE FREE 10 ML: 5 INJECTION INTRAVENOUS at 21:34

## 2023-05-15 RX ADMIN — HYDROMORPHONE HYDROCHLORIDE 0.5 MG: 1 INJECTION, SOLUTION INTRAMUSCULAR; INTRAVENOUS; SUBCUTANEOUS at 12:22

## 2023-05-15 ASSESSMENT — PAIN SCALES - GENERAL
PAINLEVEL_OUTOF10: 0
PAINLEVEL_OUTOF10: 6
PAINLEVEL_OUTOF10: 8
PAINLEVEL_OUTOF10: 4
PAINLEVEL_OUTOF10: 7

## 2023-05-15 ASSESSMENT — PAIN DESCRIPTION - LOCATION
LOCATION: FOOT

## 2023-05-15 ASSESSMENT — PAIN DESCRIPTION - DESCRIPTORS
DESCRIPTORS: ACHING

## 2023-05-15 ASSESSMENT — PAIN DESCRIPTION - ORIENTATION
ORIENTATION: RIGHT

## 2023-05-15 ASSESSMENT — PAIN DESCRIPTION - PAIN TYPE: TYPE: ACUTE PAIN

## 2023-05-15 ASSESSMENT — PAIN - FUNCTIONAL ASSESSMENT: PAIN_FUNCTIONAL_ASSESSMENT: PREVENTS OR INTERFERES SOME ACTIVE ACTIVITIES AND ADLS

## 2023-05-16 LAB
ANION GAP SERPL CALC-SCNC: 5 MMOL/L (ref 5–15)
BACTERIA SPEC CULT: NORMAL
BACTERIA SPEC CULT: NORMAL
BASOPHILS # BLD: 0.1 K/UL (ref 0–0.1)
BASOPHILS NFR BLD: 0 % (ref 0–1)
BUN SERPL-MCNC: 25 MG/DL (ref 6–20)
BUN/CREAT SERPL: 4 (ref 12–20)
CA-I BLD-MCNC: 8 MG/DL (ref 8.5–10.1)
CHLORIDE SERPL-SCNC: 98 MMOL/L (ref 97–108)
CO2 SERPL-SCNC: 30 MMOL/L (ref 21–32)
CREAT SERPL-MCNC: 6.76 MG/DL (ref 0.7–1.3)
CRP SERPL-MCNC: 15.3 MG/DL (ref 0–0.6)
DIFFERENTIAL METHOD BLD: ABNORMAL
EOSINOPHIL # BLD: 0.4 K/UL (ref 0–0.4)
EOSINOPHIL NFR BLD: 2 % (ref 0–7)
ERYTHROCYTE [DISTWIDTH] IN BLOOD BY AUTOMATED COUNT: 15.2 % (ref 11.5–14.5)
GLUCOSE SERPL-MCNC: 136 MG/DL (ref 65–100)
HCT VFR BLD AUTO: 28.1 % (ref 36.6–50.3)
HGB BLD-MCNC: 8.9 G/DL (ref 12.1–17)
IMM GRANULOCYTES # BLD AUTO: 0.3 K/UL (ref 0–0.04)
IMM GRANULOCYTES NFR BLD AUTO: 2 % (ref 0–0.5)
LYMPHOCYTES # BLD: 2 K/UL (ref 0.8–3.5)
LYMPHOCYTES NFR BLD: 12 % (ref 12–49)
Lab: NORMAL
Lab: NORMAL
MCH RBC QN AUTO: 30.4 PG (ref 26–34)
MCHC RBC AUTO-ENTMCNC: 31.7 G/DL (ref 30–36.5)
MCV RBC AUTO: 95.9 FL (ref 80–99)
MONOCYTES # BLD: 1.7 K/UL (ref 0–1)
MONOCYTES NFR BLD: 10 % (ref 5–13)
NEUTS SEG # BLD: 11.9 K/UL (ref 1.8–8)
NEUTS SEG NFR BLD: 74 % (ref 32–75)
NRBC # BLD: 0 K/UL (ref 0–0.01)
NRBC BLD-RTO: 0 PER 100 WBC
PLATELET # BLD AUTO: 323 K/UL (ref 150–400)
PMV BLD AUTO: 11.7 FL (ref 8.9–12.9)
POTASSIUM SERPL-SCNC: 3.5 MMOL/L (ref 3.5–5.1)
PROCALCITONIN SERPL-MCNC: 3.87 NG/ML
RBC # BLD AUTO: 2.93 M/UL (ref 4.1–5.7)
SODIUM SERPL-SCNC: 133 MMOL/L (ref 136–145)
WBC # BLD AUTO: 16.2 K/UL (ref 4.1–11.1)

## 2023-05-16 PROCEDURE — 94761 N-INVAS EAR/PLS OXIMETRY MLT: CPT

## 2023-05-16 PROCEDURE — 99232 SBSQ HOSP IP/OBS MODERATE 35: CPT | Performed by: SURGERY

## 2023-05-16 PROCEDURE — 99232 SBSQ HOSP IP/OBS MODERATE 35: CPT | Performed by: INTERNAL MEDICINE

## 2023-05-16 PROCEDURE — 6360000002 HC RX W HCPCS: Performed by: SURGERY

## 2023-05-16 PROCEDURE — 2580000003 HC RX 258: Performed by: INTERNAL MEDICINE

## 2023-05-16 PROCEDURE — 75625 CONTRAST EXAM ABDOMINL AORTA: CPT | Performed by: SURGERY

## 2023-05-16 PROCEDURE — 2709999900 HC NON-CHARGEABLE SUPPLY: Performed by: SURGERY

## 2023-05-16 PROCEDURE — 2580000003 HC RX 258: Performed by: PHYSICIAN ASSISTANT

## 2023-05-16 PROCEDURE — 36245 INS CATH ABD/L-EXT ART 1ST: CPT | Performed by: SURGERY

## 2023-05-16 PROCEDURE — 6360000004 HC RX CONTRAST MEDICATION: Performed by: SURGERY

## 2023-05-16 PROCEDURE — C2623 CATH, TRANSLUMIN, DRUG-COAT: HCPCS | Performed by: SURGERY

## 2023-05-16 PROCEDURE — 75710 ARTERY X-RAYS ARM/LEG: CPT | Performed by: SURGERY

## 2023-05-16 PROCEDURE — C1725 CATH, TRANSLUMIN NON-LASER: HCPCS | Performed by: SURGERY

## 2023-05-16 PROCEDURE — 04CK3ZZ EXTIRPATION OF MATTER FROM RIGHT FEMORAL ARTERY, PERCUTANEOUS APPROACH: ICD-10-PCS | Performed by: SURGERY

## 2023-05-16 PROCEDURE — 85025 COMPLETE CBC W/AUTO DIFF WBC: CPT

## 2023-05-16 PROCEDURE — 37225 PR REVSC OPN/PRQ FEM/POP W/ATHRC/ANGIOP SM VSL: CPT | Performed by: SURGERY

## 2023-05-16 PROCEDURE — 6370000000 HC RX 637 (ALT 250 FOR IP): Performed by: INTERNAL MEDICINE

## 2023-05-16 PROCEDURE — 6360000002 HC RX W HCPCS: Performed by: PHYSICIAN ASSISTANT

## 2023-05-16 PROCEDURE — 99233 SBSQ HOSP IP/OBS HIGH 50: CPT | Performed by: PODIATRIST

## 2023-05-16 PROCEDURE — 99153 MOD SED SAME PHYS/QHP EA: CPT | Performed by: SURGERY

## 2023-05-16 PROCEDURE — 1100000000 HC RM PRIVATE

## 2023-05-16 PROCEDURE — 37228 PR REVSC OPN/PRQ TIB/PERO W/ANGIOPLASTY UNI: CPT | Performed by: SURGERY

## 2023-05-16 PROCEDURE — 36415 COLL VENOUS BLD VENIPUNCTURE: CPT

## 2023-05-16 PROCEDURE — C1760 CLOSURE DEV, VASC: HCPCS | Performed by: SURGERY

## 2023-05-16 PROCEDURE — 99152 MOD SED SAME PHYS/QHP 5/>YRS: CPT | Performed by: SURGERY

## 2023-05-16 PROCEDURE — C1884 EMBOLIZATION PROTECT SYST: HCPCS | Performed by: SURGERY

## 2023-05-16 PROCEDURE — 047M3Z1 DILATION OF RIGHT POPLITEAL ARTERY USING DRUG-COATED BALLOON, PERCUTANEOUS APPROACH: ICD-10-PCS | Performed by: SURGERY

## 2023-05-16 PROCEDURE — 2500000003 HC RX 250 WO HCPCS: Performed by: SURGERY

## 2023-05-16 PROCEDURE — 047K3Z1 DILATION OF RIGHT FEMORAL ARTERY USING DRUG-COATED BALLOON, PERCUTANEOUS APPROACH: ICD-10-PCS | Performed by: SURGERY

## 2023-05-16 PROCEDURE — C1894 INTRO/SHEATH, NON-LASER: HCPCS | Performed by: SURGERY

## 2023-05-16 PROCEDURE — C1887 CATHETER, GUIDING: HCPCS | Performed by: SURGERY

## 2023-05-16 PROCEDURE — 6370000000 HC RX 637 (ALT 250 FOR IP): Performed by: PHYSICIAN ASSISTANT

## 2023-05-16 PROCEDURE — 37228 HC PLASTY UNI TIB/PER INITIAL: CPT | Performed by: SURGERY

## 2023-05-16 PROCEDURE — 37232 HC PLASTY TIBPER EA ADDL: CPT | Performed by: SURGERY

## 2023-05-16 PROCEDURE — 7100000001 HC PACU RECOVERY - ADDTL 15 MIN: Performed by: SURGERY

## 2023-05-16 PROCEDURE — 047R3Z1 DILATION OF RIGHT POSTERIOR TIBIAL ARTERY USING DRUG-COATED BALLOON, PERCUTANEOUS APPROACH: ICD-10-PCS | Performed by: SURGERY

## 2023-05-16 PROCEDURE — C1714 CATH, TRANS ATHERECTOMY, DIR: HCPCS | Performed by: SURGERY

## 2023-05-16 PROCEDURE — 7100000000 HC PACU RECOVERY - FIRST 15 MIN: Performed by: SURGERY

## 2023-05-16 PROCEDURE — 84145 PROCALCITONIN (PCT): CPT

## 2023-05-16 PROCEDURE — 80048 BASIC METABOLIC PNL TOTAL CA: CPT

## 2023-05-16 PROCEDURE — C1769 GUIDE WIRE: HCPCS | Performed by: SURGERY

## 2023-05-16 PROCEDURE — 2580000003 HC RX 258: Performed by: SURGERY

## 2023-05-16 PROCEDURE — 86140 C-REACTIVE PROTEIN: CPT

## 2023-05-16 PROCEDURE — 75716 ARTERY X-RAYS ARMS/LEGS: CPT | Performed by: SURGERY

## 2023-05-16 PROCEDURE — 37225 HC ATHERECTOMY FEM/POP: CPT | Performed by: SURGERY

## 2023-05-16 PROCEDURE — NBSRV NON-BILLABLE SERVICE: Performed by: SURGERY

## 2023-05-16 RX ORDER — MIDAZOLAM HYDROCHLORIDE 1 MG/ML
INJECTION INTRAMUSCULAR; INTRAVENOUS PRN
Status: DISCONTINUED | OUTPATIENT
Start: 2023-05-16 | End: 2023-05-16 | Stop reason: HOSPADM

## 2023-05-16 RX ORDER — ACETAMINOPHEN 325 MG/1
650 TABLET ORAL EVERY 4 HOURS PRN
Status: DISCONTINUED | OUTPATIENT
Start: 2023-05-16 | End: 2023-05-24 | Stop reason: HOSPADM

## 2023-05-16 RX ORDER — HEPARIN SODIUM 1000 [USP'U]/ML
INJECTION, SOLUTION INTRAVENOUS; SUBCUTANEOUS PRN
Status: DISCONTINUED | OUTPATIENT
Start: 2023-05-16 | End: 2023-05-16 | Stop reason: HOSPADM

## 2023-05-16 RX ORDER — NITROGLYCERIN 20 MG/100ML
INJECTION INTRAVENOUS PRN
Status: DISCONTINUED | OUTPATIENT
Start: 2023-05-16 | End: 2023-05-16 | Stop reason: HOSPADM

## 2023-05-16 RX ORDER — SODIUM CHLORIDE 0.9 % (FLUSH) 0.9 %
5-40 SYRINGE (ML) INJECTION PRN
Status: DISCONTINUED | OUTPATIENT
Start: 2023-05-16 | End: 2023-05-24 | Stop reason: HOSPADM

## 2023-05-16 RX ORDER — FENTANYL CITRATE 50 UG/ML
INJECTION, SOLUTION INTRAMUSCULAR; INTRAVENOUS PRN
Status: DISCONTINUED | OUTPATIENT
Start: 2023-05-16 | End: 2023-05-16 | Stop reason: HOSPADM

## 2023-05-16 RX ORDER — SODIUM CHLORIDE 9 MG/ML
INJECTION, SOLUTION INTRAVENOUS PRN
Status: DISCONTINUED | OUTPATIENT
Start: 2023-05-16 | End: 2023-05-24 | Stop reason: HOSPADM

## 2023-05-16 RX ORDER — SODIUM CHLORIDE 0.9 % (FLUSH) 0.9 %
5-40 SYRINGE (ML) INJECTION EVERY 12 HOURS SCHEDULED
Status: DISCONTINUED | OUTPATIENT
Start: 2023-05-16 | End: 2023-05-24 | Stop reason: HOSPADM

## 2023-05-16 RX ADMIN — PREDNISOLONE ACETATE 1 DROP: 10 SUSPENSION/ DROPS OPHTHALMIC at 23:32

## 2023-05-16 RX ADMIN — SODIUM CHLORIDE, PRESERVATIVE FREE 10 ML: 5 INJECTION INTRAVENOUS at 22:24

## 2023-05-16 RX ADMIN — SODIUM CHLORIDE, PRESERVATIVE FREE 10 ML: 5 INJECTION INTRAVENOUS at 09:11

## 2023-05-16 RX ADMIN — MEROPENEM 500 MG: 500 INJECTION, POWDER, FOR SOLUTION INTRAVENOUS at 22:34

## 2023-05-16 RX ADMIN — OXYCODONE 5 MG: 5 TABLET ORAL at 22:45

## 2023-05-16 RX ADMIN — TRAMADOL HYDROCHLORIDE 50 MG: 50 TABLET, COATED ORAL at 18:19

## 2023-05-16 RX ADMIN — HYDROMORPHONE HYDROCHLORIDE 0.5 MG: 1 INJECTION, SOLUTION INTRAMUSCULAR; INTRAVENOUS; SUBCUTANEOUS at 05:58

## 2023-05-16 RX ADMIN — SODIUM CHLORIDE, PRESERVATIVE FREE 10 ML: 5 INJECTION INTRAVENOUS at 22:23

## 2023-05-16 ASSESSMENT — PAIN SCALES - GENERAL
PAINLEVEL_OUTOF10: 4
PAINLEVEL_OUTOF10: 5
PAINLEVEL_OUTOF10: 5
PAINLEVEL_OUTOF10: 0
PAINLEVEL_OUTOF10: 10
PAINLEVEL_OUTOF10: 0
PAINLEVEL_OUTOF10: 3

## 2023-05-16 ASSESSMENT — PAIN - FUNCTIONAL ASSESSMENT
PAIN_FUNCTIONAL_ASSESSMENT: ACTIVITIES ARE NOT PREVENTED
PAIN_FUNCTIONAL_ASSESSMENT: ACTIVITIES ARE NOT PREVENTED
PAIN_FUNCTIONAL_ASSESSMENT: PREVENTS OR INTERFERES SOME ACTIVE ACTIVITIES AND ADLS

## 2023-05-16 ASSESSMENT — PAIN DESCRIPTION - ORIENTATION
ORIENTATION: RIGHT

## 2023-05-16 ASSESSMENT — PAIN DESCRIPTION - DESCRIPTORS
DESCRIPTORS: ACHING

## 2023-05-16 ASSESSMENT — PAIN DESCRIPTION - LOCATION
LOCATION: TOE (COMMENT WHICH ONE);FOOT
LOCATION: FOOT;TOE (COMMENT WHICH ONE)
LOCATION: FOOT

## 2023-05-16 ASSESSMENT — PAIN DESCRIPTION - ONSET: ONSET: GRADUAL

## 2023-05-16 ASSESSMENT — PAIN DESCRIPTION - PAIN TYPE: TYPE: ACUTE PAIN

## 2023-05-16 ASSESSMENT — PAIN DESCRIPTION - FREQUENCY: FREQUENCY: CONTINUOUS

## 2023-05-17 ENCOUNTER — ANESTHESIA EVENT (OUTPATIENT)
Facility: HOSPITAL | Age: 73
End: 2023-05-17
Payer: MEDICARE

## 2023-05-17 ENCOUNTER — ANESTHESIA (OUTPATIENT)
Facility: HOSPITAL | Age: 73
End: 2023-05-17
Payer: MEDICARE

## 2023-05-17 PROBLEM — D63.8 ANEMIA OF CHRONIC DISEASE: Status: ACTIVE | Noted: 2023-05-17

## 2023-05-17 PROBLEM — E11.621 DIABETIC FOOT ULCER WITH OSTEOMYELITIS (HCC): Status: ACTIVE | Noted: 2023-05-17

## 2023-05-17 PROBLEM — E11.69 DIABETIC FOOT ULCER WITH OSTEOMYELITIS (HCC): Status: ACTIVE | Noted: 2023-05-17

## 2023-05-17 PROBLEM — Z99.2 ESRD ON HEMODIALYSIS (HCC): Status: ACTIVE | Noted: 2021-07-07

## 2023-05-17 PROBLEM — L97.509 DIABETIC FOOT ULCER WITH OSTEOMYELITIS (HCC): Status: ACTIVE | Noted: 2023-05-17

## 2023-05-17 PROBLEM — M86.9 DIABETIC FOOT ULCER WITH OSTEOMYELITIS (HCC): Status: ACTIVE | Noted: 2023-05-17

## 2023-05-17 PROBLEM — N18.6 ESRD ON HEMODIALYSIS (HCC): Status: ACTIVE | Noted: 2021-07-07

## 2023-05-17 LAB
ANION GAP SERPL CALC-SCNC: 4 MMOL/L (ref 5–15)
BASOPHILS # BLD: 0.1 K/UL (ref 0–0.1)
BASOPHILS NFR BLD: 0 % (ref 0–1)
BUN SERPL-MCNC: 29 MG/DL (ref 6–20)
BUN/CREAT SERPL: 4 (ref 12–20)
CA-I BLD-MCNC: 8.1 MG/DL (ref 8.5–10.1)
CHLORIDE SERPL-SCNC: 98 MMOL/L (ref 97–108)
CO2 SERPL-SCNC: 30 MMOL/L (ref 21–32)
CREAT SERPL-MCNC: 7.83 MG/DL (ref 0.7–1.3)
CRP SERPL-MCNC: 16.4 MG/DL (ref 0–0.6)
DIFFERENTIAL METHOD BLD: ABNORMAL
EOSINOPHIL # BLD: 0.5 K/UL (ref 0–0.4)
EOSINOPHIL NFR BLD: 3 % (ref 0–7)
ERYTHROCYTE [DISTWIDTH] IN BLOOD BY AUTOMATED COUNT: 15.6 % (ref 11.5–14.5)
GLUCOSE SERPL-MCNC: 127 MG/DL (ref 65–100)
HCT VFR BLD AUTO: 26.1 % (ref 36.6–50.3)
HGB BLD-MCNC: 8.3 G/DL (ref 12.1–17)
IMM GRANULOCYTES # BLD AUTO: 0.2 K/UL (ref 0–0.04)
IMM GRANULOCYTES NFR BLD AUTO: 1 % (ref 0–0.5)
LYMPHOCYTES # BLD: 2.1 K/UL (ref 0.8–3.5)
LYMPHOCYTES NFR BLD: 12 % (ref 12–49)
MCH RBC QN AUTO: 30.9 PG (ref 26–34)
MCHC RBC AUTO-ENTMCNC: 31.8 G/DL (ref 30–36.5)
MCV RBC AUTO: 97 FL (ref 80–99)
MONOCYTES # BLD: 1.8 K/UL (ref 0–1)
MONOCYTES NFR BLD: 10 % (ref 5–13)
NEUTS SEG # BLD: 12.8 K/UL (ref 1.8–8)
NEUTS SEG NFR BLD: 74 % (ref 32–75)
NRBC # BLD: 0 K/UL (ref 0–0.01)
NRBC BLD-RTO: 0 PER 100 WBC
PLATELET # BLD AUTO: 305 K/UL (ref 150–400)
PMV BLD AUTO: 11.8 FL (ref 8.9–12.9)
POTASSIUM SERPL-SCNC: 3.8 MMOL/L (ref 3.5–5.1)
PROCALCITONIN SERPL-MCNC: 3.6 NG/ML
RBC # BLD AUTO: 2.69 M/UL (ref 4.1–5.7)
SODIUM SERPL-SCNC: 132 MMOL/L (ref 136–145)
WBC # BLD AUTO: 17.4 K/UL (ref 4.1–11.1)

## 2023-05-17 PROCEDURE — 2500000003 HC RX 250 WO HCPCS: Performed by: PODIATRIST

## 2023-05-17 PROCEDURE — 80048 BASIC METABOLIC PNL TOTAL CA: CPT

## 2023-05-17 PROCEDURE — 86140 C-REACTIVE PROTEIN: CPT

## 2023-05-17 PROCEDURE — 2709999900 HC NON-CHARGEABLE SUPPLY: Performed by: PODIATRIST

## 2023-05-17 PROCEDURE — 0Y6M0ZD DETACHMENT AT RIGHT FOOT, PARTIAL 4TH RAY, OPEN APPROACH: ICD-10-PCS | Performed by: PODIATRIST

## 2023-05-17 PROCEDURE — 6360000002 HC RX W HCPCS: Performed by: INTERNAL MEDICINE

## 2023-05-17 PROCEDURE — 6360000002 HC RX W HCPCS: Performed by: PHYSICIAN ASSISTANT

## 2023-05-17 PROCEDURE — 3600000012 HC SURGERY LEVEL 2 ADDTL 15MIN: Performed by: PODIATRIST

## 2023-05-17 PROCEDURE — 7100000001 HC PACU RECOVERY - ADDTL 15 MIN: Performed by: PODIATRIST

## 2023-05-17 PROCEDURE — 0Y6M0Z9 DETACHMENT AT RIGHT FOOT, PARTIAL 1ST RAY, OPEN APPROACH: ICD-10-PCS | Performed by: PODIATRIST

## 2023-05-17 PROCEDURE — 2580000003 HC RX 258: Performed by: SURGERY

## 2023-05-17 PROCEDURE — 99232 SBSQ HOSP IP/OBS MODERATE 35: CPT | Performed by: INTERNAL MEDICINE

## 2023-05-17 PROCEDURE — 0Y6M0ZC DETACHMENT AT RIGHT FOOT, PARTIAL 3RD RAY, OPEN APPROACH: ICD-10-PCS | Performed by: PODIATRIST

## 2023-05-17 PROCEDURE — 3600000002 HC SURGERY LEVEL 2 BASE: Performed by: PODIATRIST

## 2023-05-17 PROCEDURE — 87102 FUNGUS ISOLATION CULTURE: CPT

## 2023-05-17 PROCEDURE — 87077 CULTURE AEROBIC IDENTIFY: CPT

## 2023-05-17 PROCEDURE — 87186 SC STD MICRODIL/AGAR DIL: CPT

## 2023-05-17 PROCEDURE — 0Y6M0ZB DETACHMENT AT RIGHT FOOT, PARTIAL 2ND RAY, OPEN APPROACH: ICD-10-PCS | Performed by: PODIATRIST

## 2023-05-17 PROCEDURE — 87070 CULTURE OTHR SPECIMN AEROBIC: CPT

## 2023-05-17 PROCEDURE — 2580000003 HC RX 258: Performed by: INTERNAL MEDICINE

## 2023-05-17 PROCEDURE — 85025 COMPLETE CBC W/AUTO DIFF WBC: CPT

## 2023-05-17 PROCEDURE — 6360000002 HC RX W HCPCS: Performed by: NURSE ANESTHETIST, CERTIFIED REGISTERED

## 2023-05-17 PROCEDURE — 36415 COLL VENOUS BLD VENIPUNCTURE: CPT

## 2023-05-17 PROCEDURE — 3700000000 HC ANESTHESIA ATTENDED CARE: Performed by: PODIATRIST

## 2023-05-17 PROCEDURE — 3700000001 HC ADD 15 MINUTES (ANESTHESIA): Performed by: PODIATRIST

## 2023-05-17 PROCEDURE — 90935 HEMODIALYSIS ONE EVALUATION: CPT

## 2023-05-17 PROCEDURE — 6370000000 HC RX 637 (ALT 250 FOR IP): Performed by: PHYSICIAN ASSISTANT

## 2023-05-17 PROCEDURE — 87205 SMEAR GRAM STAIN: CPT

## 2023-05-17 PROCEDURE — 2709999900 HC NON-CHARGEABLE SUPPLY

## 2023-05-17 PROCEDURE — 0Y6M0ZF DETACHMENT AT RIGHT FOOT, PARTIAL 5TH RAY, OPEN APPROACH: ICD-10-PCS | Performed by: PODIATRIST

## 2023-05-17 PROCEDURE — 28805 AMPUTATION THRU METATARSAL: CPT | Performed by: PODIATRIST

## 2023-05-17 PROCEDURE — 7100000000 HC PACU RECOVERY - FIRST 15 MIN: Performed by: PODIATRIST

## 2023-05-17 PROCEDURE — 84145 PROCALCITONIN (PCT): CPT

## 2023-05-17 PROCEDURE — 2580000003 HC RX 258: Performed by: NURSE ANESTHETIST, CERTIFIED REGISTERED

## 2023-05-17 PROCEDURE — 1100000000 HC RM PRIVATE

## 2023-05-17 RX ORDER — SODIUM CHLORIDE 0.9 % (FLUSH) 0.9 %
5-40 SYRINGE (ML) INJECTION PRN
Status: CANCELLED | OUTPATIENT
Start: 2023-05-17

## 2023-05-17 RX ORDER — FENTANYL CITRATE 50 UG/ML
INJECTION, SOLUTION INTRAMUSCULAR; INTRAVENOUS PRN
Status: DISCONTINUED | OUTPATIENT
Start: 2023-05-17 | End: 2023-05-17 | Stop reason: SDUPTHER

## 2023-05-17 RX ORDER — DIPHENHYDRAMINE HYDROCHLORIDE 50 MG/ML
12.5 INJECTION INTRAMUSCULAR; INTRAVENOUS
Status: CANCELLED | OUTPATIENT
Start: 2023-05-17 | End: 2023-05-18

## 2023-05-17 RX ORDER — SODIUM CHLORIDE, SODIUM LACTATE, POTASSIUM CHLORIDE, CALCIUM CHLORIDE 600; 310; 30; 20 MG/100ML; MG/100ML; MG/100ML; MG/100ML
INJECTION, SOLUTION INTRAVENOUS CONTINUOUS
Status: CANCELLED | OUTPATIENT
Start: 2023-06-18

## 2023-05-17 RX ORDER — MIDAZOLAM HYDROCHLORIDE 1 MG/ML
INJECTION INTRAMUSCULAR; INTRAVENOUS PRN
Status: DISCONTINUED | OUTPATIENT
Start: 2023-05-17 | End: 2023-05-17 | Stop reason: SDUPTHER

## 2023-05-17 RX ORDER — ONDANSETRON 2 MG/ML
4 INJECTION INTRAMUSCULAR; INTRAVENOUS
Status: CANCELLED | OUTPATIENT
Start: 2023-05-17 | End: 2023-05-18

## 2023-05-17 RX ORDER — HYDROMORPHONE HYDROCHLORIDE 1 MG/ML
0.25 INJECTION, SOLUTION INTRAMUSCULAR; INTRAVENOUS; SUBCUTANEOUS EVERY 5 MIN PRN
Status: CANCELLED | OUTPATIENT
Start: 2023-05-17

## 2023-05-17 RX ORDER — LABETALOL HYDROCHLORIDE 5 MG/ML
10 INJECTION, SOLUTION INTRAVENOUS
Status: CANCELLED | OUTPATIENT
Start: 2023-05-17

## 2023-05-17 RX ORDER — BUPIVACAINE HYDROCHLORIDE 5 MG/ML
INJECTION, SOLUTION EPIDURAL; INTRACAUDAL PRN
Status: DISCONTINUED | OUTPATIENT
Start: 2023-05-17 | End: 2023-05-17 | Stop reason: ALTCHOICE

## 2023-05-17 RX ORDER — SODIUM CHLORIDE, SODIUM LACTATE, POTASSIUM CHLORIDE, CALCIUM CHLORIDE 600; 310; 30; 20 MG/100ML; MG/100ML; MG/100ML; MG/100ML
INJECTION, SOLUTION INTRAVENOUS CONTINUOUS PRN
Status: DISCONTINUED | OUTPATIENT
Start: 2023-05-17 | End: 2023-05-17 | Stop reason: SDUPTHER

## 2023-05-17 RX ORDER — FENTANYL CITRATE 50 UG/ML
25 INJECTION, SOLUTION INTRAMUSCULAR; INTRAVENOUS EVERY 5 MIN PRN
Status: CANCELLED | OUTPATIENT
Start: 2023-05-17

## 2023-05-17 RX ORDER — CLOPIDOGREL BISULFATE 75 MG/1
75 TABLET ORAL DAILY
Status: CANCELLED | OUTPATIENT
Start: 2023-05-17

## 2023-05-17 RX ORDER — HYDRALAZINE HYDROCHLORIDE 20 MG/ML
10 INJECTION INTRAMUSCULAR; INTRAVENOUS
Status: CANCELLED | OUTPATIENT
Start: 2023-05-17

## 2023-05-17 RX ORDER — CLOPIDOGREL BISULFATE 75 MG/1
75 TABLET ORAL DAILY
Status: DISCONTINUED | OUTPATIENT
Start: 2023-05-17 | End: 2023-05-24 | Stop reason: HOSPADM

## 2023-05-17 RX ORDER — IPRATROPIUM BROMIDE AND ALBUTEROL SULFATE 2.5; .5 MG/3ML; MG/3ML
1 SOLUTION RESPIRATORY (INHALATION)
Status: CANCELLED | OUTPATIENT
Start: 2023-05-17 | End: 2023-05-18

## 2023-05-17 RX ORDER — SODIUM CHLORIDE 9 MG/ML
INJECTION, SOLUTION INTRAVENOUS PRN
Status: CANCELLED | OUTPATIENT
Start: 2023-05-17

## 2023-05-17 RX ORDER — SODIUM CHLORIDE 0.9 % (FLUSH) 0.9 %
5-40 SYRINGE (ML) INJECTION EVERY 12 HOURS SCHEDULED
Status: CANCELLED | OUTPATIENT
Start: 2023-05-17

## 2023-05-17 RX ORDER — OXYCODONE HYDROCHLORIDE 5 MG/1
5 TABLET ORAL
Status: CANCELLED | OUTPATIENT
Start: 2023-05-17 | End: 2023-05-18

## 2023-05-17 RX ADMIN — PROPOFOL 10 MG: 10 INJECTION, EMULSION INTRAVENOUS at 14:29

## 2023-05-17 RX ADMIN — HYDROMORPHONE HYDROCHLORIDE 0.5 MG: 1 INJECTION, SOLUTION INTRAMUSCULAR; INTRAVENOUS; SUBCUTANEOUS at 10:46

## 2023-05-17 RX ADMIN — PREDNISOLONE ACETATE 1 DROP: 10 SUSPENSION/ DROPS OPHTHALMIC at 20:45

## 2023-05-17 RX ADMIN — SODIUM CHLORIDE, PRESERVATIVE FREE 10 ML: 5 INJECTION INTRAVENOUS at 20:48

## 2023-05-17 RX ADMIN — EPOETIN ALFA-EPBX 10000 UNITS: 10000 INJECTION, SOLUTION INTRAVENOUS; SUBCUTANEOUS at 12:34

## 2023-05-17 RX ADMIN — PROPOFOL 20 MG: 10 INJECTION, EMULSION INTRAVENOUS at 14:31

## 2023-05-17 RX ADMIN — FENTANYL CITRATE 50 MCG: 50 INJECTION, SOLUTION INTRAMUSCULAR; INTRAVENOUS at 14:22

## 2023-05-17 RX ADMIN — SODIUM CHLORIDE, POTASSIUM CHLORIDE, SODIUM LACTATE AND CALCIUM CHLORIDE: 600; 310; 30; 20 INJECTION, SOLUTION INTRAVENOUS at 14:14

## 2023-05-17 RX ADMIN — PROPOFOL 20 MG: 10 INJECTION, EMULSION INTRAVENOUS at 14:22

## 2023-05-17 RX ADMIN — MIDAZOLAM HYDROCHLORIDE 2 MG: 2 INJECTION, SOLUTION INTRAMUSCULAR; INTRAVENOUS at 14:14

## 2023-05-17 RX ADMIN — OXYCODONE 5 MG: 5 TABLET ORAL at 18:46

## 2023-05-17 ASSESSMENT — PAIN DESCRIPTION - DESCRIPTORS
DESCRIPTORS: ACHING

## 2023-05-17 ASSESSMENT — PAIN DESCRIPTION - ORIENTATION
ORIENTATION: RIGHT

## 2023-05-17 ASSESSMENT — PAIN DESCRIPTION - LOCATION
LOCATION: LEG
LOCATION: LEG
LOCATION: FOOT
LOCATION: LEG

## 2023-05-17 ASSESSMENT — PAIN SCALES - GENERAL
PAINLEVEL_OUTOF10: 6
PAINLEVEL_OUTOF10: 0
PAINLEVEL_OUTOF10: 5
PAINLEVEL_OUTOF10: 9
PAINLEVEL_OUTOF10: 5
PAINLEVEL_OUTOF10: 0

## 2023-05-17 ASSESSMENT — PAIN - FUNCTIONAL ASSESSMENT: PAIN_FUNCTIONAL_ASSESSMENT: NONE - DENIES PAIN

## 2023-05-17 NOTE — ANESTHESIA POSTPROCEDURE EVALUATION
Department of Anesthesiology  Postprocedure Note    Patient: Stefanie Chun  MRN: [id number]  YOB: 1950  Date of evaluation: 5/17/2023      Procedure Summary     Date: 05/17/23 Room / Location: [facility] MAIN OR 02 / [facility] MAIN OR    Anesthesia Start: 3187 Anesthesia Stop: 1448    Procedure: Open Transmetatarsal Amputation Right Foot (Right: Foot) Diagnosis:       Gangrene ([address]      (Gangrene ([address] Bessie Fitch)    Surgeons: Nan Ferro DPM Responsible Provider: Shari Gonzalez MD    Anesthesia Type: General ASA Status: 4          Anesthesia Type: General    Haleigh Phase I: Haleigh Score: 10    Haleigh Phase II:        Anesthesia Post Evaluation    Patient location during evaluation: PACU  Patient participation: complete - patient cannot participate  Level of consciousness: awake  Pain score: 0  Airway patency: patent  Nausea & Vomiting: no nausea and no vomiting  Complications: no  Cardiovascular status: hemodynamically stable  Respiratory status: acceptable  Hydration status: stable  Multimodal analgesia pain management approach

## 2023-05-17 NOTE — ANESTHESIA PRE PROCEDURE
BMI.    CBC:   Lab Results   Component Value Date/Time    WBC 17.4 05/17/2023 06:07 AM    RBC 2.69 05/17/2023 06:07 AM    HGB 8.3 05/17/2023 06:07 AM    HCT 26.1 05/17/2023 06:07 AM    MCV 97.0 05/17/2023 06:07 AM    RDW 15.6 05/17/2023 06:07 AM     05/17/2023 06:07 AM       CMP:   Lab Results   Component Value Date/Time     05/17/2023 06:07 AM    K 3.8 05/17/2023 06:07 AM    CL 98 05/17/2023 06:07 AM    CO2 30 05/17/2023 06:07 AM    BUN 29 05/17/2023 06:07 AM    CREATININE 7.83 05/17/2023 06:07 AM    GFRAA 7 12/13/2021 11:15 AM    AGRATIO 0.5 12/08/2021 02:12 PM    LABGLOM 7 05/17/2023 06:07 AM    GLUCOSE 127 05/17/2023 06:07 AM    PROT 9.2 05/08/2023 11:05 PM    CALCIUM 8.1 05/17/2023 06:07 AM    BILITOT 0.5 05/08/2023 11:05 PM    ALKPHOS 98 05/08/2023 11:05 PM    ALKPHOS 67 12/08/2021 02:12 PM    AST 10 05/08/2023 11:05 PM    ALT 8 05/08/2023 11:05 PM       POC Tests:   No results for input(s): POCGLU, POCNA, POCK, POCCL, POCBUN, POCHEMO, POCHCT in the last 72 hours.     Coags: No results found for: PROTIME, INR, APTT    HCG (If Applicable): No results found for: PREGTESTUR, PREGSERUM, HCG, HCGQUANT     ABGs: No results found for: PHART, PO2ART, MXG3LCG, GFP8TMS, BEART, R6PGLKGM     Type & Screen (If Applicable):  No results found for: LABABO, LABRH    Drug/Infectious Status (If Applicable):  No results found for: HIV, HEPCAB    COVID-19 Screening (If Applicable):   Lab Results   Component Value Date/Time    COVID19 Not Detected 12/09/2021 08:05 AM    COVID19 Please find results under separate order 12/08/2021 02:00 PM    COVID19 Not detected 12/08/2021 02:00 PM           Anesthesia Evaluation  Patient summary reviewed and Nursing notes reviewed no history of anesthetic complications:   Airway: Mallampati: I  TM distance: >3 FB   Neck ROM: full  Mouth opening: > = 3 FB   Dental: normal exam         Pulmonary: breath sounds clear to auscultation  (+) pneumonia:

## 2023-05-18 PROBLEM — I70.201 FEMORAL ARTERY STENOSIS, RIGHT (HCC): Status: ACTIVE | Noted: 2023-05-18

## 2023-05-18 PROBLEM — I96 GANGRENE (HCC): Status: ACTIVE | Noted: 2023-05-18

## 2023-05-18 PROBLEM — I70.201 TIBIAL ARTERY OCCLUSION, RIGHT (HCC): Status: ACTIVE | Noted: 2023-05-18

## 2023-05-18 LAB
ANION GAP SERPL CALC-SCNC: 6 MMOL/L (ref 5–15)
BASOPHILS # BLD: 0.1 K/UL (ref 0–0.1)
BASOPHILS NFR BLD: 1 % (ref 0–1)
BUN SERPL-MCNC: 17 MG/DL (ref 6–20)
BUN/CREAT SERPL: 3 (ref 12–20)
CA-I BLD-MCNC: 7.8 MG/DL (ref 8.5–10.1)
CHLORIDE SERPL-SCNC: 96 MMOL/L (ref 97–108)
CO2 SERPL-SCNC: 29 MMOL/L (ref 21–32)
CREAT SERPL-MCNC: 5.76 MG/DL (ref 0.7–1.3)
CRP SERPL-MCNC: 16.3 MG/DL (ref 0–0.6)
DIFFERENTIAL METHOD BLD: ABNORMAL
ECHO BSA: 2.01 M2
EOSINOPHIL # BLD: 0.5 K/UL (ref 0–0.4)
EOSINOPHIL NFR BLD: 3 % (ref 0–7)
ERYTHROCYTE [DISTWIDTH] IN BLOOD BY AUTOMATED COUNT: 15.4 % (ref 11.5–14.5)
GLUCOSE SERPL-MCNC: 107 MG/DL (ref 65–100)
HCT VFR BLD AUTO: 27.2 % (ref 36.6–50.3)
HGB BLD-MCNC: 8.4 G/DL (ref 12.1–17)
IMM GRANULOCYTES # BLD AUTO: 0.1 K/UL (ref 0–0.04)
IMM GRANULOCYTES NFR BLD AUTO: 1 % (ref 0–0.5)
LYMPHOCYTES # BLD: 2.4 K/UL (ref 0.8–3.5)
LYMPHOCYTES NFR BLD: 14 % (ref 12–49)
MCH RBC QN AUTO: 29.8 PG (ref 26–34)
MCHC RBC AUTO-ENTMCNC: 30.9 G/DL (ref 30–36.5)
MCV RBC AUTO: 96.5 FL (ref 80–99)
MONOCYTES # BLD: 2.1 K/UL (ref 0–1)
MONOCYTES NFR BLD: 12 % (ref 5–13)
NEUTS SEG # BLD: 12 K/UL (ref 1.8–8)
NEUTS SEG NFR BLD: 69 % (ref 32–75)
NRBC # BLD: 0.03 K/UL (ref 0–0.01)
NRBC BLD-RTO: 0.2 PER 100 WBC
PLATELET # BLD AUTO: 314 K/UL (ref 150–400)
PMV BLD AUTO: 11.8 FL (ref 8.9–12.9)
POTASSIUM SERPL-SCNC: 4.1 MMOL/L (ref 3.5–5.1)
PROCALCITONIN SERPL-MCNC: 3.14 NG/ML
RBC # BLD AUTO: 2.82 M/UL (ref 4.1–5.7)
SODIUM SERPL-SCNC: 131 MMOL/L (ref 136–145)
WBC # BLD AUTO: 17.2 K/UL (ref 4.1–11.1)

## 2023-05-18 PROCEDURE — 2580000003 HC RX 258: Performed by: INTERNAL MEDICINE

## 2023-05-18 PROCEDURE — 6370000000 HC RX 637 (ALT 250 FOR IP): Performed by: SURGERY

## 2023-05-18 PROCEDURE — 85025 COMPLETE CBC W/AUTO DIFF WBC: CPT

## 2023-05-18 PROCEDURE — 6370000000 HC RX 637 (ALT 250 FOR IP): Performed by: INTERNAL MEDICINE

## 2023-05-18 PROCEDURE — 2580000003 HC RX 258: Performed by: SURGERY

## 2023-05-18 PROCEDURE — 84145 PROCALCITONIN (PCT): CPT

## 2023-05-18 PROCEDURE — 86140 C-REACTIVE PROTEIN: CPT

## 2023-05-18 PROCEDURE — 36415 COLL VENOUS BLD VENIPUNCTURE: CPT

## 2023-05-18 PROCEDURE — 6360000002 HC RX W HCPCS: Performed by: INTERNAL MEDICINE

## 2023-05-18 PROCEDURE — 6370000000 HC RX 637 (ALT 250 FOR IP): Performed by: STUDENT IN AN ORGANIZED HEALTH CARE EDUCATION/TRAINING PROGRAM

## 2023-05-18 PROCEDURE — 99024 POSTOP FOLLOW-UP VISIT: CPT | Performed by: PODIATRIST

## 2023-05-18 PROCEDURE — 97605 NEG PRS WND THER DME<=50SQCM: CPT | Performed by: PODIATRIST

## 2023-05-18 PROCEDURE — 1100000000 HC RM PRIVATE

## 2023-05-18 PROCEDURE — 80048 BASIC METABOLIC PNL TOTAL CA: CPT

## 2023-05-18 RX ORDER — MIDODRINE HYDROCHLORIDE 5 MG/1
10 TABLET ORAL 3 TIMES DAILY PRN
Status: DISCONTINUED | OUTPATIENT
Start: 2023-05-18 | End: 2023-05-24 | Stop reason: HOSPADM

## 2023-05-18 RX ORDER — MIDODRINE HYDROCHLORIDE 5 MG/1
5 TABLET ORAL
Status: DISCONTINUED | OUTPATIENT
Start: 2023-05-18 | End: 2023-05-24 | Stop reason: HOSPADM

## 2023-05-18 RX ADMIN — MIDODRINE HYDROCHLORIDE 10 MG: 5 TABLET ORAL at 10:42

## 2023-05-18 RX ADMIN — MEROPENEM 500 MG: 500 INJECTION INTRAVENOUS at 18:19

## 2023-05-18 RX ADMIN — SODIUM CHLORIDE, PRESERVATIVE FREE 10 ML: 5 INJECTION INTRAVENOUS at 20:45

## 2023-05-18 RX ADMIN — CLOPIDOGREL BISULFATE 75 MG: 75 TABLET ORAL at 08:03

## 2023-05-18 RX ADMIN — ACETAMINOPHEN 650 MG: 325 TABLET ORAL at 17:08

## 2023-05-18 RX ADMIN — PREDNISOLONE ACETATE 1 DROP: 10 SUSPENSION/ DROPS OPHTHALMIC at 08:12

## 2023-05-18 RX ADMIN — ATORVASTATIN CALCIUM 40 MG: 40 TABLET, FILM COATED ORAL at 08:03

## 2023-05-18 RX ADMIN — ACETAMINOPHEN 650 MG: 325 TABLET ORAL at 05:59

## 2023-05-18 RX ADMIN — PREDNISOLONE ACETATE 1 DROP: 10 SUSPENSION/ DROPS OPHTHALMIC at 20:40

## 2023-05-18 RX ADMIN — SODIUM CHLORIDE, PRESERVATIVE FREE 10 ML: 5 INJECTION INTRAVENOUS at 08:12

## 2023-05-18 RX ADMIN — MEROPENEM 1000 MG: 1 INJECTION, POWDER, FOR SOLUTION INTRAVENOUS at 10:44

## 2023-05-18 RX ADMIN — MIDODRINE HYDROCHLORIDE 5 MG: 5 TABLET ORAL at 20:40

## 2023-05-18 RX ADMIN — SODIUM CHLORIDE, PRESERVATIVE FREE 10 ML: 5 INJECTION INTRAVENOUS at 20:44

## 2023-05-19 LAB
ANION GAP SERPL CALC-SCNC: 8 MMOL/L (ref 5–15)
BASOPHILS # BLD: 0 K/UL (ref 0–0.1)
BASOPHILS NFR BLD: 0 % (ref 0–1)
BUN SERPL-MCNC: 25 MG/DL (ref 6–20)
BUN/CREAT SERPL: 3 (ref 12–20)
CA-I BLD-MCNC: 7.6 MG/DL (ref 8.5–10.1)
CHLORIDE SERPL-SCNC: 94 MMOL/L (ref 97–108)
CO2 SERPL-SCNC: 26 MMOL/L (ref 21–32)
CREAT SERPL-MCNC: 7.33 MG/DL (ref 0.7–1.3)
CRP SERPL-MCNC: 17.1 MG/DL (ref 0–0.6)
DIFFERENTIAL METHOD BLD: ABNORMAL
EOSINOPHIL # BLD: 0.6 K/UL (ref 0–0.4)
EOSINOPHIL NFR BLD: 3 % (ref 0–7)
ERYTHROCYTE [DISTWIDTH] IN BLOOD BY AUTOMATED COUNT: 15.3 % (ref 11.5–14.5)
GLUCOSE SERPL-MCNC: 136 MG/DL (ref 65–100)
HCT VFR BLD AUTO: 28.1 % (ref 36.6–50.3)
HGB BLD-MCNC: 9 G/DL (ref 12.1–17)
IMM GRANULOCYTES # BLD AUTO: 0.2 K/UL (ref 0–0.04)
IMM GRANULOCYTES NFR BLD AUTO: 1 % (ref 0–0.5)
LYMPHOCYTES # BLD: 1.9 K/UL (ref 0.8–3.5)
LYMPHOCYTES NFR BLD: 10 % (ref 12–49)
MCH RBC QN AUTO: 31 PG (ref 26–34)
MCHC RBC AUTO-ENTMCNC: 32 G/DL (ref 30–36.5)
MCV RBC AUTO: 96.9 FL (ref 80–99)
MONOCYTES # BLD: 2 K/UL (ref 0–1)
MONOCYTES NFR BLD: 11 % (ref 5–13)
NEUTS SEG # BLD: 13.9 K/UL (ref 1.8–8)
NEUTS SEG NFR BLD: 75 % (ref 32–75)
NRBC # BLD: 0 K/UL (ref 0–0.01)
NRBC BLD-RTO: 0 PER 100 WBC
PLATELET # BLD AUTO: 306 K/UL (ref 150–400)
PLATELET COMMENT: ABNORMAL
PMV BLD AUTO: 11.7 FL (ref 8.9–12.9)
POTASSIUM SERPL-SCNC: 4 MMOL/L (ref 3.5–5.1)
PROCALCITONIN SERPL-MCNC: 4.15 NG/ML
RBC # BLD AUTO: 2.9 M/UL (ref 4.1–5.7)
RBC MORPH BLD: ABNORMAL
RBC MORPH BLD: ABNORMAL
SODIUM SERPL-SCNC: 128 MMOL/L (ref 136–145)
WBC # BLD AUTO: 18.6 K/UL (ref 4.1–11.1)

## 2023-05-19 PROCEDURE — 6370000000 HC RX 637 (ALT 250 FOR IP): Performed by: SURGERY

## 2023-05-19 PROCEDURE — 80048 BASIC METABOLIC PNL TOTAL CA: CPT

## 2023-05-19 PROCEDURE — 99024 POSTOP FOLLOW-UP VISIT: CPT | Performed by: PODIATRIST

## 2023-05-19 PROCEDURE — 86140 C-REACTIVE PROTEIN: CPT

## 2023-05-19 PROCEDURE — 1100000000 HC RM PRIVATE

## 2023-05-19 PROCEDURE — 90935 HEMODIALYSIS ONE EVALUATION: CPT

## 2023-05-19 PROCEDURE — 6370000000 HC RX 637 (ALT 250 FOR IP): Performed by: STUDENT IN AN ORGANIZED HEALTH CARE EDUCATION/TRAINING PROGRAM

## 2023-05-19 PROCEDURE — 99232 SBSQ HOSP IP/OBS MODERATE 35: CPT | Performed by: INTERNAL MEDICINE

## 2023-05-19 PROCEDURE — 6370000000 HC RX 637 (ALT 250 FOR IP): Performed by: INTERNAL MEDICINE

## 2023-05-19 PROCEDURE — 2580000003 HC RX 258: Performed by: INTERNAL MEDICINE

## 2023-05-19 PROCEDURE — 36415 COLL VENOUS BLD VENIPUNCTURE: CPT

## 2023-05-19 PROCEDURE — 2580000003 HC RX 258: Performed by: SURGERY

## 2023-05-19 PROCEDURE — 6360000002 HC RX W HCPCS: Performed by: INTERNAL MEDICINE

## 2023-05-19 PROCEDURE — 2709999900 HC NON-CHARGEABLE SUPPLY

## 2023-05-19 PROCEDURE — 6370000000 HC RX 637 (ALT 250 FOR IP): Performed by: PHYSICIAN ASSISTANT

## 2023-05-19 PROCEDURE — 85025 COMPLETE CBC W/AUTO DIFF WBC: CPT

## 2023-05-19 PROCEDURE — 84145 PROCALCITONIN (PCT): CPT

## 2023-05-19 RX ORDER — ALBUMIN (HUMAN) 12.5 G/50ML
25 SOLUTION INTRAVENOUS ONCE
OUTPATIENT
Start: 2023-05-19 | End: 2023-05-19

## 2023-05-19 RX ORDER — MIDODRINE HYDROCHLORIDE 5 MG/1
5 TABLET ORAL
Qty: 90 TABLET | Refills: 3 | Status: SHIPPED | OUTPATIENT
Start: 2023-05-19

## 2023-05-19 RX ORDER — CLOPIDOGREL BISULFATE 75 MG/1
75 TABLET ORAL DAILY
Qty: 30 TABLET | Refills: 3 | Status: SHIPPED | OUTPATIENT
Start: 2023-05-20

## 2023-05-19 RX ORDER — TRAMADOL HYDROCHLORIDE 50 MG/1
50 TABLET ORAL EVERY 6 HOURS PRN
Qty: 12 TABLET | Refills: 0 | Status: SHIPPED | OUTPATIENT
Start: 2023-05-19 | End: 2023-05-22

## 2023-05-19 RX ORDER — MIDODRINE HYDROCHLORIDE 5 MG/1
10 TABLET ORAL
Status: DISCONTINUED | OUTPATIENT
Start: 2023-05-19 | End: 2023-05-19

## 2023-05-19 RX ADMIN — OXYCODONE 5 MG: 5 TABLET ORAL at 10:14

## 2023-05-19 RX ADMIN — CLOPIDOGREL BISULFATE 75 MG: 75 TABLET ORAL at 10:04

## 2023-05-19 RX ADMIN — EPOETIN ALFA-EPBX 10000 UNITS: 10000 INJECTION, SOLUTION INTRAVENOUS; SUBCUTANEOUS at 17:48

## 2023-05-19 RX ADMIN — TOBRAMYCIN 150 MG: 40 INJECTION INTRAMUSCULAR; INTRAVENOUS at 12:22

## 2023-05-19 RX ADMIN — SODIUM CHLORIDE, PRESERVATIVE FREE 10 ML: 5 INJECTION INTRAVENOUS at 10:08

## 2023-05-19 RX ADMIN — MIDODRINE HYDROCHLORIDE 5 MG: 5 TABLET ORAL at 08:00

## 2023-05-19 RX ADMIN — SODIUM CHLORIDE, PRESERVATIVE FREE 10 ML: 5 INJECTION INTRAVENOUS at 22:55

## 2023-05-19 RX ADMIN — PREDNISOLONE ACETATE 1 DROP: 10 SUSPENSION/ DROPS OPHTHALMIC at 22:57

## 2023-05-19 RX ADMIN — SODIUM CHLORIDE, PRESERVATIVE FREE 10 ML: 5 INJECTION INTRAVENOUS at 10:09

## 2023-05-19 RX ADMIN — PREDNISOLONE ACETATE 1 DROP: 10 SUSPENSION/ DROPS OPHTHALMIC at 10:08

## 2023-05-19 RX ADMIN — ATORVASTATIN CALCIUM 40 MG: 40 TABLET, FILM COATED ORAL at 10:04

## 2023-05-19 RX ADMIN — MIDODRINE HYDROCHLORIDE 5 MG: 5 TABLET ORAL at 12:22

## 2023-05-19 RX ADMIN — OXYCODONE 5 MG: 5 TABLET ORAL at 17:36

## 2023-05-19 RX ADMIN — MIDODRINE HYDROCHLORIDE 10 MG: 5 TABLET ORAL at 22:55

## 2023-05-19 ASSESSMENT — PAIN SCALES - GENERAL
PAINLEVEL_OUTOF10: 6
PAINLEVEL_OUTOF10: 0
PAINLEVEL_OUTOF10: 0
PAINLEVEL_OUTOF10: 6
PAINLEVEL_OUTOF10: 2
PAINLEVEL_OUTOF10: 0

## 2023-05-19 ASSESSMENT — PAIN DESCRIPTION - LOCATION
LOCATION: LEG
LOCATION: FOOT

## 2023-05-19 ASSESSMENT — PAIN DESCRIPTION - ORIENTATION
ORIENTATION: LOWER;RIGHT
ORIENTATION: LOWER;RIGHT

## 2023-05-19 ASSESSMENT — PAIN SCALES - WONG BAKER
WONGBAKER_NUMERICALRESPONSE: 0
WONGBAKER_NUMERICALRESPONSE: 2

## 2023-05-19 ASSESSMENT — PAIN DESCRIPTION - DESCRIPTORS
DESCRIPTORS: ACHING
DESCRIPTORS: ACHING

## 2023-05-20 PROCEDURE — 2580000003 HC RX 258: Performed by: INTERNAL MEDICINE

## 2023-05-20 PROCEDURE — 6370000000 HC RX 637 (ALT 250 FOR IP): Performed by: PHYSICIAN ASSISTANT

## 2023-05-20 PROCEDURE — 6370000000 HC RX 637 (ALT 250 FOR IP): Performed by: SURGERY

## 2023-05-20 PROCEDURE — 6360000002 HC RX W HCPCS: Performed by: INTERNAL MEDICINE

## 2023-05-20 PROCEDURE — 1100000000 HC RM PRIVATE

## 2023-05-20 PROCEDURE — 6370000000 HC RX 637 (ALT 250 FOR IP): Performed by: STUDENT IN AN ORGANIZED HEALTH CARE EDUCATION/TRAINING PROGRAM

## 2023-05-20 PROCEDURE — 6370000000 HC RX 637 (ALT 250 FOR IP): Performed by: INTERNAL MEDICINE

## 2023-05-20 PROCEDURE — 6360000002 HC RX W HCPCS: Performed by: PHYSICIAN ASSISTANT

## 2023-05-20 RX ADMIN — ATORVASTATIN CALCIUM 40 MG: 40 TABLET, FILM COATED ORAL at 09:04

## 2023-05-20 RX ADMIN — MIDODRINE HYDROCHLORIDE 10 MG: 5 TABLET ORAL at 18:32

## 2023-05-20 RX ADMIN — MIDODRINE HYDROCHLORIDE 5 MG: 5 TABLET ORAL at 14:43

## 2023-05-20 RX ADMIN — OXYCODONE 5 MG: 5 TABLET ORAL at 09:04

## 2023-05-20 RX ADMIN — PREDNISOLONE ACETATE 1 DROP: 10 SUSPENSION/ DROPS OPHTHALMIC at 21:58

## 2023-05-20 RX ADMIN — MEROPENEM 500 MG: 500 INJECTION INTRAVENOUS at 18:34

## 2023-05-20 RX ADMIN — SODIUM CHLORIDE, PRESERVATIVE FREE 10 ML: 5 INJECTION INTRAVENOUS at 21:57

## 2023-05-20 RX ADMIN — PREDNISOLONE ACETATE 1 DROP: 10 SUSPENSION/ DROPS OPHTHALMIC at 09:07

## 2023-05-20 RX ADMIN — OXYCODONE 5 MG: 5 TABLET ORAL at 21:57

## 2023-05-20 RX ADMIN — MIDODRINE HYDROCHLORIDE 5 MG: 5 TABLET ORAL at 09:04

## 2023-05-20 RX ADMIN — CLOPIDOGREL BISULFATE 75 MG: 75 TABLET ORAL at 09:04

## 2023-05-20 RX ADMIN — SODIUM CHLORIDE, PRESERVATIVE FREE 10 ML: 5 INJECTION INTRAVENOUS at 09:06

## 2023-05-20 RX ADMIN — HYDROMORPHONE HYDROCHLORIDE 0.5 MG: 1 INJECTION, SOLUTION INTRAMUSCULAR; INTRAVENOUS; SUBCUTANEOUS at 14:48

## 2023-05-20 RX ADMIN — CARVEDILOL 3.12 MG: 3.12 TABLET, FILM COATED ORAL at 18:31

## 2023-05-20 ASSESSMENT — PAIN DESCRIPTION - LOCATION
LOCATION: FOOT
LOCATION: LEG
LOCATION: FOOT
LOCATION: OTHER (COMMENT)

## 2023-05-20 ASSESSMENT — PAIN DESCRIPTION - ORIENTATION
ORIENTATION: RIGHT

## 2023-05-20 ASSESSMENT — PAIN SCALES - GENERAL
PAINLEVEL_OUTOF10: 7
PAINLEVEL_OUTOF10: 8
PAINLEVEL_OUTOF10: 5
PAINLEVEL_OUTOF10: 7

## 2023-05-20 ASSESSMENT — PAIN - FUNCTIONAL ASSESSMENT: PAIN_FUNCTIONAL_ASSESSMENT: ACTIVITIES ARE NOT PREVENTED

## 2023-05-20 ASSESSMENT — PAIN DESCRIPTION - DESCRIPTORS
DESCRIPTORS: ACHING

## 2023-05-21 LAB
BACTERIA SPEC CULT: ABNORMAL
BACTERIA SPEC CULT: ABNORMAL
GRAM STN SPEC: ABNORMAL
GRAM STN SPEC: ABNORMAL
Lab: ABNORMAL

## 2023-05-21 PROCEDURE — 1100000000 HC RM PRIVATE

## 2023-05-21 PROCEDURE — 2580000003 HC RX 258: Performed by: INTERNAL MEDICINE

## 2023-05-21 PROCEDURE — 6360000002 HC RX W HCPCS: Performed by: INTERNAL MEDICINE

## 2023-05-21 PROCEDURE — 6370000000 HC RX 637 (ALT 250 FOR IP): Performed by: PHYSICIAN ASSISTANT

## 2023-05-21 PROCEDURE — 6370000000 HC RX 637 (ALT 250 FOR IP): Performed by: INTERNAL MEDICINE

## 2023-05-21 PROCEDURE — 6370000000 HC RX 637 (ALT 250 FOR IP): Performed by: SURGERY

## 2023-05-21 RX ADMIN — CARVEDILOL 3.12 MG: 3.12 TABLET, FILM COATED ORAL at 08:20

## 2023-05-21 RX ADMIN — MIDODRINE HYDROCHLORIDE 5 MG: 5 TABLET ORAL at 08:20

## 2023-05-21 RX ADMIN — MEROPENEM 500 MG: 500 INJECTION INTRAVENOUS at 17:00

## 2023-05-21 RX ADMIN — TRAMADOL HYDROCHLORIDE 50 MG: 50 TABLET, COATED ORAL at 08:19

## 2023-05-21 RX ADMIN — MIDODRINE HYDROCHLORIDE 5 MG: 5 TABLET ORAL at 17:01

## 2023-05-21 RX ADMIN — CLOPIDOGREL BISULFATE 75 MG: 75 TABLET ORAL at 08:20

## 2023-05-21 RX ADMIN — CARVEDILOL 3.12 MG: 3.12 TABLET, FILM COATED ORAL at 17:01

## 2023-05-21 RX ADMIN — ATORVASTATIN CALCIUM 40 MG: 40 TABLET, FILM COATED ORAL at 08:19

## 2023-05-21 RX ADMIN — MIDODRINE HYDROCHLORIDE 5 MG: 5 TABLET ORAL at 13:04

## 2023-05-21 RX ADMIN — PREDNISOLONE ACETATE 1 DROP: 10 SUSPENSION/ DROPS OPHTHALMIC at 10:09

## 2023-05-21 RX ADMIN — OXYCODONE 5 MG: 5 TABLET ORAL at 17:00

## 2023-05-21 RX ADMIN — PREDNISOLONE ACETATE 1 DROP: 10 SUSPENSION/ DROPS OPHTHALMIC at 21:04

## 2023-05-21 ASSESSMENT — PAIN DESCRIPTION - LOCATION
LOCATION: FOOT
LOCATION: FOOT

## 2023-05-21 ASSESSMENT — PAIN SCALES - GENERAL
PAINLEVEL_OUTOF10: 6
PAINLEVEL_OUTOF10: 8
PAINLEVEL_OUTOF10: 3

## 2023-05-21 ASSESSMENT — PAIN DESCRIPTION - ORIENTATION: ORIENTATION: RIGHT

## 2023-05-22 LAB
BACTERIA SPEC CULT: NORMAL
BACTERIA SPEC CULT: NORMAL
Lab: NORMAL
Lab: NORMAL
TOBRAMYCIN SERPL-MCNC: 1.3 UG/ML

## 2023-05-22 PROCEDURE — 1100000000 HC RM PRIVATE

## 2023-05-22 PROCEDURE — 2580000003 HC RX 258: Performed by: INTERNAL MEDICINE

## 2023-05-22 PROCEDURE — 6360000002 HC RX W HCPCS: Performed by: INTERNAL MEDICINE

## 2023-05-22 PROCEDURE — 2709999900 HC NON-CHARGEABLE SUPPLY

## 2023-05-22 PROCEDURE — 99232 SBSQ HOSP IP/OBS MODERATE 35: CPT | Performed by: INTERNAL MEDICINE

## 2023-05-22 PROCEDURE — 2580000003 HC RX 258: Performed by: SURGERY

## 2023-05-22 PROCEDURE — 36415 COLL VENOUS BLD VENIPUNCTURE: CPT

## 2023-05-22 PROCEDURE — 80200 ASSAY OF TOBRAMYCIN: CPT

## 2023-05-22 PROCEDURE — 6370000000 HC RX 637 (ALT 250 FOR IP): Performed by: SURGERY

## 2023-05-22 PROCEDURE — 6370000000 HC RX 637 (ALT 250 FOR IP): Performed by: INTERNAL MEDICINE

## 2023-05-22 PROCEDURE — 90935 HEMODIALYSIS ONE EVALUATION: CPT

## 2023-05-22 RX ADMIN — MEROPENEM 500 MG: 500 INJECTION INTRAVENOUS at 18:17

## 2023-05-22 RX ADMIN — CARVEDILOL 3.12 MG: 3.12 TABLET, FILM COATED ORAL at 11:45

## 2023-05-22 RX ADMIN — MIDODRINE HYDROCHLORIDE 5 MG: 5 TABLET ORAL at 11:46

## 2023-05-22 RX ADMIN — TOBRAMYCIN SULFATE 80 MG: 40 INJECTION, SOLUTION INTRAMUSCULAR; INTRAVENOUS at 17:22

## 2023-05-22 RX ADMIN — EPOETIN ALFA-EPBX 10000 UNITS: 10000 INJECTION, SOLUTION INTRAVENOUS; SUBCUTANEOUS at 09:36

## 2023-05-22 RX ADMIN — CARVEDILOL 3.12 MG: 3.12 TABLET, FILM COATED ORAL at 17:22

## 2023-05-22 RX ADMIN — CLOPIDOGREL BISULFATE 75 MG: 75 TABLET ORAL at 11:45

## 2023-05-22 RX ADMIN — MIDODRINE HYDROCHLORIDE 5 MG: 5 TABLET ORAL at 17:23

## 2023-05-22 RX ADMIN — SODIUM CHLORIDE, PRESERVATIVE FREE 10 ML: 5 INJECTION INTRAVENOUS at 21:59

## 2023-05-22 RX ADMIN — PREDNISOLONE ACETATE 1 DROP: 10 SUSPENSION/ DROPS OPHTHALMIC at 11:53

## 2023-05-22 RX ADMIN — SODIUM CHLORIDE, PRESERVATIVE FREE 10 ML: 5 INJECTION INTRAVENOUS at 21:56

## 2023-05-22 RX ADMIN — SODIUM CHLORIDE, PRESERVATIVE FREE 10 ML: 5 INJECTION INTRAVENOUS at 11:48

## 2023-05-22 RX ADMIN — PREDNISOLONE ACETATE 1 DROP: 10 SUSPENSION/ DROPS OPHTHALMIC at 21:56

## 2023-05-22 RX ADMIN — ATORVASTATIN CALCIUM 40 MG: 40 TABLET, FILM COATED ORAL at 11:45

## 2023-05-22 NOTE — DISCHARGE SUMMARY
Hospitalist Discharge Summary     Patient ID:    Linda Harris  [id number]  71 y.o.  1950    Admit date: 5/8/2023    Discharge date : 5/22/2023    Final Diagnoses:     ICD-10-CM    1. Toe gangrene ([facility])  I96 traMADol (ULTRAM) 50 MG tablet      2. Septicemia ([address]  A41.9       3. Cellulitis of right lower extremity  L03.115       4. Foot infection  L08.9 Vascular lower arterial complete physiologic Doppler at rest     Vascular lower arterial complete physiologic Doppler at rest     traMADol (ULTRAM) 50 MG tablet      5. Diabetic foot ulcer with osteomyelitis [facility])  E11.621 Surgical Pathology    E11.69 Surgical Pathology    L97.509 Culture, Fungus    M86.9 Culture, Fungus     Culture, Wound Aerobic Only     Culture, Wound Aerobic Only      6. PVD (peripheral vascular disease) ([facility])  I73.9 Invasive vascular procedure     Invasive vascular procedure      7. Gangrene ([address]  I96 Culture, Fungus     Culture, Fungus     Culture, Wound Aerobic Only     Culture, Wound Aerobic Only            Hospital Course:   Damien Melgar is a 71 y.o.  male who is being seen for right third toe gangrene. Patient has a PMHx of CHF, ESRD, hypertension. Patient was going to undergo a possible revascularization tomorrow by Dr. Celestino [facility] in [facility]. Patient decided to come to the emergency department yesterday due to generalized fatigue and worsening of the right foot pain. Was admitted for management of right third toe gangrene. Nephrology consulted for hemodialysis. Infectious disease consulted. Patient started on vancomycin and meropenem. Taken to the OR by podiatry on 5/11/2023 for right third toe amputation. Right BRENDA is 0.57 left BRENDA is 0.53. Wound culture positive for Morganella morganii and Citrobacter frundii. Patient on IV vancomycin and meropenem.   On 05/16 had successful PTA angioplasty of the distal superficial femoral artery and revascularization of the posterior tibial

## 2023-05-22 NOTE — CARE COORDINATION
CM reviewed chart. Patient with discharge orders home with home health. Accepted for home health with [facility]. Waiting for wound Vac. Wound vac ordered from [facility] Friday, still has not been delivered. Called and left message for kenny Murillo rep. Waiting for call back. 1415: spoke with Marlene from [facility]. Was informed that they where waiting on the accompanying documents, face sheet, Operative report, H&P. Explained I was under the impression it was all sent on Friday 05/19/2023. They stated they only received order from. Also wanted to know about  delivery. Requested delivery to hospital. Awaiting call back to confirm. All necessary documents have been faxed via [employer] system to [phone].

## 2023-05-22 NOTE — DIALYSIS
Tx completed after 3.75 hours and 1 kg fluid removal. EPO 10K Given. No issues noted with tx.  Orders discussed with Dr Jese Louie and Jenniffer Victoria Primary nurse

## 2023-05-23 VITALS
SYSTOLIC BLOOD PRESSURE: 111 MMHG | TEMPERATURE: 97.9 F | OXYGEN SATURATION: 95 % | BODY MASS INDEX: 24.93 KG/M2 | HEIGHT: 70 IN | WEIGHT: 174.16 LBS | DIASTOLIC BLOOD PRESSURE: 58 MMHG | HEART RATE: 75 BPM | RESPIRATION RATE: 18 BRPM

## 2023-05-23 PROCEDURE — 6370000000 HC RX 637 (ALT 250 FOR IP): Performed by: SURGERY

## 2023-05-23 PROCEDURE — 28805 AMPUTATION THRU METATARSAL: CPT | Performed by: INTERNAL MEDICINE

## 2023-05-23 PROCEDURE — 6370000000 HC RX 637 (ALT 250 FOR IP): Performed by: INTERNAL MEDICINE

## 2023-05-23 PROCEDURE — 6370000000 HC RX 637 (ALT 250 FOR IP): Performed by: PHYSICIAN ASSISTANT

## 2023-05-23 PROCEDURE — 2580000003 HC RX 258: Performed by: INTERNAL MEDICINE

## 2023-05-23 RX ADMIN — MIDODRINE HYDROCHLORIDE 5 MG: 5 TABLET ORAL at 09:24

## 2023-05-23 RX ADMIN — ATORVASTATIN CALCIUM 40 MG: 40 TABLET, FILM COATED ORAL at 09:24

## 2023-05-23 RX ADMIN — PREDNISOLONE ACETATE 1 DROP: 10 SUSPENSION/ DROPS OPHTHALMIC at 09:27

## 2023-05-23 RX ADMIN — MIDODRINE HYDROCHLORIDE 5 MG: 5 TABLET ORAL at 18:32

## 2023-05-23 RX ADMIN — OXYCODONE 5 MG: 5 TABLET ORAL at 14:58

## 2023-05-23 RX ADMIN — CLOPIDOGREL BISULFATE 75 MG: 75 TABLET ORAL at 09:24

## 2023-05-23 RX ADMIN — MIDODRINE HYDROCHLORIDE 5 MG: 5 TABLET ORAL at 12:46

## 2023-05-23 RX ADMIN — CARVEDILOL 3.12 MG: 3.12 TABLET, FILM COATED ORAL at 18:32

## 2023-05-23 RX ADMIN — SODIUM CHLORIDE, PRESERVATIVE FREE 10 ML: 5 INJECTION INTRAVENOUS at 09:25

## 2023-05-23 RX ADMIN — CARVEDILOL 3.12 MG: 3.12 TABLET, FILM COATED ORAL at 09:24

## 2023-05-23 ASSESSMENT — PAIN DESCRIPTION - LOCATION: LOCATION: FOOT

## 2023-05-23 NOTE — CARE COORDINATION
Patient with discharge orders for home with [facility]. Home health setup with [facility] health. DC orders and summary have been sent via [facility]. Waiting for wound VAC from . Order forms and accompanying documentation have been sent. Per [employer]M wound vac was suppose to be delivered to room this morning. Was told they would check on status. No return status from . Called again and was redirected to [facility] for delivery. Spoke with rep at delivery warehouse and stated he would call me back to let me know status. No return call. Called again to check status, no response, message left. If wound vac is delivered patient can discharge home today. Transition of Care Plan:    RUR: 10%  Prior Level of Functioning: ind  Disposition: Home with home health  If SNF or IPR: Date FOC offered:   Date FOC received:   Accepting facility:   Date authorization started with reference number:   Date authorization received and expires: Follow up appointments:   DME needed: wound vac  Transportation at discharge: family  IM/IMM Medicare/ letter given: yes  Is patient a  and connected with VA? If yes, was [employer] transfer form completed and VA notified? Caregiver Contact:   Discharge Caregiver contacted prior to discharge?    Care Conference needed? na  Barriers to discharge:  wound vac

## 2023-05-23 NOTE — DISCHARGE SUMMARY
Hospitalist Discharge Summary     Patient ID:    Keisha Gaffney  [id number]  71 y.o.  1950    Admit date: 5/8/2023    Discharge date : 5/23/2023    Final Diagnoses:     ICD-10-CM    1. Toe gangrene ([facility])  I96 traMADol (ULTRAM) 50 MG tablet      2. Septicemia ([address]  A41.9       3. Cellulitis of right lower extremity  L03.115       4. Foot infection  L08.9 Vascular lower arterial complete physiologic Doppler at rest     Vascular lower arterial complete physiologic Doppler at rest     traMADol (ULTRAM) 50 MG tablet      5. Diabetic foot ulcer with osteomyelitis [facility])  E11.621 Surgical Pathology    E11.69 Surgical Pathology    L97.509 Culture, Fungus    M86.9 Culture, Fungus     Culture, Wound Aerobic Only     Culture, Wound Aerobic Only      6. PVD (peripheral vascular disease) ([facility])  I73.9 Invasive vascular procedure     Invasive vascular procedure      7. Gangrene ([address]  I96 Culture, Fungus     Culture, Fungus     Culture, Wound Aerobic Only     Culture, Wound Aerobic Only            Hospital Course:   Jaelyn Doty is a 71 y.o.  male who is being seen for right third toe gangrene. Patient has a PMHx of CHF, ESRD, hypertension. Patient was going to undergo a possible revascularization tomorrow by Dr. Nichole Cardenas [facility] in [facility]. Patient decided to come to the emergency department yesterday due to generalized fatigue and worsening of the right foot pain. Was admitted for management of right third toe gangrene. Nephrology consulted for hemodialysis. Infectious disease consulted. Patient started on vancomycin and meropenem. Taken to the OR by podiatry on 5/11/2023 for right third toe amputation. Right BRENDA is 0.57 left BRENDA is 0.53. Wound culture positive for Morganella morganii and Citrobacter frundii. Patient on IV vancomycin and meropenem.   On 05/16 had successful PTA angioplasty of the distal superficial femoral artery and revascularization of the posterior tibial

## 2023-05-23 NOTE — PLAN OF CARE
Problem: Safety - Adult  Goal: Free from fall injury  Outcome: Progressing     Problem: Pain  Goal: Verbalizes/displays adequate comfort level or baseline comfort level  5/23/2023 0721 by Katrin Vazquez LPN  Outcome: Progressing  5/22/2023 2345 by Girma Mandel RN  Outcome: Progressing

## 2023-05-24 NOTE — PROGRESS NOTES
Aminoglycoside Dosing Consult  Day #01 of Tobramycin  Consult placed by Dr. Cyndi Doyle for this 71 y.o. male for indication of right third toe gangrene and cellulitis. Antibiotic regimen:  Tobramycin + Meropenem    Temp (24hrs), Av °F (36.7 °C), Min:97 °F (36.1 °C), Max:98.8 °F (37.1 °C)    No results for input(s): WBC, CREATININE, CRP, PCT in the last 72 hours. Est CrCl: HD (MWF)  Concomitant nephrotoxic drugs: None    Cultures:   Wound culture right foot () Morganella morganii    Dosing Strategy: Conventional  Dosing weight: 73 kg (Ideal body weight)    Recent level history:  Date/Time Dose & Interval Measured Level (mcg/mL)    0604 150 mg IV x 1  1.3 (pre-HD)         Assessment/Plan:   HD patient on MWF.    Patient tolerated dialysis well today  Trough is within goal, will continue tobramycin 80 mg (1.1 mg/kg) M/W/F after dialysis   Scheduled pre-hd level  @ 0400  Antimicrobial stop date 23
Comprehensive Nutrition Assessment    Type and Reason for Visit:  Reassess (Goal)    Nutrition Recommendations/Plan:   4-Carb diet  Nepro 1x/day   Eliot Packets  2x/day   Monitor and document intake %, supplement tolerance, bms, wts in I/Os thanks     Malnutrition Assessment:  Malnutrition Status:  No malnutrition (05/23/23 1501)    Context:  Chronic Illness     Findings of the 6 clinical characteristics of malnutrition:  Energy Intake:  No significant decrease in energy intake  Weight Loss:  No significant weight loss     Body Fat Loss:  Mild body fat loss Orbital   Muscle Mass Loss:  Mild muscle mass loss Temples (temporalis)  Fluid Accumulation:  No significant fluid accumulation     Strength:  Not Performed    Nutrition Assessment:    Pt admitted w/ gangrene to R third toe. (5/17) Scheduled for HD today, R TMA today. PO % of meals IP. Pt off floor for dialysis, RD UTO wt, intake hx, will obtain as able. Will add ONS to support adequate intake to meet nutr needs on HD, w/ wounds. (5/23) HD yesterday,1kg fluid removed. Wound vac applied 5/18 for R TMA. PO intakes remain % of meals. Labs unremarkable. Meds: statin, carvedilol, clopidogrel, retacrit, merrem, midodrine, prednisolone, tobramycin    Nutrition Related Findings:    Pt appears somewhat thin, minimal acute findings. No n/v/c/d reported today. BM improved. +1 non-pitting edema to BLE. Wound vac applied 5/18 for R TMA. Wound Type: Surgical Incision (R foot s/p TMA)       Current Nutrition Intake & Therapies:    Average Meal Intake: 51-75%, %  Average Supplements Intake: None Ordered  ADULT DIET; Regular; 4 carb choices (60 gm/meal)    Anthropometric Measures:  Height: 177.8 cm (5' 10\")  Ideal Body Weight (IBW): 166 lbs (75 kg)    Current Body Weight: 174 lb 2.6 oz (79 kg) (5/19), 104.9 % IBW.  Weight Source: Bed Scale  Current BMI (kg/m2): 25   Weight Adjustment For: No Adjustment   BMI Categories: Normal Weight (BMI 22.0 to 24.9) age
Off the floor to dialysis.
Patient returned from dialysis.
Progress Note  Date:2023       Room:[facility]  Patient Name:Damien Ferrer     Date of Birth:[birth year]     Age:72 y.o. Subjective    Subjective   Patient followed for right third toe gangrene and cellulitis right foot. He has undergone balloon PTA angioplasty of right SFA and recanalization of right PTA, amputation right third toe and subsequently underwent right TMA. He has been afebrile but his WBC, CRP and procal remain elevated. Repeat wound cultures showed same Gram negative rods. He is currently on Meropenem and Tobramycin. He is apparently waiting for wound vac. Review of Systems negative as noted above  Objective         Vitals Last 24 Hours:  TEMPERATURE:  Temp  Av °F (36.7 °C)  Min: 97 °F (36.1 °C)  Max: 98.8 °F (37.1 °C)  RESPIRATIONS RANGE: Resp  Av  Min: 18  Max: 18  PULSE OXIMETRY RANGE: SpO2  Av %  Min: 96 %  Max: 100 %  PULSE RANGE: Pulse  Av.5  Min: 46  Max: 79  BLOOD PRESSURE RANGE: Systolic (30AMO), FPW:718 , Min:97 , POP:560   ; Diastolic (51WMB), HDP:59, Min:53, Max:72       Objective:  Vital signs: (most recent): Blood pressure (!) 116/53, pulse 79, temperature 98.8 °F (37.1 °C), temperature source Oral, resp. rate 18, height 5' 10\" (1.778 m), weight 174 lb 2.6 oz (79 kg), SpO2 100 %. Vitals and nursing note reviewed. Constitutional:       Appearance: He is not ill-appearing. Genitourinary:     Comments: No [place]  Musculoskeletal:  right foot with bulky gauze dressing, not removed at this time  Left arm AV fistula   Skin:     Findings: No rash. Neurological:      General: No focal deficit present. Mental Status: He is alert and oriented to person, place, and time. Psychiatric:   normal behavior                           Labs/Imaging/Diagnostics    Labs:  CBC:  No results for input(s): WBC, RBC, HGB, HCT, MCV, RDW, PLT in the last 72 hours.      WBC 18,600    CRP 17.10 <16.30 <16.40 <18.60 <21.90 <23,90 < 23.80 <20.90   Procal 4.15 <3.14
Received pt stable already discharged,iv access out and ready to leave with his niece. all discharge process and rx explained earlier by DEBI reynolds nurse pt escorted by tech to the parking  stable.had his wound vac package
Renal Progress Note    Patient: Abby Mullen MRN: [id number]  SSN: [id number]    YOB: 1950  Age: 71 y.o.   Sex: male      Admit Date: 5/8/2023    LOS: 14 days     Subjective:      Patient seen at bedside this morning, alert and awake, no new complaints today,   status post hemodialysis yesterday        Current Facility-Administered Medications   Medication Dose Route Frequency    [START ON 5/24/2023] Tobramycin- Please draw Pre-HD level 5/24 @ 0400   Other RX Placeholder    tobramycin (NEBCIN) 80 mg in sodium chloride 0.9 % 100 mL IVPB  80 mg IntraVENous Once per day on Mon Wed Fri    aminoglycoside intermittent dosing (placeholder)   Other RX Placeholder    midodrine (PROAMATINE) tablet 10 mg  10 mg Oral TID PRN    meropenem (MERREM) 500 mg in sodium chloride 0.9 % 100 mL IVPB (mini-bag)  500 mg IntraVENous Q24H    midodrine (PROAMATINE) tablet 5 mg  5 mg Oral TID WC    clopidogrel (PLAVIX) tablet 75 mg  75 mg Oral Daily    sodium chloride flush 0.9 % injection 5-40 mL  5-40 mL IntraVENous 2 times per day    sodium chloride flush 0.9 % injection 5-40 mL  5-40 mL IntraVENous PRN    0.9 % sodium chloride infusion   IntraVENous PRN    acetaminophen (TYLENOL) tablet 650 mg  650 mg Oral Q4H PRN    traMADol (ULTRAM) tablet 50 mg  50 mg Oral Q6H PRN    oxyCODONE (ROXICODONE) immediate release tablet 5 mg  5 mg Oral Q4H PRN    HYDROmorphone (DILAUDID) injection 0.5 mg  0.5 mg IntraVENous Q4H PRN    albuterol sulfate HFA (PROVENTIL;VENTOLIN;PROAIR) 108 (90 Base) MCG/ACT inhaler 2 puff  2 puff Inhalation Q4H PRN    atorvastatin (LIPITOR) tablet 40 mg  40 mg Oral Daily    carvedilol (COREG) tablet 3.125 mg  3.125 mg Oral BID WC    prednisoLONE acetate (PRED FORTE) 1 % ophthalmic suspension 1 drop  1 drop Ophthalmic BID    sodium chloride flush 0.9 % injection 5-40 mL  5-40 mL IntraVENous 2 times per day    sodium chloride flush 0.9 % injection 5-40 mL  5-40 mL IntraVENous PRN    0.9 % sodium chloride infusion
Renal Progress Note    Patient: Jerel Gee MRN: [id number]  SSN: [id number]    YOB: 1950  Age: 71 y.o.   Sex: male      Admit Date: 5/8/2023    LOS: 13 days     Subjective:      Patient seen at bedside, alert and awake, no new complaints today, status post hemodialysis this morning and tolerated well    He underwent open transmetatarsal amputation of right foot 5/17      Current Facility-Administered Medications   Medication Dose Route Frequency    [START ON 5/24/2023] Tobramycin- Please draw Pre-HD level 5/24 @ 0400   Other RX Placeholder    tobramycin (NEBCIN) 80 mg in sodium chloride 0.9 % 100 mL IVPB  80 mg IntraVENous Once per day on Mon Wed Fri    aminoglycoside intermittent dosing (placeholder)   Other RX Placeholder    midodrine (PROAMATINE) tablet 10 mg  10 mg Oral TID PRN    meropenem (MERREM) 500 mg in sodium chloride 0.9 % 100 mL IVPB (mini-bag)  500 mg IntraVENous Q24H    midodrine (PROAMATINE) tablet 5 mg  5 mg Oral TID WC    clopidogrel (PLAVIX) tablet 75 mg  75 mg Oral Daily    sodium chloride flush 0.9 % injection 5-40 mL  5-40 mL IntraVENous 2 times per day    sodium chloride flush 0.9 % injection 5-40 mL  5-40 mL IntraVENous PRN    0.9 % sodium chloride infusion   IntraVENous PRN    acetaminophen (TYLENOL) tablet 650 mg  650 mg Oral Q4H PRN    traMADol (ULTRAM) tablet 50 mg  50 mg Oral Q6H PRN    oxyCODONE (ROXICODONE) immediate release tablet 5 mg  5 mg Oral Q4H PRN    HYDROmorphone (DILAUDID) injection 0.5 mg  0.5 mg IntraVENous Q4H PRN    albuterol sulfate HFA (PROVENTIL;VENTOLIN;PROAIR) 108 (90 Base) MCG/ACT inhaler 2 puff  2 puff Inhalation Q4H PRN    atorvastatin (LIPITOR) tablet 40 mg  40 mg Oral Daily    carvedilol (COREG) tablet 3.125 mg  3.125 mg Oral BID WC    prednisoLONE acetate (PRED FORTE) 1 % ophthalmic suspension 1 drop  1 drop Ophthalmic BID    sodium chloride flush 0.9 % injection 5-40 mL  5-40 mL IntraVENous 2 times per day    sodium chloride flush 0.9 %
17.10 <16.30 <16.40 <18.60 <21.90 <23,90 < 23.80 <20.90   Procal 4.15 <3.14 <3.60 <5.60< 6.71 <6.97 < 6.53    Blood cultures (5/9) No growth FINAL  Blood cultures (5/9) No growth FINAL  Blood cultures (5/15) No growth FINAL  Blood cultures (5/15) No growth FINAL    Wound culture right third toe (5/9) Group B strep, Citrobacter freundii and Morganella morganii       Morganella morganii ssp morganii Citrobacter freundii     BACTERIAL SUSCEPTIBILITY PANEL BERNDA BACTERIAL SUSCEPTIBILITY PANEL BRENDA     amikacin <=2 ug/mL Sensitive <=2 ug/mL Sensitive     ampicillin >=32 ug/mL Resistant       ampicillin-sulbactam >=32 ug/mL Resistant       ceFAZolin >=64 ug/mL Resistant >=64 ug/mL Resistant     cefepime <=1 ug/mL Sensitive <=1 ug/mL Sensitive     cefOXitin 16 ug/mL Intermediate 32 ug/mL Resistant     cefTAZidime <=1 ug/mL Sensitive <=1 ug/mL Sensitive     cefTRIAXone <=1 ug/mL Sensitive <=1 ug/mL Sensitive     ciprofloxacin 2 ug/mL Intermediate 1 <=0.25 ug/mL Sensitive     gentamicin >=16 ug/mL Resistant <=1 ug/mL Sensitive     levofloxacin 4 ug/mL Intermediate 2 <=0.12 ug/mL Sensitive     meropenem <=0.25 ug/mL Sensitive <=0.25 ug/mL Sensitive     piperacillin-tazobactam <=4 ug/mL Sensitive       tobramycin 4 ug/mL Sensitive <=1 ug/mL Sensitive     trimethoprim-sulfamethoxazole >=320 ug/mL Resistant <=20 ug/mL Sensitive               Wound culture right foot (5/11) Morganella morganii  Wound culture right third toe fungus (5/12) in process  Wound culture right third toe Fungus (5/12) in procress    Wound culture right foot (5/17) Morganella morganii and Citrobacter freundii sensitive to Tobramycin  Wound culture right third toe fungus (5/17) in process  Wound culture right third toe Fungus (5/17) in process    Imaging Last 24 Hours:  XR FOOT RIGHT (MIN 3 VIEWS)    Result Date: 5/13/2023   Interval third toe amputation        Assessment//Plan           Hospital Problems             Last Modified POA    * (Principal) Foot

## 2023-06-12 ENCOUNTER — HOSPITAL ENCOUNTER (INPATIENT)
Facility: HOSPITAL | Age: 73
LOS: 11 days | Discharge: NURSING, REHAB OR LONG-TERM CARE | DRG: 474 | End: 2023-06-23
Attending: EMERGENCY MEDICINE | Admitting: INTERNAL MEDICINE
Payer: MEDICARE

## 2023-06-12 DIAGNOSIS — I96 GANGRENE (HCC): ICD-10-CM

## 2023-06-12 DIAGNOSIS — I96 GANGRENE OF RIGHT FOOT (HCC): ICD-10-CM

## 2023-06-12 DIAGNOSIS — T81.49XA SURGICAL SITE INFECTION: Primary | ICD-10-CM

## 2023-06-12 PROBLEM — E11.628 DIABETIC FOOT INFECTION (HCC): Status: ACTIVE | Noted: 2023-06-12

## 2023-06-12 PROBLEM — L08.9 DIABETIC FOOT INFECTION (HCC): Status: ACTIVE | Noted: 2023-06-12

## 2023-06-12 LAB
ALBUMIN SERPL-MCNC: 2.2 G/DL (ref 3.5–5)
ALBUMIN/GLOB SERPL: 0.3 (ref 1.1–2.2)
ALP SERPL-CCNC: 120 U/L (ref 45–117)
ALT SERPL-CCNC: 12 U/L (ref 12–78)
ANION GAP SERPL CALC-SCNC: 5 MMOL/L (ref 5–15)
AST SERPL W P-5'-P-CCNC: 34 U/L (ref 15–37)
BASE EXCESS BLD CALC-SCNC: 3.6 MMOL/L
BASOPHILS # BLD: 0.1 K/UL (ref 0–0.1)
BASOPHILS NFR BLD: 1 % (ref 0–1)
BILIRUB SERPL-MCNC: 0.4 MG/DL (ref 0.2–1)
BUN SERPL-MCNC: 36 MG/DL (ref 6–20)
BUN/CREAT SERPL: 5 (ref 12–20)
CA-I BLD-MCNC: 1.12 MMOL/L (ref 1.12–1.32)
CA-I BLD-MCNC: 8.4 MG/DL (ref 8.5–10.1)
CHLORIDE BLD-SCNC: 100 MMOL/L (ref 98–107)
CHLORIDE SERPL-SCNC: 100 MMOL/L (ref 97–108)
CO2 BLD-SCNC: 29 MMOL/L
CO2 SERPL-SCNC: 28 MMOL/L (ref 21–32)
CREAT SERPL-MCNC: 7.02 MG/DL (ref 0.7–1.3)
CREAT UR-MCNC: 7.86 MG/DL (ref 0.6–1.3)
DIFFERENTIAL METHOD BLD: ABNORMAL
EOSINOPHIL # BLD: 0.9 K/UL (ref 0–0.4)
EOSINOPHIL NFR BLD: 10 % (ref 0–7)
ERYTHROCYTE [DISTWIDTH] IN BLOOD BY AUTOMATED COUNT: 16.5 % (ref 11.5–14.5)
GLOBULIN SER CALC-MCNC: 7.7 G/DL (ref 2–4)
GLUCOSE BLD STRIP.AUTO-MCNC: 117 MG/DL (ref 65–100)
GLUCOSE BLD STRIP.AUTO-MCNC: 89 MG/DL (ref 65–100)
GLUCOSE SERPL-MCNC: 113 MG/DL (ref 65–100)
HCO3 BLD-SCNC: 28.9 MMOL/L (ref 19–28)
HCT VFR BLD AUTO: 24.9 % (ref 36.6–50.3)
HGB BLD-MCNC: 7.8 G/DL (ref 12.1–17)
IMM GRANULOCYTES # BLD AUTO: 0 K/UL (ref 0–0.04)
IMM GRANULOCYTES NFR BLD AUTO: 0 % (ref 0–0.5)
LACTATE BLD-SCNC: 1.12 MMOL/L (ref 0.4–2)
LYMPHOCYTES # BLD: 1.9 K/UL (ref 0.8–3.5)
LYMPHOCYTES NFR BLD: 19 % (ref 12–49)
MCH RBC QN AUTO: 29.4 PG (ref 26–34)
MCHC RBC AUTO-ENTMCNC: 31.3 G/DL (ref 30–36.5)
MCV RBC AUTO: 94 FL (ref 80–99)
MONOCYTES # BLD: 1.1 K/UL (ref 0–1)
MONOCYTES NFR BLD: 11 % (ref 5–13)
NEUTS SEG # BLD: 5.7 K/UL (ref 1.8–8)
NEUTS SEG NFR BLD: 59 % (ref 32–75)
NRBC # BLD: 0 K/UL (ref 0–0.01)
NRBC BLD-RTO: 0 PER 100 WBC
PCO2 BLD: 46.3 MMHG (ref 35–45)
PERFORMED BY:: ABNORMAL
PERFORMED BY:: NORMAL
PH BLD: 7.4 (ref 7.35–7.45)
PLATELET # BLD AUTO: 452 K/UL (ref 150–400)
PMV BLD AUTO: 10.7 FL (ref 8.9–12.9)
PO2 BLD: <13 MMHG (ref 75–100)
POTASSIUM BLD-SCNC: 5.6 MMOL/L (ref 3.5–5.5)
POTASSIUM SERPL-SCNC: 5.2 MMOL/L (ref 3.5–5.1)
PROT SERPL-MCNC: 9.9 G/DL (ref 6.4–8.2)
RBC # BLD AUTO: 2.65 M/UL (ref 4.1–5.7)
SERVICE CMNT-IMP: ABNORMAL
SODIUM BLD-SCNC: 134 MMOL/L (ref 136–145)
SODIUM SERPL-SCNC: 133 MMOL/L (ref 136–145)
SPECIMEN SITE: ABNORMAL
WBC # BLD AUTO: 9.7 K/UL (ref 4.1–11.1)

## 2023-06-12 PROCEDURE — 6370000000 HC RX 637 (ALT 250 FOR IP): Performed by: INTERNAL MEDICINE

## 2023-06-12 PROCEDURE — 80053 COMPREHEN METABOLIC PANEL: CPT

## 2023-06-12 PROCEDURE — 96365 THER/PROPH/DIAG IV INF INIT: CPT

## 2023-06-12 PROCEDURE — 84295 ASSAY OF SERUM SODIUM: CPT

## 2023-06-12 PROCEDURE — 82803 BLOOD GASES ANY COMBINATION: CPT

## 2023-06-12 PROCEDURE — 99222 1ST HOSP IP/OBS MODERATE 55: CPT | Performed by: PODIATRIST

## 2023-06-12 PROCEDURE — 2580000003 HC RX 258: Performed by: EMERGENCY MEDICINE

## 2023-06-12 PROCEDURE — 96375 TX/PRO/DX INJ NEW DRUG ADDON: CPT

## 2023-06-12 PROCEDURE — 1100000000 HC RM PRIVATE

## 2023-06-12 PROCEDURE — 2580000003 HC RX 258: Performed by: INTERNAL MEDICINE

## 2023-06-12 PROCEDURE — 84132 ASSAY OF SERUM POTASSIUM: CPT

## 2023-06-12 PROCEDURE — 83605 ASSAY OF LACTIC ACID: CPT

## 2023-06-12 PROCEDURE — 82962 GLUCOSE BLOOD TEST: CPT

## 2023-06-12 PROCEDURE — 82947 ASSAY GLUCOSE BLOOD QUANT: CPT

## 2023-06-12 PROCEDURE — 5A1D70Z PERFORMANCE OF URINARY FILTRATION, INTERMITTENT, LESS THAN 6 HOURS PER DAY: ICD-10-PCS | Performed by: INTERNAL MEDICINE

## 2023-06-12 PROCEDURE — 6360000002 HC RX W HCPCS: Performed by: EMERGENCY MEDICINE

## 2023-06-12 PROCEDURE — 99285 EMERGENCY DEPT VISIT HI MDM: CPT

## 2023-06-12 PROCEDURE — 82330 ASSAY OF CALCIUM: CPT

## 2023-06-12 PROCEDURE — 6360000002 HC RX W HCPCS: Performed by: INTERNAL MEDICINE

## 2023-06-12 PROCEDURE — 85025 COMPLETE CBC W/AUTO DIFF WBC: CPT

## 2023-06-12 PROCEDURE — 2709999900 HC NON-CHARGEABLE SUPPLY

## 2023-06-12 PROCEDURE — 90935 HEMODIALYSIS ONE EVALUATION: CPT

## 2023-06-12 PROCEDURE — 36415 COLL VENOUS BLD VENIPUNCTURE: CPT

## 2023-06-12 RX ORDER — CLOPIDOGREL BISULFATE 75 MG/1
75 TABLET ORAL DAILY
Status: DISCONTINUED | OUTPATIENT
Start: 2023-06-12 | End: 2023-06-23 | Stop reason: HOSPADM

## 2023-06-12 RX ORDER — SODIUM CHLORIDE 0.9 % (FLUSH) 0.9 %
5-40 SYRINGE (ML) INJECTION PRN
Status: DISCONTINUED | OUTPATIENT
Start: 2023-06-12 | End: 2023-06-23 | Stop reason: HOSPADM

## 2023-06-12 RX ORDER — ONDANSETRON 4 MG/1
4 TABLET, ORALLY DISINTEGRATING ORAL EVERY 8 HOURS PRN
Status: DISCONTINUED | OUTPATIENT
Start: 2023-06-12 | End: 2023-06-23 | Stop reason: HOSPADM

## 2023-06-12 RX ORDER — OXYCODONE HYDROCHLORIDE AND ACETAMINOPHEN 5; 325 MG/1; MG/1
1 TABLET ORAL EVERY 4 HOURS PRN
Status: DISCONTINUED | OUTPATIENT
Start: 2023-06-12 | End: 2023-06-23 | Stop reason: HOSPADM

## 2023-06-12 RX ORDER — ONDANSETRON 2 MG/ML
4 INJECTION INTRAMUSCULAR; INTRAVENOUS EVERY 6 HOURS PRN
Status: DISCONTINUED | OUTPATIENT
Start: 2023-06-12 | End: 2023-06-23 | Stop reason: HOSPADM

## 2023-06-12 RX ORDER — MAGNESIUM HYDROXIDE/ALUMINUM HYDROXICE/SIMETHICONE 120; 1200; 1200 MG/30ML; MG/30ML; MG/30ML
30 SUSPENSION ORAL EVERY 6 HOURS PRN
Status: DISCONTINUED | OUTPATIENT
Start: 2023-06-12 | End: 2023-06-23 | Stop reason: HOSPADM

## 2023-06-12 RX ORDER — SODIUM CHLORIDE 9 MG/ML
INJECTION, SOLUTION INTRAVENOUS CONTINUOUS
Status: DISCONTINUED | OUTPATIENT
Start: 2023-06-12 | End: 2023-06-23 | Stop reason: HOSPADM

## 2023-06-12 RX ORDER — POLYETHYLENE GLYCOL 3350 17 G/17G
17 POWDER, FOR SOLUTION ORAL DAILY PRN
Status: DISCONTINUED | OUTPATIENT
Start: 2023-06-12 | End: 2023-06-23 | Stop reason: HOSPADM

## 2023-06-12 RX ORDER — POTASSIUM CHLORIDE 7.45 MG/ML
10 INJECTION INTRAVENOUS PRN
Status: DISCONTINUED | OUTPATIENT
Start: 2023-06-12 | End: 2023-06-23 | Stop reason: HOSPADM

## 2023-06-12 RX ORDER — LOSARTAN POTASSIUM 25 MG/1
25 TABLET ORAL DAILY
COMMUNITY

## 2023-06-12 RX ORDER — CARVEDILOL 3.12 MG/1
3.12 TABLET ORAL 2 TIMES DAILY WITH MEALS
Status: DISCONTINUED | OUTPATIENT
Start: 2023-06-12 | End: 2023-06-23 | Stop reason: HOSPADM

## 2023-06-12 RX ORDER — SODIUM CHLORIDE 9 MG/ML
INJECTION, SOLUTION INTRAVENOUS PRN
Status: DISCONTINUED | OUTPATIENT
Start: 2023-06-12 | End: 2023-06-23 | Stop reason: HOSPADM

## 2023-06-12 RX ORDER — ACETAMINOPHEN 650 MG/1
650 SUPPOSITORY RECTAL EVERY 6 HOURS PRN
Status: DISCONTINUED | OUTPATIENT
Start: 2023-06-12 | End: 2023-06-23 | Stop reason: HOSPADM

## 2023-06-12 RX ORDER — MIDODRINE HYDROCHLORIDE 5 MG/1
5 TABLET ORAL
Status: DISCONTINUED | OUTPATIENT
Start: 2023-06-12 | End: 2023-06-23 | Stop reason: HOSPADM

## 2023-06-12 RX ORDER — HEPARIN SODIUM 5000 [USP'U]/ML
5000 INJECTION, SOLUTION INTRAVENOUS; SUBCUTANEOUS EVERY 12 HOURS
Status: DISCONTINUED | OUTPATIENT
Start: 2023-06-12 | End: 2023-06-20 | Stop reason: SDUPTHER

## 2023-06-12 RX ORDER — SEVELAMER CARBONATE 800 MG/1
1600 TABLET, FILM COATED ORAL
Status: DISCONTINUED | OUTPATIENT
Start: 2023-06-12 | End: 2023-06-13

## 2023-06-12 RX ORDER — SODIUM CHLORIDE 0.9 % (FLUSH) 0.9 %
5-40 SYRINGE (ML) INJECTION EVERY 12 HOURS SCHEDULED
Status: DISCONTINUED | OUTPATIENT
Start: 2023-06-12 | End: 2023-06-23 | Stop reason: HOSPADM

## 2023-06-12 RX ORDER — ACETAMINOPHEN 325 MG/1
650 TABLET ORAL EVERY 6 HOURS PRN
Status: DISCONTINUED | OUTPATIENT
Start: 2023-06-12 | End: 2023-06-23 | Stop reason: HOSPADM

## 2023-06-12 RX ORDER — ATORVASTATIN CALCIUM 40 MG/1
40 TABLET, FILM COATED ORAL DAILY
Status: DISCONTINUED | OUTPATIENT
Start: 2023-06-12 | End: 2023-06-23 | Stop reason: HOSPADM

## 2023-06-12 RX ORDER — DORZOLAMIDE HYDROCHLORIDE AND TIMOLOL MALEATE 20; 5 MG/ML; MG/ML
1 SOLUTION/ DROPS OPHTHALMIC 2 TIMES DAILY
COMMUNITY

## 2023-06-12 RX ADMIN — ATORVASTATIN CALCIUM 40 MG: 40 TABLET, FILM COATED ORAL at 14:16

## 2023-06-12 RX ADMIN — CEFTRIAXONE SODIUM 1000 MG: 1 INJECTION, POWDER, FOR SOLUTION INTRAMUSCULAR; INTRAVENOUS at 12:24

## 2023-06-12 RX ADMIN — VANCOMYCIN HYDROCHLORIDE 1250 MG: 1.25 INJECTION, POWDER, LYOPHILIZED, FOR SOLUTION INTRAVENOUS at 12:24

## 2023-06-12 RX ADMIN — MIDODRINE HYDROCHLORIDE 5 MG: 5 TABLET ORAL at 14:17

## 2023-06-12 RX ADMIN — SODIUM CHLORIDE, PRESERVATIVE FREE 10 ML: 5 INJECTION INTRAVENOUS at 20:23

## 2023-06-12 RX ADMIN — MEROPENEM 1000 MG: 1 INJECTION, POWDER, FOR SOLUTION INTRAVENOUS at 14:16

## 2023-06-12 RX ADMIN — SODIUM CHLORIDE: 9 INJECTION, SOLUTION INTRAVENOUS at 14:16

## 2023-06-12 RX ADMIN — HEPARIN SODIUM 5000 UNITS: 5000 INJECTION INTRAVENOUS; SUBCUTANEOUS at 14:16

## 2023-06-12 ASSESSMENT — LIFESTYLE VARIABLES
HOW MANY STANDARD DRINKS CONTAINING ALCOHOL DO YOU HAVE ON A TYPICAL DAY: PATIENT DOES NOT DRINK
HOW OFTEN DO YOU HAVE A DRINK CONTAINING ALCOHOL: NEVER

## 2023-06-12 ASSESSMENT — PAIN - FUNCTIONAL ASSESSMENT: PAIN_FUNCTIONAL_ASSESSMENT: 0-10

## 2023-06-12 ASSESSMENT — PAIN SCALES - GENERAL
PAINLEVEL_OUTOF10: 8
PAINLEVEL_OUTOF10: 0
PAINLEVEL_OUTOF10: 0

## 2023-06-13 ENCOUNTER — APPOINTMENT (OUTPATIENT)
Facility: HOSPITAL | Age: 73
DRG: 474 | End: 2023-06-13
Attending: SURGERY
Payer: MEDICARE

## 2023-06-13 PROBLEM — T81.49XA SURGICAL SITE INFECTION: Status: ACTIVE | Noted: 2023-06-13

## 2023-06-13 LAB
ALBUMIN SERPL-MCNC: 2 G/DL (ref 3.5–5)
ANION GAP SERPL CALC-SCNC: 6 MMOL/L (ref 5–15)
BASOPHILS # BLD: 0.1 K/UL (ref 0–0.1)
BASOPHILS NFR BLD: 1 % (ref 0–1)
BUN SERPL-MCNC: 19 MG/DL (ref 6–20)
BUN/CREAT SERPL: 4 (ref 12–20)
CA-I BLD-MCNC: 8 MG/DL (ref 8.5–10.1)
CHLORIDE SERPL-SCNC: 99 MMOL/L (ref 97–108)
CO2 SERPL-SCNC: 29 MMOL/L (ref 21–32)
CREAT SERPL-MCNC: 4.45 MG/DL (ref 0.7–1.3)
CRP SERPL-MCNC: 11.2 MG/DL (ref 0–0.6)
DIFFERENTIAL METHOD BLD: ABNORMAL
EOSINOPHIL # BLD: 0.8 K/UL (ref 0–0.4)
EOSINOPHIL NFR BLD: 9 % (ref 0–7)
ERYTHROCYTE [DISTWIDTH] IN BLOOD BY AUTOMATED COUNT: 16.2 % (ref 11.5–14.5)
FERRITIN SERPL-MCNC: 1809 NG/ML (ref 26–388)
GLUCOSE BLD STRIP.AUTO-MCNC: 101 MG/DL (ref 65–100)
GLUCOSE BLD STRIP.AUTO-MCNC: 118 MG/DL (ref 65–100)
GLUCOSE SERPL-MCNC: 77 MG/DL (ref 65–100)
HCT VFR BLD AUTO: 23.4 % (ref 36.6–50.3)
HGB BLD-MCNC: 7.4 G/DL (ref 12.1–17)
IMM GRANULOCYTES # BLD AUTO: 0 K/UL (ref 0–0.04)
IMM GRANULOCYTES NFR BLD AUTO: 0 % (ref 0–0.5)
IRON SATN MFR SERPL: 23 % (ref 20–50)
IRON SERPL-MCNC: 27 UG/DL (ref 35–150)
LYMPHOCYTES # BLD: 1.6 K/UL (ref 0.8–3.5)
LYMPHOCYTES NFR BLD: 19 % (ref 12–49)
MCH RBC QN AUTO: 29.1 PG (ref 26–34)
MCHC RBC AUTO-ENTMCNC: 31.6 G/DL (ref 30–36.5)
MCV RBC AUTO: 92.1 FL (ref 80–99)
MONOCYTES # BLD: 0.9 K/UL (ref 0–1)
MONOCYTES NFR BLD: 11 % (ref 5–13)
NEUTS SEG # BLD: 5.1 K/UL (ref 1.8–8)
NEUTS SEG NFR BLD: 60 % (ref 32–75)
NRBC # BLD: 0 K/UL (ref 0–0.01)
NRBC BLD-RTO: 0 PER 100 WBC
PERFORMED BY:: ABNORMAL
PERFORMED BY:: ABNORMAL
PHOSPHATE SERPL-MCNC: 2.1 MG/DL (ref 2.6–4.7)
PLATELET # BLD AUTO: 383 K/UL (ref 150–400)
PMV BLD AUTO: 9.8 FL (ref 8.9–12.9)
POTASSIUM SERPL-SCNC: 3.4 MMOL/L (ref 3.5–5.1)
PROCALCITONIN SERPL-MCNC: 2.25 NG/ML
RBC # BLD AUTO: 2.54 M/UL (ref 4.1–5.7)
SODIUM SERPL-SCNC: 134 MMOL/L (ref 136–145)
TIBC SERPL-MCNC: 117 UG/DL (ref 250–450)
VANCOMYCIN SERPL-MCNC: 11.8 UG/ML
WBC # BLD AUTO: 8.4 K/UL (ref 4.1–11.1)

## 2023-06-13 PROCEDURE — 2580000003 HC RX 258: Performed by: INTERNAL MEDICINE

## 2023-06-13 PROCEDURE — 83540 ASSAY OF IRON: CPT

## 2023-06-13 PROCEDURE — 93922 UPR/L XTREMITY ART 2 LEVELS: CPT | Performed by: SURGERY

## 2023-06-13 PROCEDURE — 87205 SMEAR GRAM STAIN: CPT

## 2023-06-13 PROCEDURE — 87186 SC STD MICRODIL/AGAR DIL: CPT

## 2023-06-13 PROCEDURE — 85025 COMPLETE CBC W/AUTO DIFF WBC: CPT

## 2023-06-13 PROCEDURE — 99222 1ST HOSP IP/OBS MODERATE 55: CPT | Performed by: INTERNAL MEDICINE

## 2023-06-13 PROCEDURE — 6370000000 HC RX 637 (ALT 250 FOR IP): Performed by: INTERNAL MEDICINE

## 2023-06-13 PROCEDURE — 1100000000 HC RM PRIVATE

## 2023-06-13 PROCEDURE — 99232 SBSQ HOSP IP/OBS MODERATE 35: CPT | Performed by: SURGERY

## 2023-06-13 PROCEDURE — 80202 ASSAY OF VANCOMYCIN: CPT

## 2023-06-13 PROCEDURE — 86140 C-REACTIVE PROTEIN: CPT

## 2023-06-13 PROCEDURE — 84145 PROCALCITONIN (PCT): CPT

## 2023-06-13 PROCEDURE — 93922 UPR/L XTREMITY ART 2 LEVELS: CPT

## 2023-06-13 PROCEDURE — 87070 CULTURE OTHR SPECIMN AEROBIC: CPT

## 2023-06-13 PROCEDURE — 6370000000 HC RX 637 (ALT 250 FOR IP): Performed by: PHYSICIAN ASSISTANT

## 2023-06-13 PROCEDURE — 87077 CULTURE AEROBIC IDENTIFY: CPT

## 2023-06-13 PROCEDURE — 82728 ASSAY OF FERRITIN: CPT

## 2023-06-13 PROCEDURE — 82962 GLUCOSE BLOOD TEST: CPT

## 2023-06-13 PROCEDURE — 6360000002 HC RX W HCPCS: Performed by: INTERNAL MEDICINE

## 2023-06-13 PROCEDURE — 36415 COLL VENOUS BLD VENIPUNCTURE: CPT

## 2023-06-13 PROCEDURE — 80069 RENAL FUNCTION PANEL: CPT

## 2023-06-13 RX ORDER — INSULIN LISPRO 100 [IU]/ML
0-8 INJECTION, SOLUTION INTRAVENOUS; SUBCUTANEOUS
Status: DISCONTINUED | OUTPATIENT
Start: 2023-06-13 | End: 2023-06-23 | Stop reason: HOSPADM

## 2023-06-13 RX ORDER — INSULIN LISPRO 100 [IU]/ML
0-4 INJECTION, SOLUTION INTRAVENOUS; SUBCUTANEOUS NIGHTLY
Status: DISCONTINUED | OUTPATIENT
Start: 2023-06-13 | End: 2023-06-23 | Stop reason: HOSPADM

## 2023-06-13 RX ORDER — POTASSIUM CHLORIDE 20 MEQ/1
20 TABLET, EXTENDED RELEASE ORAL ONCE
Status: COMPLETED | OUTPATIENT
Start: 2023-06-13 | End: 2023-06-13

## 2023-06-13 RX ORDER — DEXTROSE MONOHYDRATE 100 MG/ML
INJECTION, SOLUTION INTRAVENOUS CONTINUOUS PRN
Status: DISCONTINUED | OUTPATIENT
Start: 2023-06-13 | End: 2023-06-23 | Stop reason: HOSPADM

## 2023-06-13 RX ADMIN — CARVEDILOL 3.12 MG: 3.12 TABLET, FILM COATED ORAL at 17:55

## 2023-06-13 RX ADMIN — VANCOMYCIN HYDROCHLORIDE 750 MG: 750 INJECTION, POWDER, LYOPHILIZED, FOR SOLUTION INTRAVENOUS at 06:14

## 2023-06-13 RX ADMIN — SEVELAMER CARBONATE 1600 MG: 800 TABLET, FILM COATED ORAL at 08:00

## 2023-06-13 RX ADMIN — MIDODRINE HYDROCHLORIDE 5 MG: 5 TABLET ORAL at 08:00

## 2023-06-13 RX ADMIN — MIDODRINE HYDROCHLORIDE 5 MG: 5 TABLET ORAL at 17:55

## 2023-06-13 RX ADMIN — MEROPENEM 500 MG: 500 INJECTION INTRAVENOUS at 13:35

## 2023-06-13 RX ADMIN — HEPARIN SODIUM 5000 UNITS: 5000 INJECTION INTRAVENOUS; SUBCUTANEOUS at 13:28

## 2023-06-13 RX ADMIN — SODIUM CHLORIDE, PRESERVATIVE FREE 10 ML: 5 INJECTION INTRAVENOUS at 09:00

## 2023-06-13 RX ADMIN — OXYCODONE HYDROCHLORIDE AND ACETAMINOPHEN 1 TABLET: 5; 325 TABLET ORAL at 10:15

## 2023-06-13 RX ADMIN — OXYCODONE HYDROCHLORIDE AND ACETAMINOPHEN 1 TABLET: 5; 325 TABLET ORAL at 17:57

## 2023-06-13 RX ADMIN — HEPARIN SODIUM 5000 UNITS: 5000 INJECTION INTRAVENOUS; SUBCUTANEOUS at 01:39

## 2023-06-13 RX ADMIN — MIDODRINE HYDROCHLORIDE 5 MG: 5 TABLET ORAL at 13:27

## 2023-06-13 RX ADMIN — SEVELAMER CARBONATE 1600 MG: 800 TABLET, FILM COATED ORAL at 13:28

## 2023-06-13 RX ADMIN — CLOPIDOGREL BISULFATE 75 MG: 75 TABLET ORAL at 10:05

## 2023-06-13 RX ADMIN — POTASSIUM CHLORIDE 20 MEQ: 1500 TABLET, EXTENDED RELEASE ORAL at 10:05

## 2023-06-13 RX ADMIN — ATORVASTATIN CALCIUM 40 MG: 40 TABLET, FILM COATED ORAL at 10:05

## 2023-06-13 RX ADMIN — CARVEDILOL 3.12 MG: 3.12 TABLET, FILM COATED ORAL at 08:00

## 2023-06-13 ASSESSMENT — ENCOUNTER SYMPTOMS
RESPIRATORY NEGATIVE: 1
ABDOMINAL PAIN: 0
GASTROINTESTINAL NEGATIVE: 1
ALLERGIC/IMMUNOLOGIC NEGATIVE: 1
SHORTNESS OF BREATH: 0
EYES NEGATIVE: 1
DIARRHEA: 0
VOMITING: 0

## 2023-06-13 ASSESSMENT — PAIN DESCRIPTION - LOCATION
LOCATION: FOOT
LOCATION: FOOT

## 2023-06-13 ASSESSMENT — PAIN SCALES - GENERAL: PAINLEVEL_OUTOF10: 0

## 2023-06-13 ASSESSMENT — PAIN DESCRIPTION - ORIENTATION
ORIENTATION: RIGHT
ORIENTATION: RIGHT

## 2023-06-13 ASSESSMENT — PAIN DESCRIPTION - DESCRIPTORS
DESCRIPTORS: ACHING
DESCRIPTORS: ACHING

## 2023-06-14 LAB
ALBUMIN SERPL-MCNC: 2.1 G/DL (ref 3.5–5)
ANION GAP SERPL CALC-SCNC: 4 MMOL/L (ref 5–15)
ANION GAP SERPL CALC-SCNC: 5 MMOL/L (ref 5–15)
BASOPHILS # BLD: 0.1 K/UL (ref 0–0.1)
BASOPHILS NFR BLD: 1 % (ref 0–1)
BUN SERPL-MCNC: 30 MG/DL (ref 6–20)
BUN SERPL-MCNC: 31 MG/DL (ref 6–20)
BUN/CREAT SERPL: 5 (ref 12–20)
BUN/CREAT SERPL: 5 (ref 12–20)
CA-I BLD-MCNC: 8.4 MG/DL (ref 8.5–10.1)
CA-I BLD-MCNC: 8.4 MG/DL (ref 8.5–10.1)
CHLORIDE SERPL-SCNC: 100 MMOL/L (ref 97–108)
CHLORIDE SERPL-SCNC: 100 MMOL/L (ref 97–108)
CO2 SERPL-SCNC: 28 MMOL/L (ref 21–32)
CO2 SERPL-SCNC: 29 MMOL/L (ref 21–32)
CREAT SERPL-MCNC: 6.47 MG/DL (ref 0.7–1.3)
CREAT SERPL-MCNC: 6.49 MG/DL (ref 0.7–1.3)
CRP SERPL-MCNC: 10.8 MG/DL (ref 0–0.6)
DIFFERENTIAL METHOD BLD: ABNORMAL
ECHO BSA: 1.99 M2
EOSINOPHIL # BLD: 0.6 K/UL (ref 0–0.4)
EOSINOPHIL NFR BLD: 7 % (ref 0–7)
ERYTHROCYTE [DISTWIDTH] IN BLOOD BY AUTOMATED COUNT: 16.2 % (ref 11.5–14.5)
EST. AVERAGE GLUCOSE BLD GHB EST-MCNC: 105 MG/DL
GLUCOSE BLD STRIP.AUTO-MCNC: 104 MG/DL (ref 65–100)
GLUCOSE BLD STRIP.AUTO-MCNC: 112 MG/DL (ref 65–100)
GLUCOSE BLD STRIP.AUTO-MCNC: 114 MG/DL (ref 65–100)
GLUCOSE BLD STRIP.AUTO-MCNC: 93 MG/DL (ref 65–100)
GLUCOSE SERPL-MCNC: 96 MG/DL (ref 65–100)
GLUCOSE SERPL-MCNC: 97 MG/DL (ref 65–100)
HBA1C MFR BLD: 5.3 % (ref 4–5.6)
HCT VFR BLD AUTO: 25.2 % (ref 36.6–50.3)
HGB BLD-MCNC: 7.9 G/DL (ref 12.1–17)
IMM GRANULOCYTES # BLD AUTO: 0 K/UL (ref 0–0.04)
IMM GRANULOCYTES NFR BLD AUTO: 0 % (ref 0–0.5)
LYMPHOCYTES # BLD: 1.7 K/UL (ref 0.8–3.5)
LYMPHOCYTES NFR BLD: 20 % (ref 12–49)
MCH RBC QN AUTO: 29.4 PG (ref 26–34)
MCHC RBC AUTO-ENTMCNC: 31.3 G/DL (ref 30–36.5)
MCV RBC AUTO: 93.7 FL (ref 80–99)
MONOCYTES # BLD: 1.3 K/UL (ref 0–1)
MONOCYTES NFR BLD: 15 % (ref 5–13)
NEUTS SEG # BLD: 4.8 K/UL (ref 1.8–8)
NEUTS SEG NFR BLD: 57 % (ref 32–75)
NRBC # BLD: 0 K/UL (ref 0–0.01)
NRBC BLD-RTO: 0 PER 100 WBC
PERFORMED BY:: ABNORMAL
PERFORMED BY:: NORMAL
PHOSPHATE SERPL-MCNC: 3.3 MG/DL (ref 2.6–4.7)
PLATELET # BLD AUTO: 427 K/UL (ref 150–400)
PMV BLD AUTO: 10 FL (ref 8.9–12.9)
POTASSIUM SERPL-SCNC: 4.2 MMOL/L (ref 3.5–5.1)
POTASSIUM SERPL-SCNC: 4.2 MMOL/L (ref 3.5–5.1)
PROCALCITONIN SERPL-MCNC: 2 NG/ML
RBC # BLD AUTO: 2.69 M/UL (ref 4.1–5.7)
SODIUM SERPL-SCNC: 133 MMOL/L (ref 136–145)
SODIUM SERPL-SCNC: 133 MMOL/L (ref 136–145)
VANCOMYCIN SERPL-MCNC: 17.3 UG/ML
VAS LEFT ABI: 0.73
VAS LEFT DORSALIS PEDIS BP: 94 MMHG
VAS LEFT PTA BP: 80 MMHG
VAS RIGHT ABI: 0.85
VAS RIGHT ARM BP: 128 MMHG
VAS RIGHT DORSALIS PEDIS BP: 60 MMHG
VAS RIGHT PTA BP: 109 MMHG
WBC # BLD AUTO: 8.5 K/UL (ref 4.1–11.1)

## 2023-06-14 PROCEDURE — 6360000002 HC RX W HCPCS: Performed by: INTERNAL MEDICINE

## 2023-06-14 PROCEDURE — 99232 SBSQ HOSP IP/OBS MODERATE 35: CPT | Performed by: PODIATRIST

## 2023-06-14 PROCEDURE — 80048 BASIC METABOLIC PNL TOTAL CA: CPT

## 2023-06-14 PROCEDURE — 80202 ASSAY OF VANCOMYCIN: CPT

## 2023-06-14 PROCEDURE — 2709999900 HC NON-CHARGEABLE SUPPLY

## 2023-06-14 PROCEDURE — 84145 PROCALCITONIN (PCT): CPT

## 2023-06-14 PROCEDURE — 2580000003 HC RX 258: Performed by: INTERNAL MEDICINE

## 2023-06-14 PROCEDURE — 6370000000 HC RX 637 (ALT 250 FOR IP): Performed by: INTERNAL MEDICINE

## 2023-06-14 PROCEDURE — 86140 C-REACTIVE PROTEIN: CPT

## 2023-06-14 PROCEDURE — 1100000000 HC RM PRIVATE

## 2023-06-14 PROCEDURE — 80069 RENAL FUNCTION PANEL: CPT

## 2023-06-14 PROCEDURE — 82962 GLUCOSE BLOOD TEST: CPT

## 2023-06-14 PROCEDURE — 85025 COMPLETE CBC W/AUTO DIFF WBC: CPT

## 2023-06-14 PROCEDURE — 99232 SBSQ HOSP IP/OBS MODERATE 35: CPT | Performed by: INTERNAL MEDICINE

## 2023-06-14 PROCEDURE — 36415 COLL VENOUS BLD VENIPUNCTURE: CPT

## 2023-06-14 PROCEDURE — 99232 SBSQ HOSP IP/OBS MODERATE 35: CPT | Performed by: SURGERY

## 2023-06-14 PROCEDURE — 90935 HEMODIALYSIS ONE EVALUATION: CPT

## 2023-06-14 PROCEDURE — 6360000002 HC RX W HCPCS: Performed by: PHYSICIAN ASSISTANT

## 2023-06-14 PROCEDURE — 83036 HEMOGLOBIN GLYCOSYLATED A1C: CPT

## 2023-06-14 RX ADMIN — SODIUM CHLORIDE, PRESERVATIVE FREE 10 ML: 5 INJECTION INTRAVENOUS at 22:27

## 2023-06-14 RX ADMIN — MIDODRINE HYDROCHLORIDE 5 MG: 5 TABLET ORAL at 17:01

## 2023-06-14 RX ADMIN — MIDODRINE HYDROCHLORIDE 5 MG: 5 TABLET ORAL at 12:50

## 2023-06-14 RX ADMIN — HEPARIN SODIUM 5000 UNITS: 5000 INJECTION INTRAVENOUS; SUBCUTANEOUS at 12:51

## 2023-06-14 RX ADMIN — HEPARIN SODIUM 5000 UNITS: 5000 INJECTION INTRAVENOUS; SUBCUTANEOUS at 00:24

## 2023-06-14 RX ADMIN — OXYCODONE HYDROCHLORIDE AND ACETAMINOPHEN 1 TABLET: 5; 325 TABLET ORAL at 12:50

## 2023-06-14 RX ADMIN — CARVEDILOL 3.12 MG: 3.12 TABLET, FILM COATED ORAL at 12:50

## 2023-06-14 RX ADMIN — OXYCODONE HYDROCHLORIDE AND ACETAMINOPHEN 1 TABLET: 5; 325 TABLET ORAL at 23:10

## 2023-06-14 RX ADMIN — HYDROMORPHONE HYDROCHLORIDE 1 MG: 1 INJECTION, SOLUTION INTRAMUSCULAR; INTRAVENOUS; SUBCUTANEOUS at 00:32

## 2023-06-14 RX ADMIN — SODIUM CHLORIDE, PRESERVATIVE FREE 10 ML: 5 INJECTION INTRAVENOUS at 12:58

## 2023-06-14 RX ADMIN — CLOPIDOGREL BISULFATE 75 MG: 75 TABLET ORAL at 12:51

## 2023-06-14 RX ADMIN — VANCOMYCIN HYDROCHLORIDE 750 MG: 750 INJECTION, POWDER, LYOPHILIZED, FOR SOLUTION INTRAVENOUS at 17:02

## 2023-06-14 RX ADMIN — EPOETIN ALFA-EPBX 10000 UNITS: 10000 INJECTION, SOLUTION INTRAVENOUS; SUBCUTANEOUS at 10:30

## 2023-06-14 RX ADMIN — HEPARIN SODIUM 5000 UNITS: 5000 INJECTION INTRAVENOUS; SUBCUTANEOUS at 23:06

## 2023-06-14 RX ADMIN — CARVEDILOL 3.12 MG: 3.12 TABLET, FILM COATED ORAL at 17:01

## 2023-06-14 RX ADMIN — OXYCODONE HYDROCHLORIDE AND ACETAMINOPHEN 1 TABLET: 5; 325 TABLET ORAL at 06:35

## 2023-06-14 RX ADMIN — EPOETIN ALFA-EPBX 10000 UNITS: 10000 INJECTION, SOLUTION INTRAVENOUS; SUBCUTANEOUS at 23:06

## 2023-06-14 RX ADMIN — MEROPENEM 500 MG: 500 INJECTION INTRAVENOUS at 14:43

## 2023-06-14 RX ADMIN — ATORVASTATIN CALCIUM 40 MG: 40 TABLET, FILM COATED ORAL at 12:51

## 2023-06-14 ASSESSMENT — PAIN DESCRIPTION - LOCATION
LOCATION: FOOT
LOCATION: FOOT

## 2023-06-14 ASSESSMENT — PAIN DESCRIPTION - ORIENTATION: ORIENTATION: RIGHT

## 2023-06-14 ASSESSMENT — PAIN DESCRIPTION - DESCRIPTORS: DESCRIPTORS: ACHING

## 2023-06-14 ASSESSMENT — PAIN SCALES - GENERAL
PAINLEVEL_OUTOF10: 9
PAINLEVEL_OUTOF10: 8

## 2023-06-15 LAB
ANION GAP SERPL CALC-SCNC: 4 MMOL/L (ref 5–15)
BASOPHILS # BLD: 0.1 K/UL (ref 0–0.1)
BASOPHILS NFR BLD: 1 % (ref 0–1)
BUN SERPL-MCNC: 22 MG/DL (ref 6–20)
BUN/CREAT SERPL: 4 (ref 12–20)
CA-I BLD-MCNC: 8.4 MG/DL (ref 8.5–10.1)
CHLORIDE SERPL-SCNC: 99 MMOL/L (ref 97–108)
CO2 SERPL-SCNC: 31 MMOL/L (ref 21–32)
CREAT SERPL-MCNC: 5 MG/DL (ref 0.7–1.3)
CRP SERPL-MCNC: 8.08 MG/DL (ref 0–0.6)
DIFFERENTIAL METHOD BLD: ABNORMAL
EOSINOPHIL # BLD: 0.6 K/UL (ref 0–0.4)
EOSINOPHIL NFR BLD: 8 % (ref 0–7)
ERYTHROCYTE [DISTWIDTH] IN BLOOD BY AUTOMATED COUNT: 16.7 % (ref 11.5–14.5)
GLUCOSE BLD STRIP.AUTO-MCNC: 103 MG/DL (ref 65–100)
GLUCOSE BLD STRIP.AUTO-MCNC: 88 MG/DL (ref 65–100)
GLUCOSE BLD STRIP.AUTO-MCNC: 89 MG/DL (ref 65–100)
GLUCOSE BLD STRIP.AUTO-MCNC: 99 MG/DL (ref 65–100)
GLUCOSE SERPL-MCNC: 90 MG/DL (ref 65–100)
HCT VFR BLD AUTO: 25.4 % (ref 36.6–50.3)
HGB BLD-MCNC: 7.9 G/DL (ref 12.1–17)
IMM GRANULOCYTES # BLD AUTO: 0 K/UL (ref 0–0.04)
IMM GRANULOCYTES NFR BLD AUTO: 0 % (ref 0–0.5)
LYMPHOCYTES # BLD: 1.8 K/UL (ref 0.8–3.5)
LYMPHOCYTES NFR BLD: 24 % (ref 12–49)
MCH RBC QN AUTO: 29 PG (ref 26–34)
MCHC RBC AUTO-ENTMCNC: 31.1 G/DL (ref 30–36.5)
MCV RBC AUTO: 93.4 FL (ref 80–99)
MONOCYTES # BLD: 1.2 K/UL (ref 0–1)
MONOCYTES NFR BLD: 16 % (ref 5–13)
NEUTS SEG # BLD: 3.8 K/UL (ref 1.8–8)
NEUTS SEG NFR BLD: 51 % (ref 32–75)
NRBC # BLD: 0 K/UL (ref 0–0.01)
NRBC BLD-RTO: 0 PER 100 WBC
PERFORMED BY:: ABNORMAL
PERFORMED BY:: NORMAL
PLATELET # BLD AUTO: 431 K/UL (ref 150–400)
PMV BLD AUTO: 10.4 FL (ref 8.9–12.9)
POTASSIUM SERPL-SCNC: 4.3 MMOL/L (ref 3.5–5.1)
PROCALCITONIN SERPL-MCNC: 1.81 NG/ML
RBC # BLD AUTO: 2.72 M/UL (ref 4.1–5.7)
SODIUM SERPL-SCNC: 134 MMOL/L (ref 136–145)
WBC # BLD AUTO: 7.6 K/UL (ref 4.1–11.1)

## 2023-06-15 PROCEDURE — 99232 SBSQ HOSP IP/OBS MODERATE 35: CPT | Performed by: INTERNAL MEDICINE

## 2023-06-15 PROCEDURE — 6360000002 HC RX W HCPCS: Performed by: INTERNAL MEDICINE

## 2023-06-15 PROCEDURE — 99232 SBSQ HOSP IP/OBS MODERATE 35: CPT | Performed by: SURGERY

## 2023-06-15 PROCEDURE — 84145 PROCALCITONIN (PCT): CPT

## 2023-06-15 PROCEDURE — 82962 GLUCOSE BLOOD TEST: CPT

## 2023-06-15 PROCEDURE — 1100000000 HC RM PRIVATE

## 2023-06-15 PROCEDURE — 36415 COLL VENOUS BLD VENIPUNCTURE: CPT

## 2023-06-15 PROCEDURE — 86140 C-REACTIVE PROTEIN: CPT

## 2023-06-15 PROCEDURE — 85025 COMPLETE CBC W/AUTO DIFF WBC: CPT

## 2023-06-15 PROCEDURE — 6370000000 HC RX 637 (ALT 250 FOR IP): Performed by: INTERNAL MEDICINE

## 2023-06-15 PROCEDURE — 80048 BASIC METABOLIC PNL TOTAL CA: CPT

## 2023-06-15 PROCEDURE — 2580000003 HC RX 258: Performed by: INTERNAL MEDICINE

## 2023-06-15 RX ADMIN — CARVEDILOL 3.12 MG: 3.12 TABLET, FILM COATED ORAL at 17:12

## 2023-06-15 RX ADMIN — MEROPENEM 500 MG: 500 INJECTION INTRAVENOUS at 14:00

## 2023-06-15 RX ADMIN — MIDODRINE HYDROCHLORIDE 5 MG: 5 TABLET ORAL at 12:45

## 2023-06-15 RX ADMIN — SODIUM CHLORIDE, PRESERVATIVE FREE 10 ML: 5 INJECTION INTRAVENOUS at 08:51

## 2023-06-15 RX ADMIN — ATORVASTATIN CALCIUM 40 MG: 40 TABLET, FILM COATED ORAL at 08:47

## 2023-06-15 RX ADMIN — HEPARIN SODIUM 5000 UNITS: 5000 INJECTION INTRAVENOUS; SUBCUTANEOUS at 12:44

## 2023-06-15 RX ADMIN — CLOPIDOGREL BISULFATE 75 MG: 75 TABLET ORAL at 08:47

## 2023-06-15 RX ADMIN — OXYCODONE HYDROCHLORIDE AND ACETAMINOPHEN 1 TABLET: 5; 325 TABLET ORAL at 04:10

## 2023-06-15 RX ADMIN — HEPARIN SODIUM 5000 UNITS: 5000 INJECTION INTRAVENOUS; SUBCUTANEOUS at 23:06

## 2023-06-15 RX ADMIN — MIDODRINE HYDROCHLORIDE 5 MG: 5 TABLET ORAL at 08:48

## 2023-06-15 RX ADMIN — OXYCODONE HYDROCHLORIDE AND ACETAMINOPHEN 1 TABLET: 5; 325 TABLET ORAL at 21:29

## 2023-06-15 RX ADMIN — MIDODRINE HYDROCHLORIDE 5 MG: 5 TABLET ORAL at 17:12

## 2023-06-15 RX ADMIN — CARVEDILOL 3.12 MG: 3.12 TABLET, FILM COATED ORAL at 08:47

## 2023-06-15 RX ADMIN — OXYCODONE HYDROCHLORIDE AND ACETAMINOPHEN 1 TABLET: 5; 325 TABLET ORAL at 15:41

## 2023-06-15 RX ADMIN — SODIUM CHLORIDE: 9 INJECTION, SOLUTION INTRAVENOUS at 23:08

## 2023-06-15 ASSESSMENT — PAIN DESCRIPTION - LOCATION: LOCATION: FOOT

## 2023-06-15 ASSESSMENT — PAIN SCALES - GENERAL
PAINLEVEL_OUTOF10: 6
PAINLEVEL_OUTOF10: 7
PAINLEVEL_OUTOF10: 8
PAINLEVEL_OUTOF10: 8

## 2023-06-15 ASSESSMENT — PAIN DESCRIPTION - DESCRIPTORS: DESCRIPTORS: THROBBING

## 2023-06-15 ASSESSMENT — PAIN DESCRIPTION - ORIENTATION: ORIENTATION: LEFT

## 2023-06-16 LAB
ANION GAP SERPL CALC-SCNC: 5 MMOL/L (ref 5–15)
BASOPHILS # BLD: 0.1 K/UL (ref 0–0.1)
BASOPHILS NFR BLD: 1 % (ref 0–1)
BUN SERPL-MCNC: 30 MG/DL (ref 6–20)
BUN/CREAT SERPL: 5 (ref 12–20)
CA-I BLD-MCNC: 8.3 MG/DL (ref 8.5–10.1)
CHLORIDE SERPL-SCNC: 100 MMOL/L (ref 97–108)
CO2 SERPL-SCNC: 29 MMOL/L (ref 21–32)
CREAT SERPL-MCNC: 6.26 MG/DL (ref 0.7–1.3)
CRP SERPL-MCNC: 6.12 MG/DL (ref 0–0.6)
DIFFERENTIAL METHOD BLD: ABNORMAL
EOSINOPHIL # BLD: 0.7 K/UL (ref 0–0.4)
EOSINOPHIL NFR BLD: 9 % (ref 0–7)
ERYTHROCYTE [DISTWIDTH] IN BLOOD BY AUTOMATED COUNT: 16.7 % (ref 11.5–14.5)
GLUCOSE BLD STRIP.AUTO-MCNC: 128 MG/DL (ref 65–100)
GLUCOSE BLD STRIP.AUTO-MCNC: 154 MG/DL (ref 65–100)
GLUCOSE BLD STRIP.AUTO-MCNC: 85 MG/DL (ref 65–100)
GLUCOSE SERPL-MCNC: 102 MG/DL (ref 65–100)
HCT VFR BLD AUTO: 25.8 % (ref 36.6–50.3)
HGB BLD-MCNC: 8.1 G/DL (ref 12.1–17)
IMM GRANULOCYTES # BLD AUTO: 0 K/UL (ref 0–0.04)
IMM GRANULOCYTES NFR BLD AUTO: 0 % (ref 0–0.5)
LYMPHOCYTES # BLD: 2.1 K/UL (ref 0.8–3.5)
LYMPHOCYTES NFR BLD: 28 % (ref 12–49)
MCH RBC QN AUTO: 29.3 PG (ref 26–34)
MCHC RBC AUTO-ENTMCNC: 31.4 G/DL (ref 30–36.5)
MCV RBC AUTO: 93.5 FL (ref 80–99)
MONOCYTES # BLD: 1 K/UL (ref 0–1)
MONOCYTES NFR BLD: 13 % (ref 5–13)
NEUTS SEG # BLD: 3.8 K/UL (ref 1.8–8)
NEUTS SEG NFR BLD: 49 % (ref 32–75)
NRBC # BLD: 0 K/UL (ref 0–0.01)
NRBC BLD-RTO: 0 PER 100 WBC
PERFORMED BY:: ABNORMAL
PERFORMED BY:: ABNORMAL
PERFORMED BY:: NORMAL
PLATELET # BLD AUTO: 435 K/UL (ref 150–400)
PMV BLD AUTO: 10.2 FL (ref 8.9–12.9)
POTASSIUM SERPL-SCNC: 4.2 MMOL/L (ref 3.5–5.1)
PROCALCITONIN SERPL-MCNC: 1.55 NG/ML
RBC # BLD AUTO: 2.76 M/UL (ref 4.1–5.7)
SODIUM SERPL-SCNC: 134 MMOL/L (ref 136–145)
VANCOMYCIN SERPL-MCNC: 18.3 UG/ML
WBC # BLD AUTO: 7.6 K/UL (ref 4.1–11.1)

## 2023-06-16 PROCEDURE — 99232 SBSQ HOSP IP/OBS MODERATE 35: CPT | Performed by: SURGERY

## 2023-06-16 PROCEDURE — 80202 ASSAY OF VANCOMYCIN: CPT

## 2023-06-16 PROCEDURE — 6360000002 HC RX W HCPCS: Performed by: INTERNAL MEDICINE

## 2023-06-16 PROCEDURE — 6370000000 HC RX 637 (ALT 250 FOR IP): Performed by: INTERNAL MEDICINE

## 2023-06-16 PROCEDURE — 90935 HEMODIALYSIS ONE EVALUATION: CPT

## 2023-06-16 PROCEDURE — 2709999900 HC NON-CHARGEABLE SUPPLY

## 2023-06-16 PROCEDURE — 1100000000 HC RM PRIVATE

## 2023-06-16 PROCEDURE — 84145 PROCALCITONIN (PCT): CPT

## 2023-06-16 PROCEDURE — 99232 SBSQ HOSP IP/OBS MODERATE 35: CPT | Performed by: INTERNAL MEDICINE

## 2023-06-16 PROCEDURE — 80048 BASIC METABOLIC PNL TOTAL CA: CPT

## 2023-06-16 PROCEDURE — 82962 GLUCOSE BLOOD TEST: CPT

## 2023-06-16 PROCEDURE — 36415 COLL VENOUS BLD VENIPUNCTURE: CPT

## 2023-06-16 PROCEDURE — 2580000003 HC RX 258: Performed by: INTERNAL MEDICINE

## 2023-06-16 PROCEDURE — 85025 COMPLETE CBC W/AUTO DIFF WBC: CPT

## 2023-06-16 PROCEDURE — 86140 C-REACTIVE PROTEIN: CPT

## 2023-06-16 RX ADMIN — OXYCODONE HYDROCHLORIDE AND ACETAMINOPHEN 1 TABLET: 5; 325 TABLET ORAL at 11:36

## 2023-06-16 RX ADMIN — EPOETIN ALFA-EPBX 10000 UNITS: 10000 INJECTION, SOLUTION INTRAVENOUS; SUBCUTANEOUS at 22:00

## 2023-06-16 RX ADMIN — EPOETIN ALFA-EPBX 10000 UNITS: 10000 INJECTION, SOLUTION INTRAVENOUS; SUBCUTANEOUS at 09:31

## 2023-06-16 RX ADMIN — OXYCODONE HYDROCHLORIDE AND ACETAMINOPHEN 1 TABLET: 5; 325 TABLET ORAL at 17:43

## 2023-06-16 RX ADMIN — MEROPENEM 500 MG: 500 INJECTION INTRAVENOUS at 14:52

## 2023-06-16 RX ADMIN — HEPARIN SODIUM 5000 UNITS: 5000 INJECTION INTRAVENOUS; SUBCUTANEOUS at 12:39

## 2023-06-16 RX ADMIN — SODIUM CHLORIDE, PRESERVATIVE FREE 10 ML: 5 INJECTION INTRAVENOUS at 21:11

## 2023-06-16 RX ADMIN — MIDODRINE HYDROCHLORIDE 5 MG: 5 TABLET ORAL at 12:39

## 2023-06-16 RX ADMIN — MIDODRINE HYDROCHLORIDE 5 MG: 5 TABLET ORAL at 17:43

## 2023-06-16 RX ADMIN — OXYCODONE HYDROCHLORIDE AND ACETAMINOPHEN 1 TABLET: 5; 325 TABLET ORAL at 22:05

## 2023-06-16 ASSESSMENT — PAIN DESCRIPTION - ORIENTATION
ORIENTATION: RIGHT

## 2023-06-16 ASSESSMENT — PAIN DESCRIPTION - LOCATION
LOCATION: FOOT
LOCATION: FOOT
LOCATION: LEG;FOOT

## 2023-06-16 ASSESSMENT — PAIN SCALES - GENERAL
PAINLEVEL_OUTOF10: 5
PAINLEVEL_OUTOF10: 8
PAINLEVEL_OUTOF10: 8
PAINLEVEL_OUTOF10: 9
PAINLEVEL_OUTOF10: 4
PAINLEVEL_OUTOF10: 9

## 2023-06-16 ASSESSMENT — PAIN DESCRIPTION - DESCRIPTORS
DESCRIPTORS: THROBBING
DESCRIPTORS: ACHING
DESCRIPTORS: THROBBING

## 2023-06-16 ASSESSMENT — PAIN SCALES - WONG BAKER: WONGBAKER_NUMERICALRESPONSE: 2

## 2023-06-17 LAB
ANION GAP SERPL CALC-SCNC: 2 MMOL/L (ref 5–15)
BACTERIA SPEC CULT: ABNORMAL
BACTERIA SPEC CULT: ABNORMAL
BASOPHILS # BLD: 0.1 K/UL (ref 0–0.1)
BASOPHILS NFR BLD: 1 % (ref 0–1)
BUN SERPL-MCNC: 26 MG/DL (ref 6–20)
BUN/CREAT SERPL: 5 (ref 12–20)
CA-I BLD-MCNC: 8.8 MG/DL (ref 8.5–10.1)
CHLORIDE SERPL-SCNC: 102 MMOL/L (ref 97–108)
CO2 SERPL-SCNC: 31 MMOL/L (ref 21–32)
CREAT SERPL-MCNC: 5.76 MG/DL (ref 0.7–1.3)
CRP SERPL-MCNC: 5.88 MG/DL (ref 0–0.6)
DIFFERENTIAL METHOD BLD: ABNORMAL
EOSINOPHIL # BLD: 0.6 K/UL (ref 0–0.4)
EOSINOPHIL NFR BLD: 8 % (ref 0–7)
ERYTHROCYTE [DISTWIDTH] IN BLOOD BY AUTOMATED COUNT: 17.2 % (ref 11.5–14.5)
GLUCOSE BLD STRIP.AUTO-MCNC: 105 MG/DL (ref 65–100)
GLUCOSE BLD STRIP.AUTO-MCNC: 132 MG/DL (ref 65–100)
GLUCOSE BLD STRIP.AUTO-MCNC: 134 MG/DL (ref 65–100)
GLUCOSE BLD STRIP.AUTO-MCNC: 93 MG/DL (ref 65–100)
GLUCOSE SERPL-MCNC: 118 MG/DL (ref 65–100)
GRAM STN SPEC: ABNORMAL
GRAM STN SPEC: ABNORMAL
HCT VFR BLD AUTO: 26.5 % (ref 36.6–50.3)
HGB BLD-MCNC: 8.1 G/DL (ref 12.1–17)
IMM GRANULOCYTES # BLD AUTO: 0 K/UL (ref 0–0.04)
IMM GRANULOCYTES NFR BLD AUTO: 0 % (ref 0–0.5)
LYMPHOCYTES # BLD: 2 K/UL (ref 0.8–3.5)
LYMPHOCYTES NFR BLD: 25 % (ref 12–49)
Lab: ABNORMAL
MCH RBC QN AUTO: 28.7 PG (ref 26–34)
MCHC RBC AUTO-ENTMCNC: 30.6 G/DL (ref 30–36.5)
MCV RBC AUTO: 94 FL (ref 80–99)
MONOCYTES # BLD: 1 K/UL (ref 0–1)
MONOCYTES NFR BLD: 12 % (ref 5–13)
NEUTS SEG # BLD: 4.4 K/UL (ref 1.8–8)
NEUTS SEG NFR BLD: 54 % (ref 32–75)
NRBC # BLD: 0 K/UL (ref 0–0.01)
NRBC BLD-RTO: 0 PER 100 WBC
PERFORMED BY:: ABNORMAL
PERFORMED BY:: NORMAL
PLATELET # BLD AUTO: 463 K/UL (ref 150–400)
PMV BLD AUTO: 10.6 FL (ref 8.9–12.9)
POTASSIUM SERPL-SCNC: 4.2 MMOL/L (ref 3.5–5.1)
PROCALCITONIN SERPL-MCNC: 1.46 NG/ML
RBC # BLD AUTO: 2.82 M/UL (ref 4.1–5.7)
SODIUM SERPL-SCNC: 135 MMOL/L (ref 136–145)
WBC # BLD AUTO: 8.1 K/UL (ref 4.1–11.1)

## 2023-06-17 PROCEDURE — 6360000002 HC RX W HCPCS: Performed by: PHYSICIAN ASSISTANT

## 2023-06-17 PROCEDURE — 82962 GLUCOSE BLOOD TEST: CPT

## 2023-06-17 PROCEDURE — 36415 COLL VENOUS BLD VENIPUNCTURE: CPT

## 2023-06-17 PROCEDURE — 6360000002 HC RX W HCPCS: Performed by: INTERNAL MEDICINE

## 2023-06-17 PROCEDURE — 6370000000 HC RX 637 (ALT 250 FOR IP): Performed by: INTERNAL MEDICINE

## 2023-06-17 PROCEDURE — 85025 COMPLETE CBC W/AUTO DIFF WBC: CPT

## 2023-06-17 PROCEDURE — 84145 PROCALCITONIN (PCT): CPT

## 2023-06-17 PROCEDURE — 6370000000 HC RX 637 (ALT 250 FOR IP): Performed by: PHYSICIAN ASSISTANT

## 2023-06-17 PROCEDURE — 86140 C-REACTIVE PROTEIN: CPT

## 2023-06-17 PROCEDURE — 80048 BASIC METABOLIC PNL TOTAL CA: CPT

## 2023-06-17 PROCEDURE — 2580000003 HC RX 258: Performed by: INTERNAL MEDICINE

## 2023-06-17 PROCEDURE — 1100000000 HC RM PRIVATE

## 2023-06-17 RX ORDER — SENNA PLUS 8.6 MG/1
2 TABLET ORAL NIGHTLY
Status: DISCONTINUED | OUTPATIENT
Start: 2023-06-17 | End: 2023-06-23 | Stop reason: HOSPADM

## 2023-06-17 RX ORDER — DOCUSATE SODIUM 100 MG/1
100 CAPSULE, LIQUID FILLED ORAL 2 TIMES DAILY
Status: DISCONTINUED | OUTPATIENT
Start: 2023-06-17 | End: 2023-06-23 | Stop reason: HOSPADM

## 2023-06-17 RX ADMIN — ATORVASTATIN CALCIUM 40 MG: 40 TABLET, FILM COATED ORAL at 07:54

## 2023-06-17 RX ADMIN — HYDROMORPHONE HYDROCHLORIDE 1 MG: 1 INJECTION, SOLUTION INTRAMUSCULAR; INTRAVENOUS; SUBCUTANEOUS at 07:54

## 2023-06-17 RX ADMIN — DOCUSATE SODIUM 100 MG: 100 CAPSULE, LIQUID FILLED ORAL at 20:25

## 2023-06-17 RX ADMIN — OXYCODONE HYDROCHLORIDE AND ACETAMINOPHEN 1 TABLET: 5; 325 TABLET ORAL at 20:25

## 2023-06-17 RX ADMIN — CARVEDILOL 3.12 MG: 3.12 TABLET, FILM COATED ORAL at 17:36

## 2023-06-17 RX ADMIN — CLOPIDOGREL BISULFATE 75 MG: 75 TABLET ORAL at 07:54

## 2023-06-17 RX ADMIN — MEROPENEM 500 MG: 500 INJECTION INTRAVENOUS at 14:19

## 2023-06-17 RX ADMIN — MIDODRINE HYDROCHLORIDE 5 MG: 5 TABLET ORAL at 12:20

## 2023-06-17 RX ADMIN — MIDODRINE HYDROCHLORIDE 5 MG: 5 TABLET ORAL at 07:54

## 2023-06-17 RX ADMIN — MIDODRINE HYDROCHLORIDE 5 MG: 5 TABLET ORAL at 17:36

## 2023-06-17 RX ADMIN — HEPARIN SODIUM 5000 UNITS: 5000 INJECTION INTRAVENOUS; SUBCUTANEOUS at 12:18

## 2023-06-17 RX ADMIN — SODIUM CHLORIDE, PRESERVATIVE FREE 10 ML: 5 INJECTION INTRAVENOUS at 07:58

## 2023-06-17 RX ADMIN — SENNOSIDES 17.2 MG: 8.6 TABLET, FILM COATED ORAL at 20:25

## 2023-06-17 RX ADMIN — CARVEDILOL 3.12 MG: 3.12 TABLET, FILM COATED ORAL at 07:54

## 2023-06-17 RX ADMIN — HEPARIN SODIUM 5000 UNITS: 5000 INJECTION INTRAVENOUS; SUBCUTANEOUS at 00:17

## 2023-06-17 ASSESSMENT — PAIN DESCRIPTION - ORIENTATION: ORIENTATION: RIGHT

## 2023-06-17 ASSESSMENT — PAIN SCALES - GENERAL
PAINLEVEL_OUTOF10: 9
PAINLEVEL_OUTOF10: 0
PAINLEVEL_OUTOF10: 6

## 2023-06-17 ASSESSMENT — PAIN DESCRIPTION - LOCATION: LOCATION: FOOT

## 2023-06-17 ASSESSMENT — PAIN SCALES - WONG BAKER: WONGBAKER_NUMERICALRESPONSE: 6

## 2023-06-18 LAB
ANION GAP SERPL CALC-SCNC: 4 MMOL/L (ref 5–15)
BASOPHILS # BLD: 0.1 K/UL (ref 0–0.1)
BASOPHILS NFR BLD: 1 % (ref 0–1)
BUN SERPL-MCNC: 39 MG/DL (ref 6–20)
BUN/CREAT SERPL: 5 (ref 12–20)
CA-I BLD-MCNC: 8.3 MG/DL (ref 8.5–10.1)
CHLORIDE SERPL-SCNC: 102 MMOL/L (ref 97–108)
CO2 SERPL-SCNC: 29 MMOL/L (ref 21–32)
CREAT SERPL-MCNC: 7.56 MG/DL (ref 0.7–1.3)
DIFFERENTIAL METHOD BLD: ABNORMAL
EOSINOPHIL # BLD: 0.8 K/UL (ref 0–0.4)
EOSINOPHIL NFR BLD: 9 % (ref 0–7)
ERYTHROCYTE [DISTWIDTH] IN BLOOD BY AUTOMATED COUNT: 17.2 % (ref 11.5–14.5)
GLUCOSE BLD STRIP.AUTO-MCNC: 107 MG/DL (ref 65–100)
GLUCOSE BLD STRIP.AUTO-MCNC: 74 MG/DL (ref 65–100)
GLUCOSE BLD STRIP.AUTO-MCNC: 83 MG/DL (ref 65–100)
GLUCOSE BLD STRIP.AUTO-MCNC: 89 MG/DL (ref 65–100)
GLUCOSE SERPL-MCNC: 96 MG/DL (ref 65–100)
HCT VFR BLD AUTO: 24.5 % (ref 36.6–50.3)
HGB BLD-MCNC: 7.6 G/DL (ref 12.1–17)
IMM GRANULOCYTES # BLD AUTO: 0 K/UL (ref 0–0.04)
IMM GRANULOCYTES NFR BLD AUTO: 0 % (ref 0–0.5)
LYMPHOCYTES # BLD: 2.3 K/UL (ref 0.8–3.5)
LYMPHOCYTES NFR BLD: 24 % (ref 12–49)
MCH RBC QN AUTO: 29 PG (ref 26–34)
MCHC RBC AUTO-ENTMCNC: 31 G/DL (ref 30–36.5)
MCV RBC AUTO: 93.5 FL (ref 80–99)
MONOCYTES # BLD: 1.1 K/UL (ref 0–1)
MONOCYTES NFR BLD: 12 % (ref 5–13)
NEUTS SEG # BLD: 5.3 K/UL (ref 1.8–8)
NEUTS SEG NFR BLD: 54 % (ref 32–75)
NRBC # BLD: 0 K/UL (ref 0–0.01)
NRBC BLD-RTO: 0 PER 100 WBC
PERFORMED BY:: ABNORMAL
PERFORMED BY:: NORMAL
PLATELET # BLD AUTO: 422 K/UL (ref 150–400)
PMV BLD AUTO: 10.5 FL (ref 8.9–12.9)
POTASSIUM SERPL-SCNC: 5.2 MMOL/L (ref 3.5–5.1)
RBC # BLD AUTO: 2.62 M/UL (ref 4.1–5.7)
SODIUM SERPL-SCNC: 135 MMOL/L (ref 136–145)
WBC # BLD AUTO: 9.6 K/UL (ref 4.1–11.1)

## 2023-06-18 PROCEDURE — 6370000000 HC RX 637 (ALT 250 FOR IP): Performed by: PHYSICIAN ASSISTANT

## 2023-06-18 PROCEDURE — 36415 COLL VENOUS BLD VENIPUNCTURE: CPT

## 2023-06-18 PROCEDURE — 6360000002 HC RX W HCPCS: Performed by: INTERNAL MEDICINE

## 2023-06-18 PROCEDURE — 6360000002 HC RX W HCPCS: Performed by: PHYSICIAN ASSISTANT

## 2023-06-18 PROCEDURE — 6370000000 HC RX 637 (ALT 250 FOR IP): Performed by: INTERNAL MEDICINE

## 2023-06-18 PROCEDURE — 82962 GLUCOSE BLOOD TEST: CPT

## 2023-06-18 PROCEDURE — 2580000003 HC RX 258: Performed by: PHYSICIAN ASSISTANT

## 2023-06-18 PROCEDURE — 2580000003 HC RX 258: Performed by: INTERNAL MEDICINE

## 2023-06-18 PROCEDURE — 1100000000 HC RM PRIVATE

## 2023-06-18 PROCEDURE — 85025 COMPLETE CBC W/AUTO DIFF WBC: CPT

## 2023-06-18 PROCEDURE — 80048 BASIC METABOLIC PNL TOTAL CA: CPT

## 2023-06-18 RX ADMIN — HEPARIN SODIUM 5000 UNITS: 5000 INJECTION INTRAVENOUS; SUBCUTANEOUS at 14:06

## 2023-06-18 RX ADMIN — CARVEDILOL 3.12 MG: 3.12 TABLET, FILM COATED ORAL at 17:06

## 2023-06-18 RX ADMIN — HEPARIN SODIUM 5000 UNITS: 5000 INJECTION INTRAVENOUS; SUBCUTANEOUS at 22:28

## 2023-06-18 RX ADMIN — HEPARIN SODIUM 5000 UNITS: 5000 INJECTION INTRAVENOUS; SUBCUTANEOUS at 02:09

## 2023-06-18 RX ADMIN — MIDODRINE HYDROCHLORIDE 5 MG: 5 TABLET ORAL at 17:06

## 2023-06-18 RX ADMIN — ATORVASTATIN CALCIUM 40 MG: 40 TABLET, FILM COATED ORAL at 09:02

## 2023-06-18 RX ADMIN — OXYCODONE HYDROCHLORIDE AND ACETAMINOPHEN 1 TABLET: 5; 325 TABLET ORAL at 02:10

## 2023-06-18 RX ADMIN — SENNOSIDES 17.2 MG: 8.6 TABLET, FILM COATED ORAL at 22:27

## 2023-06-18 RX ADMIN — MIDODRINE HYDROCHLORIDE 5 MG: 5 TABLET ORAL at 14:06

## 2023-06-18 RX ADMIN — OXYCODONE HYDROCHLORIDE AND ACETAMINOPHEN 1 TABLET: 5; 325 TABLET ORAL at 14:07

## 2023-06-18 RX ADMIN — MEROPENEM 500 MG: 500 INJECTION INTRAVENOUS at 14:07

## 2023-06-18 RX ADMIN — OXYCODONE HYDROCHLORIDE AND ACETAMINOPHEN 1 TABLET: 5; 325 TABLET ORAL at 22:26

## 2023-06-18 RX ADMIN — DOCUSATE SODIUM 100 MG: 100 CAPSULE, LIQUID FILLED ORAL at 08:57

## 2023-06-18 RX ADMIN — CARVEDILOL 3.12 MG: 3.12 TABLET, FILM COATED ORAL at 08:57

## 2023-06-18 RX ADMIN — SODIUM CHLORIDE, PRESERVATIVE FREE 10 ML: 5 INJECTION INTRAVENOUS at 21:00

## 2023-06-18 RX ADMIN — SODIUM CHLORIDE, PRESERVATIVE FREE 10 ML: 5 INJECTION INTRAVENOUS at 02:15

## 2023-06-18 RX ADMIN — DOCUSATE SODIUM 100 MG: 100 CAPSULE, LIQUID FILLED ORAL at 22:27

## 2023-06-18 RX ADMIN — MIDODRINE HYDROCHLORIDE 5 MG: 5 TABLET ORAL at 08:57

## 2023-06-18 ASSESSMENT — PAIN DESCRIPTION - ORIENTATION
ORIENTATION: RIGHT
ORIENTATION: RIGHT

## 2023-06-18 ASSESSMENT — PAIN DESCRIPTION - LOCATION
LOCATION: FOOT
LOCATION: FOOT

## 2023-06-18 ASSESSMENT — PAIN SCALES - GENERAL
PAINLEVEL_OUTOF10: 9
PAINLEVEL_OUTOF10: 9
PAINLEVEL_OUTOF10: 6

## 2023-06-18 ASSESSMENT — PAIN DESCRIPTION - DESCRIPTORS: DESCRIPTORS: ACHING

## 2023-06-19 ENCOUNTER — ANESTHESIA EVENT (OUTPATIENT)
Facility: HOSPITAL | Age: 73
DRG: 474 | End: 2023-06-19
Payer: MEDICARE

## 2023-06-19 ENCOUNTER — ANESTHESIA (OUTPATIENT)
Facility: HOSPITAL | Age: 73
DRG: 474 | End: 2023-06-19
Payer: MEDICARE

## 2023-06-19 LAB
ANION GAP SERPL CALC-SCNC: 4 MMOL/L (ref 5–15)
BASOPHILS # BLD: 0.1 K/UL (ref 0–0.1)
BASOPHILS NFR BLD: 1 % (ref 0–1)
BUN SERPL-MCNC: 40 MG/DL (ref 6–20)
BUN/CREAT SERPL: 5 (ref 12–20)
CA-I BLD-MCNC: 8.5 MG/DL (ref 8.5–10.1)
CHLORIDE SERPL-SCNC: 104 MMOL/L (ref 97–108)
CO2 SERPL-SCNC: 29 MMOL/L (ref 21–32)
CREAT SERPL-MCNC: 7.99 MG/DL (ref 0.7–1.3)
CRP SERPL-MCNC: 4.02 MG/DL (ref 0–0.6)
DIFFERENTIAL METHOD BLD: ABNORMAL
EOSINOPHIL # BLD: 0.9 K/UL (ref 0–0.4)
EOSINOPHIL NFR BLD: 9 % (ref 0–7)
ERYTHROCYTE [DISTWIDTH] IN BLOOD BY AUTOMATED COUNT: 17.5 % (ref 11.5–14.5)
GLUCOSE BLD STRIP.AUTO-MCNC: 87 MG/DL (ref 65–100)
GLUCOSE BLD STRIP.AUTO-MCNC: 90 MG/DL (ref 65–100)
GLUCOSE BLD STRIP.AUTO-MCNC: 94 MG/DL (ref 65–100)
GLUCOSE BLD STRIP.AUTO-MCNC: 98 MG/DL (ref 65–100)
GLUCOSE SERPL-MCNC: 97 MG/DL (ref 65–100)
HCT VFR BLD AUTO: 25.3 % (ref 36.6–50.3)
HGB BLD-MCNC: 7.8 G/DL (ref 12.1–17)
IMM GRANULOCYTES # BLD AUTO: 0 K/UL (ref 0–0.04)
IMM GRANULOCYTES NFR BLD AUTO: 0 % (ref 0–0.5)
LYMPHOCYTES # BLD: 2.4 K/UL (ref 0.8–3.5)
LYMPHOCYTES NFR BLD: 25 % (ref 12–49)
MCH RBC QN AUTO: 28.9 PG (ref 26–34)
MCHC RBC AUTO-ENTMCNC: 30.8 G/DL (ref 30–36.5)
MCV RBC AUTO: 93.7 FL (ref 80–99)
MONOCYTES # BLD: 1.1 K/UL (ref 0–1)
MONOCYTES NFR BLD: 12 % (ref 5–13)
NEUTS SEG # BLD: 5 K/UL (ref 1.8–8)
NEUTS SEG NFR BLD: 53 % (ref 32–75)
NRBC # BLD: 0 K/UL (ref 0–0.01)
NRBC BLD-RTO: 0 PER 100 WBC
PERFORMED BY:: NORMAL
PLATELET # BLD AUTO: 426 K/UL (ref 150–400)
PMV BLD AUTO: 10.4 FL (ref 8.9–12.9)
POTASSIUM SERPL-SCNC: 4.6 MMOL/L (ref 3.5–5.1)
PROCALCITONIN SERPL-MCNC: 1.04 NG/ML
RBC # BLD AUTO: 2.7 M/UL (ref 4.1–5.7)
SODIUM SERPL-SCNC: 137 MMOL/L (ref 136–145)
WBC # BLD AUTO: 9.5 K/UL (ref 4.1–11.1)

## 2023-06-19 PROCEDURE — 3700000000 HC ANESTHESIA ATTENDED CARE: Performed by: SURGERY

## 2023-06-19 PROCEDURE — 6370000000 HC RX 637 (ALT 250 FOR IP): Performed by: ANESTHESIOLOGY

## 2023-06-19 PROCEDURE — 86140 C-REACTIVE PROTEIN: CPT

## 2023-06-19 PROCEDURE — 6360000002 HC RX W HCPCS: Performed by: SURGERY

## 2023-06-19 PROCEDURE — 3600000013 HC SURGERY LEVEL 3 ADDTL 15MIN: Performed by: SURGERY

## 2023-06-19 PROCEDURE — 7100000000 HC PACU RECOVERY - FIRST 15 MIN: Performed by: SURGERY

## 2023-06-19 PROCEDURE — 7100000001 HC PACU RECOVERY - ADDTL 15 MIN: Performed by: SURGERY

## 2023-06-19 PROCEDURE — 82962 GLUCOSE BLOOD TEST: CPT

## 2023-06-19 PROCEDURE — 84145 PROCALCITONIN (PCT): CPT

## 2023-06-19 PROCEDURE — 3600000003 HC SURGERY LEVEL 3 BASE: Performed by: SURGERY

## 2023-06-19 PROCEDURE — 85025 COMPLETE CBC W/AUTO DIFF WBC: CPT

## 2023-06-19 PROCEDURE — 0Y6H0Z3 DETACHMENT AT RIGHT LOWER LEG, LOW, OPEN APPROACH: ICD-10-PCS | Performed by: SURGERY

## 2023-06-19 PROCEDURE — 2580000003 HC RX 258: Performed by: INTERNAL MEDICINE

## 2023-06-19 PROCEDURE — 2709999900 HC NON-CHARGEABLE SUPPLY: Performed by: SURGERY

## 2023-06-19 PROCEDURE — 2580000003 HC RX 258: Performed by: PHYSICIAN ASSISTANT

## 2023-06-19 PROCEDURE — 6370000000 HC RX 637 (ALT 250 FOR IP): Performed by: PHYSICIAN ASSISTANT

## 2023-06-19 PROCEDURE — 27880 AMPUTATION OF LOWER LEG: CPT | Performed by: SURGERY

## 2023-06-19 PROCEDURE — 3700000001 HC ADD 15 MINUTES (ANESTHESIA): Performed by: SURGERY

## 2023-06-19 PROCEDURE — 80048 BASIC METABOLIC PNL TOTAL CA: CPT

## 2023-06-19 PROCEDURE — 6360000002 HC RX W HCPCS: Performed by: NURSE ANESTHETIST, CERTIFIED REGISTERED

## 2023-06-19 PROCEDURE — 1100000000 HC RM PRIVATE

## 2023-06-19 PROCEDURE — 6370000000 HC RX 637 (ALT 250 FOR IP): Performed by: INTERNAL MEDICINE

## 2023-06-19 PROCEDURE — 36415 COLL VENOUS BLD VENIPUNCTURE: CPT

## 2023-06-19 PROCEDURE — 2500000003 HC RX 250 WO HCPCS: Performed by: NURSE ANESTHETIST, CERTIFIED REGISTERED

## 2023-06-19 PROCEDURE — 99232 SBSQ HOSP IP/OBS MODERATE 35: CPT | Performed by: SURGERY

## 2023-06-19 PROCEDURE — 6360000002 HC RX W HCPCS

## 2023-06-19 PROCEDURE — 2500000003 HC RX 250 WO HCPCS: Performed by: ANESTHESIOLOGY

## 2023-06-19 PROCEDURE — 2580000003 HC RX 258: Performed by: SURGERY

## 2023-06-19 PROCEDURE — 6360000002 HC RX W HCPCS: Performed by: PHYSICIAN ASSISTANT

## 2023-06-19 RX ORDER — SODIUM CHLORIDE 0.9 % (FLUSH) 0.9 %
5-40 SYRINGE (ML) INJECTION PRN
Status: DISCONTINUED | OUTPATIENT
Start: 2023-06-19 | End: 2023-06-19 | Stop reason: HOSPADM

## 2023-06-19 RX ORDER — SODIUM CHLORIDE 0.9 % (FLUSH) 0.9 %
5-40 SYRINGE (ML) INJECTION EVERY 12 HOURS SCHEDULED
Status: DISCONTINUED | OUTPATIENT
Start: 2023-06-19 | End: 2023-06-23 | Stop reason: HOSPADM

## 2023-06-19 RX ORDER — FENTANYL CITRATE 50 UG/ML
INJECTION, SOLUTION INTRAMUSCULAR; INTRAVENOUS
Status: COMPLETED
Start: 2023-06-19 | End: 2023-06-19

## 2023-06-19 RX ORDER — HEPARIN SODIUM 5000 [USP'U]/ML
5000 INJECTION, SOLUTION INTRAVENOUS; SUBCUTANEOUS EVERY 8 HOURS SCHEDULED
Status: DISCONTINUED | OUTPATIENT
Start: 2023-06-19 | End: 2023-06-23 | Stop reason: HOSPADM

## 2023-06-19 RX ORDER — ONDANSETRON 2 MG/ML
4 INJECTION INTRAMUSCULAR; INTRAVENOUS
Status: DISCONTINUED | OUTPATIENT
Start: 2023-06-19 | End: 2023-06-19 | Stop reason: HOSPADM

## 2023-06-19 RX ORDER — MORPHINE SULFATE 15 MG/1
15 TABLET ORAL ONCE AS NEEDED
Status: DISCONTINUED | OUTPATIENT
Start: 2023-06-19 | End: 2023-06-20 | Stop reason: SDUPTHER

## 2023-06-19 RX ORDER — ACETAMINOPHEN 500 MG
1000 TABLET ORAL ONCE AS NEEDED
Status: DISCONTINUED | OUTPATIENT
Start: 2023-06-19 | End: 2023-06-23 | Stop reason: HOSPADM

## 2023-06-19 RX ORDER — OXYCODONE HYDROCHLORIDE 5 MG/1
5 TABLET ORAL
Status: DISCONTINUED | OUTPATIENT
Start: 2023-06-19 | End: 2023-06-19 | Stop reason: HOSPADM

## 2023-06-19 RX ORDER — HYDROMORPHONE HYDROCHLORIDE 2 MG/1
1 TABLET ORAL ONCE AS NEEDED
Status: DISCONTINUED | OUTPATIENT
Start: 2023-06-19 | End: 2023-06-20

## 2023-06-19 RX ORDER — FENTANYL CITRATE 50 UG/ML
25 INJECTION, SOLUTION INTRAMUSCULAR; INTRAVENOUS EVERY 5 MIN PRN
Status: DISCONTINUED | OUTPATIENT
Start: 2023-06-19 | End: 2023-06-19 | Stop reason: HOSPADM

## 2023-06-19 RX ORDER — METOPROLOL TARTRATE 5 MG/5ML
5 INJECTION INTRAVENOUS EVERY 10 MIN PRN
Status: DISCONTINUED | OUTPATIENT
Start: 2023-06-19 | End: 2023-06-23 | Stop reason: HOSPADM

## 2023-06-19 RX ORDER — HYDRALAZINE HYDROCHLORIDE 20 MG/ML
10 INJECTION INTRAMUSCULAR; INTRAVENOUS
Status: DISCONTINUED | OUTPATIENT
Start: 2023-06-19 | End: 2023-06-19 | Stop reason: HOSPADM

## 2023-06-19 RX ORDER — FENTANYL CITRATE 50 UG/ML
50 INJECTION, SOLUTION INTRAMUSCULAR; INTRAVENOUS EVERY 5 MIN PRN
Status: DISCONTINUED | OUTPATIENT
Start: 2023-06-19 | End: 2023-06-19 | Stop reason: HOSPADM

## 2023-06-19 RX ORDER — MEPERIDINE HYDROCHLORIDE 25 MG/ML
12.5 INJECTION INTRAMUSCULAR; INTRAVENOUS; SUBCUTANEOUS EVERY 5 MIN PRN
Status: DISCONTINUED | OUTPATIENT
Start: 2023-06-19 | End: 2023-06-19 | Stop reason: HOSPADM

## 2023-06-19 RX ORDER — OXYCODONE HYDROCHLORIDE 5 MG/1
5 TABLET ORAL ONCE AS NEEDED
Status: DISCONTINUED | OUTPATIENT
Start: 2023-06-19 | End: 2023-06-20 | Stop reason: SDUPTHER

## 2023-06-19 RX ORDER — OXYCODONE HYDROCHLORIDE 5 MG/1
5 TABLET ORAL ONCE AS NEEDED
Status: DISCONTINUED | OUTPATIENT
Start: 2023-06-19 | End: 2023-06-23 | Stop reason: HOSPADM

## 2023-06-19 RX ORDER — OXYCODONE HYDROCHLORIDE 5 MG/1
10 TABLET ORAL PRN
Status: DISCONTINUED | OUTPATIENT
Start: 2023-06-19 | End: 2023-06-19 | Stop reason: HOSPADM

## 2023-06-19 RX ORDER — SODIUM CHLORIDE 9 MG/ML
INJECTION, SOLUTION INTRAVENOUS PRN
Status: DISCONTINUED | OUTPATIENT
Start: 2023-06-19 | End: 2023-06-19 | Stop reason: HOSPADM

## 2023-06-19 RX ORDER — LABETALOL HYDROCHLORIDE 5 MG/ML
10 INJECTION, SOLUTION INTRAVENOUS
Status: DISCONTINUED | OUTPATIENT
Start: 2023-06-19 | End: 2023-06-19 | Stop reason: HOSPADM

## 2023-06-19 RX ORDER — HYDRALAZINE HYDROCHLORIDE 20 MG/ML
10 INJECTION INTRAMUSCULAR; INTRAVENOUS EVERY 10 MIN PRN
Status: DISCONTINUED | OUTPATIENT
Start: 2023-06-19 | End: 2023-06-23 | Stop reason: HOSPADM

## 2023-06-19 RX ORDER — FENTANYL CITRATE 50 UG/ML
INJECTION, SOLUTION INTRAMUSCULAR; INTRAVENOUS PRN
Status: DISCONTINUED | OUTPATIENT
Start: 2023-06-19 | End: 2023-06-19 | Stop reason: SDUPTHER

## 2023-06-19 RX ORDER — SODIUM CHLORIDE, SODIUM LACTATE, POTASSIUM CHLORIDE, CALCIUM CHLORIDE 600; 310; 30; 20 MG/100ML; MG/100ML; MG/100ML; MG/100ML
INJECTION, SOLUTION INTRAVENOUS CONTINUOUS
Status: DISCONTINUED | OUTPATIENT
Start: 2023-06-19 | End: 2023-06-23 | Stop reason: HOSPADM

## 2023-06-19 RX ORDER — HYDROMORPHONE HYDROCHLORIDE 1 MG/ML
0.25 INJECTION, SOLUTION INTRAMUSCULAR; INTRAVENOUS; SUBCUTANEOUS EVERY 5 MIN PRN
Status: DISCONTINUED | OUTPATIENT
Start: 2023-06-19 | End: 2023-06-19 | Stop reason: HOSPADM

## 2023-06-19 RX ORDER — LABETALOL HYDROCHLORIDE 5 MG/ML
10 INJECTION, SOLUTION INTRAVENOUS EVERY 10 MIN PRN
Status: DISCONTINUED | OUTPATIENT
Start: 2023-06-19 | End: 2023-06-23 | Stop reason: HOSPADM

## 2023-06-19 RX ORDER — SODIUM CHLORIDE 0.9 % (FLUSH) 0.9 %
5-40 SYRINGE (ML) INJECTION EVERY 12 HOURS SCHEDULED
Status: DISCONTINUED | OUTPATIENT
Start: 2023-06-19 | End: 2023-06-19 | Stop reason: HOSPADM

## 2023-06-19 RX ORDER — HYDROMORPHONE HYDROCHLORIDE 1 MG/ML
0.5 INJECTION, SOLUTION INTRAMUSCULAR; INTRAVENOUS; SUBCUTANEOUS EVERY 5 MIN PRN
Status: DISCONTINUED | OUTPATIENT
Start: 2023-06-19 | End: 2023-06-19 | Stop reason: HOSPADM

## 2023-06-19 RX ORDER — METOCLOPRAMIDE HYDROCHLORIDE 5 MG/ML
10 INJECTION INTRAMUSCULAR; INTRAVENOUS
Status: DISCONTINUED | OUTPATIENT
Start: 2023-06-19 | End: 2023-06-19 | Stop reason: HOSPADM

## 2023-06-19 RX ORDER — ONDANSETRON 2 MG/ML
INJECTION INTRAMUSCULAR; INTRAVENOUS PRN
Status: DISCONTINUED | OUTPATIENT
Start: 2023-06-19 | End: 2023-06-19 | Stop reason: SDUPTHER

## 2023-06-19 RX ORDER — IPRATROPIUM BROMIDE AND ALBUTEROL SULFATE 2.5; .5 MG/3ML; MG/3ML
1 SOLUTION RESPIRATORY (INHALATION)
Status: DISCONTINUED | OUTPATIENT
Start: 2023-06-19 | End: 2023-06-19 | Stop reason: HOSPADM

## 2023-06-19 RX ORDER — DIPHENHYDRAMINE HYDROCHLORIDE 50 MG/ML
12.5 INJECTION INTRAMUSCULAR; INTRAVENOUS
Status: DISCONTINUED | OUTPATIENT
Start: 2023-06-19 | End: 2023-06-19 | Stop reason: HOSPADM

## 2023-06-19 RX ORDER — ONDANSETRON 2 MG/ML
4 INJECTION INTRAMUSCULAR; INTRAVENOUS EVERY 6 HOURS PRN
Status: DISCONTINUED | OUTPATIENT
Start: 2023-06-19 | End: 2023-06-20 | Stop reason: SDUPTHER

## 2023-06-19 RX ORDER — SODIUM CHLORIDE, SODIUM LACTATE, POTASSIUM CHLORIDE, CALCIUM CHLORIDE 600; 310; 30; 20 MG/100ML; MG/100ML; MG/100ML; MG/100ML
INJECTION, SOLUTION INTRAVENOUS ONCE
Status: DISCONTINUED | OUTPATIENT
Start: 2023-06-19 | End: 2023-06-19 | Stop reason: HOSPADM

## 2023-06-19 RX ORDER — OXYCODONE HYDROCHLORIDE 5 MG/1
5 TABLET ORAL PRN
Status: DISCONTINUED | OUTPATIENT
Start: 2023-06-19 | End: 2023-06-19 | Stop reason: HOSPADM

## 2023-06-19 RX ORDER — SODIUM CHLORIDE 9 MG/ML
INJECTION, SOLUTION INTRAVENOUS PRN
Status: DISCONTINUED | OUTPATIENT
Start: 2023-06-19 | End: 2023-06-23 | Stop reason: HOSPADM

## 2023-06-19 RX ORDER — LORAZEPAM 2 MG/ML
0.5 INJECTION INTRAMUSCULAR
Status: DISCONTINUED | OUTPATIENT
Start: 2023-06-19 | End: 2023-06-19 | Stop reason: HOSPADM

## 2023-06-19 RX ORDER — LIDOCAINE HYDROCHLORIDE 20 MG/ML
INJECTION, SOLUTION EPIDURAL; INFILTRATION; INTRACAUDAL; PERINEURAL PRN
Status: DISCONTINUED | OUTPATIENT
Start: 2023-06-19 | End: 2023-06-19 | Stop reason: SDUPTHER

## 2023-06-19 RX ORDER — SODIUM CHLORIDE 0.9 % (FLUSH) 0.9 %
5-40 SYRINGE (ML) INJECTION PRN
Status: DISCONTINUED | OUTPATIENT
Start: 2023-06-19 | End: 2023-06-20 | Stop reason: SDUPTHER

## 2023-06-19 RX ORDER — ONDANSETRON 4 MG/1
4 TABLET, ORALLY DISINTEGRATING ORAL EVERY 8 HOURS PRN
Status: DISCONTINUED | OUTPATIENT
Start: 2023-06-19 | End: 2023-06-20 | Stop reason: SDUPTHER

## 2023-06-19 RX ORDER — PROPOFOL 10 MG/ML
INJECTION, EMULSION INTRAVENOUS PRN
Status: DISCONTINUED | OUTPATIENT
Start: 2023-06-19 | End: 2023-06-19 | Stop reason: SDUPTHER

## 2023-06-19 RX ADMIN — ONDANSETRON 4 MG: 2 INJECTION INTRAMUSCULAR; INTRAVENOUS at 17:05

## 2023-06-19 RX ADMIN — FENTANYL CITRATE 25 MCG: 50 INJECTION, SOLUTION INTRAMUSCULAR; INTRAVENOUS at 17:09

## 2023-06-19 RX ADMIN — MEROPENEM 500 MG: 500 INJECTION INTRAVENOUS at 14:08

## 2023-06-19 RX ADMIN — OXYCODONE HYDROCHLORIDE AND ACETAMINOPHEN 1 TABLET: 5; 325 TABLET ORAL at 19:56

## 2023-06-19 RX ADMIN — FENTANYL CITRATE 25 MCG: 50 INJECTION, SOLUTION INTRAMUSCULAR; INTRAVENOUS at 17:01

## 2023-06-19 RX ADMIN — SODIUM CHLORIDE, PRESERVATIVE FREE 10 ML: 5 INJECTION INTRAVENOUS at 20:05

## 2023-06-19 RX ADMIN — HEPARIN SODIUM 5000 UNITS: 5000 INJECTION INTRAVENOUS; SUBCUTANEOUS at 22:01

## 2023-06-19 RX ADMIN — OXYCODONE HYDROCHLORIDE AND ACETAMINOPHEN 1 TABLET: 5; 325 TABLET ORAL at 06:02

## 2023-06-19 RX ADMIN — MIDODRINE HYDROCHLORIDE 5 MG: 5 TABLET ORAL at 14:08

## 2023-06-19 RX ADMIN — ATORVASTATIN CALCIUM 40 MG: 40 TABLET, FILM COATED ORAL at 09:17

## 2023-06-19 RX ADMIN — SODIUM CHLORIDE, PRESERVATIVE FREE 10 ML: 5 INJECTION INTRAVENOUS at 22:01

## 2023-06-19 RX ADMIN — HYDROMORPHONE HYDROCHLORIDE 0.5 MG: 1 INJECTION, SOLUTION INTRAMUSCULAR; INTRAVENOUS; SUBCUTANEOUS at 18:48

## 2023-06-19 RX ADMIN — SODIUM CHLORIDE: 9 INJECTION, SOLUTION INTRAVENOUS at 16:48

## 2023-06-19 RX ADMIN — SENNOSIDES 17.2 MG: 8.6 TABLET, FILM COATED ORAL at 19:57

## 2023-06-19 RX ADMIN — FENTANYL CITRATE 50 MCG: 50 INJECTION, SOLUTION INTRAMUSCULAR; INTRAVENOUS at 18:31

## 2023-06-19 RX ADMIN — LIDOCAINE HYDROCHLORIDE 80 MG: 20 INJECTION, SOLUTION EPIDURAL; INFILTRATION; INTRACAUDAL; PERINEURAL at 16:55

## 2023-06-19 RX ADMIN — CARVEDILOL 3.12 MG: 3.12 TABLET, FILM COATED ORAL at 06:04

## 2023-06-19 RX ADMIN — PROPOFOL 70 MG: 10 INJECTION, EMULSION INTRAVENOUS at 16:55

## 2023-06-19 RX ADMIN — MORPHINE SULFATE 15 MG: 15 TABLET ORAL at 21:10

## 2023-06-19 RX ADMIN — DOCUSATE SODIUM 100 MG: 100 CAPSULE, LIQUID FILLED ORAL at 19:57

## 2023-06-19 RX ADMIN — HYDROMORPHONE HYDROCHLORIDE 0.5 MG: 1 INJECTION, SOLUTION INTRAMUSCULAR; INTRAVENOUS; SUBCUTANEOUS at 19:03

## 2023-06-19 RX ADMIN — SODIUM CHLORIDE, PRESERVATIVE FREE 10 ML: 5 INJECTION INTRAVENOUS at 09:18

## 2023-06-19 RX ADMIN — MIDODRINE HYDROCHLORIDE 5 MG: 5 TABLET ORAL at 06:04

## 2023-06-19 ASSESSMENT — PAIN DESCRIPTION - DESCRIPTORS
DESCRIPTORS: ACHING
DESCRIPTORS: ACHING
DESCRIPTORS: ACHING;SHOOTING;BURNING
DESCRIPTORS: ACHING
DESCRIPTORS: BURNING;DISCOMFORT;ACHING;THROBBING

## 2023-06-19 ASSESSMENT — PAIN DESCRIPTION - LOCATION
LOCATION: KNEE
LOCATION: LEG
LOCATION: KNEE
LOCATION: LEG
LOCATION: FOOT
LOCATION: LEG

## 2023-06-19 ASSESSMENT — PAIN DESCRIPTION - ORIENTATION
ORIENTATION: RIGHT

## 2023-06-19 ASSESSMENT — PAIN SCALES - GENERAL
PAINLEVEL_OUTOF10: 8
PAINLEVEL_OUTOF10: 10
PAINLEVEL_OUTOF10: 9
PAINLEVEL_OUTOF10: 10
PAINLEVEL_OUTOF10: 7

## 2023-06-19 ASSESSMENT — PAIN SCALES - WONG BAKER
WONGBAKER_NUMERICALRESPONSE: 6
WONGBAKER_NUMERICALRESPONSE: 6

## 2023-06-19 ASSESSMENT — PAIN - FUNCTIONAL ASSESSMENT: PAIN_FUNCTIONAL_ASSESSMENT: NONE - DENIES PAIN

## 2023-06-19 NOTE — CARE COORDINATION
DC Plan: Home with family and [facility] (resume)    Updates sent via [address] [place] [address],[facility].

## 2023-06-19 NOTE — BRIEF OP NOTE
Brief Postoperative Note      Patient: Sam Roberts  YOB: 1950  MRN: [id number]    Date of Procedure: 6/19/2023    Pre-Op Diagnosis Codes:     * Gangrene of right foot ([address] Ann Garland    Post-Op Diagnosis: same       Procedure(s):  LEG AMPUTATION BELOW KNEE ON RIGHT    Surgeon(s):  Mary Smith MD    Assistant:  Surgical Assistant: Tonja Wiley    Anesthesia: General    Estimated Blood Loss (mL): 605AJ    Complications: none    Specimens:   ID Type Source Tests Collected by Time Destination   A : RIGHT LEG BELOW KNEE AMPUTATION  Tissue Leg SURGICAL PATHOLOGY Mary Smith MD 6/19/2023 1719        Implants:  none      Drains: none    Findings: as above.     Electronically signed by Esthela Alvarado MD on 6/19/2023 at 5:56 PM

## 2023-06-20 PROBLEM — N18.6 END STAGE RENAL DISEASE ON DIALYSIS (HCC): Status: ACTIVE | Noted: 2021-01-24

## 2023-06-20 PROBLEM — Z89.511 S/P BKA (BELOW KNEE AMPUTATION), RIGHT (HCC): Status: ACTIVE | Noted: 2023-06-20

## 2023-06-20 PROBLEM — Z99.2 END STAGE RENAL DISEASE ON DIALYSIS (HCC): Status: ACTIVE | Noted: 2021-01-24

## 2023-06-20 PROBLEM — K21.9 GASTROESOPHAGEAL REFLUX DISEASE: Status: ACTIVE | Noted: 2021-01-24

## 2023-06-20 LAB
ABO + RH BLD: NORMAL
ANION GAP SERPL CALC-SCNC: 5 MMOL/L (ref 5–15)
BASOPHILS # BLD: 0.1 K/UL (ref 0–0.1)
BASOPHILS NFR BLD: 0 % (ref 0–1)
BLOOD GROUP ANTIBODIES SERPL: NEGATIVE
BUN SERPL-MCNC: 48 MG/DL (ref 6–20)
BUN/CREAT SERPL: 5 (ref 12–20)
CA-I BLD-MCNC: 8.4 MG/DL (ref 8.5–10.1)
CHLORIDE SERPL-SCNC: 104 MMOL/L (ref 97–108)
CO2 SERPL-SCNC: 28 MMOL/L (ref 21–32)
CREAT SERPL-MCNC: 9.11 MG/DL (ref 0.7–1.3)
CRP SERPL-MCNC: 5.04 MG/DL (ref 0–0.6)
DIFFERENTIAL METHOD BLD: ABNORMAL
EOSINOPHIL # BLD: 0.6 K/UL (ref 0–0.4)
EOSINOPHIL NFR BLD: 5 % (ref 0–7)
ERYTHROCYTE [DISTWIDTH] IN BLOOD BY AUTOMATED COUNT: 17.5 % (ref 11.5–14.5)
GLUCOSE BLD STRIP.AUTO-MCNC: 130 MG/DL (ref 65–100)
GLUCOSE BLD STRIP.AUTO-MCNC: 137 MG/DL (ref 65–100)
GLUCOSE BLD STRIP.AUTO-MCNC: 70 MG/DL (ref 65–100)
GLUCOSE BLD STRIP.AUTO-MCNC: 72 MG/DL (ref 65–100)
GLUCOSE SERPL-MCNC: 86 MG/DL (ref 65–100)
HCT VFR BLD AUTO: 22.8 % (ref 36.6–50.3)
HGB BLD-MCNC: 7.1 G/DL (ref 12.1–17)
IMM GRANULOCYTES # BLD AUTO: 0.1 K/UL (ref 0–0.04)
IMM GRANULOCYTES NFR BLD AUTO: 0 % (ref 0–0.5)
LYMPHOCYTES # BLD: 1.5 K/UL (ref 0.8–3.5)
LYMPHOCYTES NFR BLD: 13 % (ref 12–49)
MCH RBC QN AUTO: 29.7 PG (ref 26–34)
MCHC RBC AUTO-ENTMCNC: 31.1 G/DL (ref 30–36.5)
MCV RBC AUTO: 95.4 FL (ref 80–99)
MONOCYTES # BLD: 1.1 K/UL (ref 0–1)
MONOCYTES NFR BLD: 10 % (ref 5–13)
NEUTS SEG # BLD: 8 K/UL (ref 1.8–8)
NEUTS SEG NFR BLD: 72 % (ref 32–75)
NRBC # BLD: 0 K/UL (ref 0–0.01)
NRBC BLD-RTO: 0 PER 100 WBC
PERFORMED BY:: ABNORMAL
PERFORMED BY:: ABNORMAL
PERFORMED BY:: NORMAL
PERFORMED BY:: NORMAL
PLATELET # BLD AUTO: 427 K/UL (ref 150–400)
PMV BLD AUTO: 10.3 FL (ref 8.9–12.9)
POTASSIUM SERPL-SCNC: 4.8 MMOL/L (ref 3.5–5.1)
PROCALCITONIN SERPL-MCNC: 0.87 NG/ML
RBC # BLD AUTO: 2.39 M/UL (ref 4.1–5.7)
SODIUM SERPL-SCNC: 137 MMOL/L (ref 136–145)
SPECIMEN EXP DATE BLD: NORMAL
WBC # BLD AUTO: 11.3 K/UL (ref 4.1–11.1)

## 2023-06-20 PROCEDURE — 86850 RBC ANTIBODY SCREEN: CPT

## 2023-06-20 PROCEDURE — 36415 COLL VENOUS BLD VENIPUNCTURE: CPT

## 2023-06-20 PROCEDURE — 2580000003 HC RX 258: Performed by: INTERNAL MEDICINE

## 2023-06-20 PROCEDURE — 6360000002 HC RX W HCPCS: Performed by: INTERNAL MEDICINE

## 2023-06-20 PROCEDURE — 86140 C-REACTIVE PROTEIN: CPT

## 2023-06-20 PROCEDURE — 80048 BASIC METABOLIC PNL TOTAL CA: CPT

## 2023-06-20 PROCEDURE — 2709999900 HC NON-CHARGEABLE SUPPLY

## 2023-06-20 PROCEDURE — 84145 PROCALCITONIN (PCT): CPT

## 2023-06-20 PROCEDURE — 85025 COMPLETE CBC W/AUTO DIFF WBC: CPT

## 2023-06-20 PROCEDURE — 6370000000 HC RX 637 (ALT 250 FOR IP): Performed by: SURGERY

## 2023-06-20 PROCEDURE — 86900 BLOOD TYPING SEROLOGIC ABO: CPT

## 2023-06-20 PROCEDURE — 6370000000 HC RX 637 (ALT 250 FOR IP): Performed by: INTERNAL MEDICINE

## 2023-06-20 PROCEDURE — 82962 GLUCOSE BLOOD TEST: CPT

## 2023-06-20 PROCEDURE — 6370000000 HC RX 637 (ALT 250 FOR IP): Performed by: PHYSICIAN ASSISTANT

## 2023-06-20 PROCEDURE — 2580000003 HC RX 258: Performed by: PHYSICIAN ASSISTANT

## 2023-06-20 PROCEDURE — 2580000003 HC RX 258: Performed by: SURGERY

## 2023-06-20 PROCEDURE — 6360000002 HC RX W HCPCS: Performed by: PHYSICIAN ASSISTANT

## 2023-06-20 PROCEDURE — 86901 BLOOD TYPING SEROLOGIC RH(D): CPT

## 2023-06-20 PROCEDURE — 99232 SBSQ HOSP IP/OBS MODERATE 35: CPT | Performed by: INTERNAL MEDICINE

## 2023-06-20 PROCEDURE — 90935 HEMODIALYSIS ONE EVALUATION: CPT

## 2023-06-20 PROCEDURE — 1100000000 HC RM PRIVATE

## 2023-06-20 PROCEDURE — 6360000002 HC RX W HCPCS: Performed by: SURGERY

## 2023-06-20 RX ORDER — OXYCODONE HCL 10 MG/1
20 TABLET, FILM COATED, EXTENDED RELEASE ORAL EVERY 12 HOURS SCHEDULED
Status: DISCONTINUED | OUTPATIENT
Start: 2023-06-20 | End: 2023-06-20

## 2023-06-20 RX ORDER — OXYCODONE AND ACETAMINOPHEN 10; 325 MG/1; MG/1
1 TABLET ORAL EVERY 6 HOURS PRN
Status: DISCONTINUED | OUTPATIENT
Start: 2023-06-20 | End: 2023-06-23 | Stop reason: HOSPADM

## 2023-06-20 RX ORDER — OXYCODONE HCL 10 MG/1
10 TABLET, FILM COATED, EXTENDED RELEASE ORAL EVERY 12 HOURS SCHEDULED
Status: DISCONTINUED | OUTPATIENT
Start: 2023-06-20 | End: 2023-06-23 | Stop reason: HOSPADM

## 2023-06-20 RX ADMIN — OXYCODONE HYDROCHLORIDE AND ACETAMINOPHEN 1 TABLET: 10; 325 TABLET ORAL at 18:41

## 2023-06-20 RX ADMIN — HEPARIN SODIUM 5000 UNITS: 5000 INJECTION INTRAVENOUS; SUBCUTANEOUS at 12:09

## 2023-06-20 RX ADMIN — MIDODRINE HYDROCHLORIDE 5 MG: 5 TABLET ORAL at 18:41

## 2023-06-20 RX ADMIN — ATORVASTATIN CALCIUM 40 MG: 40 TABLET, FILM COATED ORAL at 12:09

## 2023-06-20 RX ADMIN — SODIUM CHLORIDE, PRESERVATIVE FREE 10 ML: 5 INJECTION INTRAVENOUS at 12:10

## 2023-06-20 RX ADMIN — OXYCODONE HYDROCHLORIDE 10 MG: 10 TABLET, FILM COATED, EXTENDED RELEASE ORAL at 22:12

## 2023-06-20 RX ADMIN — CARVEDILOL 3.12 MG: 3.12 TABLET, FILM COATED ORAL at 12:09

## 2023-06-20 RX ADMIN — OXYCODONE HYDROCHLORIDE 10 MG: 10 TABLET, FILM COATED, EXTENDED RELEASE ORAL at 12:08

## 2023-06-20 RX ADMIN — SODIUM CHLORIDE, PRESERVATIVE FREE 10 ML: 5 INJECTION INTRAVENOUS at 22:14

## 2023-06-20 RX ADMIN — OXYCODONE HYDROCHLORIDE AND ACETAMINOPHEN 1 TABLET: 5; 325 TABLET ORAL at 10:37

## 2023-06-20 RX ADMIN — DOCUSATE SODIUM 100 MG: 100 CAPSULE, LIQUID FILLED ORAL at 12:09

## 2023-06-20 RX ADMIN — DOCUSATE SODIUM 100 MG: 100 CAPSULE, LIQUID FILLED ORAL at 22:14

## 2023-06-20 RX ADMIN — SENNOSIDES 17.2 MG: 8.6 TABLET, FILM COATED ORAL at 22:14

## 2023-06-20 RX ADMIN — MIDODRINE HYDROCHLORIDE 5 MG: 5 TABLET ORAL at 12:09

## 2023-06-20 RX ADMIN — SODIUM CHLORIDE, PRESERVATIVE FREE 10 ML: 5 INJECTION INTRAVENOUS at 22:21

## 2023-06-20 RX ADMIN — HEPARIN SODIUM 5000 UNITS: 5000 INJECTION INTRAVENOUS; SUBCUTANEOUS at 05:13

## 2023-06-20 RX ADMIN — MEROPENEM 500 MG: 500 INJECTION INTRAVENOUS at 12:10

## 2023-06-20 RX ADMIN — EPOETIN ALFA-EPBX 10000 UNITS: 10000 INJECTION, SOLUTION INTRAVENOUS; SUBCUTANEOUS at 14:18

## 2023-06-20 RX ADMIN — OXYCODONE HYDROCHLORIDE AND ACETAMINOPHEN 1 TABLET: 5; 325 TABLET ORAL at 05:12

## 2023-06-20 RX ADMIN — CARVEDILOL 3.12 MG: 3.12 TABLET, FILM COATED ORAL at 18:41

## 2023-06-20 RX ADMIN — OXYCODONE HYDROCHLORIDE AND ACETAMINOPHEN 1 TABLET: 5; 325 TABLET ORAL at 14:45

## 2023-06-20 RX ADMIN — HEPARIN SODIUM 5000 UNITS: 5000 INJECTION INTRAVENOUS; SUBCUTANEOUS at 22:14

## 2023-06-20 RX ADMIN — OXYCODONE HYDROCHLORIDE AND ACETAMINOPHEN 1 TABLET: 5; 325 TABLET ORAL at 00:09

## 2023-06-20 ASSESSMENT — PAIN DESCRIPTION - DESCRIPTORS
DESCRIPTORS: ACHING

## 2023-06-20 ASSESSMENT — PAIN SCALES - GENERAL
PAINLEVEL_OUTOF10: 6
PAINLEVEL_OUTOF10: 10
PAINLEVEL_OUTOF10: 10
PAINLEVEL_OUTOF10: 7
PAINLEVEL_OUTOF10: 4
PAINLEVEL_OUTOF10: 10
PAINLEVEL_OUTOF10: 10
PAINLEVEL_OUTOF10: 6
PAINLEVEL_OUTOF10: 5
PAINLEVEL_OUTOF10: 10
PAINLEVEL_OUTOF10: 9
PAINLEVEL_OUTOF10: 5
PAINLEVEL_OUTOF10: 9

## 2023-06-20 ASSESSMENT — PAIN DESCRIPTION - LOCATION
LOCATION: LEG
LOCATION: KNEE
LOCATION: LEG
LOCATION: KNEE

## 2023-06-20 ASSESSMENT — PAIN DESCRIPTION - ORIENTATION
ORIENTATION: RIGHT

## 2023-06-20 ASSESSMENT — PAIN DESCRIPTION - FREQUENCY
FREQUENCY: CONTINUOUS
FREQUENCY: CONTINUOUS

## 2023-06-20 ASSESSMENT — PAIN SCALES - WONG BAKER
WONGBAKER_NUMERICALRESPONSE: 6
WONGBAKER_NUMERICALRESPONSE: 4
WONGBAKER_NUMERICALRESPONSE: 4
WONGBAKER_NUMERICALRESPONSE: 6

## 2023-06-20 ASSESSMENT — PAIN DESCRIPTION - ONSET
ONSET: ON-GOING
ONSET: ON-GOING

## 2023-06-20 ASSESSMENT — ENCOUNTER SYMPTOMS: RESPIRATORY NEGATIVE: 1

## 2023-06-20 NOTE — DIALYSIS
Patient tolerated treatment. 2000 ml of fluid was removed. Patient was alert and oriented during treatment. 82.3 liters of blood was processed. Report was given to primary nurse Indiana Shields.

## 2023-06-20 NOTE — CARE COORDINATION
DC Plan: Disposition pending    Pt had a right bka. Per general surgery note 6/20/23 \"Dressing change is due tomorrow morning and the patient can be discharged to rehab. \"    Cm will need PT/OT notes. Pt will require auth if IRF is recommended. If SNF if recommended, pt will not receive daily therapy, pt has [employer]. So far, pt has home health arranged. Cm will continue to follow.

## 2023-06-20 NOTE — OP NOTE
This is operative report on Alyssa Pryor, patient's YOB: 1950. Date of surgery: June 19, 2023. Preop diagnosis: Right foot gangrene. Postop diagnosis: Right foot gangrene. Procedure: Right high below-knee amputation. Surgeon: Dr. Vandana Charlton    Anesthesia: General with endotracheal tube. Anesthesiologist: Dr. John Cardenas  EBL: 200 cc. Implants: None  Complications: None. Fluids and the urine output: Please see anesthesia record. Indication for surgery: Mr. Erickson Malik is currently hospitalized with right foot gangrene. Patient had a previous angiogram with interventions as well as TMA. Currently wounds open and gangrenous tissue developed more extensively. Family and patient decided to have right below-knee amputation. Patient was offered and discussed about below-knee amputation. We discussed the rationale for the below-knee amputation, surgical risks benefits and complication. Patient was also discussed about postop wound cares and follow-ups and timing of new prosthetic leg as well after amputation. Surgical consent forms obtained. Description of procedure:  Patient was brought to the operating table comfortably supine position. Followed by general anesthesia was initiated. Followed by patient's right leg was prepped usual sterile technique using Betadine solution and draped in usual aseptic fashion. Followed by at this time timeout was called and confirmation was made and procedure commenced. The intended incision site was marked with a marking pen. The anterior aspect of the incision was made 4 fingerbreadths distal to the tibial tuberosity. Incision was continued through the fascia. The anterior compartment muscles were divided using electrosurgical dissection. The tibia and fibula were cleared. Periosteal elevator was used to clear the periosteum from the tibia and tibia was transected with a power reciprocating saw.   The fibula was transected 1 cm

## 2023-06-21 LAB
ANION GAP SERPL CALC-SCNC: 3 MMOL/L (ref 5–15)
BASOPHILS # BLD: 0 K/UL (ref 0–0.1)
BASOPHILS NFR BLD: 0 % (ref 0–1)
BUN SERPL-MCNC: 27 MG/DL (ref 6–20)
BUN/CREAT SERPL: 4 (ref 12–20)
CA-I BLD-MCNC: 8.2 MG/DL (ref 8.5–10.1)
CHLORIDE SERPL-SCNC: 105 MMOL/L (ref 97–108)
CO2 SERPL-SCNC: 31 MMOL/L (ref 21–32)
CREAT SERPL-MCNC: 6.23 MG/DL (ref 0.7–1.3)
DIFFERENTIAL METHOD BLD: ABNORMAL
EOSINOPHIL # BLD: 0.8 K/UL (ref 0–0.4)
EOSINOPHIL NFR BLD: 9 % (ref 0–7)
ERYTHROCYTE [DISTWIDTH] IN BLOOD BY AUTOMATED COUNT: 17.8 % (ref 11.5–14.5)
GLUCOSE BLD STRIP.AUTO-MCNC: 102 MG/DL (ref 65–100)
GLUCOSE BLD STRIP.AUTO-MCNC: 123 MG/DL (ref 65–100)
GLUCOSE BLD STRIP.AUTO-MCNC: 147 MG/DL (ref 65–100)
GLUCOSE BLD STRIP.AUTO-MCNC: 86 MG/DL (ref 65–100)
GLUCOSE SERPL-MCNC: 99 MG/DL (ref 65–100)
HCT VFR BLD AUTO: 24.6 % (ref 36.6–50.3)
HGB BLD-MCNC: 7.4 G/DL (ref 12.1–17)
IMM GRANULOCYTES # BLD AUTO: 0 K/UL (ref 0–0.04)
IMM GRANULOCYTES NFR BLD AUTO: 0 % (ref 0–0.5)
LYMPHOCYTES # BLD: 1.7 K/UL (ref 0.8–3.5)
LYMPHOCYTES NFR BLD: 19 % (ref 12–49)
MCH RBC QN AUTO: 29.2 PG (ref 26–34)
MCHC RBC AUTO-ENTMCNC: 30.1 G/DL (ref 30–36.5)
MCV RBC AUTO: 97.2 FL (ref 80–99)
MONOCYTES # BLD: 1.6 K/UL (ref 0–1)
MONOCYTES NFR BLD: 17 % (ref 5–13)
NEUTS SEG # BLD: 5.2 K/UL (ref 1.8–8)
NEUTS SEG NFR BLD: 55 % (ref 32–75)
NRBC # BLD: 0 K/UL (ref 0–0.01)
NRBC BLD-RTO: 0 PER 100 WBC
PERFORMED BY:: ABNORMAL
PERFORMED BY:: NORMAL
PLATELET # BLD AUTO: 418 K/UL (ref 150–400)
PMV BLD AUTO: 10.5 FL (ref 8.9–12.9)
POTASSIUM SERPL-SCNC: 4.2 MMOL/L (ref 3.5–5.1)
RBC # BLD AUTO: 2.53 M/UL (ref 4.1–5.7)
SODIUM SERPL-SCNC: 139 MMOL/L (ref 136–145)
WBC # BLD AUTO: 9.4 K/UL (ref 4.1–11.1)

## 2023-06-21 PROCEDURE — 6360000002 HC RX W HCPCS: Performed by: SURGERY

## 2023-06-21 PROCEDURE — 97530 THERAPEUTIC ACTIVITIES: CPT

## 2023-06-21 PROCEDURE — 2709999900 HC NON-CHARGEABLE SUPPLY

## 2023-06-21 PROCEDURE — 99232 SBSQ HOSP IP/OBS MODERATE 35: CPT | Performed by: INTERNAL MEDICINE

## 2023-06-21 PROCEDURE — 90935 HEMODIALYSIS ONE EVALUATION: CPT

## 2023-06-21 PROCEDURE — 6370000000 HC RX 637 (ALT 250 FOR IP): Performed by: PHYSICIAN ASSISTANT

## 2023-06-21 PROCEDURE — 6370000000 HC RX 637 (ALT 250 FOR IP): Performed by: SURGERY

## 2023-06-21 PROCEDURE — 1100000000 HC RM PRIVATE

## 2023-06-21 PROCEDURE — 6360000002 HC RX W HCPCS: Performed by: INTERNAL MEDICINE

## 2023-06-21 PROCEDURE — 97161 PT EVAL LOW COMPLEX 20 MIN: CPT

## 2023-06-21 PROCEDURE — 6370000000 HC RX 637 (ALT 250 FOR IP): Performed by: INTERNAL MEDICINE

## 2023-06-21 PROCEDURE — 36415 COLL VENOUS BLD VENIPUNCTURE: CPT

## 2023-06-21 PROCEDURE — 2580000003 HC RX 258: Performed by: INTERNAL MEDICINE

## 2023-06-21 PROCEDURE — 82962 GLUCOSE BLOOD TEST: CPT

## 2023-06-21 PROCEDURE — 85025 COMPLETE CBC W/AUTO DIFF WBC: CPT

## 2023-06-21 PROCEDURE — 80048 BASIC METABOLIC PNL TOTAL CA: CPT

## 2023-06-21 PROCEDURE — 97165 OT EVAL LOW COMPLEX 30 MIN: CPT

## 2023-06-21 RX ADMIN — SODIUM CHLORIDE, PRESERVATIVE FREE 10 ML: 5 INJECTION INTRAVENOUS at 21:32

## 2023-06-21 RX ADMIN — EPOETIN ALFA-EPBX 10000 UNITS: 10000 INJECTION, SOLUTION INTRAVENOUS; SUBCUTANEOUS at 14:28

## 2023-06-21 RX ADMIN — SENNOSIDES 17.2 MG: 8.6 TABLET, FILM COATED ORAL at 21:31

## 2023-06-21 RX ADMIN — HEPARIN SODIUM 5000 UNITS: 5000 INJECTION INTRAVENOUS; SUBCUTANEOUS at 06:23

## 2023-06-21 RX ADMIN — OXYCODONE HYDROCHLORIDE 10 MG: 10 TABLET, FILM COATED, EXTENDED RELEASE ORAL at 08:19

## 2023-06-21 RX ADMIN — MIDODRINE HYDROCHLORIDE 5 MG: 5 TABLET ORAL at 08:19

## 2023-06-21 RX ADMIN — SODIUM CHLORIDE, PRESERVATIVE FREE 10 ML: 5 INJECTION INTRAVENOUS at 08:22

## 2023-06-21 RX ADMIN — MIDODRINE HYDROCHLORIDE 5 MG: 5 TABLET ORAL at 17:05

## 2023-06-21 RX ADMIN — SODIUM CHLORIDE, PRESERVATIVE FREE 10 ML: 5 INJECTION INTRAVENOUS at 08:23

## 2023-06-21 RX ADMIN — DOCUSATE SODIUM 100 MG: 100 CAPSULE, LIQUID FILLED ORAL at 08:19

## 2023-06-21 RX ADMIN — CARVEDILOL 3.12 MG: 3.12 TABLET, FILM COATED ORAL at 17:05

## 2023-06-21 RX ADMIN — DOCUSATE SODIUM 100 MG: 100 CAPSULE, LIQUID FILLED ORAL at 21:31

## 2023-06-21 RX ADMIN — OXYCODONE HYDROCHLORIDE 10 MG: 10 TABLET, FILM COATED, EXTENDED RELEASE ORAL at 21:31

## 2023-06-21 RX ADMIN — ATORVASTATIN CALCIUM 40 MG: 40 TABLET, FILM COATED ORAL at 08:19

## 2023-06-21 RX ADMIN — HEPARIN SODIUM 5000 UNITS: 5000 INJECTION INTRAVENOUS; SUBCUTANEOUS at 21:32

## 2023-06-21 ASSESSMENT — ENCOUNTER SYMPTOMS: RESPIRATORY NEGATIVE: 1

## 2023-06-21 ASSESSMENT — PAIN SCALES - GENERAL: PAINLEVEL_OUTOF10: 8

## 2023-06-21 NOTE — CARE COORDINATION
4553: Chart reviewed. Per notes; patient followed via nephrology and general surgery. PT/OT evals pending discharge recs. [facility] has accepted patient. CM will continue to follow patient and recs of medical team.    7915: CM attempted to discuss above with patient who is off unit.

## 2023-06-21 NOTE — DIALYSIS
Patient tolerated treatment. 1500 ml of fluid was removed. Patient was alert and oriented during treatment. 82.7 liters of blood was processed. Report was given to primary nurse Oscar Jordan.

## 2023-06-22 LAB
ANION GAP SERPL CALC-SCNC: 4 MMOL/L (ref 5–15)
BASOPHILS # BLD: 0 K/UL (ref 0–0.1)
BASOPHILS NFR BLD: 0 % (ref 0–1)
BUN SERPL-MCNC: 18 MG/DL (ref 6–20)
BUN/CREAT SERPL: 4 (ref 12–20)
CA-I BLD-MCNC: 8.3 MG/DL (ref 8.5–10.1)
CHLORIDE SERPL-SCNC: 100 MMOL/L (ref 97–108)
CO2 SERPL-SCNC: 32 MMOL/L (ref 21–32)
CREAT SERPL-MCNC: 4.92 MG/DL (ref 0.7–1.3)
DIFFERENTIAL METHOD BLD: ABNORMAL
EOSINOPHIL # BLD: 1.4 K/UL (ref 0–0.4)
EOSINOPHIL NFR BLD: 16 % (ref 0–7)
ERYTHROCYTE [DISTWIDTH] IN BLOOD BY AUTOMATED COUNT: 17.9 % (ref 11.5–14.5)
GLUCOSE BLD STRIP.AUTO-MCNC: 132 MG/DL (ref 65–100)
GLUCOSE BLD STRIP.AUTO-MCNC: 137 MG/DL (ref 65–100)
GLUCOSE BLD STRIP.AUTO-MCNC: 143 MG/DL (ref 65–100)
GLUCOSE BLD STRIP.AUTO-MCNC: 147 MG/DL (ref 65–100)
GLUCOSE SERPL-MCNC: 105 MG/DL (ref 65–100)
HCT VFR BLD AUTO: 25.2 % (ref 36.6–50.3)
HGB BLD-MCNC: 7.7 G/DL (ref 12.1–17)
IMM GRANULOCYTES # BLD AUTO: 0 K/UL
IMM GRANULOCYTES NFR BLD AUTO: 0 %
LYMPHOCYTES # BLD: 4 K/UL (ref 0.8–3.5)
LYMPHOCYTES NFR BLD: 46 % (ref 12–49)
MCH RBC QN AUTO: 28.9 PG (ref 26–34)
MCHC RBC AUTO-ENTMCNC: 30.6 G/DL (ref 30–36.5)
MCV RBC AUTO: 94.7 FL (ref 80–99)
MONOCYTES # BLD: 0.7 K/UL (ref 0–1)
MONOCYTES NFR BLD: 8 % (ref 5–13)
NEUTS SEG # BLD: 2.6 K/UL (ref 1.8–8)
NEUTS SEG NFR BLD: 30 % (ref 32–75)
NRBC # BLD: 0 K/UL (ref 0–0.01)
NRBC BLD-RTO: 0 PER 100 WBC
PERFORMED BY:: ABNORMAL
PLATELET # BLD AUTO: 348 K/UL (ref 150–400)
PMV BLD AUTO: 10.4 FL (ref 8.9–12.9)
POTASSIUM SERPL-SCNC: 3.8 MMOL/L (ref 3.5–5.1)
RBC # BLD AUTO: 2.66 M/UL (ref 4.1–5.7)
RBC MORPH BLD: ABNORMAL
SODIUM SERPL-SCNC: 136 MMOL/L (ref 136–145)
WBC # BLD AUTO: 8.7 K/UL (ref 4.1–11.1)

## 2023-06-22 PROCEDURE — 85025 COMPLETE CBC W/AUTO DIFF WBC: CPT

## 2023-06-22 PROCEDURE — 82962 GLUCOSE BLOOD TEST: CPT

## 2023-06-22 PROCEDURE — 97530 THERAPEUTIC ACTIVITIES: CPT

## 2023-06-22 PROCEDURE — 6370000000 HC RX 637 (ALT 250 FOR IP): Performed by: PHYSICIAN ASSISTANT

## 2023-06-22 PROCEDURE — 36415 COLL VENOUS BLD VENIPUNCTURE: CPT

## 2023-06-22 PROCEDURE — 2580000003 HC RX 258: Performed by: INTERNAL MEDICINE

## 2023-06-22 PROCEDURE — 6360000002 HC RX W HCPCS: Performed by: PHYSICIAN ASSISTANT

## 2023-06-22 PROCEDURE — 2580000003 HC RX 258: Performed by: SURGERY

## 2023-06-22 PROCEDURE — 2580000003 HC RX 258: Performed by: PHYSICIAN ASSISTANT

## 2023-06-22 PROCEDURE — 6370000000 HC RX 637 (ALT 250 FOR IP): Performed by: SURGERY

## 2023-06-22 PROCEDURE — 6370000000 HC RX 637 (ALT 250 FOR IP): Performed by: INTERNAL MEDICINE

## 2023-06-22 PROCEDURE — 1100000000 HC RM PRIVATE

## 2023-06-22 PROCEDURE — 6360000002 HC RX W HCPCS: Performed by: SURGERY

## 2023-06-22 PROCEDURE — 80048 BASIC METABOLIC PNL TOTAL CA: CPT

## 2023-06-22 RX ADMIN — CARVEDILOL 3.12 MG: 3.12 TABLET, FILM COATED ORAL at 10:16

## 2023-06-22 RX ADMIN — HEPARIN SODIUM 5000 UNITS: 5000 INJECTION INTRAVENOUS; SUBCUTANEOUS at 05:49

## 2023-06-22 RX ADMIN — CARVEDILOL 3.12 MG: 3.12 TABLET, FILM COATED ORAL at 16:04

## 2023-06-22 RX ADMIN — DOCUSATE SODIUM 100 MG: 100 CAPSULE, LIQUID FILLED ORAL at 20:55

## 2023-06-22 RX ADMIN — DOCUSATE SODIUM 100 MG: 100 CAPSULE, LIQUID FILLED ORAL at 10:16

## 2023-06-22 RX ADMIN — OXYCODONE HYDROCHLORIDE AND ACETAMINOPHEN 1 TABLET: 5; 325 TABLET ORAL at 16:04

## 2023-06-22 RX ADMIN — MIDODRINE HYDROCHLORIDE 5 MG: 5 TABLET ORAL at 10:16

## 2023-06-22 RX ADMIN — OXYCODONE HYDROCHLORIDE 10 MG: 10 TABLET, FILM COATED, EXTENDED RELEASE ORAL at 10:16

## 2023-06-22 RX ADMIN — MIDODRINE HYDROCHLORIDE 5 MG: 5 TABLET ORAL at 16:04

## 2023-06-22 RX ADMIN — HEPARIN SODIUM 5000 UNITS: 5000 INJECTION INTRAVENOUS; SUBCUTANEOUS at 20:55

## 2023-06-22 RX ADMIN — CLOPIDOGREL BISULFATE 75 MG: 75 TABLET ORAL at 10:16

## 2023-06-22 RX ADMIN — ATORVASTATIN CALCIUM 40 MG: 40 TABLET, FILM COATED ORAL at 10:16

## 2023-06-22 RX ADMIN — MEROPENEM 500 MG: 500 INJECTION INTRAVENOUS at 13:45

## 2023-06-22 RX ADMIN — SENNOSIDES 17.2 MG: 8.6 TABLET, FILM COATED ORAL at 20:55

## 2023-06-22 RX ADMIN — SODIUM CHLORIDE, PRESERVATIVE FREE 10 ML: 5 INJECTION INTRAVENOUS at 20:57

## 2023-06-22 RX ADMIN — SODIUM CHLORIDE, PRESERVATIVE FREE 10 ML: 5 INJECTION INTRAVENOUS at 21:02

## 2023-06-22 RX ADMIN — HEPARIN SODIUM 5000 UNITS: 5000 INJECTION INTRAVENOUS; SUBCUTANEOUS at 13:46

## 2023-06-22 RX ADMIN — SODIUM CHLORIDE, PRESERVATIVE FREE 10 ML: 5 INJECTION INTRAVENOUS at 10:16

## 2023-06-22 RX ADMIN — OXYCODONE HYDROCHLORIDE 10 MG: 10 TABLET, FILM COATED, EXTENDED RELEASE ORAL at 20:54

## 2023-06-22 RX ADMIN — SODIUM CHLORIDE, PRESERVATIVE FREE 10 ML: 5 INJECTION INTRAVENOUS at 10:17

## 2023-06-22 ASSESSMENT — PAIN SCALES - GENERAL
PAINLEVEL_OUTOF10: 0
PAINLEVEL_OUTOF10: 9

## 2023-06-22 ASSESSMENT — PAIN SCALES - WONG BAKER: WONGBAKER_NUMERICALRESPONSE: 0

## 2023-06-22 ASSESSMENT — PAIN DESCRIPTION - LOCATION: LOCATION: LEG

## 2023-06-22 ASSESSMENT — ENCOUNTER SYMPTOMS: RESPIRATORY NEGATIVE: 1

## 2023-06-22 ASSESSMENT — PAIN DESCRIPTION - DESCRIPTORS: DESCRIPTORS: ACHING

## 2023-06-22 ASSESSMENT — PAIN DESCRIPTION - ORIENTATION: ORIENTATION: RIGHT

## 2023-06-22 ASSESSMENT — PAIN - FUNCTIONAL ASSESSMENT: PAIN_FUNCTIONAL_ASSESSMENT: PREVENTS OR INTERFERES SOME ACTIVE ACTIVITIES AND ADLS

## 2023-06-22 NOTE — PLAN OF CARE
OCCUPATIONAL THERAPY EVALUATION  Patient: Gilberto Welch (80 y.o. male)  Date: 6/21/2023  Primary Diagnosis: Diabetic foot infection ([facility] [address] [E11.628, L08.9]  Surgical site infection [T81.49XA]  Procedure(s) (LRB):  LEG AMPUTATION BELOW KNEE ON RIGHT (Right) 2 Days Post-Op   Precautions: General Precautions, Contact Precautions                In place during session:EKG/telemetry     ASSESSMENT  Pt is a 71 y.o. male presenting to [facility] with concerns for surgical site infection, admitted 6/12 and currently s/p R BKA; POD #2. Pt received semi-supine in bed upon arrival, AXO x4, and agreeable to OT/PT evaluation. Based on current observations, pt presents with decreased  functional mobility, independence in ADLs, ROM, strength, sensation, activity tolerance, endurance, balance (see below for objective details and assist levels). Overall, pt tolerates session fair w/out c/o pain dizziness, SOB, or pain. Pt req'd CGA and additional time for bed mobility and min A x2 for all OOB mobility using RW; cues for walker placement and sequencing. Pt able to don L socks at EOB w/ good balance. In standing pt has slight R lateral lean req'ing physical A to maintain support. Pt will benefit from continued skilled OT services to address current impairments and improve IND and safety with self cares and functional transfers/mobility. Current OT d/c recommendation [facility]patient [facility] once medically appropriate to maximize IND/safety w/ functional mobility/ADLs. Other factors to consider for discharge: family/social support, DME, time since onset, severity of deficits, functional baseline     Patient will benefit from skilled therapy intervention to address the above noted impairments.         PLAN :  Recommendations and Planned Interventions: self care training, therapeutic activities, functional mobility training, balance training, therapeutic exercise, endurance activities, and patient education    Recommend
OCCUPATIONAL THERAPY TREATMENT  Patient: Lorri Perez (4 y.o. male)  Date: 6/22/2023  Primary Diagnosis: Diabetic foot infection ([facility] [address] [E11.628, L08.9]  Surgical site infection [T81.49XA]  Procedure(s) (LRB):  LEG AMPUTATION BELOW KNEE ON RIGHT (Right) 3 Days Post-Op   Precautions: General Precautions, Contact Precautions                In place during session: None  Chart, occupational therapy assessment, plan of care, and goals were reviewed. ASSESSMENT  Patient continues with skilled OT services and is progressing towards goals. Pt semi supine in bed upon OT arrival, agreeable to session. Pt A&O x 4. (See below for objective details and assist levels). Overall pt tolerated session fair today with completion of bed mobility, transfers, and ADLs. Pt completed bed mobility with CGA and additional time provided for scooting to EOB. Pt completed transfers with Susie from EOB and required Susie x2 to transfer to recliner. Pt able to complete small hops and shuffling of L foot using RW for balance. Pt completed simulated face washing while seated in recliner with SBA. Pt completed few reps of UE therex using yellow theraband and pt reporting the band broke when completing UE HEP. Therapist gave pt new, heavier resistance theraband and educated on UE HEP. Pt left sitting in recliner with call bell within reach and needs met. Will continue to benefit from skilled OT services, and will continue to progress as tolerated. Other factors to consider for discharge: high risk for falls, not safe to be alone, and concern for safely navigating or managing the home environment        PLAN :  Patient continues to benefit from skilled intervention to address the above impairments. Continue treatment per established plan of care to address goals. Recommend with staff: Out of bed to chair for meals and Encourage HEP in prep for ADLs/mobility    Recommend next OT session:  Toileting, LB dressing, and Seated
Problem: Discharge Planning  Goal: Discharge to home or other facility with appropriate resources  Outcome: Progressing     Problem: Pain  Goal: Verbalizes/displays adequate comfort level or baseline comfort level  Outcome: Progressing     Problem: Safety - Adult  Goal: Free from fall injury  Outcome: Progressing     Problem: Chronic Conditions and Co-morbidities  Goal: Patient's chronic conditions and co-morbidity symptoms are monitored and maintained or improved  Outcome: Progressing     Problem: Skin/Tissue Integrity  Goal: Absence of new skin breakdown  Description: 1. Monitor for areas of redness and/or skin breakdown  2. Assess vascular access sites hourly  3. Every 4-6 hours minimum:  Change oxygen saturation probe site  4. Every 4-6 hours:  If on nasal continuous positive airway pressure, respiratory therapy assess nares and determine need for appliance change or resting period.   Outcome: Progressing
Problem: Discharge Planning  Goal: Discharge to home or other facility with appropriate resources  Outcome: Progressing  Flowsheets (Taken 6/20/2023 0750)  Discharge to home or other facility with appropriate resources:   Identify barriers to discharge with patient and caregiver   Arrange for needed discharge resources and transportation as appropriate     Problem: Pain  Goal: Verbalizes/displays adequate comfort level or baseline comfort level  Outcome: Progressing     Problem: Safety - Adult  Goal: Free from fall injury  Outcome: Progressing     Problem: Chronic Conditions and Co-morbidities  Goal: Patient's chronic conditions and co-morbidity symptoms are monitored and maintained or improved  Outcome: Progressing  Flowsheets (Taken 6/20/2023 0750)  Care Plan - Patient's Chronic Conditions and Co-Morbidity Symptoms are Monitored and Maintained or Improved: Monitor and assess patient's chronic conditions and comorbid symptoms for stability, deterioration, or improvement     Problem: Skin/Tissue Integrity  Goal: Absence of new skin breakdown  Description: 1. Monitor for areas of redness and/or skin breakdown  2. Assess vascular access sites hourly  3. Every 4-6 hours minimum:  Change oxygen saturation probe site  4. Every 4-6 hours:  If on nasal continuous positive airway pressure, respiratory therapy assess nares and determine need for appliance change or resting period.   Outcome: Progressing
Problem: Pain  Goal: Verbalizes/displays adequate comfort level or baseline comfort level  Outcome: Progressing     Problem: Safety - Adult  Goal: Free from fall injury  Outcome: Progressing
Problem: Safety - Adult  Goal: Free from fall injury  Outcome: Progressing     Problem: Occupational Therapy - Adult  Goal: By Discharge: Performs self-care activities at highest level of function for planned discharge setting. See evaluation for individualized goals. Description: FUNCTIONAL STATUS PRIOR TO ADMISSION:  Pt reports he utilized [facility] for ambulation and IND w/ ADLs. HOME SUPPORT: Pt lives with two roommates. Occupational Therapy Goals:  Initiated 6/21/2023  Patient/Family stated goal: I want to get better  Patient will perform grooming in standing with Minimal Assist within 7 day(s). Patient will perform UB bathing with [place] within 7 day(s). Patient will perform LB bathing with [place] within 7 day(s). Patient will perform toilet transfers with Modified [place]  within 7 day(s). Patient will perform all aspects of toileting with [place] within 7 day(s). Patient will participate in upper extremity therapeutic exercise/activities with [place] within 7 day(s).    6/22/2023 1035 by PATEL Noyola  Outcome: Progressing     Problem: Physical Therapy - Adult  Goal: By Discharge: Performs mobility at highest level of function for planned discharge setting. See evaluation for individualized goals. Description: FUNCTIONAL STATUS PRIOR TO ADMISSION: Patient was modified independent using a rolling walker and single point cane for functional mobility. HOME SUPPORT PRIOR TO ADMISSION: The patient lived with family and friend. Physical Therapy Goals  Initiated 6/21/2023  Pt stated goal: to get better  Pt will be I with LE HEP in 7 days. Pt will perform bed mobility with Modified [place] in 7 days. Pt will perform transfers with Modified [place] in 7 days. Pt will amb  feet with LRAD safely with SBA in 7 days. Pt will demonstrate improvement in dynamic standing balance from Min A to Stand by Assist in 7 days.      6/22/2023 1035 by [address] Lara
in no apparent distress sitting up in chair and Call bell within reach, and nsg updated       COMMUNICATION/COLLABORATION:   The patients plan of care was discussed with: Occupational therapy assistant and Registered nurse PT/OT sessions occurred together for increased safety of pt and clinician. Patient Education  Education Given To: Patient  Education Provided: Role of Therapy;Plan of Care;Transfer Training; Fall Prevention Strategies; Home Exercise Program  Education Method: Demonstration;Verbal  Barriers to Learning: None  Education Outcome: Verbalized understanding;Demonstrated understanding      Sheryl Babb PTA  Minutes: 23         Problem: Physical Therapy - Adult  Goal: By Discharge: Performs mobility at highest level of function for planned discharge setting. See evaluation for individualized goals. Description: FUNCTIONAL STATUS PRIOR TO ADMISSION: Patient was modified independent using a rolling walker and single point cane for functional mobility. HOME SUPPORT PRIOR TO ADMISSION: The patient lived with family and friend. Physical Therapy Goals  Initiated 6/21/2023  Pt stated goal: to get better  Pt will be I with LE HEP in 7 days. Pt will perform bed mobility with Modified [place] in 7 days. Pt will perform transfers with Modified [place] in 7 days. Pt will amb  feet with LRAD safely with SBA in 7 days. Pt will demonstrate improvement in dynamic standing balance from Min A to Stand by Assist in 7 days.      Outcome: Progressing
lying on back to sitting on the side of the bed? [] 1   [] 2   [x] 3   [] 4          How much help from another person does the patient currently need. .. Total A Lot A Little None   4. Moving to and from a bed to a chair (including a wheelchair)? [] 1   [] 2   [x] 3   [] 4   5. Need to walk in hospital room? [] 1   [] 2   [x] 3   [] 4   6. Climbing 3-5 steps with a railing? [] 1   [x] 2   [] 3   [] 4   © , Trustees of [address] [place] [address] [address] [id number], under license to [employer]. All rights reserved     Score:  Initial:  Most Recent: X (Date: 2023 )   Interpretation of Tool:  Represents activities that are increasingly more difficult (i.e. Bed mobility, Transfers, Gait). Score 24 23 22-20 19-15 14-10 9-7 6   Modifier CH CI CJ CK CL CM CN         Physical Therapy Evaluation Charge Determination   History Examination Presentation Decision-Making   HIGH Complexity :3+ comorbidities / personal factors will impact the outcome/ POC  HIGH Complexity : 4+ Standardized tests and measures addressing body structure, function, activity limitation and / or participation in recreation  LOW Complexity : Stable, uncomplicated  Other outcome measures ampa 6  MEDIUM      Based on the above components, the patient evaluation is determined to be of the following complexity level: LOW    Pain Ratin/10   Activity Tolerance:   Fair  and requires rest breaks    After treatment patient left in no apparent distress:   Bed locked and in lowest position Patient left in no apparent distress sitting up in chair and Call bell within reach and nsg updated. COMMUNICATION/EDUCATION:   The patients plan of care was discussed with: Occupational therapist and Registered nurse     Patient Education  Education Given To: Patient  Education Provided: Role of Therapy;Plan of Care;Transfer Training; Fall Prevention Strategies; Home Exercise Program  Education Method: Demonstration;Verbal  Barriers to Learning: None  Education Outcome:

## 2023-06-22 NOTE — CARE COORDINATION
1010: CM met with patient sitting up in chair to discuss IRF recs. Patient agreeable. Choice letter received, placed on chart and referral sent.

## 2023-06-23 VITALS
TEMPERATURE: 98.6 F | OXYGEN SATURATION: 99 % | HEART RATE: 78 BPM | RESPIRATION RATE: 18 BRPM | BODY MASS INDEX: 24.43 KG/M2 | HEIGHT: 70 IN | DIASTOLIC BLOOD PRESSURE: 57 MMHG | WEIGHT: 170.64 LBS | SYSTOLIC BLOOD PRESSURE: 104 MMHG

## 2023-06-23 LAB
-: NORMAL
ANION GAP SERPL CALC-SCNC: 6 MMOL/L (ref 5–15)
BASOPHILS # BLD: 0 K/UL (ref 0–0.1)
BASOPHILS NFR BLD: 1 % (ref 0–1)
BUN SERPL-MCNC: 24 MG/DL (ref 6–20)
BUN/CREAT SERPL: 5 (ref 12–20)
CA-I BLD-MCNC: 8.1 MG/DL (ref 8.5–10.1)
CHLORIDE SERPL-SCNC: 100 MMOL/L (ref 97–108)
CO2 SERPL-SCNC: 29 MMOL/L (ref 21–32)
CREAT SERPL-MCNC: 5.06 MG/DL (ref 0.7–1.3)
CRP SERPL-MCNC: 9.69 MG/DL (ref 0–0.6)
DIFFERENTIAL METHOD BLD: ABNORMAL
EOSINOPHIL # BLD: 1 K/UL (ref 0–0.4)
EOSINOPHIL NFR BLD: 15 % (ref 0–7)
ERYTHROCYTE [DISTWIDTH] IN BLOOD BY AUTOMATED COUNT: 17.7 % (ref 11.5–14.5)
GLUCOSE BLD STRIP.AUTO-MCNC: 110 MG/DL (ref 65–100)
GLUCOSE BLD STRIP.AUTO-MCNC: 96 MG/DL (ref 65–100)
GLUCOSE SERPL-MCNC: 110 MG/DL (ref 65–100)
HAV IGM SER QL: NONREACTIVE
HBV CORE IGM SER QL: NONREACTIVE
HBV SURFACE AG SER QL: <0.1 INDEX
HBV SURFACE AG SER QL: NEGATIVE
HCT VFR BLD AUTO: 23.4 % (ref 36.6–50.3)
HCV AB SER IA-ACNC: 0.08 INDEX
HCV AB SERPL QL IA: NONREACTIVE
HGB BLD-MCNC: 7.2 G/DL (ref 12.1–17)
IMM GRANULOCYTES # BLD AUTO: 0 K/UL (ref 0–0.04)
IMM GRANULOCYTES NFR BLD AUTO: 0 % (ref 0–0.5)
LYMPHOCYTES # BLD: 1.8 K/UL (ref 0.8–3.5)
LYMPHOCYTES NFR BLD: 27 % (ref 12–49)
MCH RBC QN AUTO: 28.8 PG (ref 26–34)
MCHC RBC AUTO-ENTMCNC: 30.8 G/DL (ref 30–36.5)
MCV RBC AUTO: 93.6 FL (ref 80–99)
MONOCYTES # BLD: 0.6 K/UL (ref 0–1)
MONOCYTES NFR BLD: 9 % (ref 5–13)
NEUTS SEG # BLD: 3.1 K/UL (ref 1.8–8)
NEUTS SEG NFR BLD: 48 % (ref 32–75)
NRBC # BLD: 0 K/UL (ref 0–0.01)
NRBC BLD-RTO: 0 PER 100 WBC
PERFORMED BY:: ABNORMAL
PERFORMED BY:: NORMAL
PLATELET # BLD AUTO: 335 K/UL (ref 150–400)
PMV BLD AUTO: 10.5 FL (ref 8.9–12.9)
POTASSIUM SERPL-SCNC: 3.6 MMOL/L (ref 3.5–5.1)
PROCALCITONIN SERPL-MCNC: 1.13 NG/ML
RBC # BLD AUTO: 2.5 M/UL (ref 4.1–5.7)
SODIUM SERPL-SCNC: 135 MMOL/L (ref 136–145)
WBC # BLD AUTO: 6.5 K/UL (ref 4.1–11.1)

## 2023-06-23 PROCEDURE — 2709999900 HC NON-CHARGEABLE SUPPLY

## 2023-06-23 PROCEDURE — 6360000002 HC RX W HCPCS: Performed by: SURGERY

## 2023-06-23 PROCEDURE — 84145 PROCALCITONIN (PCT): CPT

## 2023-06-23 PROCEDURE — 85025 COMPLETE CBC W/AUTO DIFF WBC: CPT

## 2023-06-23 PROCEDURE — 2580000003 HC RX 258: Performed by: INTERNAL MEDICINE

## 2023-06-23 PROCEDURE — 6370000000 HC RX 637 (ALT 250 FOR IP): Performed by: INTERNAL MEDICINE

## 2023-06-23 PROCEDURE — 86140 C-REACTIVE PROTEIN: CPT

## 2023-06-23 PROCEDURE — 82962 GLUCOSE BLOOD TEST: CPT

## 2023-06-23 PROCEDURE — 6370000000 HC RX 637 (ALT 250 FOR IP): Performed by: SURGERY

## 2023-06-23 PROCEDURE — 6360000002 HC RX W HCPCS: Performed by: INTERNAL MEDICINE

## 2023-06-23 PROCEDURE — 36415 COLL VENOUS BLD VENIPUNCTURE: CPT

## 2023-06-23 PROCEDURE — 80074 ACUTE HEPATITIS PANEL: CPT

## 2023-06-23 PROCEDURE — 80048 BASIC METABOLIC PNL TOTAL CA: CPT

## 2023-06-23 PROCEDURE — 90935 HEMODIALYSIS ONE EVALUATION: CPT

## 2023-06-23 RX ORDER — MIDODRINE HYDROCHLORIDE 5 MG/1
5 TABLET ORAL
Qty: 90 TABLET | Refills: 3 | Status: SHIPPED | OUTPATIENT
Start: 2023-06-23

## 2023-06-23 RX ADMIN — HEPARIN SODIUM 5000 UNITS: 5000 INJECTION INTRAVENOUS; SUBCUTANEOUS at 07:00

## 2023-06-23 RX ADMIN — OXYCODONE HYDROCHLORIDE 10 MG: 10 TABLET, FILM COATED, EXTENDED RELEASE ORAL at 08:11

## 2023-06-23 RX ADMIN — MEROPENEM 500 MG: 500 INJECTION INTRAVENOUS at 13:30

## 2023-06-23 RX ADMIN — OXYCODONE HYDROCHLORIDE AND ACETAMINOPHEN 1 TABLET: 5; 325 TABLET ORAL at 01:40

## 2023-06-23 RX ADMIN — MIDODRINE HYDROCHLORIDE 5 MG: 5 TABLET ORAL at 17:11

## 2023-06-23 RX ADMIN — HEPARIN SODIUM 5000 UNITS: 5000 INJECTION INTRAVENOUS; SUBCUTANEOUS at 13:30

## 2023-06-23 RX ADMIN — CARVEDILOL 3.12 MG: 3.12 TABLET, FILM COATED ORAL at 17:11

## 2023-06-23 RX ADMIN — EPOETIN ALFA-EPBX 10000 UNITS: 10000 INJECTION, SOLUTION INTRAVENOUS; SUBCUTANEOUS at 12:20

## 2023-06-23 RX ADMIN — MIDODRINE HYDROCHLORIDE 5 MG: 5 TABLET ORAL at 13:30

## 2023-06-23 ASSESSMENT — PAIN DESCRIPTION - LOCATION
LOCATION: LEG

## 2023-06-23 ASSESSMENT — PAIN DESCRIPTION - DESCRIPTORS
DESCRIPTORS: ACHING

## 2023-06-23 ASSESSMENT — PAIN SCALES - GENERAL
PAINLEVEL_OUTOF10: 4
PAINLEVEL_OUTOF10: 0
PAINLEVEL_OUTOF10: 7
PAINLEVEL_OUTOF10: 8
PAINLEVEL_OUTOF10: 4

## 2023-06-23 ASSESSMENT — PAIN DESCRIPTION - ORIENTATION
ORIENTATION: RIGHT

## 2023-06-23 ASSESSMENT — PAIN SCALES - WONG BAKER
WONGBAKER_NUMERICALRESPONSE: 0
WONGBAKER_NUMERICALRESPONSE: 4

## 2023-06-23 ASSESSMENT — PAIN DESCRIPTION - ONSET: ONSET: ON-GOING

## 2023-06-23 ASSESSMENT — PAIN DESCRIPTION - PAIN TYPE: TYPE: ACUTE PAIN

## 2023-06-23 ASSESSMENT — ENCOUNTER SYMPTOMS: RESPIRATORY NEGATIVE: 1

## 2023-06-23 ASSESSMENT — PAIN - FUNCTIONAL ASSESSMENT: PAIN_FUNCTIONAL_ASSESSMENT: PREVENTS OR INTERFERES SOME ACTIVE ACTIVITIES AND ADLS

## 2023-06-23 ASSESSMENT — PAIN DESCRIPTION - FREQUENCY: FREQUENCY: CONTINUOUS

## 2023-06-23 NOTE — WOUND CARE
WBCs: 6.5 on 6/23/23    Nutritionist Consulted: Yes   Nutrition Status: Moderate malnutrition    Support Surface: Gel    Physician/Provider notified: Dr. Diaz   Recommendations: right BKA incision examined with Dr. Tera Barth. Intact sutures noted. Wound edges are well-approximated with a few gaps noted. Scant serosanguineous drainage noted. See order by Dr. Grazyna La for wound care dressing changes. Recommend elevating with pillows to help reduce edema. Calluses to left heel and left medial foot at hallux, dry and intact. See pictures below. Patient should follow up with Dr. Grazyna La for management of right BKA incision and Dr. Delmis Rod or Rick Hollingsworth for left foot care. Possible discharge today for rehab. Re-consult [facility] for other wound/skin care concerns. Discharge Wound Care Needs: see wound care order by Dr. Chris Zaragoza completed with:   [] Patient           [] Family member       [] Caregiver          [] Nursing  [] Other    Patient/Caregiver Teaching:  Level of patient/caregiver understanding able to:   [] Indicates understanding       [] Needs reinforcement  [] Unsuccessful      [] Verbal Understanding  [] Demonstrated understanding       [] No evidence of learning  [] Refused teaching         [] N/A       Electronically signed by Henrry Gomes RN on 6/23/2023 at 2:31 PM      Media Information      Document Information    Wound Care Image:  Wound   Left Heel Plantar   06/23/2023 13:17   Attached To: Hospital Encounter on 6/12/23     Source Power Mack RN  [facility]        Media Information      Document Information    Wound Care Image:  Wound   Left Medial Foot   06/23/2023 13:18   Attached To:    Hospital Encounter on 6/12/23     Source Power Mack RN  [facility]

## 2023-06-23 NOTE — DISCHARGE INSTR - COC
femoral popliteal artery performed by Blue Barnes [facility] 5/16/2023    Angioplasty post tib artery performed by Blue Barnes MD at [facility] N/A 5/16/2023    Angioplasty popliteal artery performed by Blue Barnes MD at [address]  10/18/2021     [address]  1/22/2021    IR FLUOROSCOPY GUIDED CENTRAL VENOUS ACCESS DEVICE PLACEMENT  1/18/2021    IR FLUOROSCOPY GUIDED CENTRAL VENOUS ACCESS DEVICE PLACEMENT  12/13/2021    IR NONTUNNELED VASCULAR CATHETER  10/18/2021    IR NONTUNNELED VASCULAR CATHETER 10/18/2021 [facility]    IR NONTUNNELED VASCULAR CATHETER  1/18/2021    IR NONTUNNELED VASCULAR CATHETER 1/18/2021 [facility]    IR NONTUNNELED VASCULAR CATHETER  10/18/2021    IR NONTUNNELED VASCULAR CATHETER  1/18/2021    IR REMOVAL TUNNELED CVC WO PORT PUMP  10/18/2021    IR TUNNELED CATHETER PLACEMENT GREATER THAN 5 YEARS  1/22/2021    IR TUNNELED CATHETER PLACEMENT GREATER THAN 5 YEARS 1/22/2021 [facility]    IR TUNNELED CATHETER PLACEMENT GREATER THAN 5 YEARS  12/13/2021    IR TUNNELED CATHETER PLACEMENT GREATER THAN 5 YEARS 12/13/2021 [facility]    IR TUNNELED CATHETER PLACEMENT GREATER THAN 5 YEARS  1/22/2021    IR TUNNELED CATHETER PLACEMENT GREATER THAN 5 YEARS  12/13/2021    LEG AMPUTATION BELOW KNEE Right 6/19/2023    LEG AMPUTATION BELOW KNEE ON RIGHT performed by Blue Barnes MD at [facility] Right 5/11/2023    AMPUTATION THIRD TOE RIGHT performed by Last Arroyo DPM at [address] [place] [address]       Immunization History: There is no immunization history for the selected administration types on file for this patient.     Active Problems:  Patient Active Problem List   Diagnosis Code    End stage renal disease on dialysis ([facility] [address] N18.6, Z99.2    MRSA bacteremia R78.81, B95.62    ESRD on dialysis ([facility] [address]

## 2023-06-23 NOTE — DISCHARGE SUMMARY
Hospitalist Discharge Summary     Patient ID:    Lorenzo Booker  [id number]  71 y.o.  1950    Admit date: 6/12/2023    Discharge date : 6/23/2023    CC on admission: surgical site infection  Final Diagnoses:   Principal Problem:    Diabetic foot infection ([facility] [address]  Active Problems:    End stage renal disease on dialysis ([facility] [address]    Gastroesophageal reflux disease    Anemia of chronic disease    Gangrene of right foot ([facility] [address]    Surgical site infection    S/P BKA (below knee amputation), right ([facility] [address]  Resolved Problems:    * No resolved hospital problems. *      Reason for Hospitalization/Hospital Course:   Lorenzo Booker is a  28-year-old male with history of diabetic foot infection on the right with gangrene of the third toe. He was admitted in early May, discharged on May 22. During his hospital stay he initially underwent right third toe amputation and subsequently right TMA. He also underwent angioplasty of the right superficial artery and recanalization of the right posterior tibial artery. He was treated with IV tobramycin following discharge at dialysis for another 2 weeks. Wound cultures grew heavy Morganella marganii. Other comorbidities include ESRD, PAD diabetes. Patient had a follow-up appointment day of admission with Dr. Inna Razo and there was concern for surgical site infection. Patient was subsequently sent to the ED. He was found to be afebrile and with stable vital signs. His white count is normal, lactate 1.1. Patient denies fever at home. Vascular surgery and podiatry consulted. Most likely will need a BKA. Infectious disease consulted. Patient on IV vancomycin and meropenem. Right BRENDA mild peripheral artery disease, left BRENDA moderate peripheral artery disease, bilateral TBI cannot be obtained.   Per podiatry wound care clean ulcer with normal saline, apply Betadine wet to dry and covered with a Kerlix and

## 2023-06-23 NOTE — DIALYSIS
Pt mai 3.5 hour hemodialysis well.  2 liters net fluid removed. Epogen 23517 units given IV.   Report called to Stephania

## 2023-06-23 NOTE — PROGRESS NOTES
Attempted PT 7508 however pt currently off the floor. Will continue to follow and attempt at a later time. Thank you.
Attempted PT eval at 46 today on 72 y/o male POD1 s/p R BKA. Pt reporting he is in too much pain from surgical site (10/10) to mobilize and has been unable to sleep due to this pain as well. Will f/u for eval at next available opportunity.
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 He was admitted in early May, discharged on May 22. During his hospital stay he initially underwent right third toe amputation and subsequently right TMA. He also underwent angioplasty of the right superficial artery and recanalization of the right posterior tibial artery. He was treated with IV tobramycin following discharge at dialysis for another 2 weeks. Wound cultures grew heavy Morganella marganii. Other comorbidities include ESRD, PAD diabetes. Patient had a follow-up appointment day of admission with Dr. Maicol López and there was concern for surgical site infection. Patient was subsequently sent to the ED. He was found to be afebrile and with stable vital signs. His white count is normal, lactate 1.1. Patient denies fever at home. Vascular surgery and podiatry consulted. Most likely will need a BKA. Infectious disease consulted. Patient on IV vancomycin and meropenem. Right BRENDA mild peripheral artery disease, left BRENDA moderate peripheral artery disease, bilateral TBI cannot be obtained. Per podiatry wound care clean ulcer with normal saline, apply Betadine wet to dry and covered with a Kerlix and Ace bandage    Nephrology consulted for dialysis management    Patient declined dialysis this morning. Patient remains NPO. Surgery is scheduled for today per Dr. Maicol López. Plavix is on hold. Continue IV meropenem. Subjective/CC: Patient was seen at the bedside.      Current Facility-Administered Medications   Medication Dose Route Frequency    docusate sodium (COLACE) capsule 100 mg  100 mg Oral BID    senna (SENOKOT) tablet 17.2 mg  2 tablet Oral Nightly    insulin lispro (HUMALOG) injection vial 0-8 Units  0-8 Units SubCUTAneous TID WC
OT eval order received and acknowledged. OT eval attempted at 3:00 PM, however, pt reporting 10/10 pain in RLE and declining therapy at this time. OT will continue to follow and attempt eval at a later time. Thank you.
PROGRESS NOTE      Chief Complaints:  Patient examined this morning. HPI and  Objective:    Patient is no fever. Review of Systems:  Rest of review of system reviewed personally and they are negative. EXAM:  /75   Pulse 67   Temp 97.5 °F (36.4 °C) (Oral)   Resp 16   Ht 5' 10\" (1.778 m)   Wt 177 lb (80.3 kg)   SpO2 99%   BMI 25.40 kg/m²     Patient is awake   Head and neck atraumatic, normocephalic. ENT: No hoarse voice, gaze appropriate. Cardiac system regular rate rhythm. Pulmonary no audible wheeze, no cyanosis. Chest wall excursion normal with respiration cycle  Abdomen is soft not particularly distended. Neurologically nonfocal.  Hematology system: No bruising noted. Musculoskeletal system: No joint deformity noted. Genitourinary system: No hematuria noted. Skin is warm and moist.  Psychosocial: Cooperative. Vascular examination as previously noted no changes.     Current Facility-Administered Medications   Medication Dose Route Frequency Provider Last Rate Last Admin    docusate sodium (COLACE) capsule 100 mg  100 mg Oral BID Efren Rosario PA-C   100 mg at 06/18/23 2227    senna (SENOKOT) tablet 17.2 mg  2 tablet Oral Nightly Marilyn Garcia PA-C   17.2 mg at 06/18/23 2227    insulin lispro (HUMALOG) injection vial 0-8 Units  0-8 Units SubCUTAneous TID WC Marilyn Garcia PA-C        insulin lispro (HUMALOG) injection vial 0-4 Units  0-4 Units SubCUTAneous Nightly Marilyn Garcia PA-C        glucose chewable tablet 16 g  4 tablet Oral PRN Marilyn Garcia PA-C        dextrose bolus 10% 125 mL  125 mL IntraVENous PRN Efren Rosario PA-C        Or    dextrose bolus 10% 250 mL  250 mL IntraVENous PRN Efren oRsario PA-C        glucagon injection 1 mg  1 mg SubCUTAneous PRN Marilyn Garcia PA-C        dextrose 10 % infusion   IntraVENous Continuous PRN Efren Rosario PA-C        [Held by provider] 0.9 % sodium chloride infusion   IntraVENous Continuous Steve Archer MD 75 mL/hr at
Patient has right BKA yesterday. He is doing fine. Pain is severe. Vital signs stable. Right BKA dressing is intact. Dressing change is due tomorrow morning and the patient can be discharged to rehab. I will modify pain medications to oxycodone extended release 10 mg p.o. twice daily with Percocet for breakthrough.
Progress Note  Date:2023       Room:[facility]  Patient Name:Damien Ferrer     Date of Birth:[birth year]     Age:72 y.o. 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 Yesterday he underwent right BKA. Remains on Meroopenem. Procal and CRP now decreased. Patient sitting up in bedside chair on telephone. Remains on Meropenem. Apparent plans now for IRF. Review of Systems negative  Objective         Vitals Last 24 Hours:  TEMPERATURE:  Temp  Av.7 °F (37.1 °C)  Min: 98.4 °F (36.9 °C)  Max: 99.1 °F (37.3 °C)  RESPIRATIONS RANGE: Resp  Av.8  Min: 16  Max: 18  PULSE OXIMETRY RANGE: SpO2  Av.3 %  Min: 97 %  Max: 100 %  PULSE RANGE: Pulse  Av.7  Min: 68  Max: 81  BLOOD PRESSURE RANGE: Systolic (71EUP), TMX:231 , Min:110 , QEO:096   ; Diastolic (68KWV), ATE:40, Min:60, Max:76       Objective:  Vital signs: (most recent): Blood pressure 122/66, pulse 73, temperature 98.4 °F (36.9 °C), temperature source Oral, resp. rate 18, height 5' 10\" (1.778 m), weight 170 lb 10.2 oz (77.4 kg), SpO2 100 %. Vitals and nursing note reviewed. Patient unavailable for re-examination at this time  Constitutional:       Appearance: He is ill-appearing. Genitourinary:     Comments: No Valencia  Musculoskeletal:      Cervical back: Neck supple. Left lower leg: Edema present. Comments: Right BKA stump with bulky gauzed dressing and ace wrap, not removed at this time   Skin:     Findings: No rash. Neurological:      General: No focal deficit present. Mental Status: He is oriented to person, place, and time. Psychiatric:         Behavior: Behavior normal.              Wound Care Image:  Wound       2023 12:50   Attached To:    Hospital Encounter on 23          Labs/Imaging/Diagnostics    Labs:  CBC:  Recent Labs     23  0725 23  0535 23  0744   WBC 11.3* 9.4 8.7   RBC 2.39* 2.53* 2.66*   HGB 7.1*
Progress Note  Date:2023       Room:[facility]  Patient Name:Damien Ferrer     Date of Birth:[birth year]     Age:72 y.o. 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 Yesterday he underwent right BKA. Remains on Meroopenem. Procal and CRP now decreased. Patient is resting comfortably with no complaints and wants to go home instead of Rehab. Review of Systems negative  Objective         Vitals Last 24 Hours:  TEMPERATURE:  Temp  Av.2 °F (36.8 °C)  Min: 97.5 °F (36.4 °C)  Max: 98.9 °F (37.2 °C)  RESPIRATIONS RANGE: Resp  Av.6  Min: 15  Max: 26  PULSE OXIMETRY RANGE: SpO2  Av.6 %  Min: 97 %  Max: 100 %  PULSE RANGE: Pulse  Av.9  Min: 70  Max: 86  BLOOD PRESSURE RANGE: Systolic (23ROH), DST:973 , Min:115 , OQW:673   ; Diastolic (92FRE), EHR:00, Min:64, Max:83       Objective:  Vital signs: (most recent): Blood pressure 115/70, pulse 86, temperature 98.5 °F (36.9 °C), resp. rate 18, height 5' 10\" (1.778 m), weight 170 lb 10.2 oz (77.4 kg), SpO2 100 %. Vitals and nursing note reviewed. Constitutional:       Appearance: He is ill-appearing. Genitourinary:     Comments: No Valencia  Musculoskeletal:      Cervical back: Neck supple. Left lower leg: Edema present. Comments: Right BKA stump with bulky gauzed dressing and ace wrap, not removed at this time   Skin:     Findings: No rash. Neurological:      General: No focal deficit present. Mental Status: He is oriented to person, place, and time. Psychiatric:         Behavior: Behavior normal.              Wound Care Image:  Wound       2023 12:50   Attached To:    Hospital Encounter on 23          Labs/Imaging/Diagnostics    Labs:  CBC:  Recent Labs     23  2030 23  0509 23  0725   WBC 9.6 9.5 11.3*   RBC 2.62* 2.70* 2.39*   HGB 7.6* 7.8* 7.1*   HCT 24.5* 25.3* 22.8*   MCV 93.5 93.7 95.4   RDW 17.2* 17.5* 17.5*   *
Progress Note  Date:2023       Room:[facility]  Patient Name:Damien Ferrer     Date of Birth:[birth year]     Age:72 y.o. 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 Yesterday he underwent right BKA. Remains on Meroopenem. Procal and CRP now decreased. Patient is off the floor for dialysis at this time. Remains on Meropenem. Review of Systems negative  Objective         Vitals Last 24 Hours:  TEMPERATURE:  Temp  Av °F (37.2 °C)  Min: 98.6 °F (37 °C)  Max: 99.9 °F (37.7 °C)  RESPIRATIONS RANGE: Resp  Av  Min: 16  Max: 20  PULSE OXIMETRY RANGE: SpO2  Av.8 %  Min: 96 %  Max: 100 %  PULSE RANGE: Pulse  Av.8  Min: 68  Max: 81  BLOOD PRESSURE RANGE: Systolic (26TKD), GOP:780 , Min:100 , YSX:690   ; Diastolic (79JCD), UWY:91, Min:56, Max:76       Objective:  Vital signs: (most recent): Blood pressure 123/70, pulse 81, temperature 98.6 °F (37 °C), temperature source Oral, resp. rate 16, height 5' 10\" (1.778 m), weight 170 lb 10.2 oz (77.4 kg), SpO2 100 %. Vitals and nursing note reviewed. Patient unavailable for re-examination at this time  Constitutional:       Appearance: He is ill-appearing. Genitourinary:     Comments: No Valencia  Musculoskeletal:      Cervical back: Neck supple. Left lower leg: Edema present. Comments: Right BKA stump with bulky gauzed dressing and ace wrap, not removed at this time   Skin:     Findings: No rash. Neurological:      General: No focal deficit present. Mental Status: He is oriented to person, place, and time. Psychiatric:         Behavior: Behavior normal.              Wound Care Image:  Wound       2023 12:50   Attached To:    Hospital Encounter on 23          Labs/Imaging/Diagnostics    Labs:  CBC:  Recent Labs     23  0509 23  0725 23  0535   WBC 9.5 11.3* 9.4   RBC 2.70* 2.39* 2.53*   HGB 7.8* 7.1* 7.4*   HCT 25.3* 22.8* 24.6*
Progress Note  Date:2023       Room:[facility]  Patient Name:Damien Ferrer     Date of Birth:[birth year]     Age:72 y.o. 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 Yesterday he underwent right BKA. Remains on Meroopenem. Procal and CRP now decreased. Patient sitting up in bedside chair on telephone. Remains on Meropenem. Apparent plans now for IRF. Review of Systems negative  Objective         Vitals Last 24 Hours:  TEMPERATURE:  Temp  Av.4 °F (36.9 °C)  Min: 98 °F (36.7 °C)  Max: 98.8 °F (37.1 °C)  RESPIRATIONS RANGE: Resp  Av.1  Min: 16  Max: 18  PULSE OXIMETRY RANGE: SpO2  Av %  Min: 96 %  Max: 99 %  PULSE RANGE: Pulse  Av.3  Min: 68  Max: 78  BLOOD PRESSURE RANGE: Systolic (57GVJ), QUW:491 , Min:103 , XYA:178   ; Diastolic (59MYX), UBD:27, Min:64, Max:80       Objective:  Vital signs: (most recent): Blood pressure (!) 140/80, pulse 75, temperature 98.3 °F (36.8 °C), resp. rate 18, height 5' 10\" (1.778 m), weight 170 lb 10.2 oz (77.4 kg), SpO2 99 %. Vitals and nursing note reviewed. Patient unavailable for re-examination at this time  Constitutional:       Appearance: He is ill-appearing. Genitourinary:     Comments: No Valencia  Musculoskeletal:      Cervical back: Neck supple. Left lower leg: Edema present. Comments: Right BKA stump with bulky gauzed dressing and ace wrap, not removed at this time   Skin:     Findings: No rash. Neurological:      General: No focal deficit present. Mental Status: He is oriented to person, place, and time. Psychiatric:         Behavior: Behavior normal.              Wound Care Image:  Wound       2023 12:50   Attached To:    Hospital Encounter on 23          Labs/Imaging/Diagnostics    Labs:  CBC:  Recent Labs     23  0535 23  0744 23  0840   WBC 9.4 8.7 6.5   RBC 2.53* 2.66* 2.50*   HGB 7.4* 7.7* 7.2*   HCT 24.6* 25.2*
Progress Note  Date:2023       Room:[facility]  Patient Name:Damien Ferrer     Date of Birth:[birth year]     Age:72 y.o. 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 Earlier today, he underwent left BKA. Remains on Meroopenem. Procal and CRP now decreased. Patient is in recovery room at this time. Review of Systems negative  Objective         Vitals Last 24 Hours:  TEMPERATURE:  Temp  Av.1 °F (36.7 °C)  Min: 97.5 °F (36.4 °C)  Max: 98.7 °F (37.1 °C)  RESPIRATIONS RANGE: Resp  Av.6  Min: 15  Max: 26  PULSE OXIMETRY RANGE: SpO2  Av.5 %  Min: 97 %  Max: 100 %  PULSE RANGE: Pulse  Av.4  Min: 79  Max: 78  BLOOD PRESSURE RANGE: Systolic (48XKL), UPY:209 , Min:126 , OMJ:321   ; Diastolic (39CXB), ZAH:49, Min:66, Max:79       Objective:  Vital signs: (most recent): Blood pressure (!) 151/70, pulse 78, temperature 97.9 °F (36.6 °C), temperature source Oral, resp. rate 26, height 5' 10\" (1.778 m), weight 177 lb (80.3 kg), SpO2 100 %. Vitals and nursing note reviewed. Constitutional:       Appearance: He is ill-appearing. Genitourinary:     Comments: No Valencia  Musculoskeletal:      Cervical back: Neck supple. Left lower leg: Edema present. Comments: Open right TMA wound with necrosis (see photo)   Skin:     Findings: No rash. Neurological:      General: No focal deficit present. Mental Status: He is oriented to person, place, and time. Psychiatric:         Behavior: Behavior normal.         Media Information      Document Information     Wound Care Image:  Wound       2023 12:50   Attached To:    Hospital Encounter on 23          Labs/Imaging/Diagnostics    Labs:  CBC:  Recent Labs     23  1402 23  2030 23  0509   WBC 8.1 9.6 9.5   RBC 2.82* 2.62* 2.70*   HGB 8.1* 7.6* 7.8*   HCT 26.5* 24.5* 25.3*   MCV 94.0 93.5 93.7   RDW 17.2* 17.2* 17.5*   * 422* 426*
Pt states no family to notify  at this time post surgery
Renal Progress Note    Patient: Danielle Queen MRN: [id number]  SSN: [id number]    YOB: 1950  Age: 71 y.o. Sex: male      Admit Date: 6/12/2023    LOS: 8 days     Subjective:   Patient seen in the room. He had right BKA yesterday. Complaining of severe pain today.   He was dialyzed this morning with 2 L fluid removal      Current Facility-Administered Medications   Medication Dose Route Frequency    oxyCODONE-acetaminophen (PERCOCET)  MG per tablet 1 tablet  1 tablet Oral Q6H PRN    oxyCODONE (OXYCONTIN) extended release tablet 10 mg  10 mg Oral 2 times per day    metoprolol (LOPRESSOR) injection 5 mg  5 mg IntraVENous Q10 Min PRN    labetalol (NORMODYNE;TRANDATE) injection 10 mg  10 mg IntraVENous Q10 Min PRN    hydrALAZINE (APRESOLINE) injection 10 mg  10 mg IntraVENous Q10 Min PRN    oxyCODONE (ROXICODONE) immediate release tablet 5 mg  5 mg Oral Once PRN    acetaminophen (TYLENOL) tablet 1,000 mg  1,000 mg Oral Once PRN    epoetin selma-epbx (RETACRIT) injection 10,000 Units  10,000 Units IntraVENous Once per day on Tue Thu Sat    sodium chloride flush 0.9 % injection 5-40 mL  5-40 mL IntraVENous 2 times per day    0.9 % sodium chloride infusion   IntraVENous PRN    heparin (porcine) injection 5,000 Units  5,000 Units SubCUTAneous 3 times per day    lactated ringers IV soln infusion   IntraVENous Continuous    metoprolol (LOPRESSOR) injection 5 mg  5 mg IntraVENous Q10 Min PRN    labetalol (NORMODYNE;TRANDATE) injection 10 mg  10 mg IntraVENous Q10 Min PRN    hydrALAZINE (APRESOLINE) injection 10 mg  10 mg IntraVENous Q10 Min PRN    acetaminophen (TYLENOL) tablet 1,000 mg  1,000 mg Oral Once PRN    docusate sodium (COLACE) capsule 100 mg  100 mg Oral BID    senna (SENOKOT) tablet 17.2 mg  2 tablet Oral Nightly    insulin lispro (HUMALOG) injection vial 0-8 Units  0-8 Units SubCUTAneous TID WC    insulin lispro (HUMALOG) injection vial 0-4 Units  0-4 Units SubCUTAneous Nightly    glucose
Renal Progress Note    Patient: Jalil cMkeon MRN: [id number]  SSN: [id number]    YOB: 1950  Age: 71 y.o. Sex: male      Admit Date: 6/12/2023    LOS: 10 days     Subjective:     -Patient seen in his room this morning comfortably sitting up in chair. Reports doing some physical therapy this morning. It is improving.   He is reporting some lesions in his left foot now    Current Facility-Administered Medications   Medication Dose Route Frequency    epoetin selma-epbx (RETACRIT) injection 10,000 Units  10,000 Units IntraVENous Q MWF    oxyCODONE-acetaminophen (PERCOCET)  MG per tablet 1 tablet  1 tablet Oral Q6H PRN    oxyCODONE (OXYCONTIN) extended release tablet 10 mg  10 mg Oral 2 times per day    metoprolol (LOPRESSOR) injection 5 mg  5 mg IntraVENous Q10 Min PRN    labetalol (NORMODYNE;TRANDATE) injection 10 mg  10 mg IntraVENous Q10 Min PRN    hydrALAZINE (APRESOLINE) injection 10 mg  10 mg IntraVENous Q10 Min PRN    oxyCODONE (ROXICODONE) immediate release tablet 5 mg  5 mg Oral Once PRN    acetaminophen (TYLENOL) tablet 1,000 mg  1,000 mg Oral Once PRN    sodium chloride flush 0.9 % injection 5-40 mL  5-40 mL IntraVENous 2 times per day    0.9 % sodium chloride infusion   IntraVENous PRN    heparin (porcine) injection 5,000 Units  5,000 Units SubCUTAneous 3 times per day    lactated ringers IV soln infusion   IntraVENous Continuous    metoprolol (LOPRESSOR) injection 5 mg  5 mg IntraVENous Q10 Min PRN    labetalol (NORMODYNE;TRANDATE) injection 10 mg  10 mg IntraVENous Q10 Min PRN    hydrALAZINE (APRESOLINE) injection 10 mg  10 mg IntraVENous Q10 Min PRN    acetaminophen (TYLENOL) tablet 1,000 mg  1,000 mg Oral Once PRN    docusate sodium (COLACE) capsule 100 mg  100 mg Oral BID    senna (SENOKOT) tablet 17.2 mg  2 tablet Oral Nightly    insulin lispro (HUMALOG) injection vial 0-8 Units  0-8 Units SubCUTAneous TID WC    insulin lispro (HUMALOG) injection vial 0-4 Units  0-4 Units
Renal Progress Note    Patient: Katerin Mascorro MRN: [id number]  SSN: [id number]    YOB: 1950  Age: 71 y.o. Sex: male      Admit Date: 6/12/2023    LOS: 9 days     Subjective:     -Patient seen in his room this morning comfortably sitting up in chair. Reports doing some physical therapy this morning.     Informed him that his regular dialysis session will done be later in the afternoon today    Current Facility-Administered Medications   Medication Dose Route Frequency    epoetin selma-epbx (RETACRIT) injection 10,000 Units  10,000 Units IntraVENous Q MWF    oxyCODONE-acetaminophen (PERCOCET)  MG per tablet 1 tablet  1 tablet Oral Q6H PRN    oxyCODONE (OXYCONTIN) extended release tablet 10 mg  10 mg Oral 2 times per day    metoprolol (LOPRESSOR) injection 5 mg  5 mg IntraVENous Q10 Min PRN    labetalol (NORMODYNE;TRANDATE) injection 10 mg  10 mg IntraVENous Q10 Min PRN    hydrALAZINE (APRESOLINE) injection 10 mg  10 mg IntraVENous Q10 Min PRN    oxyCODONE (ROXICODONE) immediate release tablet 5 mg  5 mg Oral Once PRN    acetaminophen (TYLENOL) tablet 1,000 mg  1,000 mg Oral Once PRN    sodium chloride flush 0.9 % injection 5-40 mL  5-40 mL IntraVENous 2 times per day    0.9 % sodium chloride infusion   IntraVENous PRN    heparin (porcine) injection 5,000 Units  5,000 Units SubCUTAneous 3 times per day    lactated ringers IV soln infusion   IntraVENous Continuous    metoprolol (LOPRESSOR) injection 5 mg  5 mg IntraVENous Q10 Min PRN    labetalol (NORMODYNE;TRANDATE) injection 10 mg  10 mg IntraVENous Q10 Min PRN    hydrALAZINE (APRESOLINE) injection 10 mg  10 mg IntraVENous Q10 Min PRN    acetaminophen (TYLENOL) tablet 1,000 mg  1,000 mg Oral Once PRN    docusate sodium (COLACE) capsule 100 mg  100 mg Oral BID    senna (SENOKOT) tablet 17.2 mg  2 tablet Oral Nightly    insulin lispro (HUMALOG) injection vial 0-8 Units  0-8 Units SubCUTAneous TID WC    insulin lispro (HUMALOG) injection vial 0-4
Renal Progress Note    Patient: Lorri Perez MRN: [id number]  SSN: [id number]    YOB: 1950  Age: 71 y.o. Sex: male      Admit Date: 6/12/2023    LOS: 7 days     Subjective:   -Patient seen at bedside, comfortably resting, states he feels better.   Patient states that he is scheduled for surgery today  Informed him that dialysis will be done later in the afternoon after he recovers from anesthesia or will be planned for Tuesday morning   S/p HD  on 6/16       Current Facility-Administered Medications   Medication Dose Route Frequency    docusate sodium (COLACE) capsule 100 mg  100 mg Oral BID    senna (SENOKOT) tablet 17.2 mg  2 tablet Oral Nightly    insulin lispro (HUMALOG) injection vial 0-8 Units  0-8 Units SubCUTAneous TID WC    insulin lispro (HUMALOG) injection vial 0-4 Units  0-4 Units SubCUTAneous Nightly    glucose chewable tablet 16 g  4 tablet Oral PRN    dextrose bolus 10% 125 mL  125 mL IntraVENous PRN    Or    dextrose bolus 10% 250 mL  250 mL IntraVENous PRN    glucagon injection 1 mg  1 mg SubCUTAneous PRN    dextrose 10 % infusion   IntraVENous Continuous PRN    [Held by provider] 0.9 % sodium chloride infusion   IntraVENous Continuous    sodium chloride flush 0.9 % injection 5-40 mL  5-40 mL IntraVENous 2 times per day    sodium chloride flush 0.9 % injection 5-40 mL  5-40 mL IntraVENous PRN    0.9 % sodium chloride infusion   IntraVENous PRN    potassium chloride 10 mEq/100 mL IVPB (Peripheral Line)  10 mEq IntraVENous PRN    heparin (porcine) injection 5,000 Units  5,000 Units SubCUTAneous Q12H    ondansetron (ZOFRAN-ODT) disintegrating tablet 4 mg  4 mg Oral Q8H PRN    Or    ondansetron (ZOFRAN) injection 4 mg  4 mg IntraVENous Q6H PRN    polyethylene glycol (GLYCOLAX) packet 17 g  17 g Oral Daily PRN    aluminum & magnesium hydroxide-simethicone (MAALOX) 200-200-20 MG/5ML suspension 30 mL  30 mL Oral Q6H PRN    acetaminophen (TYLENOL) tablet 650 mg  650 mg Oral Q6H PRN    Or
Renal Progress Note    Patient: Mary Luong MRN: [id number]  SSN: [id number]    YOB: 1950  Age: 71 y.o. Sex: male      Admit Date: 6/12/2023    LOS: 11 days     Subjective:     -Patient seen in the dialysis unit.   2 L of fluid removed  Denies any complaint    Current Facility-Administered Medications   Medication Dose Route Frequency    epoetin selma-epbx (RETACRIT) injection 10,000 Units  10,000 Units IntraVENous Q MWF    oxyCODONE-acetaminophen (PERCOCET)  MG per tablet 1 tablet  1 tablet Oral Q6H PRN    oxyCODONE (OXYCONTIN) extended release tablet 10 mg  10 mg Oral 2 times per day    metoprolol (LOPRESSOR) injection 5 mg  5 mg IntraVENous Q10 Min PRN    labetalol (NORMODYNE;TRANDATE) injection 10 mg  10 mg IntraVENous Q10 Min PRN    hydrALAZINE (APRESOLINE) injection 10 mg  10 mg IntraVENous Q10 Min PRN    oxyCODONE (ROXICODONE) immediate release tablet 5 mg  5 mg Oral Once PRN    acetaminophen (TYLENOL) tablet 1,000 mg  1,000 mg Oral Once PRN    sodium chloride flush 0.9 % injection 5-40 mL  5-40 mL IntraVENous 2 times per day    0.9 % sodium chloride infusion   IntraVENous PRN    heparin (porcine) injection 5,000 Units  5,000 Units SubCUTAneous 3 times per day    lactated ringers IV soln infusion   IntraVENous Continuous    metoprolol (LOPRESSOR) injection 5 mg  5 mg IntraVENous Q10 Min PRN    labetalol (NORMODYNE;TRANDATE) injection 10 mg  10 mg IntraVENous Q10 Min PRN    hydrALAZINE (APRESOLINE) injection 10 mg  10 mg IntraVENous Q10 Min PRN    acetaminophen (TYLENOL) tablet 1,000 mg  1,000 mg Oral Once PRN    docusate sodium (COLACE) capsule 100 mg  100 mg Oral BID    senna (SENOKOT) tablet 17.2 mg  2 tablet Oral Nightly    insulin lispro (HUMALOG) injection vial 0-8 Units  0-8 Units SubCUTAneous TID WC    insulin lispro (HUMALOG) injection vial 0-4 Units  0-4 Units SubCUTAneous Nightly    glucose chewable tablet 16 g  4 tablet Oral PRN    dextrose bolus 10% 125 mL  125 mL
Report called to Mitra at [facility], patient is for  at 5 pm
Spiritual Care Assessment/Progress Note  [facility]    Name: Tomasa Frank MRN: [id number]    Age: 71 y.o. Sex: male   Language: English     Date: 6/21/2023            Total Time Calculated: 3 min              Spiritual Assessment begun in [facility] SURGICAL  Service Provided For[de-identified] Patient not available  Referral/Consult From[de-identified] Rounding  Encounter Overview/Reason : Attempted Encounter    Spiritual beliefs:      [] Involved in a richard tradition/spiritual practice:      [] Supported by a richard community:      [] Claims no spiritual orientation:      [] Seeking spiritual identity:           [] Adheres to an individual form of spirituality:      [x] Not able to assess:                Identified resources for coping and support system:   Support System: Unknown       [] Prayer                  [] Devotional reading               [] Music                  [] Guided Imagery     [] Pet visits                                        [] Other: (COMMENT)     Specific area/focus of visit   Encounter:    Crisis:    Spiritual/Emotional needs:    Ritual, Rites and Sacraments:    Grief, Loss, and Adjustments:    Ethics/Mediation:    Behavioral Health:    Palliative Care: Advance Care Planning:      Plan/Referrals: Other (Comment) ( is available if needed)    Narrative:  attempted to visit with Mr. Sourav Jordan while rounding on [address] [place]. Pt was not available; pt was having a procedure done.  left message for pt informing him  attempted to visit. Please contact Spiritual Care for any emotional/spiritual needs and/or further referrals. Thank you. Rev. Jazmyn Ricardo MDIV   can be reached by calling the  at [facility]  [phone]
Requirements: Current  Energy (kcal/day): 1745-2460 kcals/day (30-35kcals/kg, wounds)  Weight Used for Protein Requirements: Current  Protein (g/day):  g/kg (1.2-1.4g/kg, wound, ESRD on HD)  Method Used for Fluid Requirements: Standard Renal  Fluid (ml/day): 6702-7870  mL (oliguria)    Nutrition Diagnosis:   Moderate malnutrition, In context of acute illness or injury related to inadequate protein-energy intake as evidenced by mild muscle loss, poor intake prior to admission, mild loss of subcutaneous fat  Increased nutrient needs related to increase demand for energy/nutrients as evidenced by wounds    Nutrition Interventions:   Food and/or Nutrient Delivery: Continue Current Diet, Start Oral Nutrition Supplement  Nutrition Education/Counseling: No recommendation at this time  Coordination of Nutrition Care: Continue to monitor while inpatient  Plan of Care discussed with: pt    Goals:  Previous Goal Met: Progressing toward Goal(s)  Goals: Meet at least 75% of estimated needs, PO intake 75% or greater, within 7 days, other (specify)  Specify Other Goals: wound healing in 7 days. Nutrition Monitoring and Evaluation:   Behavioral-Environmental Outcomes: None Identified  Food/Nutrient Intake Outcomes: Food and Nutrient Intake, Supplement Intake  Physical Signs/Symptoms Outcomes: Biochemical Data, Meal Time Behavior, Weight, Nutrition Focused Physical Findings, GI Status, Constipation    Discharge Planning:    Continue current diet, Continue Oral Nutrition Supplement     Blaise Hyatt RD  Contact: ext. [phone].
bilateral TBI cannot be obtained. - refer to  the above. - Continue Plavix      End-stage renal disease on hemodialysis:   - Nephrology consulted. - For dialysis today   - Hemodialysis Monday, Wednesday, Friday. - On epoetin and Renvela    Hyponatremia:   - Na 135 today  - continues with high sodium bath with dialysis    Acute on chronic anemia:   - Hemoglobin 7.2. Will repeat labs in the am.  - continue epoetin    Diabetes:   - Glucose level stable. - Insulin sliding scale    Hypertension:   - blood pressure at goal  - continue with Coreg 3.125 mg BID      Hyperlipidemia:   - Continue atorvastatin 40 mg daily       Discussion/MDM: Patient with multiple medical comorbidities, each with high likelihood for morbidity and mortality if left untreated. I have reviewed patient's presenting subjective and objective findings, as well as all laboratory studies, imaging studies, and vital signs to date as well as treatment rendered and patient's response to those treatments. In addition, prior medical, surgical and relevant social and family histories were reviewed. MDM  Reviewed: previous chart, nursing note and vitals  Reviewed previous: labs  Interpretation: labs  Total time providing critical care: non-critical care 30 minutes. Consults: general surgery (Vascular surgery, nephrology, ID)     DVT Prophylaxis: heparin subq  Code Status: Full Code   POA/NOK:    Disposition and discharge barriers:   IV antibiotics, dressing change in the morning.   Care Plan discussed with: Patient, staff, IDR team        Current Facility-Administered Medications   Medication Dose Route Frequency    epoetin selma-epbx (RETACRIT) injection 10,000 Units  10,000 Units IntraVENous Q MWF    oxyCODONE-acetaminophen (PERCOCET)  MG per tablet 1 tablet  1 tablet Oral Q6H PRN    oxyCODONE (OXYCONTIN) extended release tablet 10 mg  10 mg Oral 2 times per day    metoprolol (LOPRESSOR) injection 5 mg  5 mg IntraVENous Q10 Min PRN
104 105   CO2 29 28 31   GLUCOSE 97 86 99   BUN 40* 48* 27*   CREATININE 7.99* 9.11* 6.23*   CALCIUM 8.5 8.4* 8.2*       No results for input(s): PHART, IDI9QSA, PO2ART, TWY3HGC, BEART, EEA7SRPJ, HGBART, SQ3TUJSMA, FIO2A, A5OOUTJD, OXYHEM, CARBOXHGBART, METHGBART, E6BODESCX, PHCORART, TEMP in the last 72 hours.     Invalid input(s): CZV5HUKUC  Recent Results (from the past 24 hour(s))   POCT Glucose    Collection Time: 06/20/23 10:39 AM   Result Value Ref Range    POC Glucose 72 65 - 100 mg/dL    Performed by: Adam Mazariegos    POCT Glucose    Collection Time: 06/20/23 11:57 AM   Result Value Ref Range    POC Glucose 70 65 - 100 mg/dL    Performed by: Collins Mendoza    TYPE AND SCREEN    Collection Time: 06/20/23  2:40 PM   Result Value Ref Range    Crossmatch expiration date 06/23/2023,2352     ABO/Rh O Positive     Antibody Screen Negative    POCT Glucose    Collection Time: 06/20/23  4:31 PM   Result Value Ref Range    POC Glucose 137 (H) 65 - 100 mg/dL    Performed by: Collins Mendoza    POCT Glucose    Collection Time: 06/20/23 10:20 PM   Result Value Ref Range    POC Glucose 130 (H) 65 - 100 mg/dL    Performed by: Octavio Trevizo (RN)    Basic Metabolic Panel    Collection Time: 06/21/23  5:35 AM   Result Value Ref Range    Sodium 139 136 - 145 mmol/L    Potassium 4.2 3.5 - 5.1 mmol/L    Chloride 105 97 - 108 mmol/L    CO2 31 21 - 32 mmol/L    Anion Gap 3 (L) 5 - 15 mmol/L    Glucose 99 65 - 100 mg/dL    BUN 27 (H) 6 - 20 mg/dL    Creatinine 6.23 (H) 0.70 - 1.30 mg/dL    Bun/Cre Ratio 4 (L) 12 - 20      Est, Glom Filt Rate 9 (L) >60 ml/min/1.73m2    Calcium 8.2 (L) 8.5 - 10.1 mg/dL   CBC with Auto Differential    Collection Time: 06/21/23  5:35 AM   Result Value Ref Range    WBC 9.4 4.1 - 11.1 K/uL    RBC 2.53 (L) 4.10 - 5.70 M/uL    Hemoglobin 7.4 (L) 12.1 - 17.0 g/dL    Hematocrit 24.6 (L) 36.6 - 50.3 %    MCV 97.2 80.0 - 99.0 FL    MCH 29.2 26.0 - 34.0 PG    MCHC 30.1 30.0 - 36.5 g/dL    RDW 17.8 (H) 11.5 - 14.5 %
CRW6YGN, BEART, GZE4HLDD, HGBART, CR7WDYQWN, FIO2A, U7XEAKWV, OXYHEM, CARBOXHGBART, METHGBART, H0EPVOUUZ, PHCORART, TEMP in the last 72 hours.     Invalid input(s): DTJ1LXYDW  Recent Results (from the past 24 hour(s))   POCT Glucose    Collection Time: 06/19/23  6:09 PM   Result Value Ref Range    POC Glucose 87 65 - 100 mg/dL    Performed by: Gilbert Horvath    POCT Glucose    Collection Time: 06/19/23  9:00 PM   Result Value Ref Range    POC Glucose 94 65 - 100 mg/dL    Performed by: Selena Armendariz    Basic Metabolic Panel    Collection Time: 06/20/23  7:25 AM   Result Value Ref Range    Sodium 137 136 - 145 mmol/L    Potassium 4.8 3.5 - 5.1 mmol/L    Chloride 104 97 - 108 mmol/L    CO2 28 21 - 32 mmol/L    Anion Gap 5 5 - 15 mmol/L    Glucose 86 65 - 100 mg/dL    BUN 48 (H) 6 - 20 mg/dL    Creatinine 9.11 (H) 0.70 - 1.30 mg/dL    Bun/Cre Ratio 5 (L) 12 - 20      Est, Glom Filt Rate 6 (L) >60 ml/min/1.73m2    Calcium 8.4 (L) 8.5 - 10.1 mg/dL   CBC with Auto Differential    Collection Time: 06/20/23  7:25 AM   Result Value Ref Range    WBC 11.3 (H) 4.1 - 11.1 K/uL    RBC 2.39 (L) 4.10 - 5.70 M/uL    Hemoglobin 7.1 (L) 12.1 - 17.0 g/dL    Hematocrit 22.8 (L) 36.6 - 50.3 %    MCV 95.4 80.0 - 99.0 FL    MCH 29.7 26.0 - 34.0 PG    MCHC 31.1 30.0 - 36.5 g/dL    RDW 17.5 (H) 11.5 - 14.5 %    Platelets 394 (H) 880 - 400 K/uL    MPV 10.3 8.9 - 12.9 FL    Nucleated RBCs 0.0 0.0  WBC    nRBC 0.00 0.00 - 0.01 K/uL    Neutrophils % 72 32 - 75 %    Lymphocytes % 13 12 - 49 %    Monocytes % 10 5 - 13 %    Eosinophils % 5 0 - 7 %    Basophils % 0 0 - 1 %    Immature Granulocytes 0 0 - 0.5 %    Neutrophils Absolute 8.0 1.8 - 8.0 K/UL    Lymphocytes Absolute 1.5 0.8 - 3.5 K/UL    Monocytes Absolute 1.1 (H) 0.0 - 1.0 K/UL    Eosinophils Absolute 0.6 (H) 0.0 - 0.4 K/UL    Basophils Absolute 0.1 0.0 - 0.1 K/UL    Absolute Immature Granulocyte 0.1 (H) 0.00 - 0.04 K/UL    Differential Type AUTOMATED     C-Reactive Protein
Monocytes Absolute 0.7 0.0 - 1.0 K/UL    Eosinophils Absolute 1.4 (H) 0.0 - 0.4 K/UL    Basophils Absolute 0.0 0.0 - 0.1 K/UL    Absolute Immature Granulocyte 0.0 K/UL    Differential Type Manual      RBC Comment Normocytic, Normochromic     POCT Glucose    Collection Time: 06/22/23  9:21 AM   Result Value Ref Range    POC Glucose 137 (H) 65 - 100 mg/dL    Performed by: Alessandra Bueno    POCT Glucose    Collection Time: 06/22/23 11:35 AM   Result Value Ref Range    POC Glucose 143 (H) 65 - 100 mg/dL    Performed by: Alessandra Bueno              ______________________________________________________________________________    KARIN Claudio NP    This is dictation was done by dragon, computer voice recognition software. Quite often unanticipated grammatical, syntax, homophones and other interpretive errors or inadvertently transcribed by the computer software. Please excuse errors that have escaped final proofreading. Thank you.

## 2023-06-23 NOTE — CARE COORDINATION
DC Plan: [address] (accepted)       Report: [phone]      Transportation: [place]    Per morning IDR's, plan for discharge is today. [facility] can accept pt at 5pm.     CM called [employer] [facility] [phone] and set up stretcher transport for a 5pm pickup. Cm met with pt at the bedside and updated him regarding his dc. Cm asked pt if he needs writer to notify anyone regarding his dc. Pt declined. Pt indicated he has already notified the individuals about his discharge. Transition of Care Plan:    RUR: 23%  Prior Level of Functioning: needs assistance  Disposition: IRF  If SNF or IPR: Date FOC offered: 6/22  Date FOC received: 6/22  Accepting facility: [facility]  Date authorization started with reference number: N/A  Date authorization received and expires: N/A  Follow up appointments: N/A  DME needed: N/A  Transportation at discharge: Stretcher  IM/IMM Medicare/ letter given: N/A - IRF  Is patient a  and connected with [facility]? N/A   If yes, was [place] transfer form completed and [facility] notified? N/A  Caregiver Contact: N/A  Discharge Caregiver contacted prior to discharge? N/A  Care Conference needed?  N/A  Barriers to discharge: N/A

## 2023-06-28 ENCOUNTER — HOSPITAL ENCOUNTER (OUTPATIENT)
Facility: HOSPITAL | Age: 73
Setting detail: SPECIMEN
Discharge: HOME | End: 2023-07-01

## 2023-06-28 PROCEDURE — 82728 ASSAY OF FERRITIN: CPT

## 2023-06-29 LAB — FERRITIN SERPL-MCNC: 1354 NG/ML (ref 26–388)

## 2023-06-30 ENCOUNTER — HOSPITAL ENCOUNTER (OUTPATIENT)
Facility: HOSPITAL | Age: 73
Setting detail: SPECIMEN
Discharge: HOME | End: 2023-07-03

## 2023-06-30 LAB
ANION GAP SERPL CALC-SCNC: 8 MMOL/L (ref 5–15)
BASOPHILS # BLD: 0 K/UL (ref 0–0.1)
BASOPHILS NFR BLD: 0 % (ref 0–1)
BUN SERPL-MCNC: 29 MG/DL (ref 6–20)
BUN/CREAT SERPL: 5 (ref 12–20)
CA-I BLD-MCNC: 8.6 MG/DL (ref 8.5–10.1)
CHLORIDE SERPL-SCNC: 99 MMOL/L (ref 97–108)
CO2 SERPL-SCNC: 27 MMOL/L (ref 21–32)
CREAT SERPL-MCNC: 5.67 MG/DL (ref 0.7–1.3)
DIFFERENTIAL METHOD BLD: ABNORMAL
EOSINOPHIL # BLD: 2.2 K/UL (ref 0–0.4)
EOSINOPHIL NFR BLD: 26 % (ref 0–7)
ERYTHROCYTE [DISTWIDTH] IN BLOOD BY AUTOMATED COUNT: 17.7 % (ref 11.5–14.5)
GLUCOSE SERPL-MCNC: 135 MG/DL (ref 65–100)
HCT VFR BLD AUTO: 29.3 % (ref 36.6–50.3)
HGB BLD-MCNC: 8.7 G/DL (ref 12.1–17)
IMM GRANULOCYTES # BLD AUTO: 0 K/UL
IMM GRANULOCYTES NFR BLD AUTO: 0 %
LYMPHOCYTES # BLD: 1.8 K/UL (ref 0.8–3.5)
LYMPHOCYTES NFR BLD: 22 % (ref 12–49)
MCH RBC QN AUTO: 28.6 PG (ref 26–34)
MCHC RBC AUTO-ENTMCNC: 29.7 G/DL (ref 30–36.5)
MCV RBC AUTO: 96.4 FL (ref 80–99)
METAMYELOCYTES NFR BLD MANUAL: 1 %
MONOCYTES # BLD: 0.6 K/UL (ref 0–1)
MONOCYTES NFR BLD: 7 % (ref 5–13)
MYELOCYTES NFR BLD MANUAL: 3 %
NEUTS SEG # BLD: 3.4 K/UL (ref 1.8–8)
NEUTS SEG NFR BLD: 40 % (ref 32–75)
NRBC # BLD: 0 K/UL (ref 0–0.01)
NRBC BLD-RTO: 0 PER 100 WBC
OTHER CELLS NFR BLD MANUAL: 1 %
PLATELET # BLD AUTO: 369 K/UL (ref 150–400)
PMV BLD AUTO: 11 FL (ref 8.9–12.9)
POTASSIUM SERPL-SCNC: 4.5 MMOL/L (ref 3.5–5.1)
RBC # BLD AUTO: 3.04 M/UL (ref 4.1–5.7)
RBC MORPH BLD: ABNORMAL
SODIUM SERPL-SCNC: 134 MMOL/L (ref 136–145)
WBC # BLD AUTO: 8.4 K/UL (ref 4.1–11.1)

## 2023-06-30 PROCEDURE — 85025 COMPLETE CBC W/AUTO DIFF WBC: CPT

## 2023-06-30 PROCEDURE — 80048 BASIC METABOLIC PNL TOTAL CA: CPT

## 2023-07-01 ENCOUNTER — HOSPITAL ENCOUNTER (EMERGENCY)
Facility: HOSPITAL | Age: 73
Discharge: HOME | End: 2023-07-01
Attending: EMERGENCY MEDICINE
Payer: MEDICARE

## 2023-07-01 VITALS
SYSTOLIC BLOOD PRESSURE: 136 MMHG | RESPIRATION RATE: 15 BRPM | DIASTOLIC BLOOD PRESSURE: 64 MMHG | BODY MASS INDEX: 22.33 KG/M2 | OXYGEN SATURATION: 100 % | HEIGHT: 70 IN | WEIGHT: 156 LBS | TEMPERATURE: 99 F | HEART RATE: 75 BPM

## 2023-07-01 DIAGNOSIS — T82.9XXA COMPLICATION OF ARTERIOVENOUS DIALYSIS FISTULA, INITIAL ENCOUNTER: Primary | ICD-10-CM

## 2023-07-01 LAB
ALBUMIN SERPL-MCNC: 2.6 G/DL (ref 3.5–5)
ALBUMIN/GLOB SERPL: 0.3 (ref 1.1–2.2)
ALP SERPL-CCNC: 114 U/L (ref 45–117)
ALT SERPL-CCNC: 11 U/L (ref 12–78)
ANION GAP SERPL CALC-SCNC: 5 MMOL/L (ref 5–15)
AST SERPL W P-5'-P-CCNC: 22 U/L (ref 15–37)
BASOPHILS # BLD: 0.1 K/UL (ref 0–0.1)
BASOPHILS NFR BLD: 1 % (ref 0–1)
BILIRUB SERPL-MCNC: 0.3 MG/DL (ref 0.2–1)
BUN SERPL-MCNC: 17 MG/DL (ref 6–20)
BUN/CREAT SERPL: 5 (ref 12–20)
CA-I BLD-MCNC: 8.8 MG/DL (ref 8.5–10.1)
CHLORIDE SERPL-SCNC: 101 MMOL/L (ref 97–108)
CO2 SERPL-SCNC: 30 MMOL/L (ref 21–32)
CREAT SERPL-MCNC: 3.66 MG/DL (ref 0.7–1.3)
DIFFERENTIAL METHOD BLD: ABNORMAL
EOSINOPHIL # BLD: 1.6 K/UL (ref 0–0.4)
EOSINOPHIL NFR BLD: 21 % (ref 0–7)
ERYTHROCYTE [DISTWIDTH] IN BLOOD BY AUTOMATED COUNT: 17.2 % (ref 11.5–14.5)
GLOBULIN SER CALC-MCNC: 7.8 G/DL (ref 2–4)
GLUCOSE SERPL-MCNC: 83 MG/DL (ref 65–100)
HCT VFR BLD AUTO: 27.5 % (ref 36.6–50.3)
HGB BLD-MCNC: 8.3 G/DL (ref 12.1–17)
IMM GRANULOCYTES # BLD AUTO: 0 K/UL (ref 0–0.04)
IMM GRANULOCYTES NFR BLD AUTO: 0 % (ref 0–0.5)
LYMPHOCYTES # BLD: 2.4 K/UL (ref 0.8–3.5)
LYMPHOCYTES NFR BLD: 31 % (ref 12–49)
MCH RBC QN AUTO: 29 PG (ref 26–34)
MCHC RBC AUTO-ENTMCNC: 30.2 G/DL (ref 30–36.5)
MCV RBC AUTO: 96.2 FL (ref 80–99)
MONOCYTES # BLD: 0.9 K/UL (ref 0–1)
MONOCYTES NFR BLD: 12 % (ref 5–13)
NEUTS SEG # BLD: 2.8 K/UL (ref 1.8–8)
NEUTS SEG NFR BLD: 35 % (ref 32–75)
NRBC # BLD: 0 K/UL (ref 0–0.01)
NRBC BLD-RTO: 0 PER 100 WBC
PLATELET # BLD AUTO: 355 K/UL (ref 150–400)
PMV BLD AUTO: 11.1 FL (ref 8.9–12.9)
POTASSIUM SERPL-SCNC: 3.9 MMOL/L (ref 3.5–5.1)
PROT SERPL-MCNC: 10.4 G/DL (ref 6.4–8.2)
RBC # BLD AUTO: 2.86 M/UL (ref 4.1–5.7)
RBC MORPH BLD: ABNORMAL
SODIUM SERPL-SCNC: 136 MMOL/L (ref 136–145)
WBC # BLD AUTO: 7.8 K/UL (ref 4.1–11.1)

## 2023-07-01 PROCEDURE — 80053 COMPREHEN METABOLIC PANEL: CPT

## 2023-07-01 PROCEDURE — 99283 EMERGENCY DEPT VISIT LOW MDM: CPT

## 2023-07-01 PROCEDURE — 36415 COLL VENOUS BLD VENIPUNCTURE: CPT

## 2023-07-01 PROCEDURE — 85025 COMPLETE CBC W/AUTO DIFF WBC: CPT

## 2023-07-01 PROCEDURE — 2500000003 HC RX 250 WO HCPCS: Performed by: EMERGENCY MEDICINE

## 2023-07-01 PROCEDURE — 2500000003 HC RX 250 WO HCPCS

## 2023-07-01 PROCEDURE — 12001 RPR S/N/AX/GEN/TRNK 2.5CM/<: CPT

## 2023-07-01 RX ORDER — TRANEXAMIC ACID 100 MG/ML
1000 INJECTION, SOLUTION INTRAVENOUS ONCE
Status: COMPLETED | OUTPATIENT
Start: 2023-07-01 | End: 2023-07-01

## 2023-07-01 RX ORDER — TRANEXAMIC ACID 100 MG/ML
INJECTION, SOLUTION INTRAVENOUS
Status: COMPLETED
Start: 2023-07-01 | End: 2023-07-01

## 2023-07-01 RX ORDER — LIDOCAINE HYDROCHLORIDE AND EPINEPHRINE 10; 10 MG/ML; UG/ML
20 INJECTION, SOLUTION INFILTRATION; PERINEURAL ONCE
Status: COMPLETED | OUTPATIENT
Start: 2023-07-01 | End: 2023-07-01

## 2023-07-01 RX ADMIN — LIDOCAINE HYDROCHLORIDE AND EPINEPHRINE 20 ML: 10; 10 INJECTION, SOLUTION INFILTRATION; PERINEURAL at 20:49

## 2023-07-01 RX ADMIN — TRANEXAMIC ACID 1000 MG: 100 INJECTION, SOLUTION INTRAVENOUS at 19:37

## 2023-07-05 ENCOUNTER — HOSPITAL ENCOUNTER (OUTPATIENT)
Facility: HOSPITAL | Age: 73
Discharge: HOME | End: 2023-07-08

## 2023-07-05 LAB
ALBUMIN SERPL-MCNC: 2.3 G/DL (ref 3.5–5)
ALBUMIN/GLOB SERPL: 0.3 (ref 1.1–2.2)
ALP SERPL-CCNC: 111 U/L (ref 45–117)
ALT SERPL-CCNC: 16 U/L (ref 12–78)
ANION GAP SERPL CALC-SCNC: 3 MMOL/L (ref 5–15)
AST SERPL W P-5'-P-CCNC: 19 U/L (ref 15–37)
BASOPHILS # BLD: 0.1 K/UL (ref 0–0.1)
BASOPHILS NFR BLD: 1 % (ref 0–1)
BILIRUB SERPL-MCNC: 0.2 MG/DL (ref 0.2–1)
BUN SERPL-MCNC: 27 MG/DL (ref 6–20)
BUN/CREAT SERPL: 5 (ref 12–20)
CA-I BLD-MCNC: 8.6 MG/DL (ref 8.5–10.1)
CHLORIDE SERPL-SCNC: 103 MMOL/L (ref 97–108)
CO2 SERPL-SCNC: 31 MMOL/L (ref 21–32)
CREAT SERPL-MCNC: 4.92 MG/DL (ref 0.7–1.3)
DIFFERENTIAL METHOD BLD: ABNORMAL
EOSINOPHIL # BLD: 1.8 K/UL (ref 0–0.4)
EOSINOPHIL NFR BLD: 22 % (ref 0–7)
ERYTHROCYTE [DISTWIDTH] IN BLOOD BY AUTOMATED COUNT: 18.3 % (ref 11.5–14.5)
FERRITIN SERPL-MCNC: 1070 NG/ML (ref 26–388)
GLOBULIN SER CALC-MCNC: 7.1 G/DL (ref 2–4)
GLUCOSE SERPL-MCNC: 107 MG/DL (ref 65–100)
HCT VFR BLD AUTO: 23.6 % (ref 36.6–50.3)
HGB BLD-MCNC: 7.2 G/DL (ref 12.1–17)
IMM GRANULOCYTES # BLD AUTO: 0 K/UL (ref 0–0.04)
IMM GRANULOCYTES NFR BLD AUTO: 0 % (ref 0–0.5)
IRON SATN MFR SERPL: 29 % (ref 20–50)
IRON SERPL-MCNC: 49 UG/DL (ref 35–150)
LYMPHOCYTES # BLD: 2.2 K/UL (ref 0.8–3.5)
LYMPHOCYTES NFR BLD: 27 % (ref 12–49)
MCH RBC QN AUTO: 29.4 PG (ref 26–34)
MCHC RBC AUTO-ENTMCNC: 30.5 G/DL (ref 30–36.5)
MCV RBC AUTO: 96.3 FL (ref 80–99)
MONOCYTES # BLD: 1 K/UL (ref 0–1)
MONOCYTES NFR BLD: 12 % (ref 5–13)
NEUTS SEG # BLD: 2.9 K/UL (ref 1.8–8)
NEUTS SEG NFR BLD: 38 % (ref 32–75)
NRBC # BLD: 0 K/UL (ref 0–0.01)
NRBC BLD-RTO: 0 PER 100 WBC
PLATELET # BLD AUTO: 378 K/UL (ref 150–400)
PMV BLD AUTO: 10.9 FL (ref 8.9–12.9)
POTASSIUM SERPL-SCNC: 4.3 MMOL/L (ref 3.5–5.1)
PROT SERPL-MCNC: 9.4 G/DL (ref 6.4–8.2)
RBC # BLD AUTO: 2.45 M/UL (ref 4.1–5.7)
RBC MORPH BLD: ABNORMAL
RBC MORPH BLD: ABNORMAL
SODIUM SERPL-SCNC: 137 MMOL/L (ref 136–145)
TIBC SERPL-MCNC: 169 UG/DL (ref 250–450)
WBC # BLD AUTO: 8 K/UL (ref 4.1–11.1)

## 2023-07-05 PROCEDURE — 83540 ASSAY OF IRON: CPT

## 2023-07-05 PROCEDURE — 85025 COMPLETE CBC W/AUTO DIFF WBC: CPT

## 2023-07-05 PROCEDURE — 80053 COMPREHEN METABOLIC PANEL: CPT

## 2023-07-05 PROCEDURE — 82728 ASSAY OF FERRITIN: CPT

## 2023-07-06 ENCOUNTER — OFFICE VISIT (OUTPATIENT)
Age: 73
End: 2023-07-06

## 2023-07-06 VITALS
DIASTOLIC BLOOD PRESSURE: 71 MMHG | HEIGHT: 70 IN | BODY MASS INDEX: 22.33 KG/M2 | RESPIRATION RATE: 18 BRPM | SYSTOLIC BLOOD PRESSURE: 130 MMHG | TEMPERATURE: 97.5 F | HEART RATE: 72 BPM | WEIGHT: 156 LBS | OXYGEN SATURATION: 96 %

## 2023-07-06 DIAGNOSIS — L97.509 DIABETIC FOOT ULCER WITH OSTEOMYELITIS (HCC): Primary | ICD-10-CM

## 2023-07-06 DIAGNOSIS — E11.69 DIABETIC FOOT ULCER WITH OSTEOMYELITIS (HCC): Primary | ICD-10-CM

## 2023-07-06 DIAGNOSIS — E11.621 DIABETIC FOOT ULCER WITH OSTEOMYELITIS (HCC): Primary | ICD-10-CM

## 2023-07-06 DIAGNOSIS — M86.9 DIABETIC FOOT ULCER WITH OSTEOMYELITIS (HCC): Primary | ICD-10-CM

## 2023-07-06 PROCEDURE — 99024 POSTOP FOLLOW-UP VISIT: CPT | Performed by: SURGERY

## 2023-07-06 ASSESSMENT — PATIENT HEALTH QUESTIONNAIRE - PHQ9
2. FEELING DOWN, DEPRESSED OR HOPELESS: 0
SUM OF ALL RESPONSES TO PHQ QUESTIONS 1-9: 0
SUM OF ALL RESPONSES TO PHQ9 QUESTIONS 1 & 2: 0
SUM OF ALL RESPONSES TO PHQ QUESTIONS 1-9: 0
1. LITTLE INTEREST OR PLEASURE IN DOING THINGS: 0

## 2023-07-13 ENCOUNTER — OFFICE VISIT (OUTPATIENT)
Age: 73
End: 2023-07-13

## 2023-07-13 VITALS
DIASTOLIC BLOOD PRESSURE: 71 MMHG | HEART RATE: 74 BPM | SYSTOLIC BLOOD PRESSURE: 151 MMHG | RESPIRATION RATE: 15 BRPM | WEIGHT: 156 LBS | OXYGEN SATURATION: 97 % | HEIGHT: 70 IN | TEMPERATURE: 98.2 F | BODY MASS INDEX: 22.33 KG/M2

## 2023-07-13 DIAGNOSIS — M79.604 PAIN OF RIGHT LOWER EXTREMITY: Primary | ICD-10-CM

## 2023-07-13 DIAGNOSIS — Z89.511 S/P BKA (BELOW KNEE AMPUTATION), RIGHT (HCC): ICD-10-CM

## 2023-07-13 PROCEDURE — 99024 POSTOP FOLLOW-UP VISIT: CPT | Performed by: SURGERY

## 2023-07-13 RX ORDER — OXYCODONE HYDROCHLORIDE AND ACETAMINOPHEN 5; 325 MG/1; MG/1
1 TABLET ORAL EVERY 6 HOURS PRN
Qty: 20 TABLET | Refills: 0 | Status: SHIPPED | OUTPATIENT
Start: 2023-07-13 | End: 2023-07-18

## 2023-07-13 ASSESSMENT — PATIENT HEALTH QUESTIONNAIRE - PHQ9
1. LITTLE INTEREST OR PLEASURE IN DOING THINGS: 0
SUM OF ALL RESPONSES TO PHQ QUESTIONS 1-9: 0
SUM OF ALL RESPONSES TO PHQ QUESTIONS 1-9: 0
2. FEELING DOWN, DEPRESSED OR HOPELESS: 0
SUM OF ALL RESPONSES TO PHQ QUESTIONS 1-9: 0
SUM OF ALL RESPONSES TO PHQ QUESTIONS 1-9: 0
SUM OF ALL RESPONSES TO PHQ9 QUESTIONS 1 & 2: 0

## 2023-07-13 NOTE — PROGRESS NOTES
Patient is follow-up today right BKA. He is doing well. Wounds are clean and dry.   Patient will come see me next week for suture removal.

## 2023-07-17 PROBLEM — M79.604 PAIN OF RIGHT LOWER EXTREMITY: Status: ACTIVE | Noted: 2023-07-17

## 2023-07-18 NOTE — ANESTHESIA POSTPROCEDURE EVALUATION
Department of Anesthesiology  Postprocedure Note    Patient: Abdirizak Sesay  MRN: [id number]  YOB: 1950      Procedure Summary     Date: 06/19/23 Room / Location: [facility] MAIN OR 02 / [facility] MAIN OR    Anesthesia Start: 7299 Anesthesia Stop: 6290    Procedure: LEG AMPUTATION BELOW KNEE ON RIGHT (Right: Leg Lower) Diagnosis:       Gangrene of right foot ([address])      (Gangrene of right foot ([address]) Hernan Duarte)    Surgeons: Hanna Varela MD Responsible Provider: Doretha Hagen MD    Anesthesia Type: General ASA Status: 4          Anesthesia Type: General    Haleigh Phase I: Haleigh Score: 10    Haleigh Phase II:        Anesthesia Post Evaluation    Patient location during evaluation: PACU  Patient participation: complete - patient cannot participate  Level of consciousness: awake  Pain score: 0  Airway patency: patent  Nausea & Vomiting: no nausea and no vomiting  Complications: no  Cardiovascular status: hemodynamically stable  Respiratory status: acceptable  Hydration status: stable  Multimodal analgesia pain management approach

## 2023-07-31 ENCOUNTER — TELEPHONE (OUTPATIENT)
Age: 73
End: 2023-07-31

## 2023-07-31 NOTE — TELEPHONE ENCOUNTER
Spoke with roula, let her know that dr Doris Nicholas is in surgery and will get the message as soon as he gets back in the office

## 2023-07-31 NOTE — ROUTINE PROCESS
TRANSFER - OUT REPORT:    Verbal report given to Johnathan Cruz RN(name) on [employer]  being transferred to 279(unit) for routine progression of care       Report consisted of patients Situation, Background, Assessment and   Recommendations(SBAR). Information from the following report(s) SBAR, ED Summary, [facility] and Recent Results was reviewed with the receiving nurse. Lines:   Venous Access Device Dialysis port 07/07/21 Upper chest (subclavicular area, right (Active)       Peripheral IV 09/08/21 Posterior;Right Hand (Active)       Peripheral IV 09/08/21 Right;Upper Basilic (Active)        Opportunity for questions and clarification was provided.       Patient transported with:   Registered Nurse, tech Elidel Counseling: Patient may experience a mild burning sensation during topical application. Elidel is not approved in children less than 2 years of age. There have been case reports of hematologic and skin malignancies in patients using topical calcineurin inhibitors although causality is questionable.

## 2023-07-31 NOTE — TELEPHONE ENCOUNTER
Ms. Kati Bojorquez came into the office this morning requesting medication refill on [facility] medication oxycodone. Made her aware that it does take 48 to 72 hours for refill request. Ms. Kati Bojorquez can be reached at [phone].

## 2023-08-01 ENCOUNTER — TELEPHONE (OUTPATIENT)
Age: 73
End: 2023-08-01

## 2023-08-01 DIAGNOSIS — M79.606 PAIN OF LOWER EXTREMITY, UNSPECIFIED LATERALITY: Primary | ICD-10-CM

## 2023-08-01 RX ORDER — OXYCODONE HYDROCHLORIDE AND ACETAMINOPHEN 5; 325 MG/1; MG/1
1 TABLET ORAL EVERY 6 HOURS PRN
Qty: 20 TABLET | Refills: 0 | Status: SHIPPED | OUTPATIENT
Start: 2023-08-01 | End: 2023-08-06

## 2023-08-01 NOTE — TELEPHONE ENCOUNTER
Called and spoke with patient to inform him that as per Dr. Ashley Carbajal a prescription was sent over to his pharmacy. Patient understood.

## 2023-08-11 ENCOUNTER — TELEPHONE (OUTPATIENT)
Age: 73
End: 2023-08-11

## 2023-08-11 NOTE — TELEPHONE ENCOUNTER
Luis Miguel the niece called requesting a refill on pain medication. [facility]. [phone].

## 2023-08-14 ENCOUNTER — TELEPHONE (OUTPATIENT)
Age: 73
End: 2023-08-14

## 2023-08-14 NOTE — TELEPHONE ENCOUNTER
Called niece # but was not in service. As per Dr. Haider Sanches he will send script a little later today.

## 2023-08-15 NOTE — TELEPHONE ENCOUNTER
Diogenes Akbar called in regarding [facility] Tono [facility]abil medication refill request I made her aware that the nurse spoke with Dr. Summer Olivas in regards to sending it over. Please remind Dr. Summer Olivas to send over medication.

## 2023-08-16 ENCOUNTER — TELEPHONE (OUTPATIENT)
Age: 73
End: 2023-08-16

## 2023-08-16 DIAGNOSIS — M79.604 PAIN OF RIGHT LOWER EXTREMITY: Primary | ICD-10-CM

## 2023-08-16 RX ORDER — OXYCODONE AND ACETAMINOPHEN 10; 325 MG/1; MG/1
1 TABLET ORAL EVERY 8 HOURS PRN
Qty: 42 TABLET | Refills: 0 | Status: SHIPPED | OUTPATIENT
Start: 2023-08-16 | End: 2023-08-30

## 2023-08-16 NOTE — TELEPHONE ENCOUNTER
Called and spoke with patient to inform him that his medication was sent over to [id number] [facility] on [address]. Patient understood.

## 2023-09-14 ENCOUNTER — OFFICE VISIT (OUTPATIENT)
Age: 73
End: 2023-09-14

## 2023-09-14 VITALS
DIASTOLIC BLOOD PRESSURE: 76 MMHG | HEIGHT: 70 IN | RESPIRATION RATE: 98 BRPM | HEART RATE: 79 BPM | BODY MASS INDEX: 22.38 KG/M2 | TEMPERATURE: 98 F | OXYGEN SATURATION: 98 % | SYSTOLIC BLOOD PRESSURE: 132 MMHG

## 2023-09-14 DIAGNOSIS — M79.604 PAIN OF RIGHT LOWER EXTREMITY: Primary | ICD-10-CM

## 2023-09-14 PROCEDURE — 99024 POSTOP FOLLOW-UP VISIT: CPT | Performed by: SURGERY

## 2023-09-14 RX ORDER — OXYCODONE HYDROCHLORIDE AND ACETAMINOPHEN 5; 325 MG/1; MG/1
1 TABLET ORAL EVERY 8 HOURS PRN
Qty: 30 TABLET | Refills: 0 | Status: SHIPPED | OUTPATIENT
Start: 2023-09-14 | End: 2023-09-28

## 2023-09-14 RX ORDER — PREDNISOLONE ACETATE 10 MG/ML
SUSPENSION/ DROPS OPHTHALMIC
COMMUNITY
Start: 2023-09-05

## 2023-09-14 RX ORDER — OXYCODONE HYDROCHLORIDE AND ACETAMINOPHEN 5; 325 MG/1; MG/1
1 TABLET ORAL EVERY 6 HOURS PRN
COMMUNITY
Start: 2023-08-15 | End: 2023-09-14 | Stop reason: SDUPTHER

## 2023-09-14 ASSESSMENT — PATIENT HEALTH QUESTIONNAIRE - PHQ9
SUM OF ALL RESPONSES TO PHQ9 QUESTIONS 1 & 2: 0
1. LITTLE INTEREST OR PLEASURE IN DOING THINGS: 0
SUM OF ALL RESPONSES TO PHQ QUESTIONS 1-9: 0
2. FEELING DOWN, DEPRESSED OR HOPELESS: 0
SUM OF ALL RESPONSES TO PHQ QUESTIONS 1-9: 0

## 2023-09-15 NOTE — PROGRESS NOTES
Patient is here today follow-up right BKA. Patient doing well. Wounds are clean. Patient is ready for new prosthetic application on the right BKA. Patient also has a poor BRENDA on the left leg with index 0.5. We will work on left leg angiogram angioplasty next month. Prior to that patient will be reassessed 1 month.

## 2023-10-05 ENCOUNTER — TELEPHONE (OUTPATIENT)
Age: 73
End: 2023-10-05

## 2023-10-06 DIAGNOSIS — M79.605 PAIN OF LEFT LOWER EXTREMITY: Primary | ICD-10-CM

## 2023-10-06 NOTE — TELEPHONE ENCOUNTER
Left message informing patient provider informed of refill request agreed to review and sign. Patient can return call if any other questions or concerns. Refill encounter sent to provider.

## 2023-10-06 NOTE — TELEPHONE ENCOUNTER
Ezra Montelongo the caretaker called needing a refill on oxoycodone called into [facility] on [address].  [phone]

## 2023-10-07 RX ORDER — OXYCODONE HYDROCHLORIDE 5 MG/1
5 TABLET ORAL EVERY 8 HOURS PRN
Qty: 21 TABLET | Refills: 0 | Status: SHIPPED | OUTPATIENT
Start: 2023-10-07 | End: 2023-10-14

## 2023-10-19 ENCOUNTER — OFFICE VISIT (OUTPATIENT)
Age: 73
End: 2023-10-19
Payer: MEDICARE

## 2023-10-19 VITALS
BODY MASS INDEX: 24.2 KG/M2 | TEMPERATURE: 97.5 F | HEIGHT: 70 IN | WEIGHT: 169 LBS | HEART RATE: 75 BPM | DIASTOLIC BLOOD PRESSURE: 72 MMHG | RESPIRATION RATE: 17 BRPM | SYSTOLIC BLOOD PRESSURE: 121 MMHG | OXYGEN SATURATION: 97 %

## 2023-10-19 DIAGNOSIS — I73.9 PERIPHERAL VASCULAR DISEASE (HCC): Primary | ICD-10-CM

## 2023-10-19 PROCEDURE — 3074F SYST BP LT 130 MM HG: CPT | Performed by: SURGERY

## 2023-10-19 PROCEDURE — 99213 OFFICE O/P EST LOW 20 MIN: CPT | Performed by: SURGERY

## 2023-10-19 PROCEDURE — G8484 FLU IMMUNIZE NO ADMIN: HCPCS | Performed by: SURGERY

## 2023-10-19 PROCEDURE — 3078F DIAST BP <80 MM HG: CPT | Performed by: SURGERY

## 2023-10-19 PROCEDURE — G8420 CALC BMI NORM PARAMETERS: HCPCS | Performed by: SURGERY

## 2023-10-19 PROCEDURE — 1123F ACP DISCUSS/DSCN MKR DOCD: CPT | Performed by: SURGERY

## 2023-10-19 PROCEDURE — 1036F TOBACCO NON-USER: CPT | Performed by: SURGERY

## 2023-10-19 PROCEDURE — 3017F COLORECTAL CA SCREEN DOC REV: CPT | Performed by: SURGERY

## 2023-10-19 PROCEDURE — G8427 DOCREV CUR MEDS BY ELIG CLIN: HCPCS | Performed by: SURGERY

## 2023-10-19 ASSESSMENT — PATIENT HEALTH QUESTIONNAIRE - PHQ9
SUM OF ALL RESPONSES TO PHQ QUESTIONS 1-9: 0
SUM OF ALL RESPONSES TO PHQ9 QUESTIONS 1 & 2: 0
SUM OF ALL RESPONSES TO PHQ QUESTIONS 1-9: 0
SUM OF ALL RESPONSES TO PHQ QUESTIONS 1-9: 0
2. FEELING DOWN, DEPRESSED OR HOPELESS: 0
1. LITTLE INTEREST OR PLEASURE IN DOING THINGS: 0
SUM OF ALL RESPONSES TO PHQ QUESTIONS 1-9: 0

## 2023-10-23 PROBLEM — I73.9 PERIPHERAL VASCULAR DISEASE (HCC): Status: ACTIVE | Noted: 2023-10-23

## 2023-10-23 NOTE — PROGRESS NOTES
Vascular History and Physical    Patient: Dylan Nuñez  MRN: [id number]    YOB: 1950  Age: 71 y.o. Sex: male     Chief Complaint:  Chief Complaint   Patient presents with    Follow-up     PVD       History of Present Illness: Dylan Nuñez is a 71 y.o. very pleasant gentleman is here today for right BKA wound check and as well as discuss left leg angiogram angioplasty. Patient currently doing well. Patient was able to get new prosthetic leg for right BKA.     Social History:  Social Connections: Not on file       Past Medical History:  Past Medical History:   Diagnosis Date    Asthenia 01/24/2021    Cardiomyopathy ([address]) 01/24/2021    CHF (congestive heart failure) (HCC)     Chronic kidney disease     CKD (chronic kidney disease)     4    End stage renal disease on dialysis ([address]) 01/24/2021    Essential hypertension 01/24/2021    Gastroesophageal reflux disease 01/24/2021    Gastroesophageal reflux disease 01/24/2021    Hypertension     Pneumonia due to COVID-19 virus 69/89/2618    Systolic CHF, acute on chronic ([address]) 01/24/2021       Surgical History:  Past Surgical History:   Procedure Laterality Date    CARDIAC CATHETERIZATION      COLONOSCOPY N/A 12/3/2020    COLONOSCOPY performed by Jeanie Abarca MD at [address] N/A 12/2/2022    COLONOSCOPY performed by Jeanie Abarca MD at [address] [facility] Right 5/17/2023    Open Transmetatarsal Amputation Right Foot performed by Kenya Schaffer DPM at [address] N/A 5/16/2023    Angiography lower ext right performed by Karen Carrillo MD at [address] [place] [address] 5/16/2023    Aortagram abdominal performed by Karen Carrillo MD at [address] [place] [address] 5/16/2023    Ultrasound guided vascular access performed by Karen Carrilol MD at [address] [place] [address] 5/16/2023    Atherectomy  femoral popliteal performed by

## 2023-10-24 ENCOUNTER — HOSPITAL ENCOUNTER (OUTPATIENT)
Facility: HOSPITAL | Age: 73
Discharge: HOME | End: 2023-10-24
Attending: SURGERY | Admitting: SURGERY
Payer: MEDICARE

## 2023-10-24 VITALS
HEART RATE: 68 BPM | RESPIRATION RATE: 20 BRPM | OXYGEN SATURATION: 98 % | DIASTOLIC BLOOD PRESSURE: 78 MMHG | TEMPERATURE: 98.2 F | SYSTOLIC BLOOD PRESSURE: 132 MMHG

## 2023-10-24 DIAGNOSIS — I73.9 PVD (PERIPHERAL VASCULAR DISEASE) (HCC): ICD-10-CM

## 2023-10-24 LAB
ALBUMIN SERPL-MCNC: 3.6 G/DL (ref 3.5–5)
ALBUMIN/GLOB SERPL: 0.5 (ref 1.1–2.2)
ALP SERPL-CCNC: 139 U/L (ref 45–117)
ALT SERPL-CCNC: 41 U/L (ref 12–78)
ANION GAP SERPL CALC-SCNC: 6 MMOL/L (ref 5–15)
APTT PPP: 37 SEC (ref 21.2–34.1)
AST SERPL W P-5'-P-CCNC: ABNORMAL U/L (ref 15–37)
BILIRUB SERPL-MCNC: 0.4 MG/DL (ref 0.2–1)
BUN SERPL-MCNC: 57 MG/DL (ref 6–20)
BUN/CREAT SERPL: 8 (ref 12–20)
CA-I BLD-MCNC: 9.5 MG/DL (ref 8.5–10.1)
CHLORIDE SERPL-SCNC: 100 MMOL/L (ref 97–108)
CO2 SERPL-SCNC: 30 MMOL/L (ref 21–32)
CREAT SERPL-MCNC: 6.86 MG/DL (ref 0.7–1.3)
ERYTHROCYTE [DISTWIDTH] IN BLOOD BY AUTOMATED COUNT: 14.4 % (ref 11.5–14.5)
GLOBULIN SER CALC-MCNC: 6.6 G/DL (ref 2–4)
GLUCOSE SERPL-MCNC: 111 MG/DL (ref 65–100)
HCT VFR BLD AUTO: 41.2 % (ref 36.6–50.3)
HGB BLD-MCNC: 13.3 G/DL (ref 12.1–17)
INR PPP: 1.2 (ref 0.9–1.1)
MCH RBC QN AUTO: 29.8 PG (ref 26–34)
MCHC RBC AUTO-ENTMCNC: 32.3 G/DL (ref 30–36.5)
MCV RBC AUTO: 92.4 FL (ref 80–99)
NRBC # BLD: 0 K/UL (ref 0–0.01)
NRBC BLD-RTO: 0 PER 100 WBC
PLATELET # BLD AUTO: 174 K/UL (ref 150–400)
PMV BLD AUTO: 11.6 FL (ref 8.9–12.9)
POTASSIUM SERPL-SCNC: ABNORMAL MMOL/L (ref 3.5–5.1)
PROT SERPL-MCNC: 10.2 G/DL (ref 6.4–8.2)
PROTHROMBIN TIME: 15.6 SEC (ref 11.9–14.6)
RBC # BLD AUTO: 4.46 M/UL (ref 4.1–5.7)
SODIUM SERPL-SCNC: 136 MMOL/L (ref 136–145)
THERAPEUTIC RANGE: ABNORMAL SEC (ref 82–109)
WBC # BLD AUTO: 6.1 K/UL (ref 4.1–11.1)

## 2023-10-24 PROCEDURE — 37224 HC PLASTY UNI FEMPOP: CPT | Performed by: SURGERY

## 2023-10-24 PROCEDURE — C2623 CATH, TRANSLUMIN, DRUG-COAT: HCPCS | Performed by: SURGERY

## 2023-10-24 PROCEDURE — 36415 COLL VENOUS BLD VENIPUNCTURE: CPT

## 2023-10-24 PROCEDURE — 7100000011 HC PHASE II RECOVERY - ADDTL 15 MIN: Performed by: SURGERY

## 2023-10-24 PROCEDURE — C1887 CATHETER, GUIDING: HCPCS | Performed by: SURGERY

## 2023-10-24 PROCEDURE — 37224 PR REVSC OPN/PRG FEM/POP W/ANGIOPLASTY UNI: CPT | Performed by: SURGERY

## 2023-10-24 PROCEDURE — 2500000003 HC RX 250 WO HCPCS: Performed by: SURGERY

## 2023-10-24 PROCEDURE — C1760 CLOSURE DEV, VASC: HCPCS | Performed by: SURGERY

## 2023-10-24 PROCEDURE — 85730 THROMBOPLASTIN TIME PARTIAL: CPT

## 2023-10-24 PROCEDURE — 99152 MOD SED SAME PHYS/QHP 5/>YRS: CPT | Performed by: SURGERY

## 2023-10-24 PROCEDURE — 75710 ARTERY X-RAYS ARM/LEG: CPT | Performed by: SURGERY

## 2023-10-24 PROCEDURE — 75625 CONTRAST EXAM ABDOMINL AORTA: CPT | Performed by: SURGERY

## 2023-10-24 PROCEDURE — 99153 MOD SED SAME PHYS/QHP EA: CPT | Performed by: SURGERY

## 2023-10-24 PROCEDURE — C1894 INTRO/SHEATH, NON-LASER: HCPCS | Performed by: SURGERY

## 2023-10-24 PROCEDURE — 2709999900 HC NON-CHARGEABLE SUPPLY: Performed by: SURGERY

## 2023-10-24 PROCEDURE — 7100000000 HC PACU RECOVERY - FIRST 15 MIN: Performed by: SURGERY

## 2023-10-24 PROCEDURE — 6360000002 HC RX W HCPCS: Performed by: SURGERY

## 2023-10-24 PROCEDURE — 80053 COMPREHEN METABOLIC PANEL: CPT

## 2023-10-24 PROCEDURE — 36245 INS CATH ABD/L-EXT ART 1ST: CPT | Performed by: SURGERY

## 2023-10-24 PROCEDURE — 76937 US GUIDE VASCULAR ACCESS: CPT | Performed by: SURGERY

## 2023-10-24 PROCEDURE — 6360000004 HC RX CONTRAST MEDICATION: Performed by: SURGERY

## 2023-10-24 PROCEDURE — C1769 GUIDE WIRE: HCPCS | Performed by: SURGERY

## 2023-10-24 PROCEDURE — 85027 COMPLETE CBC AUTOMATED: CPT

## 2023-10-24 PROCEDURE — 7100000001 HC PACU RECOVERY - ADDTL 15 MIN: Performed by: SURGERY

## 2023-10-24 PROCEDURE — 85610 PROTHROMBIN TIME: CPT

## 2023-10-24 PROCEDURE — 7100000010 HC PHASE II RECOVERY - FIRST 15 MIN: Performed by: SURGERY

## 2023-10-24 RX ORDER — SODIUM CHLORIDE 0.9 % (FLUSH) 0.9 %
5-40 SYRINGE (ML) INJECTION PRN
Status: DISCONTINUED | OUTPATIENT
Start: 2023-10-24 | End: 2023-10-24 | Stop reason: HOSPADM

## 2023-10-24 RX ORDER — FENTANYL CITRATE 50 UG/ML
INJECTION, SOLUTION INTRAMUSCULAR; INTRAVENOUS PRN
Status: DISCONTINUED | OUTPATIENT
Start: 2023-10-24 | End: 2023-10-24 | Stop reason: HOSPADM

## 2023-10-24 RX ORDER — HEPARIN SODIUM 200 [USP'U]/100ML
INJECTION, SOLUTION INTRAVENOUS CONTINUOUS PRN
Status: COMPLETED | OUTPATIENT
Start: 2023-10-24 | End: 2023-10-24

## 2023-10-24 RX ORDER — SODIUM CHLORIDE 9 MG/ML
INJECTION, SOLUTION INTRAVENOUS PRN
Status: DISCONTINUED | OUTPATIENT
Start: 2023-10-24 | End: 2023-10-24 | Stop reason: HOSPADM

## 2023-10-24 RX ORDER — SODIUM CHLORIDE 9 MG/ML
INJECTION, SOLUTION INTRAVENOUS CONTINUOUS
Status: DISCONTINUED | OUTPATIENT
Start: 2023-10-24 | End: 2023-10-24 | Stop reason: HOSPADM

## 2023-10-24 RX ORDER — MIDAZOLAM HYDROCHLORIDE 1 MG/ML
INJECTION INTRAMUSCULAR; INTRAVENOUS PRN
Status: DISCONTINUED | OUTPATIENT
Start: 2023-10-24 | End: 2023-10-24 | Stop reason: HOSPADM

## 2023-10-24 RX ORDER — HEPARIN SODIUM 1000 [USP'U]/ML
INJECTION, SOLUTION INTRAVENOUS; SUBCUTANEOUS PRN
Status: DISCONTINUED | OUTPATIENT
Start: 2023-10-24 | End: 2023-10-24 | Stop reason: HOSPADM

## 2023-10-24 RX ORDER — ACETAMINOPHEN 325 MG/1
650 TABLET ORAL EVERY 4 HOURS PRN
Status: DISCONTINUED | OUTPATIENT
Start: 2023-10-24 | End: 2023-10-24 | Stop reason: HOSPADM

## 2023-10-24 RX ORDER — SODIUM CHLORIDE 0.9 % (FLUSH) 0.9 %
5-40 SYRINGE (ML) INJECTION EVERY 12 HOURS SCHEDULED
Status: DISCONTINUED | OUTPATIENT
Start: 2023-10-24 | End: 2023-10-24 | Stop reason: HOSPADM

## 2023-10-24 ASSESSMENT — PAIN - FUNCTIONAL ASSESSMENT: PAIN_FUNCTIONAL_ASSESSMENT: 0-10

## 2023-10-25 PROBLEM — I70.202 FEMORAL ARTERY STENOSIS, LEFT (HCC): Status: ACTIVE | Noted: 2023-05-18

## 2023-10-30 ENCOUNTER — OFFICE VISIT (OUTPATIENT)
Age: 73
End: 2023-10-30
Payer: MEDICARE

## 2023-10-30 VITALS
OXYGEN SATURATION: 97 % | SYSTOLIC BLOOD PRESSURE: 107 MMHG | HEIGHT: 70 IN | HEART RATE: 69 BPM | RESPIRATION RATE: 18 BRPM | TEMPERATURE: 97.6 F | BODY MASS INDEX: 24.34 KG/M2 | WEIGHT: 170 LBS | DIASTOLIC BLOOD PRESSURE: 63 MMHG

## 2023-10-30 DIAGNOSIS — I73.9 PERIPHERAL VASCULAR DISEASE (HCC): Primary | ICD-10-CM

## 2023-10-30 PROCEDURE — 3074F SYST BP LT 130 MM HG: CPT | Performed by: SURGERY

## 2023-10-30 PROCEDURE — G8420 CALC BMI NORM PARAMETERS: HCPCS | Performed by: SURGERY

## 2023-10-30 PROCEDURE — G8427 DOCREV CUR MEDS BY ELIG CLIN: HCPCS | Performed by: SURGERY

## 2023-10-30 PROCEDURE — 1123F ACP DISCUSS/DSCN MKR DOCD: CPT | Performed by: SURGERY

## 2023-10-30 PROCEDURE — 3017F COLORECTAL CA SCREEN DOC REV: CPT | Performed by: SURGERY

## 2023-10-30 PROCEDURE — 99213 OFFICE O/P EST LOW 20 MIN: CPT | Performed by: SURGERY

## 2023-10-30 PROCEDURE — 3078F DIAST BP <80 MM HG: CPT | Performed by: SURGERY

## 2023-10-30 PROCEDURE — G8484 FLU IMMUNIZE NO ADMIN: HCPCS | Performed by: SURGERY

## 2023-10-30 PROCEDURE — 1036F TOBACCO NON-USER: CPT | Performed by: SURGERY

## 2023-10-31 NOTE — PROGRESS NOTES
Vascular History and Physical    Patient: Ale Del Angel  MRN: [id number]    YOB: 1950  Age: 71 y.o. Sex: male     Chief Complaint:  Chief Complaint   Patient presents with    Post-Op Check       History of Present Illness: Ale Del Angel is a 71 y.o. very pleasant gentleman is here today follow-up after left leg angioplasty. Patient is doing well. Patient states left foot feels better.     Social History:  Social Connections: Not on file       Past Medical History:  Past Medical History:   Diagnosis Date    Asthenia 01/24/2021    Cardiomyopathy ([address]) 01/24/2021    CHF (congestive heart failure) (HCC)     Chronic kidney disease     CKD (chronic kidney disease)     4    End stage renal disease on dialysis ([address]) 01/24/2021    Essential hypertension 01/24/2021    Gastroesophageal reflux disease 01/24/2021    Gastroesophageal reflux disease 01/24/2021    Hypertension     Pneumonia due to COVID-19 virus 12/28/2057    Systolic CHF, acute on chronic ([address]) 01/24/2021       Surgical History:  Past Surgical History:   Procedure Laterality Date    CARDIAC CATHETERIZATION      COLONOSCOPY N/A 12/3/2020    COLONOSCOPY performed by Brayden Eng MD at [address] N/A 12/2/2022    COLONOSCOPY performed by Brayden Eng MD at [address] [facility] Right 5/17/2023    Open Transmetatarsal Amputation Right Foot performed by Oumar Mcclain DPM at [address] N/A 5/16/2023    Angiography lower ext right performed by Keven Granados MD at [address] [place] [address] 5/16/2023    Aortagram abdominal performed by Keven Granados MD at [address] [place] [address] 5/16/2023    Ultrasound guided vascular access performed by Keven Granados MD at [address] [place] [address] 5/16/2023    Atherectomy  femoral popliteal performed by Keven Granados MD at [address] [place] [address]

## 2023-10-31 NOTE — PROGRESS NOTES
Consult for Vancomycin Dosing by Pharmacy by Sonia Bailey. Consult provided for this 72y.o. year old male , for indication of Bacteremia. Day of Therapy: 1  Goal of Level(s): 15-20mcg/dL    Other Current Antibiotics: None    New Regimen:   Patient had dialysis treatment today. Not sure of dialysis schedule. Will order Vancomycin 1250 mg x 1 dose today and order a vancomycin random level in AM tomorrow. Pharmacy to follow daily and will make changes to dose and/or frequency based on clinical status.   _________________________________     Pharmacist WILLIS McgheeD
Plan: See psoriasis plan
Detail Level: Zone

## 2023-12-14 ENCOUNTER — OFFICE VISIT (OUTPATIENT)
Age: 73
End: 2023-12-14
Payer: MEDICAID

## 2023-12-14 VITALS
HEIGHT: 70 IN | SYSTOLIC BLOOD PRESSURE: 114 MMHG | RESPIRATION RATE: 16 BRPM | TEMPERATURE: 98 F | OXYGEN SATURATION: 97 % | BODY MASS INDEX: 24.05 KG/M2 | WEIGHT: 168 LBS | DIASTOLIC BLOOD PRESSURE: 67 MMHG

## 2023-12-14 DIAGNOSIS — I73.9 PVD (PERIPHERAL VASCULAR DISEASE) (HCC): Primary | ICD-10-CM

## 2023-12-14 PROCEDURE — 1123F ACP DISCUSS/DSCN MKR DOCD: CPT | Performed by: SURGERY

## 2023-12-14 PROCEDURE — 3074F SYST BP LT 130 MM HG: CPT | Performed by: SURGERY

## 2023-12-14 PROCEDURE — 3078F DIAST BP <80 MM HG: CPT | Performed by: SURGERY

## 2023-12-14 PROCEDURE — 99213 OFFICE O/P EST LOW 20 MIN: CPT | Performed by: SURGERY

## 2023-12-14 ASSESSMENT — PATIENT HEALTH QUESTIONNAIRE - PHQ9
SUM OF ALL RESPONSES TO PHQ9 QUESTIONS 1 & 2: 0
SUM OF ALL RESPONSES TO PHQ QUESTIONS 1-9: 0
2. FEELING DOWN, DEPRESSED OR HOPELESS: 0
1. LITTLE INTEREST OR PLEASURE IN DOING THINGS: 0
SUM OF ALL RESPONSES TO PHQ QUESTIONS 1-9: 0

## 2023-12-29 ENCOUNTER — TELEPHONE (OUTPATIENT)
Age: 73
End: 2023-12-29

## 2023-12-29 NOTE — TELEPHONE ENCOUNTER
Patient niece Janna Shelton [phone] calling to see if we can get him in sooner then feb.  because his Left foot is burning. Scared due to R Leg burner before amputated.

## 2024-01-02 ENCOUNTER — HOSPITAL ENCOUNTER (INPATIENT)
Facility: HOSPITAL | Age: 74
LOS: 9 days | Discharge: HOME HEALTH | DRG: 871 | End: 2024-01-11
Attending: EMERGENCY MEDICINE | Admitting: INTERNAL MEDICINE
Payer: MEDICARE

## 2024-01-02 ENCOUNTER — APPOINTMENT (OUTPATIENT)
Facility: HOSPITAL | Age: 74
DRG: 871 | End: 2024-01-02
Payer: MEDICARE

## 2024-01-02 DIAGNOSIS — R78.81 BACTEREMIA DUE TO STREPTOCOCCUS PNEUMONIAE: Primary | ICD-10-CM

## 2024-01-02 DIAGNOSIS — R79.89 ELEVATED TROPONIN: ICD-10-CM

## 2024-01-02 DIAGNOSIS — R53.1 GENERALIZED WEAKNESS: ICD-10-CM

## 2024-01-02 DIAGNOSIS — B95.3 BACTEREMIA DUE TO STREPTOCOCCUS PNEUMONIAE: Primary | ICD-10-CM

## 2024-01-02 LAB
ALBUMIN SERPL-MCNC: 2.7 G/DL (ref 3.5–5)
ALBUMIN/GLOB SERPL: 0.4 (ref 1.1–2.2)
ALP SERPL-CCNC: 87 U/L (ref 45–117)
ALT SERPL-CCNC: 17 U/L (ref 12–78)
ANION GAP SERPL CALC-SCNC: 14 MMOL/L (ref 5–15)
AST SERPL W P-5'-P-CCNC: 23 U/L (ref 15–37)
BASOPHILS # BLD: 0 K/UL (ref 0–0.1)
BASOPHILS NFR BLD: 0 % (ref 0–1)
BILIRUB SERPL-MCNC: 0.4 MG/DL (ref 0.2–1)
BNP SERPL-MCNC: ABNORMAL PG/ML
BUN SERPL-MCNC: 130 MG/DL (ref 6–20)
BUN/CREAT SERPL: 8 (ref 12–20)
CA-I BLD-MCNC: 8.7 MG/DL (ref 8.5–10.1)
CHLORIDE SERPL-SCNC: 97 MMOL/L (ref 97–108)
CO2 SERPL-SCNC: 18 MMOL/L (ref 21–32)
CREAT SERPL-MCNC: 15.8 MG/DL (ref 0.7–1.3)
DIFFERENTIAL METHOD BLD: ABNORMAL
EOSINOPHIL # BLD: 0 K/UL (ref 0–0.4)
EOSINOPHIL NFR BLD: 0 % (ref 0–7)
ERYTHROCYTE [DISTWIDTH] IN BLOOD BY AUTOMATED COUNT: 14.8 % (ref 11.5–14.5)
FLUAV AG NPH QL IA: NEGATIVE
FLUBV AG NOSE QL IA: NEGATIVE
GLOBULIN SER CALC-MCNC: 6.5 G/DL (ref 2–4)
GLUCOSE SERPL-MCNC: 177 MG/DL (ref 65–100)
HCT VFR BLD AUTO: 31.6 % (ref 36.6–50.3)
HGB BLD-MCNC: 10.4 G/DL (ref 12.1–17)
IMM GRANULOCYTES # BLD AUTO: 0.1 K/UL (ref 0–0.04)
IMM GRANULOCYTES NFR BLD AUTO: 0 % (ref 0–0.5)
LACTATE BLD-SCNC: 1.27 MMOL/L (ref 0.4–2)
LYMPHOCYTES # BLD: 0.8 K/UL (ref 0.8–3.5)
LYMPHOCYTES NFR BLD: 5 % (ref 12–49)
MAGNESIUM SERPL-MCNC: 2.1 MG/DL (ref 1.6–2.4)
MCH RBC QN AUTO: 32.1 PG (ref 26–34)
MCHC RBC AUTO-ENTMCNC: 32.9 G/DL (ref 30–36.5)
MCV RBC AUTO: 97.5 FL (ref 80–99)
MONOCYTES # BLD: 1.6 K/UL (ref 0–1)
MONOCYTES NFR BLD: 11 % (ref 5–13)
NEUTS SEG # BLD: 12.7 K/UL (ref 1.8–8)
NEUTS SEG NFR BLD: 84 % (ref 32–75)
NRBC # BLD: 0 K/UL (ref 0–0.01)
NRBC BLD-RTO: 0 PER 100 WBC
PERFORMED BY:: NORMAL
PLATELET # BLD AUTO: 172 K/UL (ref 150–400)
PMV BLD AUTO: 12.3 FL (ref 8.9–12.9)
POTASSIUM SERPL-SCNC: 4.9 MMOL/L (ref 3.5–5.1)
PROT SERPL-MCNC: 9.2 G/DL (ref 6.4–8.2)
RBC # BLD AUTO: 3.24 M/UL (ref 4.1–5.7)
SARS-COV-2 RDRP RESP QL NAA+PROBE: NOT DETECTED
SODIUM SERPL-SCNC: 129 MMOL/L (ref 136–145)
TROPONIN I SERPL HS-MCNC: 134 NG/L (ref 0–76)
TROPONIN I SERPL HS-MCNC: 139 NG/L (ref 0–76)
WBC # BLD AUTO: 15.2 K/UL (ref 4.1–11.1)

## 2024-01-02 PROCEDURE — 94762 N-INVAS EAR/PLS OXIMTRY CONT: CPT

## 2024-01-02 PROCEDURE — P9047 ALBUMIN (HUMAN), 25%, 50ML: HCPCS | Performed by: INTERNAL MEDICINE

## 2024-01-02 PROCEDURE — 93005 ELECTROCARDIOGRAM TRACING: CPT | Performed by: EMERGENCY MEDICINE

## 2024-01-02 PROCEDURE — 83880 ASSAY OF NATRIURETIC PEPTIDE: CPT

## 2024-01-02 PROCEDURE — 71045 X-RAY EXAM CHEST 1 VIEW: CPT

## 2024-01-02 PROCEDURE — 99285 EMERGENCY DEPT VISIT HI MDM: CPT

## 2024-01-02 PROCEDURE — 6370000000 HC RX 637 (ALT 250 FOR IP): Performed by: INTERNAL MEDICINE

## 2024-01-02 PROCEDURE — 87635 SARS-COV-2 COVID-19 AMP PRB: CPT

## 2024-01-02 PROCEDURE — 87040 BLOOD CULTURE FOR BACTERIA: CPT

## 2024-01-02 PROCEDURE — 87154 CUL TYP ID BLD PTHGN 6+ TRGT: CPT

## 2024-01-02 PROCEDURE — 87804 INFLUENZA ASSAY W/OPTIC: CPT

## 2024-01-02 PROCEDURE — 6360000002 HC RX W HCPCS: Performed by: INTERNAL MEDICINE

## 2024-01-02 PROCEDURE — 85025 COMPLETE CBC W/AUTO DIFF WBC: CPT

## 2024-01-02 PROCEDURE — 87186 SC STD MICRODIL/AGAR DIL: CPT

## 2024-01-02 PROCEDURE — 1100000000 HC RM PRIVATE

## 2024-01-02 PROCEDURE — 36415 COLL VENOUS BLD VENIPUNCTURE: CPT

## 2024-01-02 PROCEDURE — 87077 CULTURE AEROBIC IDENTIFY: CPT

## 2024-01-02 PROCEDURE — 83605 ASSAY OF LACTIC ACID: CPT

## 2024-01-02 PROCEDURE — 83735 ASSAY OF MAGNESIUM: CPT

## 2024-01-02 PROCEDURE — 80053 COMPREHEN METABOLIC PANEL: CPT

## 2024-01-02 PROCEDURE — 84484 ASSAY OF TROPONIN QUANT: CPT

## 2024-01-02 PROCEDURE — 6370000000 HC RX 637 (ALT 250 FOR IP): Performed by: EMERGENCY MEDICINE

## 2024-01-02 PROCEDURE — 2580000003 HC RX 258: Performed by: EMERGENCY MEDICINE

## 2024-01-02 PROCEDURE — 2580000003 HC RX 258: Performed by: INTERNAL MEDICINE

## 2024-01-02 RX ORDER — ATORVASTATIN CALCIUM 40 MG/1
40 TABLET, FILM COATED ORAL DAILY
Status: DISCONTINUED | OUTPATIENT
Start: 2024-01-03 | End: 2024-01-11 | Stop reason: HOSPADM

## 2024-01-02 RX ORDER — MIDODRINE HYDROCHLORIDE 5 MG/1
5 TABLET ORAL
Status: COMPLETED | OUTPATIENT
Start: 2024-01-02 | End: 2024-01-02

## 2024-01-02 RX ORDER — SODIUM CHLORIDE 9 MG/ML
INJECTION, SOLUTION INTRAVENOUS PRN
Status: DISCONTINUED | OUTPATIENT
Start: 2024-01-02 | End: 2024-01-11 | Stop reason: HOSPADM

## 2024-01-02 RX ORDER — POLYETHYLENE GLYCOL 3350 17 G/17G
17 POWDER, FOR SOLUTION ORAL DAILY PRN
Status: DISCONTINUED | OUTPATIENT
Start: 2024-01-02 | End: 2024-01-11 | Stop reason: HOSPADM

## 2024-01-02 RX ORDER — ONDANSETRON 4 MG/1
4 TABLET, ORALLY DISINTEGRATING ORAL EVERY 8 HOURS PRN
Status: DISCONTINUED | OUTPATIENT
Start: 2024-01-02 | End: 2024-01-11 | Stop reason: HOSPADM

## 2024-01-02 RX ORDER — SODIUM CHLORIDE 0.9 % (FLUSH) 0.9 %
5-40 SYRINGE (ML) INJECTION EVERY 12 HOURS SCHEDULED
Status: DISCONTINUED | OUTPATIENT
Start: 2024-01-02 | End: 2024-01-11 | Stop reason: HOSPADM

## 2024-01-02 RX ORDER — ALBUMIN (HUMAN) 12.5 G/50ML
25 SOLUTION INTRAVENOUS ONCE
Status: COMPLETED | OUTPATIENT
Start: 2024-01-02 | End: 2024-01-02

## 2024-01-02 RX ORDER — MIDODRINE HYDROCHLORIDE 5 MG/1
5 TABLET ORAL
Status: DISCONTINUED | OUTPATIENT
Start: 2024-01-03 | End: 2024-01-04

## 2024-01-02 RX ORDER — CLOPIDOGREL BISULFATE 75 MG/1
75 TABLET ORAL DAILY
Status: DISCONTINUED | OUTPATIENT
Start: 2024-01-03 | End: 2024-01-11 | Stop reason: HOSPADM

## 2024-01-02 RX ORDER — HEPARIN SODIUM 5000 [USP'U]/ML
5000 INJECTION, SOLUTION INTRAVENOUS; SUBCUTANEOUS EVERY 8 HOURS SCHEDULED
Status: DISCONTINUED | OUTPATIENT
Start: 2024-01-03 | End: 2024-01-11 | Stop reason: HOSPADM

## 2024-01-02 RX ORDER — ACETAMINOPHEN 650 MG/1
650 SUPPOSITORY RECTAL EVERY 6 HOURS PRN
Status: DISCONTINUED | OUTPATIENT
Start: 2024-01-02 | End: 2024-01-11 | Stop reason: HOSPADM

## 2024-01-02 RX ORDER — ACETAMINOPHEN 325 MG/1
650 TABLET ORAL EVERY 6 HOURS PRN
Status: DISCONTINUED | OUTPATIENT
Start: 2024-01-02 | End: 2024-01-11 | Stop reason: HOSPADM

## 2024-01-02 RX ORDER — ONDANSETRON 2 MG/ML
4 INJECTION INTRAMUSCULAR; INTRAVENOUS EVERY 6 HOURS PRN
Status: DISCONTINUED | OUTPATIENT
Start: 2024-01-02 | End: 2024-01-11 | Stop reason: HOSPADM

## 2024-01-02 RX ORDER — SODIUM CHLORIDE 0.9 % (FLUSH) 0.9 %
5-40 SYRINGE (ML) INJECTION PRN
Status: DISCONTINUED | OUTPATIENT
Start: 2024-01-02 | End: 2024-01-11 | Stop reason: HOSPADM

## 2024-01-02 RX ORDER — 0.9 % SODIUM CHLORIDE 0.9 %
500 INTRAVENOUS SOLUTION INTRAVENOUS ONCE
Status: COMPLETED | OUTPATIENT
Start: 2024-01-02 | End: 2024-01-02

## 2024-01-02 RX ADMIN — SODIUM CHLORIDE 500 ML: 9 INJECTION, SOLUTION INTRAVENOUS at 17:07

## 2024-01-02 RX ADMIN — MIDODRINE HYDROCHLORIDE 5 MG: 5 TABLET ORAL at 16:55

## 2024-01-02 RX ADMIN — CEFEPIME 1000 MG: 1 INJECTION, POWDER, FOR SOLUTION INTRAMUSCULAR; INTRAVENOUS at 21:20

## 2024-01-02 RX ADMIN — ACETAMINOPHEN 650 MG: 325 TABLET ORAL at 21:25

## 2024-01-02 RX ADMIN — ALBUMIN (HUMAN) 25 G: 0.25 INJECTION, SOLUTION INTRAVENOUS at 21:29

## 2024-01-02 RX ADMIN — SODIUM CHLORIDE, PRESERVATIVE FREE 10 ML: 5 INJECTION INTRAVENOUS at 21:34

## 2024-01-02 ASSESSMENT — PAIN DESCRIPTION - ORIENTATION: ORIENTATION: MID

## 2024-01-02 ASSESSMENT — PAIN DESCRIPTION - LOCATION: LOCATION: CHEST

## 2024-01-02 ASSESSMENT — PAIN - FUNCTIONAL ASSESSMENT: PAIN_FUNCTIONAL_ASSESSMENT: 0-10

## 2024-01-02 ASSESSMENT — PAIN SCALES - GENERAL: PAINLEVEL_OUTOF10: 3

## 2024-01-02 ASSESSMENT — PAIN DESCRIPTION - DESCRIPTORS: DESCRIPTORS: SORE

## 2024-01-02 NOTE — ED TRIAGE NOTES
Arrives via ems with complaints of generalized weakness and fatigue x several days, missed dialysis yesterday, last dialysis was Friday. [facility] - per ems

## 2024-01-02 NOTE — ED PROVIDER NOTES
[facility] EMERGENCY DEPT  EMERGENCY DEPARTMENT HISTORY AND PHYSICAL EXAM      Date: 1/2/2024  Patient Name: Damien Ferrer  MRN: [id number]  Birthdate 1950  Date of evaluation: 1/2/2024  Provider: Senthil Cerda MD   Note Started: 4:35 PM EST 1/2/24    HISTORY OF PRESENT ILLNESS     Chief Complaint   Patient presents with    Fatigue       History Provided By: Patient and EMS    HPI: Damien Ferrer is a 73 y.o. male presents to the emergency department complaint of several days of generalized weakness.  Patient denies fevers or chills, denies nausea or vomiting.  Patient does report he missed dialysis yesterday.  Patient does state his blood pressure medications have been increased recently.    PAST MEDICAL HISTORY   Past Medical History:  Past Medical History:   Diagnosis Date    Asthenia 01/24/2021    Cardiomyopathy (HCC) 01/24/2021    CHF (congestive heart failure) (HCC)     Chronic kidney disease     CKD (chronic kidney disease)     4    End stage renal disease on dialysis (HCC) 01/24/2021    Essential hypertension 01/24/2021    Gastroesophageal reflux disease 01/24/2021    Gastroesophageal reflux disease 01/24/2021    Hypertension     Pneumonia due to COVID-19 virus 01/24/2021    Systolic CHF, acute on chronic (HCC) 01/24/2021       Past Surgical History:  Past Surgical History:   Procedure Laterality Date    CARDIAC CATHETERIZATION      COLONOSCOPY N/A 12/3/2020    COLONOSCOPY performed by Patsy Chakraborty MD at [facility] ENDOSCOPY    COLONOSCOPY N/A 12/2/2022    COLONOSCOPY performed by Patsy Chakraborty MD at [facility] ENDOSCOPY    FOOT SURGERY Right 5/17/2023    Open Transmetatarsal Amputation Right Foot performed by Phil Reyes DPM at [facility] MAIN OR    HERNIA REPAIR      INVASIVE VASCULAR N/A 5/16/2023    Angiography lower ext right performed by Papo Charlton MD at [facility] ELECTROPHYSIOLOGY    INVASIVE VASCULAR N/A 5/16/2023    Aortagram abdominal performed by Papo Charlton MD at [facility] ELECTROPHYSIOLOGY    INVASIVE

## 2024-01-03 ENCOUNTER — APPOINTMENT (OUTPATIENT)
Facility: HOSPITAL | Age: 74
DRG: 871 | End: 2024-01-03
Payer: MEDICARE

## 2024-01-03 LAB
ALBUMIN SERPL-MCNC: 2.9 G/DL (ref 3.5–5)
ANION GAP SERPL CALC-SCNC: 18 MMOL/L (ref 5–15)
BACTERIA SPEC CULT: NORMAL
BACTERIA SPEC CULT: NORMAL
BASOPHILS # BLD: 0 K/UL (ref 0–0.1)
BASOPHILS NFR BLD: 0 % (ref 0–1)
BUN SERPL-MCNC: 143 MG/DL (ref 6–20)
BUN/CREAT SERPL: 9 (ref 12–20)
CA-I BLD-MCNC: 8.7 MG/DL (ref 8.5–10.1)
CHLORIDE SERPL-SCNC: 99 MMOL/L (ref 97–108)
CO2 SERPL-SCNC: 14 MMOL/L (ref 21–32)
CREAT SERPL-MCNC: 16 MG/DL (ref 0.7–1.3)
DIFFERENTIAL METHOD BLD: ABNORMAL
EKG ATRIAL RATE: 80 BPM
EKG DIAGNOSIS: NORMAL
EKG P AXIS: 58 DEGREES
EKG P-R INTERVAL: 156 MS
EKG Q-T INTERVAL: 388 MS
EKG QRS DURATION: 112 MS
EKG QTC CALCULATION (BAZETT): 447 MS
EKG R AXIS: -36 DEGREES
EKG T AXIS: 18 DEGREES
EKG VENTRICULAR RATE: 80 BPM
EOSINOPHIL # BLD: 0 K/UL (ref 0–0.4)
EOSINOPHIL NFR BLD: 0 % (ref 0–7)
ERYTHROCYTE [DISTWIDTH] IN BLOOD BY AUTOMATED COUNT: 14.9 % (ref 11.5–14.5)
GLUCOSE BLD STRIP.AUTO-MCNC: 140 MG/DL (ref 65–100)
GLUCOSE BLD STRIP.AUTO-MCNC: 150 MG/DL (ref 65–100)
GLUCOSE SERPL-MCNC: 184 MG/DL (ref 65–100)
HBV SURFACE AB SER QL: NONREACTIVE
HBV SURFACE AB SER-ACNC: <3.1 MIU/ML
HBV SURFACE AG SER QL: 0.31 INDEX
HBV SURFACE AG SER QL: NEGATIVE
HCT VFR BLD AUTO: 28.2 % (ref 36.6–50.3)
HGB BLD-MCNC: 9.5 G/DL (ref 12.1–17)
IMM GRANULOCYTES # BLD AUTO: 0.2 K/UL
IMM GRANULOCYTES NFR BLD AUTO: 1 %
LYMPHOCYTES # BLD: 0.5 K/UL (ref 0.8–3.5)
LYMPHOCYTES NFR BLD: 3 % (ref 12–49)
Lab: NORMAL
MCH RBC QN AUTO: 32.3 PG (ref 26–34)
MCHC RBC AUTO-ENTMCNC: 33.7 G/DL (ref 30–36.5)
MCV RBC AUTO: 95.9 FL (ref 80–99)
MONOCYTES # BLD: 1.8 K/UL (ref 0–1)
MONOCYTES NFR BLD: 11 % (ref 5–13)
NEUTS SEG # BLD: 13.6 K/UL (ref 1.8–8)
NEUTS SEG NFR BLD: 85 % (ref 32–75)
NRBC # BLD: 0 K/UL (ref 0–0.01)
NRBC BLD-RTO: 0 PER 100 WBC
PERFORMED BY:: ABNORMAL
PERFORMED BY:: ABNORMAL
PHOSPHATE SERPL-MCNC: 5.4 MG/DL (ref 2.6–4.7)
PLATELET # BLD AUTO: 170 K/UL (ref 150–400)
PMV BLD AUTO: 13 FL (ref 8.9–12.9)
POTASSIUM SERPL-SCNC: 5 MMOL/L (ref 3.5–5.1)
RBC # BLD AUTO: 2.94 M/UL (ref 4.1–5.7)
RBC MORPH BLD: ABNORMAL
SODIUM SERPL-SCNC: 131 MMOL/L (ref 136–145)
TROPONIN I SERPL HS-MCNC: 156 NG/L (ref 0–76)
WBC # BLD AUTO: 16.1 K/UL (ref 4.1–11.1)

## 2024-01-03 PROCEDURE — 90935 HEMODIALYSIS ONE EVALUATION: CPT

## 2024-01-03 PROCEDURE — 86706 HEP B SURFACE ANTIBODY: CPT

## 2024-01-03 PROCEDURE — 87340 HEPATITIS B SURFACE AG IA: CPT

## 2024-01-03 PROCEDURE — P9047 ALBUMIN (HUMAN), 25%, 50ML: HCPCS

## 2024-01-03 PROCEDURE — 1100000000 HC RM PRIVATE

## 2024-01-03 PROCEDURE — 02H633Z INSERTION OF INFUSION DEVICE INTO RIGHT ATRIUM, PERCUTANEOUS APPROACH: ICD-10-PCS | Performed by: STUDENT IN AN ORGANIZED HEALTH CARE EDUCATION/TRAINING PROGRAM

## 2024-01-03 PROCEDURE — G0257 UNSCHED DIALYSIS ESRD PT HOS: HCPCS

## 2024-01-03 PROCEDURE — 99223 1ST HOSP IP/OBS HIGH 75: CPT | Performed by: INTERNAL MEDICINE

## 2024-01-03 PROCEDURE — 82962 GLUCOSE BLOOD TEST: CPT

## 2024-01-03 PROCEDURE — 6360000002 HC RX W HCPCS

## 2024-01-03 PROCEDURE — 6360000002 HC RX W HCPCS: Performed by: INTERNAL MEDICINE

## 2024-01-03 PROCEDURE — 84484 ASSAY OF TROPONIN QUANT: CPT

## 2024-01-03 PROCEDURE — 76937 US GUIDE VASCULAR ACCESS: CPT

## 2024-01-03 PROCEDURE — 71045 X-RAY EXAM CHEST 1 VIEW: CPT

## 2024-01-03 PROCEDURE — 2580000003 HC RX 258: Performed by: INTERNAL MEDICINE

## 2024-01-03 PROCEDURE — 5A1D70Z PERFORMANCE OF URINARY FILTRATION, INTERMITTENT, LESS THAN 6 HOURS PER DAY: ICD-10-PCS | Performed by: INTERNAL MEDICINE

## 2024-01-03 PROCEDURE — 36415 COLL VENOUS BLD VENIPUNCTURE: CPT

## 2024-01-03 PROCEDURE — 6360000002 HC RX W HCPCS: Performed by: HOSPITALIST

## 2024-01-03 PROCEDURE — 80069 RENAL FUNCTION PANEL: CPT

## 2024-01-03 PROCEDURE — 70450 CT HEAD/BRAIN W/O DYE: CPT

## 2024-01-03 PROCEDURE — 2709999900 HC NON-CHARGEABLE SUPPLY

## 2024-01-03 PROCEDURE — C1894 INTRO/SHEATH, NON-LASER: HCPCS

## 2024-01-03 PROCEDURE — 93971 EXTREMITY STUDY: CPT

## 2024-01-03 PROCEDURE — P9047 ALBUMIN (HUMAN), 25%, 50ML: HCPCS | Performed by: HOSPITALIST

## 2024-01-03 PROCEDURE — 6370000000 HC RX 637 (ALT 250 FOR IP): Performed by: INTERNAL MEDICINE

## 2024-01-03 PROCEDURE — 85025 COMPLETE CBC W/AUTO DIFF WBC: CPT

## 2024-01-03 RX ORDER — ALBUMIN (HUMAN) 12.5 G/50ML
25 SOLUTION INTRAVENOUS ONCE
Status: COMPLETED | OUTPATIENT
Start: 2024-01-03 | End: 2024-01-03

## 2024-01-03 RX ORDER — ALBUMIN (HUMAN) 12.5 G/50ML
SOLUTION INTRAVENOUS
Status: COMPLETED
Start: 2024-01-03 | End: 2024-01-03

## 2024-01-03 RX ORDER — HEPARIN SODIUM 1000 [USP'U]/ML
2500 INJECTION, SOLUTION INTRAVENOUS; SUBCUTANEOUS PRN
Status: DISCONTINUED | OUTPATIENT
Start: 2024-01-03 | End: 2024-01-11 | Stop reason: HOSPADM

## 2024-01-03 RX ORDER — ALBUMIN (HUMAN) 12.5 G/50ML
25 SOLUTION INTRAVENOUS
Status: COMPLETED | OUTPATIENT
Start: 2024-01-03 | End: 2024-01-03

## 2024-01-03 RX ADMIN — CLOPIDOGREL BISULFATE 75 MG: 75 TABLET ORAL at 10:15

## 2024-01-03 RX ADMIN — ALBUMIN (HUMAN) 25 G: 0.25 INJECTION, SOLUTION INTRAVENOUS at 22:13

## 2024-01-03 RX ADMIN — MIDODRINE HYDROCHLORIDE 5 MG: 5 TABLET ORAL at 10:15

## 2024-01-03 RX ADMIN — SODIUM CHLORIDE, PRESERVATIVE FREE 10 ML: 5 INJECTION INTRAVENOUS at 10:15

## 2024-01-03 RX ADMIN — EPOETIN ALFA-EPBX 3000 UNITS: 3000 INJECTION, SOLUTION INTRAVENOUS; SUBCUTANEOUS at 16:08

## 2024-01-03 RX ADMIN — ALBUMIN (HUMAN) 25 G: 12.5 SOLUTION INTRAVENOUS at 14:21

## 2024-01-03 RX ADMIN — HEPARIN SODIUM 2500 UNITS: 1000 INJECTION INTRAVENOUS; SUBCUTANEOUS at 17:01

## 2024-01-03 RX ADMIN — HEPARIN SODIUM 5000 UNITS: 5000 INJECTION INTRAVENOUS; SUBCUTANEOUS at 21:55

## 2024-01-03 RX ADMIN — ATORVASTATIN CALCIUM 40 MG: 40 TABLET, FILM COATED ORAL at 10:15

## 2024-01-03 RX ADMIN — ALBUMIN (HUMAN) 25 G: 0.25 INJECTION, SOLUTION INTRAVENOUS at 14:21

## 2024-01-03 RX ADMIN — CEFEPIME 1000 MG: 1 INJECTION, POWDER, FOR SOLUTION INTRAMUSCULAR; INTRAVENOUS at 21:47

## 2024-01-03 RX ADMIN — MIDODRINE HYDROCHLORIDE 5 MG: 5 TABLET ORAL at 23:18

## 2024-01-03 NOTE — ED NOTES
RN assumed care of patient at this time. Bedside/verbal report received from STEFAN Cedeno RN at this time. Patient AAOx4 in semi-fowlers position. Stretcher in lowest locked position.

## 2024-01-03 NOTE — DIALYSIS
Pt received 4 hour treatment. Removed 2000ml and 65.5 liters of blood process this treatment. Cath dressing clean, dry and intact. Pt discharged alert back to ED via hospital transportation. Report given to Nurse Machado.

## 2024-01-03 NOTE — ED NOTES
Nurse noted that pt is having increased difficulty in hearing in bilateral ears, NIH -, but an increased difference from yesterday when I was caring for pt. Contacted provider and orders for received for head CT.

## 2024-01-03 NOTE — H&P
History & Physical    Primary Care Provider: Jim Dorman MD    Chief complaint:   Chief Complaint   Patient presents with    Fatigue        History of Presenting Illness:   Damien Ferrer is a 73 y.o. male with PMH of ESRD, HFrEF, hypertension and other medical problems as below. Presented to the ED with chief complaint of generalized weakness for the past few days. Limited history from patient given patient very lethargic. He reports no cough, shortness of breath. No diarrhea, nausea or vomiting, but has some abdominal discomfort. He reports till make urine, but unable to tell me about UTI symptoms. 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     In the ED, initially hypotensive, improved with IVF bolus and PO midodrine. Febrile and has leukocytosis. Troponin elevated, but stable. Urinalysis pending. Chest X-ray showed pulmonary edema.       Chart review: none            Past Medical History:   Diagnosis Date    Asthenia 01/24/2021    Cardiomyopathy (HCC) 01/24/2021    CHF (congestive heart failure) (HCC)     Chronic kidney disease     CKD (chronic kidney disease)     4    End stage renal disease on dialysis (HCC) 01/24/2021    Essential hypertension 01/24/2021    Gastroesophageal reflux disease 01/24/2021    Gastroesophageal reflux disease 01/24/2021    Hypertension     Pneumonia due to COVID-19 virus 01/24/2021    Systolic CHF, acute on chronic (HCC) 01/24/2021        Past Surgical History:   Procedure Laterality Date    CARDIAC CATHETERIZATION      COLONOSCOPY N/A 12/3/2020    COLONOSCOPY performed by Patsy Chakraborty MD at [facility] ENDOSCOPY    COLONOSCOPY N/A 12/2/2022    COLONOSCOPY performed by Patsy Chakraborty MD at [facility] ENDOSCOPY    FOOT SURGERY Right 5/17/2023    Open Transmetatarsal Amputation Right Foot performed by Phil Reyes DPM at [facility] MAIN OR    HERNIA REPAIR      INVASIVE VASCULAR N/A 5/16/2023    Angiography lower ext right performed by Papo Charlton MD at [facility] ELECTROPHYSIOLOGY

## 2024-01-04 ENCOUNTER — TELEPHONE (OUTPATIENT)
Age: 74
End: 2024-01-04

## 2024-01-04 LAB
ALBUMIN SERPL-MCNC: 3 G/DL (ref 3.5–5)
ANION GAP SERPL CALC-SCNC: 13 MMOL/L (ref 5–15)
BASOPHILS # BLD: 0 K/UL (ref 0–0.1)
BASOPHILS NFR BLD: 0 % (ref 0–1)
BUN SERPL-MCNC: 85 MG/DL (ref 6–20)
BUN/CREAT SERPL: 8 (ref 12–20)
CA-I BLD-MCNC: 8.8 MG/DL (ref 8.5–10.1)
CHLORIDE SERPL-SCNC: 98 MMOL/L (ref 97–108)
CO2 SERPL-SCNC: 21 MMOL/L (ref 21–32)
CREAT SERPL-MCNC: 10.3 MG/DL (ref 0.7–1.3)
CRP SERPL-MCNC: 28.1 MG/DL (ref 0–0.6)
DIFFERENTIAL METHOD BLD: ABNORMAL
EOSINOPHIL # BLD: 0 K/UL (ref 0–0.4)
EOSINOPHIL NFR BLD: 0 % (ref 0–7)
ERYTHROCYTE [DISTWIDTH] IN BLOOD BY AUTOMATED COUNT: 14.7 % (ref 11.5–14.5)
GLUCOSE SERPL-MCNC: 163 MG/DL (ref 65–100)
HCT VFR BLD AUTO: 28.8 % (ref 36.6–50.3)
HGB BLD-MCNC: 9.8 G/DL (ref 12.1–17)
IMM GRANULOCYTES # BLD AUTO: 0.1 K/UL (ref 0–0.04)
IMM GRANULOCYTES NFR BLD AUTO: 1 % (ref 0–0.5)
LACTATE SERPL-SCNC: 1.1 MMOL/L (ref 0.4–2)
LYMPHOCYTES # BLD: 0.8 K/UL (ref 0.8–3.5)
LYMPHOCYTES NFR BLD: 7 % (ref 12–49)
MCH RBC QN AUTO: 31.9 PG (ref 26–34)
MCHC RBC AUTO-ENTMCNC: 34 G/DL (ref 30–36.5)
MCV RBC AUTO: 93.8 FL (ref 80–99)
MONOCYTES # BLD: 1.7 K/UL (ref 0–1)
MONOCYTES NFR BLD: 15 % (ref 5–13)
NEUTS SEG # BLD: 9.1 K/UL (ref 1.8–8)
NEUTS SEG NFR BLD: 77 % (ref 32–75)
NRBC # BLD: 0 K/UL (ref 0–0.01)
NRBC BLD-RTO: 0 PER 100 WBC
PHOSPHATE SERPL-MCNC: 4.9 MG/DL (ref 2.6–4.7)
PLATELET # BLD AUTO: 178 K/UL (ref 150–400)
PMV BLD AUTO: 12.6 FL (ref 8.9–12.9)
POTASSIUM SERPL-SCNC: 4 MMOL/L (ref 3.5–5.1)
PROCALCITONIN SERPL-MCNC: 153.92 NG/ML
RBC # BLD AUTO: 3.07 M/UL (ref 4.1–5.7)
SODIUM SERPL-SCNC: 132 MMOL/L (ref 136–145)
TROPONIN I SERPL HS-MCNC: 175 NG/L (ref 0–76)
VANCOMYCIN SERPL-MCNC: 14.8 UG/ML
WBC # BLD AUTO: 11.7 K/UL (ref 4.1–11.1)

## 2024-01-04 PROCEDURE — 84484 ASSAY OF TROPONIN QUANT: CPT

## 2024-01-04 PROCEDURE — 1100000000 HC RM PRIVATE

## 2024-01-04 PROCEDURE — 2500000003 HC RX 250 WO HCPCS: Performed by: INTERNAL MEDICINE

## 2024-01-04 PROCEDURE — 2580000003 HC RX 258: Performed by: INTERNAL MEDICINE

## 2024-01-04 PROCEDURE — 83605 ASSAY OF LACTIC ACID: CPT

## 2024-01-04 PROCEDURE — 80069 RENAL FUNCTION PANEL: CPT

## 2024-01-04 PROCEDURE — 80202 ASSAY OF VANCOMYCIN: CPT

## 2024-01-04 PROCEDURE — 2709999900 HC NON-CHARGEABLE SUPPLY

## 2024-01-04 PROCEDURE — 86140 C-REACTIVE PROTEIN: CPT

## 2024-01-04 PROCEDURE — 6360000002 HC RX W HCPCS: Performed by: INTERNAL MEDICINE

## 2024-01-04 PROCEDURE — 85025 COMPLETE CBC W/AUTO DIFF WBC: CPT

## 2024-01-04 PROCEDURE — 84145 PROCALCITONIN (PCT): CPT

## 2024-01-04 PROCEDURE — 36415 COLL VENOUS BLD VENIPUNCTURE: CPT

## 2024-01-04 PROCEDURE — 99232 SBSQ HOSP IP/OBS MODERATE 35: CPT | Performed by: INTERNAL MEDICINE

## 2024-01-04 PROCEDURE — 90935 HEMODIALYSIS ONE EVALUATION: CPT

## 2024-01-04 PROCEDURE — 6370000000 HC RX 637 (ALT 250 FOR IP): Performed by: INTERNAL MEDICINE

## 2024-01-04 RX ORDER — MIDODRINE HYDROCHLORIDE 5 MG/1
10 TABLET ORAL
Status: DISCONTINUED | OUTPATIENT
Start: 2024-01-04 | End: 2024-01-11 | Stop reason: HOSPADM

## 2024-01-04 RX ADMIN — MIDODRINE HYDROCHLORIDE 10 MG: 5 TABLET ORAL at 17:24

## 2024-01-04 RX ADMIN — VANCOMYCIN HYDROCHLORIDE 1750 MG: 1 INJECTION, POWDER, LYOPHILIZED, FOR SOLUTION INTRAVENOUS at 01:41

## 2024-01-04 RX ADMIN — MIDODRINE HYDROCHLORIDE 10 MG: 5 TABLET ORAL at 12:52

## 2024-01-04 RX ADMIN — SODIUM CHLORIDE, PRESERVATIVE FREE 10 ML: 5 INJECTION INTRAVENOUS at 21:06

## 2024-01-04 RX ADMIN — SODIUM CHLORIDE, PRESERVATIVE FREE 10 ML: 5 INJECTION INTRAVENOUS at 01:45

## 2024-01-04 RX ADMIN — HEPARIN SODIUM 2500 UNITS: 1000 INJECTION INTRAVENOUS; SUBCUTANEOUS at 11:05

## 2024-01-04 RX ADMIN — WHITE PETROLATUM,ZINC OXIDE: 57; 17 PASTE TOPICAL at 21:06

## 2024-01-04 ASSESSMENT — PAIN SCALES - GENERAL
PAINLEVEL_OUTOF10: 0

## 2024-01-04 NOTE — ED NOTES
Called Dr Hooker, informed regarding low BP and inability to give PO meds at the moment. Ordered IV albumin 25mg. To give this meds first then midodrine tab once fully conscious.

## 2024-01-04 NOTE — CARE COORDINATION
01/04/24 1415   Service Assessment   Patient Orientation Alert and Oriented   Cognition Alert   History Provided By Child/Family   Primary Caregiver Family   Support Systems Family Members;Children   Patient's Healthcare Decision Maker is: Legal Next of Kin   PCP Verified by CM Yes   Last Visit to PCP Within last 6 months   Prior Functional Level Assistance with the following:;Bathing;Dressing;Toileting;Feeding;Cooking;Housework;Shopping;Mobility   Current Functional Level Assistance with the following:;Dressing;Bathing;Toileting;Feeding;Cooking;Housework;Shopping;Mobility   Can patient return to prior living arrangement Yes   Ability to make needs known: Fair   Family able to assist with home care needs: Yes   Would you like for me to discuss the discharge plan with any other family members/significant others, and if so, who? Yes  (Daughter, Shante and Family listed)   Financial Resources Medicare;Medicaid   Community Resources None   Social/Functional History   Lives With Family   Type of Home House   Home Layout Two level   Home Access Ramped entrance   Bathroom Toilet Bedside commode   Bathroom Equipment Shower chair   Home Equipment Walker, rolling;Cane  (Prothestic)   Receives Help From Family   Services At/After Discharge   Transition of Care Consult (CM Consult) Discharge Planning   Confirm Follow Up Transport Family     CM attempted to contact Carlton Juan (SY), Shante Quezada (daughter) and Janna Shelton (niece). CM met with patient ([facility]) and Mr. Martinez at bedside to complete DCP. Demos verified as accurate. Mr. Martinez reported patient's niece, Janna lives with him in a two story home with a ramped entrance. Patient requires assistance with ADLs. DME: cane, prosthetic, rolling walker, shower chair, wheelchair. Patient has had HH/IRF/SNF services in the past. Patient receives dialysis MWF at [facility] 11[facility] via Medicaid transport. Preferred pharmacy: [facility] in [place] verified. When

## 2024-01-04 NOTE — WOUND CARE
Patient off unit for dialysis.  WCN will attempt to follow up this afternoon.    
of unspecified stage.  Unclear what stage the original injury was classified.  Appears to be healing.  Dry brown crust and pink dry open area noted.  No erythema or drainage.  Apply Therahoney gel and cover with a sacral foam daily, see dressing order.  Ulcer of unclear etiology to posterior scrotum, possible moisture and pressure related.  Apply zinc paste to scrotum and perineum BID and prn for soiling.  If soiled, remove top soiled layer only and reapply.  Use Remedy Phytoplex Barrier Cream wipes for gentle cleansing.  To completely remove, use Remedy Foaming Cleanser or soap and water.      Maintain the the external  incontinence management system to manage  incontinence.  Use foam wedge to turn q2h at 30 degree angle to offload sacrum.  Float left heel with 1-2 pillows lengthwise while in bed for offloading of the heel.  Maintain HOB at 30 degrees or less, if not contraindicated, to reduce pressure to buttocks and sacrum.  Raise foot of bed to help prevent friction and shear injury from sliding down in the bed.  Will continue to follow weekly while in-patient.      Discharge Wound Care Needs: TBD    Teaching completed with:   [] Patient           [] Family member       [] Caregiver          [] Nursing  [] Other    Patient/Caregiver Teaching:  Level of patient/caregiver understanding able to:   [] Indicates understanding       [] Needs reinforcement  [] Unsuccessful      [] Verbal Understanding  [] Demonstrated understanding       [] No evidence of learning  [] Refused teaching         [] N/A       Electronically signed by Christina Martinez RN on 1/4/2024 at 4:38 PM

## 2024-01-04 NOTE — ED NOTES
Assumed care for this patient. Patient was unassigned initially and was ED before he was moved to the room.Patient was noted to be awake but is not fully alert and conscious. Non-verbal upon assessment.  Patient had dialysis done today.     Noted hypotension. 2nd IV line inserted. Tried calling Dr Zhou but is not on tonight. Messaged placed on perfectserved.

## 2024-01-04 NOTE — ED NOTES
Notified charge of patient in unassigned position (ED) on trackboard, patient placed from ED to H1D. VS to be obtained at this time. Patient resting comfortably on stretcher, wheels locked, in lowest position. Respiration even and unlabored. Denies needs currently.

## 2024-01-04 NOTE — TELEPHONE ENCOUNTER
Patient is in ED due to Flu sickness, he has appointment tomorrow 1/5/24 and cancelling that. Just wanted to let  know he is in hospital

## 2024-01-04 NOTE — ED NOTES
Patient is more alert and conscious now. Able to say yes or no. Swallow Screening done, passed. Midodrine given.

## 2024-01-04 NOTE — DIALYSIS
Pt mai 3.5 hour hemodialysis fair, only one liter net fluid removed, due to low BP.   Report given to Rogelio Painter.  Maurice in telemetry notified pt enroute to his room, box #[id number]

## 2024-01-04 NOTE — ED NOTES
ED TO INPATIENT SBAR HANDOFF    Patient Name: Damien Ferrer   Preferred Name: Damien  : 1950  73 y.o.   Family/Caregiver Present: no   Code Status Order: Full Code  PO Status:   Telemetry Order:   C-SSRS: Risk of Suicide: No Risk  Sitter no   Restraints:     Sepsis Risk Score Sepsis Risk Score: 16.9    Situation  Chief Complaint   Patient presents with    Fatigue     Brief Description of Patient's Condition: came to ED for fatigue, non verbal, [id number]. Alert but disoriented. Missed dialysis  Mental Status: disoriented  Arrived from:  Imaging:   CT HEAD WO CONTRAST   Final Result   No acute intracranial abnormality.          XR CHEST PORTABLE   Final Result      No pneumothorax status post central placement.         IR NONTUNNELED VASCULAR CATHETER > 5 YEARS   Final Result   Technically successful placement of an ultrasound guided temporary   dialysis catheter via the right external jugular vein, as described above.               IR ULTRASOUND GUIDANCE VASCULAR ACCESS   Final Result   Technically successful placement of an ultrasound guided temporary   dialysis catheter via the right external jugular vein, as described above.               XR CHEST PORTABLE   Final Result      Mild to moderate edema pattern.          IR US VENOUS EXT LTD    (Results Pending)     Abnormal labs:   Abnormal Labs Reviewed   CBC WITH AUTO DIFFERENTIAL - Abnormal; Notable for the following components:       Result Value    WBC 15.2 (*)     RBC 3.24 (*)     Hemoglobin 10.4 (*)     Hematocrit 31.6 (*)     RDW 14.8 (*)     Neutrophils % 84 (*)     Lymphocytes % 5 (*)     Neutrophils Absolute 12.7 (*)     Monocytes Absolute 1.6 (*)     Absolute Immature Granulocyte 0.1 (*)     All other components within normal limits   COMPREHENSIVE METABOLIC PANEL - Abnormal; Notable for the following components:    Sodium 129 (*)     CO2 18 (*)     Glucose 177 (*)      (*)     Creatinine 15.80 (*)     Bun/Cre Ratio 8 (*)     Est, Glom Dylant

## 2024-01-05 ENCOUNTER — APPOINTMENT (OUTPATIENT)
Facility: HOSPITAL | Age: 74
DRG: 871 | End: 2024-01-05
Attending: INTERNAL MEDICINE
Payer: MEDICARE

## 2024-01-05 PROBLEM — R40.0 SOMNOLENCE: Status: ACTIVE | Noted: 2024-01-05

## 2024-01-05 LAB
ALBUMIN SERPL-MCNC: 3.4 G/DL (ref 3.5–5)
ANION GAP SERPL CALC-SCNC: 13 MMOL/L (ref 5–15)
BACTERIA SPEC CULT: NORMAL
BACTERIA SPEC CULT: NORMAL
BASOPHILS # BLD: 0 K/UL (ref 0–0.1)
BASOPHILS NFR BLD: 0 % (ref 0–1)
BUN SERPL-MCNC: 62 MG/DL (ref 6–20)
BUN/CREAT SERPL: 9 (ref 12–20)
CA-I BLD-MCNC: 8.9 MG/DL (ref 8.5–10.1)
CHLORIDE SERPL-SCNC: 96 MMOL/L (ref 97–108)
CO2 SERPL-SCNC: 22 MMOL/L (ref 21–32)
CREAT SERPL-MCNC: 6.99 MG/DL (ref 0.7–1.3)
CRP SERPL-MCNC: 22.2 MG/DL (ref 0–0.6)
DIFFERENTIAL METHOD BLD: ABNORMAL
ECHO AO ASC DIAM: 3.6 CM
ECHO AO ASCENDING AORTA INDEX: 1.89 CM/M2
ECHO AO ROOT DIAM: 3.1 CM
ECHO AO ROOT INDEX: 1.63 CM/M2
ECHO AV AREA PEAK VELOCITY: 2.5 CM2
ECHO AV AREA VTI: 2 CM2
ECHO AV AREA/BSA PEAK VELOCITY: 1.3 CM2/M2
ECHO AV AREA/BSA VTI: 1.1 CM2/M2
ECHO AV MEAN GRADIENT: 3 MMHG
ECHO AV MEAN VELOCITY: 0.8 M/S
ECHO AV PEAK GRADIENT: 6 MMHG
ECHO AV PEAK VELOCITY: 1.2 M/S
ECHO AV VELOCITY RATIO: 0.67
ECHO AV VTI: 22.7 CM
ECHO BSA: 1.89 M2
ECHO EST RA PRESSURE: 3 MMHG
ECHO LA DIAMETER INDEX: 1.68 CM/M2
ECHO LA DIAMETER: 3.2 CM
ECHO LA TO AORTIC ROOT RATIO: 1.03
ECHO LV E' LATERAL VELOCITY: 6 CM/S
ECHO LV FRACTIONAL SHORTENING: 20 % (ref 28–44)
ECHO LV INTERNAL DIMENSION DIASTOLE INDEX: 2.63 CM/M2
ECHO LV INTERNAL DIMENSION DIASTOLIC: 5 CM (ref 4.2–5.9)
ECHO LV INTERNAL DIMENSION SYSTOLIC INDEX: 2.11 CM/M2
ECHO LV INTERNAL DIMENSION SYSTOLIC: 4 CM
ECHO LV IVSD: 1 CM (ref 0.6–1)
ECHO LV MASS 2D: 182 G (ref 88–224)
ECHO LV MASS INDEX 2D: 95.8 G/M2 (ref 49–115)
ECHO LV POSTERIOR WALL DIASTOLIC: 1 CM (ref 0.6–1)
ECHO LV RELATIVE WALL THICKNESS RATIO: 0.4
ECHO LVOT AREA: 3.8 CM2
ECHO LVOT AV VTI INDEX: 0.53
ECHO LVOT DIAM: 2.2 CM
ECHO LVOT MEAN GRADIENT: 1 MMHG
ECHO LVOT PEAK GRADIENT: 3 MMHG
ECHO LVOT PEAK VELOCITY: 0.8 M/S
ECHO LVOT STROKE VOLUME INDEX: 24 ML/M2
ECHO LVOT SV: 45.6 ML
ECHO LVOT VTI: 12 CM
ECHO MV A VELOCITY: 1.05 M/S
ECHO MV E VELOCITY: 0.76 M/S
ECHO MV E/A RATIO: 0.72
ECHO MV E/E' LATERAL: 12.67
ECHO MV REGURGITANT PEAK GRADIENT: 52 MMHG
ECHO MV REGURGITANT PEAK VELOCITY: 3.6 M/S
ECHO MV REGURGITANT VTIA: 113 CM
ECHO PV MAX VELOCITY: 0.8 M/S
ECHO PV MEAN GRADIENT: 1 MMHG
ECHO PV MEAN VELOCITY: 0.5 M/S
ECHO PV PEAK GRADIENT: 2 MMHG
ECHO PV VTI: 15 CM
ECHO RIGHT VENTRICULAR SYSTOLIC PRESSURE (RVSP): 16 MMHG
ECHO RV BASAL DIMENSION: 4.3 CM
ECHO RV FREE WALL PEAK S': 7 CM/S
ECHO RV MID DIMENSION: 3.8 CM
ECHO RV TAPSE: 1.4 CM (ref 1.7–?)
ECHO TV REGURGITANT MAX VELOCITY: 1.83 M/S
ECHO TV REGURGITANT PEAK GRADIENT: 13 MMHG
EOSINOPHIL # BLD: 0.1 K/UL (ref 0–0.4)
EOSINOPHIL NFR BLD: 0 % (ref 0–7)
ERYTHROCYTE [DISTWIDTH] IN BLOOD BY AUTOMATED COUNT: 14.6 % (ref 11.5–14.5)
GLUCOSE SERPL-MCNC: 179 MG/DL (ref 65–100)
HCT VFR BLD AUTO: 28 % (ref 36.6–50.3)
HGB BLD-MCNC: 9.6 G/DL (ref 12.1–17)
IMM GRANULOCYTES # BLD AUTO: 0.1 K/UL (ref 0–0.04)
IMM GRANULOCYTES NFR BLD AUTO: 1 % (ref 0–0.5)
LYMPHOCYTES # BLD: 1 K/UL (ref 0.8–3.5)
LYMPHOCYTES NFR BLD: 9 % (ref 12–49)
Lab: NORMAL
MCH RBC QN AUTO: 32.3 PG (ref 26–34)
MCHC RBC AUTO-ENTMCNC: 34.3 G/DL (ref 30–36.5)
MCV RBC AUTO: 94.3 FL (ref 80–99)
MONOCYTES # BLD: 1.7 K/UL (ref 0–1)
MONOCYTES NFR BLD: 16 % (ref 5–13)
NEUTS SEG # BLD: 8.3 K/UL (ref 1.8–8)
NEUTS SEG NFR BLD: 74 % (ref 32–75)
NRBC # BLD: 0 K/UL (ref 0–0.01)
NRBC BLD-RTO: 0 PER 100 WBC
PHOSPHATE SERPL-MCNC: 3.8 MG/DL (ref 2.6–4.7)
PLATELET # BLD AUTO: 163 K/UL (ref 150–400)
POTASSIUM SERPL-SCNC: 3.4 MMOL/L (ref 3.5–5.1)
PROCALCITONIN SERPL-MCNC: 154.58 NG/ML
RBC # BLD AUTO: 2.97 M/UL (ref 4.1–5.7)
SODIUM SERPL-SCNC: 131 MMOL/L (ref 136–145)
VANCOMYCIN SERPL-MCNC: 15 UG/ML
WBC # BLD AUTO: 11.2 K/UL (ref 4.1–11.1)

## 2024-01-05 PROCEDURE — 6370000000 HC RX 637 (ALT 250 FOR IP): Performed by: INTERNAL MEDICINE

## 2024-01-05 PROCEDURE — 6360000002 HC RX W HCPCS: Performed by: INTERNAL MEDICINE

## 2024-01-05 PROCEDURE — 1100000000 HC RM PRIVATE

## 2024-01-05 PROCEDURE — 6370000000 HC RX 637 (ALT 250 FOR IP): Performed by: HOSPITALIST

## 2024-01-05 PROCEDURE — 36415 COLL VENOUS BLD VENIPUNCTURE: CPT

## 2024-01-05 PROCEDURE — 99222 1ST HOSP IP/OBS MODERATE 55: CPT | Performed by: OTOLARYNGOLOGY

## 2024-01-05 PROCEDURE — P9047 ALBUMIN (HUMAN), 25%, 50ML: HCPCS | Performed by: HOSPITALIST

## 2024-01-05 PROCEDURE — 85025 COMPLETE CBC W/AUTO DIFF WBC: CPT

## 2024-01-05 PROCEDURE — 6360000004 HC RX CONTRAST MEDICATION

## 2024-01-05 PROCEDURE — C8929 TTE W OR WO FOL WCON,DOPPLER: HCPCS

## 2024-01-05 PROCEDURE — 80202 ASSAY OF VANCOMYCIN: CPT

## 2024-01-05 PROCEDURE — 6370000000 HC RX 637 (ALT 250 FOR IP): Performed by: STUDENT IN AN ORGANIZED HEALTH CARE EDUCATION/TRAINING PROGRAM

## 2024-01-05 PROCEDURE — 99232 SBSQ HOSP IP/OBS MODERATE 35: CPT | Performed by: INTERNAL MEDICINE

## 2024-01-05 PROCEDURE — 80069 RENAL FUNCTION PANEL: CPT

## 2024-01-05 PROCEDURE — 84145 PROCALCITONIN (PCT): CPT

## 2024-01-05 PROCEDURE — 6360000002 HC RX W HCPCS: Performed by: HOSPITALIST

## 2024-01-05 PROCEDURE — 86140 C-REACTIVE PROTEIN: CPT

## 2024-01-05 PROCEDURE — 36556 INSERT NON-TUNNEL CV CATH: CPT

## 2024-01-05 PROCEDURE — 6370000000 HC RX 637 (ALT 250 FOR IP): Performed by: OTOLARYNGOLOGY

## 2024-01-05 PROCEDURE — 90935 HEMODIALYSIS ONE EVALUATION: CPT

## 2024-01-05 PROCEDURE — 2580000003 HC RX 258: Performed by: INTERNAL MEDICINE

## 2024-01-05 PROCEDURE — 2709999900 HC NON-CHARGEABLE SUPPLY

## 2024-01-05 RX ORDER — OXYCODONE HYDROCHLORIDE 5 MG/1
5 TABLET ORAL EVERY 4 HOURS PRN
Status: DISCONTINUED | OUTPATIENT
Start: 2024-01-05 | End: 2024-01-11 | Stop reason: HOSPADM

## 2024-01-05 RX ORDER — ALBUMIN (HUMAN) 12.5 G/50ML
50 SOLUTION INTRAVENOUS ONCE
Status: COMPLETED | OUTPATIENT
Start: 2024-01-05 | End: 2024-01-05

## 2024-01-05 RX ORDER — MIDODRINE HYDROCHLORIDE 5 MG/1
5 TABLET ORAL ONCE
Status: COMPLETED | OUTPATIENT
Start: 2024-01-05 | End: 2024-01-05

## 2024-01-05 RX ORDER — LEVOFLOXACIN 5 MG/ML
750 INJECTION, SOLUTION INTRAVENOUS ONCE
Status: COMPLETED | OUTPATIENT
Start: 2024-01-08 | End: 2024-01-08

## 2024-01-05 RX ORDER — LEVOFLOXACIN 5 MG/ML
500 INJECTION, SOLUTION INTRAVENOUS
Status: DISCONTINUED | OUTPATIENT
Start: 2024-01-10 | End: 2024-01-10

## 2024-01-05 RX ORDER — POTASSIUM CHLORIDE 20 MEQ/1
40 TABLET, EXTENDED RELEASE ORAL ONCE
Status: COMPLETED | OUTPATIENT
Start: 2024-01-05 | End: 2024-01-05

## 2024-01-05 RX ADMIN — HEPARIN SODIUM 5000 UNITS: 5000 INJECTION INTRAVENOUS; SUBCUTANEOUS at 21:43

## 2024-01-05 RX ADMIN — PERFLUTREN: 6.52 INJECTION, SUSPENSION INTRAVENOUS at 12:50

## 2024-01-05 RX ADMIN — Medication 5 DROP: at 21:50

## 2024-01-05 RX ADMIN — HEPARIN SODIUM 2500 UNITS: 1000 INJECTION INTRAVENOUS; SUBCUTANEOUS at 16:30

## 2024-01-05 RX ADMIN — OXYCODONE HYDROCHLORIDE 5 MG: 5 TABLET ORAL at 22:46

## 2024-01-05 RX ADMIN — ATORVASTATIN CALCIUM 40 MG: 40 TABLET, FILM COATED ORAL at 08:37

## 2024-01-05 RX ADMIN — VANCOMYCIN HYDROCHLORIDE 1000 MG: 1 INJECTION, POWDER, LYOPHILIZED, FOR SOLUTION INTRAVENOUS at 18:27

## 2024-01-05 RX ADMIN — WHITE PETROLATUM,ZINC OXIDE: 57; 17 PASTE TOPICAL at 21:43

## 2024-01-05 RX ADMIN — CLOPIDOGREL BISULFATE 75 MG: 75 TABLET ORAL at 08:37

## 2024-01-05 RX ADMIN — EPOETIN ALFA-EPBX 3000 UNITS: 3000 INJECTION, SOLUTION INTRAVENOUS; SUBCUTANEOUS at 17:06

## 2024-01-05 RX ADMIN — SODIUM CHLORIDE, PRESERVATIVE FREE 10 ML: 5 INJECTION INTRAVENOUS at 21:43

## 2024-01-05 RX ADMIN — ALBUMIN (HUMAN) 50 G: 0.25 INJECTION, SOLUTION INTRAVENOUS at 05:16

## 2024-01-05 RX ADMIN — POTASSIUM CHLORIDE 40 MEQ: 1500 TABLET, EXTENDED RELEASE ORAL at 12:00

## 2024-01-05 RX ADMIN — MIDODRINE HYDROCHLORIDE 5 MG: 5 TABLET ORAL at 05:35

## 2024-01-05 RX ADMIN — MIDODRINE HYDROCHLORIDE 10 MG: 5 TABLET ORAL at 08:37

## 2024-01-05 RX ADMIN — MIDODRINE HYDROCHLORIDE 10 MG: 5 TABLET ORAL at 18:27

## 2024-01-05 RX ADMIN — ACETAMINOPHEN 650 MG: 325 TABLET ORAL at 08:43

## 2024-01-05 RX ADMIN — MIDODRINE HYDROCHLORIDE 10 MG: 5 TABLET ORAL at 12:00

## 2024-01-05 RX ADMIN — WHITE PETROLATUM,ZINC OXIDE: 57; 17 PASTE TOPICAL at 08:45

## 2024-01-05 RX ADMIN — SODIUM CHLORIDE, PRESERVATIVE FREE 10 ML: 5 INJECTION INTRAVENOUS at 11:16

## 2024-01-05 ASSESSMENT — PAIN DESCRIPTION - LOCATION
LOCATION: SHOULDER
LOCATION: HEAD

## 2024-01-05 ASSESSMENT — PAIN DESCRIPTION - DESCRIPTORS: DESCRIPTORS: ACHING;SHARP

## 2024-01-05 ASSESSMENT — ENCOUNTER SYMPTOMS
APNEA: 0
SORE THROAT: 0
NAUSEA: 0
VOICE CHANGE: 0
STRIDOR: 0
TROUBLE SWALLOWING: 0
VOMITING: 0
SINUS PAIN: 0
SINUS PRESSURE: 0
BACK PAIN: 0
SHORTNESS OF BREATH: 0
CHOKING: 0
RHINORRHEA: 0
COUGH: 0
EYE DISCHARGE: 0
EYE ITCHING: 0
ABDOMINAL PAIN: 0

## 2024-01-05 ASSESSMENT — PAIN - FUNCTIONAL ASSESSMENT: PAIN_FUNCTIONAL_ASSESSMENT: ACTIVITIES ARE NOT PREVENTED

## 2024-01-05 ASSESSMENT — PAIN SCALES - GENERAL
PAINLEVEL_OUTOF10: 0
PAINLEVEL_OUTOF10: 4
PAINLEVEL_OUTOF10: 10
PAINLEVEL_OUTOF10: 2
PAINLEVEL_OUTOF10: 0

## 2024-01-05 ASSESSMENT — PAIN DESCRIPTION - ORIENTATION: ORIENTATION: RIGHT

## 2024-01-05 NOTE — CONSULTS
Consult Note            Date:1/3/2024        Patient Name:Damien Ferrer     YOB: 1950     Age:73 y.o.    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 It appears that he has been followed in Vascular Surgery where he was seen last month with new prosthetic leg, but there were apparent concerns for PAD left leg.    He presented to the ED complaining of generalized weakness and fatigue.  He was febrile to 100.6  but exam was described as unremarkable. WBC was elevated. No urinalysis.  Rapid flu tests and Covid-19 were negative. CXR only showed moderate edema pattern. Blood cultures were sent and patient started on Cefepime. ID has been consulted for unexplained sepsis.  IR consulted for clotted dialysis catheter.       Patient seen in the ED where he is somnolent and not responsive to questions.  He is about to go for CT Head.    Past Medical History     Past Medical History:   Diagnosis Date    Asthenia 01/24/2021    Cardiomyopathy (HCC) 01/24/2021    CHF (congestive heart failure) (HCC)     Chronic kidney disease     CKD (chronic kidney disease)     4    End stage renal disease on dialysis (HCC) 01/24/2021    Essential hypertension 01/24/2021    Gastroesophageal reflux disease 01/24/2021    Gastroesophageal reflux disease 01/24/2021    Hypertension     Pneumonia due to COVID-19 virus 01/24/2021    Systolic CHF, acute on chronic (HCC) 01/24/2021        Past Surgical History     Past Surgical History:   Procedure Laterality Date    CARDIAC CATHETERIZATION      COLONOSCOPY N/A 12/3/2020    COLONOSCOPY performed by Patsy Chakraborty MD at [facility] ENDOSCOPY    
Session ID: [id number]  Language: ASL   ID: #[id number]   Name: April
Automatic Reconciliation, Ar   carvedilol (COREG) 3.125 MG tablet Take 1 tablet by mouth 2 times daily (with meals) 21   Automatic Reconciliation, Ar           REVIEW OF SYSTEMS  Unobtainable due to patient's altered mental status      Objective       Vitals, I/O's, Weight     24 hour vital signs  BP  Min: 84/52  Max: 113/61  Temp  Av.2 °F (36.8 °C)  Min: 97.6 °F (36.4 °C)  Max: 99.2 °F (37.3 °C)  Read-Only, Retired: Current/Future Living Arrangements could not be evaluated. This SmartLink does not work with rows of the type: String Type  Resp  Av.1  Min: 16  Max: 32  SpO2  Av %  Min: 96 %  Max: 100 %  Systolic (24hrs), Av , Min:84 , Max:113   Diastolic (24hrs), Av, Min:48, Max:66    Body mass index is 22.96 kg/m².    Intake/Output Summary (Last 24 hours) at 2024 1033  Last data filed at 2024 0147  Gross per 24 hour   Intake 800 ml   Output 2600 ml   Net -1800 ml     Patient Vitals for the past 120 hrs:   Weight   24 1538 72.6 kg (160 lb)           Admit weight: Weight - Scale: 72.6 kg (160 lb)      Exam       Constitutional Chronically ill-appearing elderly man who was somnolent and noncommunicative   HEENT normal atraumatic, no neck masses, normal thyroid   Cardiovascular RRR.  Normal S1/S2.  No M/R/G.both carotids normal upstroke without bruits, radial pulses normal, noted for right BKA   Pulmonary clear to auscultation bilaterally   Abnominal soft, non-tender, without masses or organomegaly, normal bowel sounds, and no masses palpated   Genitourinary Deferred   Muskuloskeletal No obvious joint deformities.  Right BKA   Neuro Patient is somnolent with nonfocal neurologic examination   Psychiatric Somnolent noncommunicative   Extremities warm, well perfused   Skin Warm and dry         Labs     Lab Results   Component Value Date/Time    WBC 11.7 2024 06:43 AM    HGB 9.8 2024 06:43 AM    HCT 28.8 2024 06:43 AM     2024 06:43 AM    MCV 93.8 
BUN/creatinine 143/16.0.    -He is volume overload and his chest x-ray did show pulmonary edema.    -His AV fistula did not work.    -He did receive temporary dialysis catheter placement by IR because patient had leukocytosis.  -Plan for hemodialysis with low flows today and then tomorrow and Friday as well.    2 metabolic acidosis, high anion gap.    -It is due to missed dialysis sessions.    -I will check for lactic acid level.    -His CO2 is a 14 only.    -I will dialyze with high bicarb bath.    3.  Chronic hypotension.  He is on midodrine 5 mg 3 times daily.    4.  Leukocytosis.    -He was also with low-grade fever.    -Blood cultures are pending.    -Plan to insert temporary dialysis catheter.    5.  Anemia.    -Hemoglobin 9.5.    -I will give EPO with dialysis.    Thank you for the consultation.  Plan:       Signed By: Nova Champagne MD     January 3, 2024       
external ear normal. Decreased hearing noted. There is impacted cerumen.      Ears:      Comments: Grossly unable to hear     Nose: Nose normal.      Right Turbinates: Not enlarged.      Left Turbinates: Not enlarged.      Right Sinus: No maxillary sinus tenderness.      Left Sinus: No maxillary sinus tenderness.      Mouth/Throat:      Lips: Pink.      Mouth: Mucous membranes are dry. No oral lesions.      Tongue: No lesions.      Palate: No lesions.      Pharynx: Oropharynx is clear.      Tonsils: No tonsillar exudate.   Eyes:      Extraocular Movements:      Right eye: Normal extraocular motion and no nystagmus.      Left eye: Normal extraocular motion and no nystagmus.   Neck:      Thyroid: No thyroid mass.      Trachea: Trachea and phonation normal.   Cardiovascular:      Rate and Rhythm: Normal rate and regular rhythm.   Pulmonary:      Effort: Pulmonary effort is normal.      Breath sounds: No stridor.   Musculoskeletal:      Cervical back: Normal range of motion. No muscular tenderness.   Lymphadenopathy:      Cervical: No cervical adenopathy.   Neurological:      General: No focal deficit present.      Mental Status: He is alert and oriented to person, place, and time.      Comments: Lying in hospital bed, currently undergoing dialysis   Psychiatric:         Attention and Perception: Attention normal.         Mood and Affect: Mood normal.         Speech: Speech normal.         Behavior: Behavior normal.           Recent Results (from the past 24 hour(s))   Vancomycin Level, Random    Collection Time: 01/04/24  6:49 PM   Result Value Ref Range    Vancomycin Rm 14.8 ug/mL   CBC with Auto Differential    Collection Time: 01/05/24  6:03 AM   Result Value Ref Range    WBC 11.2 (H) 4.1 - 11.1 K/uL    RBC 2.97 (L) 4.10 - 5.70 M/uL    Hemoglobin 9.6 (L) 12.1 - 17.0 g/dL    Hematocrit 28.0 (L) 36.6 - 50.3 %    MCV 94.3 80.0 - 99.0 FL    MCH 32.3 26.0 - 34.0 PG    MCHC 34.3 30.0 - 36.5 g/dL    RDW 14.6 (H) 11.5 -

## 2024-01-05 NOTE — DIALYSIS
Pt mai 3.5 hour hemodialysis well.  One liter net fluid removed.  Report given to Rogelio Woods.  Sandy in telemetry notified pt is enroute to his room, box #[id number]

## 2024-01-05 NOTE — CARE COORDINATION
0838: Chart reviewed.    Per notes; patient on IV ABX followed via cardiology, ID, nephrology and wound care nurse.    Otolaryngology consulted.    [facility].    CM will continue to follow patient and recs of medical team.

## 2024-01-06 LAB
ALBUMIN SERPL-MCNC: 3 G/DL (ref 3.5–5)
ALBUMIN/GLOB SERPL: 0.5 (ref 1.1–2.2)
ALP SERPL-CCNC: 93 U/L (ref 45–117)
ALT SERPL-CCNC: 31 U/L (ref 12–78)
ANION GAP SERPL CALC-SCNC: 11 MMOL/L (ref 5–15)
AST SERPL W P-5'-P-CCNC: 62 U/L (ref 15–37)
BILIRUB SERPL-MCNC: 0.8 MG/DL (ref 0.2–1)
BUN SERPL-MCNC: 40 MG/DL (ref 6–20)
BUN/CREAT SERPL: 8 (ref 12–20)
CA-I BLD-MCNC: 9 MG/DL (ref 8.5–10.1)
CHLORIDE SERPL-SCNC: 97 MMOL/L (ref 97–108)
CO2 SERPL-SCNC: 24 MMOL/L (ref 21–32)
CREAT SERPL-MCNC: 5.3 MG/DL (ref 0.7–1.3)
CRP SERPL-MCNC: 24.7 MG/DL (ref 0–0.6)
ERYTHROCYTE [DISTWIDTH] IN BLOOD BY AUTOMATED COUNT: 14.5 % (ref 11.5–14.5)
GLOBULIN SER CALC-MCNC: 5.9 G/DL (ref 2–4)
GLUCOSE SERPL-MCNC: 177 MG/DL (ref 65–100)
HCT VFR BLD AUTO: 28.8 % (ref 36.6–50.3)
HGB BLD-MCNC: 9.7 G/DL (ref 12.1–17)
MCH RBC QN AUTO: 31.7 PG (ref 26–34)
MCHC RBC AUTO-ENTMCNC: 33.7 G/DL (ref 30–36.5)
MCV RBC AUTO: 94.1 FL (ref 80–99)
NRBC # BLD: 0 K/UL (ref 0–0.01)
NRBC BLD-RTO: 0 PER 100 WBC
PLATELET # BLD AUTO: 228 K/UL (ref 150–400)
PMV BLD AUTO: 12.4 FL (ref 8.9–12.9)
POTASSIUM SERPL-SCNC: 3.5 MMOL/L (ref 3.5–5.1)
PROCALCITONIN SERPL-MCNC: 86.76 NG/ML
PROT SERPL-MCNC: 8.9 G/DL (ref 6.4–8.2)
RBC # BLD AUTO: 3.06 M/UL (ref 4.1–5.7)
SODIUM SERPL-SCNC: 132 MMOL/L (ref 136–145)
WBC # BLD AUTO: 13.5 K/UL (ref 4.1–11.1)

## 2024-01-06 PROCEDURE — 36415 COLL VENOUS BLD VENIPUNCTURE: CPT

## 2024-01-06 PROCEDURE — 99232 SBSQ HOSP IP/OBS MODERATE 35: CPT | Performed by: INTERNAL MEDICINE

## 2024-01-06 PROCEDURE — 86140 C-REACTIVE PROTEIN: CPT

## 2024-01-06 PROCEDURE — 6370000000 HC RX 637 (ALT 250 FOR IP): Performed by: INTERNAL MEDICINE

## 2024-01-06 PROCEDURE — 2580000003 HC RX 258: Performed by: INTERNAL MEDICINE

## 2024-01-06 PROCEDURE — 6360000002 HC RX W HCPCS: Performed by: INTERNAL MEDICINE

## 2024-01-06 PROCEDURE — 85027 COMPLETE CBC AUTOMATED: CPT

## 2024-01-06 PROCEDURE — 6370000000 HC RX 637 (ALT 250 FOR IP): Performed by: STUDENT IN AN ORGANIZED HEALTH CARE EDUCATION/TRAINING PROGRAM

## 2024-01-06 PROCEDURE — 1100000000 HC RM PRIVATE

## 2024-01-06 PROCEDURE — 80053 COMPREHEN METABOLIC PANEL: CPT

## 2024-01-06 PROCEDURE — 84145 PROCALCITONIN (PCT): CPT

## 2024-01-06 RX ADMIN — MIDODRINE HYDROCHLORIDE 10 MG: 5 TABLET ORAL at 11:56

## 2024-01-06 RX ADMIN — MIDODRINE HYDROCHLORIDE 10 MG: 5 TABLET ORAL at 10:07

## 2024-01-06 RX ADMIN — SODIUM CHLORIDE, PRESERVATIVE FREE 10 ML: 5 INJECTION INTRAVENOUS at 21:57

## 2024-01-06 RX ADMIN — ATORVASTATIN CALCIUM 40 MG: 40 TABLET, FILM COATED ORAL at 10:07

## 2024-01-06 RX ADMIN — WHITE PETROLATUM,ZINC OXIDE: 57; 17 PASTE TOPICAL at 10:07

## 2024-01-06 RX ADMIN — SODIUM CHLORIDE, PRESERVATIVE FREE 10 ML: 5 INJECTION INTRAVENOUS at 10:08

## 2024-01-06 RX ADMIN — HEPARIN SODIUM 5000 UNITS: 5000 INJECTION INTRAVENOUS; SUBCUTANEOUS at 21:57

## 2024-01-06 RX ADMIN — MIDODRINE HYDROCHLORIDE 10 MG: 5 TABLET ORAL at 17:20

## 2024-01-06 RX ADMIN — WHITE PETROLATUM,ZINC OXIDE: 57; 17 PASTE TOPICAL at 21:58

## 2024-01-06 RX ADMIN — OXYCODONE HYDROCHLORIDE 5 MG: 5 TABLET ORAL at 22:08

## 2024-01-06 RX ADMIN — Medication 5 DROP: at 10:08

## 2024-01-06 RX ADMIN — Medication 5 DROP: at 21:57

## 2024-01-06 RX ADMIN — HEPARIN SODIUM 5000 UNITS: 5000 INJECTION INTRAVENOUS; SUBCUTANEOUS at 14:08

## 2024-01-06 RX ADMIN — HEPARIN SODIUM 5000 UNITS: 5000 INJECTION INTRAVENOUS; SUBCUTANEOUS at 06:40

## 2024-01-06 RX ADMIN — CLOPIDOGREL BISULFATE 75 MG: 75 TABLET ORAL at 10:07

## 2024-01-06 ASSESSMENT — PAIN DESCRIPTION - ORIENTATION: ORIENTATION: RIGHT

## 2024-01-06 ASSESSMENT — PAIN SCALES - GENERAL
PAINLEVEL_OUTOF10: 7
PAINLEVEL_OUTOF10: 3
PAINLEVEL_OUTOF10: 0
PAINLEVEL_OUTOF10: 0

## 2024-01-06 ASSESSMENT — PAIN DESCRIPTION - DESCRIPTORS: DESCRIPTORS: ACHING;SHARP

## 2024-01-06 ASSESSMENT — PAIN DESCRIPTION - LOCATION: LOCATION: SHOULDER

## 2024-01-06 ASSESSMENT — PAIN - FUNCTIONAL ASSESSMENT: PAIN_FUNCTIONAL_ASSESSMENT: ACTIVITIES ARE NOT PREVENTED

## 2024-01-07 LAB
ALBUMIN SERPL-MCNC: 2.9 G/DL (ref 3.5–5)
ALBUMIN/GLOB SERPL: 0.5 (ref 1.1–2.2)
ALP SERPL-CCNC: 105 U/L (ref 45–117)
ALT SERPL-CCNC: 28 U/L (ref 12–78)
ANION GAP SERPL CALC-SCNC: 11 MMOL/L (ref 5–15)
AST SERPL W P-5'-P-CCNC: 47 U/L (ref 15–37)
BASOPHILS # BLD: 0 K/UL (ref 0–0.1)
BASOPHILS NFR BLD: 0 % (ref 0–1)
BILIRUB SERPL-MCNC: 0.8 MG/DL (ref 0.2–1)
BUN SERPL-MCNC: 55 MG/DL (ref 6–20)
BUN/CREAT SERPL: 8 (ref 12–20)
CA-I BLD-MCNC: 8.7 MG/DL (ref 8.5–10.1)
CHLORIDE SERPL-SCNC: 97 MMOL/L (ref 97–108)
CO2 SERPL-SCNC: 22 MMOL/L (ref 21–32)
CREAT SERPL-MCNC: 6.87 MG/DL (ref 0.7–1.3)
CRP SERPL-MCNC: 21.9 MG/DL (ref 0–0.6)
DIFFERENTIAL METHOD BLD: ABNORMAL
EOSINOPHIL # BLD: 0 K/UL (ref 0–0.4)
EOSINOPHIL NFR BLD: 0 % (ref 0–7)
ERYTHROCYTE [DISTWIDTH] IN BLOOD BY AUTOMATED COUNT: 14.5 % (ref 11.5–14.5)
GLOBULIN SER CALC-MCNC: 5.9 G/DL (ref 2–4)
GLUCOSE SERPL-MCNC: 186 MG/DL (ref 65–100)
HCT VFR BLD AUTO: 29.1 % (ref 36.6–50.3)
HGB BLD-MCNC: 9.7 G/DL (ref 12.1–17)
IMM GRANULOCYTES # BLD AUTO: 0 K/UL
IMM GRANULOCYTES NFR BLD AUTO: 0 %
LYMPHOCYTES # BLD: 2.7 K/UL (ref 0.8–3.5)
LYMPHOCYTES NFR BLD: 16 % (ref 12–49)
MCH RBC QN AUTO: 31.8 PG (ref 26–34)
MCHC RBC AUTO-ENTMCNC: 33.3 G/DL (ref 30–36.5)
MCV RBC AUTO: 95.4 FL (ref 80–99)
METAMYELOCYTES NFR BLD MANUAL: 4 %
MONOCYTES # BLD: 1.7 K/UL (ref 0–1)
MONOCYTES NFR BLD: 10 % (ref 5–13)
NEUTS BAND NFR BLD MANUAL: 10 % (ref 0–6)
NEUTS SEG # BLD: 12 K/UL (ref 1.8–8)
NEUTS SEG NFR BLD: 60 % (ref 32–75)
NRBC # BLD: 0 K/UL (ref 0–0.01)
NRBC BLD-RTO: 0 PER 100 WBC
PLATELET # BLD AUTO: 218 K/UL (ref 150–400)
PMV BLD AUTO: 13.1 FL (ref 8.9–12.9)
POTASSIUM SERPL-SCNC: 3.7 MMOL/L (ref 3.5–5.1)
PROCALCITONIN SERPL-MCNC: 63.47 NG/ML
PROT SERPL-MCNC: 8.8 G/DL (ref 6.4–8.2)
RBC # BLD AUTO: 3.05 M/UL (ref 4.1–5.7)
RBC MORPH BLD: ABNORMAL
SODIUM SERPL-SCNC: 130 MMOL/L (ref 136–145)
WBC # BLD AUTO: 17.1 K/UL (ref 4.1–11.1)

## 2024-01-07 PROCEDURE — 99232 SBSQ HOSP IP/OBS MODERATE 35: CPT | Performed by: INTERNAL MEDICINE

## 2024-01-07 PROCEDURE — 6360000002 HC RX W HCPCS: Performed by: INTERNAL MEDICINE

## 2024-01-07 PROCEDURE — 2580000003 HC RX 258: Performed by: INTERNAL MEDICINE

## 2024-01-07 PROCEDURE — 87040 BLOOD CULTURE FOR BACTERIA: CPT

## 2024-01-07 PROCEDURE — 84145 PROCALCITONIN (PCT): CPT

## 2024-01-07 PROCEDURE — 85025 COMPLETE CBC W/AUTO DIFF WBC: CPT

## 2024-01-07 PROCEDURE — 6370000000 HC RX 637 (ALT 250 FOR IP): Performed by: INTERNAL MEDICINE

## 2024-01-07 PROCEDURE — 6370000000 HC RX 637 (ALT 250 FOR IP): Performed by: STUDENT IN AN ORGANIZED HEALTH CARE EDUCATION/TRAINING PROGRAM

## 2024-01-07 PROCEDURE — 80053 COMPREHEN METABOLIC PANEL: CPT

## 2024-01-07 PROCEDURE — 86140 C-REACTIVE PROTEIN: CPT

## 2024-01-07 PROCEDURE — 1100000000 HC RM PRIVATE

## 2024-01-07 PROCEDURE — 36415 COLL VENOUS BLD VENIPUNCTURE: CPT

## 2024-01-07 RX ADMIN — Medication 5 DROP: at 08:18

## 2024-01-07 RX ADMIN — Medication 5 DROP: at 20:27

## 2024-01-07 RX ADMIN — MIDODRINE HYDROCHLORIDE 10 MG: 5 TABLET ORAL at 12:09

## 2024-01-07 RX ADMIN — WHITE PETROLATUM,ZINC OXIDE: 57; 17 PASTE TOPICAL at 20:28

## 2024-01-07 RX ADMIN — WHITE PETROLATUM,ZINC OXIDE: 57; 17 PASTE TOPICAL at 08:18

## 2024-01-07 RX ADMIN — SODIUM CHLORIDE, PRESERVATIVE FREE 10 ML: 5 INJECTION INTRAVENOUS at 08:19

## 2024-01-07 RX ADMIN — ACETAMINOPHEN 650 MG: 325 TABLET ORAL at 12:09

## 2024-01-07 RX ADMIN — MIDODRINE HYDROCHLORIDE 10 MG: 5 TABLET ORAL at 16:55

## 2024-01-07 RX ADMIN — OXYCODONE HYDROCHLORIDE 5 MG: 5 TABLET ORAL at 13:56

## 2024-01-07 RX ADMIN — HEPARIN SODIUM 5000 UNITS: 5000 INJECTION INTRAVENOUS; SUBCUTANEOUS at 06:28

## 2024-01-07 RX ADMIN — POLYETHYLENE GLYCOL 3350 17 G: 17 POWDER, FOR SOLUTION ORAL at 14:06

## 2024-01-07 RX ADMIN — CLOPIDOGREL BISULFATE 75 MG: 75 TABLET ORAL at 08:18

## 2024-01-07 RX ADMIN — MIDODRINE HYDROCHLORIDE 10 MG: 5 TABLET ORAL at 06:28

## 2024-01-07 RX ADMIN — HEPARIN SODIUM 5000 UNITS: 5000 INJECTION INTRAVENOUS; SUBCUTANEOUS at 13:51

## 2024-01-07 RX ADMIN — ATORVASTATIN CALCIUM 40 MG: 40 TABLET, FILM COATED ORAL at 08:18

## 2024-01-07 RX ADMIN — HEPARIN SODIUM 5000 UNITS: 5000 INJECTION INTRAVENOUS; SUBCUTANEOUS at 20:27

## 2024-01-07 RX ADMIN — SODIUM CHLORIDE, PRESERVATIVE FREE 10 ML: 5 INJECTION INTRAVENOUS at 20:34

## 2024-01-07 ASSESSMENT — PAIN SCALES - GENERAL
PAINLEVEL_OUTOF10: 8
PAINLEVEL_OUTOF10: 8

## 2024-01-07 ASSESSMENT — PAIN DESCRIPTION - LOCATION: LOCATION: OTHER (COMMENT)

## 2024-01-07 NOTE — PLAN OF CARE

## 2024-01-07 NOTE — PLAN OF CARE
Problem: Discharge Planning  Goal: Discharge to home or other facility with appropriate resources  Outcome: Progressing  Flowsheets (Taken 1/6/2024 0835)  Discharge to home or other facility with appropriate resources: Identify barriers to discharge with patient and caregiver     Problem: Pain  Goal: Verbalizes/displays adequate comfort level or baseline comfort level  Outcome: Progressing     Problem: Skin/Tissue Integrity  Goal: Absence of new skin breakdown  Description: 1.  Monitor for areas of redness and/or skin breakdown  2.  Assess vascular access sites hourly  3.  Every 4-6 hours minimum:  Change oxygen saturation probe site  4.  Every 4-6 hours:  If on nasal continuous positive airway pressure, respiratory therapy assess nares and determine need for appliance change or resting period.  Outcome: Progressing     Problem: ABCDS Injury Assessment  Goal: Absence of physical injury  Outcome: Progressing  Flowsheets (Taken 1/6/2024 0830)  Absence of Physical Injury: Implement safety measures based on patient assessment     Problem: Chronic Conditions and Co-morbidities  Goal: Patient's chronic conditions and co-morbidity symptoms are monitored and maintained or improved  Outcome: Progressing  Flowsheets (Taken 1/6/2024 0835)  Care Plan - Patient's Chronic Conditions and Co-Morbidity Symptoms are Monitored and Maintained or Improved: Monitor and assess patient's chronic conditions and comorbid symptoms for stability, deterioration, or improvement     Problem: Safety - Adult  Goal: Free from fall injury  Outcome: Progressing  Flowsheets (Taken 1/6/2024 0830)  Free From Fall Injury: Instruct family/caregiver on patient safety

## 2024-01-08 LAB
ACCESSION NUMBER, LLC1M: ABNORMAL
ACINETOBACTER CALCOAC BAUMANNII COMPLEX BY PCR: NOT DETECTED
ALBUMIN SERPL-MCNC: 2.7 G/DL (ref 3.5–5)
ANION GAP SERPL CALC-SCNC: 11 MMOL/L (ref 5–15)
BACTEROIDES FRAGILIS BY PCR: NOT DETECTED
BIOFIRE TEST COMMENT: ABNORMAL
BUN SERPL-MCNC: 71 MG/DL (ref 6–20)
BUN/CREAT SERPL: 9 (ref 12–20)
CA-I BLD-MCNC: 8.8 MG/DL (ref 8.5–10.1)
CANDIDA ALBICANS BY PCR: NOT DETECTED
CANDIDA AURIS BY PCR: NOT DETECTED
CANDIDA GLABRATA: NOT DETECTED
CANDIDA KRUSEI BY PCR: NOT DETECTED
CANDIDA PARAPSILOSIS BY PCR: NOT DETECTED
CANDIDA TROPICALIS BY PCR: NOT DETECTED
CHLORIDE SERPL-SCNC: 97 MMOL/L (ref 97–108)
CO2 SERPL-SCNC: 21 MMOL/L (ref 21–32)
CREAT SERPL-MCNC: 8.3 MG/DL (ref 0.7–1.3)
CRP SERPL-MCNC: 22 MG/DL (ref 0–0.6)
CRYPTOCOCCUS NEOFORMANS/GATTII BY PCR: NOT DETECTED
ENTEROBACTER CLOACAE COMPLEX BY PCR: NOT DETECTED
ENTEROBACTERALES BY PCR: NOT DETECTED
ENTEROCOCCUS FAECALIS BY PCR: NOT DETECTED
ENTEROCOCCUS FAECIUM BY PCR: NOT DETECTED
ERYTHROCYTE [DISTWIDTH] IN BLOOD BY AUTOMATED COUNT: 14.6 % (ref 11.5–14.5)
ESCHERICHIA COLI: NOT DETECTED
GLUCOSE SERPL-MCNC: 152 MG/DL (ref 65–100)
HAEMOPHILUS INFLUENZAE BY PCR: NOT DETECTED
HCT VFR BLD AUTO: 27.4 % (ref 36.6–50.3)
HGB BLD-MCNC: 9.1 G/DL (ref 12.1–17)
KLEBSIELLA AEROGENES BY PCR: NOT DETECTED
KLEBSIELLA OXYTOCA BY PCR: NOT DETECTED
KLEBSIELLA PNEUMONIAE GROUP BY PCR: NOT DETECTED
LISTERIA MONOCYTOGENES BY PCR: NOT DETECTED
MCH RBC QN AUTO: 31.9 PG (ref 26–34)
MCHC RBC AUTO-ENTMCNC: 33.2 G/DL (ref 30–36.5)
MCV RBC AUTO: 96.1 FL (ref 80–99)
NEISSERIA MENINGITIDIS BY PCR: NOT DETECTED
NRBC # BLD: 0 K/UL (ref 0–0.01)
NRBC BLD-RTO: 0 PER 100 WBC
PHOSPHATE SERPL-MCNC: 4 MG/DL (ref 2.6–4.7)
PLATELET # BLD AUTO: 280 K/UL (ref 150–400)
PMV BLD AUTO: 12.5 FL (ref 8.9–12.9)
POTASSIUM SERPL-SCNC: 4.4 MMOL/L (ref 3.5–5.1)
PROCALCITONIN SERPL-MCNC: 49.55 NG/ML
PROTEUS BY PCR: NOT DETECTED
PSEUDOMONAS AERUGINOSA, PSAEP: NOT DETECTED
RBC # BLD AUTO: 2.85 M/UL (ref 4.1–5.7)
RESISTANT GENE TARGETS: ABNORMAL
SALMONELLA SPECIES BY PCR: NOT DETECTED
SERRATIA MARCESCENS BY PCR: NOT DETECTED
SODIUM SERPL-SCNC: 129 MMOL/L (ref 136–145)
STAPHYLOCOCCUS AUREUS: NOT DETECTED
STAPHYLOCOCCUS EPIDERMIDIS BY PCR: NOT DETECTED
STAPHYLOCOCCUS LUGDUNENSIS BY PCR: NOT DETECTED
STAPHYLOCOCCUS: NOT DETECTED
STENOTROPHOMONAS MALTOPHILIA BY PCR: NOT DETECTED
STREPTOCOCCUS AGALACTIAE (GROUP B): NOT DETECTED
STREPTOCOCCUS PNEUMONIAE , SPNP: DETECTED
STREPTOCOCCUS PYOGENES (GROUP A), SPYOP: NOT DETECTED
STREPTOCOCCUS: DETECTED
TROPONIN I SERPL HS-MCNC: 60 NG/L (ref 0–76)
WBC # BLD AUTO: 19 K/UL (ref 4.1–11.1)

## 2024-01-08 PROCEDURE — 6370000000 HC RX 637 (ALT 250 FOR IP): Performed by: STUDENT IN AN ORGANIZED HEALTH CARE EDUCATION/TRAINING PROGRAM

## 2024-01-08 PROCEDURE — 6370000000 HC RX 637 (ALT 250 FOR IP): Performed by: INTERNAL MEDICINE

## 2024-01-08 PROCEDURE — 6360000002 HC RX W HCPCS: Performed by: INTERNAL MEDICINE

## 2024-01-08 PROCEDURE — 1100000000 HC RM PRIVATE

## 2024-01-08 PROCEDURE — 2580000003 HC RX 258: Performed by: INTERNAL MEDICINE

## 2024-01-08 PROCEDURE — 90935 HEMODIALYSIS ONE EVALUATION: CPT

## 2024-01-08 PROCEDURE — 2580000003 HC RX 258: Performed by: STUDENT IN AN ORGANIZED HEALTH CARE EDUCATION/TRAINING PROGRAM

## 2024-01-08 PROCEDURE — 36415 COLL VENOUS BLD VENIPUNCTURE: CPT

## 2024-01-08 PROCEDURE — 84145 PROCALCITONIN (PCT): CPT

## 2024-01-08 PROCEDURE — 2709999900 HC NON-CHARGEABLE SUPPLY

## 2024-01-08 PROCEDURE — 84484 ASSAY OF TROPONIN QUANT: CPT

## 2024-01-08 PROCEDURE — 85027 COMPLETE CBC AUTOMATED: CPT

## 2024-01-08 PROCEDURE — 86140 C-REACTIVE PROTEIN: CPT

## 2024-01-08 PROCEDURE — 99232 SBSQ HOSP IP/OBS MODERATE 35: CPT | Performed by: INTERNAL MEDICINE

## 2024-01-08 PROCEDURE — 80069 RENAL FUNCTION PANEL: CPT

## 2024-01-08 RX ORDER — SODIUM CHLORIDE 9 MG/ML
INJECTION, SOLUTION INTRAVENOUS CONTINUOUS
Status: DISPENSED | OUTPATIENT
Start: 2024-01-08 | End: 2024-01-09

## 2024-01-08 RX ORDER — SODIUM CHLORIDE 9 MG/ML
INJECTION, SOLUTION INTRAVENOUS CONTINUOUS
Status: DISCONTINUED | OUTPATIENT
Start: 2024-01-08 | End: 2024-01-08

## 2024-01-08 RX ADMIN — HEPARIN SODIUM 2500 UNITS: 1000 INJECTION INTRAVENOUS; SUBCUTANEOUS at 17:59

## 2024-01-08 RX ADMIN — WHITE PETROLATUM,ZINC OXIDE: 57; 17 PASTE TOPICAL at 15:32

## 2024-01-08 RX ADMIN — MIDODRINE HYDROCHLORIDE 10 MG: 5 TABLET ORAL at 09:58

## 2024-01-08 RX ADMIN — SODIUM CHLORIDE, PRESERVATIVE FREE 10 ML: 5 INJECTION INTRAVENOUS at 09:58

## 2024-01-08 RX ADMIN — WHITE PETROLATUM,ZINC OXIDE: 57; 17 PASTE TOPICAL at 21:10

## 2024-01-08 RX ADMIN — HEPARIN SODIUM 5000 UNITS: 5000 INJECTION INTRAVENOUS; SUBCUTANEOUS at 06:28

## 2024-01-08 RX ADMIN — CLOPIDOGREL BISULFATE 75 MG: 75 TABLET ORAL at 09:58

## 2024-01-08 RX ADMIN — OXYCODONE HYDROCHLORIDE 5 MG: 5 TABLET ORAL at 06:29

## 2024-01-08 RX ADMIN — SODIUM CHLORIDE, PRESERVATIVE FREE 10 ML: 5 INJECTION INTRAVENOUS at 21:16

## 2024-01-08 RX ADMIN — Medication 5 DROP: at 21:09

## 2024-01-08 RX ADMIN — Medication 5 DROP: at 10:04

## 2024-01-08 RX ADMIN — EPOETIN ALFA-EPBX 3000 UNITS: 3000 INJECTION, SOLUTION INTRAVENOUS; SUBCUTANEOUS at 15:45

## 2024-01-08 RX ADMIN — MIDODRINE HYDROCHLORIDE 10 MG: 5 TABLET ORAL at 19:10

## 2024-01-08 RX ADMIN — POLYETHYLENE GLYCOL 3350 17 G: 17 POWDER, FOR SOLUTION ORAL at 19:10

## 2024-01-08 RX ADMIN — OXYCODONE HYDROCHLORIDE 5 MG: 5 TABLET ORAL at 11:24

## 2024-01-08 RX ADMIN — ATORVASTATIN CALCIUM 40 MG: 40 TABLET, FILM COATED ORAL at 09:58

## 2024-01-08 RX ADMIN — HEPARIN SODIUM 5000 UNITS: 5000 INJECTION INTRAVENOUS; SUBCUTANEOUS at 21:09

## 2024-01-08 RX ADMIN — LEVOFLOXACIN 750 MG: 5 INJECTION, SOLUTION INTRAVENOUS at 19:10

## 2024-01-08 RX ADMIN — SODIUM CHLORIDE: 9 INJECTION, SOLUTION INTRAVENOUS at 14:24

## 2024-01-08 ASSESSMENT — PAIN DESCRIPTION - DESCRIPTORS: DESCRIPTORS: DISCOMFORT

## 2024-01-08 ASSESSMENT — PAIN SCALES - GENERAL
PAINLEVEL_OUTOF10: 8
PAINLEVEL_OUTOF10: 8

## 2024-01-08 ASSESSMENT — PAIN DESCRIPTION - LOCATION
LOCATION: ARM
LOCATION: SHOULDER

## 2024-01-08 ASSESSMENT — PAIN DESCRIPTION - ORIENTATION: ORIENTATION: RIGHT

## 2024-01-08 ASSESSMENT — PAIN - FUNCTIONAL ASSESSMENT: PAIN_FUNCTIONAL_ASSESSMENT: ACTIVITIES ARE NOT PREVENTED

## 2024-01-08 NOTE — PLAN OF CARE
Problem: Discharge Planning  Goal: Discharge to home or other facility with appropriate resources  Recent Flowsheet Documentation  Taken 1/7/2024 1557 by Chinyere Foster RN  Discharge to home or other facility with appropriate resources: Identify barriers to discharge with patient and caregiver  1/7/2024 1556 by Chinyere Foster RN  Outcome: Progressing     Problem: Pain  Goal: Verbalizes/displays adequate comfort level or baseline comfort level  1/7/2024 1556 by Chinyere Foster RN  Outcome: Progressing     Problem: Skin/Tissue Integrity  Goal: Absence of new skin breakdown  Description: 1.  Monitor for areas of redness and/or skin breakdown  2.  Assess vascular access sites hourly  3.  Every 4-6 hours minimum:  Change oxygen saturation probe site  4.  Every 4-6 hours:  If on nasal continuous positive airway pressure, respiratory therapy assess nares and determine need for appliance change or resting period.  1/7/2024 2011 by Indu Fisher RN  Outcome: Progressing  1/7/2024 1556 by Chinyere Foster RN  Outcome: Progressing     Problem: ABCDS Injury Assessment  Goal: Absence of physical injury  Recent Flowsheet Documentation  Taken 1/7/2024 1606 by Chinyere Foster RN  Absence of Physical Injury: Implement safety measures based on patient assessment  1/7/2024 1556 by Chinyere Foster RN  Outcome: Progressing     Problem: Chronic Conditions and Co-morbidities  Goal: Patient's chronic conditions and co-morbidity symptoms are monitored and maintained or improved  Recent Flowsheet Documentation  Taken 1/7/2024 1557 by Chinyere Foster RN  Care Plan - Patient's Chronic Conditions and Co-Morbidity Symptoms are Monitored and Maintained or Improved: Monitor and assess patient's chronic conditions and comorbid symptoms for stability, deterioration, or improvement  1/7/2024 1556 by Chinyere Foster RN  Outcome: Progressing     Problem: Safety - Adult  Goal: Free from fall injury  Recent Flowsheet Documentation  Taken 1/7/2024 1606

## 2024-01-08 NOTE — DIALYSIS
Patient completed 3.5 hour treatment with 1L fluid removed.    Epo 3,000 units via IV given intra dialysis.    CASTRO Ryan received dialysis report.

## 2024-01-09 LAB
ALBUMIN SERPL-MCNC: 2.7 G/DL (ref 3.5–5)
ANION GAP SERPL CALC-SCNC: 14 MMOL/L (ref 5–15)
BASOPHILS # BLD: 0.2 K/UL (ref 0–0.1)
BASOPHILS NFR BLD: 1 % (ref 0–1)
BUN SERPL-MCNC: 44 MG/DL (ref 6–20)
BUN/CREAT SERPL: 7 (ref 12–20)
CA-I BLD-MCNC: 9.2 MG/DL (ref 8.5–10.1)
CHLORIDE SERPL-SCNC: 96 MMOL/L (ref 97–108)
CO2 SERPL-SCNC: 21 MMOL/L (ref 21–32)
CREAT SERPL-MCNC: 5.96 MG/DL (ref 0.7–1.3)
CRP SERPL-MCNC: 23.7 MG/DL (ref 0–0.6)
DIFFERENTIAL METHOD BLD: ABNORMAL
EOSINOPHIL # BLD: 0.2 K/UL (ref 0–0.4)
EOSINOPHIL NFR BLD: 1 % (ref 0–7)
ERYTHROCYTE [DISTWIDTH] IN BLOOD BY AUTOMATED COUNT: 14.2 % (ref 11.5–14.5)
GLUCOSE SERPL-MCNC: 160 MG/DL (ref 65–100)
HCT VFR BLD AUTO: 27.1 % (ref 36.6–50.3)
HGB BLD-MCNC: 9.4 G/DL (ref 12.1–17)
IMM GRANULOCYTES # BLD AUTO: 0 K/UL
IMM GRANULOCYTES NFR BLD AUTO: 0 %
LYMPHOCYTES # BLD: 2.5 K/UL (ref 0.8–3.5)
LYMPHOCYTES NFR BLD: 15 % (ref 12–49)
MCH RBC QN AUTO: 32.9 PG (ref 26–34)
MCHC RBC AUTO-ENTMCNC: 34.7 G/DL (ref 30–36.5)
MCV RBC AUTO: 94.8 FL (ref 80–99)
METAMYELOCYTES NFR BLD MANUAL: 1 %
MONOCYTES # BLD: 0.7 K/UL (ref 0–1)
MONOCYTES NFR BLD: 4 % (ref 5–13)
NEUTS BAND NFR BLD MANUAL: 2 % (ref 0–6)
NEUTS SEG # BLD: 13.2 K/UL (ref 1.8–8)
NEUTS SEG NFR BLD: 76 % (ref 32–75)
NRBC # BLD: 0 K/UL (ref 0–0.01)
NRBC BLD-RTO: 0 PER 100 WBC
PHOSPHATE SERPL-MCNC: 3 MG/DL (ref 2.6–4.7)
PLATELET # BLD AUTO: 316 K/UL (ref 150–400)
PMV BLD AUTO: 11.9 FL (ref 8.9–12.9)
POTASSIUM SERPL-SCNC: 3.8 MMOL/L (ref 3.5–5.1)
PROCALCITONIN SERPL-MCNC: 37.12 NG/ML
RBC # BLD AUTO: 2.86 M/UL (ref 4.1–5.7)
RBC MORPH BLD: ABNORMAL
RBC MORPH BLD: ABNORMAL
SODIUM SERPL-SCNC: 131 MMOL/L (ref 136–145)
WBC # BLD AUTO: 16.9 K/UL (ref 4.1–11.1)

## 2024-01-09 PROCEDURE — 6360000002 HC RX W HCPCS: Performed by: INTERNAL MEDICINE

## 2024-01-09 PROCEDURE — 85025 COMPLETE CBC W/AUTO DIFF WBC: CPT

## 2024-01-09 PROCEDURE — 2580000003 HC RX 258: Performed by: STUDENT IN AN ORGANIZED HEALTH CARE EDUCATION/TRAINING PROGRAM

## 2024-01-09 PROCEDURE — 86140 C-REACTIVE PROTEIN: CPT

## 2024-01-09 PROCEDURE — 84145 PROCALCITONIN (PCT): CPT

## 2024-01-09 PROCEDURE — 2580000003 HC RX 258: Performed by: INTERNAL MEDICINE

## 2024-01-09 PROCEDURE — 6370000000 HC RX 637 (ALT 250 FOR IP): Performed by: INTERNAL MEDICINE

## 2024-01-09 PROCEDURE — 6370000000 HC RX 637 (ALT 250 FOR IP): Performed by: STUDENT IN AN ORGANIZED HEALTH CARE EDUCATION/TRAINING PROGRAM

## 2024-01-09 PROCEDURE — 80069 RENAL FUNCTION PANEL: CPT

## 2024-01-09 PROCEDURE — 36415 COLL VENOUS BLD VENIPUNCTURE: CPT

## 2024-01-09 PROCEDURE — 1100000000 HC RM PRIVATE

## 2024-01-09 PROCEDURE — 99232 SBSQ HOSP IP/OBS MODERATE 35: CPT | Performed by: INTERNAL MEDICINE

## 2024-01-09 RX ORDER — SODIUM CHLORIDE 9 MG/ML
INJECTION, SOLUTION INTRAVENOUS CONTINUOUS
Status: DISPENSED | OUTPATIENT
Start: 2024-01-09 | End: 2024-01-09

## 2024-01-09 RX ADMIN — CLOPIDOGREL BISULFATE 75 MG: 75 TABLET ORAL at 08:26

## 2024-01-09 RX ADMIN — SODIUM CHLORIDE, PRESERVATIVE FREE 10 ML: 5 INJECTION INTRAVENOUS at 21:08

## 2024-01-09 RX ADMIN — Medication 5 DROP: at 08:26

## 2024-01-09 RX ADMIN — WHITE PETROLATUM,ZINC OXIDE: 57; 17 PASTE TOPICAL at 08:25

## 2024-01-09 RX ADMIN — ATORVASTATIN CALCIUM 40 MG: 40 TABLET, FILM COATED ORAL at 08:26

## 2024-01-09 RX ADMIN — HEPARIN SODIUM 5000 UNITS: 5000 INJECTION INTRAVENOUS; SUBCUTANEOUS at 21:08

## 2024-01-09 RX ADMIN — MIDODRINE HYDROCHLORIDE 10 MG: 5 TABLET ORAL at 16:42

## 2024-01-09 RX ADMIN — MIDODRINE HYDROCHLORIDE 10 MG: 5 TABLET ORAL at 11:49

## 2024-01-09 RX ADMIN — MIDODRINE HYDROCHLORIDE 10 MG: 5 TABLET ORAL at 08:26

## 2024-01-09 RX ADMIN — WHITE PETROLATUM,ZINC OXIDE: 57; 17 PASTE TOPICAL at 21:07

## 2024-01-09 RX ADMIN — SODIUM CHLORIDE, PRESERVATIVE FREE 10 ML: 5 INJECTION INTRAVENOUS at 08:32

## 2024-01-09 RX ADMIN — Medication 5 DROP: at 21:07

## 2024-01-09 RX ADMIN — HEPARIN SODIUM 5000 UNITS: 5000 INJECTION INTRAVENOUS; SUBCUTANEOUS at 05:31

## 2024-01-09 RX ADMIN — OXYCODONE HYDROCHLORIDE 5 MG: 5 TABLET ORAL at 20:29

## 2024-01-09 RX ADMIN — SODIUM CHLORIDE: 9 INJECTION, SOLUTION INTRAVENOUS at 12:26

## 2024-01-09 RX ADMIN — HEPARIN SODIUM 5000 UNITS: 5000 INJECTION INTRAVENOUS; SUBCUTANEOUS at 16:43

## 2024-01-09 ASSESSMENT — PAIN DESCRIPTION - LOCATION: LOCATION: SHOULDER

## 2024-01-09 ASSESSMENT — PAIN SCALES - GENERAL
PAINLEVEL_OUTOF10: 7
PAINLEVEL_OUTOF10: 2

## 2024-01-09 ASSESSMENT — PAIN DESCRIPTION - ORIENTATION: ORIENTATION: RIGHT

## 2024-01-09 ASSESSMENT — PAIN - FUNCTIONAL ASSESSMENT: PAIN_FUNCTIONAL_ASSESSMENT: PREVENTS OR INTERFERES SOME ACTIVE ACTIVITIES AND ADLS

## 2024-01-09 NOTE — PLAN OF CARE
Problem: Discharge Planning  Goal: Discharge to home or other facility with appropriate resources  1/8/2024 2203 by Denia Busch RN  Outcome: Progressing  Flowsheets (Taken 1/8/2024 2004)  Discharge to home or other facility with appropriate resources: Identify barriers to discharge with patient and caregiver  1/8/2024 1102 by Shelby Graves RN  Outcome: Progressing  Flowsheets (Taken 1/8/2024 0845)  Discharge to home or other facility with appropriate resources: Identify barriers to discharge with patient and caregiver     Problem: Pain  Goal: Verbalizes/displays adequate comfort level or baseline comfort level  1/8/2024 2203 by Denia Busch RN  Outcome: Progressing  1/8/2024 1102 by Shelby Graves RN  Outcome: Progressing     Problem: Skin/Tissue Integrity  Goal: Absence of new skin breakdown  Description: 1.  Monitor for areas of redness and/or skin breakdown  2.  Assess vascular access sites hourly  3.  Every 4-6 hours minimum:  Change oxygen saturation probe site  4.  Every 4-6 hours:  If on nasal continuous positive airway pressure, respiratory therapy assess nares and determine need for appliance change or resting period.  1/8/2024 2203 by Denia Busch RN  Outcome: Progressing  1/8/2024 1102 by Shelby Graves RN  Outcome: Progressing     Problem: ABCDS Injury Assessment  Goal: Absence of physical injury  1/8/2024 2203 by Denia Busch RN  Outcome: Progressing  1/8/2024 1102 by Shelby Graves RN  Outcome: Progressing     Problem: Chronic Conditions and Co-morbidities  Goal: Patient's chronic conditions and co-morbidity symptoms are monitored and maintained or improved  1/8/2024 2203 by Denia Busch RN  Outcome: Progressing  Flowsheets (Taken 1/8/2024 2004)  Care Plan - Patient's Chronic Conditions and Co-Morbidity Symptoms are Monitored and Maintained or Improved: Monitor and assess patient's chronic conditions and comorbid symptoms for stability, deterioration, or improvement  1/8/2024 1102 by

## 2024-01-10 LAB
ALBUMIN SERPL-MCNC: 2.5 G/DL (ref 3.5–5)
ALBUMIN/GLOB SERPL: 0.4 (ref 1.1–2.2)
ALP SERPL-CCNC: 106 U/L (ref 45–117)
ALT SERPL-CCNC: 21 U/L (ref 12–78)
ANION GAP SERPL CALC-SCNC: 11 MMOL/L (ref 5–15)
AST SERPL W P-5'-P-CCNC: 25 U/L (ref 15–37)
BASOPHILS # BLD: 0 K/UL (ref 0–0.1)
BASOPHILS NFR BLD: 0 % (ref 0–1)
BILIRUB SERPL-MCNC: 0.7 MG/DL (ref 0.2–1)
BUN SERPL-MCNC: 58 MG/DL (ref 6–20)
BUN/CREAT SERPL: 8 (ref 12–20)
CA-I BLD-MCNC: 8.9 MG/DL (ref 8.5–10.1)
CHLORIDE SERPL-SCNC: 97 MMOL/L (ref 97–108)
CK SERPL-CCNC: 72 U/L (ref 39–308)
CO2 SERPL-SCNC: 22 MMOL/L (ref 21–32)
CREAT SERPL-MCNC: 7.39 MG/DL (ref 0.7–1.3)
CRP SERPL-MCNC: 20.9 MG/DL (ref 0–0.6)
DIFFERENTIAL METHOD BLD: ABNORMAL
EOSINOPHIL # BLD: 0.3 K/UL (ref 0–0.4)
EOSINOPHIL NFR BLD: 2 % (ref 0–7)
ERYTHROCYTE [DISTWIDTH] IN BLOOD BY AUTOMATED COUNT: 14.5 % (ref 11.5–14.5)
GLOBULIN SER CALC-MCNC: 6.4 G/DL (ref 2–4)
GLUCOSE SERPL-MCNC: 143 MG/DL (ref 65–100)
HCT VFR BLD AUTO: 25.9 % (ref 36.6–50.3)
HGB BLD-MCNC: 8.7 G/DL (ref 12.1–17)
IMM GRANULOCYTES # BLD AUTO: 0 K/UL (ref 0–0.04)
IMM GRANULOCYTES NFR BLD AUTO: 0 % (ref 0–0.5)
LYMPHOCYTES # BLD: 2.7 K/UL (ref 0.8–3.5)
LYMPHOCYTES NFR BLD: 16 % (ref 12–49)
MCH RBC QN AUTO: 32.3 PG (ref 26–34)
MCHC RBC AUTO-ENTMCNC: 33.6 G/DL (ref 30–36.5)
MCV RBC AUTO: 96.3 FL (ref 80–99)
MONOCYTES # BLD: 2.3 K/UL (ref 0–1)
MONOCYTES NFR BLD: 14 % (ref 5–13)
NEUTS SEG # BLD: 11.3 K/UL (ref 1.8–8)
NEUTS SEG NFR BLD: 68 % (ref 32–75)
NRBC # BLD: 0 K/UL (ref 0–0.01)
NRBC BLD-RTO: 0 PER 100 WBC
PLATELET # BLD AUTO: 328 K/UL (ref 150–400)
PMV BLD AUTO: 11.8 FL (ref 8.9–12.9)
POTASSIUM SERPL-SCNC: 3.8 MMOL/L (ref 3.5–5.1)
PROCALCITONIN SERPL-MCNC: 28.14 NG/ML
PROT SERPL-MCNC: 8.9 G/DL (ref 6.4–8.2)
RBC # BLD AUTO: 2.69 M/UL (ref 4.1–5.7)
RBC MORPH BLD: ABNORMAL
SODIUM SERPL-SCNC: 130 MMOL/L (ref 136–145)
VANCOMYCIN SERPL-MCNC: 15.2 UG/ML
WBC # BLD AUTO: 16.6 K/UL (ref 4.1–11.1)

## 2024-01-10 PROCEDURE — 87205 SMEAR GRAM STAIN: CPT

## 2024-01-10 PROCEDURE — 6360000002 HC RX W HCPCS: Performed by: INTERNAL MEDICINE

## 2024-01-10 PROCEDURE — 1100000000 HC RM PRIVATE

## 2024-01-10 PROCEDURE — 90935 HEMODIALYSIS ONE EVALUATION: CPT

## 2024-01-10 PROCEDURE — 36415 COLL VENOUS BLD VENIPUNCTURE: CPT

## 2024-01-10 PROCEDURE — 80202 ASSAY OF VANCOMYCIN: CPT

## 2024-01-10 PROCEDURE — 87070 CULTURE OTHR SPECIMN AEROBIC: CPT

## 2024-01-10 PROCEDURE — 86140 C-REACTIVE PROTEIN: CPT

## 2024-01-10 PROCEDURE — 84145 PROCALCITONIN (PCT): CPT

## 2024-01-10 PROCEDURE — 82550 ASSAY OF CK (CPK): CPT

## 2024-01-10 PROCEDURE — 80053 COMPREHEN METABOLIC PANEL: CPT

## 2024-01-10 PROCEDURE — 2580000003 HC RX 258: Performed by: INTERNAL MEDICINE

## 2024-01-10 PROCEDURE — 99232 SBSQ HOSP IP/OBS MODERATE 35: CPT | Performed by: INTERNAL MEDICINE

## 2024-01-10 PROCEDURE — 85025 COMPLETE CBC W/AUTO DIFF WBC: CPT

## 2024-01-10 PROCEDURE — 2709999900 HC NON-CHARGEABLE SUPPLY

## 2024-01-10 PROCEDURE — 6370000000 HC RX 637 (ALT 250 FOR IP): Performed by: STUDENT IN AN ORGANIZED HEALTH CARE EDUCATION/TRAINING PROGRAM

## 2024-01-10 PROCEDURE — 6370000000 HC RX 637 (ALT 250 FOR IP): Performed by: INTERNAL MEDICINE

## 2024-01-10 RX ADMIN — HEPARIN SODIUM 2500 UNITS: 1000 INJECTION INTRAVENOUS; SUBCUTANEOUS at 14:17

## 2024-01-10 RX ADMIN — OXYCODONE HYDROCHLORIDE 5 MG: 5 TABLET ORAL at 00:23

## 2024-01-10 RX ADMIN — OXYCODONE HYDROCHLORIDE 5 MG: 5 TABLET ORAL at 20:39

## 2024-01-10 RX ADMIN — Medication 5 DROP: at 09:17

## 2024-01-10 RX ADMIN — DAPTOMYCIN 435 MG: 500 INJECTION, POWDER, LYOPHILIZED, FOR SOLUTION INTRAVENOUS at 20:40

## 2024-01-10 RX ADMIN — WHITE PETROLATUM,ZINC OXIDE: 57; 17 PASTE TOPICAL at 20:40

## 2024-01-10 RX ADMIN — EPOETIN ALFA-EPBX 3000 UNITS: 3000 INJECTION, SOLUTION INTRAVENOUS; SUBCUTANEOUS at 13:31

## 2024-01-10 RX ADMIN — CLOPIDOGREL BISULFATE 75 MG: 75 TABLET ORAL at 09:17

## 2024-01-10 RX ADMIN — MIDODRINE HYDROCHLORIDE 10 MG: 5 TABLET ORAL at 18:05

## 2024-01-10 RX ADMIN — ONDANSETRON 4 MG: 4 TABLET, ORALLY DISINTEGRATING ORAL at 00:22

## 2024-01-10 RX ADMIN — ATORVASTATIN CALCIUM 40 MG: 40 TABLET, FILM COATED ORAL at 09:17

## 2024-01-10 RX ADMIN — WHITE PETROLATUM,ZINC OXIDE: 57; 17 PASTE TOPICAL at 09:16

## 2024-01-10 RX ADMIN — Medication 5 DROP: at 20:40

## 2024-01-10 RX ADMIN — MIDODRINE HYDROCHLORIDE 10 MG: 5 TABLET ORAL at 09:17

## 2024-01-10 RX ADMIN — SODIUM CHLORIDE, PRESERVATIVE FREE 10 ML: 5 INJECTION INTRAVENOUS at 09:17

## 2024-01-10 RX ADMIN — SODIUM CHLORIDE, PRESERVATIVE FREE 10 ML: 5 INJECTION INTRAVENOUS at 20:39

## 2024-01-10 ASSESSMENT — PAIN - FUNCTIONAL ASSESSMENT
PAIN_FUNCTIONAL_ASSESSMENT: ACTIVITIES ARE NOT PREVENTED
PAIN_FUNCTIONAL_ASSESSMENT: ACTIVITIES ARE NOT PREVENTED

## 2024-01-10 ASSESSMENT — PAIN SCALES - GENERAL
PAINLEVEL_OUTOF10: 7
PAINLEVEL_OUTOF10: 1
PAINLEVEL_OUTOF10: 1
PAINLEVEL_OUTOF10: 7

## 2024-01-10 ASSESSMENT — PAIN DESCRIPTION - DESCRIPTORS
DESCRIPTORS: ACHING;SORE;CRAMPING
DESCRIPTORS: ACHING;TIGHTNESS;SORE

## 2024-01-10 ASSESSMENT — PAIN DESCRIPTION - ORIENTATION
ORIENTATION: RIGHT;LEFT;LOWER
ORIENTATION: LEFT;UPPER

## 2024-01-10 ASSESSMENT — PAIN DESCRIPTION - LOCATION
LOCATION: ABDOMEN
LOCATION: ARM

## 2024-01-10 NOTE — DIALYSIS
Patient completed 3.5 hours with 0.5 L (500 mL) fluid removed.    Epo 3,000 units given on the run of dialysis    Robert of telemetry made aware, patient is en route to Room 586 with Box #[id number]    Primary Nurse at [facility] received dialysis report.

## 2024-01-11 VITALS
SYSTOLIC BLOOD PRESSURE: 90 MMHG | HEIGHT: 70 IN | OXYGEN SATURATION: 91 % | WEIGHT: 160 LBS | DIASTOLIC BLOOD PRESSURE: 50 MMHG | HEART RATE: 77 BPM | RESPIRATION RATE: 18 BRPM | TEMPERATURE: 99.9 F | BODY MASS INDEX: 22.9 KG/M2

## 2024-01-11 PROBLEM — R78.81 BACTEREMIA: Status: ACTIVE | Noted: 2021-09-16

## 2024-01-11 PROBLEM — E87.20 METABOLIC ACIDOSIS: Status: ACTIVE | Noted: 2024-01-11

## 2024-01-11 LAB
ALBUMIN SERPL-MCNC: 2.5 G/DL (ref 3.5–5)
ALBUMIN/GLOB SERPL: 0.4 (ref 1.1–2.2)
ALP SERPL-CCNC: 112 U/L (ref 45–117)
ALT SERPL-CCNC: 21 U/L (ref 12–78)
ANION GAP SERPL CALC-SCNC: 8 MMOL/L (ref 5–15)
AST SERPL W P-5'-P-CCNC: 28 U/L (ref 15–37)
BASOPHILS # BLD: 0 K/UL (ref 0–0.1)
BASOPHILS NFR BLD: 0 % (ref 0–1)
BILIRUB SERPL-MCNC: 0.5 MG/DL (ref 0.2–1)
BUN SERPL-MCNC: 40 MG/DL (ref 6–20)
BUN/CREAT SERPL: 7 (ref 12–20)
CA-I BLD-MCNC: 8.7 MG/DL (ref 8.5–10.1)
CHLORIDE SERPL-SCNC: 100 MMOL/L (ref 97–108)
CO2 SERPL-SCNC: 24 MMOL/L (ref 21–32)
CREAT SERPL-MCNC: 6.11 MG/DL (ref 0.7–1.3)
CRP SERPL-MCNC: 18.2 MG/DL (ref 0–0.6)
DIFFERENTIAL METHOD BLD: ABNORMAL
EOSINOPHIL # BLD: 0 K/UL (ref 0–0.4)
EOSINOPHIL NFR BLD: 0 % (ref 0–7)
ERYTHROCYTE [DISTWIDTH] IN BLOOD BY AUTOMATED COUNT: 14.5 % (ref 11.5–14.5)
GLOBULIN SER CALC-MCNC: 6.7 G/DL (ref 2–4)
GLUCOSE SERPL-MCNC: 211 MG/DL (ref 65–100)
HCT VFR BLD AUTO: 26.3 % (ref 36.6–50.3)
HGB BLD-MCNC: 8.7 G/DL (ref 12.1–17)
IMM GRANULOCYTES # BLD AUTO: 0 K/UL
IMM GRANULOCYTES NFR BLD AUTO: 0 %
LYMPHOCYTES # BLD: 1.7 K/UL (ref 0.8–3.5)
LYMPHOCYTES NFR BLD: 12 % (ref 12–49)
MCH RBC QN AUTO: 32.2 PG (ref 26–34)
MCHC RBC AUTO-ENTMCNC: 33.1 G/DL (ref 30–36.5)
MCV RBC AUTO: 97.4 FL (ref 80–99)
METAMYELOCYTES NFR BLD MANUAL: 4 %
MONOCYTES # BLD: 0.4 K/UL (ref 0–1)
MONOCYTES NFR BLD: 3 % (ref 5–13)
MYELOCYTES NFR BLD MANUAL: 1 %
NEUTS BAND NFR BLD MANUAL: 3 % (ref 0–6)
NEUTS SEG # BLD: 11.3 K/UL (ref 1.8–8)
NEUTS SEG NFR BLD: 77 % (ref 32–75)
NRBC # BLD: 0 K/UL (ref 0–0.01)
NRBC BLD-RTO: 0 PER 100 WBC
PLATELET # BLD AUTO: 330 K/UL (ref 150–400)
PMV BLD AUTO: 11.7 FL (ref 8.9–12.9)
POTASSIUM SERPL-SCNC: 3.7 MMOL/L (ref 3.5–5.1)
PROCALCITONIN SERPL-MCNC: 19.37 NG/ML
PROT SERPL-MCNC: 9.2 G/DL (ref 6.4–8.2)
RBC # BLD AUTO: 2.7 M/UL (ref 4.1–5.7)
RBC MORPH BLD: ABNORMAL
SODIUM SERPL-SCNC: 132 MMOL/L (ref 136–145)
WBC # BLD AUTO: 14.1 K/UL (ref 4.1–11.1)

## 2024-01-11 PROCEDURE — 80053 COMPREHEN METABOLIC PANEL: CPT

## 2024-01-11 PROCEDURE — 2500000003 HC RX 250 WO HCPCS: Performed by: INTERNAL MEDICINE

## 2024-01-11 PROCEDURE — 85025 COMPLETE CBC W/AUTO DIFF WBC: CPT

## 2024-01-11 PROCEDURE — 6370000000 HC RX 637 (ALT 250 FOR IP): Performed by: STUDENT IN AN ORGANIZED HEALTH CARE EDUCATION/TRAINING PROGRAM

## 2024-01-11 PROCEDURE — 97530 THERAPEUTIC ACTIVITIES: CPT

## 2024-01-11 PROCEDURE — 6370000000 HC RX 637 (ALT 250 FOR IP): Performed by: INTERNAL MEDICINE

## 2024-01-11 PROCEDURE — 36415 COLL VENOUS BLD VENIPUNCTURE: CPT

## 2024-01-11 PROCEDURE — 84145 PROCALCITONIN (PCT): CPT

## 2024-01-11 PROCEDURE — 97161 PT EVAL LOW COMPLEX 20 MIN: CPT

## 2024-01-11 PROCEDURE — 97165 OT EVAL LOW COMPLEX 30 MIN: CPT

## 2024-01-11 PROCEDURE — 2580000003 HC RX 258: Performed by: INTERNAL MEDICINE

## 2024-01-11 PROCEDURE — 86140 C-REACTIVE PROTEIN: CPT

## 2024-01-11 RX ORDER — LEVOFLOXACIN 500 MG/1
500 TABLET, FILM COATED ORAL
Qty: 5 TABLET | Refills: 0 | Status: SHIPPED | OUTPATIENT
Start: 2024-01-11 | End: 2024-01-21

## 2024-01-11 RX ORDER — MIDODRINE HYDROCHLORIDE 5 MG/1
10 TABLET ORAL
Qty: 90 TABLET | Refills: 3 | Status: SHIPPED | OUTPATIENT
Start: 2024-01-11

## 2024-01-11 RX ADMIN — MIDODRINE HYDROCHLORIDE 10 MG: 5 TABLET ORAL at 14:00

## 2024-01-11 RX ADMIN — WHITE PETROLATUM,ZINC OXIDE: 57; 17 PASTE TOPICAL at 10:13

## 2024-01-11 RX ADMIN — OXYCODONE HYDROCHLORIDE 5 MG: 5 TABLET ORAL at 00:53

## 2024-01-11 RX ADMIN — CLOPIDOGREL BISULFATE 75 MG: 75 TABLET ORAL at 10:02

## 2024-01-11 RX ADMIN — SODIUM CHLORIDE, PRESERVATIVE FREE 10 ML: 5 INJECTION INTRAVENOUS at 10:10

## 2024-01-11 RX ADMIN — MIDODRINE HYDROCHLORIDE 10 MG: 5 TABLET ORAL at 10:02

## 2024-01-11 RX ADMIN — Medication 5 DROP: at 10:04

## 2024-01-11 ASSESSMENT — PAIN - FUNCTIONAL ASSESSMENT: PAIN_FUNCTIONAL_ASSESSMENT: ACTIVITIES ARE NOT PREVENTED

## 2024-01-11 ASSESSMENT — PAIN DESCRIPTION - ORIENTATION: ORIENTATION: ANTERIOR

## 2024-01-11 ASSESSMENT — PAIN DESCRIPTION - DESCRIPTORS: DESCRIPTORS: SORE;BURNING;TENDER

## 2024-01-11 ASSESSMENT — PAIN DESCRIPTION - LOCATION: LOCATION: COCCYX

## 2024-01-11 ASSESSMENT — PAIN SCALES - GENERAL
PAINLEVEL_OUTOF10: 7
PAINLEVEL_OUTOF10: 0

## 2024-01-11 NOTE — PLAN OF CARE
support  Wheelchair Mobility:        Ambulation/Gait Training:                                Gait  Assistive Device: Walker, rolling;Gait belt                   Therapeutic Exercises:       Functional Measure:  [facility] AM-PAC™ “6 Clicks”         Basic Mobility Inpatient Short Form  How much difficulty does the patient currently have... Unable A Lot A Little None   1.  Turning over in bed (including adjusting bedclothes, sheets and blankets)?   [] 1   [] 2   [x] 3   [] 4   2.  Sitting down on and standing up from a chair with arms ( e.g., wheelchair, bedside commode, etc.)   [] 1   [] 2   [x] 3   [] 4   3.  Moving from lying on back to sitting on the side of the bed?   [] 1   [] 2   [x] 3   [] 4          How much help from another person does the patient currently need... Total A Lot A Little None   4.  Moving to and from a bed to a chair (including a wheelchair)?   [] 1   [] 2   [x] 3   [] 4   5.  Need to walk in hospital room?   [x] 1   [] 2   [] 3   [] 4   6.  Climbing 3-5 steps with a railing?   [x] 1   [] 2   [] 3   [] 4   © 2007, Trustees of [facility], under license to [employer]. All rights reserved     Score:  Initial: 14/24 Most Recent: X (Date: 1/11/2024 )   Interpretation of Tool:  Represents activities that are increasingly more difficult (i.e. Bed mobility, Transfers, Gait).  Score 24 23 22-20 19-15 14-10 9-7 6   Modifier CH CI CJ CK CL CM CN         Physical Therapy Evaluation Charge Determination   History Examination Presentation Decision-Making   LOW Complexity : Zero comorbidities / personal factors that will impact the outcome / POC LOW Complexity : 1-2 Standardized tests and measures addressing body structure, function, activity limitation and / or participation in recreation  LOW Complexity : Stable, uncomplicated  Other outcome measures [facility] 6  LOW      Based on the above components, the patient evaluation is determined to be of the following complexity level: LOW    Pain

## 2024-01-11 NOTE — CARE COORDINATION
DC Plan: Home with family and [facility] (SOC: 1/13)    LUIS F spoke with Dr. Magallanes regarding pt's iv abx. Dr. Magallanes would like to know if pt can receive iv daptomycin at his dialysis center. Pt would receive his iv abx after dialysis. If the dialysis center is not able to accommodate iv daptomycin, then oral zyvox would be the next recommendation. Apparently, pt is unable to do iv vanc.     CM called [facility] [phone] and spoke with Joann. She was not able to give a confirmed answer. She indicated she would need an RX and provide it for the doctor to see if they can accommodate the iv med. CM informed her writer will call the nephrologist.     Cm spoke with Dr. Rodriguez. They cannot accommodate iv daptomycin. Cm explained to him the reasoning why pt cannot receive iv vanc.    Cm spoke with Dr. Magallanes and updated him. Dr. Magallanes will send RX for oral zyvox to pt's pharmacy to see if insurance would cover it or not. If insurance does not cover the oral med, then pt can get a [employer] card, but pt would be financially responsible for a certain amount.  CM to f/up with Dr. Magallanes this afternoon.     CM met with pt at the bedside to f/up on his dc plan. Pt is [facility]. CM Communicated pt by writing on a small white board he had on his table top. Pt is aware of discharge home today. Cm discussed home health. Pt is agreeable with home health services. Pt would like the home health company off of [address]. Cm asked pt if writer could call his family. Pt indicated his daughter is working and the family member he lives with is busy herself. Pt reports his brother-in-law can transport him home today, but he will need to know what time. Cm informed pt his nurse will be abl to let him know when his transport can pick him up.    ______________________________________________    1322    Cm spoke with Dr. Magallanes. Pt is not discharging home with po zyvox. Patient will discharge home with po levaquin.

## 2024-01-11 NOTE — DISCHARGE INSTR - COC
Continuity of Care Form    Patient Name: Damien Ferrer   :  1950  MRN:  [id number]    Admit date:  2024  Discharge date:  2024    Code Status Order: Full Code   Advance Directives:     Admitting Physician:  Thom Arauz Cha, MD  PCP: Jim Dorman MD    Discharging Nurse: Ashley ERAZO   Discharging Hospital Unit/Room#: 586/01  Discharging Unit Phone Number: [phone]    Emergency Contact:   Extended Emergency Contact Information  Primary Emergency Contact: Carlton Martinez  Home Phone: [phone]  Mobile Phone: [phone]  Relation: Other  Secondary Emergency Contact: Shante Ferrer  Mobile Phone: [phone]  Relation: Child   needed? No    Past Surgical History:  Past Surgical History:   Procedure Laterality Date    CARDIAC CATHETERIZATION      COLONOSCOPY N/A 12/3/2020    COLONOSCOPY performed by Patsy Chakraborty MD at [facility] ENDOSCOPY    COLONOSCOPY N/A 2022    COLONOSCOPY performed by Patsy Chakraborty MD at [facility] ENDOSCOPY    FOOT SURGERY Right 2023    Open Transmetatarsal Amputation Right Foot performed by Phil Reyes DPM at [facility] MAIN OR    HERNIA REPAIR      INVASIVE VASCULAR N/A 2023    Angiography lower ext right performed by Papo Charlton MD at [facility] ELECTROPHYSIOLOGY    INVASIVE VASCULAR N/A 2023    Aortagram abdominal performed by Papo Charlton MD at [facility] ELECTROPHYSIOLOGY    INVASIVE VASCULAR N/A 2023    Ultrasound guided vascular access performed by Papo Charlton MD at [facility] ELECTROPHYSIOLOGY    INVASIVE VASCULAR N/A 2023    Atherectomy  femoral popliteal performed by Papo Charlton MD at [facility] ELECTROPHYSIOLOGY    INVASIVE VASCULAR N/A 2023    Pta femoral popliteal artery performed by Papo Charlton MD at [facility] ELECTROPHYSIOLOGY    INVASIVE VASCULAR N/A 2023    Angioplasty post tib artery performed by Papo Charlton MD at [facility] ELECTROPHYSIOLOGY    INVASIVE VASCULAR N/A 2023    Angioplasty popliteal artery performed by Papo Charlton MD at

## 2024-01-11 NOTE — DISCHARGE SUMMARY
Discharge Summary    Name: Damien Ferrer  [id number]  YOB: 1950 (Age: 73 y.o.)   Date of Admission: 1/2/2024  Date of Discharge: 1/11/2024  Attending Physician: Josiah Umaña MD    Discharge Diagnosis:   Principal Problem:    Sepsis (HCC)  Active Problems:    Bacteremia    Somnolence    Metabolic acidosis  Resolved Problems:    * No resolved hospital problems. *       Consultations:  IP CONSULT TO NEPHROLOGY  IP CONSULT TO INFECTIOUS DISEASES  IP CONSULT TO CARDIOLOGY  IP WOUND CARE NURSE CONSULT TO EVAL  IP CONSULT TO OTOLARYNGOLOGY  IP CONSULT TO PHARMACY  IP CONSULT TO CASE MANAGEMENT      Brief Admission History/Reason for Admission Per Thom Arauz Cha, MD:   Generalized weakness    Brief Hospital Course by Main Problems:   Damien Ferrer is a 73 y.o. male with ESRD, HFrEF, hypertension and other medical problems as below who presented to the ED with chief complaint of generalized weakness for the past few days. Limited history from patient given patient very lethargic. He reports no cough, shortness of breath. No diarrhea, nausea or vomiting, but has some abdominal discomfort. He reports still makes urine. 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      In the ED, initially hypotensive, improved with IVF bolus and PO midodrine. Febrile and has leukocytosis. Troponin elevated, but stable. Chest X-ray showed pulmonary edema. Nephrology consulted for dialysis. LUE AV fistula with clot, temporary cath placed. Patient started empirically on IV Rocephin.  Given sepsis of unknown origin and infectious disease consulted.  Blood cultures positive for Streptococcus pneumonia, unclear source.  Patient started on IV vancomycin, cefepime discontinued. TTE with a EF of 20 to 25%, severe global hypokinesis noted, abnormal diastolic function. ID discontinued IV vanc and started on IV levaquin. Repeat blood cultures drawn 1/7 no growth to date. Concern for slow

## 2024-01-12 NOTE — PROGRESS NOTES
Hospitalist Progress Note    NAME:   Damien Ferrer   : 1950   MRN: [id number]     Date/Time: 2024 2:27 PM  Patient PCP: Jmi Dorman MD    Estimated discharge date: 48 hours  Barriers: IV antibiotics, improvement in infectious markers, ID clearance, repeat blood cultures    Hospital course:   Damien Ferrer is a 73 y.o. male with ESRD, HFrEF, hypertension and other medical problems as below who presented to the ED with chief complaint of generalized weakness for the past few days. Limited history from patient given patient very lethargic. He reports no cough, shortness of breath. No diarrhea, nausea or vomiting, but has some abdominal discomfort. He reports still makes urine. 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      In the ED, initially hypotensive, improved with IVF bolus and PO midodrine. Febrile and has leukocytosis. Troponin elevated, but stable. Urinalysis pending. Chest X-ray showed pulmonary edema. Nephrology consulted for dialysis. LUE AV fistula with clot, temporary cath placed. Patient started empirically on IV Rocephin.  Given sepsis of unknown origin and infectious disease consulted.  Blood cultures positive for Streptococcus pneumonia, unclear source.  Patient started on IV vancomycin, cefepime discontinued.  TTE with a EF of 20 to 25%, severe global hypokinesis noted, abnormal diastolic function.    Of note during hospital admission patient developed complete hearing loss.  Patient is hard of hearing at baseline but per RN reports patient initially on admission able to hear.  CT of the head obtained negative for acute pathology.  ENT consulted for right cerumen impaction as well as chronic hearing loss now worsened.  Possible questional ototoxicity related to vancomycin however vancomycin pharmacy to dose ordered after onset of hearing loss.  Patient may possibly transition to oral Levaquin per ID recommendations if continues to respond to treatment. Repeat 
      Hospitalist Progress Note    NAME:   Damien Ferrer   : 1950   MRN: [id number]     Date/Time: 2024 7:42 AM  Patient PCP: Jim Dorman MD    Estimated discharge date:>48 hours  Barriers: V antibiotics, blood cultures, continued HD     Hospital course:   Damien Ferrer is a 73 y.o. male with PMH of ESRD, HFrEF, hypertension and other medical problems as below. Presented to the ED with chief complaint of generalized weakness for the past few days. Limited history from patient given patient very lethargic. He reports no cough, shortness of breath. No diarrhea, nausea or vomiting, but has some abdominal discomfort. He reports still makes urine, but unable to tell me about UTI symptoms. 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      In the ED, initially hypotensive, improved with IVF bolus and PO midodrine. Febrile and has leukocytosis. Troponin elevated, but stable. Urinalysis pending. Chest X-ray showed pulmonary edema.  Nephrology consulted for dialysis.  Patient started empirically on IV Rocephin.  Given sepsis of unknown origin and infectious disease consulted.  Blood cultures positive concerning for probable Streptococcus pneumonia.  Patient started on IV vancomycin, cefepime discontinued.  TTE pending.    Of note during hospital admission patient developed complete hearing loss.  Patient is hard of hearing at baseline but per RN reports patient initially on admission able to hear.  CT of the head obtained negative for acute pathology.  ENT consulted for right cerumen impaction as well as chronic hearing loss now worsened.    Assessment / Plan:  Sepsis  - Source:uncertain. Pending urinalysis.   - No open wounds of legs concerning for infected ulcer or cellulitis  - Antibiotics empirically: IV Cefepime.   - Hold off fluid resuscitation given pulmonary edema from missed hemodialysis   - Blood cultures: gram + cocci chains and pairs 2/3 bottles, suspected Streptococcus pneumoniae. 
      Hospitalist Progress Note    NAME:   Damien Ferrer   : 1950   MRN: [id number]     Date/Time: 2024 2:38 PM  Patient PCP: Jim Dorman MD    Estimated discharge date:>48 hours  Barriers: IV antibiotics, improvement in infectious markers, ID clearance    Hospital course:   Damien Ferrer is a 73 y.o. male with PMH of ESRD, HFrEF, hypertension and other medical problems as below. Presented to the ED with chief complaint of generalized weakness for the past few days. Limited history from patient given patient very lethargic. He reports no cough, shortness of breath. No diarrhea, nausea or vomiting, but has some abdominal discomfort. He reports still makes urine, but unable to tell me about UTI symptoms. 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      In the ED, initially hypotensive, improved with IVF bolus and PO midodrine. Febrile and has leukocytosis. Troponin elevated, but stable. Urinalysis pending. Chest X-ray showed pulmonary edema.  Nephrology consulted for dialysis.  Patient started empirically on IV Rocephin.  Given sepsis of unknown origin and infectious disease consulted.  Blood cultures positive for Streptococcus pneumonia.  Patient started on IV vancomycin, cefepime discontinued.  TTE with a EF of 20 to 25%, severe global hypokinesis noted, abnormal diastolic function.    Of note during hospital admission patient developed complete hearing loss.  Patient is hard of hearing at baseline but per RN reports patient initially on admission able to hear.  CT of the head obtained negative for acute pathology.  ENT consulted for right cerumen impaction as well as chronic hearing loss now worsened.  Possible questional ototoxicity related to vancomycin however vancomycin pharmacy to dose ordered after onset of hearing loss.  Patient transition to oral Levaquin per ID recommendations.     Assessment / Plan:  Sepsis  - Source:uncertain.   - Urinalysis yet to be obtained as patient 
      Hospitalist Progress Note    NAME:   Damien Ferrer   : 1950   MRN: [id number]     Date/Time: 2024 10:57 AM  Patient PCP: Jim Dorman MD    Estimated discharge date:>48 hours  Barriers: IV antibiotics, improvement in infectious markers, ID clearance, repeat blood cultures    Hospital course:   Damien Ferrer is a 73 y.o. male with PMH of ESRD, HFrEF, hypertension and other medical problems as below. Presented to the ED with chief complaint of generalized weakness for the past few days. Limited history from patient given patient very lethargic. He reports no cough, shortness of breath. No diarrhea, nausea or vomiting, but has some abdominal discomfort. He reports still makes urine. 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      In the ED, initially hypotensive, improved with IVF bolus and PO midodrine. Febrile and has leukocytosis. Troponin elevated, but stable. Urinalysis pending. Chest X-ray showed pulmonary edema. Cr markedly elevated 15.8. Nephrology consulted for dialysis. LUE AV fistula with clot, temporary cath placed. Patient started empirically on IV Rocephin.  Given sepsis of unknown origin and infectious disease consulted.  Blood cultures positive for Streptococcus pneumonia, unclear source.  Patient started on IV vancomycin, cefepime discontinued.  TTE with a EF of 20 to 25%, severe global hypokinesis noted, abnormal diastolic function.    Of note during hospital admission patient developed complete hearing loss.  Patient is hard of hearing at baseline but per RN reports patient initially on admission able to hear.  CT of the head obtained negative for acute pathology.  ENT consulted for right cerumen impaction as well as chronic hearing loss now worsened.  Possible questional ototoxicity related to vancomycin however vancomycin pharmacy to dose ordered after onset of hearing loss.  Patient may possibly transition to oral Levaquin per ID recommendations if continues to 
      Hospitalist Progress Note    NAME:   Damien Ferrer   : 1950   MRN: [id number]     Date/Time: 1/10/2024 2:03 PM  Patient PCP: Jim Dorman MD    Estimated discharge date: 24-48 hours  Barriers: IV antibiotics, improvement in infectious markers, ID clearance    Hospital course:   Damien Ferrer is a 73 y.o. male with ESRD, HFrEF, hypertension and other medical problems as below who presented to the ED with chief complaint of generalized weakness for the past few days. Limited history from patient given patient very lethargic. He reports no cough, shortness of breath. No diarrhea, nausea or vomiting, but has some abdominal discomfort. He reports still makes urine. 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      In the ED, initially hypotensive, improved with IVF bolus and PO midodrine. Febrile and has leukocytosis. Troponin elevated, but stable. Urinalysis pending. Chest X-ray showed pulmonary edema. Nephrology consulted for dialysis. LUE AV fistula with clot, temporary cath placed. Patient started empirically on IV Rocephin.  Given sepsis of unknown origin and infectious disease consulted.  Blood cultures positive for Streptococcus pneumonia, unclear source.  Patient started on IV vancomycin, cefepime discontinued. TTE with a EF of 20 to 25%, severe global hypokinesis noted, abnormal diastolic function. ID discontinued IV vanc and started on IV levaquin. Repeat blood cultures drawn  no growth to date. Concern for slow decline in WBC, CRP, and procalcitonin. D/c IV levaquin s/t suboptimal response.     Of note during hospital admission patient developed complete hearing loss.  Patient is hard of hearing at baseline but per RN reports patient initially on admission able to hear.  CT of the head obtained negative for acute pathology.  ENT consulted for right cerumen impaction as well as chronic hearing loss now worsened.  Possible questional ototoxicity related to vancomycin however 
      Hospitalist Progress Note    NAME:   Damien Ferrer   : 1950   MRN: [id number]     Date/Time: 1/3/2024 7:52 PM  Patient PCP: Jim Dorman MD    Estimated discharge date:>48 hours  Barriers: sepsis workup, IV antibiotics, continued HD     Hospital course:   Damien Ferrer is a 73 y.o. male with PMH of ESRD, HFrEF, hypertension and other medical problems as below. Presented to the ED with chief complaint of generalized weakness for the past few days. Limited history from patient given patient very lethargic. He reports no cough, shortness of breath. No diarrhea, nausea or vomiting, but has some abdominal discomfort. He reports still makes urine, but unable to tell me about UTI symptoms. 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      In the ED, initially hypotensive, improved with IVF bolus and PO midodrine. Febrile and has leukocytosis. Troponin elevated, but stable. Urinalysis pending. Chest X-ray showed pulmonary edema.     Assessment / Plan:  Sepsis  - Source:uncertain. Pending urinalysis.   - No open wounds of legs concerning for infected ulcer or cellulitis  - Antibiotics empirically: IV Cefepime.   - Hold off fluid resuscitation given pulmonary edema from missed hemodialysis   - Blood cultures: gram + cocci chains and pairs 2/3 bottles.   - Trend LA  - Consult ID for further recommendations    Generalized weakness   -Patient somnolent on initial exam however became more alert throughout the day.  -Unable to give significant history, hearing seems to have worsened per RN   -Will obtain CT of the head     ESRD  - Hemodialysis schedule: Mon-Wed-Fri.  Patient repeated last dialysis on Friday however has been missed several outpatient sessions.  - Consult nephrology for hemodialysis and further recommendations appreciated.   - Continue home medications.      Metabolic acidosis  Bicarb 14  Likely related to missed dialysis sessions  Patient to be dialyzed with high bicarb bath, defer 
   Nephrology Consult    Patient: Damien Ferrer MRN: [id number]  SSN: [id number]    YOB: 1950  Age: 73 y.o.  Sex: male      Subjective:   Pt is seen on dialysis.    Past Medical History:   Diagnosis Date    Asthenia 01/24/2021    Cardiomyopathy (HCC) 01/24/2021    CHF (congestive heart failure) ([facility])     Chronic kidney disease     CKD (chronic kidney disease)     4    End stage renal disease on dialysis ([facility]) 01/24/2021    Essential hypertension 01/24/2021    Gastroesophageal reflux disease 01/24/2021    Gastroesophageal reflux disease 01/24/2021    Hypertension     Pneumonia due to COVID-19 virus 01/24/2021    Systolic CHF, acute on chronic (HCC) 01/24/2021     Past Surgical History:   Procedure Laterality Date    CARDIAC CATHETERIZATION      COLONOSCOPY N/A 12/3/2020    COLONOSCOPY performed by Patsy Chakraborty MD at [facility] ENDOSCOPY    COLONOSCOPY N/A 12/2/2022    COLONOSCOPY performed by Patsy Chakraborty MD at [facility] ENDOSCOPY    FOOT SURGERY Right 5/17/2023    Open Transmetatarsal Amputation Right Foot performed by Phil Reyes DPM at [facility] MAIN OR    HERNIA REPAIR      INVASIVE VASCULAR N/A 5/16/2023    Angiography lower ext right performed by Papo Charlton MD at [facility] ELECTROPHYSIOLOGY    INVASIVE VASCULAR N/A 5/16/2023    Aortagram abdominal performed by Papo Charlton MD at [facility] ELECTROPHYSIOLOGY    INVASIVE VASCULAR N/A 5/16/2023    Ultrasound guided vascular access performed by Papo Charlton MD at [facility] ELECTROPHYSIOLOGY    INVASIVE VASCULAR N/A 5/16/2023    Atherectomy  femoral popliteal performed by Papo Charlton MD at [facility] ELECTROPHYSIOLOGY    INVASIVE VASCULAR N/A 5/16/2023    Pta femoral popliteal artery performed by Papo Charlton MD at [facility] ELECTROPHYSIOLOGY    INVASIVE VASCULAR N/A 5/16/2023    Angioplasty post tib artery performed by Papo Charlton MD at [facility] ELECTROPHYSIOLOGY    INVASIVE VASCULAR N/A 5/16/2023    Angioplasty popliteal artery performed by Ppao Charlton MD at [facility] 
   Nephrology Consult    Patient: Damien Ferrer MRN: [id number]  SSN: [id number]    YOB: 1950  Age: 73 y.o.  Sex: male      Subjective:   Pt is seen in the room.  He is more awake and alert  Blood pressures are lowish  Was dialyzed 1/05..    Past Medical History:   Diagnosis Date    Asthenia 01/24/2021    Cardiomyopathy (HCC) 01/24/2021    CHF (congestive heart failure) ([facility])     Chronic kidney disease     CKD (chronic kidney disease)     4    End stage renal disease on dialysis ([facility]) 01/24/2021    Essential hypertension 01/24/2021    Gastroesophageal reflux disease 01/24/2021    Gastroesophageal reflux disease 01/24/2021    Hypertension     Pneumonia due to COVID-19 virus 01/24/2021    Systolic CHF, acute on chronic ([facility]) 01/24/2021     Past Surgical History:   Procedure Laterality Date    CARDIAC CATHETERIZATION      COLONOSCOPY N/A 12/3/2020    COLONOSCOPY performed by Patsy Chakraborty MD at [facility] ENDOSCOPY    COLONOSCOPY N/A 12/2/2022    COLONOSCOPY performed by Patsy Chakraborty MD at [facility] ENDOSCOPY    FOOT SURGERY Right 5/17/2023    Open Transmetatarsal Amputation Right Foot performed by Phil Reyes DPM at [facility] MAIN OR    HERNIA REPAIR      INVASIVE VASCULAR N/A 5/16/2023    Angiography lower ext right performed by Papo Charlton MD at [facility] ELECTROPHYSIOLOGY    INVASIVE VASCULAR N/A 5/16/2023    Aortagram abdominal performed by Papo Charlton MD at [facility] ELECTROPHYSIOLOGY    INVASIVE VASCULAR N/A 5/16/2023    Ultrasound guided vascular access performed by Papo Charlton MD at [facility] ELECTROPHYSIOLOGY    INVASIVE VASCULAR N/A 5/16/2023    Atherectomy  femoral popliteal performed by Papo Charlton MD at [facility] ELECTROPHYSIOLOGY    INVASIVE VASCULAR N/A 5/16/2023    Pta femoral popliteal artery performed by Papo Charlton MD at [facility] ELECTROPHYSIOLOGY    INVASIVE VASCULAR N/A 5/16/2023    Angioplasty post tib artery performed by Papo Charlton MD at [facility] ELECTROPHYSIOLOGY    INVASIVE VASCULAR N/A 5/16/2023    
   Nephrology Consult    Patient: Damien Ferrer MRN: [id number]  SSN: [id number]    YOB: 1950  Age: 73 y.o.  Sex: male      Subjective:   Pt is seen in the room.  Was dialyzed yesterday..    Past Medical History:   Diagnosis Date    Asthenia 01/24/2021    Cardiomyopathy (HCC) 01/24/2021    CHF (congestive heart failure) (HCC)     Chronic kidney disease     CKD (chronic kidney disease)     4    End stage renal disease on dialysis (HCC) 01/24/2021    Essential hypertension 01/24/2021    Gastroesophageal reflux disease 01/24/2021    Gastroesophageal reflux disease 01/24/2021    Hypertension     Pneumonia due to COVID-19 virus 01/24/2021    Systolic CHF, acute on chronic (HCC) 01/24/2021     Past Surgical History:   Procedure Laterality Date    CARDIAC CATHETERIZATION      COLONOSCOPY N/A 12/3/2020    COLONOSCOPY performed by Patsy Chakraborty MD at [facility] ENDOSCOPY    COLONOSCOPY N/A 12/2/2022    COLONOSCOPY performed by Patsy Chakraborty MD at [facility] ENDOSCOPY    FOOT SURGERY Right 5/17/2023    Open Transmetatarsal Amputation Right Foot performed by Phil Reyes DPM at [facility] MAIN OR    HERNIA REPAIR      INVASIVE VASCULAR N/A 5/16/2023    Angiography lower ext right performed by Papo Charlton MD at [facility] ELECTROPHYSIOLOGY    INVASIVE VASCULAR N/A 5/16/2023    Aortagram abdominal performed by Papo Charlton MD at [facility] ELECTROPHYSIOLOGY    INVASIVE VASCULAR N/A 5/16/2023    Ultrasound guided vascular access performed by Papo Charlton MD at [facility] ELECTROPHYSIOLOGY    INVASIVE VASCULAR N/A 5/16/2023    Atherectomy  femoral popliteal performed by Papo Charlton MD at [facility] ELECTROPHYSIOLOGY    INVASIVE VASCULAR N/A 5/16/2023    Pta femoral popliteal artery performed by Papo Charlton MD at [facility] ELECTROPHYSIOLOGY    INVASIVE VASCULAR N/A 5/16/2023    Angioplasty post tib artery performed by Papo Charlton MD at [facility] ELECTROPHYSIOLOGY    INVASIVE VASCULAR N/A 5/16/2023    Angioplasty popliteal artery performed by Papo 
4 Eyes Skin Assessment     NAME:  Damien Ferrer  YOB: 1950  MEDICAL RECORD NUMBER:  [id number]    The patient is being assessed for  Admission    I agree that at least one RN has performed a thorough Head to Toe Skin Assessment on the patient. ALL assessment sites listed below have been assessed.      Areas assessed by both nurses:    Head, Face, Ears, Shoulders, Back, Chest, Arms, Elbows, Hands, Sacrum. Buttock, Coccyx, Ischium, Legs. Feet and Heels, and Under Medical Devices         Does the Patient have a Wound? Yes wound(s) were present on assessment. LDA wound assessment was Initiated and completed by RN       Arpan Prevention initiated by RN: Yes  Wound Care Orders initiated by RN: Yes    Pressure Injury (Stage 3,4, Unstageable, DTI, NWPT, and Complex wounds) if present, place Wound referral order by RN under : Yes    New Ostomies, if present place, Ostomy referral order under : No     Nurse 1 eSignature: Electronically signed by Sear Garvey RN on 1/4/24 at 6:31 AM EST    **SHARE this note so that the co-signing nurse can place an eSignature**    Nurse 2 eSignature: Electronically signed by Richa Dobson RN on 1/4/24 at 6:41 AM EST   
4 Eyes Skin Assessment     NAME:  Damien Ferrer  YOB: 1950  MEDICAL RECORD NUMBER:  [id number]    The patient is being assessed for  Transfer to New Unit    I agree that at least one RN has performed a thorough Head to Toe Skin Assessment on the patient. ALL assessment sites listed below have been assessed.      Areas assessed by both nurses:    Arms, Elbows, Hands, Sacrum. Buttock, Coccyx, Ischium, and Legs. Feet and Heels        Does the Patient have a Wound? Yes wound(s) were present on assessment. LDA wound assessment was Initiated and completed by RN       Arpan Prevention initiated by RN: Yes  Wound Care Orders initiated by RN: No    Pressure Injury (Stage 3,4, Unstageable, DTI, NWPT, and Complex wounds) if present, place Wound referral order by RN under : No    New Ostomies, if present place, Ostomy referral order under : No     Nurse 1 eSignature: Electronically signed by Val Mercado RN on 1/7/24 at 3:35 PM EST    **SHARE this note so that the co-signing nurse can place an eSignature**    Nurse 2 eSignature: Electronically signed by Abdelrahman Langley RN on 1/7/24 at 3:39 PM EST   
Comprehensive Nutrition Assessment    Type and Reason for Visit:  Initial, RD Nutrition Re-Screen/LOS (LOS day 8)    Nutrition Recommendations/Plan:   Continue regular diet - consider liberalization of phos restriction w/ improved values   Nepro 1x/day (420 kcal, 19 gm pro)  Provide encouragement at meals prn   Please obtain an updated scaled wt as able   Monitor/document PO/ONS intake in I/Os      Malnutrition Assessment:  Malnutrition Status:  Mild malnutrition (01/10/24 1317)    Context:  Acute Illness     Findings of the 6 clinical characteristics of malnutrition:  Energy Intake:  75% or less of estimated energy requirements for 7 or more days  Weight Loss:  No significant weight loss (~6% wt loss x 6 months per EMR - not nutritionally significant)     Body Fat Loss:  Unable to assess (deferred d/t contact iso)     Muscle Mass Loss:  Unable to assess (deferred d/t contact iso)    Fluid Accumulation:  No significant fluid accumulation     Strength:  Not Performed    Nutrition Assessment:    Presents w/ cc of generalized weakness. Chest x-ray showing pulmonary edema. Chart reviewed for LOS day 8. Pt asleep during attempted RD visit, unable to awaken d/t hearing loss. Per RN, pt declined B, >51% x 2 doc meals yesterday. RD to add ONS to better meet estimated needs. Labs: Na 130, BUN 58, Creat 7.39, Gluc 143, Total protein 8.9, CRP 20.90, Alb 2.5, H&H 8.7/25.9,   Meds: Lipitor, Plavix, Retacrit, Heparin, Proamatine, NaCl, Zofran, Glycolax, Roxicodone.    Nutrition Related Findings:    LBM 1/9, smear - +C, on Glycolax prn. No edema noted, NFPE deferred d/t contact iso, no hx of dysphagia per chart review, no documented N/V/D/C Wound Type: Multiple (Coccyx PI, possible moisture r/t scrotal wound)       Current Nutrition Intake & Therapies:    Average Meal Intake: 26-50%, 51-75%  Average Supplements Intake: None Ordered  ADULT DIET; Regular; Low Phosphorus (Less than 1000 mg)  ADULT ORAL NUTRITION SUPPLEMENT; 
Discharge plan of care/case management plan validated with provider discharge order.    Discharge instructions discussed with the patient and friend. All concerns addressed at this time.    PIV removed. Patient wheeled downstairs to go home with family  
Dr. Magallanes notified of patient pain around dialysis R upper chest catheter.  Patient given tylenol for pain.  Patient declines lunch at this time.  
Home medications and clothing were given to his niece (Janna).  
OCCUPATIONAL THERAPY EVALUATION  Patient: Damien Ferrer (73 y.o. male)  Date: 1/11/2024  Primary Diagnosis: Generalized weakness [R53.1]  Elevated troponin [R79.89]  Sepsis (HCC) [A41.9]       Precautions: Fall Risk, Bed Alarm                Recommendations for nursing mobility: AD and gt belt for bed to chair , Amb to bathroom with AD and gait belt, and Assist x1    In place during session:EKG/telemetry   ASSESSMENT  Pt is a 73 y.o. male presenting to [facility] with c/o generalized weakness for the past few days, admitted 1/2 and currently being treated for elevated troponin, sepsis, and malfunctioning AV fistula. Pt received semi-supine in bed upon arrival, AXO x4, and agreeable to OT evaluation. Pt is [place] and need to utilize white board for longer phrase; can understand gestures and short phrases when speaking loud.     Based on current observations, pt presents with decreased  functional mobility, independence in ADLs, ROM, strength, activity tolerance, endurance, balance (see below for objective details and assist levels).     Overall, pt tolerates session fair w/out c/o pain, dizziness, or SOB. Pt completed bed mobility w/ CGA but req'd min A x2 for STS using RW and SPT to chair; no LOB but slightly unsteady in standing d/t not having prosthesis on RLE. PT provided A for standing balance and OT completed posterior pericare in standing. Pt set up w/ ADLs in chair; slight A for fine motor tasks. Pt will benefit from continued skilled OT services to address current impairments and improve IND and safety with self cares and functional transfers/mobility. Potential barriers for safe discharge: pt is a high fall risk. Current OT d/c recommendation Home with Home Health Therapy and family assistonce medically appropriate.    GOALS:    Problem: Occupational Therapy - Adult  Goal: By Discharge: Performs self-care activities at highest level of function for planned discharge setting.  See evaluation for individualized 
Patient cleaned up, linens and gown changed after having a bowel movement. Noticed drainage to left sleeve of gown. Examined patient's non-working fistula. Noticed his fistula is red, swollen, and draining serosanguineous fluid. Called and made Dr. Magallanes aware of new finding. Dr. Magallanes gave order to culture the site. Went into patient room with wound culture swab and gently pressed surrounding area. A small-moderate amount of pus drained from the lower part of the fistula, followed by a steady steam of blood. Drainage was collected with swab. Sample was dated, timed, and signed by myself. Area was cleansed with soap and water and covered with gauze and a band aid. Culture was sent to the lab for testing.  
Patient dialysis port bleeding at this time dressing changed, within 30 mins dressing saturated. Nurse consulted with Chilango PABLO regarding patient dialysis port. Chilango PABLO stated she would call primary nurse back regarding control of bleeding.     1700-Chilango PABLO stated to apply pressure, raise patient HOB, will call PA back if dressing bleeds through again.   
Patient transported to   
Progress Note  Date:1/10/2024       Room:[facility]  Patient Name:Damien Ferrer     YOB: 1950     Age:73 y.o.        Subjective    Subjective  Patient followed for sepsis with no clear source of infection, however, blood cultures grew Streptococcus pneumoniae. He was switched from Vancomycin to Levaquin because of concern for ototoxicity.   Patient is off the floor at this time.     Objective         Vitals Last 24 Hours:  TEMPERATURE:  Temp  Av.8 °F (37.1 °C)  Min: 98.2 °F (36.8 °C)  Max: 99 °F (37.2 °C)  RESPIRATIONS RANGE: Resp  Av.2  Min: 16  Max: 19  PULSE OXIMETRY RANGE: SpO2  Av.8 %  Min: 98 %  Max: 100 %  PULSE RANGE: Pulse  Av.8  Min: 69  Max: 79  BLOOD PRESSURE RANGE: Systolic (24hrs), Av , Min:86 , Max:120   ; Diastolic (24hrs), Av, Min:51, Max:68       Objective:  Vital signs: (most recent): Blood pressure (!) 101/55, pulse 73, temperature 99 °F (37.2 °C), temperature source Oral, resp. rate 16, height 1.778 m (5' 10\"), weight 72.6 kg (160 lb), SpO2 98 %.        Vitals and nursing note reviewed.  Patient unavailable for re-examination  Constitutional:       Appearance: He is ill-appearing.   HENT:      Head: Normocephalic and atraumatic.      Right Ear: External ear normal.      Left Ear: External ear normal.      Nose: Nose normal.      Comments: Nasal O2 cannula  Cardiovascular:      Rate and Rhythm: Regular rhythm.       Heart sounds: Normal heart sounds. No murmur heard.  Pulmonary:      Effort: Pulmonary effort is normal.      Breath sounds: Normal breath sounds.   Chest:        Abdominal:      General: Bowel sounds are normal. There is no distension.   Genitourinary:     Comments: No Valencia  Musculoskeletal:      Cervical back: Neck supple.      Right lower leg: No edema.      Left lower leg: No edema.      Comments: Well-healed right BKA stump   Neurological: nonfocal, no confusion, deafness   Psychiatric: normal behavior    Labs/Imaging/Diagnostics  
Progress Note  Date:1/10/2024       Room:[facility]  Patient Name:Damien Ferrer     YOB: 1950     Age:73 y.o.        Subjective    Subjective  Patient followed for sepsis with no clear source of infection, however, blood cultures grew Streptococcus pneumoniae. He was switched from Vancomycin to Levaquin because of concern for ototoxicity.    Patient has not responded as hoped with Levaquin.  Planned to switch to Vancomycin.   Patient inaccessible at this time. However, notified by Staff that there is drainage from his AV fistula.    Objective         Vitals Last 24 Hours:  TEMPERATURE:  Temp  Av.5 °F (36.9 °C)  Min: 97.9 °F (36.6 °C)  Max: 99 °F (37.2 °C)  RESPIRATIONS RANGE: Resp  Av.4  Min: 16  Max: 19  PULSE OXIMETRY RANGE: SpO2  Av %  Min: 93 %  Max: 100 %  PULSE RANGE: Pulse  Av.2  Min: 68  Max: 87  BLOOD PRESSURE RANGE: Systolic (24hrs), Av , Min:97 , Max:115   ; Diastolic (24hrs), Av, Min:53, Max:69       Objective:  Vital signs: (most recent): Blood pressure 103/65, pulse 87, temperature 98.4 °F (36.9 °C), temperature source Oral, resp. rate 18, height 1.778 m (5' 10\"), weight 72.6 kg (160 lb), SpO2 93 %.        Vitals and nursing note reviewed.  Patient unavailable for re-examination  Constitutional:       Appearance: He is ill-appearing.   HENT:      Head: Normocephalic and atraumatic.      Right Ear: External ear normal.      Left Ear: External ear normal.      Nose: Nose normal.      Comments: Nasal O2 cannula  Cardiovascular:      Rate and Rhythm: Regular rhythm.       Heart sounds: Normal heart sounds. No murmur heard.  Pulmonary:      Effort: Pulmonary effort is normal.      Breath sounds: Normal breath sounds.   Chest:        Abdominal:      General: Bowel sounds are normal. There is no distension.   Genitourinary:     Comments: No Valencia  Musculoskeletal:      Cervical back: Neck supple.      Right lower leg: No edema.      Left lower leg: No edema.      
Progress Note  Date:2024       Room:[facility]  Patient Name:Damein Ferrer     YOB: 1950     Age:73 y.o.        Subjective    Subjective  Patient followed for sepsis with no clear source of infection, however, blood cultures grew Streptococcus pneumoniae. He was switched from Vancomycin to Levaquin because of concern for ototoxicity.   Patient again asleep but appears to be resting comfortably.    Objective         Vitals Last 24 Hours:  TEMPERATURE:  Temp  Av.1 °F (36.7 °C)  Min: 97.7 °F (36.5 °C)  Max: 98.4 °F (36.9 °C)  RESPIRATIONS RANGE: Resp  Av.2  Min: 16  Max: 18  PULSE OXIMETRY RANGE: SpO2  Av %  Min: 98 %  Max: 100 %  PULSE RANGE: Pulse  Av  Min: 77  Max: 80  BLOOD PRESSURE RANGE: Systolic (24hrs), Av , Min:96 , Max:119   ; Diastolic (24hrs), Av, Min:56, Max:65       Objective:  Vital signs: (most recent): Blood pressure 103/65, pulse 80, temperature 97.7 °F (36.5 °C), resp. rate 16, height 1.778 m (5' 10\"), weight 72.6 kg (160 lb), SpO2 99 %.        Vitals and nursing note reviewed.    Constitutional:       Appearance: He is ill-appearing.   HENT:      Head: Normocephalic and atraumatic.      Right Ear: External ear normal.      Left Ear: External ear normal.      Nose: Nose normal.      Comments: Nasal O2 cannula  Cardiovascular:      Rate and Rhythm: Regular rhythm.       Heart sounds: Normal heart sounds. No murmur heard.  Pulmonary:      Effort: Pulmonary effort is normal.      Breath sounds: Normal breath sounds.   Chest:        Abdominal:      General: Bowel sounds are normal. There is no distension.   Genitourinary:     Comments: No Valencia  Musculoskeletal:      Cervical back: Neck supple.      Right lower leg: No edema.      Left lower leg: No edema.      Comments: Well-healed right BKA stump   Neurological: nonfocal, no confusion, deafness   Psychiatric: normal behavior    Labs/Imaging/Diagnostics    Labs:  CBC:  Recent Labs     24  0643 
Progress Note  Date:2024       Room:[facility]  Patient Name:Damien Ferrer     YOB: 1950     Age:73 y.o.        Subjective    Subjective  Patient followed for sepsis with no clear source of infection, however, blood cultures grew Streptococcus pneumoniae. Patient is asleep at this time.  Contacted by ENT because of concern that Vancomycin may be responsible for patient's hear ing loss.    Objective         Vitals Last 24 Hours:  TEMPERATURE:  Temp  Av.1 °F (36.7 °C)  Min: 97.5 °F (36.4 °C)  Max: 98.4 °F (36.9 °C)  RESPIRATIONS RANGE: Resp  Av.5  Min: 16  Max: 18  PULSE OXIMETRY RANGE: SpO2  Av.7 %  Min: 97 %  Max: 98 %  PULSE RANGE: Pulse  Av.8  Min: 65  Max: 80  BLOOD PRESSURE RANGE: Systolic (24hrs), Av , Min:97 , Max:119   ; Diastolic (24hrs), Av, Min:48, Max:64       Objective:  Vital signs: (most recent): Blood pressure 119/61, pulse 77, temperature 98.2 °F (36.8 °C), resp. rate 16, height 1.778 m (5' 10\"), weight 72.6 kg (160 lb), SpO2 98 %.        Vitals and nursing note reviewed.    Constitutional:       Appearance: He is ill-appearing.   HENT:      Head: Normocephalic and atraumatic.      Right Ear: External ear normal.      Left Ear: External ear normal.      Nose: Nose normal.      Comments: Nasal O2 cannula  Cardiovascular:      Rate and Rhythm: Regular rhythm.       Heart sounds: Normal heart sounds. No murmur heard.  Pulmonary:      Effort: Pulmonary effort is normal.      Breath sounds: Normal breath sounds.   Chest:        Abdominal:      General: Bowel sounds are normal. There is no distension.   Genitourinary:     Comments: No Valencia  Musculoskeletal:      Cervical back: Neck supple.      Right lower leg: No edema.      Left lower leg: No edema.      Comments: Well-healed right BKA stump   Neurological: nonfocal, no confusion, deafness   Psychiatric: normal behavior    Labs/Imaging/Diagnostics    Labs:  CBC:  Recent Labs     24  0652 24  0643 
Progress Note  Date:2024       Room:[facility]  Patient Name:Damien Ferrer     YOB: 1950     Age:73 y.o.        Subjective    Subjective  Patient followed for sepsis with no clear source of infection, however, blood cultures growing probable Streptococcus pneumoniae. Patient lying in bed with severe deafness making it difficult to communicate.     Objective         Vitals Last 24 Hours:  TEMPERATURE:  Temp  Av.2 °F (36.8 °C)  Min: 97.6 °F (36.4 °C)  Max: 99.2 °F (37.3 °C)  RESPIRATIONS RANGE: Resp  Av.3  Min: 16  Max: 32  PULSE OXIMETRY RANGE: SpO2  Av.7 %  Min: 96 %  Max: 100 %  PULSE RANGE: Pulse  Av.5  Min: 71  Max: 87  BLOOD PRESSURE RANGE: Systolic (24hrs), Av , Min:84 , Max:113   ; Diastolic (24hrs), Av, Min:48, Max:66       Objective    Vitals and nursing note reviewed.    Constitutional:       Appearance: He is ill-appearing.   HENT:      Head: Normocephalic and atraumatic.      Right Ear: External ear normal.      Left Ear: External ear normal.      Nose: Nose normal.      Comments: Nasal O2 cannula  Cardiovascular:      Rate and Rhythm: Regular rhythm.       Heart sounds: Normal heart sounds. No murmur heard.  Pulmonary:      Effort: Pulmonary effort is normal.      Breath sounds: Normal breath sounds.   Chest:        Abdominal:      General: Bowel sounds are normal. There is no distension.   Genitourinary:     Comments: No Valencia  Musculoskeletal:      Cervical back: Neck supple.      Right lower leg: No edema.      Left lower leg: No edema.      Comments: Well-healed right BKA stump   Neurological: nonfocal, no confusion, deafness   Psychiatric: normal behavior    Labs/Imaging/Diagnostics    Labs:  CBC:  Recent Labs     24  1606 24  0652 24  0643   WBC 15.2* 16.1* 11.7*   RBC 3.24* 2.94* 3.07*   HGB 10.4* 9.5* 9.8*   HCT 31.6* 28.2* 28.8*   MCV 97.5 95.9 93.8   RDW 14.8* 14.9* 14.7*    170 178     CHEMISTRIES:  Recent Labs     
Progress Note  Date:2024       Room:[facility]  Patient Name:Damien Ferrer     YOB: 1950     Age:73 y.o.        Subjective    Subjective  Patient followed for sepsis with no clear source of infection, however, blood cultures grew Streptococcus pneumoniae. He was switched from Vancomycin to Levaquin because of concern for ototoxicity.   Patient is off the floor at this time.     Objective         Vitals Last 24 Hours:  TEMPERATURE:  Temp  Av.2 °F (36.8 °C)  Min: 97.8 °F (36.6 °C)  Max: 99.1 °F (37.3 °C)  RESPIRATIONS RANGE: Resp  Av.9  Min: 16  Max: 20  PULSE OXIMETRY RANGE: SpO2  Av.3 %  Min: 98 %  Max: 100 %  PULSE RANGE: Pulse  Av.9  Min: 65  Max: 85  BLOOD PRESSURE RANGE: Systolic (24hrs), Av , Min:96 , Max:148   ; Diastolic (24hrs), Av, Min:41, Max:73       Objective:  Vital signs: (most recent): Blood pressure (!) 148/60, pulse 78, temperature 99.1 °F (37.3 °C), temperature source Oral, resp. rate 18, height 1.778 m (5' 10\"), weight 72.6 kg (160 lb), SpO2 100 %.        Vitals and nursing note reviewed.  Patient unavailable for re-examination  Constitutional:       Appearance: He is ill-appearing.   HENT:      Head: Normocephalic and atraumatic.      Right Ear: External ear normal.      Left Ear: External ear normal.      Nose: Nose normal.      Comments: Nasal O2 cannula  Cardiovascular:      Rate and Rhythm: Regular rhythm.       Heart sounds: Normal heart sounds. No murmur heard.  Pulmonary:      Effort: Pulmonary effort is normal.      Breath sounds: Normal breath sounds.   Chest:        Abdominal:      General: Bowel sounds are normal. There is no distension.   Genitourinary:     Comments: No Valencia  Musculoskeletal:      Cervical back: Neck supple.      Right lower leg: No edema.      Left lower leg: No edema.      Comments: Well-healed right BKA stump   Neurological: nonfocal, no confusion, deafness   Psychiatric: normal behavior    Labs/Imaging/Diagnostics  
Progress Note  Date:2024       Room:[facility]  Patient Name:Damien Ferrer     YOB: 1950     Age:73 y.o.        Subjective    Subjective  Patient followed for sepsis with no clear source of infection, however, blood cultures grew Streptococcus pneumoniae. He was switched from Vancomycin to Levaquin because of concern for ototoxicity.    Patient has not responded as hoped with Levaquin.  Planned to switch to Vancomycin.   Patient inaccessible at this time. However, notified by Staff that there is drainage from his AV fistula.    Objective         Vitals Last 24 Hours:  TEMPERATURE:  Temp  Av.7 °F (37.1 °C)  Min: 97.9 °F (36.6 °C)  Max: 99.9 °F (37.7 °C)  RESPIRATIONS RANGE: Resp  Av.2  Min: 16  Max: 18  PULSE OXIMETRY RANGE: SpO2  Av.6 %  Min: 91 %  Max: 99 %  PULSE RANGE: Pulse  Av.8  Min: 68  Max: 87  BLOOD PRESSURE RANGE: Systolic (24hrs), Av , Min:90 , Max:115   ; Diastolic (24hrs), Av, Min:50, Max:69       Objective:  Vital signs: (most recent): Blood pressure (!) 90/50, pulse 77, temperature 99.9 °F (37.7 °C), temperature source Oral, resp. rate 18, height 1.778 m (5' 10\"), weight 72.6 kg (160 lb), SpO2 91 %.        Vitals and nursing note reviewed.  Patient unavailable for re-examination  Constitutional:       Appearance: He is ill-appearing.   HENT:      Head: Normocephalic and atraumatic.      Right Ear: External ear normal.      Left Ear: External ear normal.      Nose: Nose normal.      Comments: Nasal O2 cannula  Cardiovascular:      Rate and Rhythm: Regular rhythm.       Heart sounds: Normal heart sounds. No murmur heard.  Pulmonary:      Effort: Pulmonary effort is normal.      Breath sounds: Normal breath sounds.   Chest:        Abdominal:      General: Bowel sounds are normal. There is no distension.   Genitourinary:     Comments: No Valencia  Musculoskeletal:      Cervical back: Neck supple.      Right lower leg: No edema.      Left lower leg: No edema.      
Pt unable to answer admission questions or give pertinent history information due to patient losing his hearing.   
Received patient from [facility] with opened pressure injury on his coccyx and scrotal area, zinc oxide was applied and covered with Mepilex.   
Renal Progress Note    Patient: Damien Ferrer MRN: [id number]  SSN: [id number]    YOB: 1950  Age: 73 y.o.  Sex: male      Admit Date: 1/2/2024    LOS: 6 days     Subjective:   Pt is seen in the room,  alert awake, no acute distress.   No complaints of any swelling in his lower extremities, no shortness of breath   Labs done this morning showed normal electrolytes except for a low sodium of 129    Current Facility-Administered Medications   Medication Dose Route Frequency    0.9 % sodium chloride infusion   IntraVENous Continuous    carbamide peroxide (DEBROX) 6.5 % otic solution 5 drop  5 drop Both Ears BID    levoFLOXacin (LEVAQUIN) 750 MG/150ML infusion 750 mg  750 mg IntraVENous Once    [START ON 1/10/2024] levoFLOXacin (LEVAQUIN) 500 MG/100ML infusion 500 mg  500 mg IntraVENous Q48H    oxyCODONE (ROXICODONE) immediate release tablet 5 mg  5 mg Oral Q4H PRN    midodrine (PROAMATINE) tablet 10 mg  10 mg Oral TID WC    Petrolatum-Zinc Oxide ointment   Topical BID    epoetin selma-epbx (RETACRIT) injection 3,000 Units  3,000 Units IntraVENous Once per day on Mon Wed Fri    heparin (porcine) injection 2,500 Units  2,500 Units IntraCATHeter PRN    clopidogrel (PLAVIX) tablet 75 mg  75 mg Oral Daily    atorvastatin (LIPITOR) tablet 40 mg  40 mg Oral Daily    sodium chloride flush 0.9 % injection 5-40 mL  5-40 mL IntraVENous 2 times per day    sodium chloride flush 0.9 % injection 5-40 mL  5-40 mL IntraVENous PRN    0.9 % sodium chloride infusion   IntraVENous PRN    heparin (porcine) injection 5,000 Units  5,000 Units SubCUTAneous 3 times per day    ondansetron (ZOFRAN-ODT) disintegrating tablet 4 mg  4 mg Oral Q8H PRN    Or    ondansetron (ZOFRAN) injection 4 mg  4 mg IntraVENous Q6H PRN    polyethylene glycol (GLYCOLAX) packet 17 g  17 g Oral Daily PRN    acetaminophen (TYLENOL) tablet 650 mg  650 mg Oral Q6H PRN    Or    acetaminophen (TYLENOL) suppository 650 mg  650 mg Rectal Q6H PRN        Vitals: 
Renal Progress Note    Patient: Damien Ferrer MRN: [id number]  SSN: [id number]    YOB: 1950  Age: 73 y.o.  Sex: male      Admit Date: 1/2/2024    LOS: 7 days     Subjective:   Pt is seen in the room, alert awake, no acute distress.  No complaints of any swelling in his lower extremities, no shortness of breath.  He reports that he is ready to go home.      Patient has temp dialysis cath    Blood cultures, 1/2, positive for strep pneumoniae  Repeat blood cultures, 1/7, show2 no growth x 2 days    Current Facility-Administered Medications   Medication Dose Route Frequency    0.9 % sodium chloride infusion   IntraVENous Continuous    carbamide peroxide (DEBROX) 6.5 % otic solution 5 drop  5 drop Both Ears BID    [START ON 1/10/2024] levoFLOXacin (LEVAQUIN) 500 MG/100ML infusion 500 mg  500 mg IntraVENous Q48H    oxyCODONE (ROXICODONE) immediate release tablet 5 mg  5 mg Oral Q4H PRN    midodrine (PROAMATINE) tablet 10 mg  10 mg Oral TID WC    Petrolatum-Zinc Oxide ointment   Topical BID    epoetin selma-epbx (RETACRIT) injection 3,000 Units  3,000 Units IntraVENous Once per day on Mon Wed Fri    heparin (porcine) injection 2,500 Units  2,500 Units IntraCATHeter PRN    clopidogrel (PLAVIX) tablet 75 mg  75 mg Oral Daily    atorvastatin (LIPITOR) tablet 40 mg  40 mg Oral Daily    sodium chloride flush 0.9 % injection 5-40 mL  5-40 mL IntraVENous 2 times per day    sodium chloride flush 0.9 % injection 5-40 mL  5-40 mL IntraVENous PRN    0.9 % sodium chloride infusion   IntraVENous PRN    heparin (porcine) injection 5,000 Units  5,000 Units SubCUTAneous 3 times per day    ondansetron (ZOFRAN-ODT) disintegrating tablet 4 mg  4 mg Oral Q8H PRN    Or    ondansetron (ZOFRAN) injection 4 mg  4 mg IntraVENous Q6H PRN    polyethylene glycol (GLYCOLAX) packet 17 g  17 g Oral Daily PRN    acetaminophen (TYLENOL) tablet 650 mg  650 mg Oral Q6H PRN    Or    acetaminophen (TYLENOL) suppository 650 mg  650 mg Rectal 
Renal Progress Note    Patient: Damien Frerer MRN: [id number]  SSN: [id number]    YOB: 1950  Age: 73 y.o.  Sex: male      Admit Date: 1/2/2024    LOS: 9 days     Subjective:   Pt is seen at bedside,, alert awake, no acute distress.  No complaints of any swelling in his lower extremities, no shortness of breath.  Patient has temp dialysis cath for bacteremia        Vitals:    01/10/24 2109 01/11/24 0053 01/11/24 0123 01/11/24 0833   BP:  (!) 110/57  (!) 90/50   Pulse:  73  77   Resp: 16 16 16 18   Temp:  98.8 °F (37.1 °C)  99.9 °F (37.7 °C)   TempSrc:  Oral  Oral   SpO2:  99%  91%   Weight:       Height:         Objective:   General: awake, alert, no acute distress.  HEENT: no icterus, no pallor,  mucosa moist.  Neck: Neck is supple, No JVD  Lungs: breathsounds normal, no respiratory distress on inspection, no rhonchi, no rales.  CVS: heart sounds normal, regular rate and rhythm.   GI: soft, nontender, normal BS.  Extremities: no cyanosis, no edema, right BKA   Neuro: alert, oriented.  Skin: normal skin turgor, no skin rashes.     Intake and Output:  Current Shift: No intake/output data recorded.  Last three shifts: 01/10 0701 - 01/11 1900  In: 1082.5 [P.O.:480; I.V.:2.5]  Out: 1100       Lab/Data Review:  Recent Labs     01/09/24  0624 01/10/24  0344 01/11/24  0712   WBC 16.9* 16.6* 14.1*   HGB 9.4* 8.7* 8.7*   HCT 27.1* 25.9* 26.3*    328 330     Recent Labs     01/09/24  0624 01/10/24  0344 01/11/24  0712   * 130* 132*   K 3.8 3.8 3.7   CL 96* 97 100   CO2 21 22 24   GLUCOSE 160* 143* 211*   BUN 44* 58* 40*   CREATININE 5.96* 7.39* 6.11*   CALCIUM 9.2 8.9 8.7   PHOS 3.0  --   --    LABALBU 2.7* 2.5* 2.5*   BILITOT  --  0.7 0.5   AST  --  25 28   ALT  --  21 21     No results for input(s): \"PHART\", \"NPI6UDD\", \"PO2ART\", \"WZX8PYO\", \"BEART\", \"QEK0SDZA\", \"HGBART\", \"AV1DMPPEW\", \"FIO2A\", \"R0YIIJDW\", \"OXYHEM\", \"CARBOXHGBART\", \"METHGBART\", \"C0MCULBMS\", \"PHCORART\", \"TEMP\" in the last 72 
Report called to nurse Ramos on 5W.  Daughter notified that patient is moving to 587 for bed flow purposes.    
Spiritual Care Assessment/Progress Note  [facility]    Name: Damien Ferrer MRN: [id number]    Age: 73 y.o.     Sex: male   Language: English     Date: 1/9/2024            Total Time Calculated: 27 min              Spiritual Assessment begun in [facility]  Service Provided For:: Patient  Referral/Consult From:: Rounding  Encounter Overview/Reason : Initial Encounter    Spiritual beliefs:      [x] Involved in a richard tradition/spiritual practice: Yarsanism     [] Supported by a richard community:      [] Claims no spiritual orientation:      [] Seeking spiritual identity:           [] Adheres to an individual form of spirituality:      [] Not able to assess:                Identified resources for coping and support system:   Support System: Family members       [x] Prayer                  [] Devotional reading               [] Music                  [] Guided Imagery     [] Pet visits                                        [] Other: (COMMENT)     Specific area/focus of visit   Encounter:    Crisis:    Spiritual/Emotional needs: Type: Spiritual Support  Ritual, Rites and Sacraments:    Grief, Loss, and Adjustments:    Ethics/Mediation:    Behavioral Health:    Palliative Care:    Advance Care Planning:      Plan/Referrals: Other (Comment) ( is available if needed)    Narrative:  visited pt Mr. Damien Ferrer while rounding on [facility] for the purpose of making a Spiritual Assessment. Pt's chart reviewed.  conferred with CASTRO Ryan prior to visit, to learn of pt's communication methods. Mr. uQezada is asleep in bed but awakens to 's prompts, welcomes spiritual health visit. Mr. Quezada reflects on course of hospitalization, remarking on the health challenges he's had. Mr. Quezada expresses his frustration with requiring white-board writing for communication. Pt is able to identify hope for recovery and well-being, identifying his Yarsanism richard and family visitors as supportive 
Vancomycin Dosing Consult  Damien Ferrer is a 73 y.o. male with sepsis. Pharmacy was consulted by Dr Magallanes to dose and monitor vancomycin. Today is day 1.    Antibiotic regimen: Vancomycin + cefepime    Temp (24hrs), Av.9 °F (36.6 °C), Min:97.1 °F (36.2 °C), Max:99.2 °F (37.3 °C)    Recent Labs     24  1606 24  0652   WBC 15.2* 16.1*   CREATININE 15.80* 16.00*   * 143*     Est CrCl: 4 mL/min (ESRD / dialysis)  Concomitant nephrotoxic drugs: None    Cultures:   Blood x 3 - pending  Urine x 1 - pending  Wound x 1 - pending    MRSA Swab: Not ordered, patient already received first dose of vancomycin    Target range: Re-dose for random level <15 mcg/mL    Recent level history:  Date/Time Dose & Interval Measured Level (mcg/mL) Associated AUC/BRENDA              Assessment/Plan:   Patient received Vancomycin 1750 mg 24 @ 0140  Next level to be determined  Antimicrobial stop date TBD      
Vancomycin Dosing Consult  Damien Ferrer is a 73 y.o. male with sepsis. Pharmacy was consulted by Dr Magallanes to dose and monitor vancomycin. Today is day 2.    Antibiotic regimen: Vancomycin + cefepime    Temp (24hrs), Av.2 °F (36.8 °C), Min:97.5 °F (36.4 °C), Max:98.6 °F (37 °C)    Recent Labs     24  0652 24  0643 24  0603   WBC 16.1* 11.7* 11.2*   CREATININE 16.00* 10.30* 6.99*   * 85* 62*     Est CrCl: ESRD on HD MWF  Concomitant nephrotoxic drugs: None    Cultures:    Blood x 2: Strep pneumo in both sets    MRSA Swab: Not ordered, patient already received first dose of vancomycin    Target range: Trough 15-20 mcg/mL (Intermittent hemodialysis, non-invasive infection or non-MRSA infection)    Recent level history:  Date/Time Dose & Interval Measured Level (mcg/mL) Associated AUC/BRENDA              Assessment/Plan:  Unsure of source, but seems likely catheter related source given other sources have essentially been ruled out. Bcx growing Strep pneumo in both sets  ESRD on HD MWF  Level resulted at 15.0 this AM (pre-HD), planning HD today for 3.5 hrs  Redose vancomycin 1000mg x 1 dose after HD completed today  Next level scheduled for  @ 0600 (Pre-HD)  Antimicrobial stop date TBD        
Vancomycin Dosing Consult  Damien Ferrer is a 73 y.o. male with sepsis. Pharmacy was consulted by Dr Magallanes to dose and monitor vancomycin. Today is day 7.    Antibiotic regimen: Vancomycin monotherapy    Temp (24hrs), Av.7 °F (37.1 °C), Min:98.2 °F (36.8 °C), Max:99 °F (37.2 °C)    Recent Labs     24  0750 24  0624 01/10/24  0344   WBC 19.0* 16.9* 16.6*   CREATININE 8.30* 5.96* 7.39*   BUN 71* 44* 58*     Est CrCl: ESRD on HD MWF  Concomitant nephrotoxic drugs: None    Cultures:    Blood x 2: Strep pneumo in both sets    MRSA Swab: Not ordered, patient already received first dose of vancomycin    Target range: Trough 15-20 mcg/mL (Intermittent hemodialysis, non-invasive infection or non-MRSA infection)    Recent level history:  Date/Time Dose & Interval Measured Level (mcg/mL) Associated AUC/BRENDA   1/10 @ 0344 Restarted from  15.2         Assessment/Plan:  Unsure of source, but seems likely catheter related source given other sources have essentially been ruled out. Bcx growing Strep pneumo in both sets  ESRD on HD MWF, HD today x 3.5 hrs  Level resulted at 15.2 this AM (pre-HD)  Redose vancomycin 1000mg x 1 dose after HD completed today  Next level scheduled for  @ 0600 (Pre-HD)  Antimicrobial stop date TBD          
  Psychiatric: normal behavior    Labs/Imaging/Diagnostics    Labs:  CBC:  Recent Labs     01/05/24  0603 01/06/24 0727 01/07/24  0536   WBC 11.2* 13.5* 17.1*   RBC 2.97* 3.06* 3.05*   HGB 9.6* 9.7* 9.7*   HCT 28.0* 28.8* 29.1*   MCV 94.3 94.1 95.4   RDW 14.6* 14.5 14.5    228 218       CHEMISTRIES:  Recent Labs     01/05/24 0603 01/06/24 0727 01/07/24 0536   * 132* 130*   K 3.4* 3.5 3.7   CL 96* 97 97   CO2 22 24 22   BUN 62* 40* 55*   CREATININE 6.99* 5.30* 6.87*   GLUCOSE 179* 177* 186*   PHOS 3.8  --   --        Procal 63.47 <86.76 <154.48 <153.92  CRP 21.90 < 24.70 <22.20 <28.10    Influenza A&B antigen Negative  SARS CoV-2 Negative     Blood cultures (1/2/24)   Streptococcus pneumoniae  Antibiotic Interpretation Microscan     cefTRIAXone (meningitis) Sensitive <=0.12 ug/mL    Ceftriaxone (non-meningitis) Sensitive <=0.12 ug/mL    erythromycin Sensitive <=0.12 ug/mL    levofloxacin Sensitive 0.5 ug/mL    moxifloxacin Sensitive 0.12 ug/mL    Penicillin meningitis Sensitive <=0.06 ug/mL    penicillin non meningitis,parenteral Sensitive <=0.06 ug/mL    Penicillin oral Sensitive <=0.06 ug/mL    Cefotaxime - meningitis Sensitive <=0.12 ug/mL    Cefotaxime - non-meningitis Sensitive <=0.12 ug/mL    tetracycline Sensitive <=0.25 ug/mL    trimethoprim-sulfamethoxazole Sensitive <=10 ug/mL    Blood cultures (1/2/24)   Streptococcus pneumoniae  Blood culture PCR ID panel in process    Imaging Last 24 Hours:     CT Head (1/3) No acute intracranial abnormality        XR CHEST PORTABLE        Result Date: 1/3/2024  Lung volumes are low with associated crowded lung markings in the perihilar regions and lower lobes. A double-lumen catheter has been introduced on the right, with its tip projecting over the right atrium. The visualized bones and upper abdomen are age-appropriate. There is no obvious pneumothorax.           TTE (1/5/24)     Aortic Valve: Moderately calcified cusp.    Mitral Valve: Mildly 
°C) 99.1 °F (37.3 °C)    TempSrc: Oral Oral Oral    SpO2: 97% 93% 98%    Weight:       Height:         Objective:   General: awake, alert, no acute distress.  HEENT: no icterus, no pallor,  mucosa moist.  Neck: Neck is supple, No JVD  Lungs: breathsounds normal, no respiratory distress on inspection, no rhonchi, no rales.  CVS: heart sounds normal, regular rate and rhythm.   GI: soft, nontender, normal BS.  Extremities: no cyanosis, no edema, right BKA   Neuro: alert, oriented.  Skin: normal skin turgor, no skin rashes.     Intake and Output:  Current Shift: No intake/output data recorded.  Last three shifts: 01/09 0701 - 01/10 1900  In: 1572.5 [P.O.:820; I.V.:152.5]  Out: 1100       Lab/Data Review:  Recent Labs     01/08/24  0750 01/09/24  0624 01/10/24  0344   WBC 19.0* 16.9* 16.6*   HGB 9.1* 9.4* 8.7*   HCT 27.4* 27.1* 25.9*    316 328     Recent Labs     01/08/24  0750 01/09/24  0624 01/10/24  0344   * 131* 130*   K 4.4 3.8 3.8   CL 97 96* 97   CO2 21 21 22   GLUCOSE 152* 160* 143*   BUN 71* 44* 58*   CREATININE 8.30* 5.96* 7.39*   CALCIUM 8.8 9.2 8.9   PHOS 4.0 3.0  --    LABALBU 2.7* 2.7* 2.5*   BILITOT  --   --  0.7   AST  --   --  25   ALT  --   --  21     No results for input(s): \"PHART\", \"XCE3TRT\", \"PO2ART\", \"BQG3DGN\", \"BEART\", \"KAG1CMZS\", \"HGBART\", \"CR5LBXWMN\", \"FIO2A\", \"Y3FQOZJP\", \"OXYHEM\", \"CARBOXHGBART\", \"METHGBART\", \"M8SSKBVKD\", \"PHCORART\", \"TEMP\" in the last 72 hours.    Invalid input(s): \"SNU1AYYNW\"  Recent Results (from the past 24 hour(s))   CBC with Auto Differential    Collection Time: 01/10/24  3:44 AM   Result Value Ref Range    WBC 16.6 (H) 4.1 - 11.1 K/uL    RBC 2.69 (L) 4.10 - 5.70 M/uL    Hemoglobin 8.7 (L) 12.1 - 17.0 g/dL    Hematocrit 25.9 (L) 36.6 - 50.3 %    MCV 96.3 80.0 - 99.0 FL    MCH 32.3 26.0 - 34.0 PG    MCHC 33.6 30.0 - 36.5 g/dL    RDW 14.5 11.5 - 14.5 %    Platelets 328 150 - 400 K/uL    MPV 11.8 8.9 - 12.9 FL    Nucleated RBCs 0.0 0.0  WBC    nRBC 0.00 
  GI Prophylaxis:None    Subjective:     Chief Complaint / Reason for Physician Visit  Patient evaluated at the bedside.  Patient awake, alert, appears to be resting comfortably.  History communicated through writing.  Patient reports pain near her temporary dialysis catheter site.  Endorses feeling needs to have a bowel movement.  Otherwise feels fatigued, generally weak.  Discussed with RN events overnight.       Objective:     VITALS:   Last 24hrs VS reviewed since prior progress note. Most recent are:  Patient Vitals for the past 24 hrs:   BP Temp Temp src Pulse Resp SpO2   01/05/24 0829 (!) 109/59 98.2 °F (36.8 °C) -- 75 -- 98 %   01/05/24 0404 (!) 97/48 97.5 °F (36.4 °C) Oral 78 18 97 %   01/04/24 2015 (!) 107/54 98.4 °F (36.9 °C) Oral 80 18 98 %   01/04/24 1609 (!) 107/58 98.6 °F (37 °C) Oral 82 13 99 %   01/04/24 1239 (!) 102/57 98.2 °F (36.8 °C) Oral 82 17 100 %         No intake or output data in the 24 hours ending 01/05/24 1141       I had a face to face encounter and independently examined this patient on 1/5/2024, as outlined below:    Review of Systems   Reason unable to perform ROS: hard of hearing.   Constitutional:  Positive for fatigue.   HENT:  Positive for hearing loss.    Musculoskeletal:         Right shoulder pain +   Neurological:  Positive for weakness (generalized).        PHYSICAL EXAM:  Physical Exam  Vitals reviewed.   Constitutional:       General: He is not in acute distress.     Appearance: Normal appearance. He is ill-appearing.   HENT:      Head: Normocephalic and atraumatic.      Right Ear: External ear normal.      Left Ear: Ear canal and external ear normal.      Ears:      Comments: Hearing loss  Eyes:      Extraocular Movements: Extraocular movements intact.      Conjunctiva/sclera: Conjunctivae normal.   Cardiovascular:      Rate and Rhythm: Normal rate and regular rhythm.      Heart sounds: Normal heart sounds.   Pulmonary:      Effort: Pulmonary effort is normal. No 
WBC    nRBC 0.00 0.00 - 0.01 K/uL   Comprehensive Metabolic Panel    Collection Time: 01/06/24  7:27 AM   Result Value Ref Range    Sodium 132 (L) 136 - 145 mmol/L    Potassium 3.5 3.5 - 5.1 mmol/L    Chloride 97 97 - 108 mmol/L    CO2 24 21 - 32 mmol/L    Anion Gap 11 5 - 15 mmol/L    Glucose 177 (H) 65 - 100 mg/dL    BUN 40 (H) 6 - 20 mg/dL    Creatinine 5.30 (H) 0.70 - 1.30 mg/dL    Bun/Cre Ratio 8 (L) 12 - 20      Est, Glom Filt Rate 11 (L) >60 ml/min/1.73m2    Calcium 9.0 8.5 - 10.1 mg/dL    Total Bilirubin 0.8 0.2 - 1.0 mg/dL    AST 62 (H) 15 - 37 U/L    ALT 31 12 - 78 U/L    Alk Phosphatase 93 45 - 117 U/L    Total Protein 8.9 (H) 6.4 - 8.2 g/dL    Albumin 3.0 (L) 3.5 - 5.0 g/dL    Globulin 5.9 (H) 2.0 - 4.0 g/dL    Albumin/Globulin Ratio 0.5 (L) 1.1 - 2.2            Cardiac cath:    No results found for this or any previous visit.     Echo:     01/02/24    ECHO (TTE) COMPLETE (PRN CONTRAST/BUBBLE/STRAIN/3D) 01/05/2024  3:32 PM (Final)    Interpretation Summary    Left Ventricle: Severely reduced left ventricular systolic function with a visually estimated EF of 20 - 25%. Left ventricle size is normal. Normal wall thickness. Severe global hypokinesis present. Abnormal diastolic function.    Aortic Valve: Moderately calcified cusp.    Mitral Valve: Mildly thickened leaflet. Mild regurgitation.    Tricuspid Valve: The estimated RVSP is 16 mmHg.    Image quality is good. Contrast used: Definity.    Signed by: Segundo Bar MD on 1/5/2024  3:32 PM       Patient's  EKG, laboratory data and echocardiogram were individually reviewed by me.      Lab Data:    Recent Labs     01/05/24  0603 01/06/24  0727   WBC 11.2* 13.5*   HGB 9.6* 9.7*   HCT 28.0* 28.8*    228     No results for input(s): \"INR\", \"APTT\" in the last 72 hours.    Invalid input(s): \"PTP\"   Recent Labs     01/04/24  0643 01/05/24  0603 01/06/24  0727   * 131* 132*   K 4.0 3.4* 3.5   CL 98 96* 97   CO2 21 22 24   BUN 85* 62* 40*     No 
MD        polyethylene glycol (GLYCOLAX) packet 17 g  17 g Oral Daily PRN Thom Lala MD        acetaminophen (TYLENOL) tablet 650 mg  650 mg Oral Q6H PRN Thom Lala MD   650 mg at 01/05/24 0843    Or    acetaminophen (TYLENOL) suppository 650 mg  650 mg Rectal Q6H PRN Thom Lala MD            Allergies   Allergen Reactions    Lisinopril Angioedema    Penicillins Rash       Review of Systems:  A comprehensive review of systems was negative except for that written in the History of Present Illness.    Objective:     Vitals:    01/04/24 1609 01/04/24 2015 01/05/24 0404 01/05/24 0829   BP: (!) 107/58 (!) 107/54 (!) 97/48 (!) 109/59   Pulse: 82 80 78 75   Resp: 13 18 18    Temp: 98.6 °F (37 °C) 98.4 °F (36.9 °C) 97.5 °F (36.4 °C) 98.2 °F (36.8 °C)   TempSrc: Oral Oral Oral    SpO2: 99% 98% 97% 98%   Weight:       Height:            Physical Exam:  General: NAD  Eyes: sclera anicteric  Oral Cavity: No thrush or ulcers  Neck: no JVD  Chest: Fair bilateral air entry  Heart: normal sounds  Abdomen: soft and non tender   : no machado  Lower Extremities: no edema  Skin: no rash  Neuro: intact  Psychiatric: non-depressed          Assessment/Plan:     1 ESRD.    -on MWF schedule.   -He missed several hemodialysis sessions as an outpatient.    -He came with BUN/creatinine 143/16.0.    -He is volume overload and his chest x-ray did show pulmonary edema.    -His AV fistula did not work.    -He did receive temporary dialysis catheter placement by IR because patient had leukocytosis.    -dialyzed 1/03 and 1/04.  -plan for HD today.    2 metabolic acidosis, high anion gap.    -It is due to missed dialysis sessions.    -resolved  -will dialyze with standard bicarb bath.    3.  Chronic hypotension.    -bps low  -He is on midodrine 5 mg 3 times daily.  -will increase 10 mg tid.    4.  Leukocytosis.    -He was also with low-grade fever.    -Blood cultures -strep pneumonia (1/2 bottles).    -s/p inserted temporary dialysis 
Behavior: Behavior normal.          Reviewed most current lab test results and cultures  YES  Reviewed most current radiology test results   YES  Review and summation of old records today    NO  Reviewed patient's current orders and MAR    YES  PMH/SH reviewed - no change compared to H&P  ________________________________________________________________________  Care Plan discussed with:    Comments   Patient x    Family      RN x    Care Manager     Consultant                        Multidiciplinary team rounds were held today with , nursing, pharmacist and clinical coordinator.  Patient's plan of care was discussed; medications were reviewed and discharge planning was addressed.     ________________________________________________________________________  Total NON critical care TIME:  35  Minutes    Total CRITICAL CARE TIME Spent:   Minutes non procedure based      Comments   >50% of visit spent in counseling and coordination of care     ________________________________________________________________________  Jose Cristina PA-C     Procedures: see electronic medical records for all procedures/Xrays and details which were not copied into this note but were reviewed prior to creation of Plan.      LABS:  I reviewed today's most current labs and imaging studies.  Pertinent labs include:  Recent Labs     01/07/24  0536 01/08/24  0750 01/09/24  0624   WBC 17.1* 19.0* 16.9*   HGB 9.7* 9.1* 9.4*   HCT 29.1* 27.4* 27.1*    280 316       Recent Labs     01/07/24  0536 01/08/24  0750 01/09/24  0624   * 129* 131*   K 3.7 4.4 3.8   CL 97 97 96*   CO2 22 21 21   BUN 55* 71* 44*   PHOS  --  4.0 3.0   ALT 28  --   --          Signed: Jose Cristina PA-C

## 2024-01-13 LAB
BACTERIA SPEC CULT: NORMAL
BACTERIA SPEC CULT: NORMAL
GRAM STN SPEC: NORMAL
GRAM STN SPEC: NORMAL
Lab: NORMAL
Lab: NORMAL

## 2024-01-18 ENCOUNTER — OFFICE VISIT (OUTPATIENT)
Age: 74
End: 2024-01-18
Payer: MEDICARE

## 2024-01-18 VITALS
HEART RATE: 86 BPM | RESPIRATION RATE: 17 BRPM | OXYGEN SATURATION: 97 % | BODY MASS INDEX: 22.96 KG/M2 | TEMPERATURE: 97.7 F | SYSTOLIC BLOOD PRESSURE: 115 MMHG | HEIGHT: 70 IN | DIASTOLIC BLOOD PRESSURE: 67 MMHG

## 2024-01-18 DIAGNOSIS — Z89.511 S/P BKA (BELOW KNEE AMPUTATION), RIGHT (HCC): Primary | ICD-10-CM

## 2024-01-18 PROCEDURE — 3074F SYST BP LT 130 MM HG: CPT | Performed by: SURGERY

## 2024-01-18 PROCEDURE — 1123F ACP DISCUSS/DSCN MKR DOCD: CPT | Performed by: SURGERY

## 2024-01-18 PROCEDURE — G8427 DOCREV CUR MEDS BY ELIG CLIN: HCPCS | Performed by: SURGERY

## 2024-01-18 PROCEDURE — G8484 FLU IMMUNIZE NO ADMIN: HCPCS | Performed by: SURGERY

## 2024-01-18 PROCEDURE — 1036F TOBACCO NON-USER: CPT | Performed by: SURGERY

## 2024-01-18 PROCEDURE — 99212 OFFICE O/P EST SF 10 MIN: CPT | Performed by: SURGERY

## 2024-01-18 PROCEDURE — 3017F COLORECTAL CA SCREEN DOC REV: CPT | Performed by: SURGERY

## 2024-01-18 PROCEDURE — 1111F DSCHRG MED/CURRENT MED MERGE: CPT | Performed by: SURGERY

## 2024-01-18 PROCEDURE — 3078F DIAST BP <80 MM HG: CPT | Performed by: SURGERY

## 2024-01-18 PROCEDURE — G8420 CALC BMI NORM PARAMETERS: HCPCS | Performed by: SURGERY

## 2024-01-18 ASSESSMENT — PATIENT HEALTH QUESTIONNAIRE - PHQ9
2. FEELING DOWN, DEPRESSED OR HOPELESS: 0
SUM OF ALL RESPONSES TO PHQ9 QUESTIONS 1 & 2: 0
SUM OF ALL RESPONSES TO PHQ QUESTIONS 1-9: 0
1. LITTLE INTEREST OR PLEASURE IN DOING THINGS: 0
SUM OF ALL RESPONSES TO PHQ QUESTIONS 1-9: 0

## 2024-01-18 NOTE — PROGRESS NOTES
Identified pt with two pt identifiers (name and ). Reviewed chart in preparation for visit and have obtained necessary documentation.    Damien Ferrer is a 73 y.o. male  Chief Complaint   Patient presents with    Follow-up     Left foot      /67 (Site: Right Upper Arm, Position: Sitting)   Pulse 86   Temp 97.7 °F (36.5 °C)   Resp 17   Ht 1.778 m (5' 10\")   SpO2 97%   BMI 22.96 kg/m²     1. Have you been to the ER, urgent care clinic since your last visit?  Hospitalized since your last visit?NO    2. Have you seen or consulted any other health care providers outside of the [facility] since your last visit?  Include any pap smears or colon screening. NO

## 2024-01-26 NOTE — PROGRESS NOTES
Vascular History and Physical    Patient: Damien Ferrer  MRN: [id number]    YOB: 1950  Age: 73 y.o.  Sex: male     Chief Complaint:  Chief Complaint   Patient presents with    Follow-up     Left foot        History of Present Illness: Damien Ferrer is a 73 y.o. very pleasant gentleman is here today for follow-up.  Patient recently hospitalized and apparently antibiotic causes him hearing loss.  He could not hear anything today.  Patient is accompanied by caretaker today.  Patient doing well has a prosthetic leg on the right BKA.  Patient denies any left leg pain.    Social History:  Social Connections: Not on file       Past Medical History:  Past Medical History:   Diagnosis Date    Asthenia 01/24/2021    Cardiomyopathy ([facility]) 01/24/2021    CHF (congestive heart failure) ([facility])     Chronic kidney disease     CKD (chronic kidney disease)     4    End stage renal disease on dialysis ([facility]) 01/24/2021    Essential hypertension 01/24/2021    Gastroesophageal reflux disease 01/24/2021    Gastroesophageal reflux disease 01/24/2021    Hypertension     Pneumonia due to COVID-19 virus 01/24/2021    Systolic CHF, acute on chronic (HCC) 01/24/2021       Surgical History:  Past Surgical History:   Procedure Laterality Date    CARDIAC CATHETERIZATION      COLONOSCOPY N/A 12/3/2020    COLONOSCOPY performed by Patsy Chakraborty MD at [facility] ENDOSCOPY    COLONOSCOPY N/A 12/2/2022    COLONOSCOPY performed by Patsy Chakraborty MD at [facility] ENDOSCOPY    FOOT SURGERY Right 5/17/2023    Open Transmetatarsal Amputation Right Foot performed by Phil Reyes DPM at [facility] MAIN OR    HERNIA REPAIR      INVASIVE VASCULAR N/A 5/16/2023    Angiography lower ext right performed by Papo Charlton MD at [facility] ELECTROPHYSIOLOGY    INVASIVE VASCULAR N/A 5/16/2023    Aortagram abdominal performed by Papo Charlton MD at [facility] ELECTROPHYSIOLOGY    INVASIVE VASCULAR N/A 5/16/2023    Ultrasound guided vascular access performed by Papo Charlton MD at

## 2024-02-08 ENCOUNTER — HOSPITAL ENCOUNTER (INPATIENT)
Facility: HOSPITAL | Age: 74
LOS: 5 days | Discharge: HOME HEALTH | DRG: 592 | End: 2024-02-13
Attending: STUDENT IN AN ORGANIZED HEALTH CARE EDUCATION/TRAINING PROGRAM | Admitting: INTERNAL MEDICINE
Payer: MEDICARE

## 2024-02-08 ENCOUNTER — APPOINTMENT (OUTPATIENT)
Facility: HOSPITAL | Age: 74
DRG: 592 | End: 2024-02-08
Payer: MEDICARE

## 2024-02-08 ENCOUNTER — OFFICE VISIT (OUTPATIENT)
Age: 74
End: 2024-02-08
Payer: MEDICARE

## 2024-02-08 VITALS
DIASTOLIC BLOOD PRESSURE: 74 MMHG | WEIGHT: 168 LBS | HEART RATE: 75 BPM | SYSTOLIC BLOOD PRESSURE: 131 MMHG | OXYGEN SATURATION: 98 % | RESPIRATION RATE: 17 BRPM | BODY MASS INDEX: 24.05 KG/M2 | TEMPERATURE: 97.7 F | HEIGHT: 70 IN

## 2024-02-08 DIAGNOSIS — L08.9 INFECTED WOUND: ICD-10-CM

## 2024-02-08 DIAGNOSIS — T14.8XXA INFECTED WOUND: ICD-10-CM

## 2024-02-08 DIAGNOSIS — L89.154 PRESSURE INJURY OF SACRAL REGION, STAGE 4 (HCC): ICD-10-CM

## 2024-02-08 DIAGNOSIS — I95.3 HEMODIALYSIS-ASSOCIATED HYPOTENSION: ICD-10-CM

## 2024-02-08 DIAGNOSIS — L98.422 SKIN ULCER OF SACRUM WITH FAT LAYER EXPOSED (HCC): Primary | ICD-10-CM

## 2024-02-08 DIAGNOSIS — I73.9 PVD (PERIPHERAL VASCULAR DISEASE) (HCC): Primary | ICD-10-CM

## 2024-02-08 LAB
ALBUMIN SERPL-MCNC: 2.7 G/DL (ref 3.5–5)
ALBUMIN/GLOB SERPL: 0.4 (ref 1.1–2.2)
ALP SERPL-CCNC: 121 U/L (ref 45–117)
ALT SERPL-CCNC: 8 U/L (ref 12–78)
ANION GAP SERPL CALC-SCNC: 6 MMOL/L (ref 5–15)
AST SERPL W P-5'-P-CCNC: 16 U/L (ref 15–37)
BASOPHILS # BLD: 0.1 K/UL (ref 0–0.1)
BASOPHILS NFR BLD: 1 % (ref 0–1)
BILIRUB SERPL-MCNC: 0.3 MG/DL (ref 0.2–1)
BUN SERPL-MCNC: 41 MG/DL (ref 6–20)
BUN/CREAT SERPL: 5 (ref 12–20)
CA-I BLD-MCNC: 9 MG/DL (ref 8.5–10.1)
CHLORIDE SERPL-SCNC: 100 MMOL/L (ref 97–108)
CO2 SERPL-SCNC: 28 MMOL/L (ref 21–32)
CREAT SERPL-MCNC: 8.06 MG/DL (ref 0.7–1.3)
CRP SERPL-MCNC: 5.89 MG/DL (ref 0–0.3)
DIFFERENTIAL METHOD BLD: ABNORMAL
EOSINOPHIL # BLD: 1.1 K/UL (ref 0–0.4)
EOSINOPHIL NFR BLD: 14 % (ref 0–7)
ERYTHROCYTE [DISTWIDTH] IN BLOOD BY AUTOMATED COUNT: 13.9 % (ref 11.5–14.5)
GLOBULIN SER CALC-MCNC: 7.3 G/DL (ref 2–4)
GLUCOSE SERPL-MCNC: 91 MG/DL (ref 65–100)
HBV SURFACE AG SER QL: 0.36 INDEX
HBV SURFACE AG SER QL: NEGATIVE
HCT VFR BLD AUTO: 29.1 % (ref 36.6–50.3)
HGB BLD-MCNC: 9.1 G/DL (ref 12.1–17)
IMM GRANULOCYTES # BLD AUTO: 0 K/UL (ref 0–0.04)
IMM GRANULOCYTES NFR BLD AUTO: 0 % (ref 0–0.5)
LYMPHOCYTES # BLD: 1.7 K/UL (ref 0.8–3.5)
LYMPHOCYTES NFR BLD: 22 % (ref 12–49)
MCH RBC QN AUTO: 30.5 PG (ref 26–34)
MCHC RBC AUTO-ENTMCNC: 31.3 G/DL (ref 30–36.5)
MCV RBC AUTO: 97.7 FL (ref 80–99)
MONOCYTES # BLD: 0.9 K/UL (ref 0–1)
MONOCYTES NFR BLD: 12 % (ref 5–13)
NEUTS SEG # BLD: 3.9 K/UL (ref 1.8–8)
NEUTS SEG NFR BLD: 51 % (ref 32–75)
NRBC # BLD: 0 K/UL (ref 0–0.01)
NRBC BLD-RTO: 0 PER 100 WBC
PLATELET # BLD AUTO: 366 K/UL (ref 150–400)
PMV BLD AUTO: 11 FL (ref 8.9–12.9)
POTASSIUM SERPL-SCNC: 4.9 MMOL/L (ref 3.5–5.1)
PROCALCITONIN SERPL-MCNC: 1.2 NG/ML
PROT SERPL-MCNC: 10 G/DL (ref 6.4–8.2)
RBC # BLD AUTO: 2.98 M/UL (ref 4.1–5.7)
RBC MORPH BLD: ABNORMAL
SODIUM SERPL-SCNC: 134 MMOL/L (ref 136–145)
WBC # BLD AUTO: 7.7 K/UL (ref 4.1–11.1)

## 2024-02-08 PROCEDURE — G8427 DOCREV CUR MEDS BY ELIG CLIN: HCPCS | Performed by: SURGERY

## 2024-02-08 PROCEDURE — 6370000000 HC RX 637 (ALT 250 FOR IP): Performed by: INTERNAL MEDICINE

## 2024-02-08 PROCEDURE — 3078F DIAST BP <80 MM HG: CPT | Performed by: SURGERY

## 2024-02-08 PROCEDURE — 1036F TOBACCO NON-USER: CPT | Performed by: SURGERY

## 2024-02-08 PROCEDURE — G0257 UNSCHED DIALYSIS ESRD PT HOS: HCPCS

## 2024-02-08 PROCEDURE — 6360000002 HC RX W HCPCS: Performed by: INTERNAL MEDICINE

## 2024-02-08 PROCEDURE — 87340 HEPATITIS B SURFACE AG IA: CPT

## 2024-02-08 PROCEDURE — 1111F DSCHRG MED/CURRENT MED MERGE: CPT | Performed by: SURGERY

## 2024-02-08 PROCEDURE — 99212 OFFICE O/P EST SF 10 MIN: CPT | Performed by: SURGERY

## 2024-02-08 PROCEDURE — 6360000002 HC RX W HCPCS: Performed by: STUDENT IN AN ORGANIZED HEALTH CARE EDUCATION/TRAINING PROGRAM

## 2024-02-08 PROCEDURE — 85025 COMPLETE CBC W/AUTO DIFF WBC: CPT

## 2024-02-08 PROCEDURE — 2580000003 HC RX 258: Performed by: STUDENT IN AN ORGANIZED HEALTH CARE EDUCATION/TRAINING PROGRAM

## 2024-02-08 PROCEDURE — 86140 C-REACTIVE PROTEIN: CPT

## 2024-02-08 PROCEDURE — 3017F COLORECTAL CA SCREEN DOC REV: CPT | Performed by: SURGERY

## 2024-02-08 PROCEDURE — 36415 COLL VENOUS BLD VENIPUNCTURE: CPT

## 2024-02-08 PROCEDURE — 2580000003 HC RX 258: Performed by: INTERNAL MEDICINE

## 2024-02-08 PROCEDURE — 99285 EMERGENCY DEPT VISIT HI MDM: CPT

## 2024-02-08 PROCEDURE — 1123F ACP DISCUSS/DSCN MKR DOCD: CPT | Performed by: SURGERY

## 2024-02-08 PROCEDURE — 3075F SYST BP GE 130 - 139MM HG: CPT | Performed by: SURGERY

## 2024-02-08 PROCEDURE — 2709999900 HC NON-CHARGEABLE SUPPLY

## 2024-02-08 PROCEDURE — 2500000003 HC RX 250 WO HCPCS: Performed by: STUDENT IN AN ORGANIZED HEALTH CARE EDUCATION/TRAINING PROGRAM

## 2024-02-08 PROCEDURE — G8484 FLU IMMUNIZE NO ADMIN: HCPCS | Performed by: SURGERY

## 2024-02-08 PROCEDURE — 80053 COMPREHEN METABOLIC PANEL: CPT

## 2024-02-08 PROCEDURE — 72192 CT PELVIS W/O DYE: CPT

## 2024-02-08 PROCEDURE — 99223 1ST HOSP IP/OBS HIGH 75: CPT | Performed by: INTERNAL MEDICINE

## 2024-02-08 PROCEDURE — 1100000000 HC RM PRIVATE

## 2024-02-08 PROCEDURE — 84145 PROCALCITONIN (PCT): CPT

## 2024-02-08 PROCEDURE — 96372 THER/PROPH/DIAG INJ SC/IM: CPT

## 2024-02-08 PROCEDURE — G8420 CALC BMI NORM PARAMETERS: HCPCS | Performed by: SURGERY

## 2024-02-08 RX ORDER — CLOPIDOGREL BISULFATE 75 MG/1
75 TABLET ORAL EVERY OTHER DAY
COMMUNITY
Start: 2024-01-26 | End: 2024-02-16

## 2024-02-08 RX ORDER — ACETAMINOPHEN 325 MG/1
650 TABLET ORAL EVERY 6 HOURS PRN
Status: DISCONTINUED | OUTPATIENT
Start: 2024-02-08 | End: 2024-02-13 | Stop reason: HOSPADM

## 2024-02-08 RX ORDER — HEPARIN SODIUM 1000 [USP'U]/ML
3600 INJECTION, SOLUTION INTRAVENOUS; SUBCUTANEOUS PRN
Status: DISCONTINUED | OUTPATIENT
Start: 2024-02-08 | End: 2024-02-13 | Stop reason: HOSPADM

## 2024-02-08 RX ORDER — MIDODRINE HYDROCHLORIDE 5 MG/1
10 TABLET ORAL 3 TIMES DAILY
COMMUNITY
Start: 2024-01-11 | End: 2024-02-16

## 2024-02-08 RX ORDER — MORPHINE SULFATE 4 MG/ML
4 INJECTION, SOLUTION INTRAMUSCULAR; INTRAVENOUS
Status: DISCONTINUED | OUTPATIENT
Start: 2024-02-08 | End: 2024-02-08 | Stop reason: ALTCHOICE

## 2024-02-08 RX ORDER — ACYCLOVIR 800 MG/1
800 TABLET ORAL 2 TIMES DAILY
Status: ON HOLD | COMMUNITY
Start: 2024-01-18 | End: 2024-02-13 | Stop reason: HOSPADM

## 2024-02-08 RX ORDER — POLYETHYLENE GLYCOL 3350 17 G/17G
17 POWDER, FOR SOLUTION ORAL DAILY PRN
Status: DISCONTINUED | OUTPATIENT
Start: 2024-02-08 | End: 2024-02-13 | Stop reason: HOSPADM

## 2024-02-08 RX ORDER — LANOLIN ALCOHOL/MO/W.PET/CERES
3 CREAM (GRAM) TOPICAL NIGHTLY
Status: DISCONTINUED | OUTPATIENT
Start: 2024-02-08 | End: 2024-02-13 | Stop reason: HOSPADM

## 2024-02-08 RX ORDER — LOSARTAN POTASSIUM 50 MG/1
50 TABLET ORAL DAILY
Status: ON HOLD | COMMUNITY
Start: 2023-12-09 | End: 2024-02-13 | Stop reason: HOSPADM

## 2024-02-08 RX ORDER — ATORVASTATIN CALCIUM 40 MG/1
40 TABLET, FILM COATED ORAL NIGHTLY
COMMUNITY
Start: 2024-01-26 | End: 2024-02-16

## 2024-02-08 RX ORDER — ONDANSETRON 4 MG/1
4 TABLET, ORALLY DISINTEGRATING ORAL EVERY 8 HOURS PRN
Status: DISCONTINUED | OUTPATIENT
Start: 2024-02-08 | End: 2024-02-13 | Stop reason: HOSPADM

## 2024-02-08 RX ORDER — SODIUM CHLORIDE 0.9 % (FLUSH) 0.9 %
5-40 SYRINGE (ML) INJECTION EVERY 12 HOURS SCHEDULED
Status: DISCONTINUED | OUTPATIENT
Start: 2024-02-08 | End: 2024-02-13 | Stop reason: HOSPADM

## 2024-02-08 RX ORDER — SODIUM CHLORIDE 9 MG/ML
INJECTION, SOLUTION INTRAVENOUS PRN
Status: DISCONTINUED | OUTPATIENT
Start: 2024-02-08 | End: 2024-02-13 | Stop reason: HOSPADM

## 2024-02-08 RX ORDER — CARVEDILOL 12.5 MG/1
12.5 TABLET ORAL 2 TIMES DAILY
Status: ON HOLD | COMMUNITY
Start: 2023-12-09 | End: 2024-02-13 | Stop reason: HOSPADM

## 2024-02-08 RX ORDER — ONDANSETRON 2 MG/ML
4 INJECTION INTRAMUSCULAR; INTRAVENOUS EVERY 6 HOURS PRN
Status: DISCONTINUED | OUTPATIENT
Start: 2024-02-08 | End: 2024-02-13 | Stop reason: HOSPADM

## 2024-02-08 RX ORDER — HYDROMORPHONE HYDROCHLORIDE 1 MG/ML
1 INJECTION, SOLUTION INTRAMUSCULAR; INTRAVENOUS; SUBCUTANEOUS
Status: COMPLETED | OUTPATIENT
Start: 2024-02-08 | End: 2024-02-08

## 2024-02-08 RX ORDER — HEPARIN SODIUM 5000 [USP'U]/ML
5000 INJECTION, SOLUTION INTRAVENOUS; SUBCUTANEOUS EVERY 8 HOURS SCHEDULED
Status: DISCONTINUED | OUTPATIENT
Start: 2024-02-08 | End: 2024-02-13 | Stop reason: HOSPADM

## 2024-02-08 RX ORDER — SODIUM CHLORIDE 0.9 % (FLUSH) 0.9 %
5-40 SYRINGE (ML) INJECTION PRN
Status: DISCONTINUED | OUTPATIENT
Start: 2024-02-08 | End: 2024-02-13 | Stop reason: HOSPADM

## 2024-02-08 RX ORDER — ACETAMINOPHEN 650 MG/1
650 SUPPOSITORY RECTAL EVERY 6 HOURS PRN
Status: DISCONTINUED | OUTPATIENT
Start: 2024-02-08 | End: 2024-02-13 | Stop reason: HOSPADM

## 2024-02-08 RX ORDER — SEVELAMER CARBONATE 800 MG/1
2 TABLET, FILM COATED ORAL 3 TIMES DAILY
COMMUNITY
Start: 2023-11-17

## 2024-02-08 RX ORDER — MAGNESIUM HYDROXIDE/ALUMINUM HYDROXICE/SIMETHICONE 120; 1200; 1200 MG/30ML; MG/30ML; MG/30ML
30 SUSPENSION ORAL EVERY 6 HOURS PRN
Status: DISCONTINUED | OUTPATIENT
Start: 2024-02-08 | End: 2024-02-13 | Stop reason: HOSPADM

## 2024-02-08 RX ADMIN — HYDROMORPHONE HYDROCHLORIDE 1 MG: 1 INJECTION, SOLUTION INTRAMUSCULAR; INTRAVENOUS; SUBCUTANEOUS at 12:23

## 2024-02-08 RX ADMIN — HEPARIN SODIUM 3600 UNITS: 1000 INJECTION INTRAVENOUS; SUBCUTANEOUS at 16:45

## 2024-02-08 RX ADMIN — CEFTRIAXONE SODIUM 2000 MG: 2 INJECTION, POWDER, FOR SOLUTION INTRAMUSCULAR; INTRAVENOUS at 17:33

## 2024-02-08 RX ADMIN — ACETAMINOPHEN 650 MG: 325 TABLET ORAL at 20:59

## 2024-02-08 RX ADMIN — SODIUM CHLORIDE, PRESERVATIVE FREE 10 ML: 5 INJECTION INTRAVENOUS at 21:04

## 2024-02-08 RX ADMIN — MELATONIN TAB 3 MG 3 MG: 3 TAB at 20:54

## 2024-02-08 RX ADMIN — ONDANSETRON 4 MG: 2 INJECTION INTRAMUSCULAR; INTRAVENOUS at 17:48

## 2024-02-08 ASSESSMENT — PATIENT HEALTH QUESTIONNAIRE - PHQ9
SUM OF ALL RESPONSES TO PHQ QUESTIONS 1-9: 0
1. LITTLE INTEREST OR PLEASURE IN DOING THINGS: 0
2. FEELING DOWN, DEPRESSED OR HOPELESS: 0
SUM OF ALL RESPONSES TO PHQ QUESTIONS 1-9: 0
SUM OF ALL RESPONSES TO PHQ9 QUESTIONS 1 & 2: 0

## 2024-02-08 NOTE — DIALYSIS
Pt tolerated treatment well. Removed 2000ml during treatment. Cath dressing changed clean, dry and intact. Pt discharged alert back ED via hospital transportation. Report called to primary Nurse Ketty

## 2024-02-08 NOTE — PROGRESS NOTES
Identified pt with two pt identifiers (name and ). Reviewed chart in preparation for visit and have obtained necessary documentation.    Damien Ferrer is a 73 y.o. male  Chief Complaint   Patient presents with    Wound Check     /74 (Site: Right Upper Arm, Position: Sitting, Cuff Size: Medium Adult)   Pulse 75   Temp 97.7 °F (36.5 °C) (Temporal)   Resp 17   Ht 1.778 m (5' 10\")   Wt 76.2 kg (168 lb)   SpO2 98%   BMI 24.11 kg/m²     1. Have you been to the ER, urgent care clinic since your last visit?  Hospitalized since your last visit?no    2. Have you seen or consulted any other health care providers outside of the [facility] since your last visit?  Include any pap smears or colon screening. no

## 2024-02-08 NOTE — H&P
Hospitalist Admission Note    NAME:   Damien Ferrer   : 1950   MRN: [id number]     Date/Time: 2024 3:21 PM    Patient PCP: Jim Dorman MD    ______________________________________________________________________  Given the patient's current clinical presentation, I have a high level of concern for decompensation if discharged from the emergency department.  Complex decision making was performed, which includes reviewing the patient's available past medical records, laboratory results, and x-ray films.       My assessment of this patient's clinical condition and my plan of care is as follows.    Assessment / Plan:  Infected sacral wound  Elevated procalcitonin and CRP  Continue with IV ceftriaxone.  Follow-up with CBC, blood culture, lactic acid.  Follow-up with ID for further guidance with antibiotics.  Also follow-up with vascular surgery for any debridement that patient might need.  Wound care as appropriate.    ESRD on HD  Patient is getting HD today.  Nephrology consult for further input.  Monitor patient telemetry.    Anemia of CKD  Will monitor CBC.      Medical Decision Making:   I personally reviewed labs: CBC, BMP, LFT, CRP, procalcitonin  I personally reviewed imaging:None  I personally reviewed EKG:  Toxic drug monitoring: H&H while patient is on SQ heparin.  Discussed case with: ED provider. After discussion I am in agreement that acuity of patient's medical condition necessitates hospital stay.      Code Status: Full code  DVT Prophylaxis: SQ heparin  Baseline: Lives with his roommate, deaf at baseline and communicates using whiteboard.  He is able to talk.    Subjective:   CHIEF COMPLAINT: Back pain, unable to lie flat    HISTORY OF PRESENT ILLNESS:     Damien Ferrer is a 73 y.o.  male with PMHx significant for ESRD on HD, sacral ulcer, status post right AKA and follows up with vascular surgery, deaf at baseline.  Patient was recently admitted for sepsis secondary to bacteremia with

## 2024-02-08 NOTE — PROGRESS NOTES
Vascular History and Physical    Patient: Damien Ferrer  MRN: [id number]    YOB: 1950  Age: 73 y.o.  Sex: male     Chief Complaint:  Chief Complaint   Patient presents with    Wound Check       History of Present Illness: Damien Ferrer is a 73 y.o. very pleasant gentleman nicely here today because of worsening pain at the rectal area.  Patient also still cannot hear.  Patient may have allergic reaction to antibiotics.  After that hearing loss.  Patient complains severe rectal pain.  Denies any fever or chills.    Social History:  Social Connections: Not on file       Past Medical History:  Past Medical History:   Diagnosis Date    Asthenia 01/24/2021    Cardiomyopathy ([facility]) 01/24/2021    CHF (congestive heart failure) ([facility])     Chronic kidney disease     CKD (chronic kidney disease)     4    End stage renal disease on dialysis ([facility]) 01/24/2021    Essential hypertension 01/24/2021    Gastroesophageal reflux disease 01/24/2021    Gastroesophageal reflux disease 01/24/2021    Hypertension     Pneumonia due to COVID-19 virus 01/24/2021    Systolic CHF, acute on chronic ([facility]) 01/24/2021       Surgical History:  Past Surgical History:   Procedure Laterality Date    CARDIAC CATHETERIZATION      COLONOSCOPY N/A 12/3/2020    COLONOSCOPY performed by Patsy Chakraborty MD at [facility] ENDOSCOPY    COLONOSCOPY N/A 12/2/2022    COLONOSCOPY performed by Patsy Chakraborty MD at [facility] ENDOSCOPY    FOOT SURGERY Right 5/17/2023    Open Transmetatarsal Amputation Right Foot performed by Phil Reyes DPM at [facility] MAIN OR    HERNIA REPAIR      INVASIVE VASCULAR N/A 5/16/2023    Angiography lower ext right performed by Papo Charlton MD at [facility] ELECTROPHYSIOLOGY    INVASIVE VASCULAR N/A 5/16/2023    Aortagram abdominal performed by Papo Charlton MD at [facility] ELECTROPHYSIOLOGY    INVASIVE VASCULAR N/A 5/16/2023    Ultrasound guided vascular access performed by Papo Charlton MD at [facility] ELECTROPHYSIOLOGY    INVASIVE VASCULAR N/A

## 2024-02-08 NOTE — ED PROVIDER NOTES
EMERGENCY DEPARTMENT HISTORY AND PHYSICAL EXAM      Date: 2/8/2024  Patient Name: Damien Ferrer  MRN: [id number]  YOB: 1950  Date of evaluation: 2/8/2024  Provider: Eduardo Stewart MD     History of Present Illness     Chief Complaint   Patient presents with    Wound Check       History Provided By: Patient, friend, patient received communication through whiteboard    HPI: Damien Ferrer, 73 y.o. male with past medical history as listed and reviewed below presenting to the ED for evaluation of a sacral wound.  Patient was at his vascular surgeon and was recommended to come to the ED for evaluation.  Patient is unable to hear anything and is accompanied by a friend who takes care of him.  Friend and patient's state patient has had a lot of pain over the last 2 weeks, he is unable to lay flat, has not had a fever or chills.  Has not had any known drainage or bleeding from the area.    Medical History     Past Medical History:  No past medical history on file.    Past Surgical History:  No past surgical history on file.    Family History:  No family history on file.    Social History:       Allergies:  No Known Allergies    PCP: Jim Dorman MD    Current Medications:   Current Discharge Medication List        CONTINUE these medications which have NOT CHANGED    Details   atorvastatin (LIPITOR) 40 MG tablet Take 1 tablet by mouth nightly at bedtime.      carvedilol (COREG) 12.5 MG tablet Take 1 tablet by mouth 2 times daily      sevelamer (RENVELA) 800 MG tablet Take 2 tablets by mouth 3 times daily      losartan (COZAAR) 50 MG tablet Take 1 tablet by mouth daily      clopidogrel (PLAVIX) 75 MG tablet Take 1 tablet by mouth every other day Monday, Wednesday, Friday and Saturday.      acyclovir (ZOVIRAX) 800 MG tablet Take 1 tablet by mouth 2 times daily      midodrine (PROAMATINE) 5 MG tablet Take 2 tablets by mouth 3 times daily             Review of Systems     Positives and Pertinent negatives as per

## 2024-02-08 NOTE — ED NOTES
After 3rd unsuccessful IV attempt - blood obtained but line blew - asked ER D to change pain med route.  
Pt vomited very small amt. Reports he is sick on stomach. Prn obtained.  
Returned from Dialysis, A&O.  at bedside.  
(36.9 °C)   TempSrc:  Oral  Oral   SpO2:  99% 100% 100%   Weight:       Height:         Deterioration Index (DI): Deterioration Index: 23.63  Deterioration Index (DI) Interventions Performed:    O2 Flow Rate:    O2 Device: O2 Device: None (Room air)  Cardiac Rhythm:    Critical Lab Results: [unfilled]  Cultures: Cultures:None  NIH Score: NIH     Active LDA's:   Peripheral IV 02/08/24 Posterior;Right Forearm (Active)     Active Central Lines:                          Active Wounds:    Active Valencia's:    Active Feeding Tubes:      Administered Medications:   Medications   heparin (porcine) injection 3,600 Units (3,600 Units IntraCATHeter Given 2/8/24 1645)   sodium chloride flush 0.9 % injection 5-40 mL (has no administration in time range)   sodium chloride flush 0.9 % injection 5-40 mL (has no administration in time range)   0.9 % sodium chloride infusion (has no administration in time range)   heparin (porcine) injection 5,000 Units (5,000 Units SubCUTAneous Not Given 2/8/24 1741)   ondansetron (ZOFRAN-ODT) disintegrating tablet 4 mg ( Oral See Alternative 2/8/24 1748)     Or   ondansetron (ZOFRAN) injection 4 mg (4 mg IntraVENous Given 2/8/24 1748)   polyethylene glycol (GLYCOLAX) packet 17 g (has no administration in time range)   acetaminophen (TYLENOL) tablet 650 mg (has no administration in time range)     Or   acetaminophen (TYLENOL) suppository 650 mg (has no administration in time range)   melatonin tablet 3 mg (has no administration in time range)   aluminum & magnesium hydroxide-simethicone (MAALOX) 200-200-20 MG/5ML suspension 30 mL (has no administration in time range)   cefTRIAXone (ROCEPHIN) 1,000 mg in sterile water 10 mL IV syringe (has no administration in time range)   epoetin selma-epbx (RETACRIT) injection 10,000 Units (10,000 Units IntraVENous Not Given 2/8/24 1742)   HYDROmorphone HCl PF (DILAUDID) injection 1 mg (1 mg IntraMUSCular Given 2/8/24 1223)   cefTRIAXone (ROCEPHIN) 2,000 mg in sterile

## 2024-02-08 NOTE — CARE COORDINATION
02/08/24 1802   Service Assessment   Patient Orientation Alert and Oriented;Other (see comment)  (Uses writing board/deaf.)   Cognition Alert   History Provided By Patient;Child/Family  (Antonieta Shelton via phone.)   Primary Caregiver Family   Accompanied By/Relationship Pt alone during visit. CM spoke with antonieta Shelton via phone.   Support Systems Family Members   Patient's Healthcare Decision Maker is: Legal Next of Kin   PCP Verified by CM Yes  (Jim Dorman - seen 2 weeks ago.)   Last Visit to PCP Within last 3 months   Prior Functional Level Assistance with the following:;Bathing;Dressing;Toileting;Cooking;Housework;Shopping;Mobility  (W/C/RBKA/Prosthesis/walker.)   Current Functional Level Assistance with the following:;Bathing;Dressing;Toileting;Cooking;Housework;Shopping;Mobility   Can patient return to prior living arrangement Yes   Ability to make needs known: Fair  (Uses writing board & cell phone.)   Family able to assist with home care needs: Yes   Would you like for me to discuss the discharge plan with any other family members/significant others, and if so, who? Yes  (Antonieta Shelton)   Financial Resources Medicare   Community Resources None   CM/SW Referral Other (see comment)  (None)   Social/Functional History   Lives With Family  (Lives with niece)   Type of Home House   Home Layout Two level;Able to Live on Main level with bedroom/bathroom   Home Access Ramped entrance   Bathroom Shower/Tub Shower chair with back;Tub/Shower unit   Bathroom Toilet Standard   Bathroom Accessibility Accessible   Home Equipment Wheelchair-manual;Walker, standard   Receives Help From Family   ADL Assistance Needs assistance   Toileting Needs assistance   Homemaking Assistance Needs assistance   Homemaking Responsibilities Yes   Ambulation Assistance Needs assistance  (Walker/w/c/RBKA Prosthesis)   Transfer Assistance Needs assistance   Active  No   Occupation Retired   Discharge Planning   Type of

## 2024-02-09 LAB
ALBUMIN SERPL-MCNC: 2.6 G/DL (ref 3.5–5)
ALBUMIN/GLOB SERPL: 0.4 (ref 1.1–2.2)
ALP SERPL-CCNC: 105 U/L (ref 45–117)
ALT SERPL-CCNC: 7 U/L (ref 12–78)
ANION GAP SERPL CALC-SCNC: 6 MMOL/L (ref 5–15)
AST SERPL W P-5'-P-CCNC: 13 U/L (ref 15–37)
BILIRUB SERPL-MCNC: 0.4 MG/DL (ref 0.2–1)
BUN SERPL-MCNC: 27 MG/DL (ref 6–20)
BUN/CREAT SERPL: 4 (ref 12–20)
CA-I BLD-MCNC: 8.7 MG/DL (ref 8.5–10.1)
CHLORIDE SERPL-SCNC: 100 MMOL/L (ref 97–108)
CO2 SERPL-SCNC: 28 MMOL/L (ref 21–32)
CREAT SERPL-MCNC: 6.06 MG/DL (ref 0.7–1.3)
CRP SERPL-MCNC: 5.79 MG/DL (ref 0–0.3)
ERYTHROCYTE [DISTWIDTH] IN BLOOD BY AUTOMATED COUNT: 14.1 % (ref 11.5–14.5)
GLOBULIN SER CALC-MCNC: 7.1 G/DL (ref 2–4)
GLUCOSE BLD STRIP.AUTO-MCNC: 150 MG/DL (ref 65–100)
GLUCOSE BLD STRIP.AUTO-MCNC: 73 MG/DL (ref 65–100)
HCT VFR BLD AUTO: 24.2 % (ref 36.6–50.3)
HGB BLD-MCNC: 7.6 G/DL (ref 12.1–17)
LACTATE SERPL-SCNC: 1 MMOL/L (ref 0.4–2)
MAGNESIUM SERPL-MCNC: 2.2 MG/DL (ref 1.6–2.4)
MCH RBC QN AUTO: 31.1 PG (ref 26–34)
MCHC RBC AUTO-ENTMCNC: 31.4 G/DL (ref 30–36.5)
MCV RBC AUTO: 99.2 FL (ref 80–99)
NRBC # BLD: 0 K/UL (ref 0–0.01)
NRBC BLD-RTO: 0 PER 100 WBC
PERFORMED BY:: ABNORMAL
PERFORMED BY:: NORMAL
PHOSPHATE SERPL-MCNC: 4 MG/DL (ref 2.6–4.7)
PLATELET # BLD AUTO: 351 K/UL (ref 150–400)
PMV BLD AUTO: 11 FL (ref 8.9–12.9)
POTASSIUM SERPL-SCNC: 4.1 MMOL/L (ref 3.5–5.1)
PROCALCITONIN SERPL-MCNC: 1.13 NG/ML
PROT SERPL-MCNC: 9.7 G/DL (ref 6.4–8.2)
RBC # BLD AUTO: 2.44 M/UL (ref 4.1–5.7)
SODIUM SERPL-SCNC: 134 MMOL/L (ref 136–145)
WBC # BLD AUTO: 7.6 K/UL (ref 4.1–11.1)

## 2024-02-09 PROCEDURE — 84100 ASSAY OF PHOSPHORUS: CPT

## 2024-02-09 PROCEDURE — 82962 GLUCOSE BLOOD TEST: CPT

## 2024-02-09 PROCEDURE — 87040 BLOOD CULTURE FOR BACTERIA: CPT

## 2024-02-09 PROCEDURE — 6370000000 HC RX 637 (ALT 250 FOR IP): Performed by: INTERNAL MEDICINE

## 2024-02-09 PROCEDURE — 80053 COMPREHEN METABOLIC PANEL: CPT

## 2024-02-09 PROCEDURE — 6360000002 HC RX W HCPCS: Performed by: INTERNAL MEDICINE

## 2024-02-09 PROCEDURE — 85027 COMPLETE CBC AUTOMATED: CPT

## 2024-02-09 PROCEDURE — 99232 SBSQ HOSP IP/OBS MODERATE 35: CPT | Performed by: INTERNAL MEDICINE

## 2024-02-09 PROCEDURE — 2580000003 HC RX 258: Performed by: INTERNAL MEDICINE

## 2024-02-09 PROCEDURE — 6360000002 HC RX W HCPCS

## 2024-02-09 PROCEDURE — 87070 CULTURE OTHR SPECIMN AEROBIC: CPT

## 2024-02-09 PROCEDURE — 83735 ASSAY OF MAGNESIUM: CPT

## 2024-02-09 PROCEDURE — 86003 ALLG SPEC IGE CRUDE XTRC EA: CPT

## 2024-02-09 PROCEDURE — 2709999900 HC NON-CHARGEABLE SUPPLY

## 2024-02-09 PROCEDURE — 6370000000 HC RX 637 (ALT 250 FOR IP): Performed by: HOSPITALIST

## 2024-02-09 PROCEDURE — 1100000000 HC RM PRIVATE

## 2024-02-09 PROCEDURE — 6370000000 HC RX 637 (ALT 250 FOR IP): Performed by: PHYSICIAN ASSISTANT

## 2024-02-09 PROCEDURE — 99232 SBSQ HOSP IP/OBS MODERATE 35: CPT | Performed by: SURGERY

## 2024-02-09 PROCEDURE — 84145 PROCALCITONIN (PCT): CPT

## 2024-02-09 PROCEDURE — 86140 C-REACTIVE PROTEIN: CPT

## 2024-02-09 PROCEDURE — 87205 SMEAR GRAM STAIN: CPT

## 2024-02-09 PROCEDURE — 36556 INSERT NON-TUNNEL CV CATH: CPT

## 2024-02-09 PROCEDURE — 36415 COLL VENOUS BLD VENIPUNCTURE: CPT

## 2024-02-09 PROCEDURE — 90935 HEMODIALYSIS ONE EVALUATION: CPT

## 2024-02-09 PROCEDURE — 82785 ASSAY OF IGE: CPT

## 2024-02-09 PROCEDURE — 83605 ASSAY OF LACTIC ACID: CPT

## 2024-02-09 RX ORDER — OXYCODONE HYDROCHLORIDE AND ACETAMINOPHEN 5; 325 MG/1; MG/1
1 TABLET ORAL EVERY 6 HOURS PRN
Status: DISCONTINUED | OUTPATIENT
Start: 2024-02-09 | End: 2024-02-13 | Stop reason: HOSPADM

## 2024-02-09 RX ORDER — MIDODRINE HYDROCHLORIDE 5 MG/1
5 TABLET ORAL 3 TIMES DAILY PRN
Status: DISCONTINUED | OUTPATIENT
Start: 2024-02-09 | End: 2024-02-10

## 2024-02-09 RX ADMIN — MELATONIN TAB 3 MG 3 MG: 3 TAB at 21:24

## 2024-02-09 RX ADMIN — MIDODRINE HYDROCHLORIDE 5 MG: 5 TABLET ORAL at 18:34

## 2024-02-09 RX ADMIN — OXYCODONE AND ACETAMINOPHEN 1 TABLET: 5; 325 TABLET ORAL at 15:18

## 2024-02-09 RX ADMIN — PIPERACILLIN AND TAZOBACTAM 3375 MG: 3; .375 INJECTION, POWDER, FOR SOLUTION INTRAVENOUS at 06:44

## 2024-02-09 RX ADMIN — PIPERACILLIN AND TAZOBACTAM 3375 MG: 3; .375 INJECTION, POWDER, FOR SOLUTION INTRAVENOUS at 18:34

## 2024-02-09 RX ADMIN — PIPERACILLIN AND TAZOBACTAM 4500 MG: 4; .5 INJECTION, POWDER, LYOPHILIZED, FOR SOLUTION INTRAVENOUS at 01:30

## 2024-02-09 RX ADMIN — SODIUM CHLORIDE, PRESERVATIVE FREE 10 ML: 5 INJECTION INTRAVENOUS at 21:25

## 2024-02-09 RX ADMIN — EPOETIN ALFA-EPBX 10000 UNITS: 10000 INJECTION, SOLUTION INTRAVENOUS; SUBCUTANEOUS at 10:15

## 2024-02-09 RX ADMIN — HEPARIN SODIUM 3600 UNITS: 1000 INJECTION INTRAVENOUS; SUBCUTANEOUS at 10:26

## 2024-02-09 RX ADMIN — ACETAMINOPHEN 650 MG: 325 TABLET ORAL at 06:49

## 2024-02-09 RX ADMIN — SODIUM CHLORIDE, PRESERVATIVE FREE 10 ML: 5 INJECTION INTRAVENOUS at 08:00

## 2024-02-09 NOTE — CARE COORDINATION
Patient has been accepted with [facility] when medically stable for discharge.

## 2024-02-09 NOTE — DIALYSIS
Pt ran 3 hours treatment requesting to come off 30 mins early due to cramping in legs. Removed 1500ml and 65.8 liters of blood process. Cath dressing changed during treatment clean, dry and intact. Pt discharged alert back to room via hospital transportation. Report called to primary Nurse Fe.

## 2024-02-10 PROBLEM — D72.10 EOSINOPHILIA: Status: ACTIVE | Noted: 2024-02-10

## 2024-02-10 PROBLEM — A41.9 SEPSIS WITHOUT ACUTE ORGAN DYSFUNCTION (HCC): Status: ACTIVE | Noted: 2024-02-10

## 2024-02-10 PROBLEM — S31.000A SACRAL WOUND: Status: ACTIVE | Noted: 2024-02-10

## 2024-02-10 PROBLEM — Z99.2 ESRD ON DIALYSIS (HCC): Status: ACTIVE | Noted: 2024-02-10

## 2024-02-10 PROBLEM — N18.6 ESRD ON DIALYSIS (HCC): Status: ACTIVE | Noted: 2024-02-10

## 2024-02-10 LAB
ALBUMIN SERPL-MCNC: 2.4 G/DL (ref 3.5–5)
ANION GAP SERPL CALC-SCNC: 6 MMOL/L (ref 5–15)
BASOPHILS # BLD: 0.1 K/UL (ref 0–0.1)
BASOPHILS NFR BLD: 1 % (ref 0–1)
BUN SERPL-MCNC: 19 MG/DL (ref 6–20)
BUN/CREAT SERPL: 4 (ref 12–20)
CA-I BLD-MCNC: 8.7 MG/DL (ref 8.5–10.1)
CHLORIDE SERPL-SCNC: 101 MMOL/L (ref 97–108)
CO2 SERPL-SCNC: 28 MMOL/L (ref 21–32)
CREAT SERPL-MCNC: 5.13 MG/DL (ref 0.7–1.3)
CRP SERPL-MCNC: 6.3 MG/DL (ref 0–0.3)
DIFFERENTIAL METHOD BLD: ABNORMAL
EOSINOPHIL # BLD: 1 K/UL (ref 0–0.4)
EOSINOPHIL NFR BLD: 11 % (ref 0–7)
ERYTHROCYTE [DISTWIDTH] IN BLOOD BY AUTOMATED COUNT: 14.1 % (ref 11.5–14.5)
GLUCOSE SERPL-MCNC: 90 MG/DL (ref 65–100)
HCT VFR BLD AUTO: 26.1 % (ref 36.6–50.3)
HGB BLD-MCNC: 8.2 G/DL (ref 12.1–17)
IMM GRANULOCYTES # BLD AUTO: 0 K/UL (ref 0–0.04)
IMM GRANULOCYTES NFR BLD AUTO: 0 % (ref 0–0.5)
LYMPHOCYTES # BLD: 2.2 K/UL (ref 0.8–3.5)
LYMPHOCYTES NFR BLD: 25 % (ref 12–49)
MCH RBC QN AUTO: 30.9 PG (ref 26–34)
MCHC RBC AUTO-ENTMCNC: 31.4 G/DL (ref 30–36.5)
MCV RBC AUTO: 98.5 FL (ref 80–99)
MONOCYTES # BLD: 1.5 K/UL (ref 0–1)
MONOCYTES NFR BLD: 17 % (ref 5–13)
NEUTS SEG # BLD: 4.1 K/UL (ref 1.8–8)
NEUTS SEG NFR BLD: 46 % (ref 32–75)
NRBC # BLD: 0 K/UL (ref 0–0.01)
NRBC BLD-RTO: 0 PER 100 WBC
PLATELET # BLD AUTO: 315 K/UL (ref 150–400)
PMV BLD AUTO: 10.9 FL (ref 8.9–12.9)
POTASSIUM SERPL-SCNC: 3.9 MMOL/L (ref 3.5–5.1)
PROCALCITONIN SERPL-MCNC: 1.33 NG/ML
RBC # BLD AUTO: 2.65 M/UL (ref 4.1–5.7)
SODIUM SERPL-SCNC: 135 MMOL/L (ref 136–145)
WBC # BLD AUTO: 8.9 K/UL (ref 4.1–11.1)

## 2024-02-10 PROCEDURE — 80069 RENAL FUNCTION PANEL: CPT

## 2024-02-10 PROCEDURE — 6370000000 HC RX 637 (ALT 250 FOR IP): Performed by: INTERNAL MEDICINE

## 2024-02-10 PROCEDURE — 85025 COMPLETE CBC W/AUTO DIFF WBC: CPT

## 2024-02-10 PROCEDURE — 84145 PROCALCITONIN (PCT): CPT

## 2024-02-10 PROCEDURE — 6370000000 HC RX 637 (ALT 250 FOR IP): Performed by: PHYSICIAN ASSISTANT

## 2024-02-10 PROCEDURE — 86140 C-REACTIVE PROTEIN: CPT

## 2024-02-10 PROCEDURE — 6360000002 HC RX W HCPCS: Performed by: INTERNAL MEDICINE

## 2024-02-10 PROCEDURE — 2580000003 HC RX 258: Performed by: INTERNAL MEDICINE

## 2024-02-10 PROCEDURE — 6370000000 HC RX 637 (ALT 250 FOR IP): Performed by: HOSPITALIST

## 2024-02-10 PROCEDURE — 36556 INSERT NON-TUNNEL CV CATH: CPT

## 2024-02-10 PROCEDURE — 36415 COLL VENOUS BLD VENIPUNCTURE: CPT

## 2024-02-10 PROCEDURE — 1100000000 HC RM PRIVATE

## 2024-02-10 RX ORDER — MIDODRINE HYDROCHLORIDE 5 MG/1
5 TABLET ORAL
Status: DISCONTINUED | OUTPATIENT
Start: 2024-02-10 | End: 2024-02-11

## 2024-02-10 RX ADMIN — MELATONIN TAB 3 MG 3 MG: 3 TAB at 20:05

## 2024-02-10 RX ADMIN — OXYCODONE AND ACETAMINOPHEN 1 TABLET: 5; 325 TABLET ORAL at 11:58

## 2024-02-10 RX ADMIN — PIPERACILLIN AND TAZOBACTAM 3375 MG: 3; .375 INJECTION, POWDER, FOR SOLUTION INTRAVENOUS at 05:58

## 2024-02-10 RX ADMIN — MIDODRINE HYDROCHLORIDE 5 MG: 5 TABLET ORAL at 04:13

## 2024-02-10 RX ADMIN — SODIUM CHLORIDE, PRESERVATIVE FREE 10 ML: 5 INJECTION INTRAVENOUS at 20:05

## 2024-02-10 RX ADMIN — SODIUM CHLORIDE, PRESERVATIVE FREE 10 ML: 5 INJECTION INTRAVENOUS at 09:35

## 2024-02-10 RX ADMIN — MIDODRINE HYDROCHLORIDE 5 MG: 5 TABLET ORAL at 09:35

## 2024-02-10 RX ADMIN — MIDODRINE HYDROCHLORIDE 5 MG: 5 TABLET ORAL at 16:22

## 2024-02-10 RX ADMIN — MIDODRINE HYDROCHLORIDE 5 MG: 5 TABLET ORAL at 11:58

## 2024-02-10 RX ADMIN — PIPERACILLIN AND TAZOBACTAM 3375 MG: 3; .375 INJECTION, POWDER, FOR SOLUTION INTRAVENOUS at 19:33

## 2024-02-10 NOTE — PLAN OF CARE
Problem: Discharge Planning  Goal: Discharge to home or other facility with appropriate resources  2/9/2024 2301 by Christina Hewitt RN  Outcome: Progressing  Flowsheets (Taken 2/9/2024 2030)  Discharge to home or other facility with appropriate resources: Identify barriers to discharge with patient and caregiver  2/9/2024 1950 by Kayla Watson RN  Outcome: Progressing     Problem: Pain  Goal: Verbalizes/displays adequate comfort level or baseline comfort level  2/9/2024 2301 by Christina Hewitt RN  Outcome: Progressing  2/9/2024 1950 by Kayla Watson RN  Outcome: Progressing     Problem: Skin/Tissue Integrity  Goal: Absence of new skin breakdown  Description: 1.  Monitor for areas of redness and/or skin breakdown  2.  Assess vascular access sites hourly  3.  Every 4-6 hours minimum:  Change oxygen saturation probe site  4.  Every 4-6 hours:  If on nasal continuous positive airway pressure, respiratory therapy assess nares and determine need for appliance change or resting period.  Outcome: Progressing     Problem: ABCDS Injury Assessment  Goal: Absence of physical injury  Outcome: Progressing     Problem: Safety - Adult  Goal: Free from fall injury  2/9/2024 2301 by Christina Hewitt, RN  Outcome: Progressing  2/9/2024 1950 by Kayla Watson RN  Outcome: Progressing

## 2024-02-11 ENCOUNTER — APPOINTMENT (OUTPATIENT)
Facility: HOSPITAL | Age: 74
DRG: 592 | End: 2024-02-11
Attending: PHYSICIAN ASSISTANT
Payer: MEDICARE

## 2024-02-11 LAB
ALBUMIN SERPL-MCNC: 2.5 G/DL (ref 3.5–5)
ALBUMIN/GLOB SERPL: 0.3 (ref 1.1–2.2)
ALP SERPL-CCNC: 99 U/L (ref 45–117)
ALT SERPL-CCNC: 8 U/L (ref 12–78)
ANION GAP SERPL CALC-SCNC: 6 MMOL/L (ref 5–15)
AST SERPL W P-5'-P-CCNC: 12 U/L (ref 15–37)
BASOPHILS # BLD: 0.1 K/UL (ref 0–0.1)
BASOPHILS NFR BLD: 1 % (ref 0–1)
BILIRUB SERPL-MCNC: 0.3 MG/DL (ref 0.2–1)
BUN SERPL-MCNC: 33 MG/DL (ref 6–20)
BUN/CREAT SERPL: 4 (ref 12–20)
CA-I BLD-MCNC: 8.8 MG/DL (ref 8.5–10.1)
CHLORIDE SERPL-SCNC: 101 MMOL/L (ref 97–108)
CO2 SERPL-SCNC: 27 MMOL/L (ref 21–32)
CREAT SERPL-MCNC: 7.54 MG/DL (ref 0.7–1.3)
CRP SERPL-MCNC: 6.1 MG/DL (ref 0–0.3)
DIFFERENTIAL METHOD BLD: ABNORMAL
EOSINOPHIL # BLD: 1.4 K/UL (ref 0–0.4)
EOSINOPHIL NFR BLD: 13 % (ref 0–7)
ERYTHROCYTE [DISTWIDTH] IN BLOOD BY AUTOMATED COUNT: 14.3 % (ref 11.5–14.5)
GLOBULIN SER CALC-MCNC: 7.2 G/DL (ref 2–4)
GLUCOSE SERPL-MCNC: 104 MG/DL (ref 65–100)
HCT VFR BLD AUTO: 21.4 % (ref 36.6–50.3)
HCT VFR BLD AUTO: 29.7 % (ref 36.6–50.3)
HGB BLD-MCNC: 6.6 G/DL (ref 12.1–17)
HGB BLD-MCNC: 9.2 G/DL (ref 12.1–17)
IMM GRANULOCYTES # BLD AUTO: 0 K/UL (ref 0–0.04)
IMM GRANULOCYTES NFR BLD AUTO: 0 % (ref 0–0.5)
LYMPHOCYTES # BLD: 2.6 K/UL (ref 0.8–3.5)
LYMPHOCYTES NFR BLD: 24 % (ref 12–49)
MCH RBC QN AUTO: 30.7 PG (ref 26–34)
MCHC RBC AUTO-ENTMCNC: 30.8 G/DL (ref 30–36.5)
MCV RBC AUTO: 99.5 FL (ref 80–99)
MONOCYTES # BLD: 1.6 K/UL (ref 0–1)
MONOCYTES NFR BLD: 15 % (ref 5–13)
NEUTS SEG # BLD: 5.1 K/UL (ref 1.8–8)
NEUTS SEG NFR BLD: 47 % (ref 32–75)
NRBC # BLD: 0 K/UL (ref 0–0.01)
NRBC BLD-RTO: 0 PER 100 WBC
PLATELET # BLD AUTO: 346 K/UL (ref 150–400)
PMV BLD AUTO: 11.2 FL (ref 8.9–12.9)
POTASSIUM SERPL-SCNC: 4 MMOL/L (ref 3.5–5.1)
PROCALCITONIN SERPL-MCNC: 1.42 NG/ML
PROT SERPL-MCNC: 9.7 G/DL (ref 6.4–8.2)
RBC # BLD AUTO: 2.15 M/UL (ref 4.1–5.7)
RBC MORPH BLD: ABNORMAL
SODIUM SERPL-SCNC: 134 MMOL/L (ref 136–145)
WBC # BLD AUTO: 10.8 K/UL (ref 4.1–11.1)

## 2024-02-11 PROCEDURE — 6370000000 HC RX 637 (ALT 250 FOR IP): Performed by: INTERNAL MEDICINE

## 2024-02-11 PROCEDURE — 85025 COMPLETE CBC W/AUTO DIFF WBC: CPT

## 2024-02-11 PROCEDURE — 80053 COMPREHEN METABOLIC PANEL: CPT

## 2024-02-11 PROCEDURE — 86140 C-REACTIVE PROTEIN: CPT

## 2024-02-11 PROCEDURE — 2580000003 HC RX 258: Performed by: INTERNAL MEDICINE

## 2024-02-11 PROCEDURE — 84145 PROCALCITONIN (PCT): CPT

## 2024-02-11 PROCEDURE — 86923 COMPATIBILITY TEST ELECTRIC: CPT

## 2024-02-11 PROCEDURE — 30233N1 TRANSFUSION OF NONAUTOLOGOUS RED BLOOD CELLS INTO PERIPHERAL VEIN, PERCUTANEOUS APPROACH: ICD-10-PCS | Performed by: INTERNAL MEDICINE

## 2024-02-11 PROCEDURE — 36556 INSERT NON-TUNNEL CV CATH: CPT

## 2024-02-11 PROCEDURE — P9016 RBC LEUKOCYTES REDUCED: HCPCS

## 2024-02-11 PROCEDURE — 6360000002 HC RX W HCPCS: Performed by: INTERNAL MEDICINE

## 2024-02-11 PROCEDURE — 6370000000 HC RX 637 (ALT 250 FOR IP): Performed by: HOSPITALIST

## 2024-02-11 PROCEDURE — 6370000000 HC RX 637 (ALT 250 FOR IP): Performed by: PHYSICIAN ASSISTANT

## 2024-02-11 PROCEDURE — 85018 HEMOGLOBIN: CPT

## 2024-02-11 PROCEDURE — 93306 TTE W/DOPPLER COMPLETE: CPT

## 2024-02-11 PROCEDURE — 86900 BLOOD TYPING SEROLOGIC ABO: CPT

## 2024-02-11 PROCEDURE — 36430 TRANSFUSION BLD/BLD COMPNT: CPT

## 2024-02-11 PROCEDURE — 86850 RBC ANTIBODY SCREEN: CPT

## 2024-02-11 PROCEDURE — 36415 COLL VENOUS BLD VENIPUNCTURE: CPT

## 2024-02-11 PROCEDURE — 85014 HEMATOCRIT: CPT

## 2024-02-11 PROCEDURE — 1100000000 HC RM PRIVATE

## 2024-02-11 PROCEDURE — 86901 BLOOD TYPING SEROLOGIC RH(D): CPT

## 2024-02-11 RX ORDER — MIDODRINE HYDROCHLORIDE 5 MG/1
10 TABLET ORAL
Status: DISCONTINUED | OUTPATIENT
Start: 2024-02-11 | End: 2024-02-13 | Stop reason: HOSPADM

## 2024-02-11 RX ORDER — SODIUM CHLORIDE 9 MG/ML
INJECTION, SOLUTION INTRAVENOUS PRN
Status: DISCONTINUED | OUTPATIENT
Start: 2024-02-11 | End: 2024-02-13 | Stop reason: HOSPADM

## 2024-02-11 RX ADMIN — MIDODRINE HYDROCHLORIDE 5 MG: 5 TABLET ORAL at 12:01

## 2024-02-11 RX ADMIN — MELATONIN TAB 3 MG 3 MG: 3 TAB at 20:21

## 2024-02-11 RX ADMIN — PIPERACILLIN AND TAZOBACTAM 3375 MG: 3; .375 INJECTION, POWDER, FOR SOLUTION INTRAVENOUS at 19:44

## 2024-02-11 RX ADMIN — SODIUM CHLORIDE, PRESERVATIVE FREE 10 ML: 5 INJECTION INTRAVENOUS at 20:21

## 2024-02-11 RX ADMIN — OXYCODONE AND ACETAMINOPHEN 1 TABLET: 5; 325 TABLET ORAL at 12:05

## 2024-02-11 RX ADMIN — PIPERACILLIN AND TAZOBACTAM 3375 MG: 3; .375 INJECTION, POWDER, FOR SOLUTION INTRAVENOUS at 06:03

## 2024-02-11 RX ADMIN — MIDODRINE HYDROCHLORIDE 10 MG: 5 TABLET ORAL at 19:45

## 2024-02-11 RX ADMIN — SODIUM CHLORIDE, PRESERVATIVE FREE 10 ML: 5 INJECTION INTRAVENOUS at 10:30

## 2024-02-11 NOTE — PLAN OF CARE
Problem: Discharge Planning  Goal: Discharge to home or other facility with appropriate resources  Outcome: Progressing  Flowsheets (Taken 2/10/2024 2000)  Discharge to home or other facility with appropriate resources: Identify barriers to discharge with patient and caregiver     Problem: Pain  Goal: Verbalizes/displays adequate comfort level or baseline comfort level  Outcome: Progressing     Problem: Skin/Tissue Integrity  Goal: Absence of new skin breakdown  Description: 1.  Monitor for areas of redness and/or skin breakdown  2.  Assess vascular access sites hourly  3.  Every 4-6 hours minimum:  Change oxygen saturation probe site  4.  Every 4-6 hours:  If on nasal continuous positive airway pressure, respiratory therapy assess nares and determine need for appliance change or resting period.  Outcome: Progressing     Problem: ABCDS Injury Assessment  Goal: Absence of physical injury  Outcome: Progressing     Problem: Safety - Adult  Goal: Free from fall injury  Outcome: Progressing

## 2024-02-11 NOTE — PLAN OF CARE
Problem: Discharge Planning  Goal: Discharge to home or other facility with appropriate resources  2/10/2024 2053 by Christina Hewitt RN  Outcome: Progressing  2/10/2024 2052 by Christina Hewitt RN  Outcome: Progressing  Flowsheets (Taken 2/10/2024 2000)  Discharge to home or other facility with appropriate resources: Identify barriers to discharge with patient and caregiver     Problem: Skin/Tissue Integrity  Goal: Absence of new skin breakdown  Description: 1.  Monitor for areas of redness and/or skin breakdown  2.  Assess vascular access sites hourly  3.  Every 4-6 hours minimum:  Change oxygen saturation probe site  4.  Every 4-6 hours:  If on nasal continuous positive airway pressure, respiratory therapy assess nares and determine need for appliance change or resting period.  2/10/2024 2053 by Christina Hewitt RN  Outcome: Progressing  2/10/2024 2052 by Christina Hewitt RN  Outcome: Progressing     Problem: ABCDS Injury Assessment  Goal: Absence of physical injury  2/10/2024 2053 by Christina Hewitt RN  Outcome: Progressing  2/10/2024 2052 by Christina Hewitt RN  Outcome: Progressing     Problem: Safety - Adult  Goal: Free from fall injury  2/10/2024 2053 by Christina Hewitt RN  Outcome: Progressing  2/10/2024 2052 by Christina Hewitt RN  Outcome: Progressing

## 2024-02-12 LAB
ABO + RH BLD: NORMAL
ALBUMIN SERPL-MCNC: 2.4 G/DL (ref 3.5–5)
ALBUMIN/GLOB SERPL: 0.3 (ref 1.1–2.2)
ALP SERPL-CCNC: 94 U/L (ref 45–117)
ALT SERPL-CCNC: 9 U/L (ref 12–78)
ANION GAP SERPL CALC-SCNC: 7 MMOL/L (ref 5–15)
AST SERPL W P-5'-P-CCNC: 12 U/L (ref 15–37)
BACTERIA SPEC CULT: NORMAL
BASOPHILS # BLD: 0.1 K/UL (ref 0–0.1)
BASOPHILS NFR BLD: 1 % (ref 0–1)
BILIRUB SERPL-MCNC: 0.3 MG/DL (ref 0.2–1)
BLD PROD TYP BPU: NORMAL
BLOOD BANK DISPENSE STATUS: NORMAL
BLOOD GROUP ANTIBODIES SERPL: NEGATIVE
BPU ID: NORMAL
BUN SERPL-MCNC: 42 MG/DL (ref 6–20)
BUN/CREAT SERPL: 5 (ref 12–20)
CA-I BLD-MCNC: 8.4 MG/DL (ref 8.5–10.1)
CHLORIDE SERPL-SCNC: 101 MMOL/L (ref 97–108)
CO2 SERPL-SCNC: 26 MMOL/L (ref 21–32)
CREAT SERPL-MCNC: 8.98 MG/DL (ref 0.7–1.3)
CROSSMATCH RESULT: NORMAL
CRP SERPL-MCNC: 5.74 MG/DL (ref 0–0.3)
DIFFERENTIAL METHOD BLD: ABNORMAL
ECHO AO ASC DIAM: 3.9 CM
ECHO AO ASCENDING AORTA INDEX: 1.89 CM/M2
ECHO AO ROOT DIAM: 2.8 CM
ECHO AO ROOT INDEX: 1.36 CM/M2
ECHO AV AREA PEAK VELOCITY: 1.5 CM2
ECHO AV AREA VTI: 1.5 CM2
ECHO AV AREA/BSA PEAK VELOCITY: 0.7 CM2/M2
ECHO AV AREA/BSA VTI: 0.7 CM2/M2
ECHO AV CUSP MM: 1.6 CM
ECHO AV MEAN GRADIENT: 4 MMHG
ECHO AV MEAN VELOCITY: 0.9 M/S
ECHO AV PEAK GRADIENT: 6 MMHG
ECHO AV PEAK VELOCITY: 1.3 M/S
ECHO AV VELOCITY RATIO: 0.38
ECHO AV VTI: 26.8 CM
ECHO BSA: 2.06 M2
ECHO EST RA PRESSURE: 5 MMHG
ECHO LA AREA 2C: 16.5 CM2
ECHO LA AREA 4C: 18.6 CM2
ECHO LA DIAMETER INDEX: 1.84 CM/M2
ECHO LA DIAMETER: 3.8 CM
ECHO LA MAJOR AXIS: 5.1 CM
ECHO LA MINOR AXIS: 4.1 CM
ECHO LA TO AORTIC ROOT RATIO: 1.36
ECHO LA VOL BP: 61 ML (ref 18–58)
ECHO LA VOL MOD A2C: 56 ML (ref 18–58)
ECHO LA VOL MOD A4C: 54 ML (ref 18–58)
ECHO LA VOL/BSA BIPLANE: 30 ML/M2 (ref 16–34)
ECHO LA VOLUME INDEX MOD A2C: 27 ML/M2 (ref 16–34)
ECHO LA VOLUME INDEX MOD A4C: 26 ML/M2 (ref 16–34)
ECHO LV E' LATERAL VELOCITY: 7 CM/S
ECHO LV E' SEPTAL VELOCITY: 4 CM/S
ECHO LV EDV A2C: 190 ML
ECHO LV EDV A4C: 190 ML
ECHO LV EDV INDEX A4C: 92 ML/M2
ECHO LV EDV NDEX A2C: 92 ML/M2
ECHO LV EJECTION FRACTION A2C: 19 %
ECHO LV EJECTION FRACTION A4C: 16 %
ECHO LV EJECTION FRACTION BIPLANE: 15 % (ref 55–100)
ECHO LV ESV A2C: 154 ML
ECHO LV ESV A4C: 159 ML
ECHO LV ESV INDEX A2C: 75 ML/M2
ECHO LV ESV INDEX A4C: 77 ML/M2
ECHO LV FRACTIONAL SHORTENING: 13 % (ref 28–44)
ECHO LV GLOBAL LONGITUDINAL STRAIN (GLS): -4 %
ECHO LV GLOBAL LONGITUDINAL STRAIN (GLS): -6.5 %
ECHO LV GLOBAL LONGITUDINAL STRAIN (GLS): -7.6 %
ECHO LV GLOBAL LONGITUDINAL STRAIN (GLS): -8 %
ECHO LV INTERNAL DIMENSION DIASTOLE INDEX: 2.52 CM/M2
ECHO LV INTERNAL DIMENSION DIASTOLIC MMODE: 6.8 CM (ref 4.2–5.9)
ECHO LV INTERNAL DIMENSION DIASTOLIC: 5.2 CM (ref 4.2–5.9)
ECHO LV INTERNAL DIMENSION SYSTOLIC INDEX: 2.18 CM/M2
ECHO LV INTERNAL DIMENSION SYSTOLIC MMODE: 5 CM
ECHO LV INTERNAL DIMENSION SYSTOLIC: 4.5 CM
ECHO LV IVSD MMODE: 0.8 CM (ref 0.6–1)
ECHO LV IVSD: 1.1 CM (ref 0.6–1)
ECHO LV MASS 2D: 234.6 G (ref 88–224)
ECHO LV MASS INDEX 2D: 113.9 G/M2 (ref 49–115)
ECHO LV POSTERIOR WALL DIASTOLIC MMODE: 0.8 CM (ref 0.6–1)
ECHO LV POSTERIOR WALL DIASTOLIC: 1.2 CM (ref 0.6–1)
ECHO LV RELATIVE WALL THICKNESS RATIO: 0.46
ECHO LVOT AREA: 3.5 CM2
ECHO LVOT AV VTI INDEX: 0.43
ECHO LVOT DIAM: 2.1 CM
ECHO LVOT MEAN GRADIENT: 1 MMHG
ECHO LVOT PEAK GRADIENT: 1 MMHG
ECHO LVOT PEAK VELOCITY: 0.5 M/S
ECHO LVOT STROKE VOLUME INDEX: 19.2 ML/M2
ECHO LVOT SV: 39.5 ML
ECHO LVOT VTI: 11.4 CM
ECHO MV A VELOCITY: 0.91 M/S
ECHO MV AREA VTI: 1.3 CM2
ECHO MV E DECELERATION TIME (DT): 224 MS
ECHO MV E VELOCITY: 0.66 M/S
ECHO MV E/A RATIO: 0.73
ECHO MV E/E' LATERAL: 9.43
ECHO MV E/E' RATIO (AVERAGED): 12.96
ECHO MV LVOT VTI INDEX: 2.65
ECHO MV MAX VELOCITY: 1.1 M/S
ECHO MV MEAN GRADIENT: 2 MMHG
ECHO MV MEAN VELOCITY: 0.7 M/S
ECHO MV PEAK GRADIENT: 5 MMHG
ECHO MV VTI: 30.2 CM
ECHO PULMONARY ARTERY END DIASTOLIC PRESSURE: 4 MMHG
ECHO PV ACCELERATION TIME (AT): 357 MS
ECHO PV AREA CONTINUITY EQ VELOCITY: 5.9 CM2
ECHO PV MAX VELOCITY: 0.8 M/S
ECHO PV PEAK GRADIENT: 2 MMHG
ECHO PV REGURGITANT MAX VELOCITY: 1 M/S
ECHO QP:QS RATIO: 3.1 NO UNITS
ECHO RA AREA 4C: 12.1 CM2
ECHO RA END SYSTOLIC VOLUME APICAL 4 CHAMBER INDEX BSA: 13 ML/M2
ECHO RA VOLUME: 27 ML
ECHO RIGHT VENTRICULAR SYSTOLIC PRESSURE (RVSP): 21 MMHG
ECHO RV BASAL DIMENSION: 3.3 CM
ECHO RV LONGITUDINAL DIMENSION: 8.3 CM
ECHO RV MID DIMENSION: 3 CM
ECHO RV TAPSE: 2.1 CM (ref 1.7–?)
ECHO RVOT AREA: 5.7 CM2
ECHO RVOT DIAMETER: 2.7 CM
ECHO RVOT MEAN GRADIENT: 1 MMHG
ECHO RVOT PEAK GRADIENT: 3 MMHG
ECHO RVOT PEAK VELOCITY: 0.8 M/S
ECHO RVOT STROKE VOLUME: 122.5 ML
ECHO RVOT VTI: 21.4 CM
ECHO TV REGURGITANT MAX VELOCITY: 2 M/S
ECHO TV REGURGITANT PEAK GRADIENT: 16 MMHG
EOSINOPHIL # BLD: 1.2 K/UL (ref 0–0.4)
EOSINOPHIL NFR BLD: 16 % (ref 0–7)
ERYTHROCYTE [DISTWIDTH] IN BLOOD BY AUTOMATED COUNT: 15.7 % (ref 11.5–14.5)
GLOBULIN SER CALC-MCNC: 7.1 G/DL (ref 2–4)
GLUCOSE SERPL-MCNC: 85 MG/DL (ref 65–100)
GRAM STN SPEC: NORMAL
GRAM STN SPEC: NORMAL
HCT VFR BLD AUTO: 28.5 % (ref 36.6–50.3)
HGB BLD-MCNC: 9.2 G/DL (ref 12.1–17)
IMM GRANULOCYTES # BLD AUTO: 0 K/UL (ref 0–0.04)
IMM GRANULOCYTES NFR BLD AUTO: 0 % (ref 0–0.5)
LYMPHOCYTES # BLD: 1.8 K/UL (ref 0.8–3.5)
LYMPHOCYTES NFR BLD: 23 % (ref 12–49)
Lab: NORMAL
MCH RBC QN AUTO: 30.7 PG (ref 26–34)
MCHC RBC AUTO-ENTMCNC: 32.3 G/DL (ref 30–36.5)
MCV RBC AUTO: 95 FL (ref 80–99)
MONOCYTES # BLD: 1.2 K/UL (ref 0–1)
MONOCYTES NFR BLD: 15 % (ref 5–13)
NEUTS SEG # BLD: 3.4 K/UL (ref 1.8–8)
NEUTS SEG NFR BLD: 45 % (ref 32–75)
NRBC # BLD: 0 K/UL (ref 0–0.01)
NRBC BLD-RTO: 0 PER 100 WBC
PLATELET # BLD AUTO: 302 K/UL (ref 150–400)
PMV BLD AUTO: 11.1 FL (ref 8.9–12.9)
POTASSIUM SERPL-SCNC: 4.4 MMOL/L (ref 3.5–5.1)
PROCALCITONIN SERPL-MCNC: 1.5 NG/ML
PROT SERPL-MCNC: 9.5 G/DL (ref 6.4–8.2)
RBC # BLD AUTO: 3 M/UL (ref 4.1–5.7)
RBC MORPH BLD: ABNORMAL
SODIUM SERPL-SCNC: 134 MMOL/L (ref 136–145)
SPECIMEN EXP DATE BLD: NORMAL
TRANSFUSION STATUS PATIENT QL: NORMAL
UNIT DIVISION: 0
WBC # BLD AUTO: 7.7 K/UL (ref 4.1–11.1)

## 2024-02-12 PROCEDURE — 5A1D70Z PERFORMANCE OF URINARY FILTRATION, INTERMITTENT, LESS THAN 6 HOURS PER DAY: ICD-10-PCS | Performed by: INTERNAL MEDICINE

## 2024-02-12 PROCEDURE — 86140 C-REACTIVE PROTEIN: CPT

## 2024-02-12 PROCEDURE — 99232 SBSQ HOSP IP/OBS MODERATE 35: CPT | Performed by: INTERNAL MEDICINE

## 2024-02-12 PROCEDURE — 2580000003 HC RX 258: Performed by: INTERNAL MEDICINE

## 2024-02-12 PROCEDURE — 6360000002 HC RX W HCPCS

## 2024-02-12 PROCEDURE — 1100000000 HC RM PRIVATE

## 2024-02-12 PROCEDURE — 84145 PROCALCITONIN (PCT): CPT

## 2024-02-12 PROCEDURE — 6370000000 HC RX 637 (ALT 250 FOR IP): Performed by: HOSPITALIST

## 2024-02-12 PROCEDURE — 6360000002 HC RX W HCPCS: Performed by: INTERNAL MEDICINE

## 2024-02-12 PROCEDURE — 90935 HEMODIALYSIS ONE EVALUATION: CPT

## 2024-02-12 PROCEDURE — 36415 COLL VENOUS BLD VENIPUNCTURE: CPT

## 2024-02-12 PROCEDURE — 6370000000 HC RX 637 (ALT 250 FOR IP): Performed by: INTERNAL MEDICINE

## 2024-02-12 PROCEDURE — 2709999900 HC NON-CHARGEABLE SUPPLY

## 2024-02-12 PROCEDURE — 80053 COMPREHEN METABOLIC PANEL: CPT

## 2024-02-12 PROCEDURE — 85025 COMPLETE CBC W/AUTO DIFF WBC: CPT

## 2024-02-12 RX ADMIN — HEPARIN SODIUM 3600 UNITS: 1000 INJECTION INTRAVENOUS; SUBCUTANEOUS at 11:19

## 2024-02-12 RX ADMIN — MIDODRINE HYDROCHLORIDE 10 MG: 5 TABLET ORAL at 17:07

## 2024-02-12 RX ADMIN — PIPERACILLIN AND TAZOBACTAM 3375 MG: 3; .375 INJECTION, POWDER, FOR SOLUTION INTRAVENOUS at 06:06

## 2024-02-12 RX ADMIN — EPOETIN ALFA-EPBX 10000 UNITS: 10000 INJECTION, SOLUTION INTRAVENOUS; SUBCUTANEOUS at 09:13

## 2024-02-12 RX ADMIN — PIPERACILLIN AND TAZOBACTAM 3375 MG: 3; .375 INJECTION, POWDER, FOR SOLUTION INTRAVENOUS at 18:47

## 2024-02-12 RX ADMIN — MIDODRINE HYDROCHLORIDE 10 MG: 5 TABLET ORAL at 12:22

## 2024-02-12 RX ADMIN — SODIUM CHLORIDE, PRESERVATIVE FREE 10 ML: 5 INJECTION INTRAVENOUS at 21:09

## 2024-02-12 RX ADMIN — OXYCODONE AND ACETAMINOPHEN 1 TABLET: 5; 325 TABLET ORAL at 17:06

## 2024-02-12 RX ADMIN — MELATONIN TAB 3 MG 3 MG: 3 TAB at 21:09

## 2024-02-12 NOTE — DIALYSIS
Pt tolerated 3.5 hours, removed 1500ml and 74.4 liters process. Cath dressing clean, dry and intact. Pt discharged alert back to room via hospital transportation. Report called to primary nurse.

## 2024-02-12 NOTE — CONSULTS
Cardiology Consult    NAME: Damien Ferrer   :  1950   MRN:  [id number]     Date/Time:  2024 5:00 PM    Patient PCP: Jim Dorman MD  ________________________________________________________________________     Assessment/Plan:   Hypotension, relatively low blood pressure, severe cardiomyopathy, asymptomatic, likely from sepsis.  Currently no intervention required.  Unable to give guideline directed heart failure medications due to low blood pressure.      Sepsis, on Zosyn    Patient does follow-up with Dr. Moody, known severe cardiomyopathy, no access to patient's old chart, had echo in January at [facility] with ejection fraction of 20 to 25%.    Diabetic foot, status post right BKA    Sacral decubitus    Hemodialysis/end-stage renal disease               []        High complexity decision making was performed        Subjective:   CHIEF COMPLAINT:   Sacral decubitus    REASON FOR CONSULT:  Low blood pressure    HISTORY OF PRESENT ILLNESS:     Damien Ferrer is a 73 y.o. Black /  male who presents with sacral decubitus.  Patient noted to be with a low blood pressure.  Denies any cardiac symptoms of chest pain shortness of breath or dizziness.  Known severe cardiomyopathy with ejection fraction of 20 to 25% in January and now with 15 to 20%.  Patient does follow with Dr. Moody, is known with severe cardiomyopathy.  Also with end-stage renal disease on dialysis.  Status post right BKA.        No past medical history on file.   No past surgical history on file.  No Known Allergies   Meds:  See below  Social History     Tobacco Use    Smoking status: Not on file    Smokeless tobacco: Not on file   Substance Use Topics    Alcohol use: Not on file      No family history on file.    REVIEW OF SYSTEMS:     []         Unable to obtain  ROS due to ---   [x]         Total of 12 systems reviewed as follows:    Constitutional: negative fever, negative chills, negative weight loss  Eyes: 
Consult Note            Date:2/8/2024        Patient Name:Damien Ferrer     YOB: 1950     Age:73 y.o.    Consults  Infectious Disease    Chief Complaint     Chief Complaint   Patient presents with    Wound Check      Sacral wound    History Obtained From   reason patient could not give history:  Somnolence    History of Present Illness    This is a 73 year old male with ESRD on HD, known to me from last month when followed for pneumococcal bacteremia with no identified source, treated with Daptomycin.  He was scheduled to see me in ID Clinic this afternoon but was admitted because of concern for infected sacral wound.  He was afebrile with normal WBC but procal and CRP elevated.   Blood cultures were sent and he was started on Ceftriaxone.  ID has been consulted for this reason.      Patient asleep at this time but appears to be resting comfortably.    Past Medical History   No past medical history on file. ESRD on HD, deafness, pneumococcal bacteremia    Past Surgical History   No past surgical history on file.     Medications     Prior to Admission medications    Medication Sig Start Date End Date Taking? Authorizing Provider   atorvastatin (LIPITOR) 40 MG tablet Take 1 tablet by mouth nightly at bedtime. 1/26/24  Yes Provider, Historical, MD   carvedilol (COREG) 12.5 MG tablet Take 1 tablet by mouth 2 times daily 12/9/23  Yes Provider, Historical, MD   sevelamer (RENVELA) 800 MG tablet Take 2 tablets by mouth 3 times daily 11/17/23  Yes Provider, Historical, MD   losartan (COZAAR) 50 MG tablet Take 1 tablet by mouth daily 12/9/23  Yes Provider, Historical, MD   clopidogrel (PLAVIX) 75 MG tablet Take 1 tablet by mouth every other day Monday, Wednesday, Friday and Saturday. 1/26/24  Yes Provider, Historical, MD   acyclovir (ZOVIRAX) 800 MG tablet Take 1 tablet by mouth 2 times daily 1/18/24  Yes Provider, Historical, MD   midodrine (PROAMATINE) 5 MG tablet Take 2 tablets by mouth 3 times daily 1/11/24 
Rectal Q6H PRN    melatonin tablet 3 mg  3 mg Oral Nightly    aluminum & magnesium hydroxide-simethicone (MAALOX) 200-200-20 MG/5ML suspension 30 mL  30 mL Oral Q6H PRN    epoetin selma-epbx (RETACRIT) injection 10,000 Units  10,000 Units IntraVENous Once per day on Mon Wed Fri         Segundo Bar MD    
hemoglobin  Continue Epogen with hemodialysis  Continue to monitor H&H    3. Renal osteodystrophy:   -Stable calcium level  -We will check phosphorus level and adjust binders as needed    4. Sacral wound  -Antibiotics per ID/primary  -BC pending  -ID and vascular on board        Signed: KARIN Quinn - CNP    Rounds made  and plan discussed with Heidi Romo NP  Patient seen and examined at bedside,   Labs and treatments reviewed  Reviewed NP's  notes, amended and agree with assessment and plan  Patient seen on dialysis.  Tolerating well

## 2024-02-12 NOTE — PLAN OF CARE
Problem: Discharge Planning  Goal: Discharge to home or other facility with appropriate resources  Outcome: Progressing  Flowsheets (Taken 2/11/2024 2030)  Discharge to home or other facility with appropriate resources: Identify barriers to discharge with patient and caregiver     Problem: Pain  Goal: Verbalizes/displays adequate comfort level or baseline comfort level  Outcome: Progressing     Problem: Skin/Tissue Integrity  Goal: Absence of new skin breakdown  Description: 1.  Monitor for areas of redness and/or skin breakdown  2.  Assess vascular access sites hourly  3.  Every 4-6 hours minimum:  Change oxygen saturation probe site  4.  Every 4-6 hours:  If on nasal continuous positive airway pressure, respiratory therapy assess nares and determine need for appliance change or resting period.  Outcome: Progressing     Problem: ABCDS Injury Assessment  Goal: Absence of physical injury  Outcome: Progressing     Problem: Safety - Adult  Goal: Free from fall injury  Outcome: Progressing

## 2024-02-12 NOTE — WOUND CARE
0933: Patient off unit at this time; WCN to follow up at later time.   
Assessment Non-blanchable erythema;Hypopigmented 02/12/24 1317   Margins Defined edges 02/12/24 1317   Wound Thickness Description not for Pressure Injury Full thickness 02/12/24 1317   Number of days: 2      Use foam body alignment wedge to turn patient q2h at approximately 30 degree angle to offload sacrum to prevent pressure related skin injury.  Do not position directly on greater trochanter.       Boot are to be applied daily and kept on at all times while in bed for offloading and to prevent pressure injuries.    Float heels on pillow at all times while in bed, place pillow long ways under patient leg so knee is supported and heel is hanging off edge of pillow.        WCN to continue to follow while admitted, Please re-consult WCN for any changes in skin condition.    Mary Lou Vicente RN, BSN  [facility] WCN   02/12/24  1:18 PM

## 2024-02-13 ENCOUNTER — TELEPHONE (OUTPATIENT)
Age: 74
End: 2024-02-13

## 2024-02-13 VITALS
SYSTOLIC BLOOD PRESSURE: 120 MMHG | TEMPERATURE: 98.1 F | WEIGHT: 155.65 LBS | DIASTOLIC BLOOD PRESSURE: 67 MMHG | BODY MASS INDEX: 21.08 KG/M2 | HEIGHT: 72 IN | OXYGEN SATURATION: 100 % | RESPIRATION RATE: 10 BRPM | HEART RATE: 74 BPM

## 2024-02-13 PROBLEM — I50.20 HFREF (HEART FAILURE WITH REDUCED EJECTION FRACTION) (HCC): Status: ACTIVE | Noted: 2024-02-13

## 2024-02-13 LAB
A ALTERNATA IGE QN: <0.1 KU/L
A FUMIGATUS IGE QN: <0.1 KU/L
ANION GAP SERPL CALC-SCNC: 7 MMOL/L (ref 5–15)
BASOPHILS # BLD: 0.1 K/UL (ref 0–0.1)
BASOPHILS NFR BLD: 1 % (ref 0–1)
BERMUDA GRASS IGE QN: <0.1 KU/L
BOXELDER IGE QN: <0.1 KU/L
BUN SERPL-MCNC: 25 MG/DL (ref 6–20)
BUN/CREAT SERPL: 4 (ref 12–20)
C HERBARUM IGE QN: <0.1 KU/L
CA-I BLD-MCNC: 8.6 MG/DL (ref 8.5–10.1)
CAT DANDER IGG QN: <0.1 KU/L
CHLORIDE SERPL-SCNC: 98 MMOL/L (ref 97–108)
CO2 SERPL-SCNC: 27 MMOL/L (ref 21–32)
COMMON RAGWEED IGE QN: <0.1 KU/L
COTTONWOOD IGE QN: <0.1 KU/L
CREAT SERPL-MCNC: 6.69 MG/DL (ref 0.7–1.3)
D FARINAE IGE QN: <0.1 KU/L
D PTERONYSS IGE QN: <0.1 KU/L
DEPRECATED IGE QN: <0.1 KU/L
DIFFERENTIAL METHOD BLD: ABNORMAL
DOG DANDER IGE QN: <0.1 KU/L
EOSINOPHIL # BLD: 1.2 K/UL (ref 0–0.4)
EOSINOPHIL NFR BLD: 14 % (ref 0–7)
ERYTHROCYTE [DISTWIDTH] IN BLOOD BY AUTOMATED COUNT: 15.7 % (ref 11.5–14.5)
GLUCOSE SERPL-MCNC: 107 MG/DL (ref 65–100)
HCT VFR BLD AUTO: 30.3 % (ref 36.6–50.3)
HGB BLD-MCNC: 9.6 G/DL (ref 12.1–17)
IGE SERPL-ACNC: 23 IU/ML (ref 6–495)
IGE SERPL-ACNC: 24 IU/ML (ref 6–495)
IMM GRANULOCYTES # BLD AUTO: 0 K/UL (ref 0–0.04)
IMM GRANULOCYTES NFR BLD AUTO: 0 % (ref 0–0.5)
JOHNSON GRASS IGE QN: <0.1 KU/L
LYMPHOCYTES # BLD: 2.2 K/UL (ref 0.8–3.5)
LYMPHOCYTES NFR BLD: 25 % (ref 12–49)
Lab: ABNORMAL
MCH RBC QN AUTO: 30.3 PG (ref 26–34)
MCHC RBC AUTO-ENTMCNC: 31.7 G/DL (ref 30–36.5)
MCV RBC AUTO: 95.6 FL (ref 80–99)
MONOCYTES # BLD: 1.3 K/UL (ref 0–1)
MONOCYTES NFR BLD: 15 % (ref 5–13)
MOUSE URINE PROT IGE QN: <0.1 KU/L
MT JUNIPER IGE QN: <0.1 KU/L
NEUTS SEG # BLD: 3.9 K/UL (ref 1.8–8)
NEUTS SEG NFR BLD: 45 % (ref 32–75)
NRBC # BLD: 0 K/UL (ref 0–0.01)
NRBC BLD-RTO: 0 PER 100 WBC
P NOTATUM IGE QN: <0.1 KU/L
PECAN/HICK TREE IGE QN: <0.1 KU/L
PLATELET # BLD AUTO: 304 K/UL (ref 150–400)
PMV BLD AUTO: 11 FL (ref 8.9–12.9)
POTASSIUM SERPL-SCNC: 3.8 MMOL/L (ref 3.5–5.1)
RBC # BLD AUTO: 3.17 M/UL (ref 4.1–5.7)
RBC MORPH BLD: ABNORMAL
RBC MORPH BLD: ABNORMAL
ROACH IGG QN: 0.24 KU/L
SHEEP SORREL IGE QN: <0.1 KU/L
SODIUM SERPL-SCNC: 132 MMOL/L (ref 136–145)
TIMOTHY IGE QN: 0.13 KU/L
WBC # BLD AUTO: 8.7 K/UL (ref 4.1–11.1)
WHITE BIRCH IGE QN: <0.1 KU/L
WHITE ELM IGG QN: <0.1 KU/L
WHITE MULBERRY IGE QN: <0.1 KU/L
WHITE OAK IGE QN: <0.1 KU/L

## 2024-02-13 PROCEDURE — 97165 OT EVAL LOW COMPLEX 30 MIN: CPT

## 2024-02-13 PROCEDURE — 99232 SBSQ HOSP IP/OBS MODERATE 35: CPT | Performed by: INTERNAL MEDICINE

## 2024-02-13 PROCEDURE — 6370000000 HC RX 637 (ALT 250 FOR IP): Performed by: INTERNAL MEDICINE

## 2024-02-13 PROCEDURE — 6360000002 HC RX W HCPCS: Performed by: INTERNAL MEDICINE

## 2024-02-13 PROCEDURE — 36415 COLL VENOUS BLD VENIPUNCTURE: CPT

## 2024-02-13 PROCEDURE — 80048 BASIC METABOLIC PNL TOTAL CA: CPT

## 2024-02-13 PROCEDURE — 2580000003 HC RX 258: Performed by: INTERNAL MEDICINE

## 2024-02-13 PROCEDURE — 85025 COMPLETE CBC W/AUTO DIFF WBC: CPT

## 2024-02-13 PROCEDURE — 97161 PT EVAL LOW COMPLEX 20 MIN: CPT

## 2024-02-13 PROCEDURE — 97530 THERAPEUTIC ACTIVITIES: CPT

## 2024-02-13 PROCEDURE — 6370000000 HC RX 637 (ALT 250 FOR IP): Performed by: HOSPITALIST

## 2024-02-13 RX ORDER — OXYCODONE HYDROCHLORIDE AND ACETAMINOPHEN 5; 325 MG/1; MG/1
1 TABLET ORAL EVERY 6 HOURS PRN
Qty: 12 TABLET | Refills: 0 | Status: SHIPPED | OUTPATIENT
Start: 2024-02-13 | End: 2024-02-16

## 2024-02-13 RX ADMIN — MIDODRINE HYDROCHLORIDE 10 MG: 5 TABLET ORAL at 09:14

## 2024-02-13 RX ADMIN — OXYCODONE AND ACETAMINOPHEN 1 TABLET: 5; 325 TABLET ORAL at 15:31

## 2024-02-13 RX ADMIN — MIDODRINE HYDROCHLORIDE 10 MG: 5 TABLET ORAL at 13:19

## 2024-02-13 RX ADMIN — OXYCODONE AND ACETAMINOPHEN 1 TABLET: 5; 325 TABLET ORAL at 09:14

## 2024-02-13 RX ADMIN — CEFEPIME 1000 MG: 1 INJECTION, POWDER, FOR SOLUTION INTRAMUSCULAR; INTRAVENOUS at 13:07

## 2024-02-13 RX ADMIN — PIPERACILLIN AND TAZOBACTAM 3375 MG: 3; .375 INJECTION, POWDER, FOR SOLUTION INTRAVENOUS at 05:59

## 2024-02-13 NOTE — PLAN OF CARE
Problem: Discharge Planning  Goal: Discharge to home or other facility with appropriate resources  2/13/2024 1301 by Peggy Seirra RN  Outcome: Progressing  2/13/2024 0330 by Eufemia Dowell RN  Outcome: Progressing     Problem: Pain  Goal: Verbalizes/displays adequate comfort level or baseline comfort level  2/13/2024 0330 by Eufemia Dowell RN  Outcome: Progressing     Problem: Skin/Tissue Integrity  Goal: Absence of new skin breakdown  Description: 1.  Monitor for areas of redness and/or skin breakdown  2.  Assess vascular access sites hourly  3.  Every 4-6 hours minimum:  Change oxygen saturation probe site  4.  Every 4-6 hours:  If on nasal continuous positive airway pressure, respiratory therapy assess nares and determine need for appliance change or resting period.  2/13/2024 1301 by Peggy Sierra RN  Outcome: Progressing  2/13/2024 0330 by Eufemia Dowell RN  Outcome: Progressing     Problem: ABCDS Injury Assessment  Goal: Absence of physical injury  Outcome: Progressing     Problem: Safety - Adult  Goal: Free from fall injury  2/13/2024 1301 by Peggy Sierra RN  Outcome: Progressing  2/13/2024 0330 by Eufemia Dowell RN  Outcome: Progressing     Problem: Occupational Therapy - Adult  Goal: By Discharge: Performs self-care activities at highest level of function for planned discharge setting.  See evaluation for individualized goals.  Description: FUNCTIONAL STATUS PRIOR TO ADMISSION:  Pt reports he was modified independent using RW for short distance functional mobility and wheelchair for long distance functional mobility. Pt was modified independent using AD for ADLs.    HOME SUPPORT: The patient lived with niece but did not require assistance.    Occupational Therapy Goals:  Initiated 2/13/2024  Patient/Family stated goal: Go home  1.  Patient will perform lower body dressing with Modified [place] within 7 day(s).  2.  Patient will perform grooming with

## 2024-02-13 NOTE — CARE COORDINATION
1325: CM spoke with pt niece and she states the plan is for pt to return home with HH, and family to transport pt home.     1339: CM met with pt at bedside to discuss dispo. Pt reports he does not want to go with [facility] and would like to switch to [facility]. New choice form signed and placed in pt chart. CM sent referral to [facility].

## 2024-02-13 NOTE — DISCHARGE SUMMARY
Discharge Summary    Name: Damien Ferrer  [id number]  YOB: 1950 (Age: 73 y.o.)   Date of Admission: 2/8/2024  Date of Discharge: 2/13/2024  Attending Physician: Timothy Saldaña MD    Discharge Diagnosis:   Principal Problem:    Infected wound  Active Problems:    Sacral wound    Sepsis without acute organ dysfunction (HCC)    Eosinophilia    ESRD on dialysis (HCC)    HFrEF (heart failure with reduced ejection fraction) (HCC)  Resolved Problems:    * No resolved hospital problems. *       Consultations:  IP CONSULT TO NEPHROLOGY  IP CONSULT TO PHARMACY  IP CONSULT TO NEPHROLOGY  IP CONSULT TO INFECTIOUS DISEASES  IP CONSULT TO VASCULAR SURGERY  IP WOUND CARE NURSE CONSULT TO EVAL  IP CONSULT TO CARDIOLOGY      Brief Admission History/Reason for Admission Per Amy Rodriguez MD:   Worsening sacral wound    Brief Hospital Course by Main Problems:   Patient is a 73-year-old male hearing impaired with a past medical history of ESRD on HD, chronic sacral ulcers, and s/p right BKA by Dr. Charlton who was admitted on 2/8/2024 for progressively worsening sacral wound after being advised by general surgeon for IV antibiotics and wound care.  Blood and wound cultures collected and patient initiated on IV Zosyn.  Infectious disease, general surgery and nephrology consulted.  Nephrology evaluated patient and will continue to monitor patient for HD management.  General surgery evaluated patient and states wounds are clean and do not require debridement at this time.  However, recommends wet-to-dry with saline packing changes twice daily with Mepilex coverage.  Follow-up outpatient in 2 to 3 weeks to ensure continued proving of sacral wounds with IV antibiotics.  Also, during admission patient began experiencing intermittent hypotension and was initiated on midodrine. Echo completed on 2/11/2024 revealed severely reduced left ventricular systolic function with EF of 15 to 20%.   Skin normal color for race, warm, dry and intact. No evidence of rash.

## 2024-02-13 NOTE — PROGRESS NOTES
Hospitalist Progress Note    NAME:   Damien Ferrer   : 1950   MRN: [id number]     Date/Time: 2024 10:18 AM  Patient PCP: Jim Dorman MD    Estimated discharge date: 24-48 hours  Barriers: Final cultures for ID recommendation of outpatient antibiotics, cardiology consult    Hospital course:  Patient is a 73-year-old male hearing impaired with a past medical history of ESRD on HD, chronic sacral ulcers, and s/p right BKA by Dr. Charlton who was admitted on 2024 for progressively worsening sacral wound after being advised by general surgeon for IV antibiotics and wound care.  Blood and wound cultures collected and patient initiated on IV Zosyn.  Infectious disease, general surgery and nephrology consulted.  Nephrology evaluated patient and will continue to monitor patient for HD management.  General surgery evaluated patient and states wounds are clean and do not require debridement at this time.  However, recommends wet-to-dry with saline packing changes twice daily with Mepilex coverage.  Also, during admission patient began experiencing intermittent hypotension and was initiated on midodrine.  Pending final cultures for IDs recommendation on outpatient antibiotics.  Echo completed on 2024 revealed severely reduced left ventricular systolic function with EF of 15 to 20%.  Cardiology consult.    Assessment / Plan:  Sacral wound infection  -Continue IV Zosyn  -Blood and wound cultures no growth to date  -Infectious disease agreed with IV Zosyn, continue until final cultures complete  -General surgery evaluated patient and recommended no debridement at this time, continue twice daily dressing changes  -WBC, Pro-Link and CRP all trending down    ESRD on HD, scheduled for   -Nephrology following for management of HD    Anemia of chronic disease  -Hemoglobin stable at 9.2  -Continue Retacrit with HD  -Continue to monitor and transfuse if hemoglobin less than 7    Hearing 
  Physician Progress Note      PATIENT:               LISBETH BRADSHAW  [facility] #:                  [id number]  :                       1950  ADMIT DATE:       2024 10:20 AM  DISCH DATE:  RESPONDING  PROVIDER #:        Vinny Gimenez          QUERY TEXT:    Patient admitted with Infected sacral ulcer. Noted documentation of sepsis in   ID PNs. In order to support the diagnosis of sepsis, please include additional   clinical indicators in your documentation.  Or please document if the   diagnosis of sepsis has been ruled out after further study:    The medical record reflects the following:  Risk Factors: Sacral wound infection, ESRD, Anemia, Dialysis dependent.    Clinical Indicators:  ID PN: \"Sepsis with elevated procal and CRP.\"  Temp: 98.1-98.6  RR: 16-18  HR: 77-80  WBC: 7.7, 7.6, 8.9    Treatment: ID and Nephro consulted, BCx, IV Abx, Lab monitoring.  .  Thank you,  MIGUELINA Coronado, RN, CRCR, CDI Specialist  [email or web address]  Can also be reached on [employer] Serve.  .  Options provided:  -- Sepsis was ruled out after study  -- Sepsis present as evidenced by, Please document evidence.  -- Other - I will add my own diagnosis  -- Disagree - Not applicable / Not valid  -- Disagree - Clinically unable to determine / Unknown  -- Refer to Clinical Documentation Reviewer    PROVIDER RESPONSE TEXT:    Sepsis was ruled out after study.    Query created by: Najma Looney on 2024 1:03 PM      QUERY TEXT:    Patient admitted with infected sacral decub ulcer, noted to have sacral soft   tissue gas per CT. If possible, please document in progress notes and   discharge summary further specificity regarding gangrene as follows:    The medical record reflects the following:  Risk Factors: Sacral wound infection, ESRD, Anemia, Dialysis dependent.    Clinical Indicators: CT Scan: \"Tiny focus of soft tissue gas at the right   lateral margin of the tip of the coccyx. Nonspecific tiny focus of gas in   posterior 
Blood ordered. Consent obtained and s/s of reactions reviewed. Blood started and nurse will remain in room to monitor for first 15 minutes. VSS and no acute distress noted. Call bell within reach. Continue to monitor.   
Discharge paperwork complete. Just print when patient ready to leave. Primary nurse aware.  
Echo completed. Report to follow.     
NAME:  Damien Ferrer   :   1950   MRN:   [id number]     ATTENDING: Amy Rodriguez MD  PCP:  Jim Dorman MD    Date/Time:  2024       Subjective:   Patient seen on dialysis  Tolerating well      No past medical history on file.   No past surgical history on file.  Social History     Tobacco Use    Smoking status: Not on file    Smokeless tobacco: Not on file   Substance Use Topics    Alcohol use: Not on file      No family history on file.    No Known Allergies   Prior to Admission medications    Medication Sig Start Date End Date Taking? Authorizing Provider   atorvastatin (LIPITOR) 40 MG tablet Take 1 tablet by mouth nightly at bedtime. 24  Yes Provider, MD Lida   carvedilol (COREG) 12.5 MG tablet Take 1 tablet by mouth 2 times daily 23  Yes Provider, MD Lida   sevelamer (RENVELA) 800 MG tablet Take 2 tablets by mouth 3 times daily 23  Yes Provider, MD Lida   losartan (COZAAR) 50 MG tablet Take 1 tablet by mouth daily 23  Yes Provider, MD Lida   clopidogrel (PLAVIX) 75 MG tablet Take 1 tablet by mouth every other day Monday, Wednesday, Friday and Saturday. 24  Yes Provider, MD Lida   acyclovir (ZOVIRAX) 800 MG tablet Take 1 tablet by mouth 2 times daily 24  Yes Provider, MD Lida   midodrine (PROAMATINE) 5 MG tablet Take 2 tablets by mouth 3 times daily 24  Yes Provider, MD Lida     Current Facility-Administered Medications   Medication Dose Route Frequency    0.9 % sodium chloride infusion   IntraVENous PRN    midodrine (PROAMATINE) tablet 10 mg  10 mg Oral TID     piperacillin-tazobactam (ZOSYN) 3,375 mg in sodium chloride 0.9 % 50 mL IVPB (mini-bag)  3,375 mg IntraVENous Q12H    oxyCODONE-acetaminophen (PERCOCET) 5-325 MG per tablet 1 tablet  1 tablet Oral Q6H PRN    heparin (porcine) injection 3,600 Units  3,600 Units IntraCATHeter PRN    sodium chloride flush 0.9 % injection 5-40 mL  5-40 mL IntraVENous 2 
NAME:  Damien Ferrer   :   1950   MRN:   [id number]     ATTENDING: Amy Rodriguez MD  PCP:  Jim Dorman MD    Date/Time:  2/10/2024       Subjective:   REQUESTING PHYSICIAN:  Dr. Rodriguez  REASON FOR CONSULT:    ESRD on HD    History of presenting illness:    Patient seen in the room.  He was dialyzed yesterday but treatment discontinued 30 minutes early because of cramping.  1.5 L of fluid removed  hard of hearing but denies any complaints.  Blood pressure is okay      No past medical history on file.   No past surgical history on file.  Social History     Tobacco Use    Smoking status: Not on file    Smokeless tobacco: Not on file   Substance Use Topics    Alcohol use: Not on file      No family history on file.    No Known Allergies   Prior to Admission medications    Medication Sig Start Date End Date Taking? Authorizing Provider   atorvastatin (LIPITOR) 40 MG tablet Take 1 tablet by mouth nightly at bedtime. 24  Yes ProviderLida MD   carvedilol (COREG) 12.5 MG tablet Take 1 tablet by mouth 2 times daily 23  Yes Provider, MD Lida   sevelamer (RENVELA) 800 MG tablet Take 2 tablets by mouth 3 times daily 23  Yes Provider, MD Lida   losartan (COZAAR) 50 MG tablet Take 1 tablet by mouth daily 23  Yes ProviderLida MD   clopidogrel (PLAVIX) 75 MG tablet Take 1 tablet by mouth every other day Monday, Wednesday, Friday and Saturday. 24  Yes ProviderLida MD   acyclovir (ZOVIRAX) 800 MG tablet Take 1 tablet by mouth 2 times daily 24  Yes Provider, MD Lida   midodrine (PROAMATINE) 5 MG tablet Take 2 tablets by mouth 3 times daily 24  Yes ProviderLida MD     Current Facility-Administered Medications   Medication Dose Route Frequency    midodrine (PROAMATINE) tablet 5 mg  5 mg Oral TID WC    piperacillin-tazobactam (ZOSYN) 3,375 mg in sodium chloride 0.9 % 50 mL IVPB (mini-bag)  3,375 mg IntraVENous Q12H    
NAME:  Damien Ferrer   :   1950   MRN:   [id number]     ATTENDING: Amy Rodriguez MD  PCP:  Jim Dorman MD    Date/Time:  2024       Subjective:   REQUESTING PHYSICIAN:  Dr. Rodriguez  REASON FOR CONSULT:    ESRD on HD    History of presenting illness:    Patient seen in the room.  He was dialyzed last on Friday but treatment discontinued 30 minutes early because of cramping.  1.5 L of fluid removed  hard of hearing but denies any complaints.  Blood pressure is okay      No past medical history on file.   No past surgical history on file.  Social History     Tobacco Use    Smoking status: Not on file    Smokeless tobacco: Not on file   Substance Use Topics    Alcohol use: Not on file      No family history on file.    No Known Allergies   Prior to Admission medications    Medication Sig Start Date End Date Taking? Authorizing Provider   atorvastatin (LIPITOR) 40 MG tablet Take 1 tablet by mouth nightly at bedtime. 24  Yes ProviderLida MD   carvedilol (COREG) 12.5 MG tablet Take 1 tablet by mouth 2 times daily 23  Yes Provider, MD Lida   sevelamer (RENVELA) 800 MG tablet Take 2 tablets by mouth 3 times daily 23  Yes Provider, MD Lida   losartan (COZAAR) 50 MG tablet Take 1 tablet by mouth daily 23  Yes ProviderLida MD   clopidogrel (PLAVIX) 75 MG tablet Take 1 tablet by mouth every other day Monday, Wednesday, Friday and Saturday. 24  Yes ProviderLida MD   acyclovir (ZOVIRAX) 800 MG tablet Take 1 tablet by mouth 2 times daily 24  Yes ProviderLida MD   midodrine (PROAMATINE) 5 MG tablet Take 2 tablets by mouth 3 times daily 24  Yes ProviderLida MD     Current Facility-Administered Medications   Medication Dose Route Frequency    0.9 % sodium chloride infusion   IntraVENous PRN    midodrine (PROAMATINE) tablet 5 mg  5 mg Oral TID WC    piperacillin-tazobactam (ZOSYN) 3,375 mg in sodium chloride 0.9 % 50 mL IVPB 
PHYSICAL THERAPY EVALUATION  Patient: Damien Ferrer (73 y.o. male)  Date: 2/13/2024  Primary Diagnosis: Infected wound [T14.8XXA, L08.9]  Skin ulcer of sacrum with fat layer exposed (HCC) [L98.422]       Precautions: Restrictions/Precautions  Restrictions/Precautions: Fall Risk     Recommendations for nursing mobility: Out of bed to chair for meals, AD and gt belt for bed to chair , Amb to bathroom with AD and gait belt, Assist x1, and right LE prosthesis    In place during session: Peripheral IV    ASSESSMENT  Pt is a 73 y.o. male admitted on 2/8/2024 for back pain, unable to lay flat; pt currently being treated for infected sacral wound, elevated procalcitonin and CRP, ESRD on HD, anemia on CKD. Pt semi-supine in bed upon PT arrival, agreeable to evaluation. Pt A&O x 4.     Based on the objective data described below, the patient currently presents with impaired functional mobility, decreased independence in ADLs, decreased ROM, impaired strength, decreased activity tolerance, impaired balance, impaired posture, and increased pain levels. (See below for objective details and assist levels).     Overall pt tolerated session fair today with c/o 8/10 sacral pain throughout session. Pt required SBA for bed mobility and CGA  with additional time and adaptive equipment transfers. Pt I with donning right LE prosthesis while seated EOB. Pt amb 15 feet x2 (bed>bathroom>bedside chair) with RW, right prosthesis, gt belt and CGA with additional time; demonstrates WBOS with short, steady, step through gt pattern with no LOB or knee buckling noted. Pt will benefit from continued skilled PT to address above deficits and return to PLOF. Potential barriers for safe discharge: pt is a high fall risk. Current PT DC recommendation Home with Home Health Therapy and family assist once medically appropriate.     Start of Session End of Session   SPO2 (%) 100 100   Heart Rate (BPM) 81 85     GOALS:    Problem: Physical Therapy - 
PROGRESS NOTE      Chief Complaints:  Patient examined this morning.  HPI and  Objective:    Patient was admitted to hospital significant sacral wound worsening with a significant pain.  Patient has been unsteady.  Review of Systems:  Rest of review of system reviewed personally and they are negative.    EXAM:  BP (!) 97/55   Pulse 75   Temp 98.4 °F (36.9 °C) (Oral)   Resp 16   Ht 1.829 m (6')   Wt 83.9 kg (185 lb)   SpO2 97%   BMI 25.09 kg/m²     Patient is awake   Head and neck atraumatic, normocephalic.  ENT: No hoarse voice, gaze appropriate.  Cardiac system regular rate rhythm.  Pulmonary no audible wheeze, no cyanosis.  Chest wall excursion normal with respiration cycle  Abdomen is soft not particularly distended.  Neurologically nonfocal.  Hematology system: No bruising noted.  Musculoskeletal system: No joint deformity noted.  Genitourinary system: No hematuria noted.  Skin is warm and moist.  Psychosocial: Cooperative.  Vascular examination as previously noted no changes.    Current Facility-Administered Medications   Medication Dose Route Frequency Provider Last Rate Last Admin    piperacillin-tazobactam (ZOSYN) 3,375 mg in sodium chloride 0.9 % 50 mL IVPB (mini-bag)  3,375 mg IntraVENous Q12H Deandre Magallanes MD 12.5 mL/hr at 02/09/24 0644 3,375 mg at 02/09/24 0644    oxyCODONE-acetaminophen (PERCOCET) 5-325 MG per tablet 1 tablet  1 tablet Oral Q6H PRN Uzma Nunez MD        heparin (porcine) injection 3,600 Units  3,600 Units IntraCATHeter PRN Sonam Ingram MD   3,600 Units at 02/08/24 1645    sodium chloride flush 0.9 % injection 5-40 mL  5-40 mL IntraVENous 2 times per day Amy Rodriguez MD   10 mL at 02/08/24 2104    sodium chloride flush 0.9 % injection 5-40 mL  5-40 mL IntraVENous PRN Amy Rodriguez MD        0.9 % sodium chloride infusion   IntraVENous PRN Amy Rodriguez MD        heparin (porcine) injection 5,000 Units  5,000 Units SubCUTAneous 3 times per day 
Patient medically cleared for discharge. PIV removed. Patient transported to [facility] via wheelchair to be discharged home with all belongings and discharge paperwork. Discharge paperwork reviewed with [employer] (who provided discharge transportation) including new and changed medications and follow up appointments.     
Patient was refusing his scheduled heparin SQ injection. Dr Palumbo was made aware.  
Progress Note  Date:2/10/2024       Room:[facility]  Patient Name:Damien Ferrer     YOB: 1950     Age:73 y.o.        Subjective    Subjective  Patient with ESRD on HD, deafness, followed for sepsis with stage 3 Sacral wound.  No wound culture sent yet. Currently on IV Zosyn. Afebrile with normal WBC but procal and CRP elevated. Patient resting comfortably with no new complaints.      Objective         Vitals Last 24 Hours:  TEMPERATURE:  Temp  Av °F (36.7 °C)  Min: 97.1 °F (36.2 °C)  Max: 98.4 °F (36.9 °C)  RESPIRATIONS RANGE: Resp  Av  Min: 16  Max: 18  PULSE OXIMETRY RANGE: SpO2  Av.4 %  Min: 93 %  Max: 99 %  PULSE RANGE: Pulse  Av.2  Min: 60  Max: 77  BLOOD PRESSURE RANGE: Systolic (24hrs), Av , Min:79 , Max:110   ; Diastolic (24hrs), Av, Min:42, Max:61         Objective  Vitals and nursing note reviewed.   Constitutional:       General: He is not in acute distress.     Appearance: He is ill-appearing.   HENT:  unremarkable  Cardiovascular:      Rate and Rhythm: Normal rate and regular rhythm.      Heart sounds: No murmur heard.  Pulmonary:      Effort: Pulmonary effort is normal.   Abdominal:      General: Abdomen is flat. Bowel sounds are normal. There is no distension.      Palpations: Abdomen is soft.   Genitourinary:     Comments: No Valencia  Musculoskeletal:      Cervical back: Neck supple.      Comments: Stage 3 sacral wound with moderate erythema and exudate  Skin:     Findings: No rash.   Neurological: Deaf, nonfocal, no confusion   Psychiatric: normal behavior      Labs/Imaging/Diagnostics    Labs:  CBC:  Recent Labs     24  1207 24  0545 02/10/24  0509   WBC 7.7 7.6 8.9   RBC 2.98* 2.44* 2.65*   HGB 9.1* 7.6* 8.2*   HCT 29.1* 24.2* 26.1*   MCV 97.7 99.2* 98.5   RDW 13.9 14.1 14.1    351 315     CHEMISTRIES:  Recent Labs     24  1117 24  0540 02/10/24  0509   * 134* 135*   K 4.9 4.1 3.9    100 101   CO2 28 28 28   BUN 41* 27* 
Progress Note  Date:2024       Room:[facility]  Patient Name:Damien Ferrer     YOB: 1950     Age:73 y.o.        Subjective    Subjective  Patient with ESRD on HD, deafness, followed for sepsis with stage 3 Sacral wound.  No wound culture sent yet. Currently on IV Zosyn. Afebrile with normal WBC but procal and CRP elevated. Patient resting comfortably with no new complaints.      Objective         Vitals Last 24 Hours:  TEMPERATURE:  Temp  Av.4 °F (36.9 °C)  Min: 97.9 °F (36.6 °C)  Max: 99 °F (37.2 °C)  RESPIRATIONS RANGE: Resp  Av.6  Min: 16  Max: 19  PULSE OXIMETRY RANGE: SpO2  Av.6 %  Min: 79 %  Max: 100 %  PULSE RANGE: Pulse  Av.8  Min: 63  Max: 103  BLOOD PRESSURE RANGE: Systolic (24hrs), Av , Min:92 , Max:128   ; Diastolic (24hrs), Av, Min:53, Max:73         Objective:  Vital signs: (most recent): Blood pressure 109/62, pulse 67, temperature 98.3 °F (36.8 °C), resp. rate 18, height 1.829 m (6'), weight 70.6 kg (155 lb 10.3 oz), SpO2 100 %.      Vitals and nursing note reviewed.   Constitutional:       General: He is not in acute distress.     Appearance: He is ill-appearing.   HENT:  unremarkable  Cardiovascular:      Rate and Rhythm: Normal rate and regular rhythm.      Heart sounds: No murmur heard.  Pulmonary:      Effort: Pulmonary effort is normal.   Abdominal:      General: Abdomen is flat. Bowel sounds are normal. There is no distension.      Palpations: Abdomen is soft.   Genitourinary:     Comments: No Valencia  Musculoskeletal:      Cervical back: Neck supple.      Comments: Stage 3 sacral wound with moderate erythema and exudate  Skin:     Findings: No rash.   Neurological: Deaf, nonfocal, no confusion   Psychiatric: normal behavior      Labs/Imaging/Diagnostics    Labs:  CBC:  Recent Labs     24  0537 24  1912 24  0747 24  0505   WBC 10.8  --  7.7 8.7   RBC 2.15*  --  3.00* 3.17*   HGB 6.6* 9.2* 9.2* 9.6*   HCT 21.4* 29.7* 28.5* 30.3* 
Progress Note  Date:2024       Room:[facility]  Patient Name:Damien Ferrer     YOB: 1950     Age:73 y.o.        Subjective    Subjective  Patient with ESRD on HD, deafness, followed for sepsis with stage 3 Sacral wound with wound culture growing only mixed enteric domenic.  He was switched to Cefepime in preparation for continued IV antibiotic at Dialysis.    No wound culture sent yet. Currently on IV Zosyn. Afebrile with normal WBC and decreasing procal and CRP elevated. Patient resting comfortably with no new complaints. Scheduled for discharge.     Objective         Vitals Last 24 Hours:  TEMPERATURE:  Temp  Av.4 °F (36.9 °C)  Min: 97.9 °F (36.6 °C)  Max: 99 °F (37.2 °C)  RESPIRATIONS RANGE: Resp  Av.2  Min: 15  Max: 19  PULSE OXIMETRY RANGE: SpO2  Av.2 %  Min: 79 %  Max: 100 %  PULSE RANGE: Pulse  Av.7  Min: 67  Max: 103  BLOOD PRESSURE RANGE: Systolic (24hrs), Av , Min:109 , Max:128   ; Diastolic (24hrs), Av, Min:62, Max:73         Objective:  Vital signs: (most recent): Blood pressure 116/69, pulse 73, temperature 98.1 °F (36.7 °C), temperature source Oral, resp. rate 15, height 1.829 m (6'), weight 70.6 kg (155 lb 10.3 oz), SpO2 99 %.      Vitals and nursing note reviewed.   Constitutional:       General: He is not in acute distress.     Appearance: He is ill-appearing.   HENT:  unremarkable  Cardiovascular:      Rate and Rhythm: Normal rate and regular rhythm.      Heart sounds: No murmur heard.  Pulmonary:      Effort: Pulmonary effort is normal.   Abdominal:      General: Abdomen is flat. Bowel sounds are normal. There is no distension.      Palpations: Abdomen is soft.   Genitourinary:     Comments: No Valencia  Musculoskeletal:      Cervical back: Neck supple.      Comments: Stage 3 sacral wound with moderate erythema and exudate  Skin:     Findings: No rash.   Neurological: Deaf, nonfocal, no confusion   Psychiatric: normal behavior      Labs/Imaging/Diagnostics  
(6' 0.01\")  Ideal Body Weight (IBW): 178 lbs (81 kg)       Current Body Weight: 70.6 kg (155 lb 10.3 oz), 87.4 % IBW. Weight Source: Bed Scale  Current BMI (kg/m2): 21.1  Usual Body Weight: 72.6 kg (160 lb)  % Weight Change (Calculated): -2.7  Weight Adjustment For: Amputation  Total Adjusted Percentage (Calculated): 5.9  Adjusted Ideal Body Weight (lbs) (Calculated): 167.5 lbs  Adjusted Ideal Body Weight (kg) (Calculated): 76.14 kg  Adjusted % Ideal Body Weight (Calculated): 92.9  Adjusted BMI (kg/m2) (Calculated): 22.3  BMI Categories: Normal Weight (BMI 22.0 to 24.9) age over 65    Estimated Daily Nutrient Needs:  Energy Requirements Based On: Kcal/kg  Weight Used for Energy Requirements: Current  Energy (kcal/day): 4577-7157 kcals/day (30-35kcals/kg)  Weight Used for Protein Requirements: Current  Protein (g/day): 85-106g/day (1.2-1.5g/kg-Wounds)  Method Used for Fluid Requirements: Standard Renal  Fluid (ml/day): 1641 ml/day or per MD    Nutrition Diagnosis:   Increased nutrient needs related to increase demand for energy/nutrients as evidenced by wounds    Nutrition Interventions:   Food and/or Nutrient Delivery: Continue Current Diet, Start Oral Nutrition Supplement  Nutrition Education/Counseling: No recommendation at this time  Coordination of Nutrition Care: Continue to monitor while inpatient  Plan of Care discussed with: Patient and RN    Goals:     Goals: PO intake 75% or greater, by next RD assessment       Nutrition Monitoring and Evaluation:   Behavioral-Environmental Outcomes: None Identified  Food/Nutrient Intake Outcomes: Supplement Intake, Food and Nutrient Intake  Physical Signs/Symptoms Outcomes: Biochemical Data, Nutrition Focused Physical Findings, Skin, Weight, Meal Time Behavior    Discharge Planning:    Continue current diet, Continue Oral Nutrition Supplement     Teddy SALOMON Both  Contact: [phone]    
Distal pulses intact.    PMH/ reviewed - no change compared to H&P    Data Review    Telemetry:     EKG:   []  No new EKG for review    Lab Data Personally Reviewed:    Recent Labs     02/12/24  0747 02/13/24  0505   WBC 7.7 8.7   HGB 9.2* 9.6*   HCT 28.5* 30.3*    304     No results for input(s): \"INR\", \"APTT\" in the last 72 hours.    Invalid input(s): \"PTP\"   Recent Labs     02/11/24  0537 02/12/24  0747 02/13/24  0505   * 134* 132*   K 4.0 4.4 3.8    101 98   CO2 27 26 27   BUN 33* 42* 25*     No results for input(s): \"CPK\" in the last 72 hours.    Invalid input(s): \"CPKMB\", \"CKNDX\", \"TROIQ\"  No results found for: \"CHOL\", \"CHOLX\", \"CHLST\", \"CHOLV\", \"HDL\", \"HDLC\", \"LDL\", \"LDLC\", \"TGLX\", \"TRIGL\"    Recent Labs     02/11/24  0537 02/12/24  0747   GLOB 7.2* 7.1*     No results for input(s): \"PH\", \"PCO2\", \"PO2\" in the last 72 hours.    Medications Personally Reviewed:    Current Facility-Administered Medications   Medication Dose Route Frequency    cefepime (MAXIPIME) 1,000 mg in sodium chloride 0.9 % 100 mL IVPB (mini-bag)  1,000 mg IntraVENous Daily    0.9 % sodium chloride infusion   IntraVENous PRN    midodrine (PROAMATINE) tablet 10 mg  10 mg Oral TID WC    oxyCODONE-acetaminophen (PERCOCET) 5-325 MG per tablet 1 tablet  1 tablet Oral Q6H PRN    heparin (porcine) injection 3,600 Units  3,600 Units IntraCATHeter PRN    sodium chloride flush 0.9 % injection 5-40 mL  5-40 mL IntraVENous 2 times per day    sodium chloride flush 0.9 % injection 5-40 mL  5-40 mL IntraVENous PRN    0.9 % sodium chloride infusion   IntraVENous PRN    [Held by provider] heparin (porcine) injection 5,000 Units  5,000 Units SubCUTAneous 3 times per day    ondansetron (ZOFRAN-ODT) disintegrating tablet 4 mg  4 mg Oral Q8H PRN    Or    ondansetron (ZOFRAN) injection 4 mg  4 mg IntraVENous Q6H PRN    polyethylene glycol (GLYCOLAX) packet 17 g  17 g Oral Daily PRN    acetaminophen (TYLENOL) tablet 650 mg  650 mg Oral Q6H 
  02/08/24 1400 105/66 -- -- 71 -- -- -- --   02/08/24 1330 120/68 -- -- 72 -- -- -- --   02/08/24 1315 137/68 98.6 °F (37 °C) -- 70 -- -- -- --   02/08/24 1019 -- -- -- -- -- -- 1.829 m (6') 83.9 kg (185 lb)   02/08/24 1018 123/72 98.1 °F (36.7 °C) -- 77 16 100 % -- --         Intake/Output Summary (Last 24 hours) at 2/9/2024 0833  Last data filed at 2/9/2024 0200  Gross per 24 hour   Intake 810 ml   Output 2600 ml   Net -1790 ml        I had a face to face encounter and independently examined this patient on 2/9/2024, as outlined below:    Review of Systems   Constitutional:  Negative for diaphoresis, fatigue and fever.   HENT:  Positive for hearing loss.    Respiratory:  Negative for cough, shortness of breath and wheezing.    Cardiovascular:  Negative for chest pain and palpitations.   Gastrointestinal:  Negative for diarrhea, nausea and vomiting.   Musculoskeletal:  Negative for back pain and neck pain.   Skin:  Positive for wound. Negative for rash.   Neurological:  Negative for dizziness and light-headedness.   Psychiatric/Behavioral:  Negative for agitation and confusion.         PHYSICAL EXAM:  Physical Exam  Constitutional:       General: He is not in acute distress.     Appearance: He is not ill-appearing.      Comments: Deaf, unable to read lips   Cardiovascular:      Rate and Rhythm: Normal rate and regular rhythm.      Pulses: Normal pulses.   Pulmonary:      Effort: Pulmonary effort is normal. No respiratory distress.      Breath sounds: Normal breath sounds. No wheezing.   Abdominal:      General: Abdomen is flat. There is no distension.      Palpations: Abdomen is soft.      Tenderness: There is no abdominal tenderness.   Musculoskeletal:         General: Normal range of motion.      Right lower leg: No edema.      Left lower leg: No edema.   Skin:     General: Skin is warm and dry.      Findings: Lesion present.      Comments: Sacral wound edematous, with minimal drainage   Neurological:      
  Output 150 ml   Net 170 ml          I had a face to face encounter and independently examined this patient on 2/11/2024, as outlined below:    Review of Systems   Constitutional:  Negative for diaphoresis, fatigue and fever.   HENT:  Positive for hearing loss.    Respiratory:  Negative for cough, shortness of breath and wheezing.    Cardiovascular:  Negative for chest pain and palpitations.   Gastrointestinal:  Negative for diarrhea, nausea and vomiting.   Musculoskeletal:  Negative for back pain and neck pain.   Skin:  Positive for wound. Negative for rash.   Neurological:  Negative for dizziness and light-headedness.   Psychiatric/Behavioral:  Negative for agitation and confusion.         PHYSICAL EXAM:  Physical Exam  Constitutional:       General: He is not in acute distress.     Appearance: He is not ill-appearing.      Comments: Deaf, unable to read lips   Cardiovascular:      Rate and Rhythm: Normal rate and regular rhythm.      Pulses: Normal pulses.   Pulmonary:      Effort: Pulmonary effort is normal. No respiratory distress.      Breath sounds: Normal breath sounds. No wheezing.   Abdominal:      General: Abdomen is flat. There is no distension.      Palpations: Abdomen is soft.      Tenderness: There is no abdominal tenderness.   Musculoskeletal:         General: Normal range of motion.      Right lower leg: No edema.      Left lower leg: No edema.   Skin:     General: Skin is warm and dry.      Findings: Lesion present.      Comments: Sacral wound edematous, with minimal drainage   Neurological:      Mental Status: He is alert and oriented to person, place, and time.   Psychiatric:         Mood and Affect: Mood normal.         Behavior: Behavior normal.          Reviewed most current lab test results and cultures  YES  Reviewed most current radiology test results   YES  Review and summation of old records today    NO  Reviewed patient's current orders and MAR    YES  PMH/SH reviewed - no change 
% --   02/09/24 1934 (!) 79/42 98.3 °F (36.8 °C) Oral 77 16 94 % --   02/09/24 1815 (!) 92/55 98.4 °F (36.9 °C) Oral 61 18 99 % --   02/09/24 1200 (!) 100/43 97.1 °F (36.2 °C) -- 73 -- 97 % --   02/09/24 1130 (!) 109/45 97.6 °F (36.4 °C) -- 64 -- -- --   02/09/24 1100 (!) 93/55 -- -- 61 -- -- --           Intake/Output Summary (Last 24 hours) at 2/10/2024 1035  Last data filed at 2/10/2024 0817  Gross per 24 hour   Intake 1138 ml   Output 2320 ml   Net -1182 ml          I had a face to face encounter and independently examined this patient on 2/10/2024, as outlined below:    Review of Systems   Constitutional:  Negative for diaphoresis, fatigue and fever.   HENT:  Positive for hearing loss.    Respiratory:  Negative for cough, shortness of breath and wheezing.    Cardiovascular:  Negative for chest pain and palpitations.   Gastrointestinal:  Negative for diarrhea, nausea and vomiting.   Musculoskeletal:  Negative for back pain and neck pain.   Skin:  Positive for wound. Negative for rash.   Neurological:  Negative for dizziness and light-headedness.   Psychiatric/Behavioral:  Negative for agitation and confusion.         PHYSICAL EXAM:  Physical Exam  Constitutional:       General: He is not in acute distress.     Appearance: He is not ill-appearing.      Comments: Deaf, unable to read lips   Cardiovascular:      Rate and Rhythm: Normal rate and regular rhythm.      Pulses: Normal pulses.   Pulmonary:      Effort: Pulmonary effort is normal. No respiratory distress.      Breath sounds: Normal breath sounds. No wheezing.   Abdominal:      General: Abdomen is flat. There is no distension.      Palpations: Abdomen is soft.      Tenderness: There is no abdominal tenderness.   Musculoskeletal:         General: Normal range of motion.      Right lower leg: No edema.      Left lower leg: No edema.   Skin:     General: Skin is warm and dry.      Findings: Lesion present.      Comments: Sacral wound edematous, with minimal 
Absolute 1.2 (H) 0.0 - 0.4 K/UL    Basophils Absolute 0.1 0.0 - 0.1 K/UL    Absolute Immature Granulocyte 0.0 0.00 - 0.04 K/UL    Differential Type Smear Scanned      RBC Comment Macrocytosis  1+        RBC Comment Polychromasia  1+       Basic Metabolic Panel    Collection Time: 02/13/24  5:05 AM   Result Value Ref Range    Sodium 132 (L) 136 - 145 mmol/L    Potassium 3.8 3.5 - 5.1 mmol/L    Chloride 98 97 - 108 mmol/L    CO2 27 21 - 32 mmol/L    Anion Gap 7 5 - 15 mmol/L    Glucose 107 (H) 65 - 100 mg/dL    BUN 25 (H) 6 - 20 mg/dL    Creatinine 6.69 (H) 0.70 - 1.30 mg/dL    Bun/Cre Ratio 4 (L) 12 - 20      Est, Glom Filt Rate 8 (L) >60 ml/min/1.73m2    Calcium 8.6 8.5 - 10.1 mg/dL       ASSESSMENT:   Patient is 73 y.o. with diagnosis of : Principal Problem:    Infected wound  Active Problems:    Sacral wound    Sepsis without acute organ dysfunction (HCC)    Eosinophilia    ESRD on dialysis (HCC)  Resolved Problems:    * No resolved hospital problems. *      PLAN:                 Continue local wound care wet-to-dry packing twice daily.    I did not see any fecal contamination to the wound.    Possibly transitioning to the oral antibiotics if okay with Dr. Hargrove    There is nothing to debride on the sacrum.    I will follow.  
PG    MCHC 31.7 30.0 - 36.5 g/dL    RDW 15.7 (H) 11.5 - 14.5 %    Platelets 304 150 - 400 K/uL    MPV 11.0 8.9 - 12.9 FL    Nucleated RBCs 0.0 0.0  WBC    nRBC 0.00 0.00 - 0.01 K/uL    Neutrophils % 45 32 - 75 %    Lymphocytes % 25 12 - 49 %    Monocytes % 15 (H) 5 - 13 %    Eosinophils % 14 (H) 0 - 7 %    Basophils % 1 0 - 1 %    Immature Granulocytes 0 0 - 0.5 %    Neutrophils Absolute 3.9 1.8 - 8.0 K/UL    Lymphocytes Absolute 2.2 0.8 - 3.5 K/UL    Monocytes Absolute 1.3 (H) 0.0 - 1.0 K/UL    Eosinophils Absolute 1.2 (H) 0.0 - 0.4 K/UL    Basophils Absolute 0.1 0.0 - 0.1 K/UL    Absolute Immature Granulocyte 0.0 0.00 - 0.04 K/UL    Differential Type Smear Scanned      RBC Comment Macrocytosis  1+        RBC Comment Polychromasia  1+       Basic Metabolic Panel    Collection Time: 02/13/24  5:05 AM   Result Value Ref Range    Sodium 132 (L) 136 - 145 mmol/L    Potassium 3.8 3.5 - 5.1 mmol/L    Chloride 98 97 - 108 mmol/L    CO2 27 21 - 32 mmol/L    Anion Gap 7 5 - 15 mmol/L    Glucose 107 (H) 65 - 100 mg/dL    BUN 25 (H) 6 - 20 mg/dL    Creatinine 6.69 (H) 0.70 - 1.30 mg/dL    Bun/Cre Ratio 4 (L) 12 - 20      Est, Glom Filt Rate 8 (L) >60 ml/min/1.73m2    Calcium 8.6 8.5 - 10.1 mg/dL        Assessment and Plan:     End-stage renal disease on hemodialysis: on MWF   No complaints of any fluid overload, no complaints of uremia  Stable electrolytes  Hemodynamically stable  We will continue maintenance hemodialysis on MWF     2. Anemia  Hemoglobin 9.6 today  Continue Epogen with hemodialysis  Continue to monitor H&H     3. Renal osteodystrophy:   -Stable calcium level  -Stable phos level     4. Sacral wound  -Antibiotics per ID/primary  -Continue IV Zosyn.  Wound cultures taken from the deep sacral wound  -Procalcitonin increased.  CRP is decreasing  -ID and vascular on board     5. Hypotension:  -c/w midodrine 10 mg tid.     Signed By: KARIN Quinn - CNP     February 13, 2024      
wound.  Musculoskeletal: no acute joint swellings, no joint deformities.    LAB DATA REVIEWED:    Recent Results (from the past 24 hour(s))   Hepatitis B Surface Antigen    Collection Time: 02/08/24  1:15 PM   Result Value Ref Range    Hepatitis B Surface Ag 0.36 Index    Hep B S Ag Interp Negative Negative     Comprehensive Metabolic Panel    Collection Time: 02/09/24  5:40 AM   Result Value Ref Range    Sodium 134 (L) 136 - 145 mmol/L    Potassium 4.1 3.5 - 5.1 mmol/L    Chloride 100 97 - 108 mmol/L    CO2 28 21 - 32 mmol/L    Anion Gap 6 5 - 15 mmol/L    Glucose 82 65 - 100 mg/dL    BUN 27 (H) 6 - 20 mg/dL    Creatinine 6.06 (H) 0.70 - 1.30 mg/dL    Bun/Cre Ratio 4 (L) 12 - 20      Est, Glom Filt Rate 9 (L) >60 ml/min/1.73m2    Calcium 8.7 8.5 - 10.1 mg/dL    Total Bilirubin 0.4 0.2 - 1.0 mg/dL    AST 13 (L) 15 - 37 U/L    ALT 7 (L) 12 - 78 U/L    Alk Phosphatase 105 45 - 117 U/L    Total Protein 9.7 (H) 6.4 - 8.2 g/dL    Albumin 2.6 (L) 3.5 - 5.0 g/dL    Globulin 7.1 (H) 2.0 - 4.0 g/dL    Albumin/Globulin Ratio 0.4 (L) 1.1 - 2.2     Magnesium    Collection Time: 02/09/24  5:40 AM   Result Value Ref Range    Magnesium 2.2 1.6 - 2.4 mg/dL   Phosphorus    Collection Time: 02/09/24  5:40 AM   Result Value Ref Range    Phosphorus 4.0 2.6 - 4.7 mg/dL   Lactic Acid    Collection Time: 02/09/24  5:40 AM   Result Value Ref Range    Lactic Acid, Plasma 1.0 0.4 - 2.0 mmol/L   C-Reactive Protein    Collection Time: 02/09/24  5:40 AM   Result Value Ref Range    CRP 5.79 (H) 0.00 - 0.30 mg/dL   Procalcitonin    Collection Time: 02/09/24  5:40 AM   Result Value Ref Range    Procalcitonin 1.13 (H) 0 ng/mL   CBC    Collection Time: 02/09/24  5:45 AM   Result Value Ref Range    WBC 7.6 4.1 - 11.1 K/uL    RBC 2.44 (L) 4.10 - 5.70 M/uL    Hemoglobin 7.6 (L) 12.1 - 17.0 g/dL    Hematocrit 24.2 (L) 36.6 - 50.3 %    MCV 99.2 (H) 80.0 - 99.0 FL    MCH 31.1 26.0 - 34.0 PG    MCHC 31.4 30.0 - 36.5 g/dL    RDW 14.1 11.5 - 14.5 %

## 2024-02-13 NOTE — CARE COORDINATION
Transition of Care Plan:    RUR: 15%  Prior Level of Functioning: home with family assist  Disposition[facility]  SOC 2/21  If SNF or IPR: Date FOC offered: n/a  Date FOC received: n/a  Accepting facility: n/a  Date authorization started with reference number: n/a  Date authorization received and expires: n/a  Follow up appointments: setup by attending  DME needed: none  Transportation at discharge: family to transport  IM/IMM Medicare/ letter given: yes  Is patient a [employer] and connected with [facility]? no  If yes, was  transfer form completed and VA notified? N/a  Caregiver Contact: antonieta Shelton  Discharge Caregiver contacted prior to discharge? yes  Care Conference needed? no  Barriers to discharge: none

## 2024-02-13 NOTE — PLAN OF CARE
Problem: Discharge Planning  Goal: Discharge to home or other facility with appropriate resources  2/13/2024 0330 by Eufemia Dowell RN  Outcome: Progressing  2/12/2024 1617 by Monica Hernandez RN  Outcome: Progressing     Problem: Pain  Goal: Verbalizes/displays adequate comfort level or baseline comfort level  2/13/2024 0330 by Eufemia Dowell RN  Outcome: Progressing  2/12/2024 1617 by Monica Hernandez RN  Outcome: Progressing     Problem: Skin/Tissue Integrity  Goal: Absence of new skin breakdown  Description: 1.  Monitor for areas of redness and/or skin breakdown  2.  Assess vascular access sites hourly  3.  Every 4-6 hours minimum:  Change oxygen saturation probe site  4.  Every 4-6 hours:  If on nasal continuous positive airway pressure, respiratory therapy assess nares and determine need for appliance change or resting period.  2/13/2024 0330 by Eufemia Dowell RN  Outcome: Progressing  2/12/2024 1617 by Monica Hernandez RN  Outcome: Progressing     Problem: ABCDS Injury Assessment  Goal: Absence of physical injury  2/12/2024 1617 by Monica Hernandez RN  Outcome: Progressing     Problem: Safety - Adult  Goal: Free from fall injury  2/13/2024 0330 by Eufemia Dowell RN  Outcome: Progressing  2/12/2024 1617 by Monica Hernandez RN  Outcome: Progressing

## 2024-02-13 NOTE — DISCHARGE INSTRUCTIONS
Discharge instructions:  Complete 3 weeks of IV antibiotics with hemodialysis on Monday Wednesday Friday  Follow-up with PCP in 10 to 14 days with recent admission for continued monitoring and management of medications and other comorbidities  Follow-up with Dr. Charlton in 2 to 3 weeks to ensure continue improvement of sacral wound with IV antibiotics  Follow-up with Dr. Peterson, ENT, on new appointment date at

## 2024-02-13 NOTE — PLAN OF CARE
OCCUPATIONAL THERAPY EVALUATION  Patient: Damien Ferrer (73 y.o. male)  Date: 2/13/2024  Primary Diagnosis: Infected wound [T14.8XXA, L08.9]  Skin ulcer of sacrum with fat layer exposed (HCC) [L98.422]       Precautions: Fall Risk                Recommendations for nursing mobility: Out of bed to chair for meals, Encourage HEP in prep for ADLs/mobility; see handout for details, Frequent repositioning to prevent skin breakdown, Use of bed/chair alarm for safety, AD and gt belt for bed to chair , and Assist x1    In place during session:Peripheral IV  ASSESSMENT  Pt is a 73 y.o. male presenting to [facility] with c/o progressively worsening sacral wound, admitted 2/8/24 and currently being treated for sacral wound infection, ESRD on HD M/W/F, anemia, hearing impaired, hypotension, HFrEF. Pt is deaf and communicates using whiteboard at bedside. Pt reports hearing loss occurred ~3 weeks ago. Pt received semi-supine in bed upon arrival, AXO x4, and agreeable to OT evaluation.     Based on current observations, pt presents with decreased  functional mobility, independence in ADLs, high-level IADLs, strength, body mechanics, activity tolerance, endurance, safety awareness, balance, increased pain levels (see below for objective details and assist levels).     Overall, pt tolerates session fair with c/o 8/10 pain in sacral wound. Bed mobility completed with SBA and additional time. Pt able to don prosthetic IND while seated EOB, A provided to don L shoe while seated EOB. OOB functional transfers/mobility completed to/from the bathroom using RW and CGA. Visual cues provided for safety to keep RW close and to feel toilet behind knees prior to sitting. Pt would benefit from further education, however is currently limited by hearing impairment. Toileting routine completed with CGA and raised toilet seat for transfer and supervision for posterior sebastian care while seated on toilet. Pt able to wash hands and face using wet wash cloth with

## 2024-02-13 NOTE — TELEPHONE ENCOUNTER
Janny(Nurse) called regarding Damien Ferrer, she stated that he was discharged from the hospital for a wound Dr Charlton has been treating, she stated that she hasn't gotten the care plan for the wound and will cancel it due to him being discharged for it. Give her a call due to confusion on what to do at [phone]

## 2024-02-14 NOTE — TELEPHONE ENCOUNTER
Called and spoke with Janny she states that she is going to cancel the appointment with wound care since  and  are following the patient. She states they were only seeing him because [facility] referred him but now he will be using [facility].

## 2024-02-15 LAB
BACTERIA SPEC CULT: NORMAL
BACTERIA SPEC CULT: NORMAL
Lab: NORMAL
Lab: NORMAL

## 2024-02-16 ENCOUNTER — HOSPITAL ENCOUNTER (OUTPATIENT)
Facility: HOSPITAL | Age: 74
Discharge: HOME | End: 2024-02-16
Attending: FAMILY MEDICINE
Payer: MEDICARE

## 2024-02-16 VITALS
DIASTOLIC BLOOD PRESSURE: 82 MMHG | HEART RATE: 64 BPM | TEMPERATURE: 97.3 F | RESPIRATION RATE: 16 BRPM | SYSTOLIC BLOOD PRESSURE: 145 MMHG

## 2024-02-16 DIAGNOSIS — L89.154 PRESSURE INJURY OF SACRAL REGION, STAGE 4 (HCC): Primary | ICD-10-CM

## 2024-02-16 PROCEDURE — 99214 OFFICE O/P EST MOD 30 MIN: CPT

## 2024-02-16 PROCEDURE — 11043 DBRDMT MUSC&/FSCA 1ST 20/<: CPT

## 2024-02-16 RX ORDER — CLOBETASOL PROPIONATE 0.5 MG/G
OINTMENT TOPICAL ONCE
OUTPATIENT
Start: 2024-02-16 | End: 2024-02-16

## 2024-02-16 RX ORDER — LIDOCAINE 40 MG/G
CREAM TOPICAL ONCE
OUTPATIENT
Start: 2024-02-16 | End: 2024-02-16

## 2024-02-16 RX ORDER — LIDOCAINE HYDROCHLORIDE 20 MG/ML
JELLY TOPICAL ONCE
Status: CANCELLED | OUTPATIENT
Start: 2024-02-16 | End: 2024-02-16

## 2024-02-16 RX ORDER — LIDOCAINE 40 MG/G
CREAM TOPICAL ONCE
Status: CANCELLED | OUTPATIENT
Start: 2024-02-16 | End: 2024-02-16

## 2024-02-16 RX ORDER — GENTAMICIN SULFATE 1 MG/G
OINTMENT TOPICAL ONCE
Status: CANCELLED | OUTPATIENT
Start: 2024-02-16 | End: 2024-02-16

## 2024-02-16 RX ORDER — BETAMETHASONE DIPROPIONATE 0.5 MG/G
CREAM TOPICAL ONCE
Status: CANCELLED | OUTPATIENT
Start: 2024-02-16 | End: 2024-02-16

## 2024-02-16 RX ORDER — BETAMETHASONE DIPROPIONATE 0.5 MG/G
CREAM TOPICAL ONCE
OUTPATIENT
Start: 2024-02-16 | End: 2024-02-16

## 2024-02-16 RX ORDER — BACITRACIN ZINC AND POLYMYXIN B SULFATE 500; 1000 [USP'U]/G; [USP'U]/G
OINTMENT TOPICAL ONCE
Status: CANCELLED | OUTPATIENT
Start: 2024-02-16 | End: 2024-02-16

## 2024-02-16 RX ORDER — BACITRACIN ZINC AND POLYMYXIN B SULFATE 500; 1000 [USP'U]/G; [USP'U]/G
OINTMENT TOPICAL ONCE
OUTPATIENT
Start: 2024-02-16 | End: 2024-02-16

## 2024-02-16 RX ORDER — TRIAMCINOLONE ACETONIDE 1 MG/G
OINTMENT TOPICAL ONCE
Status: CANCELLED | OUTPATIENT
Start: 2024-02-16 | End: 2024-02-16

## 2024-02-16 RX ORDER — GINSENG 100 MG
CAPSULE ORAL ONCE
OUTPATIENT
Start: 2024-02-16 | End: 2024-02-16

## 2024-02-16 RX ORDER — LIDOCAINE 50 MG/G
OINTMENT TOPICAL ONCE
Status: CANCELLED | OUTPATIENT
Start: 2024-02-16 | End: 2024-02-16

## 2024-02-16 RX ORDER — LIDOCAINE HYDROCHLORIDE 40 MG/ML
SOLUTION TOPICAL ONCE
Status: CANCELLED | OUTPATIENT
Start: 2024-02-16 | End: 2024-02-16

## 2024-02-16 RX ORDER — GINSENG 100 MG
CAPSULE ORAL ONCE
Status: CANCELLED | OUTPATIENT
Start: 2024-02-16 | End: 2024-02-16

## 2024-02-16 RX ORDER — LIDOCAINE 50 MG/G
OINTMENT TOPICAL ONCE
OUTPATIENT
Start: 2024-02-16 | End: 2024-02-16

## 2024-02-16 RX ORDER — LIDOCAINE HYDROCHLORIDE 40 MG/ML
SOLUTION TOPICAL ONCE
OUTPATIENT
Start: 2024-02-16 | End: 2024-02-16

## 2024-02-16 RX ORDER — IBUPROFEN 200 MG
TABLET ORAL ONCE
Status: CANCELLED | OUTPATIENT
Start: 2024-02-16 | End: 2024-02-16

## 2024-02-16 RX ORDER — CLOBETASOL PROPIONATE 0.5 MG/G
OINTMENT TOPICAL ONCE
Status: CANCELLED | OUTPATIENT
Start: 2024-02-16 | End: 2024-02-16

## 2024-02-16 RX ORDER — LIDOCAINE HYDROCHLORIDE 20 MG/ML
JELLY TOPICAL ONCE
OUTPATIENT
Start: 2024-02-16 | End: 2024-02-16

## 2024-02-16 RX ORDER — SODIUM CHLOR/HYPOCHLOROUS ACID 0.033 %
SOLUTION, IRRIGATION IRRIGATION ONCE
Status: CANCELLED | OUTPATIENT
Start: 2024-02-16 | End: 2024-02-16

## 2024-02-16 RX ORDER — SODIUM CHLOR/HYPOCHLOROUS ACID 0.033 %
SOLUTION, IRRIGATION IRRIGATION ONCE
OUTPATIENT
Start: 2024-02-16 | End: 2024-02-16

## 2024-02-16 RX ORDER — TRIAMCINOLONE ACETONIDE 1 MG/G
OINTMENT TOPICAL ONCE
OUTPATIENT
Start: 2024-02-16 | End: 2024-02-16

## 2024-02-16 RX ORDER — IBUPROFEN 200 MG
TABLET ORAL ONCE
OUTPATIENT
Start: 2024-02-16 | End: 2024-02-16

## 2024-02-16 RX ORDER — GENTAMICIN SULFATE 1 MG/G
OINTMENT TOPICAL ONCE
OUTPATIENT
Start: 2024-02-16 | End: 2024-02-16

## 2024-02-16 NOTE — PROGRESS NOTES
MD Bal at [facility] ELECTROPHYSIOLOGY    IR FLUORO GUIDED NEEDLE PLACEMENT  10/18/2021    IR FLUOROSCOPY GUIDED CENTRAL VENOUS ACCESS DEVICE PLACEMENT  1/22/2021    IR FLUOROSCOPY GUIDED CENTRAL VENOUS ACCESS DEVICE PLACEMENT  1/18/2021    IR FLUOROSCOPY GUIDED CENTRAL VENOUS ACCESS DEVICE PLACEMENT  12/13/2021    IR NONTUNNELED VASCULAR CATHETER  10/18/2021    IR NONTUNNELED VASCULAR CATHETER 10/18/2021 [facility]    IR NONTUNNELED VASCULAR CATHETER  01/18/2021    IR NONTUNNELED VASCULAR CATHETER 1/18/2021 [facility]    IR NONTUNNELED VASCULAR CATHETER  10/18/2021    IR NONTUNNELED VASCULAR CATHETER  1/18/2021    IR NONTUNNELED VASCULAR CATHETER  01/03/2024    IR NONTUNNELED VASCULAR CATHETER 1/3/2024 Farida Leon PA-C [facility]    IR REMOVAL TUNNELED CVC WO PORT PUMP  10/18/2021    IR TUNNELED CATHETER PLACEMENT GREATER THAN 5 YEARS  01/22/2021    IR TUNNELED CATHETER PLACEMENT GREATER THAN 5 YEARS 1/22/2021 [facility]    IR TUNNELED CATHETER PLACEMENT GREATER THAN 5 YEARS  12/13/2021    IR TUNNELED CATHETER PLACEMENT GREATER THAN 5 YEARS 12/13/2021 [facility]    IR TUNNELED CATHETER PLACEMENT GREATER THAN 5 YEARS  1/22/2021    IR TUNNELED CATHETER PLACEMENT GREATER THAN 5 YEARS  12/13/2021    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    LEG AMPUTATION BELOW KNEE ON RIGHT performed by Papo Charlton MD at [facility] MAIN OR    LEG AMPUTATION BELOW KNEE Right     TOE AMPUTATION Right 05/11/2023    AMPUTATION THIRD TOE RIGHT performed by Catarino Wisdom DPM at [facility] MAIN OR     Family History   Problem Relation Age of Onset    Heart Failure Father     Diabetes Mother      Social History     Socioeconomic History    Marital status:      Spouse name: None    Number of children: None    Years of education: None    Highest education level: None   Tobacco Use    Smoking status: Never    Smokeless tobacco: Never   Vaping Use    Vaping Use: Never used   Substance and Sexual Activity    Alcohol

## 2024-02-16 NOTE — WOUND CARE
Strip   [] Skin Sub:   [x] Other:  PACK WOUND    [x] Cover and Secure with:     [x] Gauze [] Lindy [] Kerlix   [] Zetuvit Plus Silicone Border [x] Super Absorbant [] ABD     [] Ace Wrap [] Other:    Limit contact of tape with skin.    [] Change dressing: [] Daily    [] Every Other Day   [] Twice per week   [x] Three times per week   [] Once a week [] Do Not Change Dressing   [] Other:     Negative Pressure:           Wound Location:   [] Pressure @  mm/Hg  []Continuous []Intermittent   [] Black Foam  [] White Foam [] Bridge  [] Change dressing 3 times per week     [] Other:     Pressure Relief:  [x] When sitting, shift position or do seat lifts every 15 minutes.  [] Wheelchair cushion [] Specialty Bed/Mattress  [] Turn every 2 hours when in bed.  Avoid direct pressure on wound site.  Limit side lying to 30 degree tilt.  Limit HOB elevation to 30 degrees.  [x] Other:  sit in chair 1 hour twice a day. Rest of the time in bed on your side                    AVOID PRESSURE TO WOUND     Edema Control:  Apply: [] Compression Stocking:  mmHg  []Right Leg []Left Leg   [] Tubigrip []Right Leg Double Layer []Left Leg Double Layer      []Right Leg Single Layer []Left Leg Single Layer      [] Elevate leg(s) above the level of the heart when sitting.    [] Avoid prolonged standing in one place.     Compression:  Apply: [] Compression Wrap to []RightLeg []Left Leg    []  Coflex [] Coflex Lite  [] Unna with Calamine Lite     [] Do not get leg(s) with wrap wet. If wrap becomes too tight, call the wound center and remove wrap from leg by unrolling each layer.   [] Elevate leg(s) above the level of the heart when sitting.    [] Avoid prolonged standing in one place.    Off-Loading:   [] Off-loading when: [] walking  [] in bed [] sitting  [] Total non-weight bearing  [] Right Leg  [] Left Leg   [] Assistive Device [] Walker [] Cane  [] Wheelchair  [] Crutches   [] Surgical shoe    [] Wedge Shoe  [] Foam Boot(s)  [] Knee Scooter    []

## 2024-02-16 NOTE — DISCHARGE INSTRUCTIONS
Wound Clinic Physician Orders and Discharge Instructions  [facility]  [address], [address]  [place] [address]  Telephone: [phone]     FAX [phone]    NAME:  Damien Ferrer  YOB: 1950  MEDICAL RECORD NUMBER:  [id number]  DATE:  2/16/2024      Return Appointment:  [] Dressing Supply Provider:   [x] Home Healthcare: [facility]  [x] Return Appointment:    2     Week(s)  [] Nurse Visit:     [] Discharge from [facility]: [] Healed        [] Refer to Provider:         [] Consult    Follow-up Information:  [] Ordered Tests:   [x] Referrals: order hospital bed and  air mattress  [] Rx:   [x] Other: sit in chair 1 hour twice a day. Rest of the time in bed on your side.       Wound Cleansing:   Do not scrub or use excessive force.  Cleanse wound prior to applying a clean dressing with:  [x] Normal Saline   [] Keep Wound Dry in Shower     [] Wound Cleanser   [x] Cleanse wound with Mild Soap & Water    [] Other:       Topical Treatments:  Do not apply lotions, creams, or ointments to wound bed unless directed.   [] Apply moisturizing lotion to skin surrounding the wound prior to dressing change.  [] Apply antifungal ointment to skin surrounding the wound prior to dressing change.  [] Apply thin film of moisture barrier ointment to skin immediately around wound.  [] Apply Betadine to skin immediately around wound   [] Other:      Dressings:           Wound Location SACRUM    [x] Apply Primary Dressing:       [] MediHoney Gel [] MediHoney Alginate  [x] Calcium Alginate with Silver   [] Calcium Alginate without silver   [] Collagen with silver [x] Collagen without Silver    [] Santyl with moist saline gauze     [] Hydrofera Blue (cut to size and moistened with normal saline)  [] Hydrofera Blue Ready (cut to size)      [] Normal Saline wet to dry  [] Betadine wet to dry    [] Hydrogel  [] Mepitel     [] Bactroban/Mupirocin   [] Iodoform Packing Strip [] Plain Packing

## 2024-02-29 ENCOUNTER — OFFICE VISIT (OUTPATIENT)
Age: 74
End: 2024-02-29

## 2024-02-29 VITALS
HEART RATE: 79 BPM | OXYGEN SATURATION: 99 % | SYSTOLIC BLOOD PRESSURE: 128 MMHG | WEIGHT: 161 LBS | BODY MASS INDEX: 21.83 KG/M2 | DIASTOLIC BLOOD PRESSURE: 70 MMHG

## 2024-02-29 DIAGNOSIS — H91.93 BILATERAL HEARING LOSS, UNSPECIFIED HEARING LOSS TYPE: Primary | ICD-10-CM

## 2024-02-29 DIAGNOSIS — H61.23 BILATERAL IMPACTED CERUMEN: ICD-10-CM

## 2024-02-29 RX ORDER — PREDNISONE 20 MG/1
60 TABLET ORAL DAILY
Qty: 38 TABLET | Refills: 0 | Status: SHIPPED | OUTPATIENT
Start: 2024-02-29 | End: 2024-03-15

## 2024-02-29 RX ORDER — OXYCODONE HYDROCHLORIDE 5 MG/1
5 CAPSULE ORAL EVERY 4 HOURS PRN
COMMUNITY

## 2024-03-01 ENCOUNTER — TELEPHONE (OUTPATIENT)
Age: 74
End: 2024-03-01

## 2024-03-01 NOTE — TELEPHONE ENCOUNTER
Janna is calling for patient to get a Refill on Pain meds, patient had to be r/s due to  being out of town and needs a refill till next appointment.phone number is correct.

## 2024-03-01 NOTE — TELEPHONE ENCOUNTER
Called Janna in ref to message concerning pain meds for Spade Jennings.  Leave  for her to call back

## 2024-03-07 ENCOUNTER — OFFICE VISIT (OUTPATIENT)
Age: 74
End: 2024-03-07
Payer: MEDICARE

## 2024-03-07 VITALS
BODY MASS INDEX: 19.15 KG/M2 | TEMPERATURE: 97 F | HEART RATE: 76 BPM | SYSTOLIC BLOOD PRESSURE: 123 MMHG | HEIGHT: 73 IN | OXYGEN SATURATION: 96 % | DIASTOLIC BLOOD PRESSURE: 72 MMHG | RESPIRATION RATE: 16 BRPM | WEIGHT: 144.5 LBS

## 2024-03-07 VITALS
SYSTOLIC BLOOD PRESSURE: 134 MMHG | HEART RATE: 79 BPM | WEIGHT: 164 LBS | DIASTOLIC BLOOD PRESSURE: 54 MMHG | TEMPERATURE: 98.1 F | OXYGEN SATURATION: 94 % | BODY MASS INDEX: 21.64 KG/M2

## 2024-03-07 DIAGNOSIS — T14.8XXA WOUND INFECTION: Primary | ICD-10-CM

## 2024-03-07 DIAGNOSIS — S31.000D WOUND OF SACRAL REGION, SUBSEQUENT ENCOUNTER: Primary | ICD-10-CM

## 2024-03-07 DIAGNOSIS — L08.9 WOUND INFECTION: Primary | ICD-10-CM

## 2024-03-07 PROCEDURE — 3078F DIAST BP <80 MM HG: CPT | Performed by: SURGERY

## 2024-03-07 PROCEDURE — 99213 OFFICE O/P EST LOW 20 MIN: CPT | Performed by: INTERNAL MEDICINE

## 2024-03-07 PROCEDURE — 3017F COLORECTAL CA SCREEN DOC REV: CPT | Performed by: SURGERY

## 2024-03-07 PROCEDURE — 1123F ACP DISCUSS/DSCN MKR DOCD: CPT | Performed by: INTERNAL MEDICINE

## 2024-03-07 PROCEDURE — G8427 DOCREV CUR MEDS BY ELIG CLIN: HCPCS | Performed by: SURGERY

## 2024-03-07 PROCEDURE — 1123F ACP DISCUSS/DSCN MKR DOCD: CPT | Performed by: SURGERY

## 2024-03-07 PROCEDURE — 3075F SYST BP GE 130 - 139MM HG: CPT | Performed by: INTERNAL MEDICINE

## 2024-03-07 PROCEDURE — 1036F TOBACCO NON-USER: CPT | Performed by: SURGERY

## 2024-03-07 PROCEDURE — 99212 OFFICE O/P EST SF 10 MIN: CPT | Performed by: SURGERY

## 2024-03-07 PROCEDURE — G8420 CALC BMI NORM PARAMETERS: HCPCS | Performed by: SURGERY

## 2024-03-07 PROCEDURE — G8427 DOCREV CUR MEDS BY ELIG CLIN: HCPCS | Performed by: INTERNAL MEDICINE

## 2024-03-07 PROCEDURE — G8420 CALC BMI NORM PARAMETERS: HCPCS | Performed by: INTERNAL MEDICINE

## 2024-03-07 PROCEDURE — 1036F TOBACCO NON-USER: CPT | Performed by: INTERNAL MEDICINE

## 2024-03-07 PROCEDURE — 1111F DSCHRG MED/CURRENT MED MERGE: CPT | Performed by: INTERNAL MEDICINE

## 2024-03-07 PROCEDURE — G8484 FLU IMMUNIZE NO ADMIN: HCPCS | Performed by: INTERNAL MEDICINE

## 2024-03-07 PROCEDURE — G8484 FLU IMMUNIZE NO ADMIN: HCPCS | Performed by: SURGERY

## 2024-03-07 PROCEDURE — 1111F DSCHRG MED/CURRENT MED MERGE: CPT | Performed by: SURGERY

## 2024-03-07 PROCEDURE — 3074F SYST BP LT 130 MM HG: CPT | Performed by: SURGERY

## 2024-03-07 PROCEDURE — 3017F COLORECTAL CA SCREEN DOC REV: CPT | Performed by: INTERNAL MEDICINE

## 2024-03-07 PROCEDURE — 3078F DIAST BP <80 MM HG: CPT | Performed by: INTERNAL MEDICINE

## 2024-03-07 RX ORDER — OXYCODONE HYDROCHLORIDE AND ACETAMINOPHEN 5; 325 MG/1; MG/1
1 TABLET ORAL EVERY 6 HOURS PRN
COMMUNITY

## 2024-03-07 RX ORDER — CARVEDILOL 12.5 MG/1
12.5 TABLET ORAL 2 TIMES DAILY
COMMUNITY
Start: 2024-02-22

## 2024-03-07 RX ORDER — LOSARTAN POTASSIUM 25 MG/1
25 TABLET ORAL DAILY
COMMUNITY
Start: 2024-02-22 | End: 2025-02-21

## 2024-03-07 ASSESSMENT — PATIENT HEALTH QUESTIONNAIRE - PHQ9
SUM OF ALL RESPONSES TO PHQ QUESTIONS 1-9: 0
SUM OF ALL RESPONSES TO PHQ9 QUESTIONS 1 & 2: 0
SUM OF ALL RESPONSES TO PHQ QUESTIONS 1-9: 0
1. LITTLE INTEREST OR PLEASURE IN DOING THINGS: 0
2. FEELING DOWN, DEPRESSED OR HOPELESS: 0

## 2024-03-07 NOTE — PROGRESS NOTES
Identified pt with two pt identifiers (name and ). Reviewed chart in preparation for visit and have obtained necessary documentation.    Damien Ferrer is a 73 y.o. male  Chief Complaint   Patient presents with    Post-Op Check     Follow up     /72 (Site: Right Upper Arm, Position: Sitting, Cuff Size: Large Adult)   Pulse 76   Temp 97 °F (36.1 °C) (Temporal)   Resp 16   Ht 1.854 m (6' 1\")   Wt 65.5 kg (144 lb 8 oz)   SpO2 96%   BMI 19.06 kg/m²     1. Have you been to the ER, urgent care clinic since your last visit?  Hospitalized since your last visit?no    2. Have you seen or consulted any other health care providers outside of the [facility] since your last visit?  Include any pap smears or colon screening. no

## 2024-03-07 NOTE — PROGRESS NOTES
Chief Complaint   Patient presents with    Follow-Up from Hospital    Wound Infection     Sacral wound      BP (!) 134/54 (Site: Right Upper Arm, Position: Sitting, Cuff Size: Medium Adult)   Pulse 79   Temp 98.1 °F (36.7 °C) (Oral)   Wt 74.4 kg (164 lb)   SpO2 94%   BMI 21.64 kg/m²     1. Have you been to the ER, urgent care clinic since your last visit?  Hospitalized since your last visit? 02/08/24 Infected wound     2. Have you seen or consulted any other health care providers outside of the [facility] since your last visit?  Include any pap smears or colon screening. No

## 2024-03-10 LAB
BACTERIA SPEC AEROBE CULT: NORMAL
BACTERIA SPEC AEROBE CULT: NORMAL

## 2024-03-11 ENCOUNTER — TELEPHONE (OUTPATIENT)
Age: 74
End: 2024-03-11

## 2024-03-11 DIAGNOSIS — M79.604 PAIN OF RIGHT LOWER EXTREMITY: Primary | ICD-10-CM

## 2024-03-11 RX ORDER — OXYCODONE HYDROCHLORIDE AND ACETAMINOPHEN 5; 325 MG/1; MG/1
1 TABLET ORAL EVERY 6 HOURS PRN
Qty: 28 TABLET | Refills: 0 | Status: SHIPPED | OUTPATIENT
Start: 2024-03-11 | End: 2024-03-18

## 2024-03-11 NOTE — PROGRESS NOTES
General surgery history and Physical    Patient: Damien Ferrer  MRN: [id number]    YOB: 1950  Age: 73 y.o.  Sex: male     Chief Complaint:  Chief Complaint   Patient presents with    Post-Op Check     Follow up       History of Present Illness: Damien Ferrer is a 73 y.o. very pleasant gentleman is here today for follow-up recheck of sacral wound.  Patient was recently hospitalized wound infection.  Patient has a home nurse visit helping with wound care.  Patient requesting pain medication due to severe pain.  Patient denies any fever or chills.    Social History:  Social Connections: Not on file       Past Medical History:  Past Medical History:   Diagnosis Date    Asthenia 01/24/2021    Cardiomyopathy ([facility]) 01/24/2021    CHF (congestive heart failure) ([facility])     Chronic kidney disease     CKD (chronic kidney disease)     4    End stage renal disease on dialysis ([facility]) 01/24/2021    Essential hypertension 01/24/2021    Gastroesophageal reflux disease 01/24/2021    Gastroesophageal reflux disease 01/24/2021    Hypertension     Pneumonia due to COVID-19 virus 01/24/2021    Systolic CHF, acute on chronic (HCC) 01/24/2021     Surgical History:  Past Surgical History:   Procedure Laterality Date    CARDIAC CATHETERIZATION      COLONOSCOPY N/A 12/3/2020    COLONOSCOPY performed by Patsy Chakraborty MD at [facility] ENDOSCOPY    COLONOSCOPY N/A 12/2/2022    COLONOSCOPY performed by Patsy Chakraborty MD at [facility] ENDOSCOPY    FOOT SURGERY Right 05/17/2023    Open Transmetatarsal Amputation Right Foot performed by Phil Reyes DPM at [facility] MAIN OR    HERNIA REPAIR      INVASIVE VASCULAR N/A 05/16/2023    Angiography lower ext right performed by Papo Charlton MD at [facility] ELECTROPHYSIOLOGY    INVASIVE VASCULAR N/A 05/16/2023    Aortagram abdominal performed by Papo Charlton MD at [facility] ELECTROPHYSIOLOGY    INVASIVE VASCULAR N/A 05/16/2023    Ultrasound guided vascular access performed by Papo Charlton MD at [facility]

## 2024-03-11 NOTE — TELEPHONE ENCOUNTER
Caller (patient niece) Janna called stating that [facility] does not have patient pain medication prescription for Oxycodone since was told would have it sent in on Thursday 3/7/24.     Advised would send message to medical staff and asked caller to allow up to 72 business hours for medication request to be processed.     Pharmacy- [facility] [address]  Pharmacy phone number-[phone]    Janna call back number-[phone]

## 2024-03-11 NOTE — TELEPHONE ENCOUNTER
[phone] with patient now and daughter please call back asap.    Nurse returning  phone call. Regarding patient care.

## 2024-03-11 NOTE — TELEPHONE ENCOUNTER
Spoke with Janna (niece)  and she said that she would like pain meds called in to [facility] to Dr. Charlton

## 2024-03-12 NOTE — TELEPHONE ENCOUNTER
Tried to call the nurse back and got no answer, spoke with the daughter who stated there was no issues or information that was needed but she will try and reach out to be sure.

## 2024-03-13 LAB
BACTERIA SPEC AEROBE CULT: ABNORMAL
BACTERIA SPEC AEROBE CULT: ABNORMAL
BACTERIA SPEC ANAEROBE CULT: ABNORMAL

## 2024-03-15 ENCOUNTER — HOSPITAL ENCOUNTER (OUTPATIENT)
Facility: HOSPITAL | Age: 74
Discharge: HOME | End: 2024-03-15
Attending: FAMILY MEDICINE
Payer: MEDICARE

## 2024-03-15 VITALS
DIASTOLIC BLOOD PRESSURE: 71 MMHG | HEART RATE: 74 BPM | TEMPERATURE: 97.3 F | SYSTOLIC BLOOD PRESSURE: 127 MMHG | RESPIRATION RATE: 18 BRPM

## 2024-03-15 DIAGNOSIS — L89.154 PRESSURE INJURY OF SACRAL REGION, STAGE 4 (HCC): Primary | ICD-10-CM

## 2024-03-15 PROCEDURE — 11043 DBRDMT MUSC&/FSCA 1ST 20/<: CPT

## 2024-03-15 RX ORDER — LIDOCAINE HYDROCHLORIDE 20 MG/ML
JELLY TOPICAL ONCE
OUTPATIENT
Start: 2024-03-15 | End: 2024-03-15

## 2024-03-15 RX ORDER — IBUPROFEN 200 MG
TABLET ORAL ONCE
OUTPATIENT
Start: 2024-03-15 | End: 2024-03-15

## 2024-03-15 RX ORDER — LIDOCAINE HYDROCHLORIDE 40 MG/ML
SOLUTION TOPICAL ONCE
OUTPATIENT
Start: 2024-03-15 | End: 2024-03-15

## 2024-03-15 RX ORDER — LIDOCAINE 40 MG/G
CREAM TOPICAL ONCE
OUTPATIENT
Start: 2024-03-15 | End: 2024-03-15

## 2024-03-15 RX ORDER — BETAMETHASONE DIPROPIONATE 0.5 MG/G
CREAM TOPICAL ONCE
OUTPATIENT
Start: 2024-03-15 | End: 2024-03-15

## 2024-03-15 RX ORDER — TRIAMCINOLONE ACETONIDE 1 MG/G
OINTMENT TOPICAL ONCE
OUTPATIENT
Start: 2024-03-15 | End: 2024-03-15

## 2024-03-15 RX ORDER — SODIUM CHLOR/HYPOCHLOROUS ACID 0.033 %
SOLUTION, IRRIGATION IRRIGATION ONCE
OUTPATIENT
Start: 2024-03-15 | End: 2024-03-15

## 2024-03-15 RX ORDER — LIDOCAINE 50 MG/G
OINTMENT TOPICAL ONCE
OUTPATIENT
Start: 2024-03-15 | End: 2024-03-15

## 2024-03-15 RX ORDER — BACITRACIN ZINC AND POLYMYXIN B SULFATE 500; 1000 [USP'U]/G; [USP'U]/G
OINTMENT TOPICAL ONCE
OUTPATIENT
Start: 2024-03-15 | End: 2024-03-15

## 2024-03-15 RX ORDER — CLOBETASOL PROPIONATE 0.5 MG/G
OINTMENT TOPICAL ONCE
OUTPATIENT
Start: 2024-03-15 | End: 2024-03-15

## 2024-03-15 RX ORDER — GINSENG 100 MG
CAPSULE ORAL ONCE
OUTPATIENT
Start: 2024-03-15 | End: 2024-03-15

## 2024-03-15 RX ORDER — GENTAMICIN SULFATE 1 MG/G
OINTMENT TOPICAL ONCE
OUTPATIENT
Start: 2024-03-15 | End: 2024-03-15

## 2024-03-15 ASSESSMENT — PAIN SCALES - GENERAL: PAINLEVEL_OUTOF10: 6

## 2024-03-15 NOTE — WOUND CARE
Wound Clinic Physician Orders and Discharge Instructions  [facility]  [address], [address]  [place] [address]  Telephone: [phone]     FAX [phone]    NAME:  Damien Ferrer  YOB: 1950  MEDICAL RECORD NUMBER:  [id number]  DATE:  3/15/2024      Return Appointment:  [] Dressing Supply Provider:   [x] Home Healthcare: [facility]  [x] Return Appointment:    2     Week(s)  [] Nurse Visit:     [] Discharge from [facility]: [] Healed        [] Refer to Provider:         [] Consult    Follow-up Information:  [] Ordered Tests:   [x] Referrals: order hospital bed and  air mattress with [employer]           [] Rx:   [x] Other: sit in chair 1 hour twice a day. Rest of the time in bed on your side.       Wound Cleansing:   Do not scrub or use excessive force.  Cleanse wound prior to applying a clean dressing with:  [x] Normal Saline   [] Keep Wound Dry in Shower     [] Wound Cleanser   [x] Cleanse wound with Mild Soap & Water    [] Other:       Topical Treatments:  Do not apply lotions, creams, or ointments to wound bed unless directed.   [] Apply moisturizing lotion to skin surrounding the wound prior to dressing change.  [] Apply antifungal ointment to skin surrounding the wound prior to dressing change.  [] Apply thin film of moisture barrier ointment to skin immediately around wound.  [] Apply Betadine to skin immediately around wound   [] Other:      Dressings:           Wound Location SACRUM    [x] Apply Primary Dressing:       [] MediHoney Gel [] MediHoney Alginate  [x] Calcium Alginate with Silver   [] Calcium Alginate without silver   [] Collagen with silver [x] Collagen without Silver    [] Santyl with moist saline gauze     [] Hydrofera Blue (cut to size and moistened with normal saline)  [] Hydrofera Blue Ready (cut to size)      [] Normal Saline wet to dry  [] Betadine wet to dry    [] Hydrogel  [] Mepitel     [] Bactroban/Mupirocin   [] Iodoform Packing

## 2024-03-15 NOTE — WOUND CARE
Order for V.A.C.®  Negative Pressure Wound Therapy  Please fax this form to [facility] at [phone]  [facility] Customer Service: [phone]      Ordering Center:   [facility]  [address] [place] [id number] [phone]  WOUND CARE [phone]  FAX NUMBER [phone]  Patient Information:   Damien Ferrer  [address] [facility] [address]   [phone]   : 1950  AGE: 73 y.o.     GENDER: male   TODAYS DATE:  3/15/2024  Insurance:   PRIMARY INSURANCE:  Plan: MEDICARE PART A AND B  Coverage: MEDICARE  Effective Date: 2009  [id number] - (Medicare)    Payer/Plan Subscr  Sex Relation Sub. Ins. ID Effective Group Num   1. MEDICARE - ME* VIDA FERRERCRYSTAL MORRIS 1950 Male Self [id number]                                    [address]    2. MEDICARE - ME* MARIANA FERRERANDREAS MORRIS 1950 Male Self [id number]                                    [address]        Patient Wound Information:     Duration of the V.A.C.®  Negative Pressure Wound Therapy: 4 Month which includes up to 15 dressings per wound and up to 10 canisters per month    Goal at the completion of V.A.C.®  Negative Pressure Therapy: Assist in granulation tissue formation     Will HomeCare provide V.A.C.®  Therapy?  Yes Homecare will provide VAC Therapy. The date in which Homecare will initiate VAC Therapy is 3/20/2024     Equipment for Delivery:     Delivery Location V.A.C.® Therapy Pump: Patient's Home Address located under Patient Information on the top of this form    Up to 15 dressings per wound, per month  VAC Canisters: Up to 10 canisters per month  V.A.C.® Dressing: GRANUFOAM VAC Dressing Bridge Dressing, WHITEFOAM VAC Dressing Small, and SIMPLACE VAC Dressing Small    Clinical Information by Wound Type:     Was NPWT initiated in an inpatient facility? No or  Has the patient been on NPWT anytime during the last 60 days? No     Is the patient’s nutritional status compromised? No    Please

## 2024-03-15 NOTE — DISCHARGE INSTRUCTIONS
Wound Clinic Physician Orders and Discharge Instructions  [facility]  [address], [address]  [place] [address]  Telephone: [phone]     FAX [phone]    NAME:  Damien Ferrer  YOB: 1950  MEDICAL RECORD NUMBER:  [id number]  DATE:  3/15/2024      Return Appointment:  [] Dressing Supply Provider:   [x] Home Healthcare: [facility]  [x] Return Appointment:    2     Week(s)  [] Nurse Visit:     [] Discharge from [facility]: [] Healed        [] Refer to Provider:         [] Consult    Follow-up Information:  [] Ordered Tests:   [x] Referrals: order hospital bed and  air mattress with [employer]           [] Rx:   [x] Other: sit in chair 1 hour twice a day. Rest of the time in bed on your side.       Wound Cleansing:   Do not scrub or use excessive force.  Cleanse wound prior to applying a clean dressing with:  [x] Normal Saline   [] Keep Wound Dry in Shower     [] Wound Cleanser   [x] Cleanse wound with Mild Soap & Water    [] Other:       Topical Treatments:  Do not apply lotions, creams, or ointments to wound bed unless directed.   [] Apply moisturizing lotion to skin surrounding the wound prior to dressing change.  [] Apply antifungal ointment to skin surrounding the wound prior to dressing change.  [] Apply thin film of moisture barrier ointment to skin immediately around wound.  [] Apply Betadine to skin immediately around wound   [] Other:      Dressings:           Wound Location SACRUM    [x] Apply Primary Dressing:       [] MediHoney Gel [] MediHoney Alginate  [x] Calcium Alginate with Silver   [] Calcium Alginate without silver   [] Collagen with silver [x] Collagen without Silver    [] Santyl with moist saline gauze     [] Hydrofera Blue (cut to size and moistened with normal saline)  [] Hydrofera Blue Ready (cut to size)      [] Normal Saline wet to dry  [] Betadine wet to dry    [] Hydrogel  [] Mepitel     [] Bactroban/Mupirocin   [] Iodoform Packing

## 2024-03-15 NOTE — PROGRESS NOTES
Wound Center  Progress Note / Procedure Note      Chief Complaint:  Damien Ferrer is a 73 y.o.  male  with sacral wound of >1 months duration.        Assessment/Plan     73 y.o. male with CHF, EF 15-20%, ESRD on dialysis, R/BKA    -sacral chronic ulcer.  Pressure injury, stage 4  Full thickness, probes to bone  Smaller; more depth  Slough/granular  Necessitates debridement  for wound healing and to prevent/heal infection  Ulcer needs debridement- see note below      -infected sacral ulcer  Being followed by ID- Dr Magallanes,  Getting iv cefipime during dialysis -ongoing      -offloading discussed in detail  Sacral/buttock  Offload  Limit sitting to 1 hour twice daily; reposition every 15 min when sitting  Pressure redistribution mattress: ORDER Hospital BED AND Group [employer] - low air low mattress  Reposition every few hours      -nutrition  improving      -anemia of chronic disease   9.6 (2/2024)      Following discussed with patient / niece  Needs :  Serial debridement- prn    Good local wound care  Dressing:collagen, alginatefor now, Order wund vac, then collagen, wound vac  Frequency : three times a week     Continue Home Health    -Nutrition optimization      -Good offloading    Patient/ niece understood and agrees with plan. Questions answered.      Serial visits and serial debridements also discussed.    Follow up with me in 2 week      Subjective:   Since last visit  No new issues  Is in prednisone for ear- hearing much better now  Offloading  Eating pletny of protein, and drinking delvis    HPI:     Patient has had wound over a month  I got most of the history from chart as we spent most of the time going over treatment plan  First mention of wound is on 1/2/24-measuring 1.5x1.0.1cm - superficial, pink - during this admission patient had pos blood cultures with source unknown  Apparently vanc resulted in ototoxicity- with no hearing (baseline -had hearing problems to begin with)      It appears wound

## 2024-03-21 ENCOUNTER — OFFICE VISIT (OUTPATIENT)
Age: 74
End: 2024-03-21
Payer: MEDICARE

## 2024-03-21 VITALS
TEMPERATURE: 97.6 F | DIASTOLIC BLOOD PRESSURE: 89 MMHG | HEIGHT: 73 IN | OXYGEN SATURATION: 98 % | SYSTOLIC BLOOD PRESSURE: 191 MMHG | HEART RATE: 75 BPM | BODY MASS INDEX: 21.27 KG/M2 | RESPIRATION RATE: 17 BRPM | WEIGHT: 160.5 LBS

## 2024-03-21 VITALS
HEART RATE: 77 BPM | SYSTOLIC BLOOD PRESSURE: 142 MMHG | BODY MASS INDEX: 21.2 KG/M2 | OXYGEN SATURATION: 99 % | HEIGHT: 73 IN | WEIGHT: 160 LBS | DIASTOLIC BLOOD PRESSURE: 88 MMHG

## 2024-03-21 DIAGNOSIS — H61.23 BILATERAL IMPACTED CERUMEN: ICD-10-CM

## 2024-03-21 DIAGNOSIS — T14.8XXA INFECTED WOUND: Primary | ICD-10-CM

## 2024-03-21 DIAGNOSIS — L08.9 INFECTED WOUND: Primary | ICD-10-CM

## 2024-03-21 DIAGNOSIS — H90.3 SENSORINEURAL HEARING LOSS (SNHL) OF BOTH EARS: Primary | ICD-10-CM

## 2024-03-21 PROCEDURE — G8427 DOCREV CUR MEDS BY ELIG CLIN: HCPCS | Performed by: SURGERY

## 2024-03-21 PROCEDURE — 1036F TOBACCO NON-USER: CPT | Performed by: STUDENT IN AN ORGANIZED HEALTH CARE EDUCATION/TRAINING PROGRAM

## 2024-03-21 PROCEDURE — 3017F COLORECTAL CA SCREEN DOC REV: CPT | Performed by: SURGERY

## 2024-03-21 PROCEDURE — 99214 OFFICE O/P EST MOD 30 MIN: CPT | Performed by: STUDENT IN AN ORGANIZED HEALTH CARE EDUCATION/TRAINING PROGRAM

## 2024-03-21 PROCEDURE — 1123F ACP DISCUSS/DSCN MKR DOCD: CPT | Performed by: SURGERY

## 2024-03-21 PROCEDURE — G8484 FLU IMMUNIZE NO ADMIN: HCPCS | Performed by: STUDENT IN AN ORGANIZED HEALTH CARE EDUCATION/TRAINING PROGRAM

## 2024-03-21 PROCEDURE — G8420 CALC BMI NORM PARAMETERS: HCPCS | Performed by: STUDENT IN AN ORGANIZED HEALTH CARE EDUCATION/TRAINING PROGRAM

## 2024-03-21 PROCEDURE — 1036F TOBACCO NON-USER: CPT | Performed by: SURGERY

## 2024-03-21 PROCEDURE — G8484 FLU IMMUNIZE NO ADMIN: HCPCS | Performed by: SURGERY

## 2024-03-21 PROCEDURE — G8427 DOCREV CUR MEDS BY ELIG CLIN: HCPCS | Performed by: STUDENT IN AN ORGANIZED HEALTH CARE EDUCATION/TRAINING PROGRAM

## 2024-03-21 PROCEDURE — 3077F SYST BP >= 140 MM HG: CPT | Performed by: STUDENT IN AN ORGANIZED HEALTH CARE EDUCATION/TRAINING PROGRAM

## 2024-03-21 PROCEDURE — 3079F DIAST BP 80-89 MM HG: CPT | Performed by: SURGERY

## 2024-03-21 PROCEDURE — 3079F DIAST BP 80-89 MM HG: CPT | Performed by: STUDENT IN AN ORGANIZED HEALTH CARE EDUCATION/TRAINING PROGRAM

## 2024-03-21 PROCEDURE — 3017F COLORECTAL CA SCREEN DOC REV: CPT | Performed by: STUDENT IN AN ORGANIZED HEALTH CARE EDUCATION/TRAINING PROGRAM

## 2024-03-21 PROCEDURE — G8420 CALC BMI NORM PARAMETERS: HCPCS | Performed by: SURGERY

## 2024-03-21 PROCEDURE — 99212 OFFICE O/P EST SF 10 MIN: CPT | Performed by: SURGERY

## 2024-03-21 PROCEDURE — 3077F SYST BP >= 140 MM HG: CPT | Performed by: SURGERY

## 2024-03-21 PROCEDURE — 1123F ACP DISCUSS/DSCN MKR DOCD: CPT | Performed by: STUDENT IN AN ORGANIZED HEALTH CARE EDUCATION/TRAINING PROGRAM

## 2024-03-21 ASSESSMENT — PATIENT HEALTH QUESTIONNAIRE - PHQ9
SUM OF ALL RESPONSES TO PHQ QUESTIONS 1-9: 0
1. LITTLE INTEREST OR PLEASURE IN DOING THINGS: NOT AT ALL
SUM OF ALL RESPONSES TO PHQ QUESTIONS 1-9: 0
2. FEELING DOWN, DEPRESSED OR HOPELESS: NOT AT ALL
SUM OF ALL RESPONSES TO PHQ QUESTIONS 1-9: 0
SUM OF ALL RESPONSES TO PHQ QUESTIONS 1-9: 0
SUM OF ALL RESPONSES TO PHQ9 QUESTIONS 1 & 2: 0

## 2024-03-21 NOTE — PROGRESS NOTES
adenopathy. Thyroid unremarkable. Palpable laryngeal landmarks. Full neck range of motion   Neurologic: CN II - XI intact. Normal gait     Assessment/Plan:   Assessment:   Damien Ferrer is a 73 y.o. 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     No follow-ups on file.     Plan for medical issues were discussed with patient including observation, medical management, and possible surgical/procedural intervention if they fail conservative therapy. The risks and benefits of the considered procedures were discussed with the patient. All patient questions were answered.     The patient was instructed to return to clinic if they have no improvement or progression of their symptoms. Signs to watch out for were reviewed with them.      Becca Peterson MD   [facility]  [address]  [place] [address]  Office Phone: [phone]

## 2024-03-27 NOTE — PROGRESS NOTES
Identified pt with two pt identifiers (name and ). Reviewed chart in preparation for visit and have obtained necessary documentation.    Damien Ferrer is a 73 y.o. male  Chief Complaint   Patient presents with    Follow-up     Sacral wound      BP (!) 191/89   Pulse 75   Temp 97.6 °F (36.4 °C) (Oral)   Resp 17   Ht 1.854 m (6' 1\")   Wt 72.8 kg (160 lb 8 oz)   SpO2 98%   BMI 21.18 kg/m²     1. Have you been to the ER, urgent care clinic since your last visit?  Hospitalized since your last visit?no    2. Have you seen or consulted any other health care providers outside of the [facility] since your last visit?  Include any pap smears or colon screening. No    Patient and provider made aware of elevated BP x2. Patient asymptomatic. Patient reminded to monitor BP, continue to take BP medications if prescribed, and follow up with PCP/Cardiologist.  Patient expressed understanding and agreement.    
distress  Head: normocephalic, atraumatic  EYES: Pupils are equal and reactive.  Chest wall: Excursion normal with respiration cycle, no obvious audible wheeze.  Mouth: Clear, no overt lesions, oral mucosa pink and moist  Neck: supple, no masses, no adenopathy or carotid bruits, trachea midline  Resp: clear to auscultation bilaterally, no wheeze, rhonchi or rales, excursions normal and symmetrical  Cardio: Regular rate and rhythm, no murmurs, clicks, gallops or rubs, no edema or varicosities  Abdomen: Soft nontender.  Sacral wounds examined.  Wounds appear clean and that shrunk in size over 70%.  Neuro: sensation and strength grossly intact and symmetrical  Psych: alert and oriented to person, place and time  Skin: warm and moist.  Hematologic system: No bruising.  Vascular examination: Intact in lower extremity.  Labs:  No results found for this or any previous visit (from the past 24 hour(s)).      Images:  X-ray reviewed.  CT scan reviewed.          Assessments:  Patient Active Problem List   Diagnosis    End stage renal disease on dialysis ([facility])    MRSA bacteremia    ESRD on dialysis ([facility])    ATN (acute tubular necrosis) ([facility])    Sepsis ([facility])    Herpes zoster with nervous system complication    Asthenia    Pneumonia due to COVID-19 virus    Cardiomyopathy ([facility])    Essential hypertension    Fluid overload    Bacteremia    ESRD on hemodialysis ([facility])    Gastroesophageal reflux disease    Line sepsis ([facility])    Systolic CHF, acute on chronic ([facility])    Foot infection    Toe gangrene ([facility])    PVD (peripheral vascular disease) ([facility])    Cellulitis of right lower extremity    Diabetic foot ulcer with osteomyelitis ([facility])    Anemia of chronic disease    Tibial artery occlusion, right ([facility])    Femoral artery stenosis, left ([facility])    Gangrene of right foot ([facility])    Diabetic foot infection ([facility])    Surgical site infection    S/P BKA (below knee amputation), right ([facility])    Pain of right lower extremity    Peripheral vascular

## 2024-03-29 ENCOUNTER — HOSPITAL ENCOUNTER (OUTPATIENT)
Facility: HOSPITAL | Age: 74
Discharge: HOME | End: 2024-03-29
Attending: FAMILY MEDICINE
Payer: MEDICARE

## 2024-03-29 VITALS
DIASTOLIC BLOOD PRESSURE: 87 MMHG | SYSTOLIC BLOOD PRESSURE: 156 MMHG | RESPIRATION RATE: 18 BRPM | TEMPERATURE: 97 F | HEART RATE: 72 BPM

## 2024-03-29 DIAGNOSIS — L89.154 PRESSURE INJURY OF SACRAL REGION, STAGE 4 (HCC): Primary | ICD-10-CM

## 2024-03-29 PROCEDURE — 11043 DBRDMT MUSC&/FSCA 1ST 20/<: CPT

## 2024-03-29 RX ORDER — LIDOCAINE HYDROCHLORIDE 20 MG/ML
JELLY TOPICAL ONCE
OUTPATIENT
Start: 2024-03-29 | End: 2024-03-29

## 2024-03-29 RX ORDER — TRIAMCINOLONE ACETONIDE 1 MG/G
OINTMENT TOPICAL ONCE
OUTPATIENT
Start: 2024-03-29 | End: 2024-03-29

## 2024-03-29 RX ORDER — BETAMETHASONE DIPROPIONATE 0.5 MG/G
CREAM TOPICAL ONCE
OUTPATIENT
Start: 2024-03-29 | End: 2024-03-29

## 2024-03-29 RX ORDER — GENTAMICIN SULFATE 1 MG/G
OINTMENT TOPICAL ONCE
OUTPATIENT
Start: 2024-03-29 | End: 2024-03-29

## 2024-03-29 RX ORDER — SODIUM CHLOR/HYPOCHLOROUS ACID 0.033 %
SOLUTION, IRRIGATION IRRIGATION ONCE
OUTPATIENT
Start: 2024-03-29 | End: 2024-03-29

## 2024-03-29 RX ORDER — BACITRACIN ZINC AND POLYMYXIN B SULFATE 500; 1000 [USP'U]/G; [USP'U]/G
OINTMENT TOPICAL ONCE
OUTPATIENT
Start: 2024-03-29 | End: 2024-03-29

## 2024-03-29 RX ORDER — IBUPROFEN 200 MG
TABLET ORAL ONCE
OUTPATIENT
Start: 2024-03-29 | End: 2024-03-29

## 2024-03-29 RX ORDER — LIDOCAINE HYDROCHLORIDE 40 MG/ML
SOLUTION TOPICAL ONCE
OUTPATIENT
Start: 2024-03-29 | End: 2024-03-29

## 2024-03-29 RX ORDER — LIDOCAINE 40 MG/G
CREAM TOPICAL ONCE
OUTPATIENT
Start: 2024-03-29 | End: 2024-03-29

## 2024-03-29 RX ORDER — GINSENG 100 MG
CAPSULE ORAL ONCE
OUTPATIENT
Start: 2024-03-29 | End: 2024-03-29

## 2024-03-29 RX ORDER — LIDOCAINE 50 MG/G
OINTMENT TOPICAL ONCE
OUTPATIENT
Start: 2024-03-29 | End: 2024-03-29

## 2024-03-29 RX ORDER — CLOBETASOL PROPIONATE 0.5 MG/G
OINTMENT TOPICAL ONCE
OUTPATIENT
Start: 2024-03-29 | End: 2024-03-29

## 2024-03-29 ASSESSMENT — PAIN SCALES - GENERAL: PAINLEVEL_OUTOF10: 5

## 2024-03-29 ASSESSMENT — PAIN DESCRIPTION - LOCATION: LOCATION: SACRUM

## 2024-03-29 NOTE — PROGRESS NOTES
Wound Center  Progress Note / Procedure Note      Chief Complaint:  Damien Ferrer is a 73 y.o.  male  with sacral wound of >1 months duration.        Assessment/Plan     73 y.o. male with CHF, EF 15-20%, ESRD on dialysis, R/BKA    -sacral chronic ulcer.  Pressure injury, stage 4  Full thickness, probes to bone  Smaller; improved depth, periwound mild maceration  Slough/granular  Necessitates debridement  for wound healing and to prevent/heal infection  Ulcer needs debridement- see note below    Ulcer Also being followed by Dr Charlton- have left him a message re varying orders from our offices which is confusing the patient    -infected sacral ulcer  was followed by ID- Dr Magallanes, last seen 3/7/24  received iv cefipime during dialysis       -offloading discussed in detail  Sacral/buttock  Offload  Limit sitting to 1 hour twice daily; reposition every 15 min when sitting  Pressure redistribution mattress: has Hospital BED now  AND Group [employer] - low air low mattress being delivered today  Reposition every few hours      -hearing loss  30-40% improved  Managed by Dr ATUL Hudson    -nutrition  improved      -anemia of chronic disease   9.6 (2/2024)      Following discussed with patient / niece  Needs :  Serial debridement- prn    Good local wound care  Dressing:collagen, alginatefor now,  then collagen, wound vac once wound vac arrives  Frequency : three times a week     Continue Home Health    -Nutrition optimization      -Good offloading    Patient/ nephew understood and agrees with plan. Questions answered.      Serial visits and serial debridements also discussed.    Follow up with me in 2 week      Subjective:   Since last visit  No new issues  Offloading  Eating plenty of protein, and drinking delvis    HPI:     Patient has had wound over a month  I got most of the history from chart as we spent most of the time going over treatment plan  First mention of wound is on 1/2/24-measuring 1.5x1.0.1cm - superficial,

## 2024-03-29 NOTE — WOUND CARE
Other:  PACK WOUND    Use calcium alginate with silver and collagen without silver until wound vac arrives     [x] Cover and Secure with:     [x] Gauze [] Lindy [] Kerlix   [] Zetuvit Plus Silicone Border [x] Super Absorbant [] ABD     [] Ace Wrap [] Other:    Limit contact of tape with skin.    [] Change dressing: [] Daily    [] Every Other Day   [] Twice per week   [x] Three times per week   [] Once a week [] Do Not Change Dressing   [] Other:     Negative Pressure:           Wound Location: sacrum  [x] Pressure @  125 mm/Hg    [x]Continuous []Intermittent   [x] Black Foam  [x] White Foam [x] Bridge  [x] Change dressing 3 times per week     [x] Other: plus  collagen    Pressure Relief:  [x] When sitting, shift position or do seat lifts every 15 minutes.  [x] Wheelchair cushion [x] Specialty Bed/Mattress  [x] Turn every 2 hours when in bed.  Avoid direct pressure on wound site.  Limit side lying to 30 degree tilt.  Limit HOB elevation to 30 degrees.  [x] Other:  sit in chair 1 hour twice a day. Rest of the time in bed on your side                    AVOID PRESSURE TO WOUND     Edema Control:  Apply: [] Compression Stocking:  mmHg  []Right Leg []Left Leg   [] Tubigrip []Right Leg Double Layer []Left Leg Double Layer      []Right Leg Single Layer []Left Leg Single Layer      [] Elevate leg(s) above the level of the heart when sitting.    [] Avoid prolonged standing in one place.     Compression:  Apply: [] Compression Wrap to []RightLeg []Left Leg    []  Coflex [] Coflex Lite  [] Unna with Calamine Lite     [] Do not get leg(s) with wrap wet. If wrap becomes too tight, call the wound center and remove wrap from leg by unrolling each layer.   [] Elevate leg(s) above the level of the heart when sitting.    [] Avoid prolonged standing in one place.    Off-Loading:   [] Off-loading when: [] walking  [] in bed [] sitting  [] Total non-weight bearing  [] Right Leg  [] Left Leg   [] Assistive Device [] Walker [] Cane  []

## 2024-03-29 NOTE — DISCHARGE INSTRUCTIONS
Wound Clinic Physician Orders and Discharge Instructions  [facility]  [address], [address]  [place] [address]  Telephone: [phone]     FAX [phone]    NAME:  Damien Ferrer  YOB: 1950  MEDICAL RECORD NUMBER:  [id number]  DATE:  3/29/2024      Return Appointment:  [] Dressing Supply Provider:   [x] Home Healthcare: [facility]  [x] Return Appointment:    2     Week(s)  [] Nurse Visit:     [] Discharge from [facility]: [] Healed        [] Refer to Provider:         [] Consult    Follow-up Information:  [] Ordered Tests:   [] Referrals:           [] Rx:   [x] Other: sit in chair 1 hour twice a day. Rest of the time in bed on your side.       Wound Cleansing:   Do not scrub or use excessive force.  Cleanse wound prior to applying a clean dressing with:  [x] Normal Saline   [] Keep Wound Dry in Shower     [] Wound Cleanser   [x] Cleanse wound with Mild Soap & Water    [] Other:       Topical Treatments:  Do not apply lotions, creams, or ointments to wound bed unless directed.   [] Apply moisturizing lotion to skin surrounding the wound prior to dressing change.  [] Apply antifungal ointment to skin surrounding the wound prior to dressing change.  [] Apply thin film of moisture barrier ointment to skin immediately around wound.  [] Apply Betadine to skin immediately around wound   [] Other:      Dressings:           Wound Location SACRUM    [x] Apply Primary Dressing:       [] MediHoney Gel [] MediHoney Alginate  [x] Calcium Alginate with Silver   [] Calcium Alginate without silver   [] Collagen with silver [x] Collagen without Silver    [] Santyl with moist saline gauze     [] Hydrofera Blue (cut to size and moistened with normal saline)  [] Hydrofera Blue Ready (cut to size)      [] Normal Saline wet to dry  [] Betadine wet to dry    [] Hydrogel  [] Mepitel     [] Bactroban/Mupirocin   [] Iodoform Packing Strip [] Plain Packing Strip   [] Skin Sub:   [x]

## 2024-04-04 ENCOUNTER — OFFICE VISIT (OUTPATIENT)
Age: 74
End: 2024-04-04
Payer: MEDICARE

## 2024-04-04 VITALS
DIASTOLIC BLOOD PRESSURE: 75 MMHG | HEART RATE: 81 BPM | WEIGHT: 150 LBS | TEMPERATURE: 97.9 F | BODY MASS INDEX: 19.88 KG/M2 | SYSTOLIC BLOOD PRESSURE: 119 MMHG | OXYGEN SATURATION: 96 % | HEIGHT: 73 IN | RESPIRATION RATE: 14 BRPM

## 2024-04-04 DIAGNOSIS — S31.000D SACRAL WOUND, SUBSEQUENT ENCOUNTER: Primary | ICD-10-CM

## 2024-04-04 PROCEDURE — 1123F ACP DISCUSS/DSCN MKR DOCD: CPT | Performed by: SURGERY

## 2024-04-04 PROCEDURE — 3074F SYST BP LT 130 MM HG: CPT | Performed by: SURGERY

## 2024-04-04 PROCEDURE — 3078F DIAST BP <80 MM HG: CPT | Performed by: SURGERY

## 2024-04-04 PROCEDURE — G8427 DOCREV CUR MEDS BY ELIG CLIN: HCPCS | Performed by: SURGERY

## 2024-04-04 PROCEDURE — 3017F COLORECTAL CA SCREEN DOC REV: CPT | Performed by: SURGERY

## 2024-04-04 PROCEDURE — 1036F TOBACCO NON-USER: CPT | Performed by: SURGERY

## 2024-04-04 PROCEDURE — 99212 OFFICE O/P EST SF 10 MIN: CPT | Performed by: SURGERY

## 2024-04-04 PROCEDURE — G8420 CALC BMI NORM PARAMETERS: HCPCS | Performed by: SURGERY

## 2024-04-04 ASSESSMENT — PATIENT HEALTH QUESTIONNAIRE - PHQ9
SUM OF ALL RESPONSES TO PHQ QUESTIONS 1-9: 0
SUM OF ALL RESPONSES TO PHQ9 QUESTIONS 1 & 2: 0
1. LITTLE INTEREST OR PLEASURE IN DOING THINGS: NOT AT ALL
SUM OF ALL RESPONSES TO PHQ QUESTIONS 1-9: 0
2. FEELING DOWN, DEPRESSED OR HOPELESS: NOT AT ALL
SUM OF ALL RESPONSES TO PHQ QUESTIONS 1-9: 0
SUM OF ALL RESPONSES TO PHQ QUESTIONS 1-9: 0

## 2024-04-04 NOTE — PROGRESS NOTES
Identified pt with two pt identifiers (name and ). Reviewed chart in preparation for visit and have obtained necessary documentation.    Damien Ferrer is a 73 y.o. male  Chief Complaint   Patient presents with    Follow-up     BKa     /75 (Site: Right Upper Arm, Position: Sitting, Cuff Size: Large Adult)   Pulse 81   Temp 97.9 °F (36.6 °C) (Oral)   Resp 14   Ht 1.854 m (6' 1\")   Wt 68 kg (150 lb)   SpO2 96%   BMI 19.79 kg/m²     1. Have you been to the ER, urgent care clinic since your last visit?  Hospitalized since your last visit?no    2. Have you seen or consulted any other health care providers outside of the [facility] since your last visit?  Include any pap smears or colon screening. no

## 2024-04-07 PROBLEM — S31.000D SACRAL WOUND, SUBSEQUENT ENCOUNTER: Status: ACTIVE | Noted: 2024-02-10

## 2024-04-12 ENCOUNTER — HOSPITAL ENCOUNTER (OUTPATIENT)
Facility: HOSPITAL | Age: 74
Discharge: HOME | End: 2024-04-12
Attending: FAMILY MEDICINE
Payer: MEDICARE

## 2024-04-12 VITALS
TEMPERATURE: 97.9 F | RESPIRATION RATE: 18 BRPM | SYSTOLIC BLOOD PRESSURE: 123 MMHG | DIASTOLIC BLOOD PRESSURE: 63 MMHG | HEART RATE: 80 BPM

## 2024-04-12 DIAGNOSIS — L89.154 PRESSURE INJURY OF SACRAL REGION, STAGE 4 (HCC): Primary | ICD-10-CM

## 2024-04-12 PROCEDURE — 97605 NEG PRS WND THER DME<=50SQCM: CPT

## 2024-04-12 PROCEDURE — 11043 DBRDMT MUSC&/FSCA 1ST 20/<: CPT

## 2024-04-12 RX ORDER — IBUPROFEN 200 MG
TABLET ORAL ONCE
OUTPATIENT
Start: 2024-04-12 | End: 2024-04-12

## 2024-04-12 RX ORDER — BACITRACIN ZINC AND POLYMYXIN B SULFATE 500; 1000 [USP'U]/G; [USP'U]/G
OINTMENT TOPICAL ONCE
OUTPATIENT
Start: 2024-04-12 | End: 2024-04-12

## 2024-04-12 RX ORDER — LIDOCAINE HYDROCHLORIDE 20 MG/ML
JELLY TOPICAL ONCE
OUTPATIENT
Start: 2024-04-12 | End: 2024-04-12

## 2024-04-12 RX ORDER — SODIUM CHLOR/HYPOCHLOROUS ACID 0.033 %
SOLUTION, IRRIGATION IRRIGATION ONCE
OUTPATIENT
Start: 2024-04-12 | End: 2024-04-12

## 2024-04-12 RX ORDER — TRIAMCINOLONE ACETONIDE 1 MG/G
OINTMENT TOPICAL ONCE
OUTPATIENT
Start: 2024-04-12 | End: 2024-04-12

## 2024-04-12 RX ORDER — LIDOCAINE 50 MG/G
OINTMENT TOPICAL ONCE
OUTPATIENT
Start: 2024-04-12 | End: 2024-04-12

## 2024-04-12 RX ORDER — GINSENG 100 MG
CAPSULE ORAL ONCE
OUTPATIENT
Start: 2024-04-12 | End: 2024-04-12

## 2024-04-12 RX ORDER — GENTAMICIN SULFATE 1 MG/G
OINTMENT TOPICAL ONCE
OUTPATIENT
Start: 2024-04-12 | End: 2024-04-12

## 2024-04-12 RX ORDER — LIDOCAINE 40 MG/G
CREAM TOPICAL ONCE
OUTPATIENT
Start: 2024-04-12 | End: 2024-04-12

## 2024-04-12 RX ORDER — LIDOCAINE HYDROCHLORIDE 40 MG/ML
SOLUTION TOPICAL ONCE
OUTPATIENT
Start: 2024-04-12 | End: 2024-04-12

## 2024-04-12 RX ORDER — BETAMETHASONE DIPROPIONATE 0.5 MG/G
CREAM TOPICAL ONCE
OUTPATIENT
Start: 2024-04-12 | End: 2024-04-12

## 2024-04-12 RX ORDER — CLOBETASOL PROPIONATE 0.5 MG/G
OINTMENT TOPICAL ONCE
OUTPATIENT
Start: 2024-04-12 | End: 2024-04-12

## 2024-04-12 ASSESSMENT — PAIN SCALES - GENERAL: PAINLEVEL_OUTOF10: 8

## 2024-04-12 ASSESSMENT — PAIN DESCRIPTION - LOCATION: LOCATION: SACRUM

## 2024-04-12 ASSESSMENT — PAIN DESCRIPTION - DESCRIPTORS: DESCRIPTORS: ACHING

## 2024-04-12 NOTE — DISCHARGE INSTRUCTIONS
Wound Clinic Physician Orders and Discharge Instructions  [facility]  [address], [address]  [place] [address]  Telephone: [phone]     FAX [phone]    NAME:  Damien Ferrer  YOB: 1950  MEDICAL RECORD NUMBER:  [id number]  DATE:  4/12/2024      Return Appointment:  [] Dressing Supply Provider:   [x] Home Healthcare: [facility]  [x] Return Appointment:    2     Week(s)  [] Nurse Visit:     [] Discharge from [facility]: [] Healed        [] Refer to Provider:         [] Consult    Follow-up Information:  [] Ordered Tests:   [] Referrals:           [] Rx:   [x] Other: sit in chair 1 hour twice a day. Rest of the time in bed on your side.       Wound Cleansing:   Do not scrub or use excessive force.  Cleanse wound prior to applying a clean dressing with:  [x] Normal Saline   [] Keep Wound Dry in Shower     [] Wound Cleanser   [x] Cleanse wound with Mild Soap & Water    [] Other:       Topical Treatments:  Do not apply lotions, creams, or ointments to wound bed unless directed.   [] Apply moisturizing lotion to skin surrounding the wound prior to dressing change.  [] Apply antifungal ointment to skin surrounding the wound prior to dressing change.  [] Apply thin film of moisture barrier ointment to skin immediately around wound.  [] Apply Betadine to skin immediately around wound   [] Other:      Dressings:           Wound Location SACRUM    [x] Apply Primary Dressing:       [] MediHoney Gel [] MediHoney Alginate  [] Calcium Alginate with Silver   [] Calcium Alginate without silver   [] Collagen with silver [] Collagen without Silver    [] Santyl with moist saline gauze     [] Hydrofera Blue (cut to size and moistened with normal saline)  [] Hydrofera Blue Ready (cut to size)      [] Normal Saline wet to dry  [] Betadine wet to dry    [] Hydrogel  [] Mepitel     [] Bactroban/Mupirocin   [] Iodoform Packing Strip [] Plain Packing Strip   [] Skin Sub:   [x] Other:

## 2024-04-12 NOTE — PROGRESS NOTES
kidney disease     CKD (chronic kidney disease)     4    End stage renal disease on dialysis (HCC) 01/24/2021    Essential hypertension 01/24/2021    Gastroesophageal reflux disease 01/24/2021    Gastroesophageal reflux disease 01/24/2021    Hypertension     Pneumonia due to COVID-19 virus 01/24/2021    Systolic CHF, acute on chronic (HCC) 01/24/2021     Past Surgical History:   Procedure Laterality Date    CARDIAC CATHETERIZATION      COLONOSCOPY N/A 12/3/2020    COLONOSCOPY performed by Patsy Chakraborty MD at [facility] ENDOSCOPY    COLONOSCOPY N/A 12/2/2022    COLONOSCOPY performed by Patsy Chakraborty MD at [facility] ENDOSCOPY    FOOT SURGERY Right 05/17/2023    Open Transmetatarsal Amputation Right Foot performed by Phil Reyes DPM at [facility] MAIN OR    HERNIA REPAIR      INVASIVE VASCULAR N/A 05/16/2023    Angiography lower ext right performed by Papo Charlton MD at [facility] ELECTROPHYSIOLOGY    INVASIVE VASCULAR N/A 05/16/2023    Aortagram abdominal performed by Papo Charlton MD at [facility] ELECTROPHYSIOLOGY    INVASIVE VASCULAR N/A 05/16/2023    Ultrasound guided vascular access performed by Papo Charlton MD at [facility] ELECTROPHYSIOLOGY    INVASIVE VASCULAR N/A 05/16/2023    Atherectomy  femoral popliteal performed by Papo Charlton MD at [facility] ELECTROPHYSIOLOGY    INVASIVE VASCULAR N/A 05/16/2023    Pta femoral popliteal artery performed by Papo Charlton MD at [facility] ELECTROPHYSIOLOGY    INVASIVE VASCULAR N/A 05/16/2023    Angioplasty post tib artery performed by Papo Charlton MD at [facility] ELECTROPHYSIOLOGY    INVASIVE VASCULAR N/A 05/16/2023    Angioplasty popliteal artery performed by Papo Charlton MD at [facility] ELECTROPHYSIOLOGY    INVASIVE VASCULAR N/A 10/24/2023    Angiography lower ext left performed by Papo Charlton MD at [facility] ELECTROPHYSIOLOGY    INVASIVE VASCULAR N/A 10/24/2023    Ultrasound guided vascular access performed by Papo Charlton MD at [facility] ELECTROPHYSIOLOGY    INVASIVE VASCULAR N/A 10/24/2023    Aortagram abdominal

## 2024-04-12 NOTE — WOUND CARE
Wound Clinic Physician Orders and Discharge Instructions  [facility]  [address], [address]  [place] [address]  Telephone: [phone]     FAX [phone]    NAME:  Damien Ferrer  YOB: 1950  MEDICAL RECORD NUMBER:  [id number]  DATE:  4/12/2024      Return Appointment:  [] Dressing Supply Provider:   [x] Home Healthcare: [facility]  [x] Return Appointment:    2     Week(s)  [] Nurse Visit:     [] Discharge from [facility]: [] Healed        [] Refer to Provider:         [] Consult    Follow-up Information:  [] Ordered Tests:   [] Referrals:           [] Rx:   [x] Other: sit in chair 1 hour twice a day. Rest of the time in bed on your side.       Wound Cleansing:   Do not scrub or use excessive force.  Cleanse wound prior to applying a clean dressing with:  [x] Normal Saline   [] Keep Wound Dry in Shower     [] Wound Cleanser   [x] Cleanse wound with Mild Soap & Water    [] Other:       Topical Treatments:  Do not apply lotions, creams, or ointments to wound bed unless directed.   [] Apply moisturizing lotion to skin surrounding the wound prior to dressing change.  [] Apply antifungal ointment to skin surrounding the wound prior to dressing change.  [] Apply thin film of moisture barrier ointment to skin immediately around wound.  [] Apply Betadine to skin immediately around wound   [] Other:      Dressings:           Wound Location SACRUM    [x] Apply Primary Dressing:       [] MediHoney Gel [] MediHoney Alginate  [] Calcium Alginate with Silver   [] Calcium Alginate without silver   [] Collagen with silver [] Collagen without Silver    [] Santyl with moist saline gauze     [] Hydrofera Blue (cut to size and moistened with normal saline)  [] Hydrofera Blue Ready (cut to size)      [] Normal Saline wet to dry  [] Betadine wet to dry    [] Hydrogel  [] Mepitel     [] Bactroban/Mupirocin   [] Iodoform Packing Strip [] Plain Packing Strip   [] Skin Sub:   [x] Other:

## 2024-04-18 ENCOUNTER — OFFICE VISIT (OUTPATIENT)
Age: 74
End: 2024-04-18

## 2024-04-18 VITALS
OXYGEN SATURATION: 99 % | HEART RATE: 83 BPM | BODY MASS INDEX: 19.79 KG/M2 | DIASTOLIC BLOOD PRESSURE: 80 MMHG | SYSTOLIC BLOOD PRESSURE: 122 MMHG | HEIGHT: 73 IN

## 2024-04-18 DIAGNOSIS — T16.2XXA FOREIGN BODY OF LEFT EAR, INITIAL ENCOUNTER: ICD-10-CM

## 2024-04-18 DIAGNOSIS — H91.03 OTOTOXIC HEARING LOSS OF BOTH EARS: ICD-10-CM

## 2024-04-18 DIAGNOSIS — H90.3 SENSORINEURAL HEARING LOSS (SNHL) OF BOTH EARS: Primary | ICD-10-CM

## 2024-04-18 NOTE — PROGRESS NOTES
Identified pt with two pt identifiers(name and ). Reviewed record in preparation for visit and have obtained necessary documentation. All patient medications has been reviewed.  Chief Complaint   Patient presents with    Follow-up     Sensorineural hearing loss (SNHL) of both ears       Vitals:    24 1429   BP: 122/80   Pulse: 83   SpO2: 99%                   Coordination of Care Questionnaire:   1) Have you been to an emergency room, urgent care, or hospitalized since your last visit?   no       2. Have seen or consulted any other health care provider since your last visit? no        Patient is accompanied by Niece I have received verbal consent from Damien Ferrer to discuss any/all medical information while they are present in the room.  
PROCEDURE: Bilateral transtympanic Instillation of vestibulo-active medication (57099-47)    Preoperative diagnosis: Sudden sensorineural hearing loss secondary to ototoxic exposure  Postoperative diagnosis: Same    Risks, benefits and alternatives were reviewed including but not limited to risk of bleeding, infection, TM perforation, hearing loss, no improvement in hearing, otorrhea. Informed consent was obtained and the patient agreed to the procedure.  Under microscopy, the left ear was inspected with a speculum.  Any obstructive cerumen or debris was removed.  The inferior tympanic membrane was anesthetized with phenol.  A 25 gauge spinal needle was used to make a hole anterior superiorly and posterior inferiorly.  Through the posterior inferior hole, the middle ear space was instilled with dexamethasone steroid medication. Approximately 0.5 mL were instilled into the middle ear until the middle ear space was filled.  The identical procedure was then performed on the right side. The patient tolerated the procedure well.    
Anterior rhinoscopy demonstrates no lesions. Septum midline. Turbinates without hypertrophy.    Oral Cavity / Oropharynx: No trismus. Mucosa pink and moist. No lesions. Tongue is midline and mobile. Palate elevates symmetrically. Uvula midline. Tonsils unremarkable. Floor of mouth soft.   Neck: Supple. No adenopathy. Thyroid unremarkable. Palpable laryngeal landmarks. Full neck range of motion   Neurologic: CN II - XI intact. Normal gait     Audiogram:       Assessment/Plan:   Assessment:   Damien Ferrer is a 73 y.o. male with likely ototoxic hearing loss     Plan:   Hearing loss -some improvement with high-dose steroids, but still not to baseline.   Based on the limited improvement a decision was made with the patient and his family to proceed with intratympanic steroid injections.  Risks, benefits, alternatives of discussed. Risks include bleeding, infection, need for repeat or revision procedure, retained tympanostomy tube, TM perforation.  Left ear foreign body -removed, appears to be the tip of a Q-tip    No orders of the defined types were placed in this encounter.     No follow-ups on file.     Plan for medical issues were discussed with patient including observation, medical management, and possible surgical/procedural intervention if they fail conservative therapy. The risks and benefits of the considered procedures were discussed with the patient. All patient questions were answered.     The patient was instructed to return to clinic if they have no improvement or progression of their symptoms. Signs to watch out for were reviewed with them.      Becca Peterson MD   [facility]  [address] [place] [address] [address]  [place] [address]  Office Phone: [phone]

## 2024-04-19 ENCOUNTER — TELEPHONE (OUTPATIENT)
Age: 74
End: 2024-04-19

## 2024-04-19 NOTE — TELEPHONE ENCOUNTER
Spoke with  he states he will send the prescription on Monday. I called and spoke with Janna and informed her of what  said. She thanked me and said she would check Monday as well.

## 2024-04-19 NOTE — TELEPHONE ENCOUNTER
Shante called today requesting a refill for oxycodone be called into [facility] on [address].   [phone]

## 2024-04-22 ENCOUNTER — OFFICE VISIT (OUTPATIENT)
Age: 74
End: 2024-04-22

## 2024-04-22 VITALS
WEIGHT: 150 LBS | BODY MASS INDEX: 19.88 KG/M2 | DIASTOLIC BLOOD PRESSURE: 82 MMHG | SYSTOLIC BLOOD PRESSURE: 124 MMHG | HEIGHT: 73 IN

## 2024-04-22 DIAGNOSIS — H91.03 OTOTOXIC HEARING LOSS OF BOTH EARS: ICD-10-CM

## 2024-04-22 DIAGNOSIS — H90.3 SENSORINEURAL HEARING LOSS (SNHL) OF BOTH EARS: Primary | ICD-10-CM

## 2024-04-22 NOTE — PROGRESS NOTES
PROCEDURE: Bilateral transtympanic Instillation of vestibulo-active medication (50526-48)    Preoperative diagnosis: Sudden sensorineural hearing loss secondary to ototoxic exposure  Postoperative diagnosis: Same    Risks, benefits and alternatives were reviewed including but not limited to risk of bleeding, infection, TM perforation, hearing loss, no improvement in hearing, otorrhea. Informed consent was obtained and the patient agreed to the procedure.  Under microscopy, the left ear was inspected with a speculum.  Any obstructive cerumen or debris was removed.  The inferior tympanic membrane was anesthetized with phenol.  A 25 gauge spinal needle was used to make a hole anterior superiorly and posterior inferiorly.  Through the posterior inferior hole, the middle ear space was instilled with dexamethasone steroid medication. Approximately 0.5 mL were instilled into the middle ear until the middle ear space was filled.  The identical procedure was then performed on the right side. The patient tolerated the procedure well.

## 2024-04-25 ENCOUNTER — TELEPHONE (OUTPATIENT)
Age: 74
End: 2024-04-25

## 2024-04-25 DIAGNOSIS — M79.604 PAIN OF RIGHT LOWER EXTREMITY: Primary | ICD-10-CM

## 2024-04-25 RX ORDER — OXYCODONE HYDROCHLORIDE AND ACETAMINOPHEN 5; 325 MG/1; MG/1
1 TABLET ORAL EVERY 6 HOURS PRN
Qty: 28 TABLET | Refills: 0 | Status: SHIPPED | OUTPATIENT
Start: 2024-04-25 | End: 2024-05-02

## 2024-04-25 NOTE — TELEPHONE ENCOUNTER
[phone] when answered I was told it was the wrong number. Janna's number is [phone] and I spoke with her and informed her that Zoltan was out until Monday but I did send him a message about the pain medication and will update her when he responds

## 2024-04-25 NOTE — TELEPHONE ENCOUNTER
Spoke with Janna and let her know the pain medication has been sent to the pharmacy, she understood.

## 2024-04-25 NOTE — TELEPHONE ENCOUNTER
Patient antonieta calling to explain that she has called multiple times to get pain meds for patient someone please return her call.

## 2024-04-25 NOTE — TELEPHONE ENCOUNTER
Zoltan responded patient needs to be referred to pain management but he will send in something for pain within the next few hours

## 2024-04-26 ENCOUNTER — HOSPITAL ENCOUNTER (OUTPATIENT)
Facility: HOSPITAL | Age: 74
Discharge: HOME | End: 2024-04-26
Attending: FAMILY MEDICINE
Payer: MEDICARE

## 2024-04-26 VITALS
TEMPERATURE: 97.1 F | RESPIRATION RATE: 18 BRPM | HEART RATE: 70 BPM | DIASTOLIC BLOOD PRESSURE: 63 MMHG | SYSTOLIC BLOOD PRESSURE: 119 MMHG

## 2024-04-26 DIAGNOSIS — L89.154 PRESSURE INJURY OF SACRAL REGION, STAGE 4 (HCC): Primary | ICD-10-CM

## 2024-04-26 PROCEDURE — 87205 SMEAR GRAM STAIN: CPT

## 2024-04-26 PROCEDURE — 11043 DBRDMT MUSC&/FSCA 1ST 20/<: CPT

## 2024-04-26 PROCEDURE — 87070 CULTURE OTHR SPECIMN AEROBIC: CPT

## 2024-04-26 RX ORDER — LIDOCAINE HYDROCHLORIDE 20 MG/ML
JELLY TOPICAL ONCE
Status: CANCELLED | OUTPATIENT
Start: 2024-04-26 | End: 2024-04-26

## 2024-04-26 RX ORDER — SODIUM CHLOR/HYPOCHLOROUS ACID 0.033 %
SOLUTION, IRRIGATION IRRIGATION ONCE
Status: CANCELLED | OUTPATIENT
Start: 2024-04-26 | End: 2024-04-26

## 2024-04-26 RX ORDER — LIDOCAINE HYDROCHLORIDE 20 MG/ML
JELLY TOPICAL ONCE
OUTPATIENT
Start: 2024-04-26 | End: 2024-04-26

## 2024-04-26 RX ORDER — BACITRACIN ZINC AND POLYMYXIN B SULFATE 500; 1000 [USP'U]/G; [USP'U]/G
OINTMENT TOPICAL ONCE
OUTPATIENT
Start: 2024-04-26 | End: 2024-04-26

## 2024-04-26 RX ORDER — GENTAMICIN SULFATE 1 MG/G
OINTMENT TOPICAL ONCE
OUTPATIENT
Start: 2024-04-26 | End: 2024-04-26

## 2024-04-26 RX ORDER — LIDOCAINE HYDROCHLORIDE 40 MG/ML
SOLUTION TOPICAL ONCE
OUTPATIENT
Start: 2024-04-26 | End: 2024-04-26

## 2024-04-26 RX ORDER — LIDOCAINE 40 MG/G
CREAM TOPICAL ONCE
Status: CANCELLED | OUTPATIENT
Start: 2024-04-26 | End: 2024-04-26

## 2024-04-26 RX ORDER — LIDOCAINE 40 MG/G
CREAM TOPICAL ONCE
OUTPATIENT
Start: 2024-04-26 | End: 2024-04-26

## 2024-04-26 RX ORDER — CLOBETASOL PROPIONATE 0.5 MG/G
OINTMENT TOPICAL ONCE
Status: CANCELLED | OUTPATIENT
Start: 2024-04-26 | End: 2024-04-26

## 2024-04-26 RX ORDER — CLOBETASOL PROPIONATE 0.5 MG/G
OINTMENT TOPICAL ONCE
OUTPATIENT
Start: 2024-04-26 | End: 2024-04-26

## 2024-04-26 RX ORDER — GENTAMICIN SULFATE 1 MG/G
OINTMENT TOPICAL ONCE
Status: CANCELLED | OUTPATIENT
Start: 2024-04-26 | End: 2024-04-26

## 2024-04-26 RX ORDER — LIDOCAINE HYDROCHLORIDE 40 MG/ML
SOLUTION TOPICAL ONCE
Status: CANCELLED | OUTPATIENT
Start: 2024-04-26 | End: 2024-04-26

## 2024-04-26 RX ORDER — SODIUM CHLOR/HYPOCHLOROUS ACID 0.033 %
SOLUTION, IRRIGATION IRRIGATION ONCE
OUTPATIENT
Start: 2024-04-26 | End: 2024-04-26

## 2024-04-26 RX ORDER — TRIAMCINOLONE ACETONIDE 1 MG/G
OINTMENT TOPICAL ONCE
OUTPATIENT
Start: 2024-04-26 | End: 2024-04-26

## 2024-04-26 RX ORDER — TRIAMCINOLONE ACETONIDE 1 MG/G
OINTMENT TOPICAL ONCE
Status: CANCELLED | OUTPATIENT
Start: 2024-04-26 | End: 2024-04-26

## 2024-04-26 RX ORDER — GINSENG 100 MG
CAPSULE ORAL ONCE
OUTPATIENT
Start: 2024-04-26 | End: 2024-04-26

## 2024-04-26 RX ORDER — GINSENG 100 MG
CAPSULE ORAL ONCE
Status: CANCELLED | OUTPATIENT
Start: 2024-04-26 | End: 2024-04-26

## 2024-04-26 RX ORDER — LIDOCAINE 50 MG/G
OINTMENT TOPICAL ONCE
Status: CANCELLED | OUTPATIENT
Start: 2024-04-26 | End: 2024-04-26

## 2024-04-26 RX ORDER — IBUPROFEN 200 MG
TABLET ORAL ONCE
Status: CANCELLED | OUTPATIENT
Start: 2024-04-26 | End: 2024-04-26

## 2024-04-26 RX ORDER — BETAMETHASONE DIPROPIONATE 0.5 MG/G
CREAM TOPICAL ONCE
Status: CANCELLED | OUTPATIENT
Start: 2024-04-26 | End: 2024-04-26

## 2024-04-26 RX ORDER — BETAMETHASONE DIPROPIONATE 0.5 MG/G
CREAM TOPICAL ONCE
OUTPATIENT
Start: 2024-04-26 | End: 2024-04-26

## 2024-04-26 RX ORDER — BACITRACIN ZINC AND POLYMYXIN B SULFATE 500; 1000 [USP'U]/G; [USP'U]/G
OINTMENT TOPICAL ONCE
Status: CANCELLED | OUTPATIENT
Start: 2024-04-26 | End: 2024-04-26

## 2024-04-26 RX ORDER — IBUPROFEN 200 MG
TABLET ORAL ONCE
OUTPATIENT
Start: 2024-04-26 | End: 2024-04-26

## 2024-04-26 RX ORDER — LIDOCAINE 50 MG/G
OINTMENT TOPICAL ONCE
OUTPATIENT
Start: 2024-04-26 | End: 2024-04-26

## 2024-04-26 ASSESSMENT — PAIN SCALES - GENERAL: PAINLEVEL_OUTOF10: 6

## 2024-04-26 NOTE — DISCHARGE INSTRUCTIONS
Wound Clinic Physician Orders and Discharge Instructions  [facility]  [address], [address]  [place] [address]  Telephone: [phone]     FAX [phone]    NAME:  Damien Ferrer  YOB: 1950  MEDICAL RECORD NUMBER:  [id number]  DATE:  4/26/2024      Return Appointment:  [] Dressing Supply Provider:   [x] Home Healthcare: [facility]  [x] Return Appointment:    2     Week(s)  [] Nurse Visit:     [] Discharge from [facility]: [] Healed        [] Refer to Provider:         [] Consult    Follow-up Information:  [] Ordered Tests:   [x] Referrals: Keep appointment with Dr. Charlton          [] Rx:   [x] Other: sit in chair 1 hour twice a day. Rest of the time in bed on your side.       Wound Cleansing:   Do not scrub or use excessive force.  Cleanse wound prior to applying a clean dressing with:  [x] Normal Saline   [] Keep Wound Dry in Shower     [] Wound Cleanser   [x] Cleanse wound with Mild Soap & Water    [] Other:       Topical Treatments:  Do not apply lotions, creams, or ointments to wound bed unless directed.   [] Apply moisturizing lotion to skin surrounding the wound prior to dressing change.  [] Apply antifungal ointment to skin surrounding the wound prior to dressing change.  [] Apply thin film of moisture barrier ointment to skin immediately around wound.  [] Apply Betadine to skin immediately around wound   [] Other:      Dressings:           Wound Location SACRUM    [x] Apply Primary Dressing:       [] MediHoney Gel [] MediHoney Alginate  [] Calcium Alginate with Silver   [] Calcium Alginate without silver   [] Collagen with silver [x] Collagen without Silver    [] Santyl with moist saline gauze     [x] Hydrofera Blue (cut to size and moistened with normal saline)  [] Hydrofera Blue Ready (cut to size)      [] Normal Saline wet to dry  [] Betadine wet to dry    [] Hydrogel  [] Mepitel     [] Bactroban/Mupirocin   [] Iodoform Packing Strip [] Plain Packing

## 2024-04-26 NOTE — PROGRESS NOTES
Wound Center  Progress Note / Procedure Note      Chief Complaint:  Damien Ferrer is a 73 y.o.  male  with sacral wound of >1 months duration.        Assessment/Plan     73 y.o. male with CHF, EF 15-20%, ESRD on dialysis, R/BKA    -sacral chronic ulcer.  Pressure injury, stage 4  Full thickness  Smaller++; depth about same  Slough/granular  Necessitates debridement  for wound healing and to prevent/heal infection  Ulcer needs debridement- see note below  Culture done due to increased pain    -infection resolved- sacral ulcer  was followed by ID- Dr Magallanes, last seen 3/7/24  received iv cefipime during dialysis       -offloading discussed in detail  Sacral/buttock  Offload  Limiting sitting, has gel cushion. Tilts on side during dialysis  Pressure redistribution mattress: has Hospital BED now  AND Group [employer] - low air low mattress   Reposition every few hours      -hearing loss  30-40% improved  Managed by Dr ATUL Hudson  Getting glucosteroid inj in ear- has 2 more before more testing    -nutrition  good      -anemia of chronic disease   9.6 (2/2024)      Following discussed with patient / niece  Needs :  Serial debridement- prn    Good local wound care  Dressing:collagen, Hydrofera blue, tegarderm to secure in place  Frequency : three times a week     Continue Home Health    -Nutrition optimization      -Good offloading    Patient/ niece understood and agrees with plan. Questions answered.      Serial visits and serial debridements also discussed.    Follow up with me in 2 week      Subjective:   Since last visit  No new issues  Offloading  Too much pain with Wound vac- sent it back a week ago  Still hurting now, but much better than when had vac  Dressing keeps falling on estella while on toilet- having to change often    HPI:     Patient has had wound over a month  I got most of the history from chart as we spent most of the time going over treatment plan  First mention of wound is on 1/2/24-measuring

## 2024-04-26 NOTE — WOUND CARE
Wound Clinic Physician Orders and Discharge Instructions  [facility]  [address], [address]  [place] [address]  Telephone: [phone]     FAX [phone]    NAME:  Damien Ferrer  YOB: 1950  MEDICAL RECORD NUMBER:  [id number]  DATE:  4/26/2024      Return Appointment:  [] Dressing Supply Provider:   [x] Home Healthcare: [facility]  [x] Return Appointment:    2     Week(s)  [] Nurse Visit:     [] Discharge from [facility]: [] Healed        [] Refer to Provider:         [] Consult    Follow-up Information:  [] Ordered Tests:   [x] Referrals: Keep appointment with Dr. Charlton          [] Rx:   [x] Other: sit in chair 1 hour twice a day. Rest of the time in bed on your side.       Wound Cleansing:   Do not scrub or use excessive force.  Cleanse wound prior to applying a clean dressing with:  [x] Normal Saline   [] Keep Wound Dry in Shower     [] Wound Cleanser   [x] Cleanse wound with Mild Soap & Water    [] Other:       Topical Treatments:  Do not apply lotions, creams, or ointments to wound bed unless directed.   [] Apply moisturizing lotion to skin surrounding the wound prior to dressing change.  [] Apply antifungal ointment to skin surrounding the wound prior to dressing change.  [] Apply thin film of moisture barrier ointment to skin immediately around wound.  [] Apply Betadine to skin immediately around wound   [] Other:      Dressings:           Wound Location SACRUM    [x] Apply Primary Dressing:       [] MediHoney Gel [] MediHoney Alginate  [] Calcium Alginate with Silver   [] Calcium Alginate without silver   [] Collagen with silver [x] Collagen without Silver    [] Santyl with moist saline gauze     [x] Hydrofera Blue (cut to size and moistened with normal saline)  [] Hydrofera Blue Ready (cut to size)      [] Normal Saline wet to dry  [] Betadine wet to dry    [] Hydrogel  [] Mepitel     [] Bactroban/Mupirocin   [] Iodoform Packing Strip [] Plain Packing 
done

## 2024-04-28 LAB
BACTERIA SPEC CULT: ABNORMAL
GRAM STN SPEC: ABNORMAL
GRAM STN SPEC: ABNORMAL
Lab: ABNORMAL

## 2024-04-29 RX ORDER — DOXYCYCLINE HYCLATE 100 MG
100 TABLET ORAL 2 TIMES DAILY
Qty: 28 TABLET | Refills: 0 | Status: SHIPPED | OUTPATIENT
Start: 2024-04-29 | End: 2024-05-13

## 2024-04-29 NOTE — PROGRESS NOTES
MEDICAL OFFICE VISIT      HISTORY  The patient is a 64 year old female seen today for an annual physical exam.  She is feeling generally well reports being in overall good health with no recent serious illness or hospitalization, and no acute issues or concerns to address.  Patient declines updating routine vaccinations, could have a 5 year screening Pap test but considering her age and lack of clinical symptoms/concerns we agreed not to update her Pap at this time.  Routine lab done ahead of today's visit all generally satisfactory with several borderline results but no major clinical concerns other than high cholesterol which we did discuss in detail.  Complete blood count, metabolic profile, TSH, B12 and C-reactive protein all in good shape, noting some mild chronic renal insufficiency, stable versus typical baseline.  She did recall at previous visit remote ruptured appendicitis and subsequent surgery for abdominal adhesions, perhaps related to some scarring to the right kidney noted on previous CT, and I reminded her about minimizing NSAIDs and working on routine hydration which is an issue that she admits still needs to work on.  Much of today's visit spent discussing lipid profile and previous coronary CT results, noting apparent single-vessel disease and calcium score increased from 11 in 2019 to 58 1 year ago.  LDL cholesterol is in her typical range over the last few years although up from previous 127 to now 154, and I would suggest a minimum goal less than 100 and ideally less than 70.  She has been resistant to statin therapy to this point but with discussion today is agreeable to trying low-dose Crestor and will follow pertinent lab in just 2 months and further recommendations as appropriate.  I reviewed her personal and family history with details noted, routine dental and vision care also recommended.    ALLERGIES  Allergies as of 02/19/2024    (No Known Allergies)       MEDICATIONS  Medications were  Wound Center- wound culture    Culture results reviewed:      Allergies:   PCN, ACE inhib      eGFR:   8, on dialysis    Organisms:  Heavy Staphylococcus aureus Abnormal      Sensitive to:  doxycycyline    E-scribed to pharmacy:  Doxycycyline 100mg twice daily for 28 days      Nurse notified patient/CG and made aware of the following Side effects:      Gut issues - N/V, loose stools, etc  Sun hypersensitivity      Cristal Snider MD  04/29/24     reviewed and updated today  Outpatient Medications Marked as Taking for the 2/19/24 encounter (Office Visit) with Karson Willett DO   Medication Sig Dispense Refill    levothyroxine 112 MCG tablet Take 1 tablet by mouth daily. 90 tablet 3    VITAMIN D, CHOLECALCIFEROL, PO Take 5,000 Units by mouth daily.      Omega-3 Fatty Acids (FISH OIL PO) Take 2 g by mouth daily.         HISTORIES   Past Medical History:   Diagnosis Date    Arthritis     Knees    BPPV (benign paroxysmal positional vertigo) 04/29/2023    Hypothyroidism     Leukopenia 10/22/2019    Lyme disease     Mild hypercholesterolemia     Total calcium score 11 in October 2019    Mild renal insufficiency     2018 CT confirms focal scarring right kidney; notably remote ruptured appendicitis and sounds like associated peritonitis and subsequent adhesiolysis required.    Osteoarthritis     Vitiligo      Past Surgical History:   Procedure Laterality Date    Appendectomy      Colonoscopy  06/10/2014    Normal colon, Dr Triplett    Knee scope,diagnostic  09/2016    left knee     Laparoscopic ureterolysis      Fallopian adhesiolysis procedure to allow for pregnancies (remote scarring from ruptured appendix)    Total knee replacement      right knee    Total knee replacement Left 06/2020     Social History     Tobacco Use    Smoking status: Never    Smokeless tobacco: Never   Vaping Use    Vaping Use: never used   Substance Use Topics    Alcohol use: Yes     Comment: rarely    Drug use: No     Family History   Problem Relation Age of Onset    Cancer Mother         peritoneal    Stroke Mother     Cancer Father         Bone Cancer, Skin    Anemia Father     Cancer Sister         Cervical Cancer     Anemia Brother     Patient is unaware of any medical problems Brother     Patient is unaware of any medical problems Daughter     Patient is unaware of any medical problems Daughter     Patient is unaware of any medical problems Son     Diabetes Maternal Grandmother      Cancer, Breast Maternal Cousin 58       ACTIVE PROBLEM LIST  Patient Active Problem List   Diagnosis    Acquired hypothyroidism    Primary osteoarthritis of left knee    Chronic idiopathic constipation    Mild renal insufficiency    Restless legs syndrome    Iron deficiency       REVIEW OF SYSTEMS  General:  Denies fever, chills, unusual fatigue or unexpected change in weight.  Eyes:  Denies pain, drainage or change in visual acuity.  HENT:  Denies nasal congestion, ear pain, change in hearing or sore throat.  Respiratory:  Denies cough, wheezing, shortness of breath or recent infection.  Cardiovascular:  Denies chest pain, palpitations, syncopal episodes or dependent edema; discussed previous coronary CT results and mild coronary arteriosclerosis associated, LDL not currently at goal.  Gastrointestinal:  Denies swallowing problems, abdominal pain, nausea, vomiting, constipation, diarrhea, change in bowel habits or rectal bleeding.  Genitourinary:  Denies flank pain, hematuria, unusual urinary frequency or incontinence, or other change in typical urinary habits.  Musculoskeletal:  Denies red, hot or swollen joints; no unusual pain or weakness.  Integument:  Denies rash, itching, ulcers or other skin breakdown.  Neurologic:  Denies headache, tremors, focal weakness or sensory changes.   Endocrine:  Denies unusual fatigue, hair loss, unexpected weight change, polyuria or polydipsia.  Lymphatic:  Denies swollen nodes, glands or extremities.  Psychiatric:  Denies depression, anxiety or change in typical mood and behaviors.    PHYSICAL EXAM  Vitals:   Blood pressure 128/74, pulse 69, resp. rate 16, height 5' 4\" (1.626 m), weight 87.5 kg (193 lb), SpO2 98 %.    Wt Readings from Last 3 Encounters:   02/19/24 87.5 kg (193 lb)   11/16/23 85.3 kg (188 lb)   09/01/23 84.2 kg (185 lb 11.2 oz)     BP Readings from Last 3 Encounters:   02/19/24 128/74   11/16/23 130/88   09/01/23 128/74       Body mass index is 33.13  kg/m².      General:  Patient is alert and cooperative and in no distress, slightly obese otherwise well-nourished in appearance and seems usual self. Mood is positive, speech is clear and behavior/orientation is appropriate.  HEENT:  Head is normocephalic, atraumatic. Bilateral external ears are normal, tympanic membranes are clear and unremarkable. Oropharynx is moist and pink and without visible lesions or exudative process. Nasal tissues appear normal. Vision is generally satisfactory. Pupils are equal, round, and reactive to light and accommodation. Extraocular movements are intact. Conjunctivae are normal and no ocular discharge is noted.  Neck:  Neck is supple and without lymphadenopathy, unusual tenderness, thyromegaly or jugular venous distention. Normal range of motion.    Cardiovascular:  Heart sounds are regular, S1 and S2 are auscultated, and no murmurs or extra sounds are noted.  Pulmonary/Chest:  Lungs are clear in all fields with no wheeze, rhonchi or crackles noted. Lung sounds are symmetric with adequate aeration; and patient is comfortable and conversational throughout our visit. No unusual chest tenderness, asymmetry or deformity is noted.    Abdomen:  Abdomen is soft and nontender with no guarding, rigidity, palpable masses or hepatosplenomegaly noted. Bowel sounds are normal; no peritoneal signs are noted. Rectal exam is deferred.  Neuro/Musculoskeletal/Skin:  Extremities are warm and dry with no rash, weakness or tremor; and no significant dependent edema. Strength is symmetric and appropriate, gait is steady and without assistance, and no focal neurologic deficits or lateralizing signs are noted. Spine has normal motion and function with no unusual curvatures or tenderness. Distal pulses are intact.      LABORATORY  I have reviewed these recent pertinent lab results:  Lab Results   Component Value Date    HGBA1C 5.6 02/16/2024    CHOLESTEROL 223 (H) 02/16/2024    HDL 47 (L) 02/16/2024     CALCLDL 154 (H) 02/16/2024    TRIGLYCERIDE 110 02/16/2024    GLUCOSE 100 (H) 02/16/2024    POTASSIUM 4.6 02/16/2024    CREATININE 0.97 (H) 02/16/2024    BUN 22 (H) 02/16/2024    AST 18 02/16/2024    BILIRUBIN 0.5 02/16/2024    TSH 1.010 02/16/2024    WBC 3.3 (L) 02/16/2024    HGB 13.0 02/16/2024     02/16/2024    VITD25 37.6 04/28/2023         ASSESSMENT/PLAN:  General medical exam  Stable medical exam.  Continue current medications, diet and activity as prior along with newly prescribed Crestor 10 mg nightly per the discussion above.  Will follow fasting lab in 2 months and further recommendations as appropriate.  Overall she appears to be in good physical health and condition and remains active with all manner of normal daily activities and hobbies.  Patient can update any time if changes her mind regarding routine vaccinations.  Other than high cholesterol her recent fasting lab was all generally satisfactory and discussed with her in detail at today's visit, also noting that she needs to work on hydration for benefit of her kidneys.  I would anticipate seeing her routinely at least on an annual basis with interval updates as necessary.  After visit summary is provided with written details of our discussion and plan of care along with notes concerning her recent lab.    Acquired hypothyroidism  Stable condition with current medical management, clinically euthyroid and stable TSH, no changes at today's visit.  - levothyroxine 112 MCG tablet; Take 1 tablet by mouth daily.  Dispense: 90 tablet; Refill: 3    Mild renal insufficiency  Stable condition with current medical management, reminded her to continue to avoid NSAIDs routinely and work on hydration, and will follow metabolic panel with lipids in 2 months.    Pure hypercholesterolemia  Cholesterol has continued to be moderately elevated particularly in context of coronary arterial sclerosis noted on previous CT scans, and with discussion today she is  willing to try statin therapy, agreed to low-dose Crestor and follow fasting lab in 2 months with further recommendations as appropriate.  - Comprehensive Metabolic Panel; Future  - Lipid Panel With Reflex; Future  - rosuvastatin (CRESTOR) 10 MG tablet; Take 1 tablet by mouth daily.  Dispense: 90 tablet; Refill: 1    Coronary arteriosclerosis  As above.  No previous acute coronary syndrome, mild elevation in total calcium scoring per the discussion above, newly prescribed Crestor at today's visit to optimize medical management and further recommendations pending response to ongoing care.  - Comprehensive Metabolic Panel; Future  - Lipid Panel With Reflex; Future  - rosuvastatin (CRESTOR) 10 MG tablet; Take 1 tablet by mouth daily.  Dispense: 90 tablet; Refill: 1      Patient Instructions   NEW Rx Rosuvastatin 10mg nightly to your pharmacy for high cholesterol.  Plan to recheck fasting lab in 2 months and we'll call results/recommendations.        All of the above was discussed with the patient in detail, questions were answered to the patient's satisfaction.   Patient left the office in stable condition with verbal and written instructions.

## 2024-05-02 ENCOUNTER — PROCEDURE VISIT (OUTPATIENT)
Age: 74
End: 2024-05-02

## 2024-05-02 ENCOUNTER — OFFICE VISIT (OUTPATIENT)
Age: 74
End: 2024-05-02
Payer: MEDICARE

## 2024-05-02 VITALS
DIASTOLIC BLOOD PRESSURE: 74 MMHG | HEART RATE: 83 BPM | OXYGEN SATURATION: 97 % | BODY MASS INDEX: 19.88 KG/M2 | WEIGHT: 150 LBS | SYSTOLIC BLOOD PRESSURE: 118 MMHG | HEIGHT: 73 IN

## 2024-05-02 VITALS
SYSTOLIC BLOOD PRESSURE: 101 MMHG | HEART RATE: 73 BPM | HEIGHT: 73 IN | OXYGEN SATURATION: 98 % | BODY MASS INDEX: 19.79 KG/M2 | RESPIRATION RATE: 16 BRPM | DIASTOLIC BLOOD PRESSURE: 62 MMHG | TEMPERATURE: 97.8 F

## 2024-05-02 DIAGNOSIS — H91.03 OTOTOXIC HEARING LOSS OF BOTH EARS: ICD-10-CM

## 2024-05-02 DIAGNOSIS — H90.3 SENSORINEURAL HEARING LOSS (SNHL) OF BOTH EARS: Primary | ICD-10-CM

## 2024-05-02 DIAGNOSIS — S31.000D SACRAL WOUND, SUBSEQUENT ENCOUNTER: Primary | ICD-10-CM

## 2024-05-02 PROCEDURE — 3017F COLORECTAL CA SCREEN DOC REV: CPT | Performed by: SURGERY

## 2024-05-02 PROCEDURE — 99212 OFFICE O/P EST SF 10 MIN: CPT | Performed by: SURGERY

## 2024-05-02 PROCEDURE — 1036F TOBACCO NON-USER: CPT | Performed by: SURGERY

## 2024-05-02 PROCEDURE — G8427 DOCREV CUR MEDS BY ELIG CLIN: HCPCS | Performed by: SURGERY

## 2024-05-02 PROCEDURE — G8420 CALC BMI NORM PARAMETERS: HCPCS | Performed by: SURGERY

## 2024-05-02 PROCEDURE — 1123F ACP DISCUSS/DSCN MKR DOCD: CPT | Performed by: SURGERY

## 2024-05-02 PROCEDURE — 3078F DIAST BP <80 MM HG: CPT | Performed by: SURGERY

## 2024-05-02 PROCEDURE — 3074F SYST BP LT 130 MM HG: CPT | Performed by: SURGERY

## 2024-05-02 ASSESSMENT — PATIENT HEALTH QUESTIONNAIRE - PHQ9
SUM OF ALL RESPONSES TO PHQ QUESTIONS 1-9: 0
2. FEELING DOWN, DEPRESSED OR HOPELESS: NOT AT ALL
SUM OF ALL RESPONSES TO PHQ9 QUESTIONS 1 & 2: 0
SUM OF ALL RESPONSES TO PHQ QUESTIONS 1-9: 0
1. LITTLE INTEREST OR PLEASURE IN DOING THINGS: NOT AT ALL
SUM OF ALL RESPONSES TO PHQ QUESTIONS 1-9: 0
SUM OF ALL RESPONSES TO PHQ QUESTIONS 1-9: 0

## 2024-05-02 NOTE — PROGRESS NOTES
PROCEDURE: Bilateral transtympanic Instillation of vestibulo-active medication (31463-60)    Preoperative diagnosis: Sudden sensorineural hearing loss secondary to ototoxic exposure  Postoperative diagnosis: Same    Risks, benefits and alternatives were reviewed including but not limited to risk of bleeding, infection, TM perforation, hearing loss, no improvement in hearing, otorrhea. Informed consent was obtained and the patient agreed to the procedure.  Under microscopy, the left ear was inspected with a speculum.  Any obstructive cerumen or debris was removed.  The inferior tympanic membrane was anesthetized with phenol.  A 25 gauge spinal needle was used to make a hole anterior superiorly and posterior inferiorly.  Through the posterior inferior hole, the middle ear space was instilled with dexamethasone steroid medication. Approximately 0.5 mL were instilled into the middle ear until the middle ear space was filled.  The identical procedure was then performed on the right side. The patient tolerated the procedure well.

## 2024-05-02 NOTE — PROGRESS NOTES
Identified pt with two pt identifiers (name and ). Reviewed chart in preparation for visit and have obtained necessary documentation.    Damien Ferrer is a 73 y.o. male  Chief Complaint   Patient presents with    Follow-up    Wound Check     /62 (Site: Right Upper Arm, Position: Sitting, Cuff Size: Large Adult)   Pulse 73   Temp 97.8 °F (36.6 °C) (Oral)   Resp 16   Ht 1.854 m (6' 1\")   SpO2 98%   BMI 19.79 kg/m²     1. Have you been to the ER, urgent care clinic since your last visit?  Hospitalized since your last visit?NO    2. Have you seen or consulted any other health care providers outside of the [facility] since your last visit?  Include any pap smears or colon screening. NO

## 2024-05-10 ENCOUNTER — HOSPITAL ENCOUNTER (OUTPATIENT)
Facility: HOSPITAL | Age: 74
Discharge: HOME | End: 2024-05-10
Attending: FAMILY MEDICINE
Payer: MEDICARE

## 2024-05-10 VITALS
DIASTOLIC BLOOD PRESSURE: 76 MMHG | HEART RATE: 78 BPM | RESPIRATION RATE: 16 BRPM | TEMPERATURE: 97.4 F | SYSTOLIC BLOOD PRESSURE: 140 MMHG

## 2024-05-10 DIAGNOSIS — L89.154 PRESSURE INJURY OF SACRAL REGION, STAGE 4 (HCC): Primary | ICD-10-CM

## 2024-05-10 PROCEDURE — 11043 DBRDMT MUSC&/FSCA 1ST 20/<: CPT

## 2024-05-10 RX ORDER — BACITRACIN ZINC AND POLYMYXIN B SULFATE 500; 1000 [USP'U]/G; [USP'U]/G
OINTMENT TOPICAL ONCE
OUTPATIENT
Start: 2024-05-10 | End: 2024-05-10

## 2024-05-10 RX ORDER — LIDOCAINE 50 MG/G
OINTMENT TOPICAL ONCE
OUTPATIENT
Start: 2024-05-10 | End: 2024-05-10

## 2024-05-10 RX ORDER — LIDOCAINE 40 MG/G
CREAM TOPICAL ONCE
OUTPATIENT
Start: 2024-05-10 | End: 2024-05-10

## 2024-05-10 RX ORDER — TRIAMCINOLONE ACETONIDE 1 MG/G
OINTMENT TOPICAL ONCE
OUTPATIENT
Start: 2024-05-10 | End: 2024-05-10

## 2024-05-10 RX ORDER — BETAMETHASONE DIPROPIONATE 0.5 MG/G
CREAM TOPICAL ONCE
OUTPATIENT
Start: 2024-05-10 | End: 2024-05-10

## 2024-05-10 RX ORDER — GENTAMICIN SULFATE 1 MG/G
OINTMENT TOPICAL ONCE
OUTPATIENT
Start: 2024-05-10 | End: 2024-05-10

## 2024-05-10 RX ORDER — LIDOCAINE HYDROCHLORIDE 20 MG/ML
JELLY TOPICAL ONCE
OUTPATIENT
Start: 2024-05-10 | End: 2024-05-10

## 2024-05-10 RX ORDER — SODIUM CHLOR/HYPOCHLOROUS ACID 0.033 %
SOLUTION, IRRIGATION IRRIGATION ONCE
OUTPATIENT
Start: 2024-05-10 | End: 2024-05-10

## 2024-05-10 RX ORDER — LIDOCAINE HYDROCHLORIDE 40 MG/ML
SOLUTION TOPICAL ONCE
OUTPATIENT
Start: 2024-05-10 | End: 2024-05-10

## 2024-05-10 RX ORDER — GINSENG 100 MG
CAPSULE ORAL ONCE
OUTPATIENT
Start: 2024-05-10 | End: 2024-05-10

## 2024-05-10 RX ORDER — CLOBETASOL PROPIONATE 0.5 MG/G
OINTMENT TOPICAL ONCE
OUTPATIENT
Start: 2024-05-10 | End: 2024-05-10

## 2024-05-10 RX ORDER — IBUPROFEN 200 MG
TABLET ORAL ONCE
OUTPATIENT
Start: 2024-05-10 | End: 2024-05-10

## 2024-05-10 NOTE — DISCHARGE INSTRUCTIONS
Wound Clinic Physician Orders and Discharge Instructions  [facility]  [address], [address]  [place] [address]  Telephone: [phone]     FAX [phone]    NAME:  Damien Ferrer  YOB: 1950  MEDICAL RECORD NUMBER:  [id number]  DATE:  5/10/2024      Return Appointment:  [] Dressing Supply Provider:   [x] Home Healthcare: [facility]  [x] Return Appointment:    2     Week(s)  [] Nurse Visit:     [] Discharge from [facility]: [] Healed        [] Refer to Provider:         [] Consult    Follow-up Information:  [] Ordered Tests:   [x] Referrals: Keep appointment with Dr. Charlton          [] Rx:   [x] Other: sit in chair 1 hour twice a day. Rest of the time in bed on your side.       Wound Cleansing:   Do not scrub or use excessive force.  Cleanse wound prior to applying a clean dressing with:  [x] Normal Saline   [] Keep Wound Dry in Shower     [] Wound Cleanser   [x] Cleanse wound with Mild Soap & Water    [] Other:       Topical Treatments:  Do not apply lotions, creams, or ointments to wound bed unless directed.   [] Apply moisturizing lotion to skin surrounding the wound prior to dressing change.  [] Apply antifungal ointment to skin surrounding the wound prior to dressing change.  [] Apply thin film of moisture barrier ointment to skin immediately around wound.  [] Apply Betadine to skin immediately around wound   [] Other:      Dressings:           Wound Location SACRUM    [x] Apply Primary Dressing:       [] MediHoney Gel [] MediHoney Alginate  [] Calcium Alginate with Silver   [] Calcium Alginate without silver   [] Collagen with silver [x] Collagen without Silver    [] Santyl with moist saline gauze     [x] Hydrofera Blue (cut to size and moistened with normal saline)  [] Hydrofera Blue Ready (cut to size)      [] Normal Saline wet to dry  [] Betadine wet to dry    [] Hydrogel  [] Mepitel     [] Bactroban/Mupirocin   [] Iodoform Packing Strip [] Plain Packing

## 2024-05-10 NOTE — PROGRESS NOTES
General surgery history and Physical    Patient: Damien Ferrer  MRN: [id number]    YOB: 1950  Age: 73 y.o.  Sex: male     Chief Complaint:  Chief Complaint   Patient presents with    Follow-up    Wound Check       History of Present Illness: Damien Ferrer is a 73 y.o. very pleasant gentleman is here today to check the sacral wound.  Patient currently ambulating with a new prosthetic leg.  Patient doing pretty well.  Patient has also home nurse visit helping with wound care.  Patient denies any fever or chills.    Social History:  Social Connections: Not on file       Past Medical History:  Past Medical History:   Diagnosis Date    Asthenia 01/24/2021    Cardiomyopathy ([facility]) 01/24/2021    CHF (congestive heart failure) (Formerly [facility])     Chronic kidney disease     CKD (chronic kidney disease)     4    End stage renal disease on dialysis ([facility]) 01/24/2021    Essential hypertension 01/24/2021    Gastroesophageal reflux disease 01/24/2021    Gastroesophageal reflux disease 01/24/2021    Hypertension     Pneumonia due to COVID-19 virus 01/24/2021    Systolic CHF, acute on chronic ([facility]) 01/24/2021     Surgical History:  Past Surgical History:   Procedure Laterality Date    CARDIAC CATHETERIZATION      COLONOSCOPY N/A 12/3/2020    COLONOSCOPY performed by Patsy Chakraborty MD at [facility] ENDOSCOPY    COLONOSCOPY N/A 12/2/2022    COLONOSCOPY performed by Patsy Chakraborty MD at [facility] ENDOSCOPY    FOOT SURGERY Right 05/17/2023    Open Transmetatarsal Amputation Right Foot performed by Phil Reyes DPM at [facility] MAIN OR    HERNIA REPAIR      INVASIVE VASCULAR N/A 05/16/2023    Angiography lower ext right performed by Papo Charlton MD at [facility] ELECTROPHYSIOLOGY    INVASIVE VASCULAR N/A 05/16/2023    Aortagram abdominal performed by Papo Charlton MD at [facility] ELECTROPHYSIOLOGY    INVASIVE VASCULAR N/A 05/16/2023    Ultrasound guided vascular access performed by Papo Charlton MD at [facility] ELECTROPHYSIOLOGY    INVASIVE VASCULAR N/A

## 2024-05-10 NOTE — WOUND CARE
Strip   [] Skin Sub:   [] Other:        [x] Cover and Secure with:     [x] Gauze [] Lindy [] Kerlix   [] Zetuvit Plus Silicone Border [x] Super Absorbant [] ABD     [] Ace Wrap [x] Other: tegaderm or op site or use wound vac drape    Limit contact of tape with skin.    [] Change dressing: [] Daily    [] Every Other Day   [] Twice per week   [x] Three times per week   [] Once a week [] Do Not Change Dressing   [] Other:     Negative Pressure:           Wound Location: sacrum- patient could not tolerate   [] Pressure @  125 mm/Hg    []Continuous []Intermittent   [] Black Foam  [] White Foam [] Bridge  [] Change dressing 3 times per week     [] Other: plus  collagen    Pressure Relief:  [x] When sitting, shift position or do seat lifts every 15 minutes.  [x] Wheelchair cushion [x] Specialty Bed/Mattress  [x] Turn every 2 hours when in bed.  Avoid direct pressure on wound site.  Limit side lying to 30 degree tilt.  Limit HOB elevation to 30 degrees.  [x] Other:  sit in chair 1 hour twice a day. Rest of the time in bed on your side                    AVOID PRESSURE TO WOUND     Edema Control:  Apply: [] Compression Stocking:  mmHg  []Right Leg []Left Leg   [] Tubigrip []Right Leg Double Layer []Left Leg Double Layer      []Right Leg Single Layer []Left Leg Single Layer      [] Elevate leg(s) above the level of the heart when sitting.    [] Avoid prolonged standing in one place.     Compression:  Apply: [] Compression Wrap to []RightLeg []Left Leg    []  Coflex [] Coflex Lite  [] Unna with Calamine Lite     [] Do not get leg(s) with wrap wet. If wrap becomes too tight, call the wound center and remove wrap from leg by unrolling each layer.   [] Elevate leg(s) above the level of the heart when sitting.    [] Avoid prolonged standing in one place.    Off-Loading:   [] Off-loading when: [] walking  [] in bed [] sitting  [] Total non-weight bearing  [] Right Leg  [] Left Leg   [] Assistive Device [] Walker [] Cane  []

## 2024-05-10 NOTE — PROGRESS NOTES
Wound Center  Progress Note / Procedure Note      Chief Complaint:  Damien Ferrer is a 73 y.o.  male  with sacral wound of >1 months duration.        Assessment/Plan     73 y.o. male with CHF, EF 15-20%, ESRD on dialysis, R/BKA    -sacral chronic ulcer.  Pressure injury, stage 4  Full thickness  Same size/depth  Slough/granular  Necessitates debridement  for wound healing and to prevent/heal infection  Ulcer needs debridement- see note below      -infection resolved- sacral ulcer  was followed by ID- Dr Magallanes, last seen 3/7/24  received iv cefipime during dialysis       -offloading discussed in detail  Sacral/buttock  Offload  Limiting sitting, has gel cushion. Tilts on side during dialysis  Pressure redistribution mattress: has Hospital BED now  AND Group [employer] - low air low mattress   Reposition every few hours      -hearing loss  30-40% improved  Managed by Dr ATUL Hudson  Getting glucosteroid inj in ear- has 2 more before more testing    -nutrition  good      -anemia of chronic disease   9.6 (2/2024)      Following discussed with patient / niece  Needs :  Serial debridement- prn    Good local wound care  Dressing:collagen, Hydrofera blue, tegaderm to secure in place  Frequency : three times a week     Continue Home Health- more instructions given to HH so packing stays in wound    -Nutrition optimization      -Good offloading    Patient/ niece understood and agrees with plan. Questions answered.      Serial visits and serial debridements also discussed.    Follow up with me in 2 week      Subjective:   Since last visit  Offloading  Tolerating abx, pain resolved  Packing still falling off- not suing tegaderm      HPI:     Patient has had wound over a month  I got most of the history from chart as we spent most of the time going over treatment plan  First mention of wound is on 1/2/24-measuring 1.5x1.0.1cm - superficial, pink - during this admission patient had pos blood cultures with source

## 2024-05-16 ENCOUNTER — PROCEDURE VISIT (OUTPATIENT)
Age: 74
End: 2024-05-16

## 2024-05-16 VITALS
OXYGEN SATURATION: 98 % | HEART RATE: 83 BPM | SYSTOLIC BLOOD PRESSURE: 126 MMHG | WEIGHT: 150 LBS | BODY MASS INDEX: 21.47 KG/M2 | HEIGHT: 70 IN | DIASTOLIC BLOOD PRESSURE: 78 MMHG

## 2024-05-16 DIAGNOSIS — H90.3 SENSORINEURAL HEARING LOSS (SNHL) OF BOTH EARS: Primary | ICD-10-CM

## 2024-05-16 DIAGNOSIS — H91.03 OTOTOXIC HEARING LOSS OF BOTH EARS: ICD-10-CM

## 2024-05-16 RX ORDER — ACYCLOVIR 800 MG/1
1 TABLET ORAL 2 TIMES DAILY
COMMUNITY

## 2024-05-16 NOTE — PROGRESS NOTES
PROCEDURE: Bilateral transtympanic Instillation of vestibulo-active medication (76020-51)    Preoperative diagnosis: Sudden sensorineural hearing loss secondary to ototoxic exposure  Postoperative diagnosis: Same    Risks, benefits and alternatives were reviewed including but not limited to risk of bleeding, infection, TM perforation, hearing loss, no improvement in hearing, otorrhea. Informed consent was obtained and the patient agreed to the procedure.  Under microscopy, the left ear was inspected with a speculum.  Any obstructive cerumen or debris was removed.  The inferior tympanic membrane was anesthetized with phenol.  A 25 gauge spinal needle was used to make a hole anterior superiorly and posterior inferiorly.  Through the posterior inferior hole, the middle ear space was instilled with dexamethasone steroid medication. Approximately 0.5 mL were instilled into the middle ear until the middle ear space was filled.  The identical procedure was then performed on the right side. The patient tolerated the procedure well.          
thirst.  Respiratory: denies cough, hemoptysis, wheezing  Gastrointestinal: denies trouble swallowing, nausea, emesis, regurgitation  Musculoskeletal: denies muscle weakness or wasting  Cardiovascular: denies chest pain, shortness of breath  Neurologic: denies seizures, numbness or tingling, syncope  Hematologic: denies easy bleeding or bruising       Past Medical History:   Diagnosis Date    Asthenia 01/24/2021    Cardiomyopathy ([facility]) 01/24/2021    CHF (congestive heart failure) ([facility])     Chronic kidney disease     CKD (chronic kidney disease)     4    End stage renal disease on dialysis ([facility]) 01/24/2021    Essential hypertension 01/24/2021    Gastroesophageal reflux disease 01/24/2021    Gastroesophageal reflux disease 01/24/2021    Hypertension     Pneumonia due to COVID-19 virus 01/24/2021    Systolic CHF, acute on chronic ([facility]) 01/24/2021     Past Surgical History:   Procedure Laterality Date    CARDIAC CATHETERIZATION      COLONOSCOPY N/A 12/3/2020    COLONOSCOPY performed by Patsy Chakraborty MD at [facility] ENDOSCOPY    COLONOSCOPY N/A 12/2/2022    COLONOSCOPY performed by Patsy Chakraborty MD at [facility] ENDOSCOPY    FOOT SURGERY Right 05/17/2023    Open Transmetatarsal Amputation Right Foot performed by Phil Reyes DPM at [facility] MAIN OR    HERNIA REPAIR      INVASIVE VASCULAR N/A 05/16/2023    Angiography lower ext right performed by Papo Charlton MD at [facility] ELECTROPHYSIOLOGY    INVASIVE VASCULAR N/A 05/16/2023    Aortagram abdominal performed by Papo Charlton MD at [facility] ELECTROPHYSIOLOGY    INVASIVE VASCULAR N/A 05/16/2023    Ultrasound guided vascular access performed by Papo Charlton MD at [facility] ELECTROPHYSIOLOGY    INVASIVE VASCULAR N/A 05/16/2023    Atherectomy  femoral popliteal performed by Papo Charlton MD at [facility] ELECTROPHYSIOLOGY    INVASIVE VASCULAR N/A 05/16/2023    Pta femoral popliteal artery performed by Papo Charlton MD at [facility] ELECTROPHYSIOLOGY    INVASIVE VASCULAR N/A 05/16/2023    Angioplasty post

## 2024-05-17 RX ORDER — DEXAMETHASONE SODIUM PHOSPHATE 10 MG/ML
10 INJECTION INTRAMUSCULAR; INTRAVENOUS ONCE
Qty: 1 ML | Refills: 0
Start: 2024-05-17 | End: 2024-05-17

## 2024-05-23 ENCOUNTER — OFFICE VISIT (OUTPATIENT)
Age: 74
End: 2024-05-23
Payer: MEDICARE

## 2024-05-23 VITALS
DIASTOLIC BLOOD PRESSURE: 64 MMHG | HEART RATE: 96 BPM | BODY MASS INDEX: 21.47 KG/M2 | RESPIRATION RATE: 18 BRPM | OXYGEN SATURATION: 96 % | TEMPERATURE: 97.4 F | HEIGHT: 70 IN | SYSTOLIC BLOOD PRESSURE: 100 MMHG | WEIGHT: 150 LBS

## 2024-05-23 DIAGNOSIS — S31.000D SACRAL WOUND, SUBSEQUENT ENCOUNTER: Primary | ICD-10-CM

## 2024-05-23 PROCEDURE — 99212 OFFICE O/P EST SF 10 MIN: CPT | Performed by: SURGERY

## 2024-05-23 PROCEDURE — 1036F TOBACCO NON-USER: CPT | Performed by: SURGERY

## 2024-05-23 PROCEDURE — 3017F COLORECTAL CA SCREEN DOC REV: CPT | Performed by: SURGERY

## 2024-05-23 PROCEDURE — 3078F DIAST BP <80 MM HG: CPT | Performed by: SURGERY

## 2024-05-23 PROCEDURE — G8427 DOCREV CUR MEDS BY ELIG CLIN: HCPCS | Performed by: SURGERY

## 2024-05-23 PROCEDURE — 3074F SYST BP LT 130 MM HG: CPT | Performed by: SURGERY

## 2024-05-23 PROCEDURE — G8420 CALC BMI NORM PARAMETERS: HCPCS | Performed by: SURGERY

## 2024-05-23 PROCEDURE — 1123F ACP DISCUSS/DSCN MKR DOCD: CPT | Performed by: SURGERY

## 2024-05-23 ASSESSMENT — PATIENT HEALTH QUESTIONNAIRE - PHQ9
2. FEELING DOWN, DEPRESSED OR HOPELESS: NOT AT ALL
SUM OF ALL RESPONSES TO PHQ QUESTIONS 1-9: 0
SUM OF ALL RESPONSES TO PHQ QUESTIONS 1-9: 0
1. LITTLE INTEREST OR PLEASURE IN DOING THINGS: NOT AT ALL
SUM OF ALL RESPONSES TO PHQ9 QUESTIONS 1 & 2: 0
SUM OF ALL RESPONSES TO PHQ QUESTIONS 1-9: 0
SUM OF ALL RESPONSES TO PHQ QUESTIONS 1-9: 0

## 2024-05-23 NOTE — PROGRESS NOTES
Identified pt with two pt identifiers (name and ). Reviewed chart in preparation for visit and have obtained necessary documentation.    Damien Ferrer is a 73 y.o. male  Chief Complaint   Patient presents with    Follow-up     Sacral wound     /64 (Site: Left Upper Arm, Position: Sitting, Cuff Size: Medium Adult)   Pulse 96   Temp 97.4 °F (36.3 °C) (Oral)   Resp 18   Ht 1.778 m (5' 10\")   Wt 68 kg (150 lb)   SpO2 96%   BMI 21.52 kg/m²     1. Have you been to the ER, urgent care clinic since your last visit?  Hospitalized since your last visit?no    2. Have you seen or consulted any other health care providers outside of the [facility] since your last visit?  Include any pap smears or colon screening. no

## 2024-05-30 NOTE — PROGRESS NOTES
Vascular History and Physical    Patient: Damien Ferrer  MRN: [id number]    YOB: 1950  Age: 73 y.o.  Sex: male     Chief Complaint:  Chief Complaint   Patient presents with    Follow-up     Sacral wound       History of Present Illness: Damien Ferrer is a 73 y.o. very pleasant gentleman is here today to reassess the sacral wound.  We are currently doing wound care daily with packing changes.  Patient denies any fever or chills.    Social History:  Social Connections: Not on file       Past Medical History:  Past Medical History:   Diagnosis Date    Asthenia 01/24/2021    Cardiomyopathy ([facility]) 01/24/2021    CHF (congestive heart failure) ([facility])     Chronic kidney disease     CKD (chronic kidney disease)     4    End stage renal disease on dialysis ([facility]) 01/24/2021    Essential hypertension 01/24/2021    Gastroesophageal reflux disease 01/24/2021    Gastroesophageal reflux disease 01/24/2021    Hypertension     Pneumonia due to COVID-19 virus 01/24/2021    Systolic CHF, acute on chronic ([facility]) 01/24/2021       Surgical History:  Past Surgical History:   Procedure Laterality Date    CARDIAC CATHETERIZATION      COLONOSCOPY N/A 12/3/2020    COLONOSCOPY performed by Patsy Chakraborty MD at [facility] ENDOSCOPY    COLONOSCOPY N/A 12/2/2022    COLONOSCOPY performed by Patsy Chakraborty MD at [facility] ENDOSCOPY    FOOT SURGERY Right 05/17/2023    Open Transmetatarsal Amputation Right Foot performed by Phil Reyes DPM at [facility] MAIN OR    HERNIA REPAIR      INVASIVE VASCULAR N/A 05/16/2023    Angiography lower ext right performed by Papo Charlton MD at [facility] ELECTROPHYSIOLOGY    INVASIVE VASCULAR N/A 05/16/2023    Aortagram abdominal performed by Papo Charlton MD at [facility] ELECTROPHYSIOLOGY    INVASIVE VASCULAR N/A 05/16/2023    Ultrasound guided vascular access performed by Papo Charlton MD at [facility] ELECTROPHYSIOLOGY    INVASIVE VASCULAR N/A 05/16/2023    Atherectomy  femoral popliteal performed by Papo Charlton MD at [facility]

## 2024-06-14 ENCOUNTER — HOSPITAL ENCOUNTER (OUTPATIENT)
Facility: HOSPITAL | Age: 74
Discharge: HOME | End: 2024-06-14
Attending: FAMILY MEDICINE
Payer: MEDICARE

## 2024-06-14 VITALS
DIASTOLIC BLOOD PRESSURE: 60 MMHG | TEMPERATURE: 97.2 F | HEART RATE: 79 BPM | RESPIRATION RATE: 18 BRPM | SYSTOLIC BLOOD PRESSURE: 124 MMHG

## 2024-06-14 DIAGNOSIS — L89.154 PRESSURE INJURY OF SACRAL REGION, STAGE 4 (HCC): Primary | ICD-10-CM

## 2024-06-14 PROCEDURE — 11042 DBRDMT SUBQ TIS 1ST 20SQCM/<: CPT

## 2024-06-14 RX ORDER — GENTAMICIN SULFATE 1 MG/G
OINTMENT TOPICAL ONCE
OUTPATIENT
Start: 2024-06-14 | End: 2024-06-14

## 2024-06-14 RX ORDER — IBUPROFEN 200 MG
TABLET ORAL ONCE
OUTPATIENT
Start: 2024-06-14 | End: 2024-06-14

## 2024-06-14 RX ORDER — CLOBETASOL PROPIONATE 0.5 MG/G
OINTMENT TOPICAL ONCE
OUTPATIENT
Start: 2024-06-14 | End: 2024-06-14

## 2024-06-14 RX ORDER — LIDOCAINE HYDROCHLORIDE 20 MG/ML
JELLY TOPICAL ONCE
OUTPATIENT
Start: 2024-06-14 | End: 2024-06-14

## 2024-06-14 RX ORDER — OXYCODONE HYDROCHLORIDE AND ACETAMINOPHEN 5; 325 MG/1; MG/1
1 TABLET ORAL EVERY 6 HOURS PRN
COMMUNITY

## 2024-06-14 RX ORDER — TRIAMCINOLONE ACETONIDE 1 MG/G
OINTMENT TOPICAL ONCE
OUTPATIENT
Start: 2024-06-14 | End: 2024-06-14

## 2024-06-14 RX ORDER — BETAMETHASONE DIPROPIONATE 0.5 MG/G
CREAM TOPICAL ONCE
OUTPATIENT
Start: 2024-06-14 | End: 2024-06-14

## 2024-06-14 RX ORDER — BACITRACIN ZINC AND POLYMYXIN B SULFATE 500; 1000 [USP'U]/G; [USP'U]/G
OINTMENT TOPICAL ONCE
OUTPATIENT
Start: 2024-06-14 | End: 2024-06-14

## 2024-06-14 RX ORDER — GINSENG 100 MG
CAPSULE ORAL ONCE
OUTPATIENT
Start: 2024-06-14 | End: 2024-06-14

## 2024-06-14 RX ORDER — LIDOCAINE 40 MG/G
CREAM TOPICAL ONCE
OUTPATIENT
Start: 2024-06-14 | End: 2024-06-14

## 2024-06-14 RX ORDER — SODIUM CHLOR/HYPOCHLOROUS ACID 0.033 %
SOLUTION, IRRIGATION IRRIGATION ONCE
OUTPATIENT
Start: 2024-06-14 | End: 2024-06-14

## 2024-06-14 RX ORDER — LIDOCAINE 50 MG/G
OINTMENT TOPICAL ONCE
OUTPATIENT
Start: 2024-06-14 | End: 2024-06-14

## 2024-06-14 RX ORDER — LIDOCAINE HYDROCHLORIDE 40 MG/ML
SOLUTION TOPICAL ONCE
OUTPATIENT
Start: 2024-06-14 | End: 2024-06-14

## 2024-06-14 ASSESSMENT — PAIN DESCRIPTION - LOCATION: LOCATION: SACRUM

## 2024-06-14 ASSESSMENT — PAIN SCALES - GENERAL: PAINLEVEL_OUTOF10: 2

## 2024-06-14 ASSESSMENT — PAIN DESCRIPTION - DESCRIPTORS: DESCRIPTORS: BURNING

## 2024-06-14 NOTE — DISCHARGE INSTRUCTIONS
Wound Clinic Physician Orders and Discharge Instructions  [facility]  [address], [address]  [place] [address]  Telephone: [phone]     FAX [phone]    NAME:  Damien Ferrer  YOB: 1950  MEDICAL RECORD NUMBER:  [id number]  DATE:  6/14/2024      Return Appointment:  [] Dressing Supply Provider:   [x] Home Healthcare: [facility]  [x] Return Appointment:    2     Week(s)  [] Nurse Visit:     [] Discharge from [facility]: [] Healed        [] Refer to Provider:         [] Consult    Follow-up Information:  [] Ordered Tests:   [x] Referrals: See's Dr. Charlton          [] Rx:   [x] Other: sit in chair 1 hour twice a day. Rest of the time in bed on your side.       Wound Cleansing:   Do not scrub or use excessive force.  Cleanse wound prior to applying a clean dressing with:  [x] Normal Saline   [] Keep Wound Dry in Shower     [] Wound Cleanser   [x] Cleanse wound with Mild Soap & Water    [] Other:       Topical Treatments:  Do not apply lotions, creams, or ointments to wound bed unless directed.   [] Apply moisturizing lotion to skin surrounding the wound prior to dressing change.  [] Apply antifungal ointment to skin surrounding the wound prior to dressing change.  [] Apply thin film of moisture barrier ointment to skin immediately around wound.  [] Apply Betadine to skin immediately around wound   [] Other:      Dressings:           Wound Location SACRUM    [x] Apply Primary Dressing:       [] MediHoney Gel [] MediHoney Alginate  [] Calcium Alginate with Silver   [] Calcium Alginate without silver   [] Collagen with silver [x] Collagen without Silver    [] Santyl with moist saline gauze     [x] Hydrofera Blue (cut to size and moistened with normal saline)  [] Hydrofera Blue Ready (cut to size)      [] Normal Saline wet to dry  [] Betadine wet to dry    [] Hydrogel  [] Mepitel     [] Bactroban/Mupirocin   [] Iodoform Packing Strip [] Plain Packing Strip   [] Skin

## 2024-06-14 NOTE — WOUND CARE
Boot [] CROW Boot     [] Diabetic Shoes    [] Offloading heel boot  [x] Other: keep pressure of wounds    Dietary:  [x] Diet as tolerated: [] Calorie Diabetic Diet: [] No Added Salt:  [x] Increase Protein: [x] Other: Eliot drinks to increase protien intake      Activity:  [x] Activity as tolerated:    [] Patient has no activity restrictions      [] Strict Bedrest   [] Remain off Work      [] May return to full duty work                                     [] Return to work with restrictions     Physician:  [] Dr. Jodee Phelps   [] Dr. Jeremy Chu  [] Dr. Israel Henry  [] Dr. Faith Perez  [] Dr. Catarino Wisdom  [x] Dr.Silky Snider    Nurse Case Manger:  Ángela      Wound Care Center Information: Should you experience any significant changes in your wound(s) or have questions about your wound care, please contact the Wound Center at [phone]. Our hours are Monday - Friday 8am - 4:30pm, closed all major holidays. If you need help with your wound outside these hours and cannot wait until we are again available, contact your PCP or go to the hospital emergency room.     PLEASE NOTE: IF YOU ARE UNABLE TO OBTAIN WOUND SUPPLIES, CONTINUE TO USE THE SUPPLIES YOU HAVE AVAILABLE UNTIL YOU ARE ABLE TO REACH US. IT IS MOST IMPORTANT TO KEEP THE WOUND COVERED AT ALL TIMES.

## 2024-06-14 NOTE — PROGRESS NOTES
IR FLUORO GUIDED NEEDLE PLACEMENT  10/18/2021    IR FLUOROSCOPY GUIDED CENTRAL VENOUS ACCESS DEVICE PLACEMENT  1/22/2021    IR FLUOROSCOPY GUIDED CENTRAL VENOUS ACCESS DEVICE PLACEMENT  1/18/2021    IR FLUOROSCOPY GUIDED CENTRAL VENOUS ACCESS DEVICE PLACEMENT  12/13/2021    IR NONTUNNELED VASCULAR CATHETER  10/18/2021    IR NONTUNNELED VASCULAR CATHETER 10/18/2021 [facility]    IR NONTUNNELED VASCULAR CATHETER  01/18/2021    IR NONTUNNELED VASCULAR CATHETER 1/18/2021 [facility]    IR NONTUNNELED VASCULAR CATHETER  10/18/2021    IR NONTUNNELED VASCULAR CATHETER  1/18/2021    IR NONTUNNELED VASCULAR CATHETER  01/03/2024    IR NONTUNNELED VASCULAR CATHETER 1/3/2024 Farida Leon PA-C [facility]    IR REMOVAL TUNNELED CVC WO PORT PUMP  10/18/2021    IR TUNNELED CATHETER PLACEMENT GREATER THAN 5 YEARS  01/22/2021    IR TUNNELED CATHETER PLACEMENT GREATER THAN 5 YEARS 1/22/2021 [facility]    IR TUNNELED CATHETER PLACEMENT GREATER THAN 5 YEARS  12/13/2021    IR TUNNELED CATHETER PLACEMENT GREATER THAN 5 YEARS 12/13/2021 [facility]    IR TUNNELED CATHETER PLACEMENT GREATER THAN 5 YEARS  1/22/2021    IR TUNNELED CATHETER PLACEMENT GREATER THAN 5 YEARS  12/13/2021    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    LEG AMPUTATION BELOW KNEE ON RIGHT performed by Papo Charlton MD at [facility] MAIN OR    LEG AMPUTATION BELOW KNEE Right     TOE AMPUTATION Right 05/11/2023    AMPUTATION THIRD TOE RIGHT performed by Catarino Wisdom DPM at [facility] MAIN OR     Family History   Problem Relation Age of Onset    Heart Failure Father     Diabetes Mother      Social History     Socioeconomic History    Marital status:      Spouse name: None    Number of children: None    Years of education: None    Highest education level: None   Tobacco Use    Smoking status: Never    Smokeless tobacco: Never   Vaping Use    Vaping Use: Never used   Substance and Sexual Activity    Alcohol use: Not Currently    Drug use: Never

## 2024-06-20 ENCOUNTER — OFFICE VISIT (OUTPATIENT)
Age: 74
End: 2024-06-20
Payer: COMMERCIAL

## 2024-06-20 VITALS
OXYGEN SATURATION: 94 % | WEIGHT: 164 LBS | TEMPERATURE: 98.1 F | RESPIRATION RATE: 18 BRPM | SYSTOLIC BLOOD PRESSURE: 95 MMHG | DIASTOLIC BLOOD PRESSURE: 62 MMHG | HEIGHT: 70 IN | HEART RATE: 83 BPM | BODY MASS INDEX: 23.48 KG/M2

## 2024-06-20 DIAGNOSIS — S31.000D SACRAL WOUND, SUBSEQUENT ENCOUNTER: Primary | ICD-10-CM

## 2024-06-20 PROCEDURE — 1123F ACP DISCUSS/DSCN MKR DOCD: CPT | Performed by: SURGERY

## 2024-06-20 PROCEDURE — 3074F SYST BP LT 130 MM HG: CPT | Performed by: SURGERY

## 2024-06-20 PROCEDURE — 3078F DIAST BP <80 MM HG: CPT | Performed by: SURGERY

## 2024-06-20 PROCEDURE — 99212 OFFICE O/P EST SF 10 MIN: CPT | Performed by: SURGERY

## 2024-06-20 ASSESSMENT — PATIENT HEALTH QUESTIONNAIRE - PHQ9
SUM OF ALL RESPONSES TO PHQ QUESTIONS 1-9: 0
1. LITTLE INTEREST OR PLEASURE IN DOING THINGS: NOT AT ALL
SUM OF ALL RESPONSES TO PHQ9 QUESTIONS 1 & 2: 0
2. FEELING DOWN, DEPRESSED OR HOPELESS: NOT AT ALL
SUM OF ALL RESPONSES TO PHQ QUESTIONS 1-9: 0

## 2024-06-20 NOTE — PROGRESS NOTES
Identified pt with two pt identifiers (name and ). Reviewed chart in preparation for visit and have obtained necessary documentation.    Damien Ferrer is a 73 y.o. male  Chief Complaint   Patient presents with    Follow-up    Wound Check     Sacral Wound      BP 95/62 (Site: Right Upper Arm, Position: Sitting, Cuff Size: Large Adult)   Pulse 83   Temp 98.1 °F (36.7 °C) (Oral)   Resp 18   Ht 1.778 m (5' 10\")   Wt 74.4 kg (164 lb)   SpO2 94%   BMI 23.53 kg/m²     1. Have you been to the ER, urgent care clinic since your last visit?  Hospitalized since your last visit?NO    2. Have you seen or consulted any other health care providers outside of the [facility] since your last visit?  Include any pap smears or colon screening. NO

## 2024-06-27 NOTE — PROGRESS NOTES
General surgery history and Physical    Patient: Damien Ferrer  MRN: [id number]    YOB: 1950  Age: 73 y.o.  Sex: male     Chief Complaint:  Chief Complaint   Patient presents with    Follow-up    Wound Check     Sacral Wound        History of Present Illness: Damien Ferrer is a 73 y.o. very pleasant gentleman is here today to reassess the sacral wound.  Patient current doing well.  Denies any fever.  Pain is minimal.    Social History:  Social Connections: Not on file       Past Medical History:  Past Medical History:   Diagnosis Date    Asthenia 01/24/2021    Cardiomyopathy (HCC) 01/24/2021    CHF (congestive heart failure) ([facility])     Chronic kidney disease     CKD (chronic kidney disease)     4    End stage renal disease on dialysis ([facility]) 01/24/2021    Essential hypertension 01/24/2021    Gastroesophageal reflux disease 01/24/2021    Gastroesophageal reflux disease 01/24/2021    Hypertension     Pneumonia due to COVID-19 virus 01/24/2021    Systolic CHF, acute on chronic ([facility]) 01/24/2021     Surgical History:  Past Surgical History:   Procedure Laterality Date    CARDIAC CATHETERIZATION      COLONOSCOPY N/A 12/3/2020    COLONOSCOPY performed by Patsy Chakraborty MD at [facility] ENDOSCOPY    COLONOSCOPY N/A 12/2/2022    COLONOSCOPY performed by Patsy Chakraborty MD at [facility] ENDOSCOPY    FOOT SURGERY Right 05/17/2023    Open Transmetatarsal Amputation Right Foot performed by Phil Reyes DPM at [facility] MAIN OR    HERNIA REPAIR      INVASIVE VASCULAR N/A 05/16/2023    Angiography lower ext right performed by Papo Charlton MD at [facility] ELECTROPHYSIOLOGY    INVASIVE VASCULAR N/A 05/16/2023    Aortagram abdominal performed by Papo Charlton MD at [facility] ELECTROPHYSIOLOGY    INVASIVE VASCULAR N/A 05/16/2023    Ultrasound guided vascular access performed by Papo Charlton MD at [facility] ELECTROPHYSIOLOGY    INVASIVE VASCULAR N/A 05/16/2023    Atherectomy  femoral popliteal performed by Papo Charlton MD at [facility]

## 2024-06-28 ENCOUNTER — HOSPITAL ENCOUNTER (OUTPATIENT)
Facility: HOSPITAL | Age: 74
Discharge: HOME | End: 2024-06-28
Attending: FAMILY MEDICINE
Payer: MEDICAID

## 2024-06-28 VITALS
DIASTOLIC BLOOD PRESSURE: 67 MMHG | SYSTOLIC BLOOD PRESSURE: 114 MMHG | TEMPERATURE: 97.2 F | RESPIRATION RATE: 18 BRPM | HEART RATE: 76 BPM

## 2024-06-28 DIAGNOSIS — L89.154 PRESSURE INJURY OF SACRAL REGION, STAGE 4 (HCC): Primary | ICD-10-CM

## 2024-06-28 PROCEDURE — 11042 DBRDMT SUBQ TIS 1ST 20SQCM/<: CPT

## 2024-06-28 RX ORDER — LIDOCAINE HYDROCHLORIDE 40 MG/ML
SOLUTION TOPICAL ONCE
OUTPATIENT
Start: 2024-06-28 | End: 2024-06-28

## 2024-06-28 RX ORDER — GINSENG 100 MG
CAPSULE ORAL ONCE
OUTPATIENT
Start: 2024-06-28 | End: 2024-06-28

## 2024-06-28 RX ORDER — TRIAMCINOLONE ACETONIDE 1 MG/G
OINTMENT TOPICAL ONCE
OUTPATIENT
Start: 2024-06-28 | End: 2024-06-28

## 2024-06-28 RX ORDER — BACITRACIN ZINC AND POLYMYXIN B SULFATE 500; 1000 [USP'U]/G; [USP'U]/G
OINTMENT TOPICAL ONCE
OUTPATIENT
Start: 2024-06-28 | End: 2024-06-28

## 2024-06-28 RX ORDER — LIDOCAINE HYDROCHLORIDE 20 MG/ML
JELLY TOPICAL ONCE
OUTPATIENT
Start: 2024-06-28 | End: 2024-06-28

## 2024-06-28 RX ORDER — BETAMETHASONE DIPROPIONATE 0.5 MG/G
CREAM TOPICAL ONCE
OUTPATIENT
Start: 2024-06-28 | End: 2024-06-28

## 2024-06-28 RX ORDER — GENTAMICIN SULFATE 1 MG/G
OINTMENT TOPICAL ONCE
OUTPATIENT
Start: 2024-06-28 | End: 2024-06-28

## 2024-06-28 RX ORDER — SODIUM CHLOR/HYPOCHLOROUS ACID 0.033 %
SOLUTION, IRRIGATION IRRIGATION ONCE
OUTPATIENT
Start: 2024-06-28 | End: 2024-06-28

## 2024-06-28 RX ORDER — IBUPROFEN 200 MG
TABLET ORAL ONCE
OUTPATIENT
Start: 2024-06-28 | End: 2024-06-28

## 2024-06-28 RX ORDER — CLOBETASOL PROPIONATE 0.5 MG/G
OINTMENT TOPICAL ONCE
OUTPATIENT
Start: 2024-06-28 | End: 2024-06-28

## 2024-06-28 RX ORDER — LIDOCAINE 50 MG/G
OINTMENT TOPICAL ONCE
OUTPATIENT
Start: 2024-06-28 | End: 2024-06-28

## 2024-06-28 RX ORDER — LIDOCAINE 40 MG/G
CREAM TOPICAL ONCE
OUTPATIENT
Start: 2024-06-28 | End: 2024-06-28

## 2024-06-28 ASSESSMENT — PAIN SCALES - GENERAL: PAINLEVEL_OUTOF10: 1

## 2024-06-28 ASSESSMENT — PAIN DESCRIPTION - DESCRIPTORS: DESCRIPTORS: SORE

## 2024-06-28 ASSESSMENT — PAIN DESCRIPTION - LOCATION: LOCATION: SACRUM

## 2024-06-28 NOTE — WOUND CARE
Wound Clinic Physician Orders and Discharge Instructions  [facility]  [address], [address]  [place] [address]  Telephone: [phone]     FAX [phone]    NAME:  Damien Ferrer  YOB: 1950  MEDICAL RECORD NUMBER:  [id number]  DATE:  6/28/2024      Return Appointment:  [] Dressing Supply Provider:   [x] Home Healthcare: [facility]  [x] Return Appointment:    2     Week(s)  [] Nurse Visit:     [] Discharge from [facility]: [] Healed        [] Refer to Provider:         [] Consult    Follow-up Information:  [] Ordered Tests:   [x] Referrals: See's Dr. Charlton          [] Rx:   [x] Other: sit in chair 1 hour twice a day. Rest of the time in bed on your side.       Wound Cleansing:   Do not scrub or use excessive force.  Cleanse wound prior to applying a clean dressing with:  [x] Normal Saline   [] Keep Wound Dry in Shower     [] Wound Cleanser   [] Cleanse wound with Mild Soap & Water    [] Other:       Topical Treatments:  Do not apply lotions, creams, or ointments to wound bed unless directed.   [] Apply moisturizing lotion to skin surrounding the wound prior to dressing change.  [] Apply antifungal ointment to skin surrounding the wound prior to dressing change.  [] Apply thin film of moisture barrier ointment to skin immediately around wound.  [] Apply Betadine to skin immediately around wound   [] Other:      Dressings:           Wound Location SACRUM    [x] Apply Primary Dressing:       [] MediHoney Gel [] MediHoney Alginate  [] Calcium Alginate with Silver   [] Calcium Alginate without silver   [x] Collagen with silver 2nd [] Collagen without Silver    [] Santyl with moist saline gauze     [x] Hydrofera Blue Packing to undermining then Collagen with silver to fill in depth (cut to size and moistened with normal saline)  [] Hydrofera Blue Ready (cut to size)      [] Normal Saline wet to dry  [] Betadine wet to dry    [] Hydrogel  [] Mepitel     []

## 2024-06-28 NOTE — PROGRESS NOTES
Wound Center  Progress Note / Procedure Note      Chief Complaint:  Damien Ferrer is a 73 y.o.  male  with sacral wound of >1 months duration.        Assessment/Plan     73 y.o. male with CHF, EF 15-20%, ESRD on dialysis, R/BKA    -sacral chronic ulcer.  Pressure injury, stage 4  Full thickness  About the same  Slough/granular/epibole/mild macerated  Necessitates debridement  for wound healing and to prevent/heal infection  Ulcer needs debridement- see note below  Will discuss SNAP with him next visit again    -infection resolved- sacral ulcer  was followed by ID- Dr Magallanes, last seen 3/7/24  received iv cefipime during dialysis       -offloading discussed in detail  Sacral/buttock  Offload  Limiting sitting, has gel cushion. Tilts on side during dialysis  Pressure redistribution mattress: has Hospital BED now  AND Group 2 - low air low mattress   Reposition every few hours      -hearing loss  30-40% improved  Managed by Dr ATUL Hudson      -nutrition  good      -anemia of chronic disease   9.6 (2/2024)      Following discussed with patient / niece  Needs :  Serial debridement- prn    Good local wound care  Dressing:collagen, Hydrofera blue, gauze ABD pad  Frequency : three times a week     Continue Home Health    -Nutrition optimization      -Good offloading    Patient/ niece understood and agrees with plan. Questions answered.      Serial visits and serial debridements also discussed.    Follow up with me in 2 week      Subjective:   Since last visit  Offloading  No new issues      HPI:     Patient has had wound over a month  I got most of the history from chart as we spent most of the time going over treatment plan  First mention of wound is on 1/2/24-measuring 1.5x1.0.1cm - superficial, pink - during this admission patient had pos blood cultures with source unknown  Apparently vanc resulted in ototoxicity- with no hearing (baseline -had hearing problems to begin with)      It appears wound got

## 2024-06-28 NOTE — DISCHARGE INSTRUCTIONS
Bactroban/Mupirocin   [] Iodoform Packing Strip [] Plain Packing Strip   [] Skin Sub:   [] Other:        [x] Cover and Secure with:     [x] Gauze [] Lindy [] Kerlix   [] Zetuvit Plus Silicone Border [x] Super Absorbant or foam border [] ABD     [] Ace Wrap [] Other:     Limit contact of tape with skin.    [x] Change dressing: [] Daily    [] Every Other Day   [] Twice per week   [x] Three times per week and as needed   [] Once a week [] Do Not Change Dressing   [] Other:     Negative Pressure:           Wound Location:   [] Pressure @   mm/Hg    []Continuous []Intermittent   [] Black Foam  [] White Foam [] Bridge  [] Change dressing 3 times per week     [] Other: plus  collagen    Pressure Relief:  [x] When sitting, shift position or do seat lifts every 15 minutes.  [x] Wheelchair cushion [x] Specialty Bed/Mattress  [x] Turn every 2 hours when in bed.  Avoid direct pressure on wound site.  Limit side lying to 30 degree tilt.  Limit HOB elevation to 30 degrees.  [x] Other:  sit in chair 1 hour twice a day. Rest of the time in bed on your side                    AVOID PRESSURE TO WOUND     Dietary:  [x] Diet as tolerated: [] Calorie Diabetic Diet: [] No Added Salt:  [x] Increase Protein: [x] Other: Eliot drinks to increase protien intake      Activity:  [x] Activity as tolerated:    [] Patient has no activity restrictions      [] Strict Bedrest   [] Remain off Work      [] May return to full duty work                                     [] Return to work with restrictions     Physician:  [] Dr. Jodee Phelps   [] Dr. Jeremy Chu  [] Dr. Israel Henry  [] Dr. Faith Perez  [] Dr. Catarino Wisdom  [x] Dr.Silky Snider    Nurse Case Manger:  Ángela      Wound Care Center Information: Should you experience any significant changes in your wound(s) or have questions about your wound care, please contact the Wound Center at [phone]. Our hours are Monday - Friday 8am - 4:30pm, closed all major holidays. If you need

## 2024-07-11 ENCOUNTER — PROCEDURE VISIT (OUTPATIENT)
Age: 74
End: 2024-07-11

## 2024-07-11 ENCOUNTER — OFFICE VISIT (OUTPATIENT)
Age: 74
End: 2024-07-11
Payer: COMMERCIAL

## 2024-07-11 VITALS — HEART RATE: 78 BPM | OXYGEN SATURATION: 98 % | DIASTOLIC BLOOD PRESSURE: 70 MMHG | SYSTOLIC BLOOD PRESSURE: 114 MMHG

## 2024-07-11 DIAGNOSIS — H90.3 SENSORINEURAL HEARING LOSS (SNHL), BILATERAL: Primary | ICD-10-CM

## 2024-07-11 DIAGNOSIS — H90.3 SENSORINEURAL HEARING LOSS (SNHL) OF BOTH EARS: ICD-10-CM

## 2024-07-11 DIAGNOSIS — H91.03 OTOTOXIC HEARING LOSS OF BOTH EARS: Primary | ICD-10-CM

## 2024-07-11 PROCEDURE — 3074F SYST BP LT 130 MM HG: CPT | Performed by: STUDENT IN AN ORGANIZED HEALTH CARE EDUCATION/TRAINING PROGRAM

## 2024-07-11 PROCEDURE — 3078F DIAST BP <80 MM HG: CPT | Performed by: STUDENT IN AN ORGANIZED HEALTH CARE EDUCATION/TRAINING PROGRAM

## 2024-07-11 PROCEDURE — 99214 OFFICE O/P EST MOD 30 MIN: CPT | Performed by: STUDENT IN AN ORGANIZED HEALTH CARE EDUCATION/TRAINING PROGRAM

## 2024-07-11 PROCEDURE — 1123F ACP DISCUSS/DSCN MKR DOCD: CPT | Performed by: STUDENT IN AN ORGANIZED HEALTH CARE EDUCATION/TRAINING PROGRAM

## 2024-07-11 NOTE — PROGRESS NOTES
Damien Ferrer   1950, 73 y.o. male   [id number]       Referring Provider: Becca Peterson MD  Referral Type: In an order in Epic    Reason for Visit: Evaluation of suspected change in hearing, tinnitus, or balance.    ADULT AUDIOLOGIC EVALUATION      Damien Ferrer is a 73 y.o. male seen today, 7/11/2024 , for an initial audiologic evaluation. He was seen at a different clinic in March 2024, revealing moderately-severe sensorineural hearing loss with 40% and 45% word recognition in the left and right ears respectively.  Patient was seen by Becca Peterson MD following today's evaluation.    AUDIOLOGIC AND OTHER PERTINENT MEDICAL HISTORY:      Damien Ferrer reports bilateral hearing loss of reportedly recent onset. He reports improved hearing following medical management provided by Dr. Peterson.    He denied otalgia, aural fullness, tinnitus, and dizziness    Date: 7/11/2024     IMPRESSIONS:      Today's results revealed moderately-severe sensorineural hearing loss bilaterally. Poor/Very poor speech understanding when in quiet. Tympanometry indicates normal middle ear function. Discussed test results and implications with patient. Discussed possible benefits of amplification.     Follow medical recommendations of Becca Peterson MD.     ASSESSMENT AND FINDINGS:     Otoscopy unremarkable.    RIGHT EAR:  Hearing Sensitivity: Moderately-severe sensorineural hearing loss  Speech Recognition Threshold: 60 dB HL  Word Recognition: Poor 64%, based on NU-6 25-word list at 100 dBHL using recorded speech stimuli.    Tympanometry: Normal peak pressure and compliance, Type A tympanogram, consistent with normal middle ear function.       LEFT EAR:  Hearing Sensitivity: Moderately-severe sensorineural hearing loss  Speech Recognition Threshold: 60 dB HL  Word Recognition: Very Poor 52%, based on NU-6 25-word list at 100 dBHL using recorded speech stimuli.    Tympanometry: Normal peak pressure and compliance, Type A

## 2024-07-11 NOTE — PROGRESS NOTES
Comfortable, pleasant, appears stated age   Voice: Strong, speaking in full sentences, no stridor    Face: No masses or lesions, facial strength symmetric   Ears: External ears unremarkable.  Bilateral EAC clear. Tympanic membrane clear and intact, with visible landmarks. Clear middle ear space  Nose: External nose unremarkable. Dorsum midline. Anterior rhinoscopy demonstrates no lesions. Septum midline. Turbinates without hypertrophy.    Oral Cavity / Oropharynx: No trismus. Mucosa pink and moist. No lesions. Tongue is midline and mobile. Palate elevates symmetrically. Uvula midline. Tonsils unremarkable. Floor of mouth soft.   Neck: Supple. No adenopathy. Thyroid unremarkable. Palpable laryngeal landmarks. Full neck range of motion   Neurologic: CN II - XI intact. Normal gait     Audiogram:       Assessment/Plan:   Assessment:   Damien Ferrer is a 73 y.o. male with likely ototoxic hearing loss     Plan:   Hearing loss -subjective improvement with high-dose steroids and intratympanic steroids, but still not to baseline.   Discussed hearing aids and CI with patient - he would like to defer at this time     No orders of the defined types were placed in this encounter.     Return in about 6 months (around 1/11/2025).     Plan for medical issues were discussed with patient including observation, medical management, and possible surgical/procedural intervention if they fail conservative therapy. The risks and benefits of the considered procedures were discussed with the patient. All patient questions were answered.     The patient was instructed to return to clinic if they have no improvement or progression of their symptoms. Signs to watch out for were reviewed with them.      Becca Peterson MD   [facility]  [address] [place] [address] [address]  [place] [address]  Office Phone: [phone]

## 2024-07-12 ENCOUNTER — HOSPITAL ENCOUNTER (OUTPATIENT)
Facility: HOSPITAL | Age: 74
Discharge: HOME | End: 2024-07-12
Attending: FAMILY MEDICINE
Payer: COMMERCIAL

## 2024-07-12 VITALS
DIASTOLIC BLOOD PRESSURE: 64 MMHG | HEART RATE: 78 BPM | SYSTOLIC BLOOD PRESSURE: 122 MMHG | TEMPERATURE: 97.2 F | RESPIRATION RATE: 18 BRPM

## 2024-07-12 DIAGNOSIS — L89.154 PRESSURE INJURY OF SACRAL REGION, STAGE 4 (HCC): Primary | ICD-10-CM

## 2024-07-12 PROCEDURE — 99213 OFFICE O/P EST LOW 20 MIN: CPT

## 2024-07-12 RX ORDER — CLOBETASOL PROPIONATE 0.5 MG/G
OINTMENT TOPICAL ONCE
OUTPATIENT
Start: 2024-07-12 | End: 2024-07-12

## 2024-07-12 RX ORDER — SODIUM CHLOR/HYPOCHLOROUS ACID 0.033 %
SOLUTION, IRRIGATION IRRIGATION ONCE
OUTPATIENT
Start: 2024-07-12 | End: 2024-07-12

## 2024-07-12 RX ORDER — LIDOCAINE 40 MG/G
CREAM TOPICAL ONCE
OUTPATIENT
Start: 2024-07-12 | End: 2024-07-12

## 2024-07-12 RX ORDER — TRIAMCINOLONE ACETONIDE 1 MG/G
OINTMENT TOPICAL ONCE
OUTPATIENT
Start: 2024-07-12 | End: 2024-07-12

## 2024-07-12 RX ORDER — GINSENG 100 MG
CAPSULE ORAL ONCE
OUTPATIENT
Start: 2024-07-12 | End: 2024-07-12

## 2024-07-12 RX ORDER — LIDOCAINE HYDROCHLORIDE 20 MG/ML
JELLY TOPICAL ONCE
OUTPATIENT
Start: 2024-07-12 | End: 2024-07-12

## 2024-07-12 RX ORDER — LIDOCAINE HYDROCHLORIDE 40 MG/ML
SOLUTION TOPICAL ONCE
OUTPATIENT
Start: 2024-07-12 | End: 2024-07-12

## 2024-07-12 RX ORDER — GENTAMICIN SULFATE 1 MG/G
OINTMENT TOPICAL ONCE
OUTPATIENT
Start: 2024-07-12 | End: 2024-07-12

## 2024-07-12 RX ORDER — LIDOCAINE 50 MG/G
OINTMENT TOPICAL ONCE
OUTPATIENT
Start: 2024-07-12 | End: 2024-07-12

## 2024-07-12 RX ORDER — BETAMETHASONE DIPROPIONATE 0.5 MG/G
CREAM TOPICAL ONCE
OUTPATIENT
Start: 2024-07-12 | End: 2024-07-12

## 2024-07-12 RX ORDER — BACITRACIN ZINC AND POLYMYXIN B SULFATE 500; 1000 [USP'U]/G; [USP'U]/G
OINTMENT TOPICAL ONCE
OUTPATIENT
Start: 2024-07-12 | End: 2024-07-12

## 2024-07-12 RX ORDER — IBUPROFEN 200 MG
TABLET ORAL ONCE
OUTPATIENT
Start: 2024-07-12 | End: 2024-07-12

## 2024-07-12 NOTE — WOUND CARE
Wound Clinic Physician Orders and Discharge Instructions  [facility]  [address], [address]  [place] [address]  Telephone: [phone]     FAX [phone]    NAME:  Damien Ferrer  YOB: 1950  MEDICAL RECORD NUMBER:  [id number]  DATE:  7/12/2024      Return Appointment:  [] Dressing Supply Provider:   [x] Home Healthcare: [facility] continue 3 times a week please  [x] Return Appointment:    2     Week(s)  [] Nurse Visit:     [] Discharge from [facility]: [] Healed        [] Refer to Provider:         [] Consult    Follow-up Information:  [] Ordered Tests:   [x] Referrals: See's Dr. Charlton aug 1          [] Rx:   [x] Other: sit in chair 1 hour twice a day. Rest of the time in bed on your side.       Wound Cleansing:   Do not scrub or use excessive force.  Cleanse wound prior to applying a clean dressing with:  [x] Normal Saline   [] Keep Wound Dry in Shower     [] Wound Cleanser   [] Cleanse wound with Mild Soap & Water    [] Other:       Topical Treatments:  Do not apply lotions, creams, or ointments to wound bed unless directed.   [] Apply moisturizing lotion to skin surrounding the wound prior to dressing change.  [] Apply antifungal ointment to skin surrounding the wound prior to dressing change.  [] Apply thin film of moisture barrier ointment to skin immediately around wound.  [] Apply Betadine to skin immediately around wound   [] Other:      Dressings:           Wound Location SACRUM    [x] Apply Primary Dressing:       [x] MediHoney Gel + packing strips [] MediHoney Alginate  [] Calcium Alginate with Silver   [] Calcium Alginate without silver   [] Collagen with silver 2nd [] Collagen without Silver    [] Santyl with moist saline gauze     [] Hydrofera Blue Packing to undermining then Collagen with silver to fill in depth (cut to size and moistened with normal saline)  [] Hydrofera Blue Ready (cut to size)      [] Normal Saline wet to dry  [] Betadine wet to

## 2024-07-12 NOTE — PROGRESS NOTES
by mouth 3 times daily (with meals)  Patient taking differently: Take 2 tablets by mouth daily 30 minutes before dialysis 1/11/24   Jose Cristina PA-C   prednisoLONE acetate (PRED FORTE) 1 % ophthalmic suspension  9/5/23   Provider, MD Lida   clopidogrel (PLAVIX) 75 MG tablet Take 1 tablet by mouth daily 5/20/23   Jose Cristina PA-C   atorvastatin (LIPITOR) 40 MG tablet Take 1 tablet by mouth daily    Automatic Reconciliation, Ar      Allergies   Allergen Reactions    Lisinopril Angioedema and Other (See Comments)    Penicillins Rash          Signed By: Cristal Snider MD      07/12/24

## 2024-07-12 NOTE — DISCHARGE INSTRUCTIONS
dry    [] Hydrogel  [] Mepitel     [] Bactroban/Mupirocin   [] Iodoform Packing Strip [] Plain Packing Strip   [] Skin Sub:   [] Other:        [x] Cover and Secure with:     [x] Gauze [] Lindy [] Kerlix   [] Zetuvit Plus Silicone Border [x] Super Absorbant or foam border [] ABD     [] Ace Wrap [] Other:     Limit contact of tape with skin.    [x] Change dressing: [] Daily    [] Every Other Day   [] Twice per week   [x] Three times per week    [] Once a week [] Do Not Change Dressing   [] Other:     Negative Pressure:           Wound Location:   [] Pressure @   mm/Hg    []Continuous []Intermittent   [] Black Foam  [] White Foam [] Bridge  [] Change dressing 3 times per week     [] Other: plus  collagen    Pressure Relief:  [x] When sitting, shift position or do seat lifts every 15 minutes.  [x] Wheelchair cushion [x] Specialty Bed/Mattress  [x] Turn every 2 hours when in bed.  Avoid direct pressure on wound site.  Limit side lying to 30 degree tilt.  Limit HOB elevation to 30 degrees.  [x] Other:  sit in chair 1 hour twice a day. Rest of the time in bed on your side                    AVOID PRESSURE TO WOUND     Dietary:  [x] Diet as tolerated: [] Calorie Diabetic Diet: [] No Added Salt:  [x] Increase Protein: [x] Other: Eliot drinks to increase protien intake      Activity:  [x] Activity as tolerated:    [] Patient has no activity restrictions      [] Strict Bedrest   [] Remain off Work      [] May return to full duty work                                     [] Return to work with restrictions     Physician:  [] Dr. Jodee Phelps   [] Dr. Jeremy Chu  [] Dr. Israel Henry  [] Dr. Faith Perez  [] Dr. Catarino Wisdom  [x] Dr.Silky Snider    Nurse Case Manger:  Ángela      Wound Care Center Information: Should you experience any significant changes in your wound(s) or have questions about your wound care, please contact the Wound Center at [phone]. Our hours are Monday - Friday 8am - 4:30pm, closed all major

## 2024-07-23 ENCOUNTER — HOSPITAL ENCOUNTER (OUTPATIENT)
Facility: HOSPITAL | Age: 74
Discharge: HOME | End: 2024-07-26
Attending: INTERNAL MEDICINE
Payer: MEDICARE

## 2024-07-23 DIAGNOSIS — N18.6 ESRD (END STAGE RENAL DISEASE) (HCC): ICD-10-CM

## 2024-07-23 PROCEDURE — 36590 REMOVAL TUNNELED CV CATH: CPT

## 2024-07-23 NOTE — OR NURSING
Patient to IR for PermaCath removal   Consent obtained for removal and lidocaine  Heparin removed, site prepped, line removed without complication  Pressure held at access site, no bleeding noted  No pain verbalized by Patient  Patient verbalized understanding of discharge instructions  Discharged to Lobby/Home with family

## 2024-07-26 ENCOUNTER — HOSPITAL ENCOUNTER (OUTPATIENT)
Facility: HOSPITAL | Age: 74
Discharge: HOME | End: 2024-07-26
Attending: FAMILY MEDICINE
Payer: COMMERCIAL

## 2024-07-26 VITALS
TEMPERATURE: 97.2 F | RESPIRATION RATE: 18 BRPM | SYSTOLIC BLOOD PRESSURE: 110 MMHG | HEART RATE: 81 BPM | DIASTOLIC BLOOD PRESSURE: 61 MMHG

## 2024-07-26 DIAGNOSIS — L89.154 PRESSURE INJURY OF SACRAL REGION, STAGE 4 (HCC): Primary | ICD-10-CM

## 2024-07-26 PROCEDURE — 11042 DBRDMT SUBQ TIS 1ST 20SQCM/<: CPT

## 2024-07-26 RX ORDER — BETAMETHASONE DIPROPIONATE 0.5 MG/G
CREAM TOPICAL ONCE
OUTPATIENT
Start: 2024-07-26 | End: 2024-07-26

## 2024-07-26 RX ORDER — BACITRACIN ZINC AND POLYMYXIN B SULFATE 500; 1000 [USP'U]/G; [USP'U]/G
OINTMENT TOPICAL ONCE
OUTPATIENT
Start: 2024-07-26 | End: 2024-07-26

## 2024-07-26 RX ORDER — LIDOCAINE HYDROCHLORIDE 40 MG/ML
SOLUTION TOPICAL ONCE
OUTPATIENT
Start: 2024-07-26 | End: 2024-07-26

## 2024-07-26 RX ORDER — LIDOCAINE HYDROCHLORIDE 20 MG/ML
JELLY TOPICAL ONCE
OUTPATIENT
Start: 2024-07-26 | End: 2024-07-26

## 2024-07-26 RX ORDER — IBUPROFEN 200 MG
TABLET ORAL ONCE
OUTPATIENT
Start: 2024-07-26 | End: 2024-07-26

## 2024-07-26 RX ORDER — CLOBETASOL PROPIONATE 0.5 MG/G
OINTMENT TOPICAL ONCE
OUTPATIENT
Start: 2024-07-26 | End: 2024-07-26

## 2024-07-26 RX ORDER — LIDOCAINE 40 MG/G
CREAM TOPICAL ONCE
OUTPATIENT
Start: 2024-07-26 | End: 2024-07-26

## 2024-07-26 RX ORDER — GINSENG 100 MG
CAPSULE ORAL ONCE
OUTPATIENT
Start: 2024-07-26 | End: 2024-07-26

## 2024-07-26 RX ORDER — LIDOCAINE 50 MG/G
OINTMENT TOPICAL ONCE
OUTPATIENT
Start: 2024-07-26 | End: 2024-07-26

## 2024-07-26 RX ORDER — TRIAMCINOLONE ACETONIDE 1 MG/G
OINTMENT TOPICAL ONCE
OUTPATIENT
Start: 2024-07-26 | End: 2024-07-26

## 2024-07-26 RX ORDER — GENTAMICIN SULFATE 1 MG/G
OINTMENT TOPICAL ONCE
OUTPATIENT
Start: 2024-07-26 | End: 2024-07-26

## 2024-07-26 RX ORDER — SODIUM CHLOR/HYPOCHLOROUS ACID 0.033 %
SOLUTION, IRRIGATION IRRIGATION ONCE
OUTPATIENT
Start: 2024-07-26 | End: 2024-07-26

## 2024-07-26 NOTE — DISCHARGE INSTRUCTIONS
Wound Clinic Physician Orders and Discharge Instructions  [facility]  [address], [address]  [place] [address]  Telephone: [phone]     FAX [phone]    NAME:  Damien Ferrer  YOB: 1950  MEDICAL RECORD NUMBER:  [id number]  DATE:  7/26/2024      Return Appointment:  [] Dressing Supply Provider:   [x] Home Healthcare: [facility] continue 3 times a week please  [x] Return Appointment:    3     Week(s)  [] Nurse Visit:     [] Discharge from [facility]: [] Healed        [] Refer to Provider:         [] Consult    Follow-up Information:  [] Ordered Tests:   [x] Referrals: See's Dr. Charlton aug 1          [] Rx:   [x] Other: sit in chair 1 hour twice a day. Rest of the time in bed on your side.       Wound Cleansing:   Do not scrub or use excessive force.  Cleanse wound prior to applying a clean dressing with:  [x] Normal Saline   [] Keep Wound Dry in Shower     [] Wound Cleanser   [] Cleanse wound with Mild Soap & Water    [] Other:       Topical Treatments:  Do not apply lotions, creams, or ointments to wound bed unless directed.   [] Apply moisturizing lotion to skin surrounding the wound prior to dressing change.  [] Apply antifungal ointment to skin surrounding the wound prior to dressing change.  [] Apply thin film of moisture barrier ointment to skin immediately around wound.  [] Apply Betadine to skin immediately around wound   [] Other:      Dressings:           Wound Location SACRUM    [x] Apply Primary Dressing:       [] MediHoney Gel + packing strips [] MediHoney Alginate  [] Calcium Alginate with Silver   [] Calcium Alginate without silver   [x] Collagen with silver  [] Collagen without Silver    [] Santyl with moist saline gauze     [] Hydrofera Blue Packing to undermining then Collagen with silver to fill in depth (cut to size and moistened with normal saline)  [] Hydrofera Blue Ready (cut to size)      [] Normal Saline wet to dry  [] Betadine wet to

## 2024-07-26 NOTE — WOUND CARE
Wound Clinic Physician Orders and Discharge Instructions  [facility]  [address], [address]  [place] [address]  Telephone: [phone]     FAX [phone]    NAME:  Damien Ferrer  YOB: 1950  MEDICAL RECORD NUMBER:  [id number]  DATE:  7/26/2024      Return Appointment:  [] Dressing Supply Provider:   [x] Home Healthcare: [facility] continue 3 times a week please  [x] Return Appointment:    3     Week(s)  [] Nurse Visit:     [] Discharge from [facility]: [] Healed        [] Refer to Provider:         [] Consult    Follow-up Information:  [] Ordered Tests:   [x] Referrals: See's Dr. Charlton aug 1          [] Rx:   [x] Other: sit in chair 1 hour twice a day. Rest of the time in bed on your side.       Wound Cleansing:   Do not scrub or use excessive force.  Cleanse wound prior to applying a clean dressing with:  [x] Normal Saline   [] Keep Wound Dry in Shower     [] Wound Cleanser   [] Cleanse wound with Mild Soap & Water    [] Other:       Topical Treatments:  Do not apply lotions, creams, or ointments to wound bed unless directed.   [] Apply moisturizing lotion to skin surrounding the wound prior to dressing change.  [] Apply antifungal ointment to skin surrounding the wound prior to dressing change.  [] Apply thin film of moisture barrier ointment to skin immediately around wound.  [] Apply Betadine to skin immediately around wound   [] Other:      Dressings:           Wound Location SACRUM    [x] Apply Primary Dressing:       [] MediHoney Gel + packing strips [] MediHoney Alginate  [] Calcium Alginate with Silver   [] Calcium Alginate without silver   [x] Collagen with silver  [] Collagen without Silver    [] Santyl with moist saline gauze     [] Hydrofera Blue Packing to undermining then Collagen with silver to fill in depth (cut to size and moistened with normal saline)  [] Hydrofera Blue Ready (cut to size)      [] Normal Saline wet to dry  [] Betadine wet to

## 2024-08-01 ENCOUNTER — OFFICE VISIT (OUTPATIENT)
Age: 74
End: 2024-08-01
Payer: MEDICARE

## 2024-08-01 VITALS
RESPIRATION RATE: 16 BRPM | OXYGEN SATURATION: 96 % | DIASTOLIC BLOOD PRESSURE: 61 MMHG | BODY MASS INDEX: 24.55 KG/M2 | SYSTOLIC BLOOD PRESSURE: 108 MMHG | HEIGHT: 70 IN | HEART RATE: 81 BPM | WEIGHT: 171.5 LBS | TEMPERATURE: 98.2 F

## 2024-08-01 DIAGNOSIS — S31.000D SACRAL WOUND, SUBSEQUENT ENCOUNTER: Primary | ICD-10-CM

## 2024-08-01 PROCEDURE — 1123F ACP DISCUSS/DSCN MKR DOCD: CPT | Performed by: SURGERY

## 2024-08-01 PROCEDURE — 3017F COLORECTAL CA SCREEN DOC REV: CPT | Performed by: SURGERY

## 2024-08-01 PROCEDURE — G8420 CALC BMI NORM PARAMETERS: HCPCS | Performed by: SURGERY

## 2024-08-01 PROCEDURE — 3074F SYST BP LT 130 MM HG: CPT | Performed by: SURGERY

## 2024-08-01 PROCEDURE — G8427 DOCREV CUR MEDS BY ELIG CLIN: HCPCS | Performed by: SURGERY

## 2024-08-01 PROCEDURE — 1036F TOBACCO NON-USER: CPT | Performed by: SURGERY

## 2024-08-01 PROCEDURE — 3078F DIAST BP <80 MM HG: CPT | Performed by: SURGERY

## 2024-08-01 PROCEDURE — 99212 OFFICE O/P EST SF 10 MIN: CPT | Performed by: SURGERY

## 2024-08-01 ASSESSMENT — PATIENT HEALTH QUESTIONNAIRE - PHQ9
SUM OF ALL RESPONSES TO PHQ QUESTIONS 1-9: 0
2. FEELING DOWN, DEPRESSED OR HOPELESS: NOT AT ALL
1. LITTLE INTEREST OR PLEASURE IN DOING THINGS: NOT AT ALL
SUM OF ALL RESPONSES TO PHQ QUESTIONS 1-9: 0
SUM OF ALL RESPONSES TO PHQ QUESTIONS 1-9: 0
SUM OF ALL RESPONSES TO PHQ9 QUESTIONS 1 & 2: 0
SUM OF ALL RESPONSES TO PHQ QUESTIONS 1-9: 0

## 2024-08-05 NOTE — PROGRESS NOTES
Identified pt with two pt identifiers (name and ). Reviewed chart in preparation for visit and have obtained necessary documentation.    Damien Ferrer is a 73 y.o. male  Chief Complaint   Patient presents with    Follow-up     Left leg      /61 (Site: Right Upper Arm, Position: Sitting, Cuff Size: Large Adult)   Pulse 81   Temp 98.2 °F (36.8 °C) (Oral)   Resp 16   Ht 1.778 m (5' 10\")   Wt 77.8 kg (171 lb 8 oz)   SpO2 96%   BMI 24.61 kg/m²     1. Have you been to the ER, urgent care clinic since your last visit?  Hospitalized since your last visit?no    2. Have you seen or consulted any other health care providers outside of the [facility] since your last visit?  Include any pap smears or colon screening. no    
(HCC)    Diabetic foot infection (HCC)    Surgical site infection    S/P BKA (below knee amputation), right (HCC)    Pain of right lower extremity    Peripheral vascular disease (HCC)    Somnolence    Metabolic acidosis    Pressure injury of sacral region, stage 4 (HCC)    Infected wound    Sacral wound, subsequent encounter    Sepsis without acute organ dysfunction (HCC)    Eosinophilia    ESRD on dialysis (HCC)    HFrEF (heart failure with reduced ejection fraction) (HCC)       Plans: Continue local wound care with iodoform packing changes daily.  ABD pad is recommended to cover the sacrum without any tape to hold packing.  Patient will be reassessed in 2-month follow-up.      **Note: This precious is pertinent to patient with PAD.    Vascular risk factor modification regimen:  5 regimens include: Optimal cholesterol control, exercise program, medication modifications including antiplatelet therapy and antihypertensives, optimal blood pressure control, and optimal hydration.  6 regimens include: 5 regimens plus tobacco cessation.  7 regimens include: 5 regimens plus strict diabetic control.  8 regimens include: 6 plus 7 regimens    Zoltan Barros MD

## 2024-08-16 ENCOUNTER — HOSPITAL ENCOUNTER (OUTPATIENT)
Facility: HOSPITAL | Age: 74
Discharge: HOME | End: 2024-08-16
Attending: FAMILY MEDICINE
Payer: COMMERCIAL

## 2024-08-16 VITALS
TEMPERATURE: 97.2 F | RESPIRATION RATE: 18 BRPM | HEART RATE: 76 BPM | SYSTOLIC BLOOD PRESSURE: 130 MMHG | DIASTOLIC BLOOD PRESSURE: 66 MMHG

## 2024-08-16 DIAGNOSIS — L89.154 PRESSURE INJURY OF SACRAL REGION, STAGE 4 (HCC): Primary | ICD-10-CM

## 2024-08-16 PROCEDURE — 99212 OFFICE O/P EST SF 10 MIN: CPT

## 2024-08-16 RX ORDER — BETAMETHASONE DIPROPIONATE 0.5 MG/G
CREAM TOPICAL ONCE
OUTPATIENT
Start: 2024-08-16 | End: 2024-08-16

## 2024-08-16 RX ORDER — CLOBETASOL PROPIONATE 0.5 MG/G
OINTMENT TOPICAL ONCE
OUTPATIENT
Start: 2024-08-16 | End: 2024-08-16

## 2024-08-16 RX ORDER — LIDOCAINE 50 MG/G
OINTMENT TOPICAL ONCE
OUTPATIENT
Start: 2024-08-16 | End: 2024-08-16

## 2024-08-16 RX ORDER — LIDOCAINE 40 MG/G
CREAM TOPICAL ONCE
OUTPATIENT
Start: 2024-08-16 | End: 2024-08-16

## 2024-08-16 RX ORDER — GENTAMICIN SULFATE 1 MG/G
OINTMENT TOPICAL ONCE
OUTPATIENT
Start: 2024-08-16 | End: 2024-08-16

## 2024-08-16 RX ORDER — GINSENG 100 MG
CAPSULE ORAL ONCE
OUTPATIENT
Start: 2024-08-16 | End: 2024-08-16

## 2024-08-16 RX ORDER — TRIAMCINOLONE ACETONIDE 1 MG/G
OINTMENT TOPICAL ONCE
OUTPATIENT
Start: 2024-08-16 | End: 2024-08-16

## 2024-08-16 RX ORDER — BACITRACIN ZINC AND POLYMYXIN B SULFATE 500; 1000 [USP'U]/G; [USP'U]/G
OINTMENT TOPICAL ONCE
OUTPATIENT
Start: 2024-08-16 | End: 2024-08-16

## 2024-08-16 RX ORDER — LIDOCAINE HYDROCHLORIDE 20 MG/ML
JELLY TOPICAL ONCE
OUTPATIENT
Start: 2024-08-16 | End: 2024-08-16

## 2024-08-16 RX ORDER — IBUPROFEN 200 MG
TABLET ORAL ONCE
OUTPATIENT
Start: 2024-08-16 | End: 2024-08-16

## 2024-08-16 RX ORDER — LIDOCAINE HYDROCHLORIDE 40 MG/ML
SOLUTION TOPICAL ONCE
OUTPATIENT
Start: 2024-08-16 | End: 2024-08-16

## 2024-08-16 RX ORDER — SODIUM CHLOR/HYPOCHLOROUS ACID 0.033 %
SOLUTION, IRRIGATION IRRIGATION ONCE
OUTPATIENT
Start: 2024-08-16 | End: 2024-08-16

## 2024-08-16 NOTE — WOUND CARE
Wound Clinic Physician Orders and Discharge Instructions  [facility]  [address], [address]  [place] [address]  Telephone: [phone]     FAX [phone]    NAME:  Damien Ferrer  YOB: 1950  MEDICAL RECORD NUMBER:  [id number]  DATE:  8/16/2024      Return Appointment:  [] Dressing Supply Provider:   [x] Home Healthcare: [facility] discontinue for wound care   [] Return Appointment:         Week(s)  [] Nurse Visit:     [x] Discharge from [facility]: [x] Healed        [] Refer to Provider:         [] Consult    Follow-up Information:  [] Ordered Tests:   [] Referrals: See's Dr. Charlton aug 1          [] Rx:   [x] Other: sit in chair 1 hour twice a day. Rest of the time in bed on your side.       Wound Cleansing:   Do not scrub or use excessive force.  Cleanse wound prior to applying a clean dressing with:  [] Normal Saline   [] Keep Wound Dry in Shower     [] Wound Cleanser   [] Cleanse wound with Mild Soap & Water    [] Other:       Topical Treatments:  Do not apply lotions, creams, or ointments to wound bed unless directed.   [] Apply moisturizing lotion to skin surrounding the wound prior to dressing change.  [] Apply antifungal ointment to skin surrounding the wound prior to dressing change.  [] Apply thin film of moisture barrier ointment to skin immediately around wound.  [] Apply Betadine to skin immediately around wound   [] Other:      Dressings:           Wound Location SACRUM    [x] Apply Primary Dressing:       [] MediHoney Gel + packing strips [] MediHoney Alginate  [] Calcium Alginate with Silver   [] Calcium Alginate without silver   [] Collagen with silver  [] Collagen without Silver    [] Santyl with moist saline gauze     [] Hydrofera Blue Packing to undermining then Collagen with silver to fill in depth (cut to size and moistened with normal saline)  [] Hydrofera Blue Ready (cut to size)      [] Normal Saline wet to dry  [] Betadine wet to

## 2024-08-16 NOTE — DISCHARGE INSTRUCTIONS
Wound Clinic Physician Orders and Discharge Instructions  [facility]  [address], [address]  [place] [address]  Telephone: [phone]     FAX [phone]    NAME:  Damien Ferrer  YOB: 1950  MEDICAL RECORD NUMBER:  [id number]  DATE:  8/16/2024      Return Appointment:  [] Dressing Supply Provider:   [x] Home Healthcare: [facility] discontinue for wound care   [] Return Appointment:         Week(s)  [] Nurse Visit:     [x] Discharge from [facility]: [x] Healed        [] Refer to Provider:         [] Consult    Follow-up Information:  [] Ordered Tests:   [] Referrals: See's Dr. Charlton aug 1          [] Rx:   [x] Other: sit in chair 1 hour twice a day. Rest of the time in bed on your side.       Wound Cleansing:   Do not scrub or use excessive force.  Cleanse wound prior to applying a clean dressing with:  [] Normal Saline   [] Keep Wound Dry in Shower     [] Wound Cleanser   [] Cleanse wound with Mild Soap & Water    [] Other:       Topical Treatments:  Do not apply lotions, creams, or ointments to wound bed unless directed.   [] Apply moisturizing lotion to skin surrounding the wound prior to dressing change.  [] Apply antifungal ointment to skin surrounding the wound prior to dressing change.  [] Apply thin film of moisture barrier ointment to skin immediately around wound.  [] Apply Betadine to skin immediately around wound   [] Other:      Dressings:           Wound Location SACRUM    [x] Apply Primary Dressing:       [] MediHoney Gel + packing strips [] MediHoney Alginate  [] Calcium Alginate with Silver   [] Calcium Alginate without silver   [] Collagen with silver  [] Collagen without Silver    [] Santyl with moist saline gauze     [] Hydrofera Blue Packing to undermining then Collagen with silver to fill in depth (cut to size and moistened with normal saline)  [] Hydrofera Blue Ready (cut to size)      [] Normal Saline wet to dry  [] Betadine wet to

## 2024-08-16 NOTE — PROGRESS NOTES
VASCULAR N/A 05/16/2023    Angioplasty post tib artery performed by Papo Charlton MD at [facility] ELECTROPHYSIOLOGY    INVASIVE VASCULAR N/A 05/16/2023    Angioplasty popliteal artery performed by Papo Charlton MD at [facility] ELECTROPHYSIOLOGY    INVASIVE VASCULAR N/A 10/24/2023    Angiography lower ext left performed by Papo Charlton MD at [facility] ELECTROPHYSIOLOGY    INVASIVE VASCULAR N/A 10/24/2023    Ultrasound guided vascular access performed by Papo Charlton MD at [facility] ELECTROPHYSIOLOGY    INVASIVE VASCULAR N/A 10/24/2023    Aortagram abdominal performed by Papo Charlton MD at [facility] ELECTROPHYSIOLOGY    INVASIVE VASCULAR N/A 10/24/2023    Pta femoral popliteal artery performed by Papo Charlton MD at [facility] ELECTROPHYSIOLOGY    IR FLUORO GUIDED NEEDLE PLACEMENT  10/18/2021    IR FLUOROSCOPY GUIDED CENTRAL VENOUS ACCESS DEVICE PLACEMENT  1/22/2021    IR FLUOROSCOPY GUIDED CENTRAL VENOUS ACCESS DEVICE PLACEMENT  1/18/2021    IR FLUOROSCOPY GUIDED CENTRAL VENOUS ACCESS DEVICE PLACEMENT  12/13/2021    IR NONTUNNELED VASCULAR CATHETER  10/18/2021    IR NONTUNNELED VASCULAR CATHETER 10/18/2021 [facility]    IR NONTUNNELED VASCULAR CATHETER  01/18/2021    IR NONTUNNELED VASCULAR CATHETER 1/18/2021 [facility]    IR NONTUNNELED VASCULAR CATHETER  10/18/2021    IR NONTUNNELED VASCULAR CATHETER  1/18/2021    IR NONTUNNELED VASCULAR CATHETER  01/03/2024    IR NONTUNNELED VASCULAR CATHETER 1/3/2024 Farida Leon PA-C [facility]    IR REMOVAL TUNNELED CVC WO PORT PUMP  10/18/2021    IR TUNNELED CATHETER PLACEMENT GREATER THAN 5 YEARS  01/22/2021    IR TUNNELED CATHETER PLACEMENT GREATER THAN 5 YEARS 1/22/2021 [facility]    IR TUNNELED CATHETER PLACEMENT GREATER THAN 5 YEARS  12/13/2021    IR TUNNELED CATHETER PLACEMENT GREATER THAN 5 YEARS 12/13/2021 [facility]    IR TUNNELED CATHETER PLACEMENT GREATER THAN 5 YEARS  1/22/2021    IR TUNNELED CATHETER PLACEMENT GREATER THAN 5 YEARS  12/13/2021    LEG

## 2024-09-19 ENCOUNTER — OFFICE VISIT (OUTPATIENT)
Age: 74
End: 2024-09-19

## 2024-09-19 VITALS
WEIGHT: 179 LBS | HEART RATE: 75 BPM | RESPIRATION RATE: 16 BRPM | HEIGHT: 70 IN | DIASTOLIC BLOOD PRESSURE: 70 MMHG | OXYGEN SATURATION: 97 % | SYSTOLIC BLOOD PRESSURE: 141 MMHG | BODY MASS INDEX: 25.62 KG/M2 | TEMPERATURE: 97.9 F

## 2024-09-19 DIAGNOSIS — Z20.818 EXPOSURE TO LISTERIA MONOCYTOGENES: Primary | ICD-10-CM

## 2024-10-03 ENCOUNTER — OFFICE VISIT (OUTPATIENT)
Age: 74
End: 2024-10-03
Payer: MEDICARE

## 2024-10-03 VITALS
TEMPERATURE: 98.3 F | BODY MASS INDEX: 24.62 KG/M2 | SYSTOLIC BLOOD PRESSURE: 129 MMHG | HEART RATE: 79 BPM | RESPIRATION RATE: 16 BRPM | OXYGEN SATURATION: 97 % | HEIGHT: 70 IN | DIASTOLIC BLOOD PRESSURE: 66 MMHG | WEIGHT: 172 LBS

## 2024-10-03 DIAGNOSIS — S31.000D SACRAL WOUND, SUBSEQUENT ENCOUNTER: Primary | ICD-10-CM

## 2024-10-03 PROCEDURE — 1123F ACP DISCUSS/DSCN MKR DOCD: CPT | Performed by: SURGERY

## 2024-10-03 PROCEDURE — G8484 FLU IMMUNIZE NO ADMIN: HCPCS | Performed by: SURGERY

## 2024-10-03 PROCEDURE — 3078F DIAST BP <80 MM HG: CPT | Performed by: SURGERY

## 2024-10-03 PROCEDURE — G8427 DOCREV CUR MEDS BY ELIG CLIN: HCPCS | Performed by: SURGERY

## 2024-10-03 PROCEDURE — 99212 OFFICE O/P EST SF 10 MIN: CPT | Performed by: SURGERY

## 2024-10-03 PROCEDURE — 1036F TOBACCO NON-USER: CPT | Performed by: SURGERY

## 2024-10-03 PROCEDURE — 3017F COLORECTAL CA SCREEN DOC REV: CPT | Performed by: SURGERY

## 2024-10-03 PROCEDURE — G8420 CALC BMI NORM PARAMETERS: HCPCS | Performed by: SURGERY

## 2024-10-03 PROCEDURE — 3074F SYST BP LT 130 MM HG: CPT | Performed by: SURGERY

## 2024-10-03 ASSESSMENT — PATIENT HEALTH QUESTIONNAIRE - PHQ9
SUM OF ALL RESPONSES TO PHQ QUESTIONS 1-9: 0
SUM OF ALL RESPONSES TO PHQ9 QUESTIONS 1 & 2: 0
SUM OF ALL RESPONSES TO PHQ QUESTIONS 1-9: 0
SUM OF ALL RESPONSES TO PHQ QUESTIONS 1-9: 0
2. FEELING DOWN, DEPRESSED OR HOPELESS: NOT AT ALL
1. LITTLE INTEREST OR PLEASURE IN DOING THINGS: NOT AT ALL
SUM OF ALL RESPONSES TO PHQ QUESTIONS 1-9: 0

## 2024-10-03 NOTE — PROGRESS NOTES
Identified pt with two pt identifiers (name and ). Reviewed chart in preparation for visit and have obtained necessary documentation.    Damien Ferrer is a 73 y.o. male  Chief Complaint   Patient presents with    Wound Check     /66 (Site: Right Upper Arm, Position: Sitting, Cuff Size: Small Adult)   Pulse 79   Temp 98.3 °F (36.8 °C) (Oral)   Resp 16   Ht 1.778 m (5' 10\")   Wt 78 kg (172 lb)   SpO2 97%   BMI 24.68 kg/m²     1. Have you been to the ER, urgent care clinic since your last visit?  Hospitalized since your last visit?NO    2. Have you seen or consulted any other health care providers outside of the [facility] since your last visit?  Include any pap smears or colon screening. Yes

## 2024-10-10 NOTE — PROGRESS NOTES
Problem: Falls - Risk of  Goal: *Absence of Falls  Description: Document [place] Fall Risk and appropriate interventions in the flowsheet.   Outcome: Progressing Towards Goal  Note: Fall Risk Interventions:  Mobility Interventions: Bed/chair exit alarm, Patient to call before getting OOB, Utilize walker, cane, or other assistive device    Mentation Interventions: Bed/chair exit alarm, More frequent rounding    Medication Interventions: Bed/chair exit alarm, Patient to call before getting OOB, Teach patient to arise slowly    Elimination Interventions: Bed/chair exit alarm, Call light in reach, Patient to call for help with toileting needs, Urinal in reach    History of Falls Interventions: Bed/chair exit alarm removed.

## 2024-10-17 NOTE — H&P
General surgery history and Physical     Patient: Renetta Angela                   MRN: [id number]          YOB: 1950        Age: 71 y.o. Sex: male      Chief Complaint:  No chief complaint on file. History of Present Illness: Renetta Angela is a 71 y.o. very pleasant gentleman with a history of ischemic leg and right leg requiring right BKA. Patient currently recovering from that. Patient also has severe PAD on the left leg with ankle index 0.5 with pain.      Social History:  Social Connections: Not on file         Past Medical History:  Past Medical History        Past Medical History:   Diagnosis Date    Asthenia 01/24/2021    Cardiomyopathy ([address]) 01/24/2021    CHF (congestive heart failure) (HCC)      Chronic kidney disease      CKD (chronic kidney disease)       4    End stage renal disease on dialysis ([address]) 01/24/2021    Essential hypertension 01/24/2021    Gastroesophageal reflux disease 01/24/2021    Gastroesophageal reflux disease 01/24/2021    Hypertension      Pneumonia due to COVID-19 virus 31/63/8891    Systolic CHF, acute on chronic ([address]) 01/24/2021         Surgical History:  Past Surgical History         Past Surgical History:   Procedure Laterality Date    CARDIAC CATHETERIZATION        COLONOSCOPY N/A 12/3/2020     COLONOSCOPY performed by Isadora Abbott MD at [facility] N/A 12/2/2022     COLONOSCOPY performed by Isadora Abbott MD at [address] [place] [address] 5/17/2023     Open Transmetatarsal Amputation Right Foot performed by Sarah Acevedo DPM at [facility] N/A 5/16/2023     Angiography lower ext right performed by Amanda Bryant MD at [address] [place] [address] 5/16/2023     Aortagram scheduled for this procedure in October 17, 2023. (1) More than 48 hours/None

## 2025-01-09 ENCOUNTER — OFFICE VISIT (OUTPATIENT)
Age: 75
End: 2025-01-09

## 2025-01-09 VITALS — DIASTOLIC BLOOD PRESSURE: 78 MMHG | HEART RATE: 85 BPM | OXYGEN SATURATION: 98 % | SYSTOLIC BLOOD PRESSURE: 138 MMHG

## 2025-01-09 DIAGNOSIS — H91.03 OTOTOXIC HEARING LOSS OF BOTH EARS: Primary | ICD-10-CM

## 2025-01-09 DIAGNOSIS — H90.3 SENSORINEURAL HEARING LOSS (SNHL) OF BOTH EARS: ICD-10-CM

## 2025-01-09 NOTE — PROGRESS NOTES
Subjective:   Damien Ferrer   74 y.o.   1950     Refered by: No referring provider defined for this encounter.     New Patient Visit  Chief Compliant: hearing loss    History of Present Illness:  Damien Ferrer is a 74 y.o. male with past medical history of CHF, CKD, HTN, GERD, who presents today for hearing loss.     Patient was recently admitted for sepsis from a sacral wound. Was treated with IV Abx including  Zosyn, Vanc, and daptomycin. Patient experienced significant hearing loss after treatment with the medications, is following up for evaluation. Patient has baseline hearing loss, but hearing has acutely worsened. Patient is currently still on Cefepime.     Hospital admission notes reviewed including IM, ID, Gen surg, and cardiology.     Interval Hx:   3/21/24:   After course of steroids patient's hearing has improved 30 to 40% from prior.  Still having significant decrease in hearing from baseline    Outside audiogram independently reviewed.  Shows moderately severe hearing loss across the board with significant decrease in word recognition scores.  Has been using over-the-counter amplification devices currently with some benefit    4/18/24:   Hearing is unchanged from prior visit still quite poor.  Additionally patient was attending clean his ear with a Q-tip and lost the end of it in his left external auditory canal.      Wound care and general surgery notes reviewed.    5/16/24:   Patient has now received 4 intratympanic steroid injections over the last 4 weeks.  Subjectively there is been some improvement in his hearing from prior.  Daughter feels like his hearing has improved significantly.    7/11/24:   Subjectively feels like his hearing is much improved from his initial visit.     Audiogram completed today and independently reviewed. Moderately severe SNHL bilaterally with decreased WRS. Discussed hearing aids with patient and possible CI. He would like to defer at this time and use his OTC

## 2025-01-23 ENCOUNTER — PROCEDURE VISIT (OUTPATIENT)
Age: 75
End: 2025-01-23

## 2025-01-23 DIAGNOSIS — H90.3 SENSORINEURAL HEARING LOSS (SNHL), BILATERAL: Primary | ICD-10-CM

## 2025-01-23 NOTE — PROGRESS NOTES
Damien Ferrer  1950.74 y.o. male   [id number]      HEARING AID EVALUATION    Damien Ferrer was seen today, 1/23/2025 , for a Hearing Aid Evaluation following audiologic evaluation on 7/11/2024.  Patient was found to have no direct insurance benefit for hearing aids with access to possible hearing aid discount through third party  ([facility]). Patient understands they are responsible for any cost insurance does not cover. Hearing aid benefit worksheet scanned into media tab.     DATE: 1/23/2025       LIFESTYLE EVALUATION:      After a discussion of evaluation results, discussion of levels of technology and prices, and lifestyle assessment. Damien Ferrer has decided to pursue hearing aids at a different facility that may offer reduced prices. Signed medical evaluation waiver and evaluation agreement.    Technology to be considered: Economy ITE-style hearing aids.          No charge for today's appointment.      Discussed with patient that any portion of the total cost that is not paid by insurance will be the patient's financial responsibility. Estimated insurance coverage is a verification of benefit and not a guarantee.     PATIENT EDUCATION:      - Verbally reviewed benefits and limitations of devices and available features in hearing aids.    - Verbally discussed realistic expectations of hearing aid use     RECOMMENDATIONS:     - Contact audiology if patient decides to purchase through [facility] Chandrika Dior CCC-A  Audiologist

## 2025-06-05 ENCOUNTER — OFFICE VISIT (OUTPATIENT)
Age: 75
End: 2025-06-05
Payer: MEDICARE

## 2025-06-05 VITALS
OXYGEN SATURATION: 98 % | TEMPERATURE: 98.1 F | SYSTOLIC BLOOD PRESSURE: 119 MMHG | WEIGHT: 172.5 LBS | RESPIRATION RATE: 16 BRPM | HEART RATE: 89 BPM | BODY MASS INDEX: 24.69 KG/M2 | DIASTOLIC BLOOD PRESSURE: 69 MMHG | HEIGHT: 70 IN

## 2025-06-05 DIAGNOSIS — L08.9 DIABETIC FOOT INFECTION (HCC): Primary | ICD-10-CM

## 2025-06-05 DIAGNOSIS — E11.628 DIABETIC FOOT INFECTION (HCC): Primary | ICD-10-CM

## 2025-06-05 PROCEDURE — 3074F SYST BP LT 130 MM HG: CPT | Performed by: SURGERY

## 2025-06-05 PROCEDURE — 1123F ACP DISCUSS/DSCN MKR DOCD: CPT | Performed by: SURGERY

## 2025-06-05 PROCEDURE — G8427 DOCREV CUR MEDS BY ELIG CLIN: HCPCS | Performed by: SURGERY

## 2025-06-05 PROCEDURE — 1126F AMNT PAIN NOTED NONE PRSNT: CPT | Performed by: SURGERY

## 2025-06-05 PROCEDURE — 1036F TOBACCO NON-USER: CPT | Performed by: SURGERY

## 2025-06-05 PROCEDURE — G8420 CALC BMI NORM PARAMETERS: HCPCS | Performed by: SURGERY

## 2025-06-05 PROCEDURE — 3017F COLORECTAL CA SCREEN DOC REV: CPT | Performed by: SURGERY

## 2025-06-05 PROCEDURE — 2022F DILAT RTA XM EVC RTNOPTHY: CPT | Performed by: SURGERY

## 2025-06-05 PROCEDURE — 3046F HEMOGLOBIN A1C LEVEL >9.0%: CPT | Performed by: SURGERY

## 2025-06-05 PROCEDURE — 99212 OFFICE O/P EST SF 10 MIN: CPT | Performed by: SURGERY

## 2025-06-05 PROCEDURE — 3078F DIAST BP <80 MM HG: CPT | Performed by: SURGERY

## 2025-06-05 ASSESSMENT — PATIENT HEALTH QUESTIONNAIRE - PHQ9
SUM OF ALL RESPONSES TO PHQ QUESTIONS 1-9: 0
1. LITTLE INTEREST OR PLEASURE IN DOING THINGS: NOT AT ALL
SUM OF ALL RESPONSES TO PHQ QUESTIONS 1-9: 0
2. FEELING DOWN, DEPRESSED OR HOPELESS: NOT AT ALL

## 2025-06-05 NOTE — PROGRESS NOTES
Identified pt with two pt identifiers (name and ). Reviewed chart in preparation for visit and have obtained necessary documentation.    Damien Ferrer is a 74 y.o. male  Chief Complaint   Patient presents with    Follow-up    Wound Check     Right Leg BKA      /69 (BP Site: Right Upper Arm, Patient Position: Sitting, BP Cuff Size: Large Adult)   Pulse 89   Temp 98.1 °F (36.7 °C) (Oral)   Resp 16   Ht 1.778 m (5' 10\")   Wt 78.2 kg (172 lb 8 oz)   SpO2 98%   BMI 24.75 kg/m²

## 2025-06-12 NOTE — PROGRESS NOTES
Vascular History and Physical    Patient: Damien Ferrer  MRN: [id number]    YOB: 1950  Age: 74 y.o.  Sex: male     Chief Complaint:  Chief Complaint   Patient presents with    Follow-up    Wound Check     Right Leg BKA        History of Present Illness: Damien Ferrer is a 74 y.o. very pleasant gentleman is here today for surveillance vascular examination.  Patient had a previous right BKA.  Patient currently doing much better.  He is currently ambulating with a new prosthetic leg.    Social History:  Social Connections: Socially Isolated (7/7/2021)    Received from [facility]  (prior to 6/17/2023), [facility]  (prior to 6/17/2023)    Social Connection and Isolation Panel [NHANES]     Frequency of Communication with Friends and Family: Once a week     Frequency of Social Gatherings with Friends and Family: Once a week     Attends [employer] Services: Never     Active Member of Clubs or Organizations: No     Attends Club or Organization Meetings: Never     Marital Status:        Past Medical History:  Past Medical History:   Diagnosis Date    Asthenia 01/24/2021    Cardiomyopathy (HCC) 01/24/2021    CHF (congestive heart failure) (HCC)     Chronic kidney disease     CKD (chronic kidney disease)     4    End stage renal disease on dialysis (HCC) 01/24/2021    Essential hypertension 01/24/2021    Gastroesophageal reflux disease 01/24/2021    Gastroesophageal reflux disease 01/24/2021    Hypertension     Pneumonia due to COVID-19 virus 01/24/2021    Systolic CHF, acute on chronic (HCC) 01/24/2021       Surgical History:  Past Surgical History:   Procedure Laterality Date    CARDIAC CATHETERIZATION      COLONOSCOPY N/A 12/3/2020    COLONOSCOPY performed by Patsy Chakraborty MD at [facility] ENDOSCOPY    COLONOSCOPY N/A 12/2/2022    COLONOSCOPY performed by Patsy Chakraborty MD at [facility] ENDOSCOPY    FOOT SURGERY Right 05/17/2023    Open Transmetatarsal Amputation Right Foot

## (undated) DEVICE — 3M™ TEGADERM™ TRANSPARENT FILM DRESSING FIRST AID STYLE, 1621, 4 IN X 4-3/4 IN, 50 BAGS/CARTON, 4 CARTONS/CASE: Brand: 3M™ TEGADERM™

## (undated) DEVICE — RADIFOCUS GLIDEWIRE: Brand: GLIDEWIRE

## (undated) DEVICE — NAVICROSS SUPPORT CATHETER: Brand: NAVICROSS

## (undated) DEVICE — CATHETER PTCA L130CM BLLN L80MM DIA7MM SHTH 7FR DRUG COAT

## (undated) DEVICE — SOUTHSIDE TURNOVER: Brand: MEDLINE INDUSTRIES, INC.

## (undated) DEVICE — GARMENT,MEDLINE,DVT,INT,CALF,MED, GEN2: Brand: MEDLINE

## (undated) DEVICE — GLOVE ORANGE PI 7 1/2   MSG9075

## (undated) DEVICE — STOCKINETTE,IMPERVIOUS,12X48,STERILE: Brand: MEDLINE

## (undated) DEVICE — BLOCK BITE STD GRN ORAL AD W/O STRP SLD PLAS DISP BITEGARD

## (undated) DEVICE — MASK ANES INF SZ 2 PREM TAIL VLV INFL PRT UNSCENTED SGL PT

## (undated) DEVICE — STRYKER PERFORMANCE SERIES SAGITTAL BLADE: Brand: STRYKER PERFORMANCE SERIES

## (undated) DEVICE — T-DRAPE,EXTREMITY,STERILE: Brand: MEDLINE

## (undated) DEVICE — MARKER,SKIN,WI/RULER AND LABELS: Brand: MEDLINE

## (undated) DEVICE — Device

## (undated) DEVICE — DRESSING,GAUZE,XEROFORM,CURAD,5"X9",ST: Brand: CURAD

## (undated) DEVICE — TRAP ENDOSCP CLR PLAS 4 CHMBR FIX DISP CATCHEM

## (undated) DEVICE — PADDING CAST W4INXL4YD ST COT RAYON MICROPLEATED HIGHLY

## (undated) DEVICE — SUTURE VCRL SZ 2-0 L27IN ABSRB UD L26MM CT-2 1/2 CIR J269H

## (undated) DEVICE — SUTURE VCRL + SZ 0 L27IN ABSRB UD CT-1 L36MM 1/2 CIR TAPR VCP260H

## (undated) DEVICE — Device: Brand: QUICK-CROSS SUPPORT CATHETER

## (undated) DEVICE — CATHETER MARINER BRAID RIM 5FX65 CM .035 IN.DIAGNOSTIC

## (undated) DEVICE — ELECTRODE PT RET AD L9FT HI MOIST COND ADH HYDRGEL CORDED

## (undated) DEVICE — SOLUTION SURG PREP 26 CC PURPREP

## (undated) DEVICE — SUTURE ABSORBABLE MONOFILAMENT 2-0 SH 27 IN VIO MONOCRYL + MCP317H

## (undated) DEVICE — INTENDED FOR TISSUE SEPARATION, AND OTHER PROCEDURES THAT REQUIRE A SHARP SURGICAL BLADE TO PUNCTURE OR CUT.: Brand: BARD-PARKER SAFETY BLADES SIZE 10, STERILE

## (undated) DEVICE — 1010 S-DRAPE TOWEL DRAPE 10/BX: Brand: STERI-DRAPE™

## (undated) DEVICE — INTENDED FOR TISSUE SEPARATION, AND OTHER PROCEDURES THAT REQUIRE A SHARP SURGICAL BLADE TO PUNCTURE OR CUT.: Brand: BARD-PARKER SAFETY BLADES SIZE 15, STERILE

## (undated) DEVICE — DESTINATION PERIPHERAL GUIDING SHEATH: Brand: DESTINATION

## (undated) DEVICE — GLOVE SURG SZ 8 L12IN FNGR THK79MIL GRN LTX FREE

## (undated) DEVICE — REM POLYHESIVE ADULT PATIENT RETURN ELECTRODE: Brand: VALLEYLAB

## (undated) DEVICE — BANDAGE COMPR M W6INXL10YD WHT BGE VELC E MTRX HK AND LOOP

## (undated) DEVICE — CATHETER PTCA 4FR L150CM BLLN L80MM DIA2MM 14ATM 0.014IN

## (undated) DEVICE — EVACUATED CONTAINER, 1000 ML

## (undated) DEVICE — BANDAGE E W6INXL11YD CLP CLSR DBL LEN FLEX-MASTER

## (undated) DEVICE — MICROPUNCTURE INTRODUCER SET SILHOUETTE TRANSITIONLESS PUSH-PLUS DESIGN - STIFFENED CANNULA WITH STAINLESS STEEL WIRE GUIDE: Brand: MICROPUNCTURE

## (undated) DEVICE — SUTURE PERMAHAND SZ 0 L18IN NONABSORBABLE BLK SILK BRAID A186H

## (undated) DEVICE — BLADE,CARBON-STEEL,10,STRL,DISPOSABLE,TB: Brand: MEDLINE

## (undated) DEVICE — BANDAGE,GAUZE,CONFORMING,3"X75",STRL,LF: Brand: MEDLINE

## (undated) DEVICE — CATHETER PTCA 4FR L150CM BLLN L120MM DIA2MM 14ATM 0.014IN

## (undated) DEVICE — STOCKINETTE,DOUBLE PLY,6X48,STERILE: Brand: MEDLINE

## (undated) DEVICE — FCPS RAD JAW 4LC 240CM W/NDL -- BX/20 RADIAL JAW 4

## (undated) DEVICE — BANDAGE COMPR W4INXL5YD WHT BGE POLY COT M E WRP WV HK AND

## (undated) DEVICE — FORCEPS BX L240CM JAW DIA2.8MM L CAP W/ NDL MIC MESH TOOTH

## (undated) DEVICE — SPONGE GZ W4XL4IN COT 12 PLY TYP VII WVN C FLD DSGN STERILE

## (undated) DEVICE — DRAPE,REIN 53X77,STERILE: Brand: MEDLINE

## (undated) DEVICE — THE ENDO CARRY-ON PROCEDURE KIT CONTAINS ALL OF THE SUPPLIES AND INFECTION PREVENTION PRODUCTS NEEDED FOR ENDOSCOPIC PROCEDURES: Brand: ENDO CARRY-ON PROCEDURE KIT

## (undated) DEVICE — DRESSING PETRO W3XL3IN OIL EMUL N ADH GZ KNIT IMPREG CELOS

## (undated) DEVICE — MICRO DUAL CUT (13.0 X 0.51 X 45.0MM)

## (undated) DEVICE — GAUZE,SPONGE,4"X4",16PLY,STRL,LF,10/TRAY: Brand: MEDLINE

## (undated) DEVICE — DEVICE INFL 20ML BRL POLYCARB ANALG LUMINESCENT DISPLAY ANG

## (undated) DEVICE — BASIC SINGLE BASIN-LF: Brand: MEDLINE INDUSTRIES, INC.

## (undated) DEVICE — PINNACLE INTRODUCER SHEATH: Brand: PINNACLE

## (undated) DEVICE — Z INACTIVE UOM QTY CHANGE USE 2863475 DEVICE INFL 20ML BRL POLYCARB ANALG

## (undated) DEVICE — SUTURE PERMAHAND SZ 2-0 L18IN NONABSORBABLE BLK L26MM SH C012D

## (undated) DEVICE — TUBING, SUCTION, 1/4" X 12', STRAIGHT: Brand: MEDLINE

## (undated) DEVICE — PENCIL ES CRD L10FT HND SWCHING ROCK SWCH W/ EDGE COAT BLDE

## (undated) DEVICE — BANDAGE COBAN 4 IN COMPR W4INXL5YD FOAM COHESIVE QUIK STK SELF ADH SFT

## (undated) DEVICE — GLOW 'N TELL 55CM TAPE (20 STRIPS): Brand: VASCUTAPE RADIOPAQUE TAPE

## (undated) DEVICE — ARMADA 14 PTA CATHETER 2.0 MM X 40 MM X 150 CM / OVER-THE-WIRE: Brand: ARMADA

## (undated) DEVICE — DEVICE EMBOLIC PROTCT FLTR L7MM GWIRE L320/190CM DIA0.014IN

## (undated) DEVICE — 3M™ COBAN™ NL STERILE NON-LATEX SELF-ADHERENT WRAP, 2084S, 4 IN X 5 YD (10 CM X 4,5 M), 18 ROLLS/CASE: Brand: 3M™ COBAN™

## (undated) DEVICE — TRAY PARACENT 8FR 4.75IN --

## (undated) DEVICE — ARMADA 14 PTA CATHETER 2.0 MM X 60 MM X 150 CM / OVER-THE-WIRE: Brand: ARMADA

## (undated) DEVICE — COVER PRB INTOP 96X6 IN LF

## (undated) DEVICE — SPONGE LAPAROTOMY W18XL18IN WHITE STRUNG RADIOPAQUE STERILE

## (undated) DEVICE — BANDAGE,GAUZE,BULKEE II,4.5"X4.1YD,STRL: Brand: MEDLINE

## (undated) DEVICE — DRESSING GZ W3XL16IN CELOS ACETT OIL EMUL N ADH

## (undated) DEVICE — BANDAGE E SELF CLSR 4INX5YD VELC SWIFTWRAP

## (undated) DEVICE — APPLICATOR MEDICATED 26 CC SOLUTION HI LT ORNG CHLORAPREP

## (undated) DEVICE — TOWEL,OR,DSP,ST,BLUE,DLX,6/PK,12PK/CS: Brand: MEDLINE

## (undated) DEVICE — DRAPE,U/ SHT,SPLIT,PLAS,STERIL: Brand: MEDLINE

## (undated) DEVICE — MINOR GENERAL PACK: Brand: MEDLINE INDUSTRIES, INC.

## (undated) DEVICE — SINGLE-USE BIOPSY FORCEPS: Brand: RADIAL JAW 4

## (undated) DEVICE — COVER LT HNDL BLU PLAS

## (undated) DEVICE — PAD,ABDOMINAL,8"X7.5",STERILE,LF,1/PK: Brand: MEDLINE

## (undated) DEVICE — PREP PAD BNS: Brand: CONVERTORS

## (undated) DEVICE — CATHETER ATHRCTMY 6FR L135CM TIP L5.9CM GWIRE 0.014IN

## (undated) DEVICE — ZIMMER® STERILE DISPOSABLE TOURNIQUET CUFF WITH PLC, DUAL PORT, SINGLE BLADDER, 18 IN. (46 CM)

## (undated) DEVICE — PADDING CAST 4 INX5 YD STRL

## (undated) DEVICE — LINE SAMP LNG AD ORAL NSL PT O2 TBNG SMRT CAPNOLN H +

## (undated) DEVICE — HT WINN 40 GUIDE WIRE .014" X 300 CM: Brand: HI-TORQUE WINN

## (undated) DEVICE — SURGICAL PROCEDURE TRAY CRD CATH 3 PRT

## (undated) DEVICE — PADDING CAST W6INXL4YD NONSTERILE COT RAYON MICROPLEATED

## (undated) DEVICE — MINOR EXTREMITY PACK: Brand: MEDLINE INDUSTRIES, INC.

## (undated) DEVICE — SINGLE-USE POLYPECTOMY SNARE: Brand: CAPTIFLEX

## (undated) DEVICE — SOLUTION IRRIG 500ML 0.9% SOD CHLO USP POUR PLAS BTL

## (undated) DEVICE — HT WINN 80 GUIDE WIRE .014" X 300 CM: Brand: HI-TORQUE WINN

## (undated) DEVICE — SUTURE PROL SZ 5-0 L30IN NONABSORBABLE BLU L13MM C-1 3/8 8890H

## (undated) DEVICE — PADDING CAST W6INXL4YD ST COT RAYON MICROPLEATED HIGHLY

## (undated) DEVICE — SUTURE ETHLN SZ 2-0 L18IN NONABSORBABLE BLK L26MM FS 3/8 664G

## (undated) DEVICE — BANDAGE COMPR W6INXL5YD WHT BGE POLY COT M E WRP WV HK AND

## (undated) DEVICE — SYRINGE IRRIG 60ML SFT PLIABLE BLB EZ TO GRP 1 HND USE W/

## (undated) DEVICE — WET SKIN PREP TRAY: Brand: MEDLINE INDUSTRIES, INC.